# Patient Record
Sex: FEMALE | Race: WHITE | NOT HISPANIC OR LATINO | Employment: FULL TIME | URBAN - METROPOLITAN AREA
[De-identification: names, ages, dates, MRNs, and addresses within clinical notes are randomized per-mention and may not be internally consistent; named-entity substitution may affect disease eponyms.]

---

## 2020-09-10 ENCOUNTER — APPOINTMENT (OUTPATIENT)
Dept: OTHER | Facility: HOSPITAL | Age: 40
End: 2020-09-10

## 2020-09-22 ENCOUNTER — APPOINTMENT (OUTPATIENT)
Dept: LAB | Facility: HOSPITAL | Age: 40
End: 2020-09-22

## 2024-09-11 ENCOUNTER — OFFICE VISIT (OUTPATIENT)
Dept: SURGERY | Facility: CLINIC | Age: 44
End: 2024-09-11
Payer: COMMERCIAL

## 2024-09-11 VITALS
RESPIRATION RATE: 16 BRPM | BODY MASS INDEX: 26.66 KG/M2 | DIASTOLIC BLOOD PRESSURE: 72 MMHG | WEIGHT: 160 LBS | HEART RATE: 72 BPM | SYSTOLIC BLOOD PRESSURE: 118 MMHG | HEIGHT: 65 IN

## 2024-09-11 DIAGNOSIS — F17.290 OTHER TOBACCO PRODUCT NICOTINE DEPENDENCE, UNCOMPLICATED: ICD-10-CM

## 2024-09-11 DIAGNOSIS — Z72.0 TOBACCO USE: ICD-10-CM

## 2024-09-11 DIAGNOSIS — N63.14 MASS OF LOWER INNER QUADRANT OF RIGHT BREAST: Primary | ICD-10-CM

## 2024-09-11 DIAGNOSIS — N63.10 LARGE MASS OF RIGHT BREAST: Primary | ICD-10-CM

## 2024-09-11 RX ORDER — DIPHENHYDRAMINE HCL 25 MG
25 CAPSULE ORAL NIGHTLY PRN
COMMUNITY

## 2024-09-11 NOTE — PROGRESS NOTES
Suzanne Lin 44 y.o. female presents @ the req of PHIL Quevedo for eval of RIGHT breast mass.  Pt noticed on 2021 but lost her ins and did not seek medical attention.  Reports mass started the size of a pebble. + pain + discoloration.  Chief Complaint   Patient presents with    Breast Mass     RIGHT             HPI   Above note agree.  This very pleasant 44-year-old female is here with a very large breast mass.  She has 1 child.  She gave birth at age 34.  She did not breast-feed.  She is a former smoker.  She does use nicotine patches at this time.  She has no family history of breast cancer.  She also has Crohn's disease.  She did have a colon resection in 2007.  She has been managing that with diet since that time and has been doing quite well.  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  That was 10 years ago.  She has no chest pain or shortness of breath.  She has no bone pain.  She does have hip pain but that has been an ongoing thing since she was in her 20s.      Review of Systems   All other systems reviewed and are negative.            Current Outpatient Medications:     diphenhydrAMINE (BENADRYL) 25 mg capsule, Take 1 capsule by mouth At Night As Needed for Itching., Disp: , Rfl:     Multiple Vitamins-Minerals (MULTI COMPLETE PO), Take  by mouth., Disp: , Rfl:         No Known Allergies        Past Medical History:   Diagnosis Date    Anxiety     Crohn's disease     DVT (deep vein thrombosis) in pregnancy     Stroke     after the birth of her child at age 34           Past Surgical History:   Procedure Laterality Date    COLON RESECTION      SKIN CANCER EXCISION             Social History     Tobacco Use    Smoking status: Former     Types: Cigarettes    Smokeless tobacco: Current    Tobacco comments:     Nicotine pouches   Vaping Use    Vaping status: Never Used             There is no immunization history on file for this patient.        Physical Exam  Vitals  "and nursing note reviewed.   Constitutional:       Appearance: Normal appearance.   HENT:      Head: Normocephalic and atraumatic.   Cardiovascular:      Rate and Rhythm: Normal rate and regular rhythm.   Pulmonary:      Effort: Pulmonary effort is normal.      Breath sounds: Normal breath sounds.   Chest:   Breasts:     Left: Normal.       Abdominal:      General: Bowel sounds are normal.      Palpations: Abdomen is soft.   Lymphadenopathy:      Upper Body:      Right upper body: No supraclavicular or axillary adenopathy.      Left upper body: No supraclavicular or axillary adenopathy.   Skin:     General: Skin is warm and dry.   Neurological:      General: No focal deficit present.      Mental Status: She is alert and oriented to person, place, and time.   Psychiatric:         Mood and Affect: Mood normal.         Behavior: Behavior normal.         Debilities/Disabilities Identified: None    Emotional Behavior: Appropriate    I discussed with Suzanne the benefits risks for doing a Jean Paul-Cut biopsy in the office.  After she signed informed consent the area was prepped and draped in usual sterile fashion.  Local was then injected and incision was made using an 11 blade scalpel.  Using the Jean Paul-Cut several specimens were obtained.  Hemostasis was perfected with pressure and a sterile bandage was applied.  Suzanne tolerated this well without any issues.    She had a diagnostic mammogram yesterday at [facility] that shows a BI-RADS 5 mass measuring 8.7 x 5.4 x 6.0 cm      /72   Pulse 72   Resp 16   Ht 165.1 cm (65\")   Wt 72.6 kg (160 lb)   BMI 26.63 kg/m²         Diagnoses and all orders for this visit:    1. Mass of lower inner quadrant of right breast (Primary)    This is highly suspicious for breast cancer.  I am going to start staging with a CT scan of the chest/abdomen/pelvis.  Hopefully the pathology comes back quickly.  I will make further recommendations after her pathology and scans come " back.    Thank you for allowing me to participate in the care of this interesting patient.

## 2024-09-11 NOTE — LETTER
September 11, 2024       No Recipients    Patient: Suzanne Lin   YOB: 1980   Date of Visit: 9/11/2024     Dear PHIL Gaines:       Thank you for referring Suzanne Lin to me for evaluation. Below are the relevant portions of my assessment and plan of care.    If you have questions, please do not hesitate to call me. I look forward to following Suzanne along with you.         Sincerely,        Rebekah Garcia DO        CC:   No Recipients    Rebekah Garcia DO  09/11/24 1612  Sign when Signing Visit  Suzanne Lin 44 y.o. female presents @ the req of PHIL Quevedo for eval of RIGHT breast mass.  Pt noticed on 2021 but lost her ins and did not seek medical attention.  Reports mass started the size of a pebble. + pain + discoloration.  Chief Complaint   Patient presents with   • Breast Mass     RIGHT             HPI   Above note agree.  This very pleasant 44-year-old female is here with a very large breast mass.  She has 1 child.  She gave birth at age 34.  She did not breast-feed.  She is a former smoker.  She does use nicotine patches at this time.  She has no family history of breast cancer.  She also has Crohn's disease.  She did have a colon resection in 2007.  She has been managing that with diet since that time and has been doing quite well.  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  That was 10 years ago.  She has no chest pain or shortness of breath.  She has no bone pain.  She does have hip pain but that has been an ongoing thing since she was in her 20s.      Review of Systems   All other systems reviewed and are negative.            Current Outpatient Medications:   •  diphenhydrAMINE (BENADRYL) 25 mg capsule, Take 1 capsule by mouth At Night As Needed for Itching., Disp: , Rfl:   •  Multiple Vitamins-Minerals (MULTI COMPLETE PO), Take  by mouth., Disp: , Rfl:         No Known Allergies        Past Medical History:   Diagnosis Date    • Anxiety    • Crohn's disease    • DVT (deep vein thrombosis) in pregnancy    • Stroke     after the birth of her child at age 34           Past Surgical History:   Procedure Laterality Date   • COLON RESECTION     • SKIN CANCER EXCISION             Social History     Tobacco Use   • Smoking status: Former     Types: Cigarettes   • Smokeless tobacco: Current   • Tobacco comments:     Nicotine pouches   Vaping Use   • Vaping status: Never Used             There is no immunization history on file for this patient.        Physical Exam  Vitals and nursing note reviewed.   Constitutional:       Appearance: Normal appearance.   HENT:      Head: Normocephalic and atraumatic.   Cardiovascular:      Rate and Rhythm: Normal rate and regular rhythm.   Pulmonary:      Effort: Pulmonary effort is normal.      Breath sounds: Normal breath sounds.   Chest:   Breasts:     Left: Normal.       Abdominal:      General: Bowel sounds are normal.      Palpations: Abdomen is soft.   Lymphadenopathy:      Upper Body:      Right upper body: No supraclavicular or axillary adenopathy.      Left upper body: No supraclavicular or axillary adenopathy.   Skin:     General: Skin is warm and dry.   Neurological:      General: No focal deficit present.      Mental Status: She is alert and oriented to person, place, and time.   Psychiatric:         Mood and Affect: Mood normal.         Behavior: Behavior normal.         Debilities/Disabilities Identified: None    Emotional Behavior: Appropriate    I discussed with Suzanne the benefits risks for doing a Jean Paul-Cut biopsy in the office.  After she signed informed consent the area was prepped and draped in usual sterile fashion.  Local was then injected and incision was made using an 11 blade scalpel.  Using the Jean Paul-Cut several specimens were obtained.  Hemostasis was perfected with pressure and a sterile bandage was applied.  Suzanne tolerated this well without any issues.    She had a diagnostic mammogram  "yesterday at [facility] that shows a BI-RADS 5 mass measuring 8.7 x 5.4 x 6.0 cm      /72   Pulse 72   Resp 16   Ht 165.1 cm (65\")   Wt 72.6 kg (160 lb)   BMI 26.63 kg/m²         Diagnoses and all orders for this visit:    1. Mass of lower inner quadrant of right breast (Primary)    This is highly suspicious for breast cancer.  I am going to start staging with a CT scan of the chest/abdomen/pelvis.  Hopefully the pathology comes back quickly.  I will make further recommendations after her pathology and scans come back.    Thank you for allowing me to participate in the care of this interesting patient.         "

## 2024-09-13 ENCOUNTER — PATIENT ROUNDING (BHMG ONLY) (OUTPATIENT)
Dept: SURGERY | Facility: CLINIC | Age: 44
End: 2024-09-13
Payer: COMMERCIAL

## 2024-09-13 NOTE — PROGRESS NOTES
September 13, 2024    Hello, may I speak with Suzanne Lin?    My name is Thaddeus      I am  with [facility]  [address] [place] [id number] [phone].    Before we get started may I verify your date of birth? 1980    I am calling to officially welcome you to our practice and ask about your recent visit. Is this a good time to talk? yes    Tell me about your visit with us. What things went well?  Everything went well.        We're always looking for ways to make our patients' experiences even better. Do you have recommendations on ways we may improve?  no    Overall were you satisfied with your first visit to our practice? yes       I appreciate you taking the time to speak with me today. Is there anything else I can do for you? no      Thank you, and have a great day.

## 2024-09-16 ENCOUNTER — HOSPITAL ENCOUNTER (OUTPATIENT)
Dept: CT IMAGING | Facility: HOSPITAL | Age: 44
Discharge: HOME | End: 2024-09-16
Admitting: SURGERY
Payer: COMMERCIAL

## 2024-09-16 DIAGNOSIS — N63.10 LARGE MASS OF RIGHT BREAST: ICD-10-CM

## 2024-09-16 PROCEDURE — 74177 CT ABD & PELVIS W/CONTRAST: CPT

## 2024-09-16 PROCEDURE — 25510000001 IOPAMIDOL PER 1 ML: Performed by: SURGERY

## 2024-09-16 PROCEDURE — 71260 CT THORAX DX C+: CPT

## 2024-09-16 RX ORDER — IOPAMIDOL 755 MG/ML
100 INJECTION, SOLUTION INTRAVASCULAR
Status: COMPLETED | OUTPATIENT
Start: 2024-09-16 | End: 2024-09-16

## 2024-09-16 RX ADMIN — IOPAMIDOL 100 ML: 755 INJECTION, SOLUTION INTRAVENOUS at 16:04

## 2024-09-17 DIAGNOSIS — C50.911 MALIGNANT NEOPLASM OF RIGHT BREAST IN FEMALE, ESTROGEN RECEPTOR POSITIVE, UNSPECIFIED SITE OF BREAST: Primary | ICD-10-CM

## 2024-09-17 DIAGNOSIS — Z17.0 MALIGNANT NEOPLASM OF RIGHT BREAST IN FEMALE, ESTROGEN RECEPTOR POSITIVE, UNSPECIFIED SITE OF BREAST: Primary | ICD-10-CM

## 2024-09-17 LAB
DX ICD CODE: NORMAL
PATH REPORT.FINAL DX SPEC: NORMAL
PATH REPORT.GROSS SPEC: NORMAL
PATH REPORT.SITE OF ORIGIN SPEC: NORMAL
PATHOLOGIST NAME: NORMAL
PAYMENT PROCEDURE: NORMAL

## 2024-09-18 ENCOUNTER — OFFICE VISIT (OUTPATIENT)
Dept: CARDIOLOGY | Facility: CLINIC | Age: 44
End: 2024-09-18
Payer: COMMERCIAL

## 2024-09-18 VITALS
DIASTOLIC BLOOD PRESSURE: 76 MMHG | WEIGHT: 156 LBS | HEART RATE: 87 BPM | BODY MASS INDEX: 25.99 KG/M2 | SYSTOLIC BLOOD PRESSURE: 111 MMHG | HEIGHT: 65 IN

## 2024-09-18 DIAGNOSIS — Q21.12 PFO (PATENT FORAMEN OVALE): Primary | ICD-10-CM

## 2024-09-18 DIAGNOSIS — Z01.810 ENCOUNTER FOR PRE-OPERATIVE CARDIOVASCULAR CLEARANCE: Primary | ICD-10-CM

## 2024-09-18 DIAGNOSIS — R00.2 PALPITATIONS: ICD-10-CM

## 2024-09-18 PROCEDURE — 93000 ELECTROCARDIOGRAM COMPLETE: CPT | Performed by: INTERNAL MEDICINE

## 2024-09-18 PROCEDURE — 99203 OFFICE O/P NEW LOW 30 MIN: CPT | Performed by: INTERNAL MEDICINE

## 2024-09-18 RX ORDER — BUPROPION HYDROCHLORIDE 150 MG/1
TABLET ORAL
COMMUNITY
Start: 2024-09-13

## 2024-09-20 ENCOUNTER — HOSPITAL ENCOUNTER (OUTPATIENT)
Dept: CARDIOLOGY | Facility: HOSPITAL | Age: 44
Discharge: HOME | End: 2024-09-20
Admitting: INTERNAL MEDICINE
Payer: COMMERCIAL

## 2024-09-20 PROCEDURE — 93017 CV STRESS TEST TRACING ONLY: CPT

## 2024-09-23 ENCOUNTER — OFFICE VISIT (OUTPATIENT)
Dept: SURGERY | Facility: CLINIC | Age: 44
End: 2024-09-23
Payer: COMMERCIAL

## 2024-09-23 ENCOUNTER — TELEPHONE (OUTPATIENT)
Dept: ONCOLOGY | Facility: CLINIC | Age: 44
End: 2024-09-23
Payer: COMMERCIAL

## 2024-09-23 DIAGNOSIS — C50.911 MALIGNANT NEOPLASM OF RIGHT BREAST IN FEMALE, ESTROGEN RECEPTOR POSITIVE, UNSPECIFIED SITE OF BREAST: Primary | ICD-10-CM

## 2024-09-23 DIAGNOSIS — Z17.0 MALIGNANT NEOPLASM OF RIGHT BREAST IN FEMALE, ESTROGEN RECEPTOR POSITIVE, UNSPECIFIED SITE OF BREAST: Primary | ICD-10-CM

## 2024-09-23 DIAGNOSIS — C50.911 RIGHT BREAST CANCER WITH T3 TUMOR, >5 CM IN GREATEST DIMENSION: Primary | ICD-10-CM

## 2024-09-23 PROCEDURE — 99213 OFFICE O/P EST LOW 20 MIN: CPT | Performed by: SURGERY

## 2024-09-24 ENCOUNTER — HOSPITAL ENCOUNTER (OUTPATIENT)
Dept: CARDIOLOGY | Facility: HOSPITAL | Age: 44
Discharge: HOME | End: 2024-09-24
Admitting: INTERNAL MEDICINE
Payer: COMMERCIAL

## 2024-09-24 VITALS
WEIGHT: 156.09 LBS | SYSTOLIC BLOOD PRESSURE: 106 MMHG | DIASTOLIC BLOOD PRESSURE: 75 MMHG | OXYGEN SATURATION: 100 % | BODY MASS INDEX: 26.01 KG/M2 | HEIGHT: 65 IN | HEART RATE: 81 BPM

## 2024-09-24 DIAGNOSIS — Q21.12 PFO (PATENT FORAMEN OVALE): ICD-10-CM

## 2024-09-24 DIAGNOSIS — R00.2 PALPITATIONS: ICD-10-CM

## 2024-09-24 PROCEDURE — 25510000001 PERFLUTREN 6.52 MG/ML SUSPENSION 2 ML VIAL: Performed by: INTERNAL MEDICINE

## 2024-09-24 PROCEDURE — 93306 TTE W/DOPPLER COMPLETE: CPT

## 2024-09-24 PROCEDURE — 93306 TTE W/DOPPLER COMPLETE: CPT | Performed by: INTERNAL MEDICINE

## 2024-09-24 RX ADMIN — SODIUM CHLORIDE 2 ML: 9 INJECTION INTRAMUSCULAR; INTRAVENOUS; SUBCUTANEOUS at 15:38

## 2024-09-26 LAB
AORTIC ARCH: 2.2 CM
AORTIC DIMENSIONLESS INDEX: 1.2 (DI)
BH CV ECHO MEAS - AO MAX PG: 4.4 MMHG
BH CV ECHO MEAS - AO MEAN PG: 2.6 MMHG
BH CV ECHO MEAS - AO V2 MAX: 104.7 CM/SEC
BH CV ECHO MEAS - AO V2 VTI: 18.1 CM
BH CV ECHO MEAS - AVA(I,D): 3 CM2
BH CV ECHO MEAS - EDV(CUBED): 84.6 ML
BH CV ECHO MEAS - EDV(MOD-SP2): 96 ML
BH CV ECHO MEAS - EDV(MOD-SP4): 99 ML
BH CV ECHO MEAS - EF(MOD-BP): 68 %
BH CV ECHO MEAS - EF(MOD-SP2): 69.8 %
BH CV ECHO MEAS - EF(MOD-SP4): 68.7 %
BH CV ECHO MEAS - ESV(CUBED): 27.8 ML
BH CV ECHO MEAS - ESV(MOD-SP2): 29 ML
BH CV ECHO MEAS - ESV(MOD-SP4): 31 ML
BH CV ECHO MEAS - FS: 31 %
BH CV ECHO MEAS - IVS/LVPW: 0.86 CM
BH CV ECHO MEAS - IVSD: 0.77 CM
BH CV ECHO MEAS - LA A2CS (ATRIAL LENGTH): 4.4 CM
BH CV ECHO MEAS - LA A4C LENGTH: 3.8 CM
BH CV ECHO MEAS - LAT PEAK E' VEL: 16.4 CM/SEC
BH CV ECHO MEAS - LV DIASTOLIC VOL/BSA (35-75): 55.6 CM2
BH CV ECHO MEAS - LV MASS(C)D: 115.5 GRAMS
BH CV ECHO MEAS - LV MAX PG: 5.2 MMHG
BH CV ECHO MEAS - LV MEAN PG: 2.5 MMHG
BH CV ECHO MEAS - LV SYSTOLIC VOL/BSA (12-30): 17.4 CM2
BH CV ECHO MEAS - LV V1 MAX: 114.4 CM/SEC
BH CV ECHO MEAS - LV V1 VTI: 21.3 CM
BH CV ECHO MEAS - LVIDD: 4.4 CM
BH CV ECHO MEAS - LVIDS: 3 CM
BH CV ECHO MEAS - LVOT AREA: 2.6 CM2
BH CV ECHO MEAS - LVOT DIAM: 1.8 CM
BH CV ECHO MEAS - LVPWD: 0.9 CM
BH CV ECHO MEAS - MED PEAK E' VEL: 13.9 CM/SEC
BH CV ECHO MEAS - MV A DUR: 0.12 SEC
BH CV ECHO MEAS - MV A MAX VEL: 66.4 CM/SEC
BH CV ECHO MEAS - MV DEC SLOPE: 462.5 CM/SEC2
BH CV ECHO MEAS - MV DEC TIME: 0.17 SEC
BH CV ECHO MEAS - MV E MAX VEL: 84.8 CM/SEC
BH CV ECHO MEAS - MV E/A: 1.28
BH CV ECHO MEAS - MV MAX PG: 3.6 MMHG
BH CV ECHO MEAS - MV MEAN PG: 1.34 MMHG
BH CV ECHO MEAS - MV P1/2T: 58.9 MSEC
BH CV ECHO MEAS - MV V2 VTI: 22 CM
BH CV ECHO MEAS - MVA(P1/2T): 3.7 CM2
BH CV ECHO MEAS - MVA(VTI): 2.47 CM2
BH CV ECHO MEAS - PULM A REVS DUR: 0.11 SEC
BH CV ECHO MEAS - PULM A REVS VEL: 25.3 CM/SEC
BH CV ECHO MEAS - PULM DIAS VEL: 38.6 CM/SEC
BH CV ECHO MEAS - PULM S/D: 1.53
BH CV ECHO MEAS - PULM SYS VEL: 59.1 CM/SEC
BH CV ECHO MEAS - SV(LVOT): 54.5 ML
BH CV ECHO MEAS - SV(MOD-SP2): 67 ML
BH CV ECHO MEAS - SV(MOD-SP4): 68 ML
BH CV ECHO MEAS - SVI(LVOT): 30.6 ML/M2
BH CV ECHO MEAS - SVI(MOD-SP2): 37.6 ML/M2
BH CV ECHO MEAS - SVI(MOD-SP4): 38.2 ML/M2
BH CV ECHO MEAS - TAPSE (>1.6): 1.68 CM
BH CV ECHO MEASUREMENTS AVERAGE E/E' RATIO: 5.6
BH CV ECHO SHUNT ASSESSMENT PERFORMED (HIDDEN SCRIPTING): 1
BH CV XLRA - RV BASE: 2.7 CM
BH CV XLRA - RV LENGTH: 6.6 CM
BH CV XLRA - RV MID: 1.62 CM
BH CV XLRA - TDI S': 12.2 CM/SEC
LEFT ATRIUM VOLUME INDEX: 12.1 ML/M2

## 2024-09-27 LAB
BH CV STRESS BP STAGE 1: NORMAL
BH CV STRESS BP STAGE 2: NORMAL
BH CV STRESS DURATION MIN STAGE 1: 3
BH CV STRESS DURATION MIN STAGE 2: 1
BH CV STRESS DURATION SEC STAGE 1: 0
BH CV STRESS DURATION SEC STAGE 2: 59
BH CV STRESS GRADE STAGE 1: 10
BH CV STRESS GRADE STAGE 2: 12
BH CV STRESS HR STAGE 1: 146
BH CV STRESS HR STAGE 2: 164
BH CV STRESS METS STAGE 1: 5
BH CV STRESS METS STAGE 2: 7.5
BH CV STRESS PROTOCOL 1: NORMAL
BH CV STRESS RECOVERY BP: NORMAL MMHG
BH CV STRESS RECOVERY HR: 108 BPM
BH CV STRESS SPEED STAGE 1: 1.7
BH CV STRESS SPEED STAGE 2: 2.5
BH CV STRESS STAGE 1: 1
BH CV STRESS STAGE 2: 2
MAXIMAL PREDICTED HEART RATE: 176 BPM
PERCENT MAX PREDICTED HR: 1.14 %
STRESS BASELINE BP: NORMAL MMHG
STRESS BASELINE HR: 108 BPM
STRESS PERCENT HR: 1 %
STRESS POST ESTIMATED WORKLOAD: 7 METS
STRESS POST EXERCISE DUR MIN: 4 MIN
STRESS POST EXERCISE DUR SEC: 59 SEC
STRESS POST PEAK HR: 2 BPM
STRESS TARGET HR: 150 BPM

## 2024-09-28 PROBLEM — R00.2 PALPITATIONS: Status: ACTIVE | Noted: 2024-09-28

## 2024-09-30 ENCOUNTER — TELEPHONE (OUTPATIENT)
Dept: SURGERY | Facility: CLINIC | Age: 44
End: 2024-09-30
Payer: COMMERCIAL

## 2024-09-30 ENCOUNTER — HOSPITAL ENCOUNTER (OUTPATIENT)
Dept: PET IMAGING | Facility: HOSPITAL | Age: 44
Discharge: HOME | End: 2024-09-30
Payer: COMMERCIAL

## 2024-09-30 ENCOUNTER — TELEPHONE (OUTPATIENT)
Dept: CARDIOLOGY | Facility: CLINIC | Age: 44
End: 2024-09-30

## 2024-09-30 DIAGNOSIS — C50.911 MALIGNANT NEOPLASM OF RIGHT BREAST IN FEMALE, ESTROGEN RECEPTOR POSITIVE, UNSPECIFIED SITE OF BREAST: ICD-10-CM

## 2024-09-30 DIAGNOSIS — Z17.0 MALIGNANT NEOPLASM OF RIGHT BREAST IN FEMALE, ESTROGEN RECEPTOR POSITIVE, UNSPECIFIED SITE OF BREAST: ICD-10-CM

## 2024-09-30 LAB — GLUCOSE BLDC GLUCOMTR-MCNC: 85 MG/DL (ref 70–130)

## 2024-09-30 PROCEDURE — 0 FLUDEOXYGLUCOSE F18 SOLUTION: Performed by: SURGERY

## 2024-09-30 PROCEDURE — 78815 PET IMAGE W/CT SKULL-THIGH: CPT

## 2024-09-30 PROCEDURE — A9552 F18 FDG: HCPCS | Performed by: SURGERY

## 2024-09-30 PROCEDURE — 82948 REAGENT STRIP/BLOOD GLUCOSE: CPT

## 2024-09-30 RX ADMIN — FLUDEOXYGLUCOSE F 18 1 DOSE: 200 INJECTION, SOLUTION INTRAVENOUS at 10:00

## 2024-09-30 NOTE — TELEPHONE ENCOUNTER
Caller: SHERLY MUJICAIE     Relationship: SELF     Best call back number: [phone]     What is the best time to reach you:  ANYTIME     Who are you requesting to speak with (clinical staff, provider,  specific staff member):  CRUZITO CALHOUN     What was the call regarding:  WANTS TO MAKE SURE THATPET SCAN RESULTS WILL BE AVAILABLE  TO HER BEFORE SHE DRIVES 5 HOURS TO APPOINTMENT     Is it okay if the provider responds through MyChart:  PREFERS PHONE CALL

## 2024-09-30 NOTE — TELEPHONE ENCOUNTER
Caller: Suzanne Lin    Relationship: Self    Best call back number: [phone]    What was the call regarding: PATIENT HAS FU ON 10.01.24 AND WANTS TO KNOW IF SHE CAN DO TELEHEALTH OVER THE PHONE SINCE ITS JUST TO GO OVER TEST RESULTS. PLEASE ADVISE.

## 2024-10-01 ENCOUNTER — OFFICE VISIT (OUTPATIENT)
Dept: CARDIOLOGY | Facility: CLINIC | Age: 44
End: 2024-10-01
Payer: COMMERCIAL

## 2024-10-01 VITALS
WEIGHT: 154 LBS | HEART RATE: 88 BPM | BODY MASS INDEX: 25.66 KG/M2 | HEIGHT: 65 IN | DIASTOLIC BLOOD PRESSURE: 71 MMHG | SYSTOLIC BLOOD PRESSURE: 96 MMHG

## 2024-10-01 DIAGNOSIS — R00.2 PALPITATIONS: ICD-10-CM

## 2024-10-01 DIAGNOSIS — Z01.818 PRE-OP EVALUATION: Primary | ICD-10-CM

## 2024-10-01 DIAGNOSIS — Q21.12 PFO (PATENT FORAMEN OVALE): ICD-10-CM

## 2024-10-01 PROCEDURE — 99214 OFFICE O/P EST MOD 30 MIN: CPT | Performed by: NURSE PRACTITIONER

## 2024-10-01 RX ORDER — TRAZODONE HYDROCHLORIDE 50 MG/1
50 TABLET, FILM COATED ORAL AS NEEDED
COMMUNITY
Start: 2024-09-26

## 2024-10-01 NOTE — PROGRESS NOTES
Subjective:        Suzanne Lin is a 44 y.o. female who here for follow up    Chief Complaint   Patient presents with    Follow-up     TEST  RESULTS       HPI      Suzanne Lin is a 44-year-old female who is here today for test results who needs surgical clearance.  She has a history of 2 strokes after pregnancy, breast cancer on right, PFO, Crohn's disease, DVTs and history of anxiety.    9/24/2024 echo: EF 66 to 70%, LV diastolic function is normal.  Saline test are positive for right-to-left atrial level shunt.  Large PFO.    9/20/2024 stress test indicated no ECG evidence of myocardial ischemia.  Manage clinical evidence of myocardial ischemia.  Shortness of breath on exertion.      The following portions of the patient's history were reviewed and updated as appropriate: allergies, current medications, past family history, past medical history, past social history, past surgical history and problem list.    Past Medical History:   Diagnosis Date    Anxiety     Breast cancer 2024    Right    Crohn's disease     DVT (deep vein thrombosis) in pregnancy     Stroke     after the birth of her child at age 34         reports that she has quit smoking. Her smoking use included cigarettes. She uses smokeless tobacco. She reports current alcohol use. She reports that she does not use drugs.     Family History   Problem Relation Age of Onset    Diabetes Mother     Heart disease Father     Diabetes Paternal Grandmother        ROS     Review of Systems  Constitutional: No wt loss, fever, fatigue  Gastrointestinal: No nausea, abdominal pain  Behavioral/Psych: No insomnia or anxiety  Cardiovascular no chest pain or shortness of breath.      Objective:           Vitals and nursing note reviewed.   Constitutional:       Appearance: Well-developed.   HENT:      Head: Normocephalic.      Right Ear: External ear normal.      Left Ear: External ear normal.   Neck:      Vascular: No JVD.   Pulmonary:      Effort: Pulmonary effort is  normal. No respiratory distress.      Breath sounds: Normal breath sounds. No stridor. No rales.   Cardiovascular:      Normal rate. Regular rhythm.      No gallop.    Pulses:     Intact distal pulses.   Edema:     Peripheral edema absent.   Abdominal:      General: Bowel sounds are normal. There is no distension.      Palpations: Abdomen is soft.      Tenderness: There is no abdominal tenderness. There is no guarding.   Musculoskeletal: Normal range of motion.         General: No tenderness.      Cervical back: Normal range of motion. Skin:     General: Skin is warm.   Neurological:      Mental Status: Alert and oriented to person, place, and time.      Deep Tendon Reflexes: Reflexes are normal and symmetric.   Psychiatric:         Judgment: Judgment normal.       Procedures      Interpretation Summary         No ECG evidence of myocardial ischemia.    Indeterminate clinical evidence of myocardial ischemia.    Findings consistent with a normal ECG stress test.    No ECG evidence of myocardial ischemia. Indeterminate clinical evidence of myocardial ischemia. Findings consistent with a normal ECG stress test.    Sob on excertion    Interpretation Summary         Left ventricular ejection fraction appears to be 66 - 70%.    Left ventricular diastolic function was normal.    Saline test results are positive for right to left atrial level shunt.    LARGE PFO         Current Outpatient Medications:     buPROPion XL (WELLBUTRIN XL) 150 MG 24 hr tablet, , Disp: , Rfl:     diphenhydrAMINE (BENADRYL) 25 mg capsule, Take 1 capsule by mouth At Night As Needed for Itching., Disp: , Rfl:     Multiple Vitamins-Minerals (MULTI COMPLETE PO), Take  by mouth., Disp: , Rfl:     traZODone (DESYREL) 50 MG tablet, , Disp: , Rfl:      Assessment:        Patient Active Problem List   Diagnosis    PFO (patent foramen ovale)    Palpitations               Plan:   1.  Test results and in need of cardiac clearance for right breast cancer and in  need of mastectomy.  Large PFO on echo and stress testing shortness of breath with exertion.  Will discuss with Dr. Nava.    2.  Palpitations: Controlled             No diagnosis found.    There are no diagnoses linked to this encounter.    COUNSELING: dina Adams was given to patient for the following topics: diagnostic results, risk factor reductions, impressions, risks and benefits of treatment options and importance of treatment compliance .       SMOKING COUNSELING: denies    Will discuss results with Dr. Nava.    Addendum: Discussed with Dr. Nava and she will start asa 81 mg and Dr. Nava discussed with her ok for surgery. She will need to be needed treated for DVT prophylaxis. She will follow up in approximately 1 month and will be set up with physician for pfo closure.    Sincerely,   PHIL Richards  [facility]  10/01/24  10:54 EDT    EMR Dragon/Transcription disclaimer: Dictated utilizing Dragon Dictation

## 2024-10-02 ENCOUNTER — OFFICE VISIT (OUTPATIENT)
Dept: SURGERY | Facility: CLINIC | Age: 44
End: 2024-10-02
Payer: COMMERCIAL

## 2024-10-02 VITALS
SYSTOLIC BLOOD PRESSURE: 110 MMHG | RESPIRATION RATE: 16 BRPM | HEART RATE: 72 BPM | DIASTOLIC BLOOD PRESSURE: 72 MMHG | BODY MASS INDEX: 25.99 KG/M2 | HEIGHT: 65 IN | WEIGHT: 156 LBS

## 2024-10-02 DIAGNOSIS — C50.911 BREAST CANCER, STAGE 3, RIGHT: Primary | ICD-10-CM

## 2024-10-02 PROCEDURE — 99214 OFFICE O/P EST MOD 30 MIN: CPT | Performed by: SURGERY

## 2024-10-02 RX ORDER — ACETAMINOPHEN 500 MG
1000 TABLET ORAL ONCE
OUTPATIENT
Start: 2024-10-02 | End: 2024-10-02

## 2024-10-02 RX ORDER — HEPARIN SODIUM 5000 [USP'U]/ML
5000 INJECTION, SOLUTION INTRAVENOUS; SUBCUTANEOUS EVERY 8 HOURS SCHEDULED
OUTPATIENT
Start: 2024-10-02

## 2024-10-02 RX ORDER — CEFAZOLIN SODIUM 1 G/3ML
2 INJECTION, POWDER, FOR SOLUTION INTRAMUSCULAR; INTRAVENOUS ONCE
OUTPATIENT
Start: 2024-10-02

## 2024-10-02 RX ORDER — CELECOXIB 100 MG/1
200 CAPSULE ORAL ONCE
OUTPATIENT
Start: 2024-10-02 | End: 2024-10-02

## 2024-10-02 RX ORDER — SCOLOPAMINE TRANSDERMAL SYSTEM 1 MG/1
1 PATCH, EXTENDED RELEASE TRANSDERMAL CONTINUOUS
OUTPATIENT
Start: 2024-10-02 | End: 2024-10-05

## 2024-10-02 RX ORDER — GABAPENTIN 100 MG/1
600 CAPSULE ORAL ONCE
OUTPATIENT
Start: 2024-10-02 | End: 2024-10-02

## 2024-10-02 NOTE — H&P (VIEW-ONLY)
"Suzanne Lin 44 y.o. female presents for FU/discuss PET scan and discuss surg.  Pt's Aunt Becky is with pt today and pt grants permission to speak freely.  Pt's mother, lauryn is on speaker phone and pt gives permission to speak freely.       HPI   Above-noted agree.  Suzanne is here following up from her PET scan.  She does have suspicious axillary lymph nodes but no metastatic lymph nodes.  We discussed that she is a T4 cN1 M0 which would be a stage IIIb cancer with an 85% 5-year survival rate.  We discussed we would need to do a modified radical mastectomy since she has positive axillary lymph nodes.  She requested that her other breast be removed as well.      Review of Systems   All other systems reviewed and are negative.          Past Medical History:   Diagnosis Date    Anxiety     Breast cancer     Right    Crohn's disease     DVT (deep vein thrombosis) in pregnancy     Stroke     after the birth of her child at age 34           Past Surgical History:   Procedure Laterality Date     SECTION      COLON RESECTION      SKIN CANCER EXCISION             Physical Exam  Vitals and nursing note reviewed.   Constitutional:       Appearance: Normal appearance.   Cardiovascular:      Rate and Rhythm: Normal rate and regular rhythm.   Pulmonary:      Effort: Pulmonary effort is normal.      Breath sounds: Normal breath sounds.   Abdominal:      General: Bowel sounds are normal.      Palpations: Abdomen is soft.   Neurological:      General: No focal deficit present.      Mental Status: She is alert and oriented to person, place, and time.   Psychiatric:         Mood and Affect: Mood normal.         Behavior: Behavior normal.         No debilities noted    /72   Pulse 72   Resp 16   Ht 165.1 cm (65\")   Wt 70.8 kg (156 lb)   BMI 25.96 kg/m²         Diagnoses and all orders for this visit:    1. Breast cancer, stage 3, right (Primary)    I discussed with Suzanne and her aunt and mom the benefits and risks of " a modified radical mastectomy with axillary dissection of right breast and a simple mastectomy left breast.  We discussed the benefits risks and limited to including: Bleeding, infection, failure to close the wound requiring wound VAC, injury to axillary nerves, lymphedema of the arm, formation of a hematoma or seroma, anesthesia complications.  All of their questions were answered and she is willing to proceed.    Thank you for allowing me to participate in the care of this interesting patient.

## 2024-10-02 NOTE — LETTER
2024       No Recipients    Patient: Suzanne Lin   YOB: 1980   Date of Visit: 10/2/2024     Dear PHIL Gaines:       Thank you for referring Suzanne Lin to me for evaluation. Below are the relevant portions of my assessment and plan of care.    If you have questions, please do not hesitate to call me. I look forward to following Suzanne along with you.         Sincerely,        Rebekah Garcia DO        CC:   No Recipients    Rebekah Garcia DO  10/02/24 1557  Sign when Signing Visit  Suzanne Lin 44 y.o. female presents for FU/discuss PET scan and discuss surg.  Pt's Aunt Becky is with pt today and pt grants permission to speak freely.  Pt's mother, lauryn is on speaker phone and pt gives permission to speak freely.       HPI   Above-noted agree.  Suzanne is here following up from her PET scan.  She does have suspicious axillary lymph nodes but no metastatic lymph nodes.  We discussed that she is a T4 cN1 M0 which would be a stage IIIb cancer with an 85% 5-year survival rate.  We discussed we would need to do a modified radical mastectomy since she has positive axillary lymph nodes.  She requested that her other breast be removed as well.      Review of Systems   All other systems reviewed and are negative.          Past Medical History:   Diagnosis Date   • Anxiety    • Breast cancer     Right   • Crohn's disease    • DVT (deep vein thrombosis) in pregnancy    • Stroke     after the birth of her child at age 34           Past Surgical History:   Procedure Laterality Date   •  SECTION     • COLON RESECTION     • SKIN CANCER EXCISION             Physical Exam  Vitals and nursing note reviewed.   Constitutional:       Appearance: Normal appearance.   Cardiovascular:      Rate and Rhythm: Normal rate and regular rhythm.   Pulmonary:      Effort: Pulmonary effort is normal.      Breath sounds: Normal breath sounds.   Abdominal:      General: Bowel sounds are normal.      " Palpations: Abdomen is soft.   Neurological:      General: No focal deficit present.      Mental Status: She is alert and oriented to person, place, and time.   Psychiatric:         Mood and Affect: Mood normal.         Behavior: Behavior normal.         No debilities noted    /72   Pulse 72   Resp 16   Ht 165.1 cm (65\")   Wt 70.8 kg (156 lb)   BMI 25.96 kg/m²         Diagnoses and all orders for this visit:    1. Breast cancer, stage 3, right (Primary)    I discussed with Suzanne and her aunt and mom the benefits and risks of a modified radical mastectomy with axillary dissection of right breast and a simple mastectomy left breast.  We discussed the benefits risks and limited to including: Bleeding, infection, failure to close the wound requiring wound VAC, injury to axillary nerves, lymphedema of the arm, formation of a hematoma or seroma, anesthesia complications.  All of their questions were answered and she is willing to proceed.    Thank you for allowing me to participate in the care of this interesting patient.     "

## 2024-10-04 ENCOUNTER — TELEPHONE (OUTPATIENT)
Dept: SURGERY | Facility: CLINIC | Age: 44
End: 2024-10-04
Payer: COMMERCIAL

## 2024-10-04 ENCOUNTER — DOCUMENTATION (OUTPATIENT)
Dept: SURGERY | Facility: CLINIC | Age: 44
End: 2024-10-04
Payer: COMMERCIAL

## 2024-10-04 NOTE — TELEPHONE ENCOUNTER
Patient called back in regards to her appointment with oncology on Monday. She would like a call back at your nearest convenience. Thank you!

## 2024-10-04 NOTE — TELEPHONE ENCOUNTER
Yohana Downs (Pt Mother) called with questions, wanting to know why there may be changes for Yohana Lin surgery and what needs to happen next.  Yohana is requesting a call back [phone].

## 2024-10-04 NOTE — PROGRESS NOTES
Suzanne and I discussed neoadjuvant chemotherapy before her mastectomy.  We discussed the possibility of being able to downstage the cancer and avoid an axillary dissection.  She initially agreed to see oncology on Monday.  After thinking about it overnight she decided she wanted to have the surgery and not have chemotherapy.  She has been living with this cancer for 4 years and is ready for surgery.  She understands the risks of an axillary dissection and the risk of decreased function of her arm.  She also understands the challenge of wound healing due to the size of the cancer.  All of her questions were answered and she would like to proceed with surgery on Wednesday.

## 2024-10-07 ENCOUNTER — APPOINTMENT (OUTPATIENT)
Dept: OTHER | Facility: HOSPITAL | Age: 44
End: 2024-10-07
Payer: COMMERCIAL

## 2024-10-07 ENCOUNTER — PRE-ADMISSION TESTING (OUTPATIENT)
Dept: PREADMISSION TESTING | Facility: HOSPITAL | Age: 44
End: 2024-10-07
Payer: COMMERCIAL

## 2024-10-07 VITALS
SYSTOLIC BLOOD PRESSURE: 98 MMHG | DIASTOLIC BLOOD PRESSURE: 75 MMHG | OXYGEN SATURATION: 99 % | HEIGHT: 65 IN | BODY MASS INDEX: 25.49 KG/M2 | HEART RATE: 75 BPM | WEIGHT: 153 LBS | RESPIRATION RATE: 16 BRPM

## 2024-10-07 DIAGNOSIS — C50.911 BREAST CANCER, STAGE 3, RIGHT: ICD-10-CM

## 2024-10-07 DIAGNOSIS — C50.919 MALIGNANT NEOPLASM OF FEMALE BREAST, UNSPECIFIED ESTROGEN RECEPTOR STATUS, UNSPECIFIED LATERALITY, UNSPECIFIED SITE OF BREAST: Primary | ICD-10-CM

## 2024-10-07 LAB
ALBUMIN SERPL-MCNC: 3.6 G/DL (ref 3.5–5.2)
ALBUMIN/GLOB SERPL: 1.1 G/DL
ALP SERPL-CCNC: 72 U/L (ref 39–117)
ALT SERPL W P-5'-P-CCNC: 11 U/L (ref 1–33)
ANION GAP SERPL CALCULATED.3IONS-SCNC: 11 MMOL/L (ref 5–15)
AST SERPL-CCNC: 17 U/L (ref 1–32)
BILIRUB SERPL-MCNC: 0.2 MG/DL (ref 0–1.2)
BUN SERPL-MCNC: 9 MG/DL (ref 6–20)
BUN/CREAT SERPL: 9.8 (ref 7–25)
CALCIUM SPEC-SCNC: 9.6 MG/DL (ref 8.6–10.5)
CHLORIDE SERPL-SCNC: 109 MMOL/L (ref 98–107)
CO2 SERPL-SCNC: 24 MMOL/L (ref 22–29)
CREAT SERPL-MCNC: 0.92 MG/DL (ref 0.57–1)
DEPRECATED RDW RBC AUTO: 44.2 FL (ref 37–54)
EGFRCR SERPLBLD CKD-EPI 2021: 78.9 ML/MIN/1.73
ERYTHROCYTE [DISTWIDTH] IN BLOOD BY AUTOMATED COUNT: 13.6 % (ref 12.3–15.4)
GLOBULIN UR ELPH-MCNC: 3.4 GM/DL
GLUCOSE SERPL-MCNC: 78 MG/DL (ref 65–99)
HCG SERPL QL: NEGATIVE
HCT VFR BLD AUTO: 43.2 % (ref 34–46.6)
HGB BLD-MCNC: 14.1 G/DL (ref 12–15.9)
MCH RBC QN AUTO: 29.1 PG (ref 26.6–33)
MCHC RBC AUTO-ENTMCNC: 32.6 G/DL (ref 31.5–35.7)
MCV RBC AUTO: 89.1 FL (ref 79–97)
PLATELET # BLD AUTO: 404 10*3/MM3 (ref 140–450)
PMV BLD AUTO: 10 FL (ref 6–12)
POTASSIUM SERPL-SCNC: 4.1 MMOL/L (ref 3.5–5.2)
PROT SERPL-MCNC: 7 G/DL (ref 6–8.5)
RBC # BLD AUTO: 4.85 10*6/MM3 (ref 3.77–5.28)
SODIUM SERPL-SCNC: 144 MMOL/L (ref 136–145)
WBC NRBC COR # BLD AUTO: 10.11 10*3/MM3 (ref 3.4–10.8)

## 2024-10-07 PROCEDURE — 84703 CHORIONIC GONADOTROPIN ASSAY: CPT | Performed by: SURGERY

## 2024-10-07 PROCEDURE — 80053 COMPREHEN METABOLIC PANEL: CPT | Performed by: SURGERY

## 2024-10-07 PROCEDURE — 36415 COLL VENOUS BLD VENIPUNCTURE: CPT

## 2024-10-07 PROCEDURE — 85027 COMPLETE CBC AUTOMATED: CPT | Performed by: SURGERY

## 2024-10-07 RX ORDER — ASPIRIN 81 MG/1
81 TABLET ORAL DAILY
COMMUNITY

## 2024-10-07 NOTE — DISCHARGE INSTRUCTIONS
PRE-ADMISSION TESTING INSTRUCTIONS FOR ADULTS    Take these medications the morning of surgery with a small sip of water: nothing       Do not take any insulin or diabetes medications the morning of surgery.      No aspirin, advil, aleve, ibuprofen, naproxen, diet pills, decongestants, or herbal/vitamins for a week prior to surgery.       Tylenol/Acetaminophen is okay to take if needed.    General Instructions:    DO NOT EAT SOLID FOOD AFTER MIDNIGHT THE NIGHT BEFORE SURGERY. No gum, mints, or hard candy after midnight the night before surgery.  You may drink clear liquids the day of surgery up until 2 hours before your arrival time.  Clear liquids are liquids you can see through. Nothing RED in color.    Plain water    Sports drinks      Gelatin (Jell-O)  Fruit juices without pulp such as white grape juice and apple juice  Popsicles that contain no fruit or yogurt  Tea or coffee (no cream or milk added)    It is beneficial for you to have a clear drink that contains carbohydrates 2 hours before your arrival time.  We suggest a 20 ounce bottle of Gatorade or Powerade for non-diabetic patients or a 20 ounce bottle of Gatorade Zero or Powerade Zero for diabetic patients.     Patients who avoid smoking, chewing tobacco and alcohol for 4 weeks prior to surgery have a reduced risk of post-operative complications.  If at all possible, quit smoking as many days before surgery as you can.    Do not smoke, use chewing tobacco or drink alcohol the day of surgery    Bring your C-PAP/ BI-PAP machine if you use one.  Wear clean comfortable clothes.  Do not wear contact lenses, lotion, deodorant, or make-up.  Bring a case for your glasses if applicable. You may brush your teeth the morning of surgery.  You may wear dentures/partials, do not put adhesive/glue on them.  Leave all other jewelry and valuables at home.      Preventing a Surgical Site Infection:    Shower the night before and on the morning of surgery using the  chlorhexidine soap you were given.  Use a clean washcloth with the soap.  Place clean sheets on your bed after showering the night before surgery. Do not use the CHG soap on your hair, face, or private areas. Wash your body gently for five (5) minutes. Do not scrub your skin.  Dry with a clean towel and dress in clean clothing.  Do not shave the surgical area for 10 days-2 weeks prior to surgery  because the razor can irritate skin and make it easier to develop an infection.  Make sure you, your family, and all healthcare providers clean their hands with soap and water or an alcohol based hand  before caring for you or your wound.      Day of surgery:    Your surgeon’s office will advise you of your arrival time for the day of surgery.    Upon arrival, a Pre-op nurse and Anesthesia provider will review your health history, obtain vital signs, and answer questions you may have. The anesthesia provider will also discuss the type of anesthesia that will be needed for your procedure, which may include general anesthesia. The only belongings needed at this time will be your home medications and if applicable your C-PAP/BI-PAP machine.  If you are staying overnight your family can leave the rest of your belongings in the car and bring them to your room later.  A Pre-op nurse will start an IV and you may receive medication in preparation for surgery, including something to help you relax.  Your family will be able to see you in the Pre-op area.  While you are in surgery your family should notify the waiting room  if they leave the waiting room area and provide a contact phone number.    IF you have any questions, you can call the Pre-Admission Department at [phone] or your surgeon's office.  Notify your surgeon if  you become sick, have a fever, productive cough, or cannot be here the day of surgery    Please be aware that surgery does come with discomfort.  We want to make every effort to  control your discomfort so please discuss any uncontrolled symptoms with your nurse.   Your doctor will most likely have prescribed pain medications.      If you are going home after surgery, you will receive individualized written care instructions before being discharged.  A responsible adult (over the age of 18) must drive you to and from the hospital on the day of your surgery and stay with you for 24 hours after anesthesia.    If you are staying overnight following surgery, you will be transported to your hospital room following the recovery period.  [facility] has all private rooms.    You may receive a survey regarding the care you received. Your feedback is very important and will be used to collect the necessary data to help us to continue to provide excellent care.     Deductibles and co-payments are collected on the day of service. Please be prepared to pay the required co-pay, deductible or deposit on the day of service as defined by your plan.

## 2024-10-08 ENCOUNTER — ANESTHESIA EVENT (OUTPATIENT)
Dept: PERIOP | Facility: HOSPITAL | Age: 44
End: 2024-10-08
Payer: COMMERCIAL

## 2024-10-09 ENCOUNTER — TRANSCRIBE ORDERS (OUTPATIENT)
Dept: LAB | Facility: HOSPITAL | Age: 44
End: 2024-10-09
Payer: COMMERCIAL

## 2024-10-09 ENCOUNTER — HOSPITAL ENCOUNTER (OUTPATIENT)
Facility: HOSPITAL | Age: 44
Discharge: HOME | End: 2024-10-11
Attending: SURGERY | Admitting: SURGERY
Payer: COMMERCIAL

## 2024-10-09 ENCOUNTER — ANESTHESIA (OUTPATIENT)
Dept: PERIOP | Facility: HOSPITAL | Age: 44
End: 2024-10-09
Payer: COMMERCIAL

## 2024-10-09 ENCOUNTER — ANCILLARY PROCEDURE (OUTPATIENT)
Dept: LAB | Facility: HOSPITAL | Age: 44
End: 2024-10-09
Payer: COMMERCIAL

## 2024-10-09 DIAGNOSIS — C50.911 INVASIVE DUCTAL CARCINOMA OF RIGHT BREAST IN FEMALE: Primary | ICD-10-CM

## 2024-10-09 DIAGNOSIS — C50.911 INVASIVE DUCTAL CARCINOMA OF RIGHT BREAST IN FEMALE: ICD-10-CM

## 2024-10-09 DIAGNOSIS — C50.911 BREAST CANCER, STAGE 3, RIGHT: ICD-10-CM

## 2024-10-09 PROBLEM — C50.919 BREAST CANCER IN FEMALE: Status: ACTIVE | Noted: 2024-10-09

## 2024-10-09 LAB
QT INTERVAL: 410 MS
QTC INTERVAL: 458 MS

## 2024-10-09 PROCEDURE — 19307 MAST MOD RAD: CPT | Performed by: SURGERY

## 2024-10-09 PROCEDURE — 25810000003 LACTATED RINGERS PER 1000 ML

## 2024-10-09 PROCEDURE — 25010000002 PHENYLEPHRINE 10 MG/ML SOLUTION: Performed by: ANESTHESIOLOGY

## 2024-10-09 PROCEDURE — 25010000002 HEPARIN (PORCINE) PER 1000 UNITS: Performed by: SURGERY

## 2024-10-09 PROCEDURE — 25010000002 ONDANSETRON PER 1 MG

## 2024-10-09 PROCEDURE — 25010000002 SUCCINYLCHOLINE PER 20 MG: Performed by: ANESTHESIOLOGY

## 2024-10-09 PROCEDURE — 76098 X-RAY EXAM SURGICAL SPECIMEN: CPT

## 2024-10-09 PROCEDURE — 94761 N-INVAS EAR/PLS OXIMETRY MLT: CPT

## 2024-10-09 PROCEDURE — 88307 TISSUE EXAM BY PATHOLOGIST: CPT | Performed by: SURGERY

## 2024-10-09 PROCEDURE — 25010000002 HYDROMORPHONE 1 MG/ML SOLUTION: Performed by: SURGERY

## 2024-10-09 PROCEDURE — 63710000001 DIPHENHYDRAMINE PER 50 MG: Performed by: SURGERY

## 2024-10-09 PROCEDURE — 25010000002 DEXAMETHASONE PER 1 MG

## 2024-10-09 PROCEDURE — 25010000002 BUPIVACAINE (PF) 0.25 % SOLUTION: Performed by: ANESTHESIOLOGY

## 2024-10-09 PROCEDURE — 25010000002 CEFAZOLIN PER 500 MG: Performed by: FAMILY MEDICINE

## 2024-10-09 PROCEDURE — 25010000002 PROPOFOL 200 MG/20ML EMULSION: Performed by: ANESTHESIOLOGY

## 2024-10-09 PROCEDURE — 25010000002 FENTANYL CITRATE (PF) 50 MCG/ML SOLUTION: Performed by: ANESTHESIOLOGY

## 2024-10-09 PROCEDURE — 25010000002 CEFAZOLIN PER 500 MG: Performed by: SURGERY

## 2024-10-09 PROCEDURE — 25010000002 MIDAZOLAM PER 1MG

## 2024-10-09 PROCEDURE — 19303 MAST SIMPLE COMPLETE: CPT | Performed by: SPECIALIST/TECHNOLOGIST, OTHER

## 2024-10-09 PROCEDURE — G0378 HOSPITAL OBSERVATION PER HR: HCPCS

## 2024-10-09 PROCEDURE — 93005 ELECTROCARDIOGRAM TRACING: CPT | Performed by: ANESTHESIOLOGY

## 2024-10-09 PROCEDURE — 25010000002 LIDOCAINE 2% SOLUTION: Performed by: ANESTHESIOLOGY

## 2024-10-09 PROCEDURE — 93010 ELECTROCARDIOGRAM REPORT: CPT | Performed by: INTERNAL MEDICINE

## 2024-10-09 PROCEDURE — 19303 MAST SIMPLE COMPLETE: CPT | Performed by: SURGERY

## 2024-10-09 PROCEDURE — 19307 MAST MOD RAD: CPT | Performed by: SPECIALIST/TECHNOLOGIST, OTHER

## 2024-10-09 DEVICE — ARISTA AH ABSORBABLE HEMOSTATIC PARTICLES, 3G BELLOWS CONTAINER
Type: IMPLANTABLE DEVICE | Site: BREAST | Status: FUNCTIONAL
Brand: ARISTA

## 2024-10-09 RX ORDER — FAMOTIDINE 10 MG/ML
20 INJECTION, SOLUTION INTRAVENOUS
Status: COMPLETED | OUTPATIENT
Start: 2024-10-09 | End: 2024-10-09

## 2024-10-09 RX ORDER — ONDANSETRON 2 MG/ML
4 INJECTION INTRAMUSCULAR; INTRAVENOUS EVERY 6 HOURS PRN
Status: DISCONTINUED | OUTPATIENT
Start: 2024-10-09 | End: 2024-10-11 | Stop reason: HOSPADM

## 2024-10-09 RX ORDER — CEFAZOLIN SODIUM 1 G/3ML
2 INJECTION, POWDER, FOR SOLUTION INTRAMUSCULAR; INTRAVENOUS ONCE
Status: DISCONTINUED | OUTPATIENT
Start: 2024-10-09 | End: 2024-10-09

## 2024-10-09 RX ORDER — EPHEDRINE SULFATE 50 MG/ML
INJECTION INTRAVENOUS AS NEEDED
Status: DISCONTINUED | OUTPATIENT
Start: 2024-10-09 | End: 2024-10-09 | Stop reason: SURG

## 2024-10-09 RX ORDER — NALOXONE HCL 0.4 MG/ML
0.1 VIAL (ML) INJECTION
Status: DISCONTINUED | OUTPATIENT
Start: 2024-10-09 | End: 2024-10-09

## 2024-10-09 RX ORDER — ONDANSETRON 2 MG/ML
4 INJECTION INTRAMUSCULAR; INTRAVENOUS ONCE AS NEEDED
Status: DISCONTINUED | OUTPATIENT
Start: 2024-10-09 | End: 2024-10-09

## 2024-10-09 RX ORDER — ONDANSETRON 2 MG/ML
4 INJECTION INTRAMUSCULAR; INTRAVENOUS ONCE AS NEEDED
Status: COMPLETED | OUTPATIENT
Start: 2024-10-09 | End: 2024-10-09

## 2024-10-09 RX ORDER — FENTANYL CITRATE 50 UG/ML
INJECTION, SOLUTION INTRAMUSCULAR; INTRAVENOUS AS NEEDED
Status: DISCONTINUED | OUTPATIENT
Start: 2024-10-09 | End: 2024-10-09 | Stop reason: SURG

## 2024-10-09 RX ORDER — PROMETHAZINE HYDROCHLORIDE 12.5 MG/1
6.25 TABLET ORAL EVERY 6 HOURS PRN
Status: DISCONTINUED | OUTPATIENT
Start: 2024-10-09 | End: 2024-10-11 | Stop reason: HOSPADM

## 2024-10-09 RX ORDER — PROMETHAZINE HYDROCHLORIDE 12.5 MG/1
6.25 SUPPOSITORY RECTAL EVERY 6 HOURS PRN
Status: DISCONTINUED | OUTPATIENT
Start: 2024-10-09 | End: 2024-10-11 | Stop reason: HOSPADM

## 2024-10-09 RX ORDER — PROPOFOL 10 MG/ML
INJECTION, EMULSION INTRAVENOUS AS NEEDED
Status: DISCONTINUED | OUTPATIENT
Start: 2024-10-09 | End: 2024-10-09 | Stop reason: SURG

## 2024-10-09 RX ORDER — CYCLOBENZAPRINE HCL 10 MG
10 TABLET ORAL EVERY 8 HOURS PRN
Status: DISCONTINUED | OUTPATIENT
Start: 2024-10-09 | End: 2024-10-11 | Stop reason: HOSPADM

## 2024-10-09 RX ORDER — OXYCODONE HYDROCHLORIDE 5 MG/1
5 TABLET ORAL EVERY 4 HOURS PRN
Status: DISCONTINUED | OUTPATIENT
Start: 2024-10-09 | End: 2024-10-11 | Stop reason: HOSPADM

## 2024-10-09 RX ORDER — ASPIRIN 81 MG/1
81 TABLET ORAL DAILY
Status: DISCONTINUED | OUTPATIENT
Start: 2024-10-09 | End: 2024-10-11 | Stop reason: HOSPADM

## 2024-10-09 RX ORDER — MIDAZOLAM HYDROCHLORIDE 2 MG/2ML
1 INJECTION, SOLUTION INTRAMUSCULAR; INTRAVENOUS
Status: DISCONTINUED | OUTPATIENT
Start: 2024-10-09 | End: 2024-10-09

## 2024-10-09 RX ORDER — DEXAMETHASONE SODIUM PHOSPHATE 4 MG/ML
4 INJECTION, SOLUTION INTRA-ARTICULAR; INTRALESIONAL; INTRAMUSCULAR; INTRAVENOUS; SOFT TISSUE ONCE AS NEEDED
Status: COMPLETED | OUTPATIENT
Start: 2024-10-09 | End: 2024-10-09

## 2024-10-09 RX ORDER — DIPHENHYDRAMINE HYDROCHLORIDE 50 MG/ML
12.5 INJECTION INTRAMUSCULAR; INTRAVENOUS EVERY 6 HOURS PRN
Status: DISCONTINUED | OUTPATIENT
Start: 2024-10-09 | End: 2024-10-11 | Stop reason: HOSPADM

## 2024-10-09 RX ORDER — LIDOCAINE HYDROCHLORIDE 20 MG/ML
INJECTION, SOLUTION INFILTRATION; PERINEURAL AS NEEDED
Status: DISCONTINUED | OUTPATIENT
Start: 2024-10-09 | End: 2024-10-09 | Stop reason: SURG

## 2024-10-09 RX ORDER — HEPARIN SODIUM 5000 [USP'U]/ML
5000 INJECTION, SOLUTION INTRAVENOUS; SUBCUTANEOUS EVERY 12 HOURS SCHEDULED
Status: DISCONTINUED | OUTPATIENT
Start: 2024-10-09 | End: 2024-10-11 | Stop reason: HOSPADM

## 2024-10-09 RX ORDER — DEXMEDETOMIDINE HYDROCHLORIDE 100 UG/ML
INJECTION, SOLUTION INTRAVENOUS AS NEEDED
Status: DISCONTINUED | OUTPATIENT
Start: 2024-10-09 | End: 2024-10-09 | Stop reason: SURG

## 2024-10-09 RX ORDER — SODIUM CHLORIDE, SODIUM LACTATE, POTASSIUM CHLORIDE, CALCIUM CHLORIDE 600; 310; 30; 20 MG/100ML; MG/100ML; MG/100ML; MG/100ML
30 INJECTION, SOLUTION INTRAVENOUS ONCE
Status: DISCONTINUED | OUTPATIENT
Start: 2024-10-09 | End: 2024-10-09

## 2024-10-09 RX ORDER — BUPIVACAINE HYDROCHLORIDE 2.5 MG/ML
INJECTION, SOLUTION EPIDURAL; INFILTRATION; INTRACAUDAL
Status: COMPLETED | OUTPATIENT
Start: 2024-10-09 | End: 2024-10-09

## 2024-10-09 RX ORDER — ACETAMINOPHEN 500 MG
1000 TABLET ORAL ONCE
Status: COMPLETED | OUTPATIENT
Start: 2024-10-09 | End: 2024-10-09

## 2024-10-09 RX ORDER — KETAMINE HYDROCHLORIDE 10 MG/ML
INJECTION, SOLUTION INTRAMUSCULAR; INTRAVENOUS AS NEEDED
Status: DISCONTINUED | OUTPATIENT
Start: 2024-10-09 | End: 2024-10-09 | Stop reason: SURG

## 2024-10-09 RX ORDER — HEPARIN SODIUM 5000 [USP'U]/ML
5000 INJECTION, SOLUTION INTRAVENOUS; SUBCUTANEOUS EVERY 8 HOURS SCHEDULED
Status: DISCONTINUED | OUTPATIENT
Start: 2024-10-09 | End: 2024-10-09

## 2024-10-09 RX ORDER — DEXMEDETOMIDINE HYDROCHLORIDE 100 UG/ML
INJECTION, SOLUTION INTRAVENOUS
Status: COMPLETED | OUTPATIENT
Start: 2024-10-09 | End: 2024-10-09

## 2024-10-09 RX ORDER — NALOXONE HCL 0.4 MG/ML
0.1 VIAL (ML) INJECTION
Status: DISCONTINUED | OUTPATIENT
Start: 2024-10-09 | End: 2024-10-11 | Stop reason: HOSPADM

## 2024-10-09 RX ORDER — SUCCINYLCHOLINE CHLORIDE 20 MG/ML
INJECTION INTRAMUSCULAR; INTRAVENOUS AS NEEDED
Status: DISCONTINUED | OUTPATIENT
Start: 2024-10-09 | End: 2024-10-09 | Stop reason: SURG

## 2024-10-09 RX ORDER — CELECOXIB 100 MG/1
200 CAPSULE ORAL ONCE
Status: COMPLETED | OUTPATIENT
Start: 2024-10-09 | End: 2024-10-09

## 2024-10-09 RX ORDER — SODIUM CHLORIDE, SODIUM LACTATE, POTASSIUM CHLORIDE, CALCIUM CHLORIDE 600; 310; 30; 20 MG/100ML; MG/100ML; MG/100ML; MG/100ML
30 INJECTION, SOLUTION INTRAVENOUS CONTINUOUS
Status: DISCONTINUED | OUTPATIENT
Start: 2024-10-09 | End: 2024-10-09

## 2024-10-09 RX ORDER — BUPROPION HYDROCHLORIDE 150 MG/1
300 TABLET ORAL DAILY
Status: DISCONTINUED | OUTPATIENT
Start: 2024-10-09 | End: 2024-10-11 | Stop reason: HOSPADM

## 2024-10-09 RX ORDER — TRAMADOL HYDROCHLORIDE 50 MG/1
100 TABLET ORAL EVERY 6 HOURS PRN
Status: DISCONTINUED | OUTPATIENT
Start: 2024-10-09 | End: 2024-10-11 | Stop reason: HOSPADM

## 2024-10-09 RX ORDER — DIPHENHYDRAMINE HCL 25 MG
25 CAPSULE ORAL NIGHTLY PRN
Status: DISCONTINUED | OUTPATIENT
Start: 2024-10-09 | End: 2024-10-11 | Stop reason: HOSPADM

## 2024-10-09 RX ORDER — ACETAMINOPHEN 500 MG
1000 TABLET ORAL EVERY 6 HOURS
Status: DISPENSED | OUTPATIENT
Start: 2024-10-09 | End: 2024-10-11

## 2024-10-09 RX ORDER — FAMOTIDINE 20 MG/1
20 TABLET, FILM COATED ORAL
Status: COMPLETED | OUTPATIENT
Start: 2024-10-09 | End: 2024-10-09

## 2024-10-09 RX ORDER — IBUPROFEN 400 MG/1
400 TABLET, FILM COATED ORAL EVERY 6 HOURS PRN
Status: DISCONTINUED | OUTPATIENT
Start: 2024-10-09 | End: 2024-10-11 | Stop reason: HOSPADM

## 2024-10-09 RX ORDER — SCOLOPAMINE TRANSDERMAL SYSTEM 1 MG/1
1 PATCH, EXTENDED RELEASE TRANSDERMAL CONTINUOUS
Status: DISCONTINUED | OUTPATIENT
Start: 2024-10-09 | End: 2024-10-11 | Stop reason: HOSPADM

## 2024-10-09 RX ORDER — GABAPENTIN 300 MG/1
600 CAPSULE ORAL ONCE
Status: COMPLETED | OUTPATIENT
Start: 2024-10-09 | End: 2024-10-09

## 2024-10-09 RX ORDER — ACETAMINOPHEN 500 MG
1000 TABLET ORAL ONCE
Status: DISCONTINUED | OUTPATIENT
Start: 2024-10-09 | End: 2024-10-09

## 2024-10-09 RX ORDER — PHENYLEPHRINE HYDROCHLORIDE 10 MG/ML
INJECTION INTRAVENOUS AS NEEDED
Status: DISCONTINUED | OUTPATIENT
Start: 2024-10-09 | End: 2024-10-09 | Stop reason: SURG

## 2024-10-09 RX ADMIN — DEXMEDETOMIDINE 15 MCG: 100 INJECTION, SOLUTION INTRAVENOUS at 07:44

## 2024-10-09 RX ADMIN — PHENYLEPHRINE HYDROCHLORIDE 50 MCG: 10 INJECTION INTRAVENOUS at 10:57

## 2024-10-09 RX ADMIN — GABAPENTIN 600 MG: 300 CAPSULE ORAL at 07:21

## 2024-10-09 RX ADMIN — PHENYLEPHRINE HYDROCHLORIDE 50 MCG: 10 INJECTION INTRAVENOUS at 10:52

## 2024-10-09 RX ADMIN — SCOPOLAMINE 1 PATCH: 1.5 PATCH, EXTENDED RELEASE TRANSDERMAL at 07:21

## 2024-10-09 RX ADMIN — MIDAZOLAM HYDROCHLORIDE 1 MG: 1 INJECTION, SOLUTION INTRAMUSCULAR; INTRAVENOUS at 07:32

## 2024-10-09 RX ADMIN — PHENYLEPHRINE HYDROCHLORIDE 100 MCG: 10 INJECTION INTRAVENOUS at 09:25

## 2024-10-09 RX ADMIN — EPHEDRINE SULFATE 5 MG: 50 INJECTION INTRAVENOUS at 11:08

## 2024-10-09 RX ADMIN — EPHEDRINE SULFATE 5 MG: 50 INJECTION INTRAVENOUS at 11:40

## 2024-10-09 RX ADMIN — CEFAZOLIN 2000 MG: 2 INJECTION, POWDER, FOR SOLUTION INTRAVENOUS at 07:47

## 2024-10-09 RX ADMIN — HEPARIN SODIUM 5000 UNITS: 5000 INJECTION INTRAVENOUS; SUBCUTANEOUS at 15:05

## 2024-10-09 RX ADMIN — PHENYLEPHRINE HYDROCHLORIDE 100 MCG: 10 INJECTION INTRAVENOUS at 09:43

## 2024-10-09 RX ADMIN — PROPOFOL 170 MG: 10 INJECTION, EMULSION INTRAVENOUS at 07:41

## 2024-10-09 RX ADMIN — HEPARIN SODIUM 5000 UNITS: 5000 INJECTION INTRAVENOUS; SUBCUTANEOUS at 20:12

## 2024-10-09 RX ADMIN — OXYCODONE HYDROCHLORIDE 5 MG: 5 TABLET ORAL at 20:11

## 2024-10-09 RX ADMIN — PHENYLEPHRINE HYDROCHLORIDE 50 MCG: 10 INJECTION INTRAVENOUS at 10:25

## 2024-10-09 RX ADMIN — PHENYLEPHRINE HYDROCHLORIDE 50 MCG: 10 INJECTION INTRAVENOUS at 11:17

## 2024-10-09 RX ADMIN — ASPIRIN 81 MG: 81 TABLET, COATED ORAL at 15:05

## 2024-10-09 RX ADMIN — KETAMINE HYDROCHLORIDE 10 MG: 10 INJECTION INTRAMUSCULAR; INTRAVENOUS at 08:22

## 2024-10-09 RX ADMIN — KETAMINE HYDROCHLORIDE 5 MG: 10 INJECTION INTRAMUSCULAR; INTRAVENOUS at 09:47

## 2024-10-09 RX ADMIN — FENTANYL CITRATE 25 MCG: 50 INJECTION, SOLUTION INTRAMUSCULAR; INTRAVENOUS at 08:20

## 2024-10-09 RX ADMIN — PHENYLEPHRINE HYDROCHLORIDE 100 MCG: 10 INJECTION INTRAVENOUS at 11:22

## 2024-10-09 RX ADMIN — SODIUM CHLORIDE, POTASSIUM CHLORIDE, SODIUM LACTATE AND CALCIUM CHLORIDE 30 ML/HR: 600; 310; 30; 20 INJECTION, SOLUTION INTRAVENOUS at 06:55

## 2024-10-09 RX ADMIN — PHENYLEPHRINE HYDROCHLORIDE 50 MCG: 10 INJECTION INTRAVENOUS at 10:48

## 2024-10-09 RX ADMIN — PHENYLEPHRINE HYDROCHLORIDE 50 MCG: 10 INJECTION INTRAVENOUS at 10:14

## 2024-10-09 RX ADMIN — PHENYLEPHRINE HYDROCHLORIDE 100 MCG: 10 INJECTION INTRAVENOUS at 08:14

## 2024-10-09 RX ADMIN — ACETAMINOPHEN 1000 MG: 500 TABLET ORAL at 07:22

## 2024-10-09 RX ADMIN — HYDROMORPHONE HYDROCHLORIDE 1 MG: 1 INJECTION, SOLUTION INTRAMUSCULAR; INTRAVENOUS; SUBCUTANEOUS at 15:05

## 2024-10-09 RX ADMIN — PHENYLEPHRINE HYDROCHLORIDE 50 MCG: 10 INJECTION INTRAVENOUS at 10:08

## 2024-10-09 RX ADMIN — KETAMINE HYDROCHLORIDE 5 MG: 10 INJECTION INTRAMUSCULAR; INTRAVENOUS at 09:14

## 2024-10-09 RX ADMIN — ACETAMINOPHEN 1000 MG: 500 TABLET ORAL at 20:11

## 2024-10-09 RX ADMIN — KETAMINE HYDROCHLORIDE 20 MG: 10 INJECTION INTRAMUSCULAR; INTRAVENOUS at 07:44

## 2024-10-09 RX ADMIN — PHENYLEPHRINE HYDROCHLORIDE 50 MCG: 10 INJECTION INTRAVENOUS at 10:36

## 2024-10-09 RX ADMIN — FAMOTIDINE 20 MG: 10 INJECTION, SOLUTION INTRAVENOUS at 07:22

## 2024-10-09 RX ADMIN — BUPROPION HYDROCHLORIDE 300 MG: 150 TABLET, EXTENDED RELEASE ORAL at 15:04

## 2024-10-09 RX ADMIN — SUCCINYLCHOLINE CHLORIDE 100 MG: 20 INJECTION, SOLUTION INTRAMUSCULAR; INTRAVENOUS at 07:42

## 2024-10-09 RX ADMIN — DEXAMETHASONE SODIUM PHOSPHATE 4 MG: 4 INJECTION, SOLUTION INTRAMUSCULAR; INTRAVENOUS at 07:22

## 2024-10-09 RX ADMIN — PHENYLEPHRINE HYDROCHLORIDE 50 MCG: 10 INJECTION INTRAVENOUS at 10:42

## 2024-10-09 RX ADMIN — FENTANYL CITRATE 25 MCG: 50 INJECTION, SOLUTION INTRAMUSCULAR; INTRAVENOUS at 07:58

## 2024-10-09 RX ADMIN — PHENYLEPHRINE HYDROCHLORIDE 50 MCG: 10 INJECTION INTRAVENOUS at 11:12

## 2024-10-09 RX ADMIN — PHENYLEPHRINE HYDROCHLORIDE 50 MCG: 10 INJECTION INTRAVENOUS at 10:21

## 2024-10-09 RX ADMIN — DEXMEDETOMIDINE HYDROCHLORIDE 30 MCG: 100 INJECTION, SOLUTION INTRAVENOUS at 08:06

## 2024-10-09 RX ADMIN — CELECOXIB 200 MG: 100 CAPSULE ORAL at 07:22

## 2024-10-09 RX ADMIN — ONDANSETRON 4 MG: 2 INJECTION INTRAMUSCULAR; INTRAVENOUS at 07:22

## 2024-10-09 RX ADMIN — KETAMINE HYDROCHLORIDE 5 MG: 10 INJECTION INTRAMUSCULAR; INTRAVENOUS at 08:47

## 2024-10-09 RX ADMIN — PHENYLEPHRINE HYDROCHLORIDE 100 MCG: 10 INJECTION INTRAVENOUS at 07:54

## 2024-10-09 RX ADMIN — LIDOCAINE HYDROCHLORIDE 60 MG: 20 INJECTION, SOLUTION INFILTRATION; PERINEURAL at 07:41

## 2024-10-09 RX ADMIN — DIPHENHYDRAMINE HYDROCHLORIDE 25 MG: 25 CAPSULE ORAL at 20:11

## 2024-10-09 RX ADMIN — PHENYLEPHRINE HYDROCHLORIDE 50 MCG: 10 INJECTION INTRAVENOUS at 11:03

## 2024-10-09 RX ADMIN — BUPIVACAINE HYDROCHLORIDE 50 ML: 2.5 INJECTION, SOLUTION EPIDURAL; INFILTRATION; INTRACAUDAL; PERINEURAL at 08:06

## 2024-10-09 RX ADMIN — HEPARIN SODIUM 5000 UNITS: 5000 INJECTION INTRAVENOUS; SUBCUTANEOUS at 11:19

## 2024-10-09 RX ADMIN — PHENYLEPHRINE HYDROCHLORIDE 50 MCG: 10 INJECTION INTRAVENOUS at 10:30

## 2024-10-09 RX ADMIN — ACETAMINOPHEN 1000 MG: 500 TABLET ORAL at 15:04

## 2024-10-09 RX ADMIN — SODIUM CHLORIDE 2000 MG: 9 INJECTION, SOLUTION INTRAVENOUS at 15:54

## 2024-10-09 RX ADMIN — EPHEDRINE SULFATE 5 MG: 50 INJECTION INTRAVENOUS at 08:16

## 2024-10-09 NOTE — ANESTHESIA PROCEDURE NOTES
Peripheral Block    Pre-sedation assessment completed: 10/9/2024 7:30 AM    Patient reassessed immediately prior to procedure    Patient location during procedure: OR  Start time: 10/9/2024 7:54 AM  Stop time: 10/9/2024 8:06 AM  Reason for block: at surgeon's request and post-op pain management  Performed by  Anesthesiologist: Dianne Kiran MD CRNA/CAA: Lilian Peters CRNA  Preanesthetic Checklist  Completed: patient identified, IV checked, site marked, risks and benefits discussed, surgical consent, monitors and equipment checked, pre-op evaluation and timeout performed  Prep:  Pt Position: supine  Sterile barriers:washed/disinfected hands, mask and gloves  Prep: ChloraPrep  Patient monitoring: blood pressure monitoring, continuous pulse oximetry and EKG  Procedure    Sedation: no  Performed under: general  Guidance:ultrasound guided    ULTRASOUND INTERPRETATION.  Using ultrasound guidance a 21 G gauge needle was placed in close proximity to the nerve, at which point, under ultrasound guidance anesthetic was injected in the area of the nerve and spread of the anesthesia was seen on ultrasound in close proximity thereto.  There were no abnormalities seen on ultrasound; a digital image was taken; and the patient tolerated the procedure with no complications. Images:still images obtained, printed/placed on chart    Laterality:Bilateral  Block Type:PECS II and PECS I  Injection Technique:single-shot  Needle Type:echogenic  Needle Gauge:21 G  Resistance on Injection: none    Medications Used: bupivacaine PF (MARCAINE) injection 0.25% - Injection   50 mL - 10/9/2024 8:06:00 AM  dexmedetomidine HCl (PRECEDEX) injection - Intravenous, Chest   30 mcg - 10/9/2024 8:06:00 AM      Medications  Preservative Free Saline:10ml  Comment:10 ml of  0.25% Bupivacaine with precedex to each PEC 1 block, and 20 ml of same for each PEC 2 block.    Post Assessment  Injection Assessment: negative aspiration for heme and incremental  injection  Patient Tolerance:comfortable throughout block  Complications:no  Performed by: Dianne Kiran MD

## 2024-10-09 NOTE — SIGNIFICANT NOTE
10/09/24 1513   OTHER   Discipline physical therapist   Rehab Time/Intention   Session Not Performed patient/family declined evaluation  (PT: Patient s/p mastectomy. RN present. Patient denies need for physical therapy evaluation or services. Patient to ambulate with nursing staff as tolerated. No skilled nees at this time.)

## 2024-10-09 NOTE — ANESTHESIA PREPROCEDURE EVALUATION
Anesthesia Evaluation     Patient summary reviewed, Nursing notes reviewed and pregnancy test completed   no history of anesthetic complications:   NPO Solid Status: > 8 hours  NPO Liquid Status: > 8 hours           Airway   Mallampati: I  TM distance: >3 FB  Neck ROM: full  No difficulty expected  Dental          Pulmonary - negative pulmonary ROS and normal exam    breath sounds clear to auscultation  Cardiovascular   Exercise tolerance: good (4-7 METS)    ECG reviewed  PT is on anticoagulation therapy  Rhythm: regular  Rate: normal    (+) DVT resolved    ROS comment: Patient referred to cardiology for preoperative clearance d/t hx of 2 strokes, dyspnea on exertion, and presence of PFO.     On 81 mg baby aspirin once daily, taken 10/9/2024      Cardiology clearance given 10/1/2024:  ----------------------------------------------------------------------------  [facility] CARDIOLOGY  [address] [place] [address]  Phone: [phone]  Fax: [phone]     October 1, 2024      Patient: Suzanne Lin  YOB: 1980  Date of Visit: 10/1/2024     SUZANNE  has been seen and examined with no clinical signs of angina or CHF , patient is cleared for MASECTOMY surgery with non-modifiable risk factors.LARGE PFO NEEDS TREATED FOR PROPHYLACTIC DVT MANAGED APPROPRIATELY Patient has been advised to take cardiac meds with sip of water on the day of surgery.     Please use beta blocker for tachycardia preoperatively. Anticoagulation to be managed appropriately     Watch for chest pain, shortness of breath, palpitations, arrhythmias, and significant change in the blood pressure preoperatively. Please check EKG pre-op and postop if any questions, notify us if any change in patient's cardiovascular conditions.      Sincerely,     PHIL Richards  -----------------------------------------------------------    ECG 9/18/2024:  NSR, rate  85    -----------------------------------------------------------    Stress test 9/20/2024:  ·  No ECG evidence of myocardial ischemia.  ·  Indeterminate clinical evidence of myocardial ischemia.  ·  Findings consistent with a normal ECG stress test.  ·  No ECG evidence of myocardial ischemia. Indeterminate clinical evidence of myocardial ischemia. Findings consistent with a normal ECG stress test.  ·  Sob on excertion    ------------------------------------------------------------    Echo 9/24/2024  ·  Left ventricular ejection fraction appears to be 66 - 70%.  ·  Left ventricular diastolic function was normal.  ·  Saline test results are positive for right to left atrial level shunt.  ·  LARGE PFO    -----------------------------------------------------------    DVTs lower extremities, resolved, 2007 & 2013      Neuro/Psych  (+) CVA, psychiatric history Anxiety    ROS Comment: 2014  GI/Hepatic/Renal/Endo - negative ROS     Musculoskeletal (-) negative ROS        ROS comment: HCG 10/7/2024  Collected: 10/07/24 2567  Result status: Final  Resulting lab: [facility] LABORATORY  Reference range: Negative  Value: Negative      Abdominal  - normal exam    Abdomen: soft.  Bowel sounds: normal.   Substance History - negative use      Comment: 1-2 drinks, 1-2 times a month   OB/GYN negative ob/gyn ROS         Other      history of cancer active      Other Comment: Breast cancer, right breast                Anesthesia Plan    ASA 3     general with block     (Discussed risks/benefits of general anesthesia with bilateral PECs 1 and 2 blocks for postoperative pain control. Patient agreeable to plan.)  intravenous induction     Anesthetic plan, risks, benefits, and alternatives have been provided, discussed and informed consent has been obtained with: patient and mother.  Pre-procedure education provided  Use of blood products discussed with patient and mother  Consented to blood products.    Plan discussed with  attending.      CODE STATUS:

## 2024-10-09 NOTE — PLAN OF CARE
Goal Outcome Evaluation:  Plan of Care Reviewed With: patient        Progress: improving  Outcome Evaluation: To MS from PACU. Upperco to room, call light and plan of care. Chest noted to be wrapped with ace wrap, LETY vac x2, HARDIK x 2 and Hemovac in place. IV saline locked, patient tolerating PO w/o complaints. IV Dilaudid given for pain. Ambulated in cristobal. IV Antibiotics. Chest dressing noted C/D/I. Education provided to patient and family about drain care. All care explained.  All questions answered, patient and family verb understanding. Call light within reach.

## 2024-10-09 NOTE — INTERVAL H&P NOTE
H&P reviewed. The patient was examined and there are no changes to the H&P.      /86 (BP Location: Right leg, Patient Position: Lying)   Pulse 73   Temp 97.7 °F (36.5 °C) (Oral)   Resp 14   Wt 70.7 kg (155 lb 12.8 oz)   SpO2 97%   BMI 25.93 kg/m²

## 2024-10-09 NOTE — ANESTHESIA POSTPROCEDURE EVALUATION
Patient: Suzanne Lin    Procedure Summary       Date: 10/09/24 Room / Location:  [facility] OR 3 /  [facility] OR    Anesthesia Start: 0737 Anesthesia Stop: 1155    Procedure: Modified radical mastectomy with axillary dissection of the right breast and simple mastectomy of the left breast (Bilateral: Breast) Diagnosis:       Breast cancer, stage 3, right      (Breast cancer, stage 3, right [C50.911])    Surgeons: Rebekah Garcia DO Provider: Dianne Kiran MD    Anesthesia Type: general with block ASA Status: 3            Anesthesia Type: general with block    Vitals  Vitals Value Taken Time   /64 10/09/24 1230   Temp 97.8 °F (36.6 °C) 10/09/24 1200   Pulse 84 10/09/24 1235   Resp 10 10/09/24 1230   SpO2 98 % 10/09/24 1235   Vitals shown include unfiled device data.        Post Anesthesia Care and Evaluation    Patient location during evaluation: PACU  Patient participation: complete - patient participated  Level of consciousness: awake and alert  Pain score: 3  Pain management: satisfactory to patient    Airway patency: patent  Anesthetic complications: No anesthetic complications  PONV Status: none  Cardiovascular status: acceptable  Respiratory status: acceptable  Hydration status: acceptable

## 2024-10-09 NOTE — ANESTHESIA PROCEDURE NOTES
Airway  Urgency: elective    Date/Time: 10/9/2024 7:43 AM  Airway not difficult    General Information and Staff    Patient location during procedure: OR    Indications and Patient Condition  Indications for airway management: airway protection    Preoxygenated: yes  Mask difficulty assessment: 1 - vent by mask    Final Airway Details  Final airway type: endotracheal airway      Successful airway: ETT  Cuffed: yes   Successful intubation technique: direct laryngoscopy  Endotracheal tube insertion site: oral  Blade: Joellen  Blade size: 3.5  ETT size (mm): 7.5  Cormack-Lehane Classification: grade I - full view of glottis  Placement verified by: chest auscultation and capnometry   Cuff volume (mL): 7  Measured from: lips  ETT/EBT  to lips (cm): 22  Number of attempts at approach: 1  Assessment: lips, teeth, and gum same as pre-op and atraumatic intubation

## 2024-10-09 NOTE — OP NOTE
OPERATIVE REPORT     DATE: October 9, 2024     SURGEON: Rebekah Garcia DO      Assistant: Aaliyah Espinal CSA was responsible for performing the following activities: Retraction, Closing, and Placing Dressing and their skilled assistance was necessary for the success of this case.  , who was present for necessary suctioning, retracting, suturing throughout the procedure      OPERATION PERFORMED: Right breast modified radical mastectomy with left breast simple mastectomy    PREOPERATIVE DIAGNOSIS: Stage 3B right breast cancer     POSTOPERATIVE DIAGNOSIS: Same     ANESTHESIA: General     SPECIMEN:   Right breast with single superior stitch double medial stitch and axilla  Left breast with single superior stitch and double medial stitch     BLOOD LOSS: 200 ml     INDICATION FOR OPERATION: Suzanne Lin is a 44 y.o. lady who was recently diagnosed with stage IIIb cancer of the right breast.   Modified radical mastectomy was recommended after we decided she did not want neoadjuvant chemotherapy.  She also requested a simple left mastectomy be performed all risks (including bleeding, infection, damage to surrounding structures, formation of hematoma or seroma, lymphedema, flap necrosis, anesthesia complications), benefits and alternatives were explained to her who agreed and wished to proceed. Informed consent was signed.      OPERATIVE COURSE: Suzanne was taken to the operating room, transferred onto the operating room table, and underwent anesthesia without incident.  She was given bilateral pec blocks under ultrasound guidance by anesthesia the patient was prepped and draped in sterile fashion. A time out was performed and preoperative antibiotics were given. An elliptical incision was drawn around the nipple areolar complex and the large right breast mass. A scalpel was used to transect the skin and dermal layer. Subcutaneous flaps were created approximately 1cm in thickness circumferentially. These were extended to  the clavicle superiorly, the sternum medially, the inframammary fold inferiorly, and the serratus laterally. Once this was circumferentially dissected free with Bovie electrocautery, the breast was removed from the chest wall at the level of the pectoralis fascia. It was marked as listed above and passed off for fresh permanent specimen. The area was irrigated and appeared hemostatic. Bovie electrocautery was used for any small muscle bleeding.  Moist laps were then placed on the chest wall and attention was taken to the right axilla    Using Bovie cautery we dissected down through the fat until reached the axillary fat pad.  We were able to palpate very obvious large grossly positive lymph nodes.  Using careful dissection with hemoclips as well as a harmonic scalpel we dissected out all of the lymph nodes.  We found the thoracodorsal nerve as well as the long thoracic nerve.  These were preserved.  We took our dissection up to the axillary vein.  This was also preserved.  Then using careful dissection we were able to excise the axillary tissue.  This was passed off.  Copious irrigation was carried out.  We then placed a moist lap into the axilla.  We then took our attention to the left breast.    An elliptical incision was made around the nipple areolar complex.  Then using Bovie cautery we made subcutaneous flaps that extended to the clavicle superiorly, the sternum medially, the inframammary fold inferiorly and the serratus anterior laterally.  We then remove the breast using Bovie cautery after we marked it as above.  Copious irrigation was carried out.  We then placed Yolie on the chest wall.    Once hemostasis was noted we placed a 10 flat HARDIK drain in the right axilla.  Placed another 10 flat HARDIK drain on the right wall.  We then placed a Hemovac drain on the left side.  All of these were taken out through separate stab incisions.  Drains were sutured in place with 3-0 nylon suture.    We then took our  attention to closing the incisions.  We reapproximated all of the tissue with 2-0 Vicryl plus suture as well as 2 Monocryl suture.  Once all of the tissue was reapproximated adequately, we then closed the incisions with 4-0 Vicryl.  We also used some 3-0 nylon simple sutures.  Drain sponges were placed and a sarah dressing was placed over the incision.  We then placed Ace wrap's around the chest wall.  The drains were placed to bulb suction.  Suzanne then had her anesthesia reversed and she was extubated and brought to the recovery room in stable postoperative condition having tolerated her procedure well.            Rebekah Garcia DO

## 2024-10-10 LAB
ANION GAP SERPL CALCULATED.3IONS-SCNC: 9.4 MMOL/L (ref 5–15)
BASOPHILS # BLD AUTO: 0.06 10*3/MM3 (ref 0–0.2)
BASOPHILS NFR BLD AUTO: 0.7 % (ref 0–1.5)
BUN SERPL-MCNC: 14 MG/DL (ref 6–20)
BUN/CREAT SERPL: 17.9 (ref 7–25)
CALCIUM SPEC-SCNC: 7.5 MG/DL (ref 8.6–10.5)
CHLORIDE SERPL-SCNC: 107 MMOL/L (ref 98–107)
CO2 SERPL-SCNC: 22.6 MMOL/L (ref 22–29)
CREAT SERPL-MCNC: 0.78 MG/DL (ref 0.57–1)
CYTO UR: NORMAL
DEPRECATED RDW RBC AUTO: 45.2 FL (ref 37–54)
EGFRCR SERPLBLD CKD-EPI 2021: 96.2 ML/MIN/1.73
EOSINOPHIL # BLD AUTO: 0.06 10*3/MM3 (ref 0–0.4)
EOSINOPHIL NFR BLD AUTO: 0.7 % (ref 0.3–6.2)
ERYTHROCYTE [DISTWIDTH] IN BLOOD BY AUTOMATED COUNT: 13.7 % (ref 12.3–15.4)
GLUCOSE SERPL-MCNC: 94 MG/DL (ref 65–99)
HCT VFR BLD AUTO: 32 % (ref 34–46.6)
HGB BLD-MCNC: 10.3 G/DL (ref 12–15.9)
IMM GRANULOCYTES # BLD AUTO: 0.04 10*3/MM3 (ref 0–0.05)
IMM GRANULOCYTES NFR BLD AUTO: 0.5 % (ref 0–0.5)
LAB AP CASE REPORT: NORMAL
LAB AP SYNOPTIC CHECKLIST: NORMAL
LYMPHOCYTES # BLD AUTO: 1.72 10*3/MM3 (ref 0.7–3.1)
LYMPHOCYTES NFR BLD AUTO: 20.9 % (ref 19.6–45.3)
MCH RBC QN AUTO: 29.2 PG (ref 26.6–33)
MCHC RBC AUTO-ENTMCNC: 32.2 G/DL (ref 31.5–35.7)
MCV RBC AUTO: 90.7 FL (ref 79–97)
MONOCYTES # BLD AUTO: 0.66 10*3/MM3 (ref 0.1–0.9)
MONOCYTES NFR BLD AUTO: 8 % (ref 5–12)
NEUTROPHILS NFR BLD AUTO: 5.7 10*3/MM3 (ref 1.7–7)
NEUTROPHILS NFR BLD AUTO: 69.2 % (ref 42.7–76)
NRBC BLD AUTO-RTO: 0 /100 WBC (ref 0–0.2)
PATH REPORT.FINAL DX SPEC: NORMAL
PATH REPORT.GROSS SPEC: NORMAL
PLATELET # BLD AUTO: 298 10*3/MM3 (ref 140–450)
PMV BLD AUTO: 11 FL (ref 6–12)
POTASSIUM SERPL-SCNC: 3.4 MMOL/L (ref 3.5–5.2)
RBC # BLD AUTO: 3.53 10*6/MM3 (ref 3.77–5.28)
SODIUM SERPL-SCNC: 139 MMOL/L (ref 136–145)
WBC NRBC COR # BLD AUTO: 8.24 10*3/MM3 (ref 3.4–10.8)

## 2024-10-10 PROCEDURE — 85025 COMPLETE CBC W/AUTO DIFF WBC: CPT | Performed by: SURGERY

## 2024-10-10 PROCEDURE — 25010000002 HEPARIN (PORCINE) PER 1000 UNITS: Performed by: SURGERY

## 2024-10-10 PROCEDURE — G0378 HOSPITAL OBSERVATION PER HR: HCPCS

## 2024-10-10 PROCEDURE — 80048 BASIC METABOLIC PNL TOTAL CA: CPT | Performed by: SURGERY

## 2024-10-10 PROCEDURE — 63710000001 DIPHENHYDRAMINE PER 50 MG: Performed by: SURGERY

## 2024-10-10 PROCEDURE — 25010000002 HYDROMORPHONE 1 MG/ML SOLUTION: Performed by: SURGERY

## 2024-10-10 PROCEDURE — 25010000002 CEFAZOLIN PER 500 MG: Performed by: SURGERY

## 2024-10-10 RX ORDER — POTASSIUM CHLORIDE 1500 MG/1
40 TABLET, EXTENDED RELEASE ORAL ONCE
Status: COMPLETED | OUTPATIENT
Start: 2024-10-10 | End: 2024-10-10

## 2024-10-10 RX ADMIN — TRAMADOL HYDROCHLORIDE 100 MG: 50 TABLET ORAL at 22:17

## 2024-10-10 RX ADMIN — ACETAMINOPHEN 1000 MG: 500 TABLET ORAL at 15:31

## 2024-10-10 RX ADMIN — HEPARIN SODIUM 5000 UNITS: 5000 INJECTION INTRAVENOUS; SUBCUTANEOUS at 08:43

## 2024-10-10 RX ADMIN — HYDROMORPHONE HYDROCHLORIDE 1 MG: 1 INJECTION, SOLUTION INTRAMUSCULAR; INTRAVENOUS; SUBCUTANEOUS at 11:14

## 2024-10-10 RX ADMIN — HEPARIN SODIUM 5000 UNITS: 5000 INJECTION INTRAVENOUS; SUBCUTANEOUS at 20:14

## 2024-10-10 RX ADMIN — POTASSIUM CHLORIDE 40 MEQ: 1500 TABLET, EXTENDED RELEASE ORAL at 08:43

## 2024-10-10 RX ADMIN — ACETAMINOPHEN 1000 MG: 500 TABLET ORAL at 08:43

## 2024-10-10 RX ADMIN — DIPHENHYDRAMINE HYDROCHLORIDE 25 MG: 25 CAPSULE ORAL at 20:14

## 2024-10-10 RX ADMIN — SODIUM CHLORIDE 2000 MG: 9 INJECTION, SOLUTION INTRAVENOUS at 00:34

## 2024-10-10 RX ADMIN — OXYCODONE HYDROCHLORIDE 5 MG: 5 TABLET ORAL at 08:44

## 2024-10-10 RX ADMIN — HYDROMORPHONE HYDROCHLORIDE 1 MG: 1 INJECTION, SOLUTION INTRAMUSCULAR; INTRAVENOUS; SUBCUTANEOUS at 16:48

## 2024-10-10 RX ADMIN — ASPIRIN 81 MG: 81 TABLET, COATED ORAL at 08:43

## 2024-10-10 RX ADMIN — ACETAMINOPHEN 1000 MG: 500 TABLET ORAL at 03:15

## 2024-10-10 RX ADMIN — HYDROMORPHONE HYDROCHLORIDE 1 MG: 1 INJECTION, SOLUTION INTRAMUSCULAR; INTRAVENOUS; SUBCUTANEOUS at 00:38

## 2024-10-10 RX ADMIN — OXYCODONE HYDROCHLORIDE 5 MG: 5 TABLET ORAL at 20:18

## 2024-10-10 RX ADMIN — BUPROPION HYDROCHLORIDE 300 MG: 150 TABLET, EXTENDED RELEASE ORAL at 08:43

## 2024-10-10 NOTE — DISCHARGE PLACEMENT REQUEST
"Suzanne Lin (44 y.o. Female)       Date of Birth   1980    Social Security Number       Address   [address] [id number]    Home Phone   [phone]    MRN   [id number]       Buddhism   None    Marital Status   Single                            Admission Date   10/9/24    Admission Type   Elective    Admitting Provider   Rebekah Garcia DO    Attending Provider   Rebekah Garcia DO    Department, Room/Bed   [facility] SURG, 1401/1       Discharge Date       Discharge Disposition       Discharge Destination                                 Attending Provider: Rebekah Garcia DO    Allergies: No Known Allergies    Isolation: None   Infection: None   Code Status: CPR    Ht: 165.1 cm (65\")   Wt: 67.1 kg (147 lb 14.9 oz)    Admission Cmt: None   Principal Problem: Breast cancer, stage 3, right [C50.911]                   Active Insurance as of 10/9/2024       Primary Coverage       Payor Plan Insurance Group Employer/Plan Group    [facility] [id number]       Payor Plan Address Payor Plan Phone Number Payor Plan Fax Number Effective Dates    [address] [phone]  9/1/2024 - None Entered    [place] [address]         Subscriber Name Subscriber Birth Date Member ID       SUZANNE LIN 1980 [id number]                     Emergency Contacts        (Rel.) Home Phone Work Phone Mobile Phone    GUANAKOKERRY (Mother) [phone] -- --    denise see (Significant Other) -- -- [phone]    soumya milan (Relative) -- -- [phone]                "

## 2024-10-10 NOTE — PLAN OF CARE
Goal Outcome Evaluation:  Plan of Care Reviewed With: patient        Progress: improving  Outcome Evaluation: Rested on and off throughout the night. Tolerating PRN pain pills for pain. No complaints shortness of breath or difficulty breathing. Tolerated Cefazolin. Ambultes with stand by assist. No complaints of nausea or vomiting. VSS. Drains remain in place. Patient hopes to go home today.

## 2024-10-10 NOTE — PROGRESS NOTES
"        Surgery Progress Note   Chief Complaint: Postoperative day 1 modified radical mastectomy of the right breast with simple left mastectomy for stage IIIb breast cancer    Subjective     Interval History:     Suzanne is doing well today.  Her pain is well controlled.      Objective     Vital Signs  Temp:  [97.8 °F (36.6 °C)-98.4 °F (36.9 °C)] 98.4 °F (36.9 °C)  Heart Rate:  [69-96] 70  Resp:  [10-18] 18  BP: ()/(51-70) 109/57  Body mass index is 24.62 kg/m².    Intake/Output Summary (Last 24 hours) at 10/10/2024 1031  Last data filed at 10/10/2024 0953  Gross per 24 hour   Intake 950 ml   Output 1518 ml   Net -568 ml     I/O this shift:  In: 240 [P.O.:240]  Out: -        Physical Exam:   General: patient awake, alert and cooperative   Chest wall is bruised on the right side but flaps appear viable   Extremities: no rash or edema   Neurologic: Normal mood and behavior     Results Review:     I reviewed the patient's new clinical results.      WBC No results found for: \"WBCS\"   HGB Hemoglobin   Date Value Ref Range Status   10/10/2024 10.3 (L) 12.0 - 15.9 g/dL Final   10/07/2024 14.1 12.0 - 15.9 g/dL Final      HCT Hematocrit   Date Value Ref Range Status   10/10/2024 32.0 (L) 34.0 - 46.6 % Final   10/07/2024 43.2 34.0 - 46.6 % Final      Platlets No results found for: \"LABPLAT\"     PT/INR:  No results found for: \"PROTIME\"/No results found for: \"INR\"    Sodium Sodium   Date Value Ref Range Status   10/10/2024 139 136 - 145 mmol/L Final   10/07/2024 144 136 - 145 mmol/L Final      Potassium Potassium   Date Value Ref Range Status   10/10/2024 3.4 (L) 3.5 - 5.2 mmol/L Final   10/07/2024 4.1 3.5 - 5.2 mmol/L Final      Chloride Chloride   Date Value Ref Range Status   10/10/2024 107 98 - 107 mmol/L Final   10/07/2024 109 (H) 98 - 107 mmol/L Final      Bicarbonate No results found for: \"PLASMABICARB\"   BUN BUN   Date Value Ref Range Status   10/10/2024 14 6 - 20 mg/dL Final   10/07/2024 9 6 - 20 mg/dL Final    " "  Creatinine Creatinine   Date Value Ref Range Status   10/10/2024 0.78 0.57 - 1.00 mg/dL Final   10/07/2024 0.92 0.57 - 1.00 mg/dL Final      Calcium Calcium   Date Value Ref Range Status   10/10/2024 7.5 (L) 8.6 - 10.5 mg/dL Final   10/07/2024 9.6 8.6 - 10.5 mg/dL Final      Magnesium  AST  ALT  Bilirubin, Total  AlkPhos  Albumin    Amylase  Lipase    Radiology: No results found for: \"MG\"  No components found for: \"AST.*\"  No components found for: \"ALT.*\"  No results found for: \"BILIRUBIN\"    No components found for: \"ALKPHOS.*\"  No components found for: \"ALBUMIN.*\"      No components found for: \"AMYLASE.*\"  No components found for: \"LIPASE.*\"            Imaging Results (Most Recent)       Procedure Component Value Units Date/Time    US Sonosite Portable [[id number]] Resulted: 10/09/24 0640     Updated: 10/09/24 0640    Narrative:      This procedure was auto-finalized with no dictation required.               Scopolamine, 1 patch          Assessment & Plan     Active Hospital Problems    Diagnosis     **Breast cancer, stage 3, right     Breast cancer in female         Will recheck an H&H and replace her potassium  Probably d/c home tomorrow      Rebekah Garcia DO  10/10/24  10:31 EDT   "

## 2024-10-10 NOTE — PLAN OF CARE
Goal Outcome Evaluation:  Plan of Care Reviewed With: patient        Progress: improving  Outcome Evaluation: Pt up in room, ambulate in cristobal w encouragement. IV Dilaudid given for pain control. Drains and LETY vacs remain in place. Softee camisole to bedside. Educate about drain care and provided demonstration on how to drain and measure. Voiding w/o diff. Tolerating diet. All care explained. All questions answered. Pt verb understanding.

## 2024-10-10 NOTE — CASE MANAGEMENT/SOCIAL WORK
Continued Stay Note  TERRIE Barker     Patient Name: Suzanne Lin  MRN: [id number]  Today's Date: 10/10/2024    Admit Date: 10/9/2024    Plan: home with family/ HH referral   Discharge Plan       Row Name 10/10/24 1101       Plan    Plan home with family/ HH referral    Patient/Family in Agreement with Plan yes    Plan Comments Spoke with patient at bedside. Face sheet verified. Patient lives in an apartment with her 10yr old son. She states her mother will be staying with her to assist post-op. Patient is independent of ADLs including driving and working. She has used HH in the past but does not recall the agency.  She uses no DME and has not been to inpatient rehab previously. She does not have a living will. She sees Gia VILLARREAL as PCP. She uses [facility] [place] and denies issues obtaining medications. There are no concerns r/t food, housing,utilities or transportation. Discussin regarding order for HH at [place]. Patient agreeable to HH and has no preference as long as accepted by insurance. Spoke with [facility] - referral given and placed via epic. She will review and return call for ability to accept, CM # placed on white board, will continue to follow.                   Discharge Codes    No documentation.                       Mikey Downey RN

## 2024-10-10 NOTE — CASE MANAGEMENT/SOCIAL WORK
Continued Stay Note  TERRIE SierraLindley     Patient Name: Suzanne Lin  MRN: [id number]  Today's Date: 10/10/2024    Admit Date: 10/9/2024    Plan: Plan home with family/[facility]   Discharge Plan       Row Name 10/10/24 1251       Plan    Plan Plan home with family/[facility]    Patient/Family in Agreement with Plan yes    Plan Comments Return call from [facility] who states they can accept patient with start of care this weekend. CM will update when patient is dc'd. Message to update Dr Garcia. Galina CAMPA updated.      Row Name 10/10/24 1101       Plan    Plan home with family/ HH referral    Patient/Family in Agreement with Plan yes    Plan Comments Spoke with patient at bedside. Face sheet verified. Patient lives in an apartment with her 10yr old son. She states her mother will be staying with her to assist post-op. Patient is independent of ADLs including driving and working. She has used HH in the past but does not recall the agency.  She uses no DME and has not been to inpatient rehab previously. She does not have a living will. She sees Gia VILLARREAL as PCP. She uses [facility] [place] and denies issues obtaining medications. There are no concerns r/t food, housing,utilities or transportation. Discussin regarding order for HH at [place]. Patient agreeable to HH and has no preference as long as accepted by insurance. Spoke with [facility] - referral given and placed via epic. She will review and return call for ability to accept, CM # placed on white board, will continue to follow.                   Discharge Codes    No documentation.                       Mikey Downey RN

## 2024-10-11 ENCOUNTER — TELEPHONE (OUTPATIENT)
Dept: SURGERY | Facility: CLINIC | Age: 44
End: 2024-10-11
Payer: COMMERCIAL

## 2024-10-11 VITALS
BODY MASS INDEX: 24.65 KG/M2 | DIASTOLIC BLOOD PRESSURE: 56 MMHG | HEART RATE: 75 BPM | HEIGHT: 65 IN | WEIGHT: 147.93 LBS | OXYGEN SATURATION: 96 % | SYSTOLIC BLOOD PRESSURE: 104 MMHG | TEMPERATURE: 97.8 F | RESPIRATION RATE: 16 BRPM

## 2024-10-11 LAB
ALBUMIN SERPL-MCNC: 2.8 G/DL (ref 3.5–5.2)
ANION GAP SERPL CALCULATED.3IONS-SCNC: 7 MMOL/L (ref 5–15)
BUN SERPL-MCNC: 13 MG/DL (ref 6–20)
BUN/CREAT SERPL: 15.7 (ref 7–25)
CALCIUM SPEC-SCNC: 7.8 MG/DL (ref 8.6–10.5)
CHLORIDE SERPL-SCNC: 108 MMOL/L (ref 98–107)
CO2 SERPL-SCNC: 25 MMOL/L (ref 22–29)
CREAT SERPL-MCNC: 0.83 MG/DL (ref 0.57–1)
EGFRCR SERPLBLD CKD-EPI 2021: 89.3 ML/MIN/1.73
GLUCOSE SERPL-MCNC: 84 MG/DL (ref 65–99)
HCT VFR BLD AUTO: 31 % (ref 34–46.6)
HGB BLD-MCNC: 9.7 G/DL (ref 12–15.9)
PHOSPHATE SERPL-MCNC: 2.6 MG/DL (ref 2.5–4.5)
POTASSIUM SERPL-SCNC: 3.9 MMOL/L (ref 3.5–5.2)
SODIUM SERPL-SCNC: 140 MMOL/L (ref 136–145)

## 2024-10-11 PROCEDURE — 25010000002 HEPARIN (PORCINE) PER 1000 UNITS: Performed by: SURGERY

## 2024-10-11 PROCEDURE — 85014 HEMATOCRIT: CPT | Performed by: SURGERY

## 2024-10-11 PROCEDURE — 85018 HEMOGLOBIN: CPT | Performed by: SURGERY

## 2024-10-11 PROCEDURE — 80069 RENAL FUNCTION PANEL: CPT | Performed by: SURGERY

## 2024-10-11 PROCEDURE — G0378 HOSPITAL OBSERVATION PER HR: HCPCS

## 2024-10-11 RX ORDER — OXYCODONE HYDROCHLORIDE 5 MG/1
5 TABLET ORAL EVERY 4 HOURS PRN
Qty: 10 TABLET | Refills: 0 | Status: SHIPPED | OUTPATIENT
Start: 2024-10-11

## 2024-10-11 RX ORDER — TRAMADOL HYDROCHLORIDE 50 MG/1
100 TABLET ORAL EVERY 6 HOURS PRN
Qty: 20 TABLET | Refills: 0 | Status: SHIPPED | OUTPATIENT
Start: 2024-10-11

## 2024-10-11 RX ORDER — CYCLOBENZAPRINE HCL 10 MG
10 TABLET ORAL 2 TIMES DAILY PRN
Qty: 40 TABLET | Refills: 0 | Status: SHIPPED | OUTPATIENT
Start: 2024-10-11

## 2024-10-11 RX ADMIN — HEPARIN SODIUM 5000 UNITS: 5000 INJECTION INTRAVENOUS; SUBCUTANEOUS at 08:23

## 2024-10-11 RX ADMIN — OXYCODONE HYDROCHLORIDE 5 MG: 5 TABLET ORAL at 11:29

## 2024-10-11 RX ADMIN — BUPROPION HYDROCHLORIDE 300 MG: 150 TABLET, EXTENDED RELEASE ORAL at 08:23

## 2024-10-11 RX ADMIN — OXYCODONE HYDROCHLORIDE 5 MG: 5 TABLET ORAL at 02:05

## 2024-10-11 RX ADMIN — ASPIRIN 81 MG: 81 TABLET, COATED ORAL at 08:23

## 2024-10-11 RX ADMIN — ACETAMINOPHEN 1000 MG: 500 TABLET ORAL at 08:23

## 2024-10-11 RX ADMIN — ACETAMINOPHEN 1000 MG: 500 TABLET ORAL at 02:05

## 2024-10-11 NOTE — PLAN OF CARE
Goal Outcome Evaluation:  Plan of Care Reviewed With: patient        Progress: improving  Outcome Evaluation: POD #2. Oxygen stable on room air. Dressings C/D/I. HARDIK drains and hemovacs in place. Tolerating PO pain medication, see flowsheets/mar for details. Patient appears to have rested well overnight.

## 2024-10-11 NOTE — TELEPHONE ENCOUNTER
The hospital called to make this patient a two week post op. She is being discharged today.    She is expecting a call back from your office at your nearest convenience.    Thank you!

## 2024-10-11 NOTE — CASE MANAGEMENT/SOCIAL WORK
Case Management Discharge Note      Final Note: dc home /[facility]         Selected Continued Care - Discharged on 10/11/2024 Admission date: 10/9/2024 - Discharge disposition: Home or Self Care      Destination    No services have been selected for the patient.                Durable Medical Equipment    No services have been selected for the patient.                Dialysis/Infusion    No services have been selected for the patient.                Home Medical Care       Service Provider Selected Services Address Phone Fax Patient Preferred    [facility] [address] [facility] [place] [facility] [address] [phone] --              Therapy    No services have been selected for the patient.                Community Resources    No services have been selected for the patient.                Community & DME    No services have been selected for the patient.                         Final Discharge Disposition Code: 06 - home with home health care

## 2024-10-11 NOTE — DISCHARGE SUMMARY
Discharge Summary    Patient name: Suzanne Lin    Medical record number: [id number]    Admission date: 10/9/2024  Discharge date:  10/11/2024    Attending physician: Dr. KOKO Garcia    Primary care physician: Gia Rodriguez APRN    Referring physician: Rebekah [place] [address]    Consulting physician(s):    Condition on discharge: stable    Primary Diagnoses:  Stage 3B cancer of the right breast    Secondary Diagnoses:     Operative Procedure: Right breast modified radical mastectomy with left breast simple mastectomy     Hospital Course: The patient is a very pleasant 44 y.o. female that was admitted to the hospital after her Right breast modified radical mastectomy with left breast simple mastectomy.  She was admitted for postoperative care.  By her day of discharge, her pain was well controlled and her drains were putting out serous drainage.    Discharge medications:      Discharge Medications        New Medications        Instructions Start Date   cyclobenzaprine 10 MG tablet  Commonly known as: FLEXERIL   10 mg, Oral, 2 Times Daily PRN      naloxone 4 MG/0.1ML nasal spray  Commonly known as: NARCAN   Call 911. Don't prime. [place] in 1 nostril for overdose. Repeat in 2-3 minutes in other nostril if no or minimal breathing/responsiveness.      oxyCODONE 5 MG immediate release tablet  Commonly known as: ROXICODONE   5 mg, Oral, Every 4 Hours PRN      traMADol 50 MG tablet  Commonly known as: ULTRAM   100 mg, Oral, Every 6 Hours PRN             Continue These Medications        Instructions Start Date   aspirin 81 MG EC tablet   81 mg, Oral, Daily      buPROPion  MG 24 hr tablet  Commonly known as: WELLBUTRIN XL   Take 2 tablets by mouth Daily.      CBD oral oil  Commonly known as: cannabidiol   Oral      diphenhydrAMINE 25 mg capsule  Commonly known as: BENADRYL   25 mg, Oral, Nightly PRN      MULTI COMPLETE PO   Oral      traZODone 50 MG tablet  Commonly known as:  DESYREL   Take 1 tablet by mouth As Needed.               Discharge instructions:      No driving for 2 weeks and when no longer taking narcotics.  You may ride in a car  Diet as tolerated  You may walk as tolerated  You may walk up and down stairs as tolerated  Leave your dressings in place until you follow up with me  Empty and record your drain output daily  No lifting greater than 10 pounds for 6 weeks  If you develop constipation, take a tablespoon of Milk of Magnesia once a day as needed  You may place ice packs on your incisions for 20 minutes at a time as needed for pain    You may take ibuprofen and Tylenol as directed for additional pain control    Follow-up appointment: Follow up with Dr. Garcia in the office in 1-2 weeks. Call for appointment at [phone].

## 2024-10-12 ENCOUNTER — READMISSION MANAGEMENT (OUTPATIENT)
Dept: CALL CENTER | Facility: HOSPITAL | Age: 44
End: 2024-10-12
Payer: COMMERCIAL

## 2024-10-12 NOTE — OUTREACH NOTE
Prep Survey      Flowsheet Row Responses   [facility] discharged from? [facility]   Is LACE score < 7 ? Yes   Eligibility Readm Mgmt   Discharge diagnosis Right breast modified radical mastectomy with left breast simple mastectomy   Does the patient have one of the following disease processes/diagnoses(primary or secondary)? General Surgery   Does the patient have Home health ordered? Yes   What is the Home health agency?  [facility]   Is there a DME ordered? No   Prep survey completed? Yes            Padmini GONZALEZ - Registered Nurse

## 2024-10-15 ENCOUNTER — TELEPHONE (OUTPATIENT)
Dept: SURGERY | Facility: CLINIC | Age: 44
End: 2024-10-15
Payer: COMMERCIAL

## 2024-10-15 NOTE — TELEPHONE ENCOUNTER
Pt reports white box attached to LETY is vibrating.  States she has replaced the batteries and it is still vibrating.  States green light is flashing.  Reported to Dr. Garcia and she wants LETY dressing left in place and will address at OV tomorrow.      Mother asked to speak with nurse.  Reports drainage from RIGHT axilla drain has increased from 5 ml to 10 ml.  Mother reassured.

## 2024-10-16 ENCOUNTER — OFFICE VISIT (OUTPATIENT)
Dept: SURGERY | Facility: CLINIC | Age: 44
End: 2024-10-16
Payer: COMMERCIAL

## 2024-10-16 ENCOUNTER — READMISSION MANAGEMENT (OUTPATIENT)
Dept: CALL CENTER | Facility: HOSPITAL | Age: 44
End: 2024-10-16
Payer: COMMERCIAL

## 2024-10-16 DIAGNOSIS — Z90.13 STATUS POST BILATERAL MASTECTOMY: Primary | ICD-10-CM

## 2024-10-16 DIAGNOSIS — Z17.0 MALIGNANT NEOPLASM OF RIGHT BREAST IN FEMALE, ESTROGEN RECEPTOR POSITIVE, UNSPECIFIED SITE OF BREAST: Primary | ICD-10-CM

## 2024-10-16 DIAGNOSIS — C50.911 MALIGNANT NEOPLASM OF RIGHT BREAST IN FEMALE, ESTROGEN RECEPTOR POSITIVE, UNSPECIFIED SITE OF BREAST: Primary | ICD-10-CM

## 2024-10-16 PROCEDURE — 99024 POSTOP FOLLOW-UP VISIT: CPT | Performed by: SURGERY

## 2024-10-16 NOTE — PROGRESS NOTES
Suzanne Lin 44 y.o. female presents for PO FU bilat mastectomies.       HPI   Above note agree.  Suzanne is here following her modified radical mastectomy the right breast and simple mastectomy left breast.  She is doing well.  Her pain is well-controlled.  She is documented her drain output.  She has no complaints.      Review of Systems        Past Medical History:   Diagnosis Date    Anxiety     Breast cancer     Right    Crohn's disease     DVT (deep vein thrombosis) in pregnancy     PFO (patent foramen ovale)     Stroke     after the birth of her child at age 34           Past Surgical History:   Procedure Laterality Date     SECTION      COLON RESECTION      COLONOSCOPY      MASTECTOMY Bilateral 10/9/2024    Procedure: Modified radical mastectomy with axillary dissection of the right breast and simple mastectomy of the left breast;  Surgeon: Rebekah Garcia DO;  Location: [facility];  Service: General;  Laterality: Bilateral;    SKIN CANCER EXCISION             Physical Exam    The Yaneth dressings were removed.  Her wounds are healing well without signs of infection.  There are no signs of necrosis.  The Hemovac drain was removed.  The simple sutures were removed.  The wound was cleaned with Betadine and Steri-Strips were placed in the middle of the wound.  We then placed a fresh Ace wrap around the chest.    There were no vitals taken for this visit.        Diagnoses and all orders for this visit:    1. Status post bilateral mastectomy (Primary)    I will bring Suzanne back in 1 week.  We will put in the referral for Dr. Witt as she is going to need chemotherapy and most likely radiation.    Thank you for allowing me to participate in the care of this interesting patient.

## 2024-10-16 NOTE — LETTER
2024     PHIL Gaines  [address] [place] [id number]    Patient: Suzanne Lin   YOB: 1980   Date of Visit: 10/16/2024     Dear PHIL Gaines:       Thank you for referring Suzanne Lin to me for evaluation. Below are the relevant portions of my assessment and plan of care.    If you have questions, please do not hesitate to call me. I look forward to following Suzanne along with you.         Sincerely,        Rebekah Garcia DO        CC: No Recipients    Reebkah Garcia DO  10/16/24 1407  Sign when Signing Visit  Suzanne Lin 44 y.o. female presents for PO FU bilat mastectomies.       HPI   Above note agree.  Szuanne is here following her modified radical mastectomy the right breast and simple mastectomy left breast.  She is doing well.  Her pain is well-controlled.  She is documented her drain output.  She has no complaints.      Review of Systems        Past Medical History:   Diagnosis Date   • Anxiety    • Breast cancer     Right   • Crohn's disease    • DVT (deep vein thrombosis) in pregnancy    • PFO (patent foramen ovale)    • Stroke     after the birth of her child at age 34           Past Surgical History:   Procedure Laterality Date   •  SECTION     • COLON RESECTION     • COLONOSCOPY     • MASTECTOMY Bilateral 10/9/2024    Procedure: Modified radical mastectomy with axillary dissection of the right breast and simple mastectomy of the left breast;  Surgeon: Rebekah Garcia DO;  Location: [facility];  Service: General;  Laterality: Bilateral;   • SKIN CANCER EXCISION             Physical Exam    The Yaneth dressings were removed.  Her wounds are healing well without signs of infection.  There are no signs of necrosis.  The Hemovac drain was removed.  The simple sutures were removed.  The wound was cleaned with Betadine and Steri-Strips were placed in the middle of the wound.  We then placed a fresh Ace wrap around the chest.    There were  no vitals taken for this visit.        Diagnoses and all orders for this visit:    1. Status post bilateral mastectomy (Primary)    I will bring Suzanne back in 1 week.  We will put in the referral for Dr. Witt as she is going to need chemotherapy and most likely radiation.    Thank you for allowing me to participate in the care of this interesting patient.

## 2024-10-16 NOTE — OUTREACH NOTE
LAG < 7 Survey      Flowsheet Row Responses   [facility] patient discharged from? [facility]   Does the patient have one of the following disease processes/diagnoses(primary or secondary)? Other   BHLAG <7 Attempt successful? Yes   Call start time 1308   Call end time 1312   Discharge diagnosis Right breast modified radical mastectomy with left breast simple mastectomy   Person spoke with today (if not patient) and relationship pt   Meds reviewed with patient/caregiver? Yes   Is the patient having any side effects they believe may be caused by any medication additions or changes? No   Does the patient have all medications ordered at discharge? Yes   Is the patient taking all medications as directed (includes completed medication regime)? Yes   Does the patient have a primary care provider?  Yes   Does the patient have an appointment with their PCP within 7 days of discharge? Yes   Has the patient kept scheduled appointments due by today? Yes   What is the Home health agency?  [facility]    Psychosocial issues? No   Did the patient receive a copy of their discharge instructions? Yes   Nursing interventions Reviewed instructions with patient   What is the patient's perception of their health status since discharge? Improving   Is the patient/caregiver able to teach back signs and symptoms related to disease process for when to call PCP? Yes   Is the patient/caregiver able to teach back signs and symptoms related to disease process for when to call 911? Yes   Is the patient/caregiver able to teach back the hierarchy of who to call/visit for symptoms/problems? PCP, Specialist, Home health nurse, Urgent Care, ED, 911 Yes   If the patient is a current smoker, are they able to teach back resources for cessation? Not a smoker   Graduated Yes   Is the patient interested in additional calls from an ambulatory ? No   Does the patient have an Advance Directive or Living Will? No   Is the patient/caregiver  familiar with Advance Care Planning? No   Would the patient like more information on Advance Care Planning? No   Wrap up additional comments pt on way to surgeons. Doing well. no concerns at this time            LOURDES TRUJILLO - Registered Nurse

## 2024-10-21 ENCOUNTER — OFFICE VISIT (OUTPATIENT)
Dept: SURGERY | Facility: CLINIC | Age: 44
End: 2024-10-21
Payer: COMMERCIAL

## 2024-10-21 DIAGNOSIS — Z90.13 STATUS POST BILATERAL MASTECTOMY: Primary | ICD-10-CM

## 2024-10-21 PROCEDURE — 99024 POSTOP FOLLOW-UP VISIT: CPT | Performed by: SURGERY

## 2024-10-21 NOTE — PROGRESS NOTES
Suzanne Lin 44 y.o. female presents for PO FU bilat mast.  Pt states she feels pretty good.        HPI   Above-noted agree.      Review of Systems        Past Medical History:   Diagnosis Date    Anxiety     Breast cancer     Right    Crohn's disease     DVT (deep vein thrombosis) in pregnancy     PFO (patent foramen ovale)     Stroke     after the birth of her child at age 34           Past Surgical History:   Procedure Laterality Date     SECTION      COLON RESECTION      COLONOSCOPY      MASTECTOMY Bilateral 10/9/2024    Procedure: Modified radical mastectomy with axillary dissection of the right breast and simple mastectomy of the left breast;  Surgeon: Rebekah Garcia DO;  Location: [facility];  Service: General;  Laterality: Bilateral;    SKIN CANCER EXCISION             Physical Exam    Her wound is continue to look better.  We removed the right chest wall drain without difficulty.  The right axillary drain will remain another week.    There were no vitals taken for this visit.        Diagnoses and all orders for this visit:    1. Status post bilateral mastectomy (Primary)

## 2024-10-28 ENCOUNTER — OFFICE VISIT (OUTPATIENT)
Dept: SURGERY | Facility: CLINIC | Age: 44
End: 2024-10-28
Payer: COMMERCIAL

## 2024-10-28 DIAGNOSIS — C50.911 BREAST CANCER, STAGE 3, RIGHT: Primary | ICD-10-CM

## 2024-10-28 DIAGNOSIS — I89.0 LYMPHEDEMA: ICD-10-CM

## 2024-10-28 DIAGNOSIS — Z90.13 STATUS POST MASTECTOMY, BILATERAL: ICD-10-CM

## 2024-10-28 DIAGNOSIS — Z90.13 STATUS POST MASTECTOMY, BILATERAL: Primary | ICD-10-CM

## 2024-10-28 PROCEDURE — 99024 POSTOP FOLLOW-UP VISIT: CPT | Performed by: SURGERY

## 2024-10-28 NOTE — PROGRESS NOTES
Suzanne Lin 44 y.o. female presents for PO FU.  Pt feels well.        HPI   Suzanne is here following her modified radical mastectomy of the right breast and simple mastectomy left breast that was performed on .  She has been doing very well.  She has 1 drain left and is only been putting out 5 mL/day.      Review of Systems        Past Medical History:   Diagnosis Date    Anxiety     Breast cancer     Right    Crohn's disease     DVT (deep vein thrombosis) in pregnancy     PFO (patent foramen ovale)     Stroke     after the birth of her child at age 34           Past Surgical History:   Procedure Laterality Date     SECTION      COLON RESECTION      COLONOSCOPY      MASTECTOMY Bilateral 10/9/2024    Procedure: Modified radical mastectomy with axillary dissection of the right breast and simple mastectomy of the left breast;  Surgeon: Rebekah Garcia DO;  Location: [facility];  Service: General;  Laterality: Bilateral;    SKIN CANCER EXCISION             Physical Exam    Her wound is healing very well.  The redness has completely resolved.  The eschar is healing as well.  The final drain which was an axillary drain was removed without difficulty.    There were no vitals taken for this visit.        Diagnoses and all orders for this visit:    1. Breast cancer, stage 3, right (Primary)    2. Status post mastectomy, bilateral    Suzanne is seeing Dr. Witt this week to discuss chemotherapy.  We will bring her back in 2 weeks to discuss placing a port.    Thank you for allowing me to participate in the care of this interesting patient.

## 2024-10-28 NOTE — LETTER
2024     PHIL Gaines  [address] [place] [id number]    Patient: Suzanne Lin   YOB: 1980   Date of Visit: 10/28/2024     Dear PHIL Gaines:       Thank you for referring Suzanne Lin to me for evaluation. Below are the relevant portions of my assessment and plan of care.    If you have questions, please do not hesitate to call me. I look forward to following Suzanne along with you.         Sincerely,        Rebekah Garcia DO        CC: No Recipients    Rebekah Garcia DO  10/28/24 0947  Sign when Signing Visit  Suzanne Lin 44 y.o. female presents for PO FU.  Pt feels well.        HPI   Suzanne is here following her modified radical mastectomy of the right breast and simple mastectomy left breast that was performed on .  She has been doing very well.  She has 1 drain left and is only been putting out 5 mL/day.      Review of Systems        Past Medical History:   Diagnosis Date   • Anxiety    • Breast cancer     Right   • Crohn's disease    • DVT (deep vein thrombosis) in pregnancy    • PFO (patent foramen ovale)    • Stroke     after the birth of her child at age 34           Past Surgical History:   Procedure Laterality Date   •  SECTION     • COLON RESECTION     • COLONOSCOPY     • MASTECTOMY Bilateral 10/9/2024    Procedure: Modified radical mastectomy with axillary dissection of the right breast and simple mastectomy of the left breast;  Surgeon: Rebekah Garcia DO;  Location: [facility];  Service: General;  Laterality: Bilateral;   • SKIN CANCER EXCISION             Physical Exam    Her wound is healing very well.  The redness has completely resolved.  The eschar is healing as well.  The final drain which was an axillary drain was removed without difficulty.    There were no vitals taken for this visit.        Diagnoses and all orders for this visit:    1. Breast cancer, stage 3, right (Primary)    2. Status post mastectomy,  kevin Palacios is seeing Dr. Witt this week to discuss chemotherapy.  We will bring her back in 2 weeks to discuss placing a port.    Thank you for allowing me to participate in the care of this interesting patient.

## 2024-10-31 ENCOUNTER — LAB (OUTPATIENT)
Dept: LAB | Facility: HOSPITAL | Age: 44
End: 2024-10-31
Payer: COMMERCIAL

## 2024-10-31 ENCOUNTER — CONSULT (OUTPATIENT)
Dept: ONCOLOGY | Facility: CLINIC | Age: 44
End: 2024-10-31
Payer: COMMERCIAL

## 2024-10-31 ENCOUNTER — HOSPITAL ENCOUNTER (OUTPATIENT)
Dept: OCCUPATIONAL THERAPY | Facility: HOSPITAL | Age: 44
Setting detail: THERAPIES SERIES
Discharge: HOME | End: 2024-10-31
Payer: COMMERCIAL

## 2024-10-31 VITALS
DIASTOLIC BLOOD PRESSURE: 74 MMHG | OXYGEN SATURATION: 99 % | WEIGHT: 150.2 LBS | RESPIRATION RATE: 16 BRPM | HEART RATE: 84 BPM | BODY MASS INDEX: 24.14 KG/M2 | TEMPERATURE: 97.8 F | HEIGHT: 66 IN | SYSTOLIC BLOOD PRESSURE: 113 MMHG

## 2024-10-31 DIAGNOSIS — Z45.2 ENCOUNTER FOR CENTRAL LINE PLACEMENT: ICD-10-CM

## 2024-10-31 DIAGNOSIS — C50.911 INVASIVE DUCTAL CARCINOMA OF RIGHT BREAST IN FEMALE: Primary | ICD-10-CM

## 2024-10-31 DIAGNOSIS — Z79.899 NEED FOR PROPHYLACTIC CHEMOTHERAPY: ICD-10-CM

## 2024-10-31 DIAGNOSIS — Z90.13 STATUS POST MASTECTOMY, BILATERAL: ICD-10-CM

## 2024-10-31 DIAGNOSIS — I89.0 LYMPHEDEMA: Primary | ICD-10-CM

## 2024-10-31 DIAGNOSIS — C50.911 BREAST CANCER, STAGE 3, RIGHT: Primary | ICD-10-CM

## 2024-10-31 PROBLEM — Z79.69 NEED FOR PROPHYLACTIC CHEMOTHERAPY: Status: ACTIVE | Noted: 2024-10-31

## 2024-10-31 LAB
ALBUMIN SERPL-MCNC: 3.9 G/DL (ref 3.5–5.2)
ALBUMIN/GLOB SERPL: 1.1 G/DL
ALP SERPL-CCNC: 110 U/L (ref 39–117)
ALT SERPL W P-5'-P-CCNC: 11 U/L (ref 1–33)
ANION GAP SERPL CALCULATED.3IONS-SCNC: 11.4 MMOL/L (ref 5–15)
AST SERPL-CCNC: 14 U/L (ref 1–32)
BASOPHILS # BLD AUTO: 0.09 10*3/MM3 (ref 0–0.2)
BASOPHILS NFR BLD AUTO: 1.1 % (ref 0–1.5)
BILIRUB SERPL-MCNC: 0.2 MG/DL (ref 0–1.2)
BUN SERPL-MCNC: 14 MG/DL (ref 6–20)
BUN/CREAT SERPL: 17.5 (ref 7–25)
CALCIUM SPEC-SCNC: 9.1 MG/DL (ref 8.6–10.5)
CANCER AG15-3 SERPL-ACNC: 37.2 U/ML
CHLORIDE SERPL-SCNC: 107 MMOL/L (ref 98–107)
CO2 SERPL-SCNC: 20.6 MMOL/L (ref 22–29)
CREAT SERPL-MCNC: 0.8 MG/DL (ref 0.57–1)
DEPRECATED RDW RBC AUTO: 44.5 FL (ref 37–54)
EGFRCR SERPLBLD CKD-EPI 2021: 93.3 ML/MIN/1.73
EOSINOPHIL # BLD AUTO: 0.24 10*3/MM3 (ref 0–0.4)
EOSINOPHIL NFR BLD AUTO: 3 % (ref 0.3–6.2)
ERYTHROCYTE [DISTWIDTH] IN BLOOD BY AUTOMATED COUNT: 13.6 % (ref 12.3–15.4)
GLOBULIN UR ELPH-MCNC: 3.5 GM/DL
GLUCOSE SERPL-MCNC: 85 MG/DL (ref 65–99)
HCT VFR BLD AUTO: 42.2 % (ref 34–46.6)
HGB BLD-MCNC: 13.9 G/DL (ref 12–15.9)
IMM GRANULOCYTES # BLD AUTO: 0.04 10*3/MM3 (ref 0–0.05)
IMM GRANULOCYTES NFR BLD AUTO: 0.5 % (ref 0–0.5)
LYMPHOCYTES # BLD AUTO: 1.31 10*3/MM3 (ref 0.7–3.1)
LYMPHOCYTES NFR BLD AUTO: 16.5 % (ref 19.6–45.3)
MCH RBC QN AUTO: 29.7 PG (ref 26.6–33)
MCHC RBC AUTO-ENTMCNC: 32.9 G/DL (ref 31.5–35.7)
MCV RBC AUTO: 90.2 FL (ref 79–97)
MONOCYTES # BLD AUTO: 0.45 10*3/MM3 (ref 0.1–0.9)
MONOCYTES NFR BLD AUTO: 5.7 % (ref 5–12)
NEUTROPHILS NFR BLD AUTO: 5.82 10*3/MM3 (ref 1.7–7)
NEUTROPHILS NFR BLD AUTO: 73.2 % (ref 42.7–76)
NRBC BLD AUTO-RTO: 0 /100 WBC (ref 0–0.2)
PLATELET # BLD AUTO: 430 10*3/MM3 (ref 140–450)
PMV BLD AUTO: 9.2 FL (ref 6–12)
POTASSIUM SERPL-SCNC: 3.8 MMOL/L (ref 3.5–5.2)
PROT SERPL-MCNC: 7.4 G/DL (ref 6–8.5)
RBC # BLD AUTO: 4.68 10*6/MM3 (ref 3.77–5.28)
SODIUM SERPL-SCNC: 139 MMOL/L (ref 136–145)
WBC NRBC COR # BLD AUTO: 7.95 10*3/MM3 (ref 3.4–10.8)

## 2024-10-31 PROCEDURE — 86300 IMMUNOASSAY TUMOR CA 15-3: CPT | Performed by: INTERNAL MEDICINE

## 2024-10-31 PROCEDURE — 80053 COMPREHEN METABOLIC PANEL: CPT | Performed by: INTERNAL MEDICINE

## 2024-10-31 PROCEDURE — 36415 COLL VENOUS BLD VENIPUNCTURE: CPT

## 2024-10-31 PROCEDURE — 85025 COMPLETE CBC W/AUTO DIFF WBC: CPT | Performed by: INTERNAL MEDICINE

## 2024-10-31 PROCEDURE — 97165 OT EVAL LOW COMPLEX 30 MIN: CPT

## 2024-10-31 RX ORDER — HEPARIN SODIUM (PORCINE) LOCK FLUSH IV SOLN 100 UNIT/ML 100 UNIT/ML
500 SOLUTION INTRAVENOUS AS NEEDED
OUTPATIENT
Start: 2024-11-01

## 2024-10-31 RX ORDER — SODIUM CHLORIDE 0.9 % (FLUSH) 0.9 %
10 SYRINGE (ML) INJECTION AS NEEDED
OUTPATIENT
Start: 2024-11-01

## 2024-10-31 RX ORDER — SODIUM CHLORIDE 0.9 % (FLUSH) 0.9 %
20 SYRINGE (ML) INJECTION AS NEEDED
OUTPATIENT
Start: 2024-11-01

## 2024-10-31 RX ORDER — TRAZODONE HYDROCHLORIDE 100 MG/1
100 TABLET ORAL
COMMUNITY
Start: 2024-10-16

## 2024-10-31 NOTE — THERAPY EVALUATION
Outpatient Occupational Therapy Lymphedema Initial Evaluation   [place]     Patient Name: Suzanne Lin  : 1980  MRN: [id number]  Today's Date: 10/31/2024      Visit Date: 10/31/2024    Patient Active Problem List   Diagnosis    PFO (patent foramen ovale)    Palpitations    Breast cancer, stage 3, right    Breast cancer in female        Past Medical History:   Diagnosis Date    Anxiety     Breast cancer     Right    Crohn's disease     DVT (deep vein thrombosis) in pregnancy     PFO (patent foramen ovale)     Stroke     after the birth of her child at age 34        Past Surgical History:   Procedure Laterality Date     SECTION      COLON RESECTION      COLONOSCOPY      MASTECTOMY Bilateral 10/9/2024    Procedure: Modified radical mastectomy with axillary dissection of the right breast and simple mastectomy of the left breast;  Surgeon: Rebekah Garcia DO;  Location: [facility] OR;  Service: General;  Laterality: Bilateral;    SKIN CANCER EXCISION           Visit Dx:     ICD-10-CM ICD-9-CM   1. Lymphedema  I89.0 457.1   2. Status post mastectomy, bilateral  Z90.13 V45.71        Patient History       Row Name 10/31/24 09             History    Chief Complaint Swelling  -JJ      Date Current Problem(s) Began 10/09/24  -JJ      Brief Description of Current Complaint pt presents to clinic sp R modified radical mastectomy and L breast simple mastectomy. pt with fainl drain removed 10-28. pt with co swelling in proximal R UE and tightness in chest area around scars. pt states appt with Dr Witt today to discuss plans for chemo and radiation  -JJ      Patient/Caregiver Goals Know what to do to help the symptoms;Decrease swelling  -JJ      Current Tobacco Use no  -JJ      Patient's Rating of General Health Good  -JJ      Hand Dominance right-handed  -JJ      Occupation/sports/leisure activities   -JJ      Patient seeing anyone else for problem(s)? oncologist, surgeon  -JJ       Related/Recent Hospitalizations Yes  -JJ      Date of Hospitalization 10/09/24  -JJ      Surgery/Hospitalization R modified radical mastectomy L simple mastectomy  -JJ      Are you or can you be pregnant No  -JJ         Pain     Pain Location Chest  incisional  -JJ      Pain at Present 1  -JJ         Fall Risk Assessment    Any falls in the past year: No  -JJ         Services    Are you currently receiving Home Health services No  -JJ      Do you plan to receive Home Health services in the near future No  -JJ         Daily Activities    Primary Language English  -JJ      Are you able to read Yes  -JJ      Are you able to write Yes  -JJ      How does patient learn best? Reading  -JJ      Teaching needs identified Home Exercise Program;Management of Condition  -JJ      Patient is concerned about/has problems with Flexibility;Performing home management (household chores, shopping, care of dependents);Performing job responsibilities/community activities (work, school,  -JJ      Does patient have problems with the following? None  -JJ      Barriers to learning None  -JJ      Pt Participated in POC and Goals Yes  -JJ         Safety    Are you being hurt, hit, or frightened by anyone at home or in your life? No  -JJ      Are you being neglected by a caregiver No  -JJ      Have you had any of the following issues with N/A  -JJ                User Key  (r) = Recorded By, (t) = Taken By, (c) = Cosigned By      Initials Name Provider Type    Talya Morgan, OTNATHAN Occupational Therapist                     Lymphedema       Row Name 10/31/24 0900             Subjective Pain    Able to rate subjective pain? yes  -JJ      Pre-Treatment Pain Level 1  -JJ      Post-Treatment Pain Level 1  -JJ         Subjective    Subjective Comments pt states some tightness around incison sites on chest and some surgical edema on proximal R UE  -JJ         Lymphedema Assessment    Lymphedema Classification RUE:  -JJ      Lymphedema Cancer  "Related Sx right;modified radical mastectomy  L simple mastectomy  -JJ      Stage of Cancer Stage III  -JJ      Cancer Comments pt with appointement today with oncologist today to discuss plans for chemotherapy and radiation  -JJ         Posture/Observations    Posture- WNL Posture is WNL  -JJ         General ROM    GENERAL ROM COMMENTS B UE AROM WFL. pt states some increased pain with AROM of R shoulder  -JJ         Lymphedema Edema Assessment    Edema Assessment Comment pt with some continued surgical swelling in R proximal UE, very mobile. pt states has decreased since surgery. no edema present distal to R elbow  -JJ         Skin Changes/Observations    Location/Assessment Upper Extremity  -JJ      Upper Extremity Conditions right:;clean;dry  -JJ      Upper Extremity Color/Pigment right:;normal  -JJ         Lymphedema Measurements    Measurement Type(s) Quick Girth  -JJ      Quick Girth Areas Upper extremities  -JJ         RUE Quick Girth (cm)    Axilla 34 cm  -JJ      Elbow 24 cm  -JJ      Wrist crease 15 cm  -JJ      RUE Quick Girth Total 73  -JJ         Compression/Skin Care    Compression/Skin Care Comments recommended OTC compression sleeve for R UE due to pts continued proximal edema and for wear during radiation treatments  -JJ       Bioimpedence   At risk: R UE  Score taken in standing, post surgical score  Score:13.2         User Key  (r) = Recorded By, (t) = Taken By, (c) = Cosigned By      Initials Name Provider Type    Talya Morgan, OTR Occupational Therapist                            Therapy Education  Given: HEP, Symptoms/condition management, Edema management  Program: New  How Provided: Verbal, Demonstration  Provided to: Patient  Level of Understanding: Verbalized         OT Goals       Row Name 10/31/24 0900          OT Short Term Goals    STG Date to Achieve 12/12/24  -JJ     STG 1 pt will verbalize understanding of \"dos and donts of lymphedema\" including monitoring for symptoms " and skin care  -     STG 1 Progress New  -     STG 2 pt will verbalize independence and compliance with AROM of R UE HEP  -     STG 2 Progress New  [facility]        Long Term Goals    LTG Date to Achieve 01/23/25  -     LTG 1 pt will demonstrate independence and compliance with wearing compression sleeve throughtout radiation treatments and/or if lymphedema symptoms occur in R UE, including wearing schedule and monitoring for skin breakdown/redness  -     LTG 1 Progress New  -     LTG 2 pt will decrease bioimpedence score to >/= 6 in normal range to decrease risk and symptoms of lymphedema and decrease risk of developing wounds or infections  -     LTG 2 Progress New  [facility]        Time Calculation    OT Goal Re-Cert Due Date 01/23/25  -               User Key  (r) = Recorded By, (t) = Taken By, (c) = Cosigned By      Initials Name Provider Type    Talya Morgan, OTR Occupational Therapist                     OT Assessment/Plan       Row Name 10/31/24 6580          OT Assessment    Functional Limitations Limitation in home management;Performance in self-care ADL  -     Impairments Edema;Impaired lymphatic circulation;Joint mobility;Range of motion. Pt presents to clinic with continued surgical edema in R proximal UE sp R radical mastectomy with axillary dissection and L simple mastectomy. Pt with bioimpedence score today of 13.2 in at risk R UE which is outside of the normal health range of scores. Pt measured for compression sleeve, size medium compression 20-30 mmHG and educated on wearing schedule.      Please refer to paper survey for additional self-reported information Yes  -JJ     OT Diagnosis R UE lymphedema  -     OT Rehab Potential Excellent  -     Patient/caregiver participated in establishment of treatment plan and goals Yes  -JERRY     Patient would benefit from skilled therapy intervention Yes  -JJ            OT Plan    OT Frequency --  follow up appts x 2-3  -SOSA     Predicted  Duration of Therapy Intervention (OT) x 12 weeks  -SOSA     Planned CPT's? OT EVAL LOW COMPLEXITY: 91298;OT THER ACT EA 15 MIN: [id number];OT SELF CARE/MGMT/TRAIN 15 MIN: 62288;OT MANUAL THERAPY EA 15 MIN: 06279  -SOSA     OT Plan Comments follow up appt x 2 weeks. pt to call after appointment with oncologist  -SOSA               User Key  (r) = Recorded By, (t) = Taken By, (c) = Cosigned By      Initials Name Provider Type    Talya Morgan, BRUCE Occupational Therapist                    Outcome Measure Options: Quick DASH  Quick DASH  Open a tight or new jar.: Severe Difficulty  Do heavy household chores (e.g., wash walls, wash floors): Unable  Carry a shopping bag or briefcase: Mild Difficulty  Wash your back: Severe Difficulty  Use a knife to cut food: Mild Difficulty  Recreational activities in which you take some force or impact through your arm, should or hand (e.g. golf, hammering, tennis, etc.): Moderate Difficulty  During the past week, to what extent has your arm, shoulder, or hand problem interfered with your normal social activites with family, friends, neighbors or groups?: Quite a bit  During the past week, were you limited in your work or other regular daily activities as a result of your arm, shoulder or hand problem?: Moderately Limited  Arm, Shoulder, or hand pain: Moderate  Tingling (pins and needles) in your arm, shoulder, or hand: None  During the past week, how much difficulty have you had sleeping because of the pain in your arm, shoulder or hand?: Mild Difficulty  Number of Questions Answered: 11  Quick DASH Score: 50  Work Module (Optional)  Using your usual technique for your work?: Moderate Difficulty  Doing your usual work because of arm, shoulder or hand pain?: Moderate Difficulty  Doing your work as well as you would like?: Severe Difficulty  Spending your usual amount of time doing your work?: Severe Difficulty  Work Module Score: 62.5         Time Calculation:   OT Start Time:  0900  OT Stop Time: 0945  OT Time Calculation (min): 45 min     Therapy Charges for Today       Code Description Service Date Service Provider Modifiers Qty    03896691200 HC OT EVAL LOW COMPLEXITY 3 10/31/2024 Talya Chamberlain, OTR GO 1                      Talya Chamberlain, OTR  10/31/2024

## 2024-10-31 NOTE — PROGRESS NOTES
Subjective     REASON FOR CONSULTATION:  breast cancer  Provide an opinion on any further workup or treatment                             REQUESTING PHYSICIAN:  Jose    RECORDS OBTAINED:  Records of the patients history including those obtained from the referring provider were reviewed and summarized in detail.    HISTORY OF PRESENT ILLNESS:  The patient is a 44 y.o. year old female who is here for an opinion about the above issue.    History of Present Illness   This is a 44-year-old woman referred from general surgery to discuss adjuvant treatment for recently surgically treated breast cancer.  The patient presented with a large palpable mass in the inner quadrant of the right breast.  The mass had been enlarging for couple years but the patient did not have insurance to get assessed.  The breast imaging is not available to me but she had a CT of the chest abdomen pelvis on 9/16/2024 showed a large mass in the right breast measuring up to 7.7 cm with abnormal left axillary lymphadenopathy.  There were tiny subpleural nodules in the lateral aspect of the left lower lobe likely granulomatous mildly conspicuous lymph node in the central mesentery 1.1 cm.  A PET scan was performed 9/30/2024 showing a hypermetabolic mass in the right breast with thickening throughout the right breast and pathologic hypermetabolic right axillary level 1 and 2 lymph nodes, no hypermetabolic internal mammary or supraclavicular lymph nodes.  The small pulmonary nodules were too small to characterize and mesenteric lymph nodes without hypermetabolic activity.  A biopsy of the right breast mass showed invasive ductal adenocarcinoma.    She was taken to the operating room on 10/9/2024 and underwent a right modified radical mastectomy and axillary dissection, prophylactic left mastectomy.  Pathology from the right breast showed invasive ductal carcinoma [place] grade 3 (3+3+3) measuring 17 cm with extensive lymphovascular invasion  and dermal lymphatic invasion.  The tumor extended to the dermis but did not ulcerate the skin.  Tumor extended to skeletal muscle and was present focally at an anterior margin, 0.05 mm of the posterior margin, 10 mm from the lateral margin, 20 mm from the medial margin, 28 mm from the superior margin and 40 mm from inferior margin.  There was ductal carcinoma in situ present within 2.5 mm of the posterior margin.  There were 13 lymph nodes in the axillary dissection 8 oncology plan/recommendations: of which had macrometastases, 1 micrometastases and 1 lymph node with isolated tumor cells.  The left breast had an intraductal papilloma otherwise negative.  The tumor was positive for estrogen receptor 99% of cells, progesterone receptor 50% of cells, HER2 0 and Ki-67 65%.    The patient has medical comorbidities of Crohn's disease.    Past Medical History:   Diagnosis Date    Anxiety     Breast cancer     Right    Crohn's disease     DVT (deep vein thrombosis) in pregnancy     PFO (patent foramen ovale)     Stroke     after the birth of her child at age 34        Past Surgical History:   Procedure Laterality Date     SECTION      COLON RESECTION      COLONOSCOPY      MASTECTOMY Bilateral 10/9/2024    Procedure: Modified radical mastectomy with axillary dissection of the right breast and simple mastectomy of the left breast;  Surgeon: Rebekah Garcia DO;  Location: [facility];  Service: General;  Laterality: Bilateral;    SKIN CANCER EXCISION          Current Outpatient Medications on File Prior to Visit   Medication Sig Dispense Refill    aspirin 81 MG EC tablet Take 1 tablet by mouth Daily.      buPROPion XL (WELLBUTRIN XL) 150 MG 24 hr tablet Take 2 tablets by mouth Daily.      CBD (cannabidiol) oral oil Take  by mouth.      cyclobenzaprine (FLEXERIL) 10 MG tablet Take 1 tablet by mouth 2 (Two) Times a Day As Needed for Muscle Spasms for up to 40 doses. 40 tablet 0    diphenhydrAMINE (BENADRYL) 25 mg  "capsule Take 1 capsule by mouth At Night As Needed for Itching.      Multiple Vitamins-Minerals (MULTI COMPLETE PO) Take  by mouth.      naloxone (NARCAN) 4 MG/0.1ML nasal spray Call 911. Don't prime. [place] in 1 nostril for overdose. Repeat in 2-3 minutes in other nostril if no or minimal breathing/responsiveness. 2 each 0    oxyCODONE (ROXICODONE) 5 MG immediate release tablet Take 1 tablet by mouth Every 4 (Four) Hours As Needed for Severe Pain for up to 10 doses. 10 tablet 0    traZODone (DESYREL) 100 MG tablet 1 tablet.      [DISCONTINUED] traMADol (ULTRAM) 50 MG tablet Take 2 tablets by mouth Every 6 (Six) Hours As Needed for Moderate Pain for up to 20 doses. 20 tablet 0    [DISCONTINUED] traZODone (DESYREL) 50 MG tablet Take 1 tablet by mouth As Needed.       No current facility-administered medications on file prior to visit.        ALLERGIES:  No Known Allergies     Social History     Socioeconomic History    Marital status: Single    Number of children: 1   Tobacco Use    Smoking status: Former     Types: Cigarettes    Smokeless tobacco: Current    Tobacco comments:     Nicotine pouches   Vaping Use    Vaping status: Never Used   Substance and Sexual Activity    Alcohol use: Yes     Comment: OCC    Drug use: Never    Sexual activity: Defer        Family History   Problem Relation Age of Onset    Diabetes Mother     Heart disease Father     Diabetes Paternal Grandmother         Review of Systems   Constitutional: Negative.    HENT: Negative.     Respiratory: Negative.     Cardiovascular: Negative.    Gastrointestinal: Negative.    Genitourinary: Negative.    Musculoskeletal: Negative.    Skin:  Positive for wound.   Neurological: Negative.    Hematological: Negative.           Objective     Vitals:    10/31/24 0951   BP: 113/74   Pulse: 84   Resp: 16   Temp: 97.8 °F (36.6 °C)   TempSrc: Infrared   SpO2: 99%   Weight: 68.1 kg (150 lb 3.2 oz)   Height: 168.6 cm (66.38\")  Comment: New ht - no shoes   PainSc:   " 2   PainLoc: Chest  Comment: chest and arm         10/31/2024    10:04 AM   Current Status   ECOG score 0       Physical Exam    CONSTITUTIONAL: pleasant well-developed adult woman  HEENT: no icterus, no thrush, moist membranes  NECK: no jvd  LYMPH: no cervical or supraclavicular lad  CV: RRR, S1S2, no murmur  RESP: cta bilat, no wheezing, no rales  Breast: Bilateral mastectomy wounds with slight erythema  GI: soft, nontender, no splenomegaly, +BS  MUSC: no edema, normal gait  NEURO: alert and oriented x3, normal strength  PSYCH: normal mood somewhat flat affect      RECENT LABS:  Hematology WBC   Date Value Ref Range Status   10/31/2024 7.95 3.40 - 10.80 10*3/mm3 Final     RBC   Date Value Ref Range Status   10/31/2024 4.68 3.77 - 5.28 10*6/mm3 Final     Hemoglobin   Date Value Ref Range Status   10/31/2024 13.9 12.0 - 15.9 g/dL Final     Hematocrit   Date Value Ref Range Status   10/31/2024 42.2 34.0 - 46.6 % Final     Platelets   Date Value Ref Range Status   10/31/2024 430 140 - 450 10*3/mm3 Final        Lab Results   Component Value Date    GLUCOSE 85 10/31/2024    BUN 14 10/31/2024    CREATININE 0.80 10/31/2024     10/31/2024    K 3.8 10/31/2024     10/31/2024    CALCIUM 9.1 10/31/2024    PROTEINTOT 7.4 10/31/2024    ALBUMIN 3.9 10/31/2024    ALT 11 10/31/2024    AST 14 10/31/2024    ALKPHOS 110 10/31/2024    BILITOT 0.2 10/31/2024    GLOB 3.5 10/31/2024    AGRATIO 1.1 10/31/2024    BCR 17.5 10/31/2024    ANIONGAP 11.4 10/31/2024    EGFR 93.3 10/31/2024     PET scan 9/30/2024:  FINDINGS:     Head/neck: No suspicious uptake.     Chest: An 8 x 5.8 cm mass in the lower inner right breast with max SUV  of 18 (series 4/image 156). Mass comes in close proximity to the  sternum. Hypermetabolic parenchymal thickening throughout the right  breast with max SUV of 9 (series 4/image 140). Diffuse right breast skin  thickening.     Hypermetabolic right axillary level I and II lymph nodes. Reference 1.2  cm  level I lymph node with max SUV of 11.9 (series 4/image 103).  Additional reference subcentimeter level II lymph node with max SUV of  3.3 (series 4/image 93). Separate brown fat uptake in the bilateral  axilla and posterior neck base.     No enlarged or hypermetabolic internal mammary or supraclavicular lymph  nodes.     Few subcentimeter pulmonary nodules are too small for accurate PET  characterization without overt hypermetabolism and unchanged from recent  CT. Reference left lower lobe (series 4/image 167) and right upper lobe  (series 4/image 131).     Abdomen/pelvis: Mesenteric lymph nodes are mostly subcentimeter without  associated hypermetabolism.     Sigmoid colectomy. Tubular hypermetabolism throughout the otherwise  normal-appearing remaining colon may be medication related. Moderate  proximal colonic stool burden.     Bones: No focal osseous hypermetabolism with special attention to the  sternum.           IMPRESSION:  1. Hypermetabolic 8 cm right breast mass with hypermetabolic parenchymal  thickening throughout the right breast, pathologically proven invasive  ductal carcinoma. Mass comes in close proximity to the sternum. No focal  osseous hypermetabolism with special attention to the sternum.  2. Hypermetabolic right axillary level I and II lymph nodes suggesting  alana metastatic disease. No enlarged or hypermetabolic internal mammary  or supraclavicular lymph nodes. Separate brown fat uptake in the  bilateral axilla and posterior neck base.  3. Few subcentimeter pulmonary nodules are too small for accurate PET  characterization and will need follow-up via chest CT.  4. Mesenteric lymph nodes are mostly subcentimeter without associated  hypermetabolism.       Assessment & Plan   *pT3N2a M0 grade 3 invasive ductal adenocarcinoma right breast, ER 99% positive, SC 50% positive, HER2 0, Ki-67 65%, tumor present at anterior margin  presented with a large palpable mass in the inner quadrant of the right  breast; mass had been enlarging for couple years but the patient did not have insurance to get assessed  CT of the chest abdomen pelvis on 9/16/2024 showed a large mass in the right breast measuring up to 7.7 cm with abnormal left axillary lymphadenopathy.  There were tiny subpleural nodules in the lateral aspect of the left lower lobe likely granulomatous mildly conspicuous lymph node in the central mesentery 1.1 cm.    PET scan 9/30/2024 - hypermetabolic mass in the right breast with thickening throughout the right breast and pathologic hypermetabolic right axillary level 1 and 2 lymph nodes, no hypermetabolic internal mammary or supraclavicular lymph nodes.  The small pulmonary nodules were too small to characterize and mesenteric lymph nodes without hypermetabolic activity.    biopsy of the right breast mass showed invasive ductal adenocarcinoma.  operating room on 10/9/2024 and underwent a right modified radical mastectomy and axillary dissection, prophylactic left mastectomy.  Pathology from the right breast showed invasive ductal carcinoma [place] grade 3 (3+3+3) measuring 17 cm with extensive lymphovascular invasion and dermal lymphatic invasion.  The tumor extended to the dermis but did not ulcerate the skin.  Tumor extended to skeletal muscle and was present focally at an anterior margin, 0.05 mm of the posterior margin, 10 mm from the lateral margin, 20 mm from the medial margin, 28 mm from the superior margin and 40 mm from inferior margin.  There was ductal carcinoma in situ present within 2.5 mm of the posterior margin.  There were 13 lymph nodes in the axillary dissection 8 of which had macrometastases, 1 micrometastases and 1 lymph node with isolated tumor cells.  The left breast had an intraductal papilloma otherwise negative.  The tumor was positive for estrogen receptor 99% of cells, progesterone receptor 50% of cells, HER2 0 and Ki-67 65%.    *medical comorbidities of Crohn's  disease.    Oncology plan/recommendations:  Imaging and pathology reviewed with the patient today.  I have discussed the pathology with Dr. Garcia.  The patient has disease characteristics with significant elevated risk of residual micrometastatic disease including size of tumor, lymph node involvement, grade 3 etc.  I have recommended adjuvant chemotherapy to reduce risk of recurrence/help eradicate potential micrometastatic disease with dose dense AC with Neulasta support x 4 cycles followed by weekly Taxol x 12 doses.  I have discussed risk and benefits of chemotherapy and formal education requested.  She needs another 2 to 3 weeks to heal from surgery before chemotherapy.  After chemotherapy she will need postmastectomy radiation given the size and alana involvement  She will need hormonal therapy with ovarian suppression/tamoxifen versus oophorectomy and AI therapy.  I will check a genetic panel with [facility]-if she were to have BRCA1/BRCA2 mutation salpingo-oophorectomies would be indicated  She will need a 2D echocardiogram before anthracycline chemotherapy  She will need a port placed for chemotherapy  Nonspecific lung nodules will need reassessment after completing chemotherapy radiographically  I will see the patient back in about 3 weeks anticipating to begin treatment that date assuming wounds are healed appropriately.  I spent 75 minutes of time today on this case reviewing the patient's medical records, pathology results, imaging studies, face-to-face time with the patient discussing her disease and adjuvant treatment recommendations, writing orders and documentation of care.    Thank you for allowing me to participate in the care of this pleasant patient.

## 2024-11-01 ENCOUNTER — PATIENT ROUNDING (BHMG ONLY) (OUTPATIENT)
Dept: ONCOLOGY | Facility: CLINIC | Age: 44
End: 2024-11-01
Payer: COMMERCIAL

## 2024-11-04 ENCOUNTER — OFFICE VISIT (OUTPATIENT)
Dept: SURGERY | Facility: CLINIC | Age: 44
End: 2024-11-04
Payer: COMMERCIAL

## 2024-11-04 VITALS
BODY MASS INDEX: 23.78 KG/M2 | SYSTOLIC BLOOD PRESSURE: 102 MMHG | HEART RATE: 72 BPM | RESPIRATION RATE: 16 BRPM | WEIGHT: 148 LBS | HEIGHT: 66 IN | DIASTOLIC BLOOD PRESSURE: 72 MMHG

## 2024-11-04 DIAGNOSIS — Z79.899 NEED FOR PROPHYLACTIC CHEMOTHERAPY: Primary | ICD-10-CM

## 2024-11-04 PROCEDURE — 99024 POSTOP FOLLOW-UP VISIT: CPT | Performed by: SURGERY

## 2024-11-04 NOTE — PROGRESS NOTES
"Suzanne Lin 44 y.o. female presents to discuss port placement.       HPI   Above noted and agree.  Suzanne is doing well.  She needs a port placed for chemotherapy.  The chemo is supposed to start on .      Review of Systems        Past Medical History:   Diagnosis Date    Anxiety     Breast cancer     Right    Crohn's disease     DVT (deep vein thrombosis) in pregnancy     PFO (patent foramen ovale)     Stroke     after the birth of her child at age 34           Past Surgical History:   Procedure Laterality Date     SECTION      COLON RESECTION      COLONOSCOPY      MASTECTOMY Bilateral 10/9/2024    Procedure: Modified radical mastectomy with axillary dissection of the right breast and simple mastectomy of the left breast;  Surgeon: Rebekah Garcia DO;  Location: [facility];  Service: General;  Laterality: Bilateral;    SKIN CANCER EXCISION             Physical Exam  Constitutional:       Appearance: Normal appearance.   Cardiovascular:      Rate and Rhythm: Normal rate and regular rhythm.   Pulmonary:      Effort: Pulmonary effort is normal.      Breath sounds: Normal breath sounds.   Skin:     Comments: Her incisions are continuing to heal   Neurological:      General: No focal deficit present.      Mental Status: She is alert and oriented to person, place, and time.   Psychiatric:         Mood and Affect: Mood normal.         Behavior: Behavior normal.             /72   Pulse 72   Resp 16   Ht 167.6 cm (66\")   Wt 67.1 kg (148 lb)   BMI 23.89 kg/m²         Diagnoses and all orders for this visit:    1. Need for prophylactic chemotherapy (Primary)    We discussed placing a port for initiation of chemotherapy.  We discussed the benefits and risks not limited to but including:  bleeding, infection, malfunction of the port, pneumothorax, need to a chest tube, anesthesia complications.  She appeared to understand and is willing to proceed.    Thank you for allowing me to participate " in the care of this interesting patient.

## 2024-11-04 NOTE — LETTER
2024     PHIL Gaines  [address] [place] [id number]    Patient: Suzanne Lin   YOB: 1980   Date of Visit: 2024     Dear PHIL Gaines:       Thank you for referring Suzanne Lin to me for evaluation. Below are the relevant portions of my assessment and plan of care.    If you have questions, please do not hesitate to call me. I look forward to following Suzanne along with you.         Sincerely,        Rebekah Garcia DO        CC: No Recipients    Rebekah Garcia DO  [id number]  Sign when Signing Visit  Suzanne Lin 44 y.o. female presents to discuss port placement.       HPI   Above noted and agree.  Suzanne is doing well.  She needs a port placed for chemotherapy.  The chemo is supposed to start on .      Review of Systems        Past Medical History:   Diagnosis Date   • Anxiety    • Breast cancer     Right   • Crohn's disease    • DVT (deep vein thrombosis) in pregnancy    • PFO (patent foramen ovale)    • Stroke     after the birth of her child at age 34           Past Surgical History:   Procedure Laterality Date   •  SECTION     • COLON RESECTION     • COLONOSCOPY     • MASTECTOMY Bilateral 10/9/2024    Procedure: Modified radical mastectomy with axillary dissection of the right breast and simple mastectomy of the left breast;  Surgeon: Rebekah Garcia DO;  Location: [facility];  Service: General;  Laterality: Bilateral;   • SKIN CANCER EXCISION             Physical Exam  Constitutional:       Appearance: Normal appearance.   Cardiovascular:      Rate and Rhythm: Normal rate and regular rhythm.   Pulmonary:      Effort: Pulmonary effort is normal.      Breath sounds: Normal breath sounds.   Skin:     Comments: Her incisions are continuing to heal   Neurological:      General: No focal deficit present.      Mental Status: She is alert and oriented to person, place, and time.   Psychiatric:         Mood and Affect: Mood  "normal.         Behavior: Behavior normal.             /72   Pulse 72   Resp 16   Ht 167.6 cm (66\")   Wt 67.1 kg (148 lb)   BMI 23.89 kg/m²         Diagnoses and all orders for this visit:    1. Need for prophylactic chemotherapy (Primary)    We discussed placing a port for initiation of chemotherapy.  We discussed the benefits and risks not limited to but including:  bleeding, infection, malfunction of the port, pneumothorax, need to a chest tube, anesthesia complications.  She appeared to understand and is willing to proceed.    Thank you for allowing me to participate in the care of this interesting patient.     "

## 2024-11-11 ENCOUNTER — OFFICE VISIT (OUTPATIENT)
Dept: SURGERY | Facility: CLINIC | Age: 44
End: 2024-11-11
Payer: COMMERCIAL

## 2024-11-11 DIAGNOSIS — Z90.13 STATUS POST BILATERAL MASTECTOMY: Primary | ICD-10-CM

## 2024-11-11 PROCEDURE — 99024 POSTOP FOLLOW-UP VISIT: CPT | Performed by: SURGERY

## 2024-11-11 NOTE — LETTER
2024     PHIL Gaines  [address] [place] [id number]    Patient: Suzanne Lin   YOB: 1980   Date of Visit: 2024     Dear PHIL Gaines:       Thank you for referring Suzanne Lin to me for evaluation. Below are the relevant portions of my assessment and plan of care.    If you have questions, please do not hesitate to call me. I look forward to following Suzanne along with you.         Sincerely,        Rebekah Garcia DO        CC: MD Jose Tom Jeannine, DO  24 [phone]  Sign when Signing Visit  Suzanne Lin 44 y.o. female presents for PO FU bilat mast.  Pt states she is feeling extra tired.  States she is showering everyday and using Hibiclens soap in incision.       HPI   Above-noted agree.  Suzanne is doing well.      Review of Systems        Past Medical History:   Diagnosis Date   • Anxiety    • Breast cancer     Right   • Crohn's disease    • DVT (deep vein thrombosis) in pregnancy    • PFO (patent foramen ovale)    • Stroke     after the birth of her child at age 34           Past Surgical History:   Procedure Laterality Date   •  SECTION     • COLON RESECTION     • COLONOSCOPY     • MASTECTOMY Bilateral 10/9/2024    Procedure: Modified radical mastectomy with axillary dissection of the right breast and simple mastectomy of the left breast;  Surgeon: Rebekah Garcia DO;  Location: [facility];  Service: General;  Laterality: Bilateral;   • SKIN CANCER EXCISION             Physical Exam    The wound has slough and necrotic tissue on it in the midportion of the wound.  I cleaned the wound with Betadine and then debrided all of the dead necrotic sloughy tissue.  Beneath was healthy appearing granulation tissue that had excellent blood flow.  I then cleaned the wound again with Betadine and covered with antibiotic ointment and a sterile dressing.    LMP 2024 (Approximate)         Diagnoses and all orders for this  visit:    1. Status post bilateral mastectomy (Primary)    I will bring her back on Wednesday to reevaluate her wound.  I will push back her port for a week because I want better wound healing prior to placing it.    Thank you for allowing me to participate in the care of this interesting patient.

## 2024-11-11 NOTE — PROGRESS NOTES
Suzanne Lin 44 y.o. female presents for PO FU bilat mast.  Pt states she is feeling extra tired.  States she is showering everyday and using Hibiclens soap in incision.       HPI   Above-noted agree.  Suzanne is doing well.      Review of Systems        Past Medical History:   Diagnosis Date    Anxiety     Breast cancer     Right    Crohn's disease     DVT (deep vein thrombosis) in pregnancy     PFO (patent foramen ovale)     Stroke     after the birth of her child at age 34           Past Surgical History:   Procedure Laterality Date     SECTION      COLON RESECTION      COLONOSCOPY      MASTECTOMY Bilateral 10/9/2024    Procedure: Modified radical mastectomy with axillary dissection of the right breast and simple mastectomy of the left breast;  Surgeon: Rebekah Garcia DO;  Location: [facility];  Service: General;  Laterality: Bilateral;    SKIN CANCER EXCISION             Physical Exam    The wound has slough and necrotic tissue on it in the midportion of the wound.  I cleaned the wound with Betadine and then debrided all of the dead necrotic sloughy tissue.  Beneath was healthy appearing granulation tissue that had excellent blood flow.  I then cleaned the wound again with Betadine and covered with antibiotic ointment and a sterile dressing.    LMP 2024 (Approximate)         Diagnoses and all orders for this visit:    1. Status post bilateral mastectomy (Primary)    I will bring her back on Wednesday to reevaluate her wound.  I will push back her port for a week because I want better wound healing prior to placing it.    Thank you for allowing me to participate in the care of this interesting patient.

## 2024-11-13 ENCOUNTER — INFUSION (OUTPATIENT)
Dept: ONCOLOGY | Facility: HOSPITAL | Age: 44
End: 2024-11-13
Payer: COMMERCIAL

## 2024-11-13 ENCOUNTER — LAB (OUTPATIENT)
Dept: LAB | Facility: HOSPITAL | Age: 44
End: 2024-11-13
Payer: COMMERCIAL

## 2024-11-13 ENCOUNTER — OFFICE VISIT (OUTPATIENT)
Dept: ONCOLOGY | Facility: CLINIC | Age: 44
End: 2024-11-13
Payer: COMMERCIAL

## 2024-11-13 ENCOUNTER — OFFICE VISIT (OUTPATIENT)
Dept: SURGERY | Facility: CLINIC | Age: 44
End: 2024-11-13
Payer: COMMERCIAL

## 2024-11-13 ENCOUNTER — HOSPITAL ENCOUNTER (OUTPATIENT)
Dept: CARDIOLOGY | Facility: HOSPITAL | Age: 44
Discharge: HOME | End: 2024-11-13
Payer: COMMERCIAL

## 2024-11-13 VITALS
WEIGHT: 148.6 LBS | OXYGEN SATURATION: 98 % | RESPIRATION RATE: 16 BRPM | SYSTOLIC BLOOD PRESSURE: 112 MMHG | HEIGHT: 66 IN | TEMPERATURE: 97.8 F | HEART RATE: 110 BPM | BODY MASS INDEX: 23.88 KG/M2 | DIASTOLIC BLOOD PRESSURE: 86 MMHG

## 2024-11-13 DIAGNOSIS — Z79.899 NEED FOR PROPHYLACTIC CHEMOTHERAPY: ICD-10-CM

## 2024-11-13 DIAGNOSIS — C50.911 BREAST CANCER, STAGE 3, RIGHT: ICD-10-CM

## 2024-11-13 DIAGNOSIS — R19.7 DIARRHEA, UNSPECIFIED TYPE: Primary | ICD-10-CM

## 2024-11-13 DIAGNOSIS — Z51.89 VISIT FOR WOUND CHECK: ICD-10-CM

## 2024-11-13 DIAGNOSIS — C50.911 BREAST CANCER, STAGE 3, RIGHT: Primary | ICD-10-CM

## 2024-11-13 DIAGNOSIS — E86.0 DEHYDRATION: ICD-10-CM

## 2024-11-13 LAB
ALBUMIN SERPL-MCNC: 3.8 G/DL (ref 3.5–5.2)
ALBUMIN/GLOB SERPL: 1.1 G/DL
ALP SERPL-CCNC: 136 U/L (ref 39–117)
ALT SERPL W P-5'-P-CCNC: 9 U/L (ref 1–33)
ANION GAP SERPL CALCULATED.3IONS-SCNC: 14.9 MMOL/L (ref 5–15)
AST SERPL-CCNC: 13 U/L (ref 1–32)
BASOPHILS # BLD AUTO: 0.06 10*3/MM3 (ref 0–0.2)
BASOPHILS NFR BLD AUTO: 0.5 % (ref 0–1.5)
BILIRUB SERPL-MCNC: 0.3 MG/DL (ref 0–1.2)
BUN SERPL-MCNC: 10 MG/DL (ref 6–20)
BUN/CREAT SERPL: 12 (ref 7–25)
CALCIUM SPEC-SCNC: 9 MG/DL (ref 8.6–10.5)
CHLORIDE SERPL-SCNC: 100 MMOL/L (ref 98–107)
CO2 SERPL-SCNC: 23.1 MMOL/L (ref 22–29)
CREAT SERPL-MCNC: 0.83 MG/DL (ref 0.57–1)
DEPRECATED RDW RBC AUTO: 41.7 FL (ref 37–54)
EGFRCR SERPLBLD CKD-EPI 2021: 89.3 ML/MIN/1.73
EOSINOPHIL # BLD AUTO: 0.12 10*3/MM3 (ref 0–0.4)
EOSINOPHIL NFR BLD AUTO: 1.1 % (ref 0.3–6.2)
ERYTHROCYTE [DISTWIDTH] IN BLOOD BY AUTOMATED COUNT: 13.4 % (ref 12.3–15.4)
GLOBULIN UR ELPH-MCNC: 3.6 GM/DL
GLUCOSE SERPL-MCNC: 110 MG/DL (ref 65–99)
HCT VFR BLD AUTO: 43.9 % (ref 34–46.6)
HGB BLD-MCNC: 15.1 G/DL (ref 12–15.9)
IMM GRANULOCYTES # BLD AUTO: 0.05 10*3/MM3 (ref 0–0.05)
IMM GRANULOCYTES NFR BLD AUTO: 0.5 % (ref 0–0.5)
LYMPHOCYTES # BLD AUTO: 1.32 10*3/MM3 (ref 0.7–3.1)
LYMPHOCYTES NFR BLD AUTO: 11.9 % (ref 19.6–45.3)
MAGNESIUM SERPL-MCNC: 1.4 MG/DL (ref 1.6–2.6)
MCH RBC QN AUTO: 29.8 PG (ref 26.6–33)
MCHC RBC AUTO-ENTMCNC: 34.4 G/DL (ref 31.5–35.7)
MCV RBC AUTO: 86.6 FL (ref 79–97)
MONOCYTES # BLD AUTO: 0.67 10*3/MM3 (ref 0.1–0.9)
MONOCYTES NFR BLD AUTO: 6 % (ref 5–12)
NEUTROPHILS NFR BLD AUTO: 8.86 10*3/MM3 (ref 1.7–7)
NEUTROPHILS NFR BLD AUTO: 80 % (ref 42.7–76)
NRBC BLD AUTO-RTO: 0 /100 WBC (ref 0–0.2)
PLATELET # BLD AUTO: 498 10*3/MM3 (ref 140–450)
PMV BLD AUTO: 9.6 FL (ref 6–12)
POTASSIUM SERPL-SCNC: 2.7 MMOL/L (ref 3.5–5.2)
PROT SERPL-MCNC: 7.4 G/DL (ref 6–8.5)
RBC # BLD AUTO: 5.07 10*6/MM3 (ref 3.77–5.28)
SODIUM SERPL-SCNC: 138 MMOL/L (ref 136–145)
WBC NRBC COR # BLD AUTO: 11.08 10*3/MM3 (ref 3.4–10.8)

## 2024-11-13 PROCEDURE — 96365 THER/PROPH/DIAG IV INF INIT: CPT

## 2024-11-13 PROCEDURE — 96375 TX/PRO/DX INJ NEW DRUG ADDON: CPT

## 2024-11-13 PROCEDURE — 85025 COMPLETE CBC W/AUTO DIFF WBC: CPT | Performed by: NURSE PRACTITIONER

## 2024-11-13 PROCEDURE — 80053 COMPREHEN METABOLIC PANEL: CPT | Performed by: NURSE PRACTITIONER

## 2024-11-13 PROCEDURE — 99024 POSTOP FOLLOW-UP VISIT: CPT | Performed by: SURGERY

## 2024-11-13 PROCEDURE — 25010000002 MAGNESIUM SULFATE IN D5W 1G/100ML (PREMIX) 1-5 GM/100ML-% SOLUTION: Performed by: NURSE PRACTITIONER

## 2024-11-13 PROCEDURE — 83735 ASSAY OF MAGNESIUM: CPT | Performed by: NURSE PRACTITIONER

## 2024-11-13 RX ORDER — FAMOTIDINE 10 MG/ML
20 INJECTION, SOLUTION INTRAVENOUS ONCE
Status: COMPLETED | OUTPATIENT
Start: 2024-11-13 | End: 2024-11-13

## 2024-11-13 RX ORDER — MAGNESIUM SULFATE 1 G/100ML
3 INJECTION INTRAVENOUS ONCE
Status: COMPLETED | OUTPATIENT
Start: 2024-11-13 | End: 2024-11-13

## 2024-11-13 RX ORDER — FAMOTIDINE 20 MG/1
20 TABLET, FILM COATED ORAL 2 TIMES DAILY
Qty: 60 TABLET | Refills: 2 | Status: SHIPPED | OUTPATIENT
Start: 2024-11-13

## 2024-11-13 RX ORDER — POTASSIUM CHLORIDE 1500 MG/1
20 TABLET, EXTENDED RELEASE ORAL 2 TIMES DAILY
Qty: 40 TABLET | Refills: 1 | Status: SHIPPED | OUTPATIENT
Start: 2024-11-13

## 2024-11-13 RX ORDER — OLANZAPINE 5 MG/1
TABLET ORAL
Qty: 8 TABLET | Refills: 1 | Status: SHIPPED | OUTPATIENT
Start: 2024-11-13 | End: 2024-12-11

## 2024-11-13 RX ORDER — ONDANSETRON 8 MG/1
8 TABLET, FILM COATED ORAL 3 TIMES DAILY PRN
Qty: 30 TABLET | Refills: 5 | Status: SHIPPED | OUTPATIENT
Start: 2024-11-13

## 2024-11-13 RX ADMIN — MAGNESIUM SULFATE HEPTAHYDRATE 1 G: 1 INJECTION, SOLUTION INTRAVENOUS at 11:32

## 2024-11-13 RX ADMIN — FAMOTIDINE 20 MG: 10 INJECTION, SOLUTION INTRAVENOUS at 11:32

## 2024-11-13 NOTE — PROGRESS NOTES
[facility] Hematology/Oncology Treatment Plan Summary    Name: Suzanne Lin  Merged with [facility]# [id number]  MD: Dr. Witt    Diagnosis:     ICD-10-CM ICD-9-CM   1. Diarrhea, unspecified type  R19.7 787.91   2. Breast cancer, stage 3, right  C50.911 174.9   3. Need for prophylactic chemotherapy  Z79.899 V07.39     Stage: III      Goal of treatment: adjuvant and disease control    Treatment Medication(s):   Adriamycin and Cytoxan once every 2 weeks x 4 cycles with Neulasta support  Taxol once weekly x 12 weeks    Starting on: To be determined pending healing    Repeat after completion of chemotherapy: CT Scan    Items for home use: Thermometer    Rx written for: [x] Nausea    [] Pre-Treatment   olanzapine 5mg nightly on days as directed, ondansetron 8 mg by mouth every 8 hours as needed for nausea, and Pepcid (famotidine) 20 mg twice daily    Notes: Will need to prescribe dexamethasone 2 tabs twice daily the day prior to initiation of Taxol once Adriamycin Cytoxan complete    Next Steps: PORT, MUGA/ECHO, and Oncology Social Work     Completing Provider: PHIL Sams           Date/time: 11/13/2024      Please note: You will be seen by a provider frequently with your treatment plan. This plan may change depending on many factors, if so, this will be discussed with you by your physician.  Last update 03/2022.

## 2024-11-13 NOTE — PROGRESS NOTES
TREATMENT  PREPARATION    Suzanne Lin  [id number]  1980    Chief Complaint: Treatment preparation and needs assessment    History of present illness:  Suzanne Lin is a 44 y.o. year old female who is here today for treatment preparation and needs assessment.  The patient has been diagnosed with   Encounter Diagnoses   Name Primary?    Diarrhea, unspecified type Yes    Breast cancer, stage 3, right     Need for prophylactic chemotherapy     and is scheduled to begin treatment with:     Oncology History:    Oncology/Hematology History   Breast cancer, stage 3, right   10/2/2024 Initial Diagnosis    Breast cancer, stage 3, right     11/20/2024 -  Chemotherapy    OP Breast DD AC DOXOrubicin / Cyclophosphamide      1/15/2025 -  Chemotherapy    OP BREAST PACLitaxel Adjuvant (Weekly X 12)         The current medication list and allergy list were reviewed and reconciled.     Past Medical History, Past Surgical History, Social History, Family History have been reviewed and are without significant changes except as mentioned.    Physical Exam:    Vitals:    11/13/24 0927   BP: 112/86   Pulse: 110   Resp: 16   Temp: 97.8 °F (36.6 °C)   SpO2: 98%     Vitals:    11/13/24 0927   PainSc: 0-No pain        ECOG score: 0             Physical Exam  Constitutional:       Appearance: She is ill-appearing.   HENT:      Head: Normocephalic and atraumatic.   Eyes:      Extraocular Movements: Extraocular movements intact.      Conjunctiva/sclera: Conjunctivae normal.   Pulmonary:      Effort: Pulmonary effort is normal. No respiratory distress.   Neurological:      General: No focal deficit present.      Mental Status: She is alert and oriented to person, place, and time.   Psychiatric:         Mood and Affect: Mood normal.         Behavior: Behavior normal.           NEEDS ASSESSMENTS    Genetics  The patient's new diagnosis and family history have been reviewed for genetic counseling needs. The patient will not be referred..      Psychosocial and Barriers to care  The patient has completed a PHQ-9 Depression Screening and the Distress Thermometer (DT) today.  PHQ-9 results show PHQ-2 Total Score:   PHQ-9 Total Score:        The patient scored their distress today as Distress Level: 3 on a scale of 0-10 with 0 being no distress and 10 being extreme distress. Problems marked by the patient as being an issue for them within the last week include Practical Problems  Housing: No  Treatment decisions: No  Physical concerns  Fatigue: Yes  Pain: No  Sleep: Yes .      Results were reviewed along with psychosocial resources offered by our cancer center.  Our Supportive Oncology team will be flagged for a score of 4 or above, and a same day call will be made for a score of 9 or 10.  A mental health referral is offered at that time. Patients who score less than 4 have been educated on our support services and can be referred to our  upon request.  The patient will not be referred to our .       Nutrition  The patient has completed the malnutrition screening today. They scored Malnutrition Screening Tool  Have you recently lost weight without trying?  If yes, how much weight have you lost?: 0--> No  Have you been eating poorly because of a decreased appetite?: 1--> Yes  MST score: 1   with a score of 0-1 meaning not at risk in a score of 2 or greater meaning at risk.  Patients with a score of 3 or higher will be referred to our oncology dietitian for support. Patients beginning at risk treatment regimens or who have dietary concerns will also be referred to our oncology dietitian. The patient will not be referred.    Functional Assessment  Persons who are age 70 or greater will be screened for qualification of a comprehensive geriatric assessment by our survivorship nurse practitioner.  Older adults with cancer face unique challenges. These may include an increased risk of drug reactions, financial burdens, and caregiver  "stress. The patient scored   . Patients scoring 14 or lower will referred for an older adult functional assessment with the survivorship advanced practice registered nurse to ensure all needed support is provided as patients plan for their treatments. NOT APPLICABLE    Intravenous Access Assessment  The patient and I discussed planned intravenous chemo/biotherapy as well as other IV treatments that are often needed throughout the course of treatment. These may include, but are not limited to blood transfusions, antibiotics, and IV hydration. Discussed that depending on selected treatment and vein assessment, patient may require venous access device (VAD) which could include but not limited to a Mediport or PICC line. Risks and benefits of VADs reviewed. The patient will be treated via Port.    Reproductive/Sexual Activity   People should avoid becoming pregnant and should not get a partner pregnant while undergoing chemo/biotherapy.  People of childbearing age should use effective contraception during active therapy. The best recommendation for all people is to use a barrier method for a minimum of 1 week after the last infusion of chemo/biotherapy to prevent your partner being exposed to byproducts from treatment medications in bodily fluids. Effective contraception should be discussed with your oncology team to make sure it is safe to take based on your diagnosis. Possible options include oral contraceptives, barrier methods. Chemo/biotherapy can change your ability to reproduce children in the future.  There are options for fertility preservation. The patient will not be referred.    Advanced Care Planning  Advance Care Planning   The patient and I discussed advanced care planning, \"Conversations that Matter\".   This service is offered for development of advance directives with a certified ACP facilitator.  The patient does not have an up-to-date advanced directive. This document is not on file with our office. " The patient is not interested in an appointment with one of our facilitators to create or update their advanced directives.    Have you reviewed your Advance Directive and is it valid for this stay?: No          Smoking cessation  Tobacco Use: High Risk (11/13/2024)    Patient History     Smoking Tobacco Use: Former     Smokeless Tobacco Use: Current     Passive Exposure: Not on file       Patient and I discussed their tobacco use history. Referral will not be made for smoking cessation.      Palliative Care  When appropriate, the patient and I discussed the availability palliative care services and when appropriate Hospice care. Palliative care is not the same as Hospice care which was explained to the patient.NOT APPLICABLE.    Survivorship   When appropriate, we discussed that we will refer the patient to survivorship clinic to discuss next steps following completion of planned treatment.  Reviewed this visit will include assessment of your physical, psychological, functional, and spiritual needs as a survivor and the need at attend this visit when scheduled.    TREATMENT EDUCATION    Today I met with the patient to discuss the chemo/biotherapy regimen recommended for treatment of Diarrhea, unspecified type  - CBC & Differential  - Magnesium  - Comprehensive Metabolic Panel  - Basic Metabolic Panel  - Magnesium    Breast cancer, stage 3, right  - CBC & Differential  - Magnesium  - Comprehensive Metabolic Panel  - Provider communication  - OLANZapine (zyPREXA) 5 MG tablet  - ondansetron (ZOFRAN) 8 MG tablet  - [facility] Breast Panel - Miscellaneous Test  - CBC and Differential  - Comprehensive metabolic panel  - Pregnancy, Urine - Urine, Clean Catch  - Basic Metabolic Panel  - Magnesium    Need for prophylactic chemotherapy  - Provider communication  - OLANZapine (zyPREXA) 5 MG tablet  - ondansetron (ZOFRAN) 8 MG tablet  - [facility] Breast Panel - Miscellaneous Test  - CBC and Differential  - Comprehensive metabolic  panel  - Pregnancy, Urine - Urine, Clean Catch  .  The patient was given explanation of treatment premed side effects including office policy that prohibits patients to drive if sedating medications are administered, MD explanation given regarding benefits, side effects, toxicities and goals of treatment.  The patient received a Chemotherapy/Biotherapy Plan Summary including diagnosis and explanation of specific treatment plan.    SIDE EFFECTS:  Common side effects were discussed with the patient and/or significant other.  Discussion included where applicable hair loss/discoloration, anemia/fatigue, infection/chills/fever, appetite, bleeding risk/precautions, constipation, diarrhea, mouth sores, taste alteration, loss of appetite, nausea/vomiting, peripheral neuropathy, skin/nail changes, rash, muscle aches/weakness, photosensitivity, weight gain/loss, hearing loss, dizziness, menopausal symptoms, menstrual irregularity, sterility, high blood pressure, heart damage, liver damage, lung damage, kidney damage, DVT/PE risk, fluid retention, pleural/pericardial effusion, somnolence, electrolyte/LFT imbalance, vein exercises and/or the possible need for vascular access/port placement.  The patient was advised that although uncommon, leakage of an infused medication from the vein or venous access device may lead to skin breakdown and/or other tissue damage.  The patient was advised that he/she may have pain, bleeding, and/or bruising from the insertion of a needle in their vein or venous access device (port).  The patient was further advised that, in spite of proper technique, infection with redness and irritation may rarely occur at the site where the needle was inserted.  The patient was advised that if complications occur, additional medical treatment is available.  Finally, where applicable we have reviewed rare but potential immune mediated side effects including shortness of breath, cough, chest pain (pneumonitis),  abdominal pain, diarrhea (colitis), thyroiditis (hypothyroid or hyperthyroid), hepatitis and liver dysfunction, nephritis and renal dysfunction.    Discussion also included side effects specific to drugs in the treatment plan, specifically:    Treatment Plans       Name Type Plan Dates Plan Provider         Active    OP Breast DD AC DOXOrubicin / Cyclophosphamide  ONCOLOGY TREATMENT 10/30/2024 - Present Nishant Witt MD                      Questions answered and additional information discussed on topics including:  Anemia, Thrombocytopenia, Neutropenia, Nutrition and appetite changes, Constipation, Diarrhea, Nausea & vomiting, Mouth sores, Alopecia, Intimate activity, Nervous system changes, Pain, Skin & nail changes, Organ toxicities, Survivorship, and Hand/Foot Cooling       Assessment and Plan:    Diagnoses and all orders for this visit:    1. Diarrhea, unspecified type (Primary)  -     CBC & Differential  -     Magnesium  -     Comprehensive Metabolic Panel  -     Basic Metabolic Panel; Future  -     Magnesium; Future    2. Breast cancer, stage 3, right  -     CBC & Differential  -     Magnesium  -     Comprehensive Metabolic Panel  -     Provider communication  -     OLANZapine (zyPREXA) 5 MG tablet; Take 1 tablet by mouth for 4 doses starting night of chemotherapy.  Dispense: 8 tablet; Refill: 1  -     ondansetron (ZOFRAN) 8 MG tablet; Take 1 tablet by mouth 3 (Three) Times a Day As Needed for Nausea or Vomiting.  Dispense: 30 tablet; Refill: 5  -     [facility] Breast Panel - Miscellaneous Test; Future  -     CBC and Differential; Future  -     Comprehensive metabolic panel; Future  -     Pregnancy, Urine - Urine, Clean Catch; Future  -     Basic Metabolic Panel; Future  -     Magnesium; Future    3. Need for prophylactic chemotherapy  -     Provider communication  -     OLANZapine (zyPREXA) 5 MG tablet; Take 1 tablet by mouth for 4 doses starting night of chemotherapy.  Dispense: 8 tablet; Refill: 1  -      ondansetron (ZOFRAN) 8 MG tablet; Take 1 tablet by mouth 3 (Three) Times a Day As Needed for Nausea or Vomiting.  Dispense: 30 tablet; Refill: 5  -     [facility] Breast Panel - Miscellaneous Test; Future  -     CBC and Differential; Future  -     Comprehensive metabolic panel; Future  -     Pregnancy, Urine - Urine, Clean Catch; Future    Other orders  -     famotidine (PEPCID) 20 MG tablet; Take 1 tablet by mouth 2 (Two) Times a Day.  Dispense: 60 tablet; Refill: 2  -     potassium chloride (KLOR-CON M20) 20 MEQ CR tablet; Take 1 tablet by mouth 2 (Two) Times a Day.  Dispense: 40 tablet; Refill: 1      Orders Placed This Encounter   Procedures    Magnesium     Order Specific Question:   Release to patient     Answer:   Routine Release [[id number]]    Comprehensive Metabolic Panel     Order Specific Question:   Release to patient     Answer:   Routine Release [[id number]]    CBC Auto Differential     Order Specific Question:   Release to patient     Answer:   Routine Release [[id number]]    [facility] Breast Panel - Miscellaneous Test     Standing Status:   Future     Standing Expiration Date:   11/20/2025     Order Specific Question:   What is the name of the test you wish to perform?     Answer:   [facility] Breast Panel     Order Specific Question:   Release to patient     Answer:   Routine Release [[id number]]    Comprehensive metabolic panel     Standing Status:   Future     Standing Expiration Date:   11/20/2025     Order Specific Question:   Release to patient     Answer:   Routine Release [[id number]]    Pregnancy, Urine - Urine, Clean Catch     Standing Status:   Future     Standing Expiration Date:   11/20/2025     Order Specific Question:   Release to patient     Answer:   Routine Release [[id number]]    Basic Metabolic Panel     Standing Status:   Future     Standing Expiration Date:   11/13/2025     Order Specific Question:   Release to patient     Answer:   Routine Release [[id number]]    Magnesium      "Standing Status:   Future     Standing Expiration Date:   11/13/2025     Order Specific Question:   Release to patient     Answer:   Routine Release [[id number]]    Provider communication     Go to \"Add Orders\" to place order to assess Left Ventricular Ejection Fraction prior to starting therapy.    Associate one of the following ICD-10 codes to LVEF study:  Z01.818 - Examination prior to chemotherapy  or  Z51.11 - Patient on antineoplastic chemotherapy regimen / encounter for chemotherapy management    CBC & Differential     Order Specific Question:   Manual Differential     Answer:   No     Order Specific Question:   Release to patient     Answer:   Routine Release [[id number]]    CBC and Differential     Standing Status:   Future     Standing Expiration Date:   11/20/2025     Order Specific Question:   Manual Differential     Answer:   No     Order Specific Question:   Release to patient     Answer:   Routine Release [[id number]]         The patient and I have reviewed their diagnosis and scheduled treatment plan. Needs assessment was completed where applicable including genetics, psychosocial needs, barriers to care, VAD evaluation, advanced care planning, survivorship, and palliative care services where indicated. Referrals have been ordered as appropriate based upon evaluation today and patient desires.   Chemo/biotherapy teaching was completed today and consent obtained. See separate documentation for further details.  Adequate time was given to answer questions.  Patient made aware of their care team members and contact information if they have questions or problems throughout the treatment course.  Discussion held and written information provided describing frequency of office visits and ongoing monitoring throughout the treatment plan.     Reviewed with patient any prescribed medication sent to pharmacy.  Education provided regarding proper storage, safe handling, and proper disposal of unused " medication.  Proper handling of body fluids and waste discussed and written information provided.  If appropriate, patient had pretreatment labs drawn today.  Patient having Crohn's flare and requested possible IV fluids.  Proceeded with CBC, CMP, magnesium level today.  BUN and creatinine normal.  Potassium 2.7 and magnesium 1.4 today.  Patient does not have a port placed as of yet so we will start on oral potassium chloride 40 mEq tonight and in the morning followed by 20 mg twice daily thereafter.  Suggested utilizing ondansetron prior.  Patient given samples of Enterade with instructions to drink 1/day.  .  Patient previously has used Imodium in the situation to help with her diarrhea, suggested she begin this as she has in the past.  Magnesium 3 g per protocol planned today however patient feeling very poorly after 1 g IV.  Will bring her back tomorrow to recheck levels with possible IV fluids or electrolyte supplementation.  Encouraged increased oral intake overnight, she has been drinking Gatorade and water.  Patient seen Dr. Garcia office later today for assessment of surgical site.    Learning assessment completed at initial patient encounter. See separate flowsheet. Chemo/biotherapy education comprehension assessed at today's visit.    I spent 75 minutes caring for Suzanne on this date of service. This time includes time spent by me in the following activities: preparing for the visit, reviewing tests, obtaining and/or reviewing a separately obtained history, performing a medically appropriate examination and/or evaluation, counseling and educating the patient/family/caregiver, ordering medications, tests, or procedures, referring and communicating with other health care professionals, documenting information in the medical record, independently interpreting results and communicating that information with the patient/family/caregiver, and care coordination.     Azalia [place], APRN   11/13/24

## 2024-11-13 NOTE — LETTER
2024     PHIL Gaines  [address] [place] [id number]    Patient: Suzanne Lin   YOB: 1980   Date of Visit: 2024     Dear PHIL Gaines:       Thank you for referring Suzanne Lin to me for evaluation. Below are the relevant portions of my assessment and plan of care.    If you have questions, please do not hesitate to call me. I look forward to following Suzanne along with you.         Sincerely,        Rebekah Garcia DO        CC: No Recipients    Rebekah Garcia DO  24 [phone]  Sign when Signing Visit  Suzanne Lin 44 y.o. female presents for  FU/wound check.  Pt states she does not feel well.  Reports she can only be up for a few min and has SOA.  Saw oncology today and they bandar blood and gave meds.        HPI     Above note agree.  Suzanne is here for a wound check.  She has not been feeling well.  She has been having a lot of diarrhea with her Crohn's disease.  She had some lab work performed today at Dr. Witt's office and was given some fluids and a prescription for magnesium.  She really has no appetite and has not been eating well.    Review of Systems        Past Medical History:   Diagnosis Date   • Anxiety    • Breast cancer     Right   • Crohn's disease    • DVT (deep vein thrombosis) in pregnancy    • PFO (patent foramen ovale)    • Stroke     after the birth of her child at age 34   • Tattoos            Past Surgical History:   Procedure Laterality Date   •  SECTION     • COLON RESECTION     • COLONOSCOPY     • MASTECTOMY Bilateral 10/9/2024    Procedure: Modified radical mastectomy with axillary dissection of the right breast and simple mastectomy of the left breast;  Surgeon: Rebekah Garcia DO;  Location: [facility];  Service: General;  Laterality: Bilateral;   • SKIN CANCER EXCISION             Physical Exam    Her wound looks better.  I debrided some more dead tissue and treated the area with silver nitrate.  Also  took cultures.  It does not appear infected but I took the cultures just in case.    LMP 11/01/2024 (Approximate)         Diagnoses and all orders for this visit:    1. Breast cancer, stage 3, right (Primary)    2. Visit for wound check    I will bring her back next week to reevaluate the wound and await the wound cultures.    Thank you for allowing me to participate in the care of this interesting patient.

## 2024-11-13 NOTE — PATIENT INSTRUCTIONS
Take two potassium 20 meq tonight and in the morning  after taking zofran for nausea if needed    Drink enterade one pack tonight and in the morning  Return to office tomorrow at 10 am

## 2024-11-13 NOTE — ADDENDUM NOTE
Addended by: JOSEPH ONTIVEROS on: 11/13/2024 03:32 PM     Modules accepted: Orders     LENS OPTION (CLASSIC): Discussed with patient in detail all available methods and lens options as well as their associated benefits, limitations and out-of-pocket costs. Patient chooses femtosecond laser-assisted cataract surgery with monofocal lens implant and understands that reading glasses will still be needed after surgery. The patient also understands that with any IOL there is no guarantee that they will not require glasses to see their best at any distance after surgery. The risks, benefits and alternatives to surgery were explained and all questions were answered.

## 2024-11-13 NOTE — PROGRESS NOTES
Suzanne Lin 44 y.o. female presents for  FU/wound check.  Pt states she does not feel well.  Reports she can only be up for a few min and has SOA.  Saw oncology today and they bandar blood and gave meds.        HPI     Above note agree.  Suzanne is here for a wound check.  She has not been feeling well.  She has been having a lot of diarrhea with her Crohn's disease.  She had some lab work performed today at Dr. Witt's office and was given some fluids and a prescription for magnesium.  She really has no appetite and has not been eating well.    Review of Systems        Past Medical History:   Diagnosis Date    Anxiety     Breast cancer     Right    Crohn's disease     DVT (deep vein thrombosis) in pregnancy     PFO (patent foramen ovale)     Stroke     after the birth of her child at age 34    Tattoos            Past Surgical History:   Procedure Laterality Date     SECTION      COLON RESECTION      COLONOSCOPY      MASTECTOMY Bilateral 10/9/2024    Procedure: Modified radical mastectomy with axillary dissection of the right breast and simple mastectomy of the left breast;  Surgeon: Rebekah Garcia DO;  Location: [facility];  Service: General;  Laterality: Bilateral;    SKIN CANCER EXCISION             Physical Exam    Her wound looks better.  I debrided some more dead tissue and treated the area with silver nitrate.  Also took cultures.  It does not appear infected but I took the cultures just in case.    LMP 2024 (Approximate)         Diagnoses and all orders for this visit:    1. Breast cancer, stage 3, right (Primary)    2. Visit for wound check    I will bring her back next week to reevaluate the wound and await the wound cultures.    Thank you for allowing me to participate in the care of this interesting patient.

## 2024-11-13 NOTE — NURSING NOTE
Pt given one gram of magnesium rather than three ordered due to patient having an appointment with her surgeon.  Pt to return tomorrow at 10 am for lab work and magnesium.  Pt instructed to take prn zofran prior to taking 40 meq potassium tonight and again in the morning.  Enterade given to patient with instructions to take tonight and in the morning.  Pt feeling weak but feels okay to drive.  Pt instructed to call for any questions or problems.  Pt V/U.

## 2024-11-14 ENCOUNTER — INFUSION (OUTPATIENT)
Dept: ONCOLOGY | Facility: HOSPITAL | Age: 44
End: 2024-11-14
Payer: COMMERCIAL

## 2024-11-14 VITALS
BODY MASS INDEX: 24.13 KG/M2 | WEIGHT: 149.4 LBS | HEART RATE: 122 BPM | SYSTOLIC BLOOD PRESSURE: 118 MMHG | TEMPERATURE: 97 F | DIASTOLIC BLOOD PRESSURE: 84 MMHG | OXYGEN SATURATION: 97 %

## 2024-11-14 DIAGNOSIS — R19.7 DIARRHEA, UNSPECIFIED TYPE: ICD-10-CM

## 2024-11-14 DIAGNOSIS — C50.911 BREAST CANCER, STAGE 3, RIGHT: ICD-10-CM

## 2024-11-14 LAB
ANION GAP SERPL CALCULATED.3IONS-SCNC: 12.1 MMOL/L (ref 5–15)
BUN SERPL-MCNC: 12 MG/DL (ref 6–20)
BUN/CREAT SERPL: 15 (ref 7–25)
CALCIUM SPEC-SCNC: 8.6 MG/DL (ref 8.6–10.5)
CHLORIDE SERPL-SCNC: 103 MMOL/L (ref 98–107)
CO2 SERPL-SCNC: 22.9 MMOL/L (ref 22–29)
CREAT SERPL-MCNC: 0.8 MG/DL (ref 0.57–1)
EGFRCR SERPLBLD CKD-EPI 2021: 93.3 ML/MIN/1.73
GLUCOSE SERPL-MCNC: 103 MG/DL (ref 65–99)
MAGNESIUM SERPL-MCNC: 1.9 MG/DL (ref 1.6–2.6)
POTASSIUM SERPL-SCNC: 3.1 MMOL/L (ref 3.5–5.2)
SODIUM SERPL-SCNC: 138 MMOL/L (ref 136–145)

## 2024-11-14 PROCEDURE — 80048 BASIC METABOLIC PNL TOTAL CA: CPT | Performed by: NURSE PRACTITIONER

## 2024-11-14 PROCEDURE — 96361 HYDRATE IV INFUSION ADD-ON: CPT

## 2024-11-14 PROCEDURE — 96360 HYDRATION IV INFUSION INIT: CPT

## 2024-11-14 PROCEDURE — 83735 ASSAY OF MAGNESIUM: CPT | Performed by: NURSE PRACTITIONER

## 2024-11-14 PROCEDURE — 25810000003 SODIUM CHLORIDE 0.9 % SOLUTION: Performed by: NURSE PRACTITIONER

## 2024-11-14 RX ORDER — SODIUM CHLORIDE 9 MG/ML
500 INJECTION, SOLUTION INTRAVENOUS ONCE
Status: COMPLETED | OUTPATIENT
Start: 2024-11-14 | End: 2024-11-14

## 2024-11-14 RX ADMIN — SODIUM CHLORIDE 500 ML: 9 INJECTION, SOLUTION INTRAVENOUS at 09:58

## 2024-11-14 NOTE — NURSING NOTE
Pt feeling better.  She reports decrease in episodes of loose stool. Magnesium 1.9 today.  Potassium 3.1  Per PHIL Sams , Pt instructed to take 40 meq potassium twice a day for three days then take 20 meq potassium daily until return to our office next week. She is to continue enterade daily.  Pt V/U.

## 2024-11-14 NOTE — PATIENT INSTRUCTIONS
Take( 2 ) 20 meq potassium twice a day for three days then take one 20 meq potassium daily until we see you back next week and check lab work.

## 2024-11-16 LAB
BACTERIA SPEC CULT: NORMAL
MICROORGANISM/AGENT SPEC: NORMAL

## 2024-11-18 ENCOUNTER — OFFICE VISIT (OUTPATIENT)
Dept: SURGERY | Facility: CLINIC | Age: 44
End: 2024-11-18
Payer: COMMERCIAL

## 2024-11-18 DIAGNOSIS — Z90.13 STATUS POST MASTECTOMY, BILATERAL: Primary | ICD-10-CM

## 2024-11-18 PROCEDURE — 99024 POSTOP FOLLOW-UP VISIT: CPT | Performed by: SURGERY

## 2024-11-18 RX ORDER — CYCLOBENZAPRINE HCL 10 MG
10 TABLET ORAL 2 TIMES DAILY PRN
Qty: 40 TABLET | Refills: 0 | Status: SHIPPED | OUTPATIENT
Start: 2024-11-18

## 2024-11-18 RX ORDER — ZOLPIDEM TARTRATE 5 MG/1
5 TABLET ORAL NIGHTLY PRN
COMMUNITY

## 2024-11-18 NOTE — PROGRESS NOTES
Suzanne Lin 44 y.o. female presents for PO FU.  Pt is feeling much better today.  Pt is requesting RF Flexeril.       HPI   Above-noted agree.  Suzanne is here for a follow-up from her mastectomy.  She is doing well.  Her cultures were negative.      Review of Systems        Past Medical History:   Diagnosis Date    Anxiety     Breast cancer     Right    Crohn's disease     DVT (deep vein thrombosis) in pregnancy     PFO (patent foramen ovale)     Stroke     after the birth of her child at age 34    Tattoos            Past Surgical History:   Procedure Laterality Date     SECTION      COLON RESECTION      COLONOSCOPY      MASTECTOMY Bilateral 10/9/2024    Procedure: Modified radical mastectomy with axillary dissection of the right breast and simple mastectomy of the left breast;  Surgeon: Rebekah Garcia DO;  Location: [facility];  Service: General;  Laterality: Bilateral;    SKIN CANCER EXCISION             Physical Exam    The wound looks really good today.  There was less slough debrided off today.    LMP 2024 (Approximate)         Diagnoses and all orders for this visit:    1. Status post mastectomy, bilateral (Primary)    Other orders  -     cyclobenzaprine (FLEXERIL) 10 MG tablet; Take 1 tablet by mouth 2 (Two) Times a Day As Needed for Muscle Spasms for up to 40 doses.  Dispense: 40 tablet; Refill: 0    I will bring her back Wednesday for more wound care.  I will put off placing her port another several weeks to allow for complete wound healing.    Thank you for allowing me to participate in the care of this interesting patient.

## 2024-11-18 NOTE — LETTER
2024     PHIL Gaines  [address] [place] [id number]    Patient: Suzanne Lin   YOB: 1980   Date of Visit: 2024     Dear PHIL Gaines:       Thank you for referring Suzanne Lin to me for evaluation. Below are the relevant portions of my assessment and plan of care.    If you have questions, please do not hesitate to call me. I look forward to following Suzanne along with you.         Sincerely,        Rebekah Garcia DO        CC: MD Jose Tom Jeannine, DO  24 0947  Sign when Signing Visit  Suzanne Lin 44 y.o. female presents for PO FU.  Pt is feeling much better today.  Pt is requesting RF Flexeril.       HPI   Above-noted agree.  Suzanne is here for a follow-up from her mastectomy.  She is doing well.  Her cultures were negative.      Review of Systems        Past Medical History:   Diagnosis Date   • Anxiety    • Breast cancer     Right   • Crohn's disease    • DVT (deep vein thrombosis) in pregnancy    • PFO (patent foramen ovale)    • Stroke     after the birth of her child at age 34   • Tattoos            Past Surgical History:   Procedure Laterality Date   •  SECTION     • COLON RESECTION     • COLONOSCOPY     • MASTECTOMY Bilateral 10/9/2024    Procedure: Modified radical mastectomy with axillary dissection of the right breast and simple mastectomy of the left breast;  Surgeon: Rebekah Garcia DO;  Location: [facility];  Service: General;  Laterality: Bilateral;   • SKIN CANCER EXCISION             Physical Exam    The wound looks really good today.  There was less slough debrided off today.    LMP 2024 (Approximate)         Diagnoses and all orders for this visit:    1. Status post mastectomy, bilateral (Primary)    Other orders  -     cyclobenzaprine (FLEXERIL) 10 MG tablet; Take 1 tablet by mouth 2 (Two) Times a Day As Needed for Muscle Spasms for up to 40 doses.  Dispense: 40 tablet; Refill: 0    I  will bring her back Wednesday for more wound care.  I will put off placing her port another several weeks to allow for complete wound healing.    Thank you for allowing me to participate in the care of this interesting patient.

## 2024-11-20 ENCOUNTER — LAB (OUTPATIENT)
Dept: LAB | Facility: HOSPITAL | Age: 44
End: 2024-11-20
Payer: COMMERCIAL

## 2024-11-20 ENCOUNTER — INFUSION (OUTPATIENT)
Dept: ONCOLOGY | Facility: HOSPITAL | Age: 44
End: 2024-11-20
Payer: COMMERCIAL

## 2024-11-20 ENCOUNTER — OFFICE VISIT (OUTPATIENT)
Dept: SURGERY | Facility: CLINIC | Age: 44
End: 2024-11-20
Payer: COMMERCIAL

## 2024-11-20 VITALS — HEART RATE: 98 BPM | SYSTOLIC BLOOD PRESSURE: 109 MMHG | TEMPERATURE: 97.1 F | DIASTOLIC BLOOD PRESSURE: 79 MMHG

## 2024-11-20 DIAGNOSIS — R19.7 DIARRHEA, UNSPECIFIED TYPE: ICD-10-CM

## 2024-11-20 DIAGNOSIS — E86.0 DEHYDRATION: ICD-10-CM

## 2024-11-20 DIAGNOSIS — C50.911 BREAST CANCER, STAGE 3, RIGHT: ICD-10-CM

## 2024-11-20 DIAGNOSIS — Z51.89 VISIT FOR WOUND CHECK: Primary | ICD-10-CM

## 2024-11-20 LAB
ALBUMIN SERPL-MCNC: 3.4 G/DL (ref 3.5–5.2)
ALBUMIN/GLOB SERPL: 1 G/DL
ALP SERPL-CCNC: 116 U/L (ref 39–117)
ALT SERPL W P-5'-P-CCNC: 12 U/L (ref 1–33)
ANION GAP SERPL CALCULATED.3IONS-SCNC: 10.6 MMOL/L (ref 5–15)
AST SERPL-CCNC: 16 U/L (ref 1–32)
BASOPHILS # BLD AUTO: 0.05 10*3/MM3 (ref 0–0.2)
BASOPHILS NFR BLD AUTO: 0.9 % (ref 0–1.5)
BILIRUB SERPL-MCNC: 0.3 MG/DL (ref 0–1.2)
BUN SERPL-MCNC: 12 MG/DL (ref 6–20)
BUN/CREAT SERPL: 15.8 (ref 7–25)
CALCIUM SPEC-SCNC: 8.7 MG/DL (ref 8.6–10.5)
CHLORIDE SERPL-SCNC: 106 MMOL/L (ref 98–107)
CO2 SERPL-SCNC: 24.4 MMOL/L (ref 22–29)
CREAT SERPL-MCNC: 0.76 MG/DL (ref 0.57–1)
DEPRECATED RDW RBC AUTO: 43.7 FL (ref 37–54)
EGFRCR SERPLBLD CKD-EPI 2021: 99.2 ML/MIN/1.73
EOSINOPHIL # BLD AUTO: 0.16 10*3/MM3 (ref 0–0.4)
EOSINOPHIL NFR BLD AUTO: 2.9 % (ref 0.3–6.2)
ERYTHROCYTE [DISTWIDTH] IN BLOOD BY AUTOMATED COUNT: 13.3 % (ref 12.3–15.4)
GLOBULIN UR ELPH-MCNC: 3.4 GM/DL
GLUCOSE SERPL-MCNC: 94 MG/DL (ref 65–99)
HCT VFR BLD AUTO: 43.1 % (ref 34–46.6)
HGB BLD-MCNC: 14.1 G/DL (ref 12–15.9)
IMM GRANULOCYTES # BLD AUTO: 0.04 10*3/MM3 (ref 0–0.05)
IMM GRANULOCYTES NFR BLD AUTO: 0.7 % (ref 0–0.5)
LYMPHOCYTES # BLD AUTO: 1.58 10*3/MM3 (ref 0.7–3.1)
LYMPHOCYTES NFR BLD AUTO: 28.3 % (ref 19.6–45.3)
MAGNESIUM SERPL-MCNC: 1.7 MG/DL (ref 1.6–2.6)
MCH RBC QN AUTO: 29.4 PG (ref 26.6–33)
MCHC RBC AUTO-ENTMCNC: 32.7 G/DL (ref 31.5–35.7)
MCV RBC AUTO: 89.8 FL (ref 79–97)
MONOCYTES # BLD AUTO: 0.36 10*3/MM3 (ref 0.1–0.9)
MONOCYTES NFR BLD AUTO: 6.5 % (ref 5–12)
NEUTROPHILS NFR BLD AUTO: 3.39 10*3/MM3 (ref 1.7–7)
NEUTROPHILS NFR BLD AUTO: 60.7 % (ref 42.7–76)
NRBC BLD AUTO-RTO: 0 /100 WBC (ref 0–0.2)
PLATELET # BLD AUTO: 498 10*3/MM3 (ref 140–450)
PMV BLD AUTO: 9.4 FL (ref 6–12)
POTASSIUM SERPL-SCNC: 3.1 MMOL/L (ref 3.5–5.2)
PROT SERPL-MCNC: 6.8 G/DL (ref 6–8.5)
RBC # BLD AUTO: 4.8 10*6/MM3 (ref 3.77–5.28)
SODIUM SERPL-SCNC: 141 MMOL/L (ref 136–145)
WBC NRBC COR # BLD AUTO: 5.58 10*3/MM3 (ref 3.4–10.8)

## 2024-11-20 PROCEDURE — 85025 COMPLETE CBC W/AUTO DIFF WBC: CPT | Performed by: NURSE PRACTITIONER

## 2024-11-20 PROCEDURE — 83735 ASSAY OF MAGNESIUM: CPT | Performed by: NURSE PRACTITIONER

## 2024-11-20 PROCEDURE — 99024 POSTOP FOLLOW-UP VISIT: CPT | Performed by: SURGERY

## 2024-11-20 PROCEDURE — 80053 COMPREHEN METABOLIC PANEL: CPT | Performed by: NURSE PRACTITIONER

## 2024-11-20 PROCEDURE — 36415 COLL VENOUS BLD VENIPUNCTURE: CPT

## 2024-11-20 NOTE — NURSING NOTE
Lab results discussed with patient.  Potassium 3.1 today.   Magnesium  1.7. Pt has been taking 40 meq potassium daily.  Pt reports loose stool somewhat better. She reports about 4 bm's a day. She can not tolerate enterade so has not drank those.  She only uses immodium if she has to go out of the house because it can cause constipation for her.  Per Dr Witt, pt to increase Potassium tablet to 60  meq a day.  He requests that her PCP manages her potassium going forward. Pt to see Dr Gray (surgeon) today to evaluate her surgical wounds.  Pt instructed to call office if she is scheduled for port placement to adjust schedule for first chemotherapy treatment if necessary.  Pt V/U.

## 2024-11-20 NOTE — PROGRESS NOTES
Suzanne Lin 44 y.o. female presents for FU/wound check.  Pt feels well.        HPI     Above noted and agree.  Suzanne looks and feels better each and every day.    Review of Systems        Past Medical History:   Diagnosis Date    Anxiety     Breast cancer     Right    Crohn's disease     DVT (deep vein thrombosis) in pregnancy     PFO (patent foramen ovale)     Stroke     after the birth of her child at age 34    Tattoos            Past Surgical History:   Procedure Laterality Date     SECTION      COLON RESECTION      COLONOSCOPY      MASTECTOMY Bilateral 10/9/2024    Procedure: Modified radical mastectomy with axillary dissection of the right breast and simple mastectomy of the left breast;  Surgeon: Rebekah Garcia DO;  Location: [facility];  Service: General;  Laterality: Bilateral;    SKIN CANCER EXCISION             Physical Exam    Her wound looks good today.  I cleaned it with Betadine and covered it.  There was no need for debriding today.    LMP 2024 (Approximate)         Diagnoses and all orders for this visit:    1. Visit for wound check (Primary)    I will bring Suzanne back next week.    Thank you for allowing me to participate in the care of this interesting patient.

## 2024-11-25 ENCOUNTER — OFFICE VISIT (OUTPATIENT)
Dept: SURGERY | Facility: CLINIC | Age: 44
End: 2024-11-25
Payer: COMMERCIAL

## 2024-11-25 DIAGNOSIS — Z51.89 VISIT FOR WOUND CHECK: Primary | ICD-10-CM

## 2024-11-25 PROCEDURE — 99024 POSTOP FOLLOW-UP VISIT: CPT | Performed by: SURGERY

## 2024-11-25 NOTE — PROGRESS NOTES
Suzanne Lin 44 y.o. female presents for  FU.  Pt feels well.        HPI   Above noted and agree.  Suzanne is doing well.      Review of Systems        Past Medical History:   Diagnosis Date    Anxiety     Breast cancer     Right    Crohn's disease     DVT (deep vein thrombosis) in pregnancy     PFO (patent foramen ovale)     Stroke     after the birth of her child at age 34    Tattoos            Past Surgical History:   Procedure Laterality Date     SECTION      COLON RESECTION      COLONOSCOPY      MASTECTOMY Bilateral 10/9/2024    Procedure: Modified radical mastectomy with axillary dissection of the right breast and simple mastectomy of the left breast;  Surgeon: Rebekah Garcia DO;  Location: [facility];  Service: General;  Laterality: Bilateral;    SKIN CANCER EXCISION             Physical Exam    Her wound looks good.  It has almost completely granulated in.     LMP 2024 (Approximate)         Diagnoses and all orders for this visit:    1. Visit for wound check (Primary)    I will bring her back next week.  At that time, hopefully we can get her port scheduled.    Thank you for allowing me to participate in the care of this interesting patient.

## 2024-11-25 NOTE — LETTER
2024     PHIL Gaines  [address] [place] [id number]    Patient: Suzanne Lin   YOB: 1980   Date of Visit: 2024     Dear PHIL Gaines:       Thank you for referring Suzanne Lin to me for evaluation. Below are the relevant portions of my assessment and plan of care.    If you have questions, please do not hesitate to call me. I look forward to following Suzanne along with you.         Sincerely,        Rebekah Garcia DO        CC: No Recipients    Rebekah Garcia DO  24 1048  Sign when Signing Visit  Suzanne Lin 44 y.o. female presents for  FU.  Pt feels well.        HPI   Above noted and agree.  Suzanne is doing well.      Review of Systems        Past Medical History:   Diagnosis Date   • Anxiety    • Breast cancer     Right   • Crohn's disease    • DVT (deep vein thrombosis) in pregnancy    • PFO (patent foramen ovale)    • Stroke     after the birth of her child at age 34   • Tattoos            Past Surgical History:   Procedure Laterality Date   •  SECTION     • COLON RESECTION     • COLONOSCOPY     • MASTECTOMY Bilateral 10/9/2024    Procedure: Modified radical mastectomy with axillary dissection of the right breast and simple mastectomy of the left breast;  Surgeon: Rebekah Garcia DO;  Location: [facility];  Service: General;  Laterality: Bilateral;   • SKIN CANCER EXCISION             Physical Exam    Her wound looks good.  It has almost completely granulated in.     LMP 2024 (Approximate)         Diagnoses and all orders for this visit:    1. Visit for wound check (Primary)    I will bring her back next week.  At that time, hopefully we can get her port scheduled.    Thank you for allowing me to participate in the care of this interesting patient.

## 2024-11-26 ENCOUNTER — TELEPHONE (OUTPATIENT)
Dept: ONCOLOGY | Facility: CLINIC | Age: 44
End: 2024-11-26
Payer: COMMERCIAL

## 2024-11-26 NOTE — TELEPHONE ENCOUNTER
Caller: Suzanne Lin    Relationship to patient: Self    Best call back number: [phone]    Chief complaint: PATIENT CALLED TO CANCEL     Type of visit: PORT FLUSH, FOLLOW UP AND INFUSION     Requested date: THE 2ND WEEK OF DECEMBER      If rescheduling, when is the original appointment: 12-4-24

## 2024-11-27 ENCOUNTER — TELEPHONE (OUTPATIENT)
Dept: OTHER | Facility: HOSPITAL | Age: 44
End: 2024-11-27
Payer: COMMERCIAL

## 2024-11-27 NOTE — TELEPHONE ENCOUNTER
Oncology Social Work  Voicemail message was left for the patient to return call to OSW. TIMO Torres

## 2024-12-02 ENCOUNTER — HOSPITAL ENCOUNTER (OUTPATIENT)
Dept: CARDIOLOGY | Facility: HOSPITAL | Age: 44
Discharge: HOME | End: 2024-12-02
Payer: COMMERCIAL

## 2024-12-02 ENCOUNTER — OFFICE VISIT (OUTPATIENT)
Dept: SURGERY | Facility: CLINIC | Age: 44
End: 2024-12-02
Payer: COMMERCIAL

## 2024-12-02 ENCOUNTER — TELEPHONE (OUTPATIENT)
Dept: ONCOLOGY | Facility: CLINIC | Age: 44
End: 2024-12-02
Payer: COMMERCIAL

## 2024-12-02 DIAGNOSIS — Z51.89 VISIT FOR WOUND CHECK: ICD-10-CM

## 2024-12-02 DIAGNOSIS — C50.911 BREAST CANCER, STAGE 3, RIGHT: Primary | ICD-10-CM

## 2024-12-02 DIAGNOSIS — C50.911 BREAST CANCER, STAGE 3, RIGHT: ICD-10-CM

## 2024-12-02 DIAGNOSIS — Z79.899 NEED FOR PROPHYLACTIC CHEMOTHERAPY: Primary | ICD-10-CM

## 2024-12-02 DIAGNOSIS — Z79.899 NEED FOR PROPHYLACTIC CHEMOTHERAPY: ICD-10-CM

## 2024-12-02 PROCEDURE — 99024 POSTOP FOLLOW-UP VISIT: CPT | Performed by: SURGERY

## 2024-12-02 NOTE — PROGRESS NOTES
Suzanne Lin 44 y.o. female presents for  FU/wound check.       HPI     Above noted agree.  Suzanne is here for a wound check and discuss placing her port.  She is doing well.    Review of Systems        Past Medical History:   Diagnosis Date    Anxiety     Breast cancer     Right    Crohn's disease     DVT (deep vein thrombosis) in pregnancy     PFO (patent foramen ovale)     Stroke     after the birth of her child at age 34    Tattoos            Past Surgical History:   Procedure Laterality Date     SECTION      COLON RESECTION      COLONOSCOPY      MASTECTOMY Bilateral 10/9/2024    Procedure: Modified radical mastectomy with axillary dissection of the right breast and simple mastectomy of the left breast;  Surgeon: Rebekah Garcia DO;  Location: [facility];  Service: General;  Laterality: Bilateral;    SKIN CANCER EXCISION             Physical Exam  Constitutional:       Appearance: Normal appearance.   Skin:     Comments: Her incision looks great today.  There was significantly much less sloughy tissue that was removed using silver nitrate.  It is granulating in nicely and almost completed granulating in.   Neurological:      General: No focal deficit present.      Mental Status: She is alert and oriented to person, place, and time.   Psychiatric:         Mood and Affect: Mood normal.         Behavior: Behavior normal.         No debilities noted    LMP 2024 (Approximate)         Diagnoses and all orders for this visit:    1. Breast cancer, stage 3, right (Primary)    2. Need for prophylactic chemotherapy    3. Visit for wound check    I think Suzanne's wound is healed enough for us to place her port next week.  We discussed the benefits and risks.  All of her questions were answered and she is willing to proceed.    Thank you for allowing me to participate in the care of this interesting patient.

## 2024-12-02 NOTE — LETTER
2024     PHIL Gaines  [address] [place] [id number]    Patient: Suzanne Lin   YOB: 1980   Date of Visit: 2024     Dear PHIL Gaines:       Thank you for referring Suzanne Lin to me for evaluation. Below are the relevant portions of my assessment and plan of care.    If you have questions, please do not hesitate to call me. I look forward to following Suzanne along with you.         Sincerely,        Rebekah Garcia DO        CC: No Recipients    Rebekah Garcia DO  24 0941  Sign when Signing Visit  Suzanne Lin 44 y.o. female presents for  FU/wound check.       HPI     Above noted agree.  Suzanne is here for a wound check and discuss placing her port.  She is doing well.    Review of Systems        Past Medical History:   Diagnosis Date   • Anxiety    • Breast cancer     Right   • Crohn's disease    • DVT (deep vein thrombosis) in pregnancy    • PFO (patent foramen ovale)    • Stroke     after the birth of her child at age 34   • Tattoos            Past Surgical History:   Procedure Laterality Date   •  SECTION     • COLON RESECTION     • COLONOSCOPY     • MASTECTOMY Bilateral 10/9/2024    Procedure: Modified radical mastectomy with axillary dissection of the right breast and simple mastectomy of the left breast;  Surgeon: Rebekah Garcia DO;  Location: [facility];  Service: General;  Laterality: Bilateral;   • SKIN CANCER EXCISION             Physical Exam  Constitutional:       Appearance: Normal appearance.   Skin:     Comments: Her incision looks great today.  There was significantly much less sloughy tissue that was removed using silver nitrate.  It is granulating in nicely and almost completed granulating in.   Neurological:      General: No focal deficit present.      Mental Status: She is alert and oriented to person, place, and time.   Psychiatric:         Mood and Affect: Mood normal.         Behavior: Behavior normal.          No debilities noted    LMP 11/01/2024 (Approximate)         Diagnoses and all orders for this visit:    1. Breast cancer, stage 3, right (Primary)    2. Need for prophylactic chemotherapy    3. Visit for wound check    I think Suzanne's wound is healed enough for us to place her port next week.  We discussed the benefits and risks.  All of her questions were answered and she is willing to proceed.    Thank you for allowing me to participate in the care of this interesting patient.

## 2024-12-02 NOTE — TELEPHONE ENCOUNTER
Caller: Suzanne Lin    Relationship: Self    Best call back number: [phone]    What is the best time to reach you: ASAP    Who are you requesting to speak with (clinical staff, provider,  specific staff member): CLINICAL     What was the call regarding: PATIENT DID NOT HAVE ECHO DUE TO HER RECENT MASTECTOMY BEING OPEN. PORT IS SCHEDULED FOR 12/10/2024.

## 2024-12-03 ENCOUNTER — TELEPHONE (OUTPATIENT)
Dept: OTHER | Facility: HOSPITAL | Age: 44
End: 2024-12-03
Payer: COMMERCIAL

## 2024-12-03 NOTE — TELEPHONE ENCOUNTER
Oncology Social Work  Social Work referral received for Community Resources and Psychosocial support.  OSW spoke to the patient and discussed her concerns. The apteint states that she would be interested in a therapist in the [place], [place] area.  The patient was emailed a link to [facility]. Her email address is [email or web address].     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      The patient states that she feels as if she is doing well mentally but she does have anxiety and on some days feels overwhelmed.  The patient states that the Enterade helped with her diarrhea and a request was sent to the dietitians to follow up with the patient about her concerns about the taste of Enterade.  A request was also sent to navigation to follow up with the patient.      The patient states that she has been on Wellbutrin for about 3 months and it seems to be working well for her.  She states that it is prescribed her PCP.  The patient states that she has also been taking Ambien for 2-3 weeks due to the difficulty that she is having falling asleep.  The patient is currently not interested in seeing anyone for medication management or CBT-I.      The patient informed OSW that she will follow up for any assistance with getting a referral for virtual therapy at [facility] if she is unsuccessful with [facility].  When the patient follows up with OSW an inquiry will be made to see if the patient is interested in medication management or CBT-I.  OSW will make a referral to [facility] for assistance.  TIMO Torres

## 2024-12-04 ENCOUNTER — TELEPHONE (OUTPATIENT)
Dept: OTHER | Facility: HOSPITAL | Age: 44
End: 2024-12-04
Payer: COMMERCIAL

## 2024-12-04 ENCOUNTER — TELEMEDICINE - AUDIO (OUTPATIENT)
Dept: OTHER | Facility: HOSPITAL | Age: 44
End: 2024-12-04
Payer: COMMERCIAL

## 2024-12-04 ENCOUNTER — OFFICE VISIT (OUTPATIENT)
Dept: SURGERY | Facility: CLINIC | Age: 44
End: 2024-12-04
Payer: COMMERCIAL

## 2024-12-04 ENCOUNTER — DOCUMENTATION (OUTPATIENT)
Dept: OTHER | Facility: HOSPITAL | Age: 44
End: 2024-12-04
Payer: COMMERCIAL

## 2024-12-04 DIAGNOSIS — Z51.89 VISIT FOR WOUND CHECK: Primary | ICD-10-CM

## 2024-12-04 PROCEDURE — 99024 POSTOP FOLLOW-UP VISIT: CPT | Performed by: SURGERY

## 2024-12-04 NOTE — TELEPHONE ENCOUNTER
Oncology Social Work  Spoke to the patient and informed her that financial assistance applications had been submitted on her behalf for [facility] [employer] (gift card) and [employer] (Gift card and virtual therapy if available).  OSW will continue to follow and assist with patient's needs.  TIMO Torres

## 2024-12-04 NOTE — PROGRESS NOTES
Oncology Social Work  Financial Assistance application submitted for [facility].  TIMO Torres

## 2024-12-04 NOTE — PROGRESS NOTES
Suzanne Lin 44 y.o. female presents for PO FU.  Pt feels well.        HPI     Above noted and agree.    Review of Systems        Past Medical History:   Diagnosis Date    Anxiety     Breast cancer     Right    Crohn's disease     DVT (deep vein thrombosis) in pregnancy     PFO (patent foramen ovale)     Stroke     after the birth of her child at age 34    Tattoos            Past Surgical History:   Procedure Laterality Date     SECTION      COLON RESECTION      COLONOSCOPY      MASTECTOMY Bilateral 10/9/2024    Procedure: Modified radical mastectomy with axillary dissection of the right breast and simple mastectomy of the left breast;  Surgeon: Rebekah Garcia DO;  Location: [facility];  Service: General;  Laterality: Bilateral;    SKIN CANCER EXCISION             Physical Exam    The wound looks great.  There was very little slough today.    LMP 2024 (Approximate)         Diagnoses and all orders for this visit:    1. Visit for wound check (Primary)

## 2024-12-04 NOTE — PROGRESS NOTES
OUTPATIENT ONCOLOGY NUTRITION ASSESSMENT    Patient Name: Suzanne Lin  YOB: 1980  MRN: [id number]  Assessment Date: 12/4/2024    COMMENTS:  Spoke to patient on the phone today. Chemotherapy has been delayed due to port delay, wound healing. S/p mastectomy.   The patient has a history of Crohns and had a resection many years ago. She has baseline diarrhea and had reported diarrhea when she was in the office. She was given enterade to try and while it seemed to help she did not like the taste of it.   She also has had a decreased appetite with nausea. She has not had her port placed and may not start chemonext week but I suggested she try the enterade again, diluting it with more water if it tastes too salty. Explained that she should not add any flavoring to it as it will not work in the way it is meant to.   Suggested she try one bottle a day for the two days before treatment to try to get ahead of any nausea or diarrhea.   Also suggested she try biolyte for electrolyte replacement and we can get her some samples of this to try.   Will follow up for treatment schedule and follow for tolerance.  Encouraged her to eat small frequent meals.         Reason for Assessment New assessment, Nursing referral, Education      Diagnosis/Problem   Stage 3 breast cancer   Treatment Plan Chemotherapy has not started yet-- Adriamycin and Cytoxan one every 2 weeks x 4 with neulasta, then taxol once a week x 12 weeks   Frequency    Goal of cancer treatment Disease control, Adjuvant   Comments:          Encounter Information        Nutrition/Diet History:     Oral Nutrition Supplements:    Factors/Symptoms Affecting Intake: Anorexia, Diarrhea, Taste changes, Weight loss   Comments:      Medical/Surgical History Past Medical History:   Diagnosis Date    Anxiety     Breast cancer 2024    Right    Crohn's disease     DVT (deep vein thrombosis) in pregnancy     PFO (patent foramen ovale)     Stroke     after the birth of  "her child at age 34    Tattoos        Past Surgical History:   Procedure Laterality Date     SECTION      COLON RESECTION      COLONOSCOPY      MASTECTOMY Bilateral 10/9/2024    Procedure: Modified radical mastectomy with axillary dissection of the right breast and simple mastectomy of the left breast;  Surgeon: Rebekah Garcia DO;  Location: [facility];  Service: General;  Laterality: Bilateral;    SKIN CANCER EXCISION                 Anthropometrics        Current Height Ht Readings from Last 1 Encounters:   24 167.6 cm (65.98\")      Current Weight Wt Readings from Last 1 Encounters:   24 67.8 kg (149 lb 6.4 oz)      BMI  24.1   Ideal Body Weight (IBW) 130 lb   Usual Body Weight (UBW) 160 lb   Weight Change/Trend Loss   Weight History Wt Readings from Last 30 Encounters:   24 0949 67.8 kg (149 lb 6.4 oz)   24 0927 67.4 kg (148 lb 9.6 oz)   24 0907 67.1 kg (148 lb)   10/31/24 0951 68.1 kg (150 lb 3.2 oz)   10/09/24 1334 67.1 kg (147 lb 14.9 oz)   10/09/24 0700 70.7 kg (155 lb 12.8 oz)   10/07/24 1406 69.4 kg (153 lb)   10/02/24 1532 70.8 kg (156 lb)   10/01/24 1030 69.9 kg (154 lb)   24 1420 70.8 kg (156 lb 1.4 oz)   24 1506 70.8 kg (156 lb)   24 1531 72.6 kg (160 lb)          Medications           Current medications: OLANZapine, aspirin, buPROPion XL, cyclobenzaprine, diphenhydrAMINE, famotidine, ondansetron, oxyCODONE, potassium chloride, and zolpidem                 Tests/Procedures        Tests/Procedures Chemotherapy     Labs       Pertinent Labs          Invalid input(s): \"LABALBU\", \"PROT\"          No results found for: \"COVID19\"  No results found for: \"HGBA1C\"           Estimated/Assessed Needs        Energy Requirements    Height for Calculation      Weight for Calculation 68 kg   Method for Estimation  30 kcal/kg   EST Needs (kcal/day) 2040 kcals/day       Protein Requirements    Weight for Calculation 68 kg   EST Protein Needs (g/kg) 1.2 gm/kg "   EST Daily Needs (g/day) 82 g/day       Fluid Requirements     Method for Estimation 1 mL/kcal    Estimated Needs (mL/day) 2040 mL/day         NUTRITION INTERVENTION / PLAN OF CARE      Intervention Goal(s) Reduce/improve symptoms, Provide information, Meet estimated needs, Disease management/therapy, Tolerate PO , Increase intake, Maintain weight, No significant weight loss, and Appropriate weight gain         RD Intervention/Action Encouraged intake, Follow Tx progress, Recommended/ordered         Recommendations:       PO Diet Small frequent meals      Supplements       Snacks       Other Try enterade again, biolyte         Monitor/Evaluation PO intake, Supplement intake, Pertinent labs, Weight, Symptoms   Education Education provided   Next appointment 12/11       Electronically signed by:  Daysi Alex RD, LD  12/04/24 12:25 EST

## 2024-12-05 ENCOUNTER — PATIENT OUTREACH (OUTPATIENT)
Dept: ONCOLOGY | Facility: HOSPITAL | Age: 44
End: 2024-12-05
Payer: COMMERCIAL

## 2024-12-05 DIAGNOSIS — C50.911 BREAST CANCER, STAGE 3, RIGHT: Primary | ICD-10-CM

## 2024-12-09 ENCOUNTER — ANESTHESIA EVENT (OUTPATIENT)
Dept: PERIOP | Facility: HOSPITAL | Age: 44
End: 2024-12-09
Payer: COMMERCIAL

## 2024-12-09 ENCOUNTER — OFFICE VISIT (OUTPATIENT)
Dept: SURGERY | Facility: CLINIC | Age: 44
End: 2024-12-09
Payer: COMMERCIAL

## 2024-12-09 ENCOUNTER — HOSPITAL ENCOUNTER (OUTPATIENT)
Dept: CARDIOLOGY | Facility: HOSPITAL | Age: 44
Discharge: HOME | End: 2024-12-09
Admitting: INTERNAL MEDICINE
Payer: COMMERCIAL

## 2024-12-09 VITALS
BODY MASS INDEX: 25.39 KG/M2 | WEIGHT: 158 LBS | HEART RATE: 80 BPM | DIASTOLIC BLOOD PRESSURE: 78 MMHG | HEIGHT: 66 IN | SYSTOLIC BLOOD PRESSURE: 120 MMHG

## 2024-12-09 VITALS
TEMPERATURE: 97.9 F | OXYGEN SATURATION: 98 % | DIASTOLIC BLOOD PRESSURE: 80 MMHG | HEART RATE: 108 BPM | BODY MASS INDEX: 25.39 KG/M2 | HEIGHT: 66 IN | WEIGHT: 158 LBS | SYSTOLIC BLOOD PRESSURE: 118 MMHG

## 2024-12-09 DIAGNOSIS — Z48.89 ENCOUNTER FOR POSTOPERATIVE WOUND CARE: Primary | ICD-10-CM

## 2024-12-09 LAB
AORTIC DIMENSIONLESS INDEX: 0.83 (DI)
BH CV ECHO LEFT VENTRICLE GLOBAL LONGITUDINAL STRAIN: -18.8 %
BH CV ECHO MEAS - AO MAX PG: 5.3 MMHG
BH CV ECHO MEAS - AO MEAN PG: 3 MMHG
BH CV ECHO MEAS - AO V2 MAX: 115 CM/SEC
BH CV ECHO MEAS - AO V2 VTI: 20.9 CM
BH CV ECHO MEAS - AVA(I,D): 2.6 CM2
BH CV ECHO MEAS - EDV(CUBED): 93.5 ML
BH CV ECHO MEAS - EDV(MOD-SP2): 94 ML
BH CV ECHO MEAS - EDV(MOD-SP4): 84 ML
BH CV ECHO MEAS - EF(MOD-BP): 49.6 %
BH CV ECHO MEAS - EF(MOD-SP2): 50 %
BH CV ECHO MEAS - EF(MOD-SP4): 51.2 %
BH CV ECHO MEAS - EF_3D-VOL: 60 %
BH CV ECHO MEAS - ESV(CUBED): 43.5 ML
BH CV ECHO MEAS - ESV(MOD-SP2): 47 ML
BH CV ECHO MEAS - ESV(MOD-SP4): 41 ML
BH CV ECHO MEAS - FS: 22.5 %
BH CV ECHO MEAS - IVS/LVPW: 1.12 CM
BH CV ECHO MEAS - IVSD: 0.74 CM
BH CV ECHO MEAS - LAT PEAK E' VEL: 13.9 CM/SEC
BH CV ECHO MEAS - LV DIASTOLIC VOL/BSA (35-75): 46.9 CM2
BH CV ECHO MEAS - LV MASS(C)D: 97.3 GRAMS
BH CV ECHO MEAS - LV MAX PG: 3.7 MMHG
BH CV ECHO MEAS - LV MEAN PG: 2 MMHG
BH CV ECHO MEAS - LV SYSTOLIC VOL/BSA (12-30): 22.9 CM2
BH CV ECHO MEAS - LV V1 MAX: 96.1 CM/SEC
BH CV ECHO MEAS - LV V1 VTI: 17.3 CM
BH CV ECHO MEAS - LVIDD: 4.5 CM
BH CV ECHO MEAS - LVIDS: 3.5 CM
BH CV ECHO MEAS - LVOT AREA: 3.1 CM2
BH CV ECHO MEAS - LVOT DIAM: 2 CM
BH CV ECHO MEAS - LVPWD: 0.66 CM
BH CV ECHO MEAS - MED PEAK E' VEL: 11.6 CM/SEC
BH CV ECHO MEAS - MV DEC SLOPE: 607.2 CM/SEC2
BH CV ECHO MEAS - MV E MAX VEL: 70.4 CM/SEC
BH CV ECHO MEAS - MV MAX PG: 3.5 MMHG
BH CV ECHO MEAS - MV MEAN PG: 1.72 MMHG
BH CV ECHO MEAS - MV P1/2T: 42.6 MSEC
BH CV ECHO MEAS - MV V2 VTI: 15.8 CM
BH CV ECHO MEAS - MVA(P1/2T): 5.2 CM2
BH CV ECHO MEAS - MVA(VTI): 3.4 CM2
BH CV ECHO MEAS - PULM A REVS DUR: 0.08 SEC
BH CV ECHO MEAS - PULM A REVS VEL: 24.6 CM/SEC
BH CV ECHO MEAS - PULM DIAS VEL: 38.8 CM/SEC
BH CV ECHO MEAS - PULM S/D: 1.13
BH CV ECHO MEAS - PULM SYS VEL: 43.8 CM/SEC
BH CV ECHO MEAS - RAP SYSTOLE: 3 MMHG
BH CV ECHO MEAS - RVSP: 21 MMHG
BH CV ECHO MEAS - SV(LVOT): 54.2 ML
BH CV ECHO MEAS - SV(MOD-SP2): 47 ML
BH CV ECHO MEAS - SV(MOD-SP4): 43 ML
BH CV ECHO MEAS - SVI(LVOT): 30.3 ML/M2
BH CV ECHO MEAS - SVI(MOD-SP2): 26.3 ML/M2
BH CV ECHO MEAS - SVI(MOD-SP4): 24 ML/M2
BH CV ECHO MEAS - TAPSE (>1.6): 2.5 CM
BH CV ECHO MEAS - TR MAX PG: 18.3 MMHG
BH CV ECHO MEAS - TR MAX VEL: 213.8 CM/SEC
BH CV ECHO MEASUREMENTS AVERAGE E/E' RATIO: 5.52
BH CV XLRA - RV BASE: 2.47 CM
BH CV XLRA - RV LENGTH: 5.2 CM
BH CV XLRA - RV MID: 1.98 CM
BH CV XLRA - TDI S': 11.5 CM/SEC
LEFT ATRIUM VOLUME INDEX: 21.2 ML/M2
LV EF 2D ECHO EST: 55 %

## 2024-12-09 PROCEDURE — 93356 MYOCRD STRAIN IMG SPCKL TRCK: CPT

## 2024-12-09 PROCEDURE — 99024 POSTOP FOLLOW-UP VISIT: CPT | Performed by: SURGERY

## 2024-12-09 PROCEDURE — 93306 TTE W/DOPPLER COMPLETE: CPT

## 2024-12-09 PROCEDURE — 25510000001 PERFLUTREN 6.52 MG/ML SUSPENSION 2 ML VIAL: Performed by: INTERNAL MEDICINE

## 2024-12-09 RX ADMIN — PERFLUTREN 1 ML: 6.52 INJECTION, SUSPENSION INTRAVENOUS at 14:04

## 2024-12-09 NOTE — PROGRESS NOTES
Suzanne is here for a wound evaluation.  Her wound looks great.  I again treated it with silver nitrate and covered it with sterile dressing.  I will see her tomorrow for her port.

## 2024-12-10 ENCOUNTER — HOSPITAL ENCOUNTER (OUTPATIENT)
Facility: HOSPITAL | Age: 44
Setting detail: HOSPITAL OUTPATIENT SURGERY
Discharge: HOME | End: 2024-12-10
Attending: SURGERY | Admitting: SURGERY
Payer: COMMERCIAL

## 2024-12-10 ENCOUNTER — APPOINTMENT (OUTPATIENT)
Dept: GENERAL RADIOLOGY | Facility: HOSPITAL | Age: 44
End: 2024-12-10
Payer: COMMERCIAL

## 2024-12-10 ENCOUNTER — ANESTHESIA (OUTPATIENT)
Dept: PERIOP | Facility: HOSPITAL | Age: 44
End: 2024-12-10
Payer: COMMERCIAL

## 2024-12-10 VITALS
SYSTOLIC BLOOD PRESSURE: 129 MMHG | BODY MASS INDEX: 25.32 KG/M2 | HEART RATE: 84 BPM | DIASTOLIC BLOOD PRESSURE: 66 MMHG | RESPIRATION RATE: 12 BRPM | TEMPERATURE: 97.8 F | WEIGHT: 156.8 LBS | OXYGEN SATURATION: 98 %

## 2024-12-10 DIAGNOSIS — Z79.899 NEED FOR PROPHYLACTIC CHEMOTHERAPY: ICD-10-CM

## 2024-12-10 LAB — HCG SERPL QL: NEGATIVE

## 2024-12-10 PROCEDURE — 71045 X-RAY EXAM CHEST 1 VIEW: CPT

## 2024-12-10 PROCEDURE — 84703 CHORIONIC GONADOTROPIN ASSAY: CPT | Performed by: NURSE ANESTHETIST, CERTIFIED REGISTERED

## 2024-12-10 PROCEDURE — 25810000003 LACTATED RINGERS PER 1000 ML: Performed by: NURSE ANESTHETIST, CERTIFIED REGISTERED

## 2024-12-10 PROCEDURE — 25010000002 LIDOCAINE 1 % SOLUTION: Performed by: SURGERY

## 2024-12-10 PROCEDURE — 77001 FLUOROGUIDE FOR VEIN DEVICE: CPT | Performed by: SURGERY

## 2024-12-10 PROCEDURE — 25010000002 MIDAZOLAM PER 1MG: Performed by: NURSE ANESTHETIST, CERTIFIED REGISTERED

## 2024-12-10 PROCEDURE — C1788 PORT, INDWELLING, IMP: HCPCS | Performed by: SURGERY

## 2024-12-10 PROCEDURE — 25010000002 PROPOFOL 10 MG/ML EMULSION: Performed by: NURSE ANESTHETIST, CERTIFIED REGISTERED

## 2024-12-10 PROCEDURE — 25010000002 DEXAMETHASONE PER 1 MG: Performed by: NURSE ANESTHETIST, CERTIFIED REGISTERED

## 2024-12-10 PROCEDURE — 25010000002 ONDANSETRON PER 1 MG: Performed by: NURSE ANESTHETIST, CERTIFIED REGISTERED

## 2024-12-10 PROCEDURE — 25010000002 FENTANYL CITRATE (PF) 50 MCG/ML SOLUTION: Performed by: NURSE ANESTHETIST, CERTIFIED REGISTERED

## 2024-12-10 PROCEDURE — 25010000002 LIDOCAINE 2% SOLUTION: Performed by: NURSE ANESTHETIST, CERTIFIED REGISTERED

## 2024-12-10 PROCEDURE — 25010000002 CEFAZOLIN PER 500 MG: Performed by: SURGERY

## 2024-12-10 PROCEDURE — 76000 FLUOROSCOPY <1 HR PHYS/QHP: CPT

## 2024-12-10 PROCEDURE — 36561 INSERT TUNNELED CV CATH: CPT | Performed by: SURGERY

## 2024-12-10 DEVICE — POWERPORT CLEARVUE SLIM IMPLANTABLE PORT WITH ATTACHABLE 8F POLYURETHANE OPEN-ENDED SINGLE-LUMEN VENOUS CATHETER INTERMEDIATE KIT
Type: IMPLANTABLE DEVICE | Site: CHEST WALL | Status: FUNCTIONAL
Brand: POWERPORT CLEARVUE

## 2024-12-10 RX ORDER — HYDROCODONE BITARTRATE AND ACETAMINOPHEN 5; 325 MG/1; MG/1
1 TABLET ORAL ONCE AS NEEDED
Status: DISCONTINUED | OUTPATIENT
Start: 2024-12-10 | End: 2024-12-10 | Stop reason: HOSPADM

## 2024-12-10 RX ORDER — SODIUM CHLORIDE 9 MG/ML
40 INJECTION, SOLUTION INTRAVENOUS AS NEEDED
Status: DISCONTINUED | OUTPATIENT
Start: 2024-12-10 | End: 2024-12-10 | Stop reason: HOSPADM

## 2024-12-10 RX ORDER — PROPOFOL 10 MG/ML
VIAL (ML) INTRAVENOUS AS NEEDED
Status: DISCONTINUED | OUTPATIENT
Start: 2024-12-10 | End: 2024-12-10 | Stop reason: SURG

## 2024-12-10 RX ORDER — FAMOTIDINE 10 MG/ML
20 INJECTION, SOLUTION INTRAVENOUS
Status: COMPLETED | OUTPATIENT
Start: 2024-12-10 | End: 2024-12-10

## 2024-12-10 RX ORDER — FENTANYL CITRATE 50 UG/ML
INJECTION, SOLUTION INTRAMUSCULAR; INTRAVENOUS AS NEEDED
Status: DISCONTINUED | OUTPATIENT
Start: 2024-12-10 | End: 2024-12-10 | Stop reason: SURG

## 2024-12-10 RX ORDER — SODIUM CHLORIDE 0.9 % (FLUSH) 0.9 %
10 SYRINGE (ML) INJECTION EVERY 12 HOURS SCHEDULED
Status: DISCONTINUED | OUTPATIENT
Start: 2024-12-10 | End: 2024-12-10 | Stop reason: HOSPADM

## 2024-12-10 RX ORDER — BUPIVACAINE HYDROCHLORIDE AND EPINEPHRINE 5; 5 MG/ML; UG/ML
INJECTION, SOLUTION EPIDURAL; INTRACAUDAL; PERINEURAL AS NEEDED
Status: DISCONTINUED | OUTPATIENT
Start: 2024-12-10 | End: 2024-12-10 | Stop reason: HOSPADM

## 2024-12-10 RX ORDER — SODIUM CHLORIDE 0.9 % (FLUSH) 0.9 %
10 SYRINGE (ML) INJECTION AS NEEDED
Status: DISCONTINUED | OUTPATIENT
Start: 2024-12-10 | End: 2024-12-10 | Stop reason: HOSPADM

## 2024-12-10 RX ORDER — HYDROCODONE BITARTRATE AND ACETAMINOPHEN 7.5; 325 MG/1; MG/1
1 TABLET ORAL ONCE AS NEEDED
Status: DISCONTINUED | OUTPATIENT
Start: 2024-12-10 | End: 2024-12-10 | Stop reason: HOSPADM

## 2024-12-10 RX ORDER — ONDANSETRON 2 MG/ML
4 INJECTION INTRAMUSCULAR; INTRAVENOUS ONCE AS NEEDED
Status: DISCONTINUED | OUTPATIENT
Start: 2024-12-10 | End: 2024-12-10 | Stop reason: HOSPADM

## 2024-12-10 RX ORDER — SODIUM CHLORIDE, SODIUM LACTATE, POTASSIUM CHLORIDE, CALCIUM CHLORIDE 600; 310; 30; 20 MG/100ML; MG/100ML; MG/100ML; MG/100ML
100 INJECTION, SOLUTION INTRAVENOUS ONCE
Status: DISCONTINUED | OUTPATIENT
Start: 2024-12-10 | End: 2024-12-10 | Stop reason: HOSPADM

## 2024-12-10 RX ORDER — LIDOCAINE HYDROCHLORIDE 10 MG/ML
INJECTION, SOLUTION INFILTRATION; PERINEURAL AS NEEDED
Status: DISCONTINUED | OUTPATIENT
Start: 2024-12-10 | End: 2024-12-10 | Stop reason: HOSPADM

## 2024-12-10 RX ORDER — MIDAZOLAM HYDROCHLORIDE 2 MG/2ML
1 INJECTION, SOLUTION INTRAMUSCULAR; INTRAVENOUS
Status: COMPLETED | OUTPATIENT
Start: 2024-12-10 | End: 2024-12-10

## 2024-12-10 RX ORDER — LIDOCAINE HYDROCHLORIDE 20 MG/ML
INJECTION, SOLUTION INFILTRATION; PERINEURAL AS NEEDED
Status: DISCONTINUED | OUTPATIENT
Start: 2024-12-10 | End: 2024-12-10 | Stop reason: SURG

## 2024-12-10 RX ORDER — DEXAMETHASONE SODIUM PHOSPHATE 4 MG/ML
4 INJECTION, SOLUTION INTRA-ARTICULAR; INTRALESIONAL; INTRAMUSCULAR; INTRAVENOUS; SOFT TISSUE ONCE AS NEEDED
Status: COMPLETED | OUTPATIENT
Start: 2024-12-10 | End: 2024-12-10

## 2024-12-10 RX ORDER — HEPARIN SODIUM,PORCINE 10 UNIT/ML
VIAL (ML) INTRAVENOUS AS NEEDED
Status: DISCONTINUED | OUTPATIENT
Start: 2024-12-10 | End: 2024-12-10 | Stop reason: HOSPADM

## 2024-12-10 RX ORDER — ONDANSETRON 2 MG/ML
4 INJECTION INTRAMUSCULAR; INTRAVENOUS ONCE AS NEEDED
Status: COMPLETED | OUTPATIENT
Start: 2024-12-10 | End: 2024-12-10

## 2024-12-10 RX ORDER — SODIUM CHLORIDE, SODIUM LACTATE, POTASSIUM CHLORIDE, CALCIUM CHLORIDE 600; 310; 30; 20 MG/100ML; MG/100ML; MG/100ML; MG/100ML
9 INJECTION, SOLUTION INTRAVENOUS CONTINUOUS PRN
Status: DISCONTINUED | OUTPATIENT
Start: 2024-12-10 | End: 2024-12-10 | Stop reason: HOSPADM

## 2024-12-10 RX ADMIN — CEFAZOLIN 2 G: 2 INJECTION, POWDER, FOR SOLUTION INTRAVENOUS at 12:18

## 2024-12-10 RX ADMIN — FENTANYL CITRATE 50 MCG: 50 INJECTION, SOLUTION INTRAMUSCULAR; INTRAVENOUS at 12:32

## 2024-12-10 RX ADMIN — MIDAZOLAM HYDROCHLORIDE 1 MG: 1 INJECTION, SOLUTION INTRAMUSCULAR; INTRAVENOUS at 11:35

## 2024-12-10 RX ADMIN — FAMOTIDINE 20 MG: 10 INJECTION, SOLUTION INTRAVENOUS at 10:52

## 2024-12-10 RX ADMIN — ONDANSETRON 4 MG: 2 INJECTION INTRAMUSCULAR; INTRAVENOUS at 10:52

## 2024-12-10 RX ADMIN — DEXAMETHASONE SODIUM PHOSPHATE 4 MG: 4 INJECTION, SOLUTION INTRAMUSCULAR; INTRAVENOUS at 10:52

## 2024-12-10 RX ADMIN — MIDAZOLAM HYDROCHLORIDE 1 MG: 1 INJECTION, SOLUTION INTRAMUSCULAR; INTRAVENOUS at 11:18

## 2024-12-10 RX ADMIN — LIDOCAINE HYDROCHLORIDE 100 MG: 20 INJECTION, SOLUTION INFILTRATION; PERINEURAL at 12:17

## 2024-12-10 RX ADMIN — PROPOFOL 100 MCG/KG/MIN: 10 INJECTION, EMULSION INTRAVENOUS at 12:18

## 2024-12-10 RX ADMIN — SODIUM CHLORIDE, POTASSIUM CHLORIDE, SODIUM LACTATE AND CALCIUM CHLORIDE: 600; 310; 30; 20 INJECTION, SOLUTION INTRAVENOUS at 11:27

## 2024-12-10 RX ADMIN — PROPOFOL 60 MG: 10 INJECTION, EMULSION INTRAVENOUS at 12:17

## 2024-12-10 NOTE — H&P
"Suzanne Lin 44 y.o. female presents to discuss port placement.         HPI   Above noted and agree.  Suzanne is doing well.  She needs a port placed for chemotherapy.  The chemo is supposed to start on .        Review of Systems           Medical History        Past Medical History:   Diagnosis Date    Anxiety      Breast cancer      Right    Crohn's disease      DVT (deep vein thrombosis) in pregnancy      PFO (patent foramen ovale)      Stroke       after the birth of her child at age 34                  Surgical History         Past Surgical History:   Procedure Laterality Date     SECTION        COLON RESECTION        COLONOSCOPY        MASTECTOMY Bilateral 10/9/2024     Procedure: Modified radical mastectomy with axillary dissection of the right breast and simple mastectomy of the left breast;  Surgeon: Rebekah Garcia DO;  [facility];  Service: General;  Laterality: Bilateral;    SKIN CANCER EXCISION                      Physical Exam  Constitutional:       Appearance: Normal appearance.   Cardiovascular:      Rate and Rhythm: Normal rate and regular rhythm.   Pulmonary:      Effort: Pulmonary effort is normal.      Breath sounds: Normal breath sounds.   Skin:     Comments: Her incisions are continuing to heal   Neurological:      General: No focal deficit present.      Mental Status: She is alert and oriented to person, place, and time.   Psychiatric:         Mood and Affect: Mood normal.         Behavior: Behavior normal.                  /72   Pulse 72   Resp 16   Ht 167.6 cm (66\")   Wt 67.1 kg (148 lb)   BMI 23.89 kg/m²            Diagnoses and all orders for this visit:     1. Need for prophylactic chemotherapy (Primary)     We discussed placing a port for initiation of chemotherapy.  We discussed the benefits and risks not limited to but including:  bleeding, infection, malfunction of the port, pneumothorax, need to a chest tube, anesthesia complications.  She " appeared to understand and is willing to proceed.

## 2024-12-10 NOTE — OP NOTE
Operative note    Preoperative Diagnosis: Need for chemotherapy secondary to breast cancer       Postoperative Diagnosis: Need for chemotherapy secondary breast cancer    Procedure Performed: left port placement using seldinger technique and fluoroscopic guidance    Dictating physician and surgeon: Rebekah Garcia DO    EBL:10 ml    Assistant: None    FINDINGS AND DESCRIPTION OF PROCEDURE:        Suzanne was brought to the operating room and placed on the table in the supine position. After adequate anesthesia was obtained, we sterilly prepped and draped bilateral anterior chest wall.   We did a time out to identify correct patient and correct operative site.    Using a large bore needle, the left subclavian vein was located.  The syringe was removed and, under fluoroscopic guidance, a wire was floated through the needle and into the superior vena cava.  The needle was then removed and the wire was left into place.  After injecting local, an incision was made where the wire was coming out of the skin.  Then, using blunt dissection, a pocket was made for the port between the subcutaneous tissue and the pectoralis muscle.    Once the pocket was satisfactory, we inserted a peelaway sheath over the wire.  This was also done under fluoroscopic guidance.  We then removed the dilator and wire.  We then placed the catheter through the sheath and, under fluoroscopic guidance, placed the catheter into the superior vena cava above the left atrium.  The peelaway sheath was then  and removed.  We confirmed placement of the catheter using fluoroscopic guidance.  The catheter was then connected to the port and the port was placed into the pocket we made earlier.      I then flushed and bandar from the port using heparainized saline 10 unit per cc to ensure that the port worked properly, which it did.    The port was then sutured into place using 2-0 prolene suture.    I then irrigated, assured hemostasis and closed the skin  in 2 layers. the first being n interrupted 2-0 monocryl layer, followed by a running 4-0 vicryl.  Exofin glue and sterile dressings and applied.  A chest x-ray was obtained which showed good placement of the port.    The patient tolerated the procedure well, there were no immediate complications, and all counts were correct at the end of the case.

## 2024-12-10 NOTE — ANESTHESIA POSTPROCEDURE EVALUATION
Patient: Suzanne Lin    Procedure Summary       Date: 12/10/24 Room / Location:  [facility] /  [facility]    Anesthesia Start: 1210 Anesthesia Stop: 1322    Procedure: INSERTION VENOUS ACCESS DEVICE Diagnosis:       Need for prophylactic chemotherapy      (Need for prophylactic chemotherapy [Z79.899])    Surgeons: Rebekah Garcia DO Provider: Vita Lowe CRNA    Anesthesia Type: MAC ASA Status: 3            Anesthesia Type: MAC    Vitals  Vitals Value Taken Time   /66 12/10/24 1355   Temp 97.8 °F (36.6 °C) 12/10/24 1333   Pulse 91 12/10/24 1358   Resp 12 12/10/24 1355   SpO2 98 % 12/10/24 1358   Vitals shown include unfiled device data.        Post Anesthesia Care and Evaluation    Patient location during evaluation: bedside  Patient participation: complete - patient participated  Level of consciousness: awake and alert  Pain score: 1  Pain management: adequate    Airway patency: patent  Anesthetic complications: No anesthetic complications  PONV Status: none  Cardiovascular status: acceptable  Respiratory status: acceptable  Hydration status: acceptable

## 2024-12-10 NOTE — ANESTHESIA PREPROCEDURE EVALUATION
Anesthesia Evaluation     Patient summary reviewed, Nursing notes reviewed and pregnancy test completed   no history of anesthetic complications:   NPO Solid Status: > 8 hours  NPO Liquid Status: > 8 hours           Airway   Mallampati: I  TM distance: >3 FB  Neck ROM: full  No difficulty expected  Dental          Pulmonary - negative pulmonary ROS and normal exam    breath sounds clear to auscultation  Cardiovascular - normal exam  Exercise tolerance: good (4-7 METS)    ECG reviewed  Rhythm: regular  Rate: normal    (+) valvular problems/murmurs (Large PFO), DVT resolved    ROS comment: Patient referred to cardiology for preoperative clearance d/t hx of 2 strokes, dyspnea on exertion, and presence of PFO.     On 81 mg baby aspirin once daily, taken 10/9/2024      Cardiology clearance given 10/1/2024:  ----------------------------------------------------------------------------  [facility] CARDIOLOGY  [address] [facility] [address]  Phone: [phone]  Fax: [phone]     October 1, 2024      Patient: Suzanne Lin  YOB: 1980  Date of Visit: 10/1/2024     SUZANNE  has been seen and examined with no clinical signs of angina or CHF , patient is cleared for MASECTOMY surgery with non-modifiable risk factors.LARGE PFO NEEDS TREATED FOR PROPHYLACTIC DVT MANAGED APPROPRIATELY Patient has been advised to take cardiac meds with sip of water on the day of surgery.     Please use beta blocker for tachycardia preoperatively. Anticoagulation to be managed appropriately     Watch for chest pain, shortness of breath, palpitations, arrhythmias, and significant change in the blood pressure preoperatively. Please check EKG pre-op and postop if any questions, notify us if any change in patient's cardiovascular conditions.      Sincerely,     PHIL Richards  -----------------------------------------------------------    ECG 9/18/2024:  NSR, rate  85    -----------------------------------------------------------    Stress test 9/20/2024:  ·  No ECG evidence of myocardial ischemia.  ·  Indeterminate clinical evidence of myocardial ischemia.  ·  Findings consistent with a normal ECG stress test.  ·  No ECG evidence of myocardial ischemia. Indeterminate clinical evidence of myocardial ischemia. Findings consistent with a normal ECG stress test.  ·  Sob on excertion    ------------------------------------------------------------    Echo 9/24/2024  ·  Left ventricular ejection fraction appears to be 66 - 70%.  ·  Left ventricular diastolic function was normal.  ·  Saline test results are positive for right to left atrial level shunt.  ·  LARGE PFO    -----------------------------------------------------------    DVTs lower extremities, resolved, 2007 & 2013      Neuro/Psych  (+) CVA, psychiatric history Anxiety    ROS Comment: 2014  GI/Hepatic/Renal/Endo - negative ROS     Musculoskeletal (-) negative ROS        ROS comment: HCG 10/7/2024  Collected: 10/07/24 0397  Result status: Final  Resulting lab: [facility] LABORATORY  Reference range: Negative  Value: Negative      Abdominal  - normal exam    Abdomen: soft.  Bowel sounds: normal.   Substance History - negative use      Comment: 1-2 drinks, 1-2 times a month   OB/GYN negative ob/gyn ROS   (-)  Pregnant        Other      history of cancer active      Other Comment: Breast cancer, right breast                    Anesthesia Plan    ASA 3     MAC     intravenous induction     Anesthetic plan, risks, benefits, and alternatives have been provided, discussed and informed consent has been obtained with: patient and mother.  Pre-procedure education provided  Use of blood products discussed with patient and mother  Consented to blood products.    Plan discussed with attending.        CODE STATUS:

## 2024-12-11 ENCOUNTER — OFFICE VISIT (OUTPATIENT)
Dept: ONCOLOGY | Facility: CLINIC | Age: 44
End: 2024-12-11
Payer: COMMERCIAL

## 2024-12-11 ENCOUNTER — INFUSION (OUTPATIENT)
Dept: ONCOLOGY | Facility: HOSPITAL | Age: 44
End: 2024-12-11
Payer: COMMERCIAL

## 2024-12-11 ENCOUNTER — PATIENT OUTREACH (OUTPATIENT)
Dept: ONCOLOGY | Facility: HOSPITAL | Age: 44
End: 2024-12-11
Payer: COMMERCIAL

## 2024-12-11 ENCOUNTER — OFFICE VISIT (OUTPATIENT)
Dept: SURGERY | Facility: CLINIC | Age: 44
End: 2024-12-11
Payer: COMMERCIAL

## 2024-12-11 VITALS
WEIGHT: 156 LBS | RESPIRATION RATE: 16 BRPM | TEMPERATURE: 97.8 F | HEIGHT: 66 IN | HEART RATE: 86 BPM | OXYGEN SATURATION: 100 % | SYSTOLIC BLOOD PRESSURE: 120 MMHG | DIASTOLIC BLOOD PRESSURE: 77 MMHG | BODY MASS INDEX: 25.07 KG/M2

## 2024-12-11 VITALS
DIASTOLIC BLOOD PRESSURE: 62 MMHG | OXYGEN SATURATION: 98 % | BODY MASS INDEX: 25.07 KG/M2 | SYSTOLIC BLOOD PRESSURE: 100 MMHG | HEIGHT: 66 IN | WEIGHT: 156 LBS | HEART RATE: 68 BPM

## 2024-12-11 DIAGNOSIS — C50.911 BREAST CANCER, STAGE 3, RIGHT: ICD-10-CM

## 2024-12-11 DIAGNOSIS — Z45.2 ENCOUNTER FOR CENTRAL LINE CARE: Primary | ICD-10-CM

## 2024-12-11 DIAGNOSIS — R19.7 DIARRHEA, UNSPECIFIED TYPE: ICD-10-CM

## 2024-12-11 DIAGNOSIS — Z79.899 NEED FOR PROPHYLACTIC CHEMOTHERAPY: ICD-10-CM

## 2024-12-11 DIAGNOSIS — Z48.89 ENCOUNTER FOR POSTOPERATIVE WOUND CARE: Primary | ICD-10-CM

## 2024-12-11 DIAGNOSIS — D64.9 ANEMIA, UNSPECIFIED TYPE: Primary | ICD-10-CM

## 2024-12-11 DIAGNOSIS — Z79.899 NEED FOR PROPHYLACTIC CHEMOTHERAPY: Primary | ICD-10-CM

## 2024-12-11 LAB
ALBUMIN SERPL-MCNC: 3.7 G/DL (ref 3.5–5.2)
ALBUMIN/GLOB SERPL: 1.2 G/DL
ALP SERPL-CCNC: 93 U/L (ref 39–117)
ALT SERPL W P-5'-P-CCNC: 23 U/L (ref 1–33)
ANION GAP SERPL CALCULATED.3IONS-SCNC: 11.7 MMOL/L (ref 5–15)
AST SERPL-CCNC: 20 U/L (ref 1–32)
B-HCG UR QL: NEGATIVE
BASOPHILS # BLD AUTO: 0.11 10*3/MM3 (ref 0–0.2)
BASOPHILS NFR BLD AUTO: 1.2 % (ref 0–1.5)
BILIRUB SERPL-MCNC: 0.2 MG/DL (ref 0–1.2)
BUN SERPL-MCNC: 10 MG/DL (ref 6–20)
BUN/CREAT SERPL: 14.7 (ref 7–25)
CALCIUM SPEC-SCNC: 9 MG/DL (ref 8.6–10.5)
CHLORIDE SERPL-SCNC: 102 MMOL/L (ref 98–107)
CO2 SERPL-SCNC: 25.3 MMOL/L (ref 22–29)
CREAT SERPL-MCNC: 0.68 MG/DL (ref 0.57–1)
DEPRECATED RDW RBC AUTO: 42.5 FL (ref 37–54)
EGFRCR SERPLBLD CKD-EPI 2021: 110.3 ML/MIN/1.73
EOSINOPHIL # BLD AUTO: 0.1 10*3/MM3 (ref 0–0.4)
EOSINOPHIL NFR BLD AUTO: 1.1 % (ref 0.3–6.2)
ERYTHROCYTE [DISTWIDTH] IN BLOOD BY AUTOMATED COUNT: 13 % (ref 12.3–15.4)
FERRITIN SERPL-MCNC: 59.7 NG/ML (ref 13–150)
GLOBULIN UR ELPH-MCNC: 3.1 GM/DL
GLUCOSE SERPL-MCNC: 85 MG/DL (ref 65–99)
HCT VFR BLD AUTO: 33.4 % (ref 34–46.6)
HGB BLD-MCNC: 10.7 G/DL (ref 12–15.9)
IMM GRANULOCYTES # BLD AUTO: 0.27 10*3/MM3 (ref 0–0.05)
IMM GRANULOCYTES NFR BLD AUTO: 2.9 % (ref 0–0.5)
IRON 24H UR-MRATE: 37 MCG/DL (ref 37–145)
IRON SATN MFR SERPL: 10 % (ref 20–50)
LYMPHOCYTES # BLD AUTO: 1.57 10*3/MM3 (ref 0.7–3.1)
LYMPHOCYTES NFR BLD AUTO: 17.1 % (ref 19.6–45.3)
MAGNESIUM SERPL-MCNC: 1.9 MG/DL (ref 1.6–2.6)
MCH RBC QN AUTO: 29.3 PG (ref 26.6–33)
MCHC RBC AUTO-ENTMCNC: 32 G/DL (ref 31.5–35.7)
MCV RBC AUTO: 91.5 FL (ref 79–97)
MONOCYTES # BLD AUTO: 0.67 10*3/MM3 (ref 0.1–0.9)
MONOCYTES NFR BLD AUTO: 7.3 % (ref 5–12)
NEUTROPHILS NFR BLD AUTO: 6.48 10*3/MM3 (ref 1.7–7)
NEUTROPHILS NFR BLD AUTO: 70.4 % (ref 42.7–76)
NRBC BLD AUTO-RTO: 0.2 /100 WBC (ref 0–0.2)
PLATELET # BLD AUTO: 512 10*3/MM3 (ref 140–450)
PMV BLD AUTO: 9.1 FL (ref 6–12)
POTASSIUM SERPL-SCNC: 3.8 MMOL/L (ref 3.5–5.2)
PROT SERPL-MCNC: 6.8 G/DL (ref 6–8.5)
RBC # BLD AUTO: 3.65 10*6/MM3 (ref 3.77–5.28)
SODIUM SERPL-SCNC: 139 MMOL/L (ref 136–145)
TIBC SERPL-MCNC: 364 MCG/DL (ref 298–536)
TRANSFERRIN SERPL-MCNC: 244 MG/DL (ref 200–360)
WBC NRBC COR # BLD AUTO: 9.2 10*3/MM3 (ref 3.4–10.8)

## 2024-12-11 PROCEDURE — 99024 POSTOP FOLLOW-UP VISIT: CPT | Performed by: SURGERY

## 2024-12-11 PROCEDURE — 80053 COMPREHEN METABOLIC PANEL: CPT | Performed by: INTERNAL MEDICINE

## 2024-12-11 PROCEDURE — 36591 DRAW BLOOD OFF VENOUS DEVICE: CPT

## 2024-12-11 PROCEDURE — 25010000002 HEPARIN LOCK FLUSH PER 10 UNITS: Performed by: INTERNAL MEDICINE

## 2024-12-11 PROCEDURE — 84466 ASSAY OF TRANSFERRIN: CPT | Performed by: INTERNAL MEDICINE

## 2024-12-11 PROCEDURE — 83540 ASSAY OF IRON: CPT | Performed by: INTERNAL MEDICINE

## 2024-12-11 PROCEDURE — 83735 ASSAY OF MAGNESIUM: CPT | Performed by: INTERNAL MEDICINE

## 2024-12-11 PROCEDURE — 81025 URINE PREGNANCY TEST: CPT | Performed by: INTERNAL MEDICINE

## 2024-12-11 PROCEDURE — 82728 ASSAY OF FERRITIN: CPT | Performed by: INTERNAL MEDICINE

## 2024-12-11 PROCEDURE — 85025 COMPLETE CBC W/AUTO DIFF WBC: CPT | Performed by: INTERNAL MEDICINE

## 2024-12-11 RX ORDER — SODIUM CHLORIDE 0.9 % (FLUSH) 0.9 %
20 SYRINGE (ML) INJECTION AS NEEDED
OUTPATIENT
Start: 2024-12-11

## 2024-12-11 RX ORDER — HEPARIN SODIUM (PORCINE) LOCK FLUSH IV SOLN 100 UNIT/ML 100 UNIT/ML
500 SOLUTION INTRAVENOUS AS NEEDED
OUTPATIENT
Start: 2024-12-11

## 2024-12-11 RX ORDER — SODIUM CHLORIDE 0.9 % (FLUSH) 0.9 %
10 SYRINGE (ML) INJECTION AS NEEDED
Status: ACTIVE | OUTPATIENT
Start: 2024-12-11

## 2024-12-11 RX ORDER — HEPARIN SODIUM (PORCINE) LOCK FLUSH IV SOLN 100 UNIT/ML 100 UNIT/ML
SOLUTION INTRAVENOUS
Status: DISCONTINUED
Start: 2024-12-11 | End: 2024-12-11 | Stop reason: HOSPADM

## 2024-12-11 RX ORDER — SODIUM CHLORIDE 0.9 % (FLUSH) 0.9 %
10 SYRINGE (ML) INJECTION AS NEEDED
OUTPATIENT
Start: 2024-12-11

## 2024-12-11 RX ORDER — HEPARIN SODIUM (PORCINE) LOCK FLUSH IV SOLN 100 UNIT/ML 100 UNIT/ML
500 SOLUTION INTRAVENOUS AS NEEDED
Status: ACTIVE | OUTPATIENT
Start: 2024-12-11

## 2024-12-11 RX ORDER — BUPROPION HYDROCHLORIDE 300 MG/1
300 TABLET ORAL
COMMUNITY
Start: 2024-12-09

## 2024-12-11 RX ADMIN — Medication 10 ML: at 09:16

## 2024-12-11 RX ADMIN — HEPARIN 500 UNITS: 100 SYRINGE at 09:16

## 2024-12-11 NOTE — LETTER
December 11, 2024     PHIL Gaines  [address] [place] [id number]    Patient: Suzanne Lin   YOB: 1980   Date of Visit: 12/11/2024     Dear PHIL Gaines:       Thank you for referring Suzanne Lin to me for evaluation. Below are the relevant portions of my assessment and plan of care.    If you have questions, please do not hesitate to call me. I look forward to following Suzanne along with you.         Sincerely,        Rebekah Garcia DO        CC: No Recipients    Rebekah Garcia DO  12/11/24 1353  Sign when Signing Visit          Surgery Progress Note   Chief Complaint:  POD 1 port placement and wound check    Subjective    Interval History:     Suzanne is doing great.  Chemotherapy is going to start in 2 weeks.      Objective    Vital Signs  Temp:  [97.8 °F (36.6 °C)] 97.8 °F (36.6 °C)  Heart Rate:  [68-88] 68  Resp:  [12-16] 16  BP: (100-129)/(61-77) 100/62  Body mass index is 25.19 kg/m².  [unfilled]  [facility]       Physical Exam:   General: patient awake, alert and cooperative   Her wound looks great.  I cleaned it with betadine and covered it   Her port incision looks good   Neurologic: Normal mood and behavior      Assessment & Plan         Wound care  --Suzanne can return to see me in 1 week unless she needs me sooner.        Rebekah Garcia DO  12/11/24  13:49 EST

## 2024-12-11 NOTE — PROGRESS NOTES
Subjective     REASON FOR CONSULTATION:  breast cancer  Provide an opinion on any further workup or treatment                             REQUESTING PHYSICIAN:  Jose    RECORDS OBTAINED:  Records of the patients history including those obtained from the referring provider were reviewed and summarized in detail.    HISTORY OF PRESENT ILLNESS:  The patient is a 44 y.o. year old female who is here for an opinion about the above issue.    History of Present Illness   This is a 44-year-old woman referred from general surgery to discuss adjuvant treatment for recently surgically treated breast cancer.  The patient presented with a large palpable mass in the inner quadrant of the right breast.  The mass had been enlarging for couple years but the patient did not have insurance to get assessed.  The breast imaging is not available to me but she had a CT of the chest abdomen pelvis on 9/16/2024 showed a large mass in the right breast measuring up to 7.7 cm with abnormal left axillary lymphadenopathy.  There were tiny subpleural nodules in the lateral aspect of the left lower lobe likely granulomatous mildly conspicuous lymph node in the central mesentery 1.1 cm.  A PET scan was performed 9/30/2024 showing a hypermetabolic mass in the right breast with thickening throughout the right breast and pathologic hypermetabolic right axillary level 1 and 2 lymph nodes, no hypermetabolic internal mammary or supraclavicular lymph nodes.  The small pulmonary nodules were too small to characterize and mesenteric lymph nodes without hypermetabolic activity.  A biopsy of the right breast mass showed invasive ductal adenocarcinoma.    She was taken to the operating room on 10/9/2024 and underwent a right modified radical mastectomy and axillary dissection, prophylactic left mastectomy.  Pathology from the right breast showed invasive ductal carcinoma Bruce grade 3 (3+3+3) measuring 17 cm with extensive lymphovascular invasion  and dermal lymphatic invasion.  The tumor extended to the dermis but did not ulcerate the skin.  Tumor extended to skeletal muscle and was present focally at an anterior margin, 0.05 mm of the posterior margin, 10 mm from the lateral margin, 20 mm from the medial margin, 28 mm from the superior margin and 40 mm from inferior margin.  There was ductal carcinoma in situ present within 2.5 mm of the posterior margin.  There were 13 lymph nodes in the axillary dissection 8 oncology plan/recommendations: of which had macrometastases, 1 micrometastases and 1 lymph node with isolated tumor cells.  The left breast had an intraductal papilloma otherwise negative.  The tumor was positive for estrogen receptor 99% of cells, progesterone receptor 50% of cells, HER2 0 and Ki-67 65%.    She has been continuing frequent follow-ups with Dr. Garcia since surgery as the wound was not closed.  She most recently saw her in office 2 days ago at which time it was treated with silver nitrate and dressed.  She was able to have her port placed just yesterday, this had been delayed due to her wound.    The patient has medical comorbidities of Crohn's disease.  She had a flare approximately a month ago but states in the past week and a half her bowels have been normal.  Her weight has improved and she has been eating better.    She denies known bleeding.  She does have a history of iron deficiency at 1 time in the past and states she did tolerate oral iron at that time.    Past Medical History:   Diagnosis Date    Anxiety     Breast cancer     Right    Crohn's disease     DVT (deep vein thrombosis) in pregnancy     PFO (patent foramen ovale)     Stroke     after the birth of her child at age 34    Tattoos         Past Surgical History:   Procedure Laterality Date     SECTION      COLON RESECTION      COLONOSCOPY      MASTECTOMY Bilateral 10/9/2024    Procedure: Modified radical mastectomy with axillary dissection of the right  breast and simple mastectomy of the left breast;  Surgeon: Rebekah Garcia DO;  Location:  [facility];  Service: General;  Laterality: Bilateral;    SKIN CANCER EXCISION      VENOUS ACCESS DEVICE (PORT) INSERTION N/A 12/10/2024    Procedure: INSERTION VENOUS ACCESS DEVICE;  Surgeon: Rebekah Garcia DO;  Location:  [facility];  Service: General;  Laterality: N/A;        Current Outpatient Medications on File Prior to Visit   Medication Sig Dispense Refill    aspirin 81 MG EC tablet Take 1 tablet by mouth Daily.      buPROPion XL (WELLBUTRIN XL) 300 MG 24 hr tablet 1 tablet.      cyclobenzaprine (FLEXERIL) 10 MG tablet Take 1 tablet by mouth 2 (Two) Times a Day As Needed for Muscle Spasms for up to 40 doses. 40 tablet 0    diphenhydrAMINE (BENADRYL) 25 mg capsule Take 1 capsule by mouth At Night As Needed for Itching.      famotidine (PEPCID) 20 MG tablet Take 1 tablet by mouth 2 (Two) Times a Day. 60 tablet 2    OLANZapine (zyPREXA) 5 MG tablet Take 1 tablet by mouth for 4 doses starting night of chemotherapy. 8 tablet 1    omeprazole (priLOSEC) 20 MG capsule       ondansetron (ZOFRAN) 8 MG tablet Take 1 tablet by mouth 3 (Three) Times a Day As Needed for Nausea or Vomiting. 30 tablet 5    oxyCODONE (ROXICODONE) 5 MG immediate release tablet Take 1 tablet by mouth Every 4 (Four) Hours As Needed for Severe Pain for up to 10 doses. 10 tablet 0    potassium chloride (KLOR-CON M20) 20 MEQ CR tablet Take 1 tablet by mouth 2 (Two) Times a Day. 40 tablet 1    zolpidem (Ambien) 5 MG tablet Take 1 tablet by mouth At Night As Needed for Sleep.      buPROPion XL (WELLBUTRIN XL) 150 MG 24 hr tablet Take 2 tablets by mouth Daily. Hasn't had for a few days       Current Facility-Administered Medications on File Prior to Visit   Medication Dose Route Frequency Provider Last Rate Last Admin    [COMPLETED] ceFAZolin 2000 mg IVPB in 100 mL NS (VTB)  2,000 mg Intravenous Once Rebekah Garcia DO   2 g at 12/10/24 6120     [COMPLETED] dexAMETHasone (DECADRON) injection 4 mg  4 mg Intravenous Once PRN Surendra Sanz CRNA   4 mg at 12/10/24 1052    [COMPLETED] famotidine (PEPCID) injection 20 mg  20 mg Intravenous 60 Min Pre-Op Surendra Sanz CRNA   20 mg at 12/10/24 1052    heparin injection 500 Units  500 Units Intravenous PRN Nishant Witt MD   500 Units at 12/11/24 0916    [COMPLETED] Midazolam HCl (PF) (VERSED) injection 1 mg  1 mg Intravenous Q10 Min PRN Surendra Sanz CRNA   1 mg at 12/10/24 1135    [COMPLETED] ondansetron (ZOFRAN) injection 4 mg  4 mg Intravenous Once PRN Surendra Sanz CRNA   4 mg at 12/10/24 1052    sodium chloride 0.9 % flush 10 mL  10 mL Intravenous PRN Nishant Witt MD   10 mL at 12/11/24 0916    [DISCONTINUED] bupivacaine-EPINEPHrine PF (MARCAINE w/EPI) 0.5% -1:[id number] injection    PRN Rebekah Garcia, DO   10 mL at 12/10/24 1245    [DISCONTINUED] fentaNYL citrate (PF) (SUBLIMAZE) injection   Intravenous PRN Vita Lowe CRNA   50 mcg at 12/10/24 1232    [DISCONTINUED] heparin lock flush injection    PRN Rebekah Garcia DO   30 Units at 12/10/24 1314    [DISCONTINUED] HYDROcodone-acetaminophen (NORCO) 5-325 MG per tablet 1 tablet  1 tablet Oral Once PRN Vita Lowe CRNA        [DISCONTINUED] HYDROcodone-acetaminophen (NORCO) 7.5-325 MG per tablet 1 tablet  1 tablet Oral Once PRN Vita Lowe CRNA        [DISCONTINUED] lactated ringers infusion  9 mL/hr Intravenous Continuous PRN Surendra Sanz CRNA 9 mL/hr at 12/10/24 1127 Restarted at 12/10/24 1211    [DISCONTINUED] lactated ringers infusion  100 mL/hr Intravenous Once Vita Lowe CRNA        [DISCONTINUED] lidocaine (XYLOCAINE) 1 % injection    PRN Rebekah Garcia DO   10 mL at 12/10/24 1245    [DISCONTINUED] lidocaine (XYLOCAINE) 2% injection   Infiltration PRN Vita Lowe CRNA   100 mg at 12/10/24 1217    [DISCONTINUED] ondansetron (ZOFRAN) injection 4 mg  4 mg Intravenous Once PRN  "Vita Lowe CRNA        [DISCONTINUED] Propofol (DIPRIVAN) injection   Intravenous PRN Vita Lowe CRNA   75 mcg/kg/min at 12/10/24 1252    [DISCONTINUED] sodium chloride 0.9 % flush 10 mL  10 mL Intravenous Q12H Surendra Sanz CRNA        [DISCONTINUED] sodium chloride 0.9 % flush 10 mL  10 mL Intravenous PRN Surendra Sanz CRNA        [DISCONTINUED] sodium chloride 0.9 % infusion 40 mL  40 mL Intravenous PRN Surendra Sanz CRNA            ALLERGIES:  No Known Allergies     Social History     Socioeconomic History    Marital status: Single    Number of children: 1   Tobacco Use    Smoking status: Former     Current packs/day: 0.00     Average packs/day: 1 pack/day for 20.0 years (20.0 ttl pk-yrs)     Types: Cigarettes     Start date:      Quit date:      Years since quittin.9    Smokeless tobacco: Current    Tobacco comments:     Nicotine pouches   Vaping Use    Vaping status: Never Used   Substance and Sexual Activity    Alcohol use: Yes     Comment: OCC    Drug use: Never    Sexual activity: Defer        Family History   Problem Relation Age of Onset    Diabetes Mother     Heart disease Father     Diabetes Paternal Grandmother     Hypertension Other     Malig Hyperthermia Neg Hx         Review of Systems   Constitutional: Negative.    HENT: Negative.     Respiratory: Negative.     Cardiovascular: Negative.    Gastrointestinal: Negative.    Genitourinary: Negative.    Musculoskeletal: Negative.    Skin:  Positive for wound.   Neurological: Negative.    Hematological: Negative.           Objective     Vitals:    24 0824   BP: 120/77   Pulse: 86   Resp: 16   Temp: 97.8 °F (36.6 °C)   TempSrc: Infrared   SpO2: 100%   Weight: 70.8 kg (156 lb)   Height: 167.6 cm (65.98\")   PainSc: 0-No pain         2024     8:43 AM   Current Status   ECOG score 0       Physical Exam    CONSTITUTIONAL: pleasant well-developed adult woman  HEENT: no icterus, no thrush, moist " membranes  NECK: no jvd  LYMPH: no cervical or supraclavicular lad  CV: RRR, S1S2, no murmur  RESP: cta bilat, no wheezing, no rales  Breast: Bilateral mastectomy with unclosed wound midline chest, see picture in chart; left upper chest port in place to access.  MUSC: no edema, normal gait  NEURO: alert and oriented x3, normal strength  PSYCH: normal mood and affect      RECENT LABS:  Hematology WBC   Date Value Ref Range Status   12/11/2024 9.20 3.40 - 10.80 10*3/mm3 Final     RBC   Date Value Ref Range Status   12/11/2024 3.65 (L) 3.77 - 5.28 10*6/mm3 Final     Hemoglobin   Date Value Ref Range Status   12/11/2024 10.7 (L) 12.0 - 15.9 g/dL Final     Hematocrit   Date Value Ref Range Status   12/11/2024 33.4 (L) 34.0 - 46.6 % Final     Platelets   Date Value Ref Range Status   12/11/2024 512 (H) 140 - 450 10*3/mm3 Final        Lab Results   Component Value Date    GLUCOSE 85 12/11/2024    BUN 10 12/11/2024    CREATININE 0.68 12/11/2024     12/11/2024    K 3.8 12/11/2024     12/11/2024    CALCIUM 9.0 12/11/2024    PROTEINTOT 6.8 12/11/2024    ALBUMIN 3.7 12/11/2024    ALT 23 12/11/2024    AST 20 12/11/2024    ALKPHOS 93 12/11/2024    BILITOT 0.2 12/11/2024    GLOB 3.1 12/11/2024    AGRATIO 1.2 12/11/2024    BCR 14.7 12/11/2024    ANIONGAP 11.7 12/11/2024    EGFR 110.3 12/11/2024     PET scan 9/30/2024:  FINDINGS:     Head/neck: No suspicious uptake.     Chest: An 8 x 5.8 cm mass in the lower inner right breast with max SUV  of 18 (series 4/image 156). Mass comes in close proximity to the  sternum. Hypermetabolic parenchymal thickening throughout the right  breast with max SUV of 9 (series 4/image 140). Diffuse right breast skin  thickening.     Hypermetabolic right axillary level I and II lymph nodes. Reference 1.2  cm level I lymph node with max SUV of 11.9 (series 4/image 103).  Additional reference subcentimeter level II lymph node with max SUV of  3.3 (series 4/image 93). Separate brown fat uptake in  the bilateral  axilla and posterior neck base.     No enlarged or hypermetabolic internal mammary or supraclavicular lymph  nodes.     Few subcentimeter pulmonary nodules are too small for accurate PET  characterization without overt hypermetabolism and unchanged from recent  CT. Reference left lower lobe (series 4/image 167) and right upper lobe  (series 4/image 131).     Abdomen/pelvis: Mesenteric lymph nodes are mostly subcentimeter without  associated hypermetabolism.     Sigmoid colectomy. Tubular hypermetabolism throughout the otherwise  normal-appearing remaining colon may be medication related. Moderate  proximal colonic stool burden.     Bones: No focal osseous hypermetabolism with special attention to the  sternum.           IMPRESSION:  1. Hypermetabolic 8 cm right breast mass with hypermetabolic parenchymal  thickening throughout the right breast, pathologically proven invasive  ductal carcinoma. Mass comes in close proximity to the sternum. No focal  osseous hypermetabolism with special attention to the sternum.  2. Hypermetabolic right axillary level I and II lymph nodes suggesting  alana metastatic disease. No enlarged or hypermetabolic internal mammary  or supraclavicular lymph nodes. Separate brown fat uptake in the  bilateral axilla and posterior neck base.  3. Few subcentimeter pulmonary nodules are too small for accurate PET  characterization and will need follow-up via chest CT.  4. Mesenteric lymph nodes are mostly subcentimeter without associated  hypermetabolism.       Assessment & Plan   *pT3N2a M0 grade 3 invasive ductal adenocarcinoma right breast, ER 99% positive, OH 50% positive, HER2 0, Ki-67 65%, tumor present at anterior margin  presented with a large palpable mass in the inner quadrant of the right breast; mass had been enlarging for couple years but the patient did not have insurance to get assessed  CT of the chest abdomen pelvis on 9/16/2024 showed a large mass in the right breast  measuring up to 7.7 cm with abnormal left axillary lymphadenopathy.  There were tiny subpleural nodules in the lateral aspect of the left lower lobe likely granulomatous mildly conspicuous lymph node in the central mesentery 1.1 cm.    PET scan 9/30/2024 - hypermetabolic mass in the right breast with thickening throughout the right breast and pathologic hypermetabolic right axillary level 1 and 2 lymph nodes, no hypermetabolic internal mammary or supraclavicular lymph nodes.  The small pulmonary nodules were too small to characterize and mesenteric lymph nodes without hypermetabolic activity.    biopsy of the right breast mass showed invasive ductal adenocarcinoma.  operating room on 10/9/2024 and underwent a right modified radical mastectomy and axillary dissection, prophylactic left mastectomy.  Pathology from the right breast showed invasive ductal carcinoma [place] grade 3 (3+3+3) measuring 17 cm with extensive lymphovascular invasion and dermal lymphatic invasion.  The tumor extended to the dermis but did not ulcerate the skin.  Tumor extended to skeletal muscle and was present focally at an anterior margin, 0.05 mm of the posterior margin, 10 mm from the lateral margin, 20 mm from the medial margin, 28 mm from the superior margin and 40 mm from inferior margin.  There was ductal carcinoma in situ present within 2.5 mm of the posterior margin.  There were 13 lymph nodes in the axillary dissection 8 of which had macrometastases, 1 micrometastases and 1 lymph node with isolated tumor cells.  The left breast had an intraductal papilloma otherwise negative.  The tumor was positive for estrogen receptor 99% of cells, progesterone receptor 50% of cells, HER2 0 and Ki-67 65%.  10/31/2024 Imaging and pathology reviewed with the patient today.  I have discussed the pathology with Dr. Garcia.  The patient has disease characteristics with significant elevated risk of residual micrometastatic disease including size  of tumor, lymph node involvement, grade 3 etc.  I have recommended adjuvant chemotherapy to reduce risk of recurrence/help eradicate potential micrometastatic disease with dose dense AC with Neulasta support x 4 cycles followed by weekly Taxol x 12 doses.  I have discussed risk and benefits of chemotherapy and formal education requested.  She needs another 2 to 3 weeks to heal from surgery before chemotherapy.   12/10/2024 port placed by Dr. Garcia  12/11/2024 patient returns to the office today for follow-up and possible initiation of Adriamycin Cytoxan.  Her chest wound continues to improved though not fully healed at this point.  See picture in chart.  Patient verbally reports Dr. Garcia was comfortable placing the port yesterday however with leaving chemo start timing up to our service with regard to the wound and its current state.  Reviewed with Dr. Witt today and he suggest giving the patient 2 more weeks until initiation of treatment to give the wound more time to close.    *Anemia.  Hemoglobin declined to 10.7 today with platelets slightly elevated 512,000. Reports she does have a history of iron deficiency at 1 time and did take oral iron, in the setting of her also having Crohn's, and tolerated this.  Discussed will trial AccruFer since it tends to be easier with regards to GI side effects.  If patient has any difficulties at all she will call the office and we would move to IV iron.  Give this every other day, prescription sent to pharmacy.  Plan to recheck iron studies in 6 weeks.    *medical comorbidities of Crohn's disease.      Oncology plan/recommendations:  Delay initiation of chemotherapy x 2 additional weeks to allow further time for wound closure  Patient to begin ferric maltol 30 mg every other day  After chemotherapy she will need postmastectomy radiation given the size and alana involvement  She will need hormonal therapy with ovarian suppression/tamoxifen versus oophorectomy and AI  therapy.  I will check a genetic panel with [facility]-if she were to have BRCA1/BRCA2 mutation salpingo-oophorectomies would be indicated  Nonspecific lung nodules will need reassessment after completing chemotherapy radiographically    Notes reviewed from Dr. Garcia today.  Case reviewed with Dr. Witt.

## 2024-12-11 NOTE — PROGRESS NOTES
Surgery Progress Note   Chief Complaint:  POD 1 port placement and wound check    Subjective     Interval History:     Suzanne is doing great.  Chemotherapy is going to start in 2 weeks.      Objective     Vital Signs  Temp:  [97.8 °F (36.6 °C)] 97.8 °F (36.6 °C)  Heart Rate:  [68-88] 68  Resp:  [12-16] 16  BP: (100-129)/(61-77) 100/62  Body mass index is 25.19 kg/m².  [unfilled]  @[facility]@       Physical Exam:   General: patient awake, alert and cooperative   Her wound looks great.  I cleaned it with betadine and covered it   Her port incision looks good   Neurologic: Normal mood and behavior      Assessment & Plan          Wound care  --Suzanne can return to see me in 1 week unless she needs me sooner.        Rebekah Garcia,   12/11/24  13:49 EST

## 2024-12-12 ENCOUNTER — TELEPHONE (OUTPATIENT)
Dept: ONCOLOGY | Facility: CLINIC | Age: 44
End: 2024-12-12
Payer: COMMERCIAL

## 2024-12-12 PROBLEM — T45.4X5A ADVERSE EFFECT OF IRON: Status: ACTIVE | Noted: 2024-12-12

## 2024-12-12 PROBLEM — D50.9 IDA (IRON DEFICIENCY ANEMIA): Status: ACTIVE | Noted: 2024-12-12

## 2024-12-12 RX ORDER — CETIRIZINE HYDROCHLORIDE 10 MG/1
10 TABLET ORAL ONCE
OUTPATIENT
Start: 2024-12-26

## 2024-12-12 RX ORDER — ACETAMINOPHEN 325 MG/1
650 TABLET ORAL ONCE
OUTPATIENT
Start: 2024-12-26

## 2024-12-12 RX ORDER — DIPHENHYDRAMINE HYDROCHLORIDE 50 MG/ML
50 INJECTION INTRAMUSCULAR; INTRAVENOUS AS NEEDED
OUTPATIENT
Start: 2024-12-17

## 2024-12-12 RX ORDER — FAMOTIDINE 10 MG/ML
20 INJECTION, SOLUTION INTRAVENOUS AS NEEDED
OUTPATIENT
Start: 2024-12-17

## 2024-12-12 RX ORDER — FAMOTIDINE 20 MG/1
20 TABLET, FILM COATED ORAL ONCE
OUTPATIENT
Start: 2024-12-26

## 2024-12-12 RX ORDER — FAMOTIDINE 20 MG/1
20 TABLET, FILM COATED ORAL ONCE
OUTPATIENT
Start: 2024-12-17

## 2024-12-12 RX ORDER — CETIRIZINE HYDROCHLORIDE 10 MG/1
10 TABLET ORAL ONCE
OUTPATIENT
Start: 2024-12-17

## 2024-12-12 RX ORDER — POTASSIUM CHLORIDE 1500 MG/1
20 TABLET, EXTENDED RELEASE ORAL 2 TIMES DAILY
Qty: 40 TABLET | Refills: 1 | Status: SHIPPED | OUTPATIENT
Start: 2024-12-12

## 2024-12-12 RX ORDER — SODIUM CHLORIDE 9 MG/ML
20 INJECTION, SOLUTION INTRAVENOUS ONCE
OUTPATIENT
Start: 2024-12-26

## 2024-12-12 RX ORDER — FAMOTIDINE 10 MG/ML
20 INJECTION, SOLUTION INTRAVENOUS AS NEEDED
OUTPATIENT
Start: 2024-12-26

## 2024-12-12 RX ORDER — HYDROCORTISONE SODIUM SUCCINATE 100 MG/2ML
100 INJECTION INTRAMUSCULAR; INTRAVENOUS AS NEEDED
OUTPATIENT
Start: 2024-12-17

## 2024-12-12 RX ORDER — DIPHENHYDRAMINE HYDROCHLORIDE 50 MG/ML
50 INJECTION INTRAMUSCULAR; INTRAVENOUS AS NEEDED
OUTPATIENT
Start: 2024-12-26

## 2024-12-12 RX ORDER — SODIUM CHLORIDE 9 MG/ML
20 INJECTION, SOLUTION INTRAVENOUS ONCE
OUTPATIENT
Start: 2024-12-17

## 2024-12-12 RX ORDER — ACETAMINOPHEN 325 MG/1
650 TABLET ORAL ONCE
OUTPATIENT
Start: 2024-12-17

## 2024-12-12 RX ORDER — HYDROCORTISONE SODIUM SUCCINATE 100 MG/2ML
100 INJECTION INTRAMUSCULAR; INTRAVENOUS AS NEEDED
OUTPATIENT
Start: 2024-12-26

## 2024-12-12 NOTE — TELEPHONE ENCOUNTER
Caller: Suzanne Lin    Relationship: Self    Best call back number: [phone]    What is the best time to reach you: ASAP    Who are you requesting to speak with (clinical staff, provider,  specific staff member): DR. ARCHER'S NURSE    What was the call regarding: PT STATES SHE CANNOT AFFORD THE FERRIC MALTOL  THAT WAS PRESCRIBED ON 12/10/24 & WAS TO CALL BACK IF SHE REALIZED IT WAS TOO EXPENSIVE.  CALLING FOR OTHER OPTIONS.    Is it okay if the provider responds through MyChart: NO

## 2024-12-12 NOTE — TELEPHONE ENCOUNTER
"Patient called in to report Accrufer being too expensive. Per Azalia Clark, APRN, \"Would recommend us moving to IV iron with feraheme x 2.  Concerned the ferrous sulfate would aggravate her Crohns.\"  "

## 2024-12-13 ENCOUNTER — PATIENT ROUNDING (BHMG ONLY) (OUTPATIENT)
Dept: ONCOLOGY | Facility: CLINIC | Age: 44
End: 2024-12-13
Payer: COMMERCIAL

## 2024-12-13 NOTE — PROGRESS NOTES
A My-Chart message been sent to the patient for PATIENT ROUNDING with [facility]

## 2024-12-16 ENCOUNTER — PATIENT OUTREACH (OUTPATIENT)
Dept: ONCOLOGY | Facility: HOSPITAL | Age: 44
End: 2024-12-16
Payer: COMMERCIAL

## 2024-12-17 ENCOUNTER — OFFICE VISIT (OUTPATIENT)
Dept: GASTROENTEROLOGY | Facility: CLINIC | Age: 44
End: 2024-12-17
Payer: COMMERCIAL

## 2024-12-17 ENCOUNTER — DOCUMENTATION (OUTPATIENT)
Dept: OTHER | Facility: HOSPITAL | Age: 44
End: 2024-12-17
Payer: COMMERCIAL

## 2024-12-17 ENCOUNTER — INFUSION (OUTPATIENT)
Dept: ONCOLOGY | Facility: HOSPITAL | Age: 44
End: 2024-12-17
Payer: COMMERCIAL

## 2024-12-17 ENCOUNTER — PREP FOR SURGERY (OUTPATIENT)
Dept: SURGERY | Facility: SURGERY CENTER | Age: 44
End: 2024-12-17
Payer: COMMERCIAL

## 2024-12-17 VITALS
BODY MASS INDEX: 25.84 KG/M2 | HEART RATE: 120 BPM | TEMPERATURE: 97.8 F | WEIGHT: 160 LBS | OXYGEN SATURATION: 98 % | SYSTOLIC BLOOD PRESSURE: 100 MMHG | DIASTOLIC BLOOD PRESSURE: 67 MMHG | RESPIRATION RATE: 18 BRPM

## 2024-12-17 VITALS
SYSTOLIC BLOOD PRESSURE: 118 MMHG | DIASTOLIC BLOOD PRESSURE: 78 MMHG | WEIGHT: 160.8 LBS | HEIGHT: 66 IN | BODY MASS INDEX: 25.84 KG/M2

## 2024-12-17 DIAGNOSIS — Z45.2 ENCOUNTER FOR CENTRAL LINE CARE: ICD-10-CM

## 2024-12-17 DIAGNOSIS — Z12.11 ENCOUNTER FOR SCREENING COLONOSCOPY: ICD-10-CM

## 2024-12-17 DIAGNOSIS — K50.90 CROHN'S DISEASE WITHOUT COMPLICATION, UNSPECIFIED GASTROINTESTINAL TRACT LOCATION: Primary | ICD-10-CM

## 2024-12-17 DIAGNOSIS — K58.0 IRRITABLE BOWEL SYNDROME WITH DIARRHEA: ICD-10-CM

## 2024-12-17 DIAGNOSIS — T45.4X5A ADVERSE EFFECT OF IRON, INITIAL ENCOUNTER: Primary | ICD-10-CM

## 2024-12-17 DIAGNOSIS — K50.90 CROHN'S DISEASE WITHOUT COMPLICATION, UNSPECIFIED GASTROINTESTINAL TRACT LOCATION: ICD-10-CM

## 2024-12-17 DIAGNOSIS — D50.9 IRON DEFICIENCY ANEMIA, UNSPECIFIED IRON DEFICIENCY ANEMIA TYPE: ICD-10-CM

## 2024-12-17 LAB
HAV IGM SERPL QL IA: NORMAL
HBV CORE IGM SERPL QL IA: NORMAL
HBV SURFACE AG SERPL QL IA: NORMAL
HCV AB SER QL: NORMAL

## 2024-12-17 PROCEDURE — 25010000002 HEPARIN LOCK FLUSH PER 10 UNITS: Performed by: INTERNAL MEDICINE

## 2024-12-17 PROCEDURE — 25010000002 FERUMOXYTOL 510 MG/17ML SOLUTION: Performed by: NURSE PRACTITIONER

## 2024-12-17 PROCEDURE — 96374 THER/PROPH/DIAG INJ IV PUSH: CPT

## 2024-12-17 PROCEDURE — 80074 ACUTE HEPATITIS PANEL: CPT

## 2024-12-17 RX ORDER — HEPARIN SODIUM (PORCINE) LOCK FLUSH IV SOLN 100 UNIT/ML 100 UNIT/ML
500 SOLUTION INTRAVENOUS AS NEEDED
Status: DISCONTINUED | OUTPATIENT
Start: 2024-12-17 | End: 2024-12-17 | Stop reason: HOSPADM

## 2024-12-17 RX ORDER — SODIUM CHLORIDE 0.9 % (FLUSH) 0.9 %
10 SYRINGE (ML) INJECTION AS NEEDED
Status: DISCONTINUED | OUTPATIENT
Start: 2024-12-17 | End: 2024-12-17 | Stop reason: HOSPADM

## 2024-12-17 RX ORDER — HEPARIN SODIUM (PORCINE) LOCK FLUSH IV SOLN 100 UNIT/ML 100 UNIT/ML
500 SOLUTION INTRAVENOUS AS NEEDED
OUTPATIENT
Start: 2024-12-17

## 2024-12-17 RX ORDER — CETIRIZINE HYDROCHLORIDE 10 MG/1
10 TABLET ORAL ONCE
Status: COMPLETED | OUTPATIENT
Start: 2024-12-17 | End: 2024-12-17

## 2024-12-17 RX ORDER — FAMOTIDINE 20 MG/1
20 TABLET, FILM COATED ORAL ONCE
Status: COMPLETED | OUTPATIENT
Start: 2024-12-17 | End: 2024-12-17

## 2024-12-17 RX ORDER — SODIUM CHLORIDE 0.9 % (FLUSH) 0.9 %
20 SYRINGE (ML) INJECTION AS NEEDED
OUTPATIENT
Start: 2024-12-17

## 2024-12-17 RX ORDER — SODIUM CHLORIDE 0.9 % (FLUSH) 0.9 %
10 SYRINGE (ML) INJECTION AS NEEDED
OUTPATIENT
Start: 2024-12-17

## 2024-12-17 RX ORDER — SODIUM CHLORIDE 0.9 % (FLUSH) 0.9 %
3 SYRINGE (ML) INJECTION EVERY 12 HOURS SCHEDULED
OUTPATIENT
Start: 2024-12-17

## 2024-12-17 RX ORDER — ACETAMINOPHEN 325 MG/1
650 TABLET ORAL ONCE
Status: COMPLETED | OUTPATIENT
Start: 2024-12-17 | End: 2024-12-17

## 2024-12-17 RX ORDER — SODIUM CHLORIDE, SODIUM LACTATE, POTASSIUM CHLORIDE, CALCIUM CHLORIDE 600; 310; 30; 20 MG/100ML; MG/100ML; MG/100ML; MG/100ML
30 INJECTION, SOLUTION INTRAVENOUS CONTINUOUS PRN
OUTPATIENT
Start: 2024-12-17 | End: 2024-12-18

## 2024-12-17 RX ADMIN — HEPARIN 500 UNITS: 100 SYRINGE at 15:18

## 2024-12-17 RX ADMIN — FAMOTIDINE 20 MG: 20 TABLET, FILM COATED ORAL at 13:47

## 2024-12-17 RX ADMIN — ACETAMINOPHEN 650 MG: 325 TABLET ORAL at 13:47

## 2024-12-17 RX ADMIN — CETIRIZINE HYDROCHLORIDE 10 MG: 10 TABLET, FILM COATED ORAL at 13:47

## 2024-12-17 RX ADMIN — FERUMOXYTOL 510 MG: 510 INJECTION INTRAVENOUS at 14:25

## 2024-12-17 NOTE — PROGRESS NOTES
Oncology Social Work  [address] stated that a $500 [employer] gift card was mailed to the patient on 12/09/24.  TIMO Torres

## 2024-12-17 NOTE — PROGRESS NOTES
Oncology Social Work  The patient was informed that [employer] mailed her a $500 gift card.  TIMO Torres

## 2024-12-17 NOTE — PROGRESS NOTES
Chief Complaint   Patient presents with    Crohn's Disease         Patient is a 44 y.o. who presents to the office for management of Crohn's disease as a new patient to the practice.   Patient has a significant past medical history of Crohn's disease with history of colon resection in 2007 managing with diet since this time citing absence of insurance coverage, stage III right breast cancer diagnosed September 2024, DVTs, CVA 2014, melanoma, and patent PFO.     EGD and Colonoscopy: Possibly 2013 at [facility] in [place].  Will attempt to obtain and review.    Past Surgical History:  Ileocecectomy  10/9/2024 right modified radical mastectomy and axillary dissection, prophylactic left mastectomy.   Pathology from the right breast showed invasive ductal carcinoma Bruce grade 3 (3+3+3) measuring 17 cm with extensive lymphovascular invasion and dermal lymphatic invasion.   tumor was positive for estrogen receptor 99% of cells, progesterone receptor 50% of cells, HER2 0 and Ki-67 65%.     Social History:  Former tobacco user quit 2021    Family History:  [facility] in mother    History of Present Illness  The patient presents for evaluation of Crohn's disease.    The patient was diagnosed with Crohn's disease in 1997, although she has experienced symptoms since the age of 3 or 4, including gastrointestinal sensitivity and frequent emesis. Severe abdominal cramping began around the age of 12 or 13, escalating to increased diarrhea and vomiting between the ages of 16 and 17, leading to her diagnosis.     She believes a colonoscopy was performed at that time. In 2006 and 2007, her condition deteriorated, with pyrexia and inability to ambulate, resulting in frequent emergency department visits.     An abscess in her abdominal wall was discovered and drained for several months to allow her intestines to heal prior to surgery. She was treated with antibiotics and anti-inflammatory medications.     In 2007, she underwent a 6-hour  surgery during which her intestines were found to be entangled in adhesions. She underwent resection in two locations and was diagnosed with Crohn's colitis.     Postoperatively, she recovered well, gained weight, and was able to eat without pain. She did not require medication post-surgery due to loss of health insurance. In 2013, a colonoscopy was attempted but could not be completed beyond the rectal area. She was prescribed Humira after the birth of her son but discontinued it due to weight gain. She does not recall if it helped control her symptoms. She has been experiencing diarrhea and some cramping. She has been taking fiber supplements daily for the past 3 weeks, which have improved her diarrhea. She has bowel movements once a day or every other day, with formed stools and no hematochezia. She reports up to 15 bowel movements on a bad day, leading to dehydration. She experiences nocturnal bowel movements during flares. She reports no hematochezia but notes the presence of mucus. Her weight has improved since starting the fiber supplement. She does not experience bowel urgency or constant pressure. She experiences mild lower abdominal cramping, which worsens during premenstrual syndrome (PMS).     She frequently belches and experiences throat tightening, which she attributes to anxiety. She has been experiencing throat tightening for several years, sometimes daily for several days or weeks, and sometimes only in the afternoon when she is fatigued. She has a hiatal hernia and was prescribed esomeprazole (Nexium).     She has not been on corticosteroids recently. She experiences flatulence and bloating, which she manages with simethicone (Gas-X). She is not currently on antibiotics. Her surgeon has recommended cholecystectomy due to a possible gallstone and inflammation. She has been taking slippery elm to soothe her intestinal wall, which she finds helpful. She experiences discomfort and pressure pain when  bloated but does not overeat.     She was referred to [facility] in [place], [place] at age 26, where she was started on infliximab (Remicade). She discontinued infliximab after a sigmoid colon resection. She experienced severe pain, weight loss, and cramping around the age of 20 to 22, which lasted for a couple of years. She has been taking fiber supplements daily for the past 3 weeks, which have improved her diarrhea. She has been taking slippery elm to soothe her intestinal wall, which she finds helpful.    The patient had two cerebrovascular accidents (strokes) in 2013, one month apart, due to a patent foramen ovale and inadequate anticoagulation. She was not monitored for blood clotting. She started experiencing nocturnal bowel movements after the strokes.    FAMILY HISTORY  - Mother has ulcerative colitis    ALLERGIES  - No known allergies    MEDICATIONS  - Current: Gas-X, slippery elm  - Past: Remicade, Humira, Nexium      Result Review :    CT Abdomen Pelvis With Contrast (09/16/2024 16:05)   Mildly conspicuous lymph nodes in central mesentery within abdomen and pelvis measuring up to 1.1 cm  Fold is noted at gallbladder fundus with possible stone in fold and evidence of mild gallbladder wall thickening/pericholecystic edema suspicious for cholecystitis without biliary dilatation  Liver, spleen, and pancreas unremarkable  NM PET/CT Skull Base to Mid Thigh (09/30/2024 11:10)   Abdomen/pelvis: Mesenteric lymph nodes are mostly subcentimeter without associated hypermetabolism  Sigmoid colectomy.  Tubular hypermetabolism throughout the otherwise normal-appearing remaining colon which could be medication related.  Moderate proximal colonic stool burden  CBC and Differential (12/11/2024 08:19)  Comprehensive metabolic panel (12/11/2024 08:19)  Ferritin (12/11/2024 08:19)  Iron Profile (12/11/2024 08:19)    Review of outside Hospital GI records 12/18/2014:  hx of Crohn's disease involving both large and  small intestine with history of fistula  Crohn's diagnosis diagnosed via colonoscopy and CT scan in 1997  Since this time patient has had previous ileocolonic resection for treatment of IBD  Previous treatments include:  Entyvio with first infusion on 12/18/2014 12/2/2014 Colonoscopy:   Evidence of prior end-to-end side colocolonic anastomosis in rectosigmoid colon.  Anastomosis was patent with healthy-appearing mucosa  15 cm from anal verge evidence of patchy mild inflammation characterized by congestion, erosions, erythema, and granularity and extending in rectum, descending colon, transverse colon, and ascending colon  A benign-appearing intrinsic moderate stenosis measuring 3 cm in length x 6 mm in ascending colon that was unable to be traversed this area was ulcerated and inflamed  Path: Severe chronic active colitis with extensive ulceration, granulation tissue and abundant necroinflammatory exudate  No evidence of CMV or dysplasia  Right colon Path: Colonic mucosa without significant pathologic alterations however with detached fragments of necroinflammatory exudate and granulation tissue  Left colon: Colonic mucosa with no significant alterations or dysplasia  reviewed and updated with patient  Outpatient Medications Marked as Taking for the 12/17/24 encounter (Office Visit) with Aviva Eng APRN   Medication Sig Dispense Refill    aspirin 81 MG EC tablet Take 1 tablet by mouth Daily.      buPROPion XL (WELLBUTRIN XL) 300 MG 24 hr tablet 1 tablet.      cyclobenzaprine (FLEXERIL) 10 MG tablet Take 1 tablet by mouth 2 (Two) Times a Day As Needed for Muscle Spasms for up to 40 doses. 40 tablet 0    diphenhydrAMINE (BENADRYL) 25 mg capsule Take 1 capsule by mouth At Night As Needed for Itching.      famotidine (PEPCID) 20 MG tablet Take 1 tablet by mouth 2 (Two) Times a Day. 60 tablet 2    Klor-Con M20 20 MEQ CR tablet TAKE 1 TABLET BY MOUTH 2 TIMES A DAY 40 tablet 1    omeprazole (priLOSEC) 20 MG  "capsule       ondansetron (ZOFRAN) 8 MG tablet Take 1 tablet by mouth 3 (Three) Times a Day As Needed for Nausea or Vomiting. 30 tablet 5    oxyCODONE (ROXICODONE) 5 MG immediate release tablet Take 1 tablet by mouth Every 4 (Four) Hours As Needed for Severe Pain for up to 10 doses. 10 tablet 0    zolpidem (Ambien) 5 MG tablet Take 1 tablet by mouth At Night As Needed for Sleep.       Vital Signs:   /78 (BP Location: Left arm, Patient Position: Sitting, Cuff Size: Large Adult)   Ht 167.6 cm (65.98\")   Wt 72.9 kg (160 lb 12.8 oz)   BMI 25.97 kg/m²     Body mass index is 25.97 kg/m².  Physical Exam  Constitutional:       General: She is not in acute distress.     Appearance: Normal appearance. She is not ill-appearing.   HENT:      Head: Normocephalic.   Eyes:      General: No scleral icterus.  Pulmonary:      Effort: No respiratory distress.   Abdominal:      General: Abdomen is flat. Bowel sounds are normal. There is no distension.      Palpations: Abdomen is soft. There is no mass.      Tenderness: There is abdominal tenderness. There is no guarding or rebound.      Hernia: No hernia is present.      Comments: Generalized abdominal discomfort to palpation   Skin:     General: Skin is warm and dry.   Neurological:      Mental Status: She is alert.      Gait: Gait normal.   Psychiatric:         Mood and Affect: Mood normal.       Assessment and Plan    Diagnoses and all orders for this visit:    1. Irritable bowel syndrome with diarrhea (Primary)  -     riFAXIMin (Xifaxan) 550 MG tablet; Take 1 tablet by mouth 3 (Three) Times a Day for 14 days.  Dispense: 42 tablet; Refill: 2    2. Crohn's disease without complication, unspecified gastrointestinal tract location  -     Hepatitis Panel, Acute  -     Cancel: QuantiFERON TB Gold; Future  -     QuantiFERON TB Gold; Future  -     QuantiFERON-TB Gold Plus; Future  -     CT Abdomen Pelvis With Contrast Enterography       Assessment & Plan  Crohn's Disease  - " Complex medical history including previous clots, patent PFO, and stroke history necessitates a cautious approach  - Consider potential overlap between IBS and inflammatory bowel disease  - Absence of overt inflammatory changes on imaging suggests Xifaxan could be beneficial while awaiting next Epson treatment for underlying Crohn's disease.  - Upcoming chemotherapy complicates treatment plan  - Prescribe Xifaxan with hopes of improving stool consistency, consistency, and urgency.  - Provide information about Benefiber for long-term use  - Screen for hepatitis and TB before initiating any treatment  - Seek clearance to ensure no concerns about undergoing scopes    Patient is agreeable to the outlined above treatment plan.  Verbalizes understanding and will contact office for any new or worsening concerns.  All questions answered and support provided.    Addendum:    Reviewed case findings with GI attending Dr. Castellanos who concurs with plan to obtain CT enterography to further assess history of stricturing restricting ability to complete full colonoscopy evaluation in 2014.  Will also plan to obtain initial prebiologic lab assessment including acute hepatitis panel and TB testing.  We will also plan to proceed with EGD and colonoscopy evaluation once we have received the results of the CT enterography assessment.  Additionally, in regards to plans for next steps in Crohn's disease treatment.  Dr. Castellanos recommends consideration of IL 23 pending insurance authorization and coverage.  Patient has a complex past medical history with patent PFO, DVT, CVA, and plans for upcoming initiation of chemotherapy for stage III breast cancer.  Reviewed these recommendations with patient at length via telephone call and is agreeable to plan.  We also discussed recommended treatment option with Skyrizi; IL 23 medication dose frequency, common adverse effects, and mechanism of action and administration.  She did verbalize concerns of  possible weight gain associated with oral corticosteroids and would like to forego at this time pending clinical course.  After this discussion patient is agreeable to proceed.  All questions answered and report provided.  I spent more than 50 percent ( 30 minutes) of a 60 minute visit discussing diagnostic results, treatment options, and treatment plan.      Patient Instructions   Once I have reviewed outside health records we will contact you with additional recommendations     For Diarrhea, Abdominal Bloating, and stool frequency:     1.We will prescribe you a course of a gut specific antibiotic called Xifaxan. You will take 1 tab 3 x daily x 14 days. Please provide our office a 2 week update via Intrallectt or telephone call so that additional recommendations can be provided based on your symptom response to Xifaxan.       For Diarrhea we recommend starting a prebiotic supplement called Banatrol plus which can be found on Amazon:    Begin taking 1 scoop of Banatrol plus with 4 ounces of water or juice twice a day until symptoms improve or after 7 days, then begin taking 1 scoop once daily.  Banatrol plus can also be mixed in oatmeal or applesauce  Do not take more than 6 scoops per day or more than 2 scoops at a single time  It is okay to use long-term  Banatrol plus works by solidifying stools through absorbing excess water in the colon while feeling the growth of good bacteria in your gut and helping to reduce inflammation.  This is also helpful in promoting bowel regularity.  Do not take if you allergic to latex or fruits bananas, avocados, passionfruit, plums, strawberries, and tomatoes   Additional information is also available on [employer] website (the manufacture):  [email or web address]/[email or web address]-[email or web address]          EMR Dragon/Transcription Disclaimer:  This document has been Dictated utilizing Dragon dictation.   Patient or patient representative verbalized consent for the use of Ambient Listening  during the visit with  PHIL Sanchez for chart documentation. 12/17/2024  15:03 EST

## 2024-12-17 NOTE — PATIENT INSTRUCTIONS
Once I have reviewed outside health records we will contact you with additional recommendations     For Diarrhea, Abdominal Bloating, and stool frequency:     1.We will prescribe you a course of a gut specific antibiotic called Xifaxan. You will take 1 tab 3 x daily x 14 days. Please provide our office a 2 week update via WP Engine or telephone call so that additional recommendations can be provided based on your symptom response to Xifaxan.       For Diarrhea we recommend starting a prebiotic supplement called Banatrol plus which can be found on Amazon:    Begin taking 1 scoop of Banatrol plus with 4 ounces of water or juice twice a day until symptoms improve or after 7 days, then begin taking 1 scoop once daily.  Banatrol plus can also be mixed in oatmeal or applesauce  Do not take more than 6 scoops per day or more than 2 scoops at a single time  It is okay to use long-term  Banatrol plus works by solidifying stools through absorbing excess water in the colon while feeling the growth of good bacteria in your gut and helping to reduce inflammation.  This is also helpful in promoting bowel regularity.  Do not take if you allergic to latex or fruits bananas, avocados, passionfruit, plums, strawberries, and tomatoes   Additional information is also available on [employer] website (the manufacture):  https://[email or web address]/product/banatrol-plus/

## 2024-12-18 ENCOUNTER — OFFICE VISIT (OUTPATIENT)
Dept: SURGERY | Facility: CLINIC | Age: 44
End: 2024-12-18
Payer: COMMERCIAL

## 2024-12-18 DIAGNOSIS — C50.911 BREAST CANCER, STAGE 3, RIGHT: Primary | ICD-10-CM

## 2024-12-18 DIAGNOSIS — Z90.13 STATUS POST BILATERAL MASTECTOMY: ICD-10-CM

## 2024-12-18 LAB — REF LAB TEST RESULTS: NORMAL

## 2024-12-18 PROCEDURE — 99024 POSTOP FOLLOW-UP VISIT: CPT | Performed by: SURGERY

## 2024-12-18 NOTE — LETTER
2024     PHIL Gaines  [address] [place] [id number]    Patient: Suzanne Lin   YOB: 1980   Date of Visit: 2024     Dear PHIL Gaines:       Thank you for referring Suzanne Lin to me for evaluation. Below are the relevant portions of my assessment and plan of care.    If you have questions, please do not hesitate to call me. I look forward to following Suzanne along with you.         Sincerely,        Rebekah Garcia DO        CC: No Recipients    Rebekah Garcia DO  24 1515  Sign when Signing Visit  Suzanne Lin 44 y.o. female presents for  FU port placement.        HPI   Above noted and agree.  Suzanne is doing well.  The port is being used without issue.      Review of Systems        Past Medical History:   Diagnosis Date   • Anxiety    • Breast cancer     Right   • Crohn's disease    • DVT (deep vein thrombosis) in pregnancy    • PFO (patent foramen ovale)    • Stroke     after the birth of her child at age 34   • Tattoos            Past Surgical History:   Procedure Laterality Date   •  SECTION     • COLON RESECTION     • COLONOSCOPY     • MASTECTOMY Bilateral 10/9/2024    Procedure: Modified radical mastectomy with axillary dissection of the right breast and simple mastectomy of the left breast;  Surgeon: Rebekah Garcia DO;  Location: [facility] OR;  Service: General;  Laterality: Bilateral;   • SKIN CANCER EXCISION     • VENOUS ACCESS DEVICE (PORT) INSERTION N/A 12/10/2024    Procedure: INSERTION VENOUS ACCESS DEVICE;  Surgeon: Rebekah Garcia DO;  Location:  [facility] OR;  Service: General;  Laterality: N/A;           Physical Exam    Her port incision looks good.  Her mastectomy wound looks good.  It is almost healed.    There were no vitals taken for this visit.        Diagnoses and all orders for this visit:    1. Breast cancer, stage 3, right (Primary)    2. Status post bilateral mastectomy    I will bring Suzanne back in 1  month.    Thank you for allowing me to participate in the care of this interesting patient.

## 2024-12-18 NOTE — PROGRESS NOTES
Suzanne Lin 44 y.o. female presents for  FU port placement.        HPI   Above noted and agree.  Suzanne is doing well.  The port is being used without issue.      Review of Systems        Past Medical History:   Diagnosis Date    Anxiety     Breast cancer     Right    Crohn's disease     DVT (deep vein thrombosis) in pregnancy     PFO (patent foramen ovale)     Stroke     after the birth of her child at age 34    Tattoos            Past Surgical History:   Procedure Laterality Date     SECTION      COLON RESECTION      COLONOSCOPY      MASTECTOMY Bilateral 10/9/2024    Procedure: Modified radical mastectomy with axillary dissection of the right breast and simple mastectomy of the left breast;  Surgeon: Rebekah Garcia DO;  Location: [facility];  Service: General;  Laterality: Bilateral;    SKIN CANCER EXCISION      VENOUS ACCESS DEVICE (PORT) INSERTION N/A 12/10/2024    Procedure: INSERTION VENOUS ACCESS DEVICE;  Surgeon: Rebekah Garcia DO;  Location: [facility] OR;  Service: General;  Laterality: N/A;           Physical Exam    Her port incision looks good.  Her mastectomy wound looks good.  It is almost healed.    There were no vitals taken for this visit.        Diagnoses and all orders for this visit:    1. Breast cancer, stage 3, right (Primary)    2. Status post bilateral mastectomy    I will bring Suzanne back in 1 month.    Thank you for allowing me to participate in the care of this interesting patient.

## 2024-12-19 ENCOUNTER — TELEPHONE (OUTPATIENT)
Dept: GASTROENTEROLOGY | Facility: CLINIC | Age: 44
End: 2024-12-19
Payer: COMMERCIAL

## 2024-12-19 NOTE — TELEPHONE ENCOUNTER
PA Case: [id number], Status: Approved, Coverage Starts on: 12/19/2024 12:00:00 AM, Coverage Ends on: 12/19/2025 12:00:00 AM.. Authorization Expiration Date: December 19, 2025.

## 2024-12-26 ENCOUNTER — INFUSION (OUTPATIENT)
Dept: ONCOLOGY | Facility: HOSPITAL | Age: 44
End: 2024-12-26
Payer: COMMERCIAL

## 2024-12-26 ENCOUNTER — OFFICE VISIT (OUTPATIENT)
Dept: ONCOLOGY | Facility: CLINIC | Age: 44
End: 2024-12-26
Payer: COMMERCIAL

## 2024-12-26 VITALS
BODY MASS INDEX: 25.88 KG/M2 | DIASTOLIC BLOOD PRESSURE: 79 MMHG | TEMPERATURE: 98 F | OXYGEN SATURATION: 99 % | WEIGHT: 161 LBS | HEART RATE: 104 BPM | SYSTOLIC BLOOD PRESSURE: 124 MMHG | HEIGHT: 66 IN

## 2024-12-26 DIAGNOSIS — Z79.899 NEED FOR PROPHYLACTIC CHEMOTHERAPY: Primary | ICD-10-CM

## 2024-12-26 DIAGNOSIS — C50.911 BREAST CANCER, STAGE 3, RIGHT: ICD-10-CM

## 2024-12-26 DIAGNOSIS — Z45.2 ENCOUNTER FOR CENTRAL LINE CARE: ICD-10-CM

## 2024-12-26 DIAGNOSIS — D50.9 IRON DEFICIENCY ANEMIA, UNSPECIFIED IRON DEFICIENCY ANEMIA TYPE: ICD-10-CM

## 2024-12-26 DIAGNOSIS — T45.4X5A ADVERSE EFFECT OF IRON, INITIAL ENCOUNTER: ICD-10-CM

## 2024-12-26 DIAGNOSIS — C50.911 BREAST CANCER, STAGE 3, RIGHT: Primary | ICD-10-CM

## 2024-12-26 DIAGNOSIS — Z79.899 NEED FOR PROPHYLACTIC CHEMOTHERAPY: ICD-10-CM

## 2024-12-26 LAB
ALBUMIN SERPL-MCNC: 3.6 G/DL (ref 3.5–5.2)
ALBUMIN/GLOB SERPL: 1 G/DL
ALP SERPL-CCNC: 114 U/L (ref 39–117)
ALT SERPL W P-5'-P-CCNC: 20 U/L (ref 1–33)
ANION GAP SERPL CALCULATED.3IONS-SCNC: 8.2 MMOL/L (ref 5–15)
AST SERPL-CCNC: 16 U/L (ref 1–32)
B-HCG UR QL: NEGATIVE
BASOPHILS # BLD AUTO: 0.04 10*3/MM3 (ref 0–0.2)
BASOPHILS NFR BLD AUTO: 0.5 % (ref 0–1.5)
BILIRUB SERPL-MCNC: 0.2 MG/DL (ref 0–1.2)
BUN SERPL-MCNC: 10 MG/DL (ref 6–20)
BUN/CREAT SERPL: 17.5 (ref 7–25)
CALCIUM SPEC-SCNC: 9 MG/DL (ref 8.6–10.5)
CHLORIDE SERPL-SCNC: 100 MMOL/L (ref 98–107)
CO2 SERPL-SCNC: 24.8 MMOL/L (ref 22–29)
CREAT SERPL-MCNC: 0.57 MG/DL (ref 0.57–1)
DEPRECATED RDW RBC AUTO: 55.2 FL (ref 37–54)
EGFRCR SERPLBLD CKD-EPI 2021: 115.1 ML/MIN/1.73
EOSINOPHIL # BLD AUTO: 0.19 10*3/MM3 (ref 0–0.4)
EOSINOPHIL NFR BLD AUTO: 2.4 % (ref 0.3–6.2)
ERYTHROCYTE [DISTWIDTH] IN BLOOD BY AUTOMATED COUNT: 16 % (ref 12.3–15.4)
GLOBULIN UR ELPH-MCNC: 3.6 GM/DL
GLUCOSE SERPL-MCNC: 98 MG/DL (ref 65–99)
HCT VFR BLD AUTO: 36.1 % (ref 34–46.6)
HGB BLD-MCNC: 11.4 G/DL (ref 12–15.9)
IMM GRANULOCYTES # BLD AUTO: 0.03 10*3/MM3 (ref 0–0.05)
IMM GRANULOCYTES NFR BLD AUTO: 0.4 % (ref 0–0.5)
LYMPHOCYTES # BLD AUTO: 0.97 10*3/MM3 (ref 0.7–3.1)
LYMPHOCYTES NFR BLD AUTO: 12.4 % (ref 19.6–45.3)
MCH RBC QN AUTO: 29.6 PG (ref 26.6–33)
MCHC RBC AUTO-ENTMCNC: 31.6 G/DL (ref 31.5–35.7)
MCV RBC AUTO: 93.8 FL (ref 79–97)
MONOCYTES # BLD AUTO: 0.44 10*3/MM3 (ref 0.1–0.9)
MONOCYTES NFR BLD AUTO: 5.6 % (ref 5–12)
NEUTROPHILS NFR BLD AUTO: 6.14 10*3/MM3 (ref 1.7–7)
NEUTROPHILS NFR BLD AUTO: 78.7 % (ref 42.7–76)
PLATELET # BLD AUTO: 362 10*3/MM3 (ref 140–450)
PMV BLD AUTO: 9.3 FL (ref 6–12)
POTASSIUM SERPL-SCNC: 3.4 MMOL/L (ref 3.5–5.2)
PROT SERPL-MCNC: 7.2 G/DL (ref 6–8.5)
RBC # BLD AUTO: 3.85 10*6/MM3 (ref 3.77–5.28)
SODIUM SERPL-SCNC: 133 MMOL/L (ref 136–145)
WBC NRBC COR # BLD AUTO: 7.81 10*3/MM3 (ref 3.4–10.8)

## 2024-12-26 PROCEDURE — 85025 COMPLETE CBC W/AUTO DIFF WBC: CPT | Performed by: INTERNAL MEDICINE

## 2024-12-26 PROCEDURE — 81025 URINE PREGNANCY TEST: CPT | Performed by: INTERNAL MEDICINE

## 2024-12-26 PROCEDURE — 96374 THER/PROPH/DIAG INJ IV PUSH: CPT

## 2024-12-26 PROCEDURE — 25010000002 FERUMOXYTOL 510 MG/17ML SOLUTION: Performed by: NURSE PRACTITIONER

## 2024-12-26 PROCEDURE — 25010000002 HEPARIN LOCK FLUSH PER 10 UNITS: Performed by: INTERNAL MEDICINE

## 2024-12-26 PROCEDURE — 80053 COMPREHEN METABOLIC PANEL: CPT | Performed by: INTERNAL MEDICINE

## 2024-12-26 RX ORDER — HEPARIN SODIUM (PORCINE) LOCK FLUSH IV SOLN 100 UNIT/ML 100 UNIT/ML
500 SOLUTION INTRAVENOUS AS NEEDED
OUTPATIENT
Start: 2024-12-26

## 2024-12-26 RX ORDER — CEFDINIR 300 MG/1
300 CAPSULE ORAL 2 TIMES DAILY
COMMUNITY
Start: 2024-12-19

## 2024-12-26 RX ORDER — SODIUM CHLORIDE 9 MG/ML
20 INJECTION, SOLUTION INTRAVENOUS ONCE
Status: CANCELLED | OUTPATIENT
Start: 2024-12-26

## 2024-12-26 RX ORDER — SODIUM CHLORIDE 9 MG/ML
20 INJECTION, SOLUTION INTRAVENOUS ONCE
Status: DISCONTINUED | OUTPATIENT
Start: 2024-12-26 | End: 2024-12-26 | Stop reason: HOSPADM

## 2024-12-26 RX ORDER — FAMOTIDINE 20 MG/1
20 TABLET, FILM COATED ORAL ONCE
Status: COMPLETED | OUTPATIENT
Start: 2024-12-26 | End: 2024-12-26

## 2024-12-26 RX ORDER — PALONOSETRON 0.05 MG/ML
0.25 INJECTION, SOLUTION INTRAVENOUS ONCE
Status: CANCELLED | OUTPATIENT
Start: 2024-12-31

## 2024-12-26 RX ORDER — DOXORUBICIN HYDROCHLORIDE 2 MG/ML
60 INJECTION, SOLUTION INTRAVENOUS ONCE
Status: CANCELLED | OUTPATIENT
Start: 2024-12-31

## 2024-12-26 RX ORDER — SODIUM CHLORIDE 0.9 % (FLUSH) 0.9 %
10 SYRINGE (ML) INJECTION AS NEEDED
Status: DISCONTINUED | OUTPATIENT
Start: 2024-12-26 | End: 2024-12-26 | Stop reason: HOSPADM

## 2024-12-26 RX ORDER — PALONOSETRON 0.05 MG/ML
0.25 INJECTION, SOLUTION INTRAVENOUS ONCE
Status: DISCONTINUED | OUTPATIENT
Start: 2024-12-26 | End: 2024-12-26 | Stop reason: HOSPADM

## 2024-12-26 RX ORDER — ACETAMINOPHEN 325 MG/1
650 TABLET ORAL ONCE
Status: COMPLETED | OUTPATIENT
Start: 2024-12-26 | End: 2024-12-26

## 2024-12-26 RX ORDER — DOXORUBICIN HYDROCHLORIDE 2 MG/ML
60 INJECTION, SOLUTION INTRAVENOUS ONCE
Status: DISCONTINUED | OUTPATIENT
Start: 2024-12-26 | End: 2024-12-26 | Stop reason: ALTCHOICE

## 2024-12-26 RX ORDER — HEPARIN SODIUM (PORCINE) LOCK FLUSH IV SOLN 100 UNIT/ML 100 UNIT/ML
500 SOLUTION INTRAVENOUS AS NEEDED
Status: DISCONTINUED | OUTPATIENT
Start: 2024-12-26 | End: 2024-12-26 | Stop reason: HOSPADM

## 2024-12-26 RX ORDER — SODIUM CHLORIDE 9 MG/ML
20 INJECTION, SOLUTION INTRAVENOUS ONCE
Status: CANCELLED | OUTPATIENT
Start: 2024-12-31

## 2024-12-26 RX ORDER — PALONOSETRON 0.05 MG/ML
0.25 INJECTION, SOLUTION INTRAVENOUS ONCE
Status: CANCELLED | OUTPATIENT
Start: 2024-12-26

## 2024-12-26 RX ORDER — DOXORUBICIN HYDROCHLORIDE 2 MG/ML
60 INJECTION, SOLUTION INTRAVENOUS ONCE
Status: CANCELLED | OUTPATIENT
Start: 2024-12-26

## 2024-12-26 RX ORDER — SODIUM CHLORIDE 0.9 % (FLUSH) 0.9 %
20 SYRINGE (ML) INJECTION AS NEEDED
OUTPATIENT
Start: 2024-12-26

## 2024-12-26 RX ORDER — CETIRIZINE HYDROCHLORIDE 10 MG/1
10 TABLET ORAL ONCE
Status: COMPLETED | OUTPATIENT
Start: 2024-12-26 | End: 2024-12-26

## 2024-12-26 RX ORDER — SODIUM CHLORIDE 0.9 % (FLUSH) 0.9 %
10 SYRINGE (ML) INJECTION AS NEEDED
OUTPATIENT
Start: 2024-12-26

## 2024-12-26 RX ADMIN — FERUMOXYTOL 510 MG: 510 INJECTION INTRAVENOUS at 11:30

## 2024-12-26 RX ADMIN — ACETAMINOPHEN 650 MG: 325 TABLET ORAL at 11:07

## 2024-12-26 RX ADMIN — FAMOTIDINE 20 MG: 20 TABLET, FILM COATED ORAL at 11:08

## 2024-12-26 RX ADMIN — Medication 10 ML: at 12:46

## 2024-12-26 RX ADMIN — CETIRIZINE HYDROCHLORIDE 10 MG: 10 TABLET, FILM COATED ORAL at 11:08

## 2024-12-26 RX ADMIN — HEPARIN 500 UNITS: 100 SYRINGE at 12:46

## 2024-12-26 NOTE — PROGRESS NOTES
Subjective     REASON FOR CONSULTATION:  breast cancer  Provide an opinion on any further workup or treatment                             REQUESTING PHYSICIAN:  Jose    RECORDS OBTAINED:  Records of the patients history including those obtained from the referring provider were reviewed and summarized in detail.    HISTORY OF PRESENT ILLNESS:  The patient is a 44 y.o. year old female who is here for an opinion about the above issue.    History of Present Illness  Patient is a 44 y.o. female who returns the office today to initiate adjuvant chemotherapy with Adriamycin and Cytoxan after much delay due to slow wound healing post right modified radical mastectomy and axillary dissection with left prophylactic mastectomy.  She has been followed closely by Dr. Garcia.  Patient also has complicated history of Crohn's and is actively seeing gastroenterology.  Previous visit she was found to be iron deficient and therefore received her first Feraheme on 12/17/2024 and is due for her second dose today.    She previously underwent echocardiogram on 12/9/2024 showing ejection fraction 55% with normal strain and -18.8.      ONCOLOGIC HISTORY   This is a 44-year-old woman referred from general surgery to discuss adjuvant treatment for recently surgically treated breast cancer.  The patient presented with a large palpable mass in the inner quadrant of the right breast.  The mass had been enlarging for couple years but the patient did not have insurance to get assessed.  The breast imaging is not available to me but she had a CT of the chest abdomen pelvis on 9/16/2024 showed a large mass in the right breast measuring up to 7.7 cm with abnormal left axillary lymphadenopathy.  There were tiny subpleural nodules in the lateral aspect of the left lower lobe likely granulomatous mildly conspicuous lymph node in the central mesentery 1.1 cm.  A PET scan was performed 9/30/2024 showing a hypermetabolic mass in the right breast  with thickening throughout the right breast and pathologic hypermetabolic right axillary level 1 and 2 lymph nodes, no hypermetabolic internal mammary or supraclavicular lymph nodes.  The small pulmonary nodules were too small to characterize and mesenteric lymph nodes without hypermetabolic activity.  A biopsy of the right breast mass showed invasive ductal adenocarcinoma.    She was taken to the operating room on 10/9/2024 and underwent a right modified radical mastectomy and axillary dissection, prophylactic left mastectomy.  Pathology from the right breast showed invasive ductal carcinoma Austin grade 3 (3+3+3) measuring 17 cm with extensive lymphovascular invasion and dermal lymphatic invasion.  The tumor extended to the dermis but did not ulcerate the skin.  Tumor extended to skeletal muscle and was present focally at an anterior margin, 0.05 mm of the posterior margin, 10 mm from the lateral margin, 20 mm from the medial margin, 28 mm from the superior margin and 40 mm from inferior margin.  There was ductal carcinoma in situ present within 2.5 mm of the posterior margin.  There were 13 lymph nodes in the axillary dissection 8 oncology plan/recommendations: of which had macrometastases, 1 micrometastases and 1 lymph node with isolated tumor cells.  The left breast had an intraductal papilloma otherwise negative.  The tumor was positive for estrogen receptor 99% of cells, progesterone receptor 50% of cells, HER2 0 and Ki-67 65%.    She has been continuing frequent follow-ups with Dr. Garcia since surgery as the wound was not closed.  She most recently saw her in office 2 days ago at which time it was treated with silver nitrate and dressed.  She was able to have her port placed just yesterday, this had been delayed due to her wound.    The patient has medical comorbidities of Crohn's disease.  She had a flare approximately a month ago but states in the past week and a half her bowels have been normal.   Her weight has improved and she has been eating better.    She denies known bleeding.  She does have a history of iron deficiency at 1 time in the past and states she did tolerate oral iron at that time.    Past Medical History:   Diagnosis Date    Anxiety     Breast cancer     Right    Crohn's disease     DVT (deep vein thrombosis) in pregnancy     PFO (patent foramen ovale)     Stroke     after the birth of her child at age 34    Tattoos         Past Surgical History:   Procedure Laterality Date     SECTION      COLON RESECTION      COLONOSCOPY      MASTECTOMY Bilateral 10/9/2024    Procedure: Modified radical mastectomy with axillary dissection of the right breast and simple mastectomy of the left breast;  Surgeon: Rebekah Garcia DO;  Location: [facility];  Service: General;  Laterality: Bilateral;    SKIN CANCER EXCISION      VENOUS ACCESS DEVICE (PORT) INSERTION N/A 12/10/2024    Procedure: INSERTION VENOUS ACCESS DEVICE;  Surgeon: Rebekah Garcia DO;  Location: [facility] OR;  Service: General;  Laterality: N/A;        Current Outpatient Medications on File Prior to Visit   Medication Sig Dispense Refill    aspirin 81 MG EC tablet Take 1 tablet by mouth Daily.      buPROPion XL (WELLBUTRIN XL) 300 MG 24 hr tablet 1 tablet. (Patient not taking: Reported on 2024)      cefdinir (OMNICEF) 300 MG capsule Take 1 capsule by mouth 2 (Two) Times a Day.      cyclobenzaprine (FLEXERIL) 10 MG tablet Take 1 tablet by mouth 2 (Two) Times a Day As Needed for Muscle Spasms for up to 40 doses. 40 tablet 0    diphenhydrAMINE (BENADRYL) 25 mg capsule Take 1 capsule by mouth At Night As Needed for Itching.      famotidine (PEPCID) 20 MG tablet Take 1 tablet by mouth 2 (Two) Times a Day. 60 tablet 2    Klor-Con M20 20 MEQ CR tablet TAKE 1 TABLET BY MOUTH 2 TIMES A DAY 40 tablet 1    omeprazole (priLOSEC) 20 MG capsule       ondansetron (ZOFRAN) 8 MG tablet Take 1 tablet by mouth 3 (Three) Times a Day As  Needed for Nausea or Vomiting. 30 tablet 5    oxyCODONE (ROXICODONE) 5 MG immediate release tablet Take 1 tablet by mouth Every 4 (Four) Hours As Needed for Severe Pain for up to 10 doses. 10 tablet 0    [] riFAXIMin (Xifaxan) 550 MG tablet Take 1 tablet by mouth 3 (Three) Times a Day for 14 days. 42 tablet 2    zolpidem (Ambien) 5 MG tablet Take 1 tablet by mouth At Night As Needed for Sleep.      OLANZapine (zyPREXA) 5 MG tablet Take 1 tablet by mouth for 4 doses starting night of chemotherapy. 8 tablet 1     Current Facility-Administered Medications on File Prior to Visit   Medication Dose Route Frequency Provider Last Rate Last Admin    heparin injection 500 Units  500 Units Intravenous PRN Nishant Witt MD   500 Units at 24 0916    sodium chloride 0.9 % flush 10 mL  10 mL Intravenous PRN Nishant Witt MD   10 mL at 24 0916        ALLERGIES:  No Known Allergies     Social History     Socioeconomic History    Marital status: Single    Number of children: 1   Tobacco Use    Smoking status: Former     Current packs/day: 0.00     Average packs/day: 1 pack/day for 20.0 years (20.0 ttl pk-yrs)     Types: Cigarettes     Start date:      Quit date: 2019     Years since quittin.0    Smokeless tobacco: Current    Tobacco comments:     Nicotine pouches   Vaping Use    Vaping status: Never Used   Substance and Sexual Activity    Alcohol use: Yes     Comment: OCC    Drug use: Never    Sexual activity: Defer        Family History   Problem Relation Age of Onset    Diabetes Mother     Heart disease Father     Diabetes Paternal Grandmother     Hypertension Other     Malig Hyperthermia Neg Hx         Review of Systems   Constitutional: Negative.    HENT: Negative.     Respiratory: Negative.     Cardiovascular: Negative.    Gastrointestinal: Negative.    Genitourinary: Negative.    Musculoskeletal: Negative.    Skin:  Positive for wound.   Neurological: Negative.    Hematological: Negative.      "      Objective     Vitals:    12/26/24 1034   BP: 124/79   Pulse: 104   Temp: 98 °F (36.7 °C)   TempSrc: Oral   SpO2: 99%   Weight: 73 kg (161 lb)   Height: 167.6 cm (65.98\")   PainSc: 0-No pain           12/31/2024    10:10 AM   Current Status   ECOG score 0       Physical Exam    CONSTITUTIONAL: pleasant well-developed adult woman  HEENT: no icterus, no thrush, moist membranes  NECK: no jvd  LYMPH: no cervical or supraclavicular lad  CV: RRR, S1S2, no murmur  RESP: cta bilat, no wheezing, no rales  Breast: Bilateral mastectomy with unclosed wound midline chest, see picture in chart; left upper chest port in place to access.  MUSC: no edema, normal gait  NEURO: alert and oriented x3, normal strength  PSYCH: normal mood and affect      RECENT LABS:  Hematology WBC   Date Value Ref Range Status   12/31/2024 9.59 3.40 - 10.80 10*3/mm3 Final     RBC   Date Value Ref Range Status   12/31/2024 3.98 3.77 - 5.28 10*6/mm3 Final     Hemoglobin   Date Value Ref Range Status   12/31/2024 12.0 12.0 - 15.9 g/dL Final     Hematocrit   Date Value Ref Range Status   12/31/2024 37.6 34.0 - 46.6 % Final     Platelets   Date Value Ref Range Status   12/31/2024 418 140 - 450 10*3/mm3 Final        Lab Results   Component Value Date    GLUCOSE 94 12/31/2024    BUN 9 12/31/2024    CREATININE 0.66 12/31/2024     12/31/2024    K 3.5 12/31/2024     12/31/2024    CALCIUM 8.6 12/31/2024    PROTEINTOT 7.3 12/31/2024    ALBUMIN 3.5 12/31/2024    ALT 21 12/31/2024    AST 18 12/31/2024    ALKPHOS 124 (H) 12/31/2024    BILITOT 0.2 12/31/2024    GLOB 3.8 12/31/2024    AGRATIO 0.9 12/31/2024    BCR 13.6 12/31/2024    ANIONGAP 11.3 12/31/2024    EGFR 111.1 12/31/2024     PET scan 9/30/2024:  FINDINGS:     Head/neck: No suspicious uptake.     Chest: An 8 x 5.8 cm mass in the lower inner right breast with max SUV  of 18 (series 4/image 156). Mass comes in close proximity to the  sternum. Hypermetabolic parenchymal thickening throughout the " right  breast with max SUV of 9 (series 4/image 140). Diffuse right breast skin  thickening.     Hypermetabolic right axillary level I and II lymph nodes. Reference 1.2  cm level I lymph node with max SUV of 11.9 (series 4/image 103).  Additional reference subcentimeter level II lymph node with max SUV of  3.3 (series 4/image 93). Separate brown fat uptake in the bilateral  axilla and posterior neck base.     No enlarged or hypermetabolic internal mammary or supraclavicular lymph  nodes.     Few subcentimeter pulmonary nodules are too small for accurate PET  characterization without overt hypermetabolism and unchanged from recent  CT. Reference left lower lobe (series 4/image 167) and right upper lobe  (series 4/image 131).     Abdomen/pelvis: Mesenteric lymph nodes are mostly subcentimeter without  associated hypermetabolism.     Sigmoid colectomy. Tubular hypermetabolism throughout the otherwise  normal-appearing remaining colon may be medication related. Moderate  proximal colonic stool burden.     Bones: No focal osseous hypermetabolism with special attention to the  sternum.           IMPRESSION:  1. Hypermetabolic 8 cm right breast mass with hypermetabolic parenchymal  thickening throughout the right breast, pathologically proven invasive  ductal carcinoma. Mass comes in close proximity to the sternum. No focal  osseous hypermetabolism with special attention to the sternum.  2. Hypermetabolic right axillary level I and II lymph nodes suggesting  alana metastatic disease. No enlarged or hypermetabolic internal mammary  or supraclavicular lymph nodes. Separate brown fat uptake in the  bilateral axilla and posterior neck base.  3. Few subcentimeter pulmonary nodules are too small for accurate PET  characterization and will need follow-up via chest CT.  4. Mesenteric lymph nodes are mostly subcentimeter without associated  hypermetabolism.       Assessment & Plan   *pT3N2a M0 grade 3 invasive ductal adenocarcinoma  right breast, ER 99% positive, OK 50% positive, HER2 0, Ki-67 65%, tumor present at anterior margin  presented with a large palpable mass in the inner quadrant of the right breast; mass had been enlarging for couple years but the patient did not have insurance to get assessed  CT of the chest abdomen pelvis on 9/16/2024 showed a large mass in the right breast measuring up to 7.7 cm with abnormal left axillary lymphadenopathy.  There were tiny subpleural nodules in the lateral aspect of the left lower lobe likely granulomatous mildly conspicuous lymph node in the central mesentery 1.1 cm.    PET scan 9/30/2024 - hypermetabolic mass in the right breast with thickening throughout the right breast and pathologic hypermetabolic right axillary level 1 and 2 lymph nodes, no hypermetabolic internal mammary or supraclavicular lymph nodes.  The small pulmonary nodules were too small to characterize and mesenteric lymph nodes without hypermetabolic activity.    biopsy of the right breast mass showed invasive ductal adenocarcinoma.  operating room on 10/9/2024 and underwent a right modified radical mastectomy and axillary dissection, prophylactic left mastectomy.  Pathology from the right breast showed invasive ductal carcinoma Bruce grade 3 (3+3+3) measuring 17 cm with extensive lymphovascular invasion and dermal lymphatic invasion.  The tumor extended to the dermis but did not ulcerate the skin.  Tumor extended to skeletal muscle and was present focally at an anterior margin, 0.05 mm of the posterior margin, 10 mm from the lateral margin, 20 mm from the medial margin, 28 mm from the superior margin and 40 mm from inferior margin.  There was ductal carcinoma in situ present within 2.5 mm of the posterior margin.  There were 13 lymph nodes in the axillary dissection 8 of which had macrometastases, 1 micrometastases and 1 lymph node with isolated tumor cells.  The left breast had an intraductal papilloma otherwise negative.   The tumor was positive for estrogen receptor 99% of cells, progesterone receptor 50% of cells, HER2 0 and Ki-67 65%.  10/31/2024 Imaging and pathology reviewed with the patient today.  I have discussed the pathology with Dr. Garcia.  The patient has disease characteristics with significant elevated risk of residual micrometastatic disease including size of tumor, lymph node involvement, grade 3 etc.  I have recommended adjuvant chemotherapy to reduce risk of recurrence/help eradicate potential micrometastatic disease with dose dense AC with Neulasta support x 4 cycles followed by weekly Taxol x 12 doses.  I have discussed risk and benefits of chemotherapy and formal education requested.  She needs another 2 to 3 weeks to heal from surgery before chemotherapy.   12/10/2024 port placed by Dr. Garcia  12/11/2024 patient returns to the office today for follow-up and possible initiation of Adriamycin Cytoxan.  Her chest wound continues to improved though not fully healed at this point.  See picture in chart.  Patient verbally reports Dr. Garcia was comfortable placing the port yesterday however with leaving chemo start timing up to our service with regard to the wound and its current state.  Reviewed with Dr. Witt today and he suggest giving the patient 2 more weeks until initiation of treatment to give the wound more time to close.  12/26/2024 patient returns today with further improvement in wound and discussed with Dr. Witt we will proceed with Adriamycin Cytoxan.  Unfortunately, further review of med list reveals she is on Wellbutrin and we cannot give this along with Adriamycin as discussed with pharmacist today.  Patient will discontinue Wellbutrin today and reach out to filling provider for alternative medication.  She states she has not been super consistent in taking this so that should not be a problem however did take a dose yesterday.  Pharmacy would prefer 4 days prior to giving Adriamycin  therefore we will have her return on Tuesday of next week.    *Anemia.    12/11/2024 ferritin 59, iron saturation 10%, TIBC 364.  Trialed AccruFer however was not affordable for patient therefore moved to Feraheme x 2 given 12/17/2024 and 12/26/2024.    *medical comorbidities of Crohn's disease.      Oncology plan/recommendations:  Delay initiation of Adriamycin and Cytoxan until Tuesday of next week secondary to interaction with Wellbutrin  Patient will discontinue Wellbutrin and begin another alternative to be prescribed by her original prescriber  Patient receiving dose 2 of 2 of Feraheme today.  After chemotherapy she will need postmastectomy radiation given the size and alana involvement  She will need hormonal therapy with ovarian suppression/tamoxifen versus oophorectomy and AI therapy.  I will check a genetic panel with [facility]-if she were to have BRCA1/BRCA2 mutation salpingo-oophorectomies would be indicated  Nonspecific lung nodules will need reassessment after completing chemotherapy radiographically  Follow-up 1 week after initiation of chemotherapy for toxicity check and possible fluids.    Notes reviewed from Dr. Garcia today.  Case reviewed with Dr. Witt.  Case discussed with pharmacist today and infusion nursing.I spent 45 minutes caring for Suzanne on this date of service. This time includes time spent by me in the following activities: preparing for the visit, reviewing tests, obtaining and/or reviewing a separately obtained history, performing a medically appropriate examination and/or evaluation, counseling and educating the patient/family/caregiver, ordering medications, tests, or procedures, referring and communicating with other health care professionals, independently interpreting results and communicating that information with the patient/family/caregiver, and care coordination.

## 2024-12-26 NOTE — NURSING NOTE
Pt is on wellbutrin. Adriamycin and wellbutrin is a category x interaction had to hold treatment til Tuesday. Pt left message with pcp to change wellbutrin.

## 2024-12-31 ENCOUNTER — INFUSION (OUTPATIENT)
Dept: ONCOLOGY | Facility: HOSPITAL | Age: 44
End: 2024-12-31
Payer: COMMERCIAL

## 2024-12-31 VITALS
OXYGEN SATURATION: 98 % | TEMPERATURE: 97.7 F | DIASTOLIC BLOOD PRESSURE: 65 MMHG | HEART RATE: 92 BPM | WEIGHT: 159 LBS | BODY MASS INDEX: 25.68 KG/M2 | SYSTOLIC BLOOD PRESSURE: 97 MMHG

## 2024-12-31 DIAGNOSIS — Z79.899 NEED FOR PROPHYLACTIC CHEMOTHERAPY: Primary | ICD-10-CM

## 2024-12-31 DIAGNOSIS — C50.911 BREAST CANCER, STAGE 3, RIGHT: ICD-10-CM

## 2024-12-31 DIAGNOSIS — Z45.2 ENCOUNTER FOR CENTRAL LINE CARE: ICD-10-CM

## 2024-12-31 LAB
ALBUMIN SERPL-MCNC: 3.5 G/DL (ref 3.5–5.2)
ALBUMIN/GLOB SERPL: 0.9 G/DL
ALP SERPL-CCNC: 124 U/L (ref 39–117)
ALT SERPL W P-5'-P-CCNC: 21 U/L (ref 1–33)
ANION GAP SERPL CALCULATED.3IONS-SCNC: 11.3 MMOL/L (ref 5–15)
AST SERPL-CCNC: 18 U/L (ref 1–32)
B-HCG UR QL: NEGATIVE
BASOPHILS # BLD AUTO: 0.06 10*3/MM3 (ref 0–0.2)
BASOPHILS NFR BLD AUTO: 0.6 % (ref 0–1.5)
BILIRUB SERPL-MCNC: 0.2 MG/DL (ref 0–1.2)
BUN SERPL-MCNC: 9 MG/DL (ref 6–20)
BUN/CREAT SERPL: 13.6 (ref 7–25)
CALCIUM SPEC-SCNC: 8.6 MG/DL (ref 8.6–10.5)
CHLORIDE SERPL-SCNC: 104 MMOL/L (ref 98–107)
CO2 SERPL-SCNC: 22.7 MMOL/L (ref 22–29)
CREAT SERPL-MCNC: 0.66 MG/DL (ref 0.57–1)
DEPRECATED RDW RBC AUTO: 54.9 FL (ref 37–54)
EGFRCR SERPLBLD CKD-EPI 2021: 111.1 ML/MIN/1.73
EOSINOPHIL # BLD AUTO: 0.33 10*3/MM3 (ref 0–0.4)
EOSINOPHIL NFR BLD AUTO: 3.4 % (ref 0.3–6.2)
ERYTHROCYTE [DISTWIDTH] IN BLOOD BY AUTOMATED COUNT: 15.8 % (ref 12.3–15.4)
GLOBULIN UR ELPH-MCNC: 3.8 GM/DL
GLUCOSE SERPL-MCNC: 94 MG/DL (ref 65–99)
HCT VFR BLD AUTO: 37.6 % (ref 34–46.6)
HGB BLD-MCNC: 12 G/DL (ref 12–15.9)
IMM GRANULOCYTES # BLD AUTO: 0.07 10*3/MM3 (ref 0–0.05)
IMM GRANULOCYTES NFR BLD AUTO: 0.7 % (ref 0–0.5)
LYMPHOCYTES # BLD AUTO: 0.8 10*3/MM3 (ref 0.7–3.1)
LYMPHOCYTES NFR BLD AUTO: 8.3 % (ref 19.6–45.3)
MCH RBC QN AUTO: 30.2 PG (ref 26.6–33)
MCHC RBC AUTO-ENTMCNC: 31.9 G/DL (ref 31.5–35.7)
MCV RBC AUTO: 94.5 FL (ref 79–97)
MONOCYTES # BLD AUTO: 0.44 10*3/MM3 (ref 0.1–0.9)
MONOCYTES NFR BLD AUTO: 4.6 % (ref 5–12)
NEUTROPHILS NFR BLD AUTO: 7.89 10*3/MM3 (ref 1.7–7)
NEUTROPHILS NFR BLD AUTO: 82.4 % (ref 42.7–76)
PLATELET # BLD AUTO: 418 10*3/MM3 (ref 140–450)
PMV BLD AUTO: 9 FL (ref 6–12)
POTASSIUM SERPL-SCNC: 3.5 MMOL/L (ref 3.5–5.2)
PROT SERPL-MCNC: 7.3 G/DL (ref 6–8.5)
RBC # BLD AUTO: 3.98 10*6/MM3 (ref 3.77–5.28)
SODIUM SERPL-SCNC: 138 MMOL/L (ref 136–145)
WBC NRBC COR # BLD AUTO: 9.59 10*3/MM3 (ref 3.4–10.8)

## 2024-12-31 PROCEDURE — 96411 CHEMO IV PUSH ADDL DRUG: CPT

## 2024-12-31 PROCEDURE — 96413 CHEMO IV INFUSION 1 HR: CPT

## 2024-12-31 PROCEDURE — 25810000003 SODIUM CHLORIDE 0.9 % SOLUTION: Performed by: INTERNAL MEDICINE

## 2024-12-31 PROCEDURE — 25010000002 PALONOSETRON PER 25 MCG: Performed by: INTERNAL MEDICINE

## 2024-12-31 PROCEDURE — 25010000002 DEXAMETHASONE SODIUM PHOSPHATE 100 MG/10ML SOLUTION: Performed by: INTERNAL MEDICINE

## 2024-12-31 PROCEDURE — 25010000002 CYCLOPHOSPHAMIDE 2 GM/10ML SOLUTION 10 ML VIAL: Performed by: INTERNAL MEDICINE

## 2024-12-31 PROCEDURE — 25010000002 DOXORUBICIN PER 10 MG: Performed by: INTERNAL MEDICINE

## 2024-12-31 PROCEDURE — 85025 COMPLETE CBC W/AUTO DIFF WBC: CPT | Performed by: INTERNAL MEDICINE

## 2024-12-31 PROCEDURE — 96375 TX/PRO/DX INJ NEW DRUG ADDON: CPT

## 2024-12-31 PROCEDURE — 25810000003 SODIUM CHLORIDE 0.9 % SOLUTION 250 ML FLEX CONT: Performed by: INTERNAL MEDICINE

## 2024-12-31 PROCEDURE — 25010000002 HEPARIN LOCK FLUSH PER 10 UNITS: Performed by: INTERNAL MEDICINE

## 2024-12-31 PROCEDURE — 81025 URINE PREGNANCY TEST: CPT | Performed by: INTERNAL MEDICINE

## 2024-12-31 PROCEDURE — 96377 APPLICATON ON-BODY INJECTOR: CPT

## 2024-12-31 PROCEDURE — 96367 TX/PROPH/DG ADDL SEQ IV INF: CPT

## 2024-12-31 PROCEDURE — 80053 COMPREHEN METABOLIC PANEL: CPT | Performed by: INTERNAL MEDICINE

## 2024-12-31 PROCEDURE — 25010000002 FOSAPREPITANT PER 1 MG: Performed by: INTERNAL MEDICINE

## 2024-12-31 RX ORDER — HEPARIN SODIUM (PORCINE) LOCK FLUSH IV SOLN 100 UNIT/ML 100 UNIT/ML
500 SOLUTION INTRAVENOUS AS NEEDED
OUTPATIENT
Start: 2024-12-31

## 2024-12-31 RX ORDER — DOXORUBICIN HYDROCHLORIDE 2 MG/ML
100 INJECTION, SOLUTION INTRAVENOUS ONCE
Status: COMPLETED | OUTPATIENT
Start: 2024-12-31 | End: 2024-12-31

## 2024-12-31 RX ORDER — SODIUM CHLORIDE 0.9 % (FLUSH) 0.9 %
20 SYRINGE (ML) INJECTION AS NEEDED
OUTPATIENT
Start: 2024-12-31

## 2024-12-31 RX ORDER — SODIUM CHLORIDE 9 MG/ML
20 INJECTION, SOLUTION INTRAVENOUS ONCE
Status: COMPLETED | OUTPATIENT
Start: 2024-12-31 | End: 2024-12-31

## 2024-12-31 RX ORDER — SODIUM CHLORIDE 0.9 % (FLUSH) 0.9 %
10 SYRINGE (ML) INJECTION AS NEEDED
OUTPATIENT
Start: 2024-12-31

## 2024-12-31 RX ORDER — SODIUM CHLORIDE 0.9 % (FLUSH) 0.9 %
10 SYRINGE (ML) INJECTION AS NEEDED
Status: DISCONTINUED | OUTPATIENT
Start: 2024-12-31 | End: 2024-12-31 | Stop reason: HOSPADM

## 2024-12-31 RX ORDER — PALONOSETRON 0.05 MG/ML
0.25 INJECTION, SOLUTION INTRAVENOUS ONCE
Status: COMPLETED | OUTPATIENT
Start: 2024-12-31 | End: 2024-12-31

## 2024-12-31 RX ORDER — LIDOCAINE/PRILOCAINE 2.5 %-2.5%
1 CREAM (GRAM) TOPICAL AS NEEDED
Qty: 15 G | Refills: 3 | Status: SHIPPED | OUTPATIENT
Start: 2024-12-31

## 2024-12-31 RX ORDER — HEPARIN SODIUM (PORCINE) LOCK FLUSH IV SOLN 100 UNIT/ML 100 UNIT/ML
500 SOLUTION INTRAVENOUS AS NEEDED
Status: DISCONTINUED | OUTPATIENT
Start: 2024-12-31 | End: 2024-12-31 | Stop reason: HOSPADM

## 2024-12-31 RX ADMIN — HEPARIN 500 UNITS: 100 SYRINGE at 12:23

## 2024-12-31 RX ADMIN — SODIUM CHLORIDE 20 ML/HR: 900 INJECTION, SOLUTION INTRAVENOUS at 10:13

## 2024-12-31 RX ADMIN — CYCLOPHOSPHAMIDE 1000 MG: 2 INJECTION INTRAVENOUS at 11:36

## 2024-12-31 RX ADMIN — DEXAMETHASONE SODIUM PHOSPHATE 12 MG: 10 INJECTION, SOLUTION INTRAMUSCULAR; INTRAVENOUS at 10:16

## 2024-12-31 RX ADMIN — Medication 10 ML: at 12:23

## 2024-12-31 RX ADMIN — DOXORUBICIN HYDROCHLORIDE 50 MG: 2 INJECTION, SOLUTION INTRAVENOUS at 11:26

## 2024-12-31 RX ADMIN — PALONOSETRON HYDROCHLORIDE 0.25 MG: 0.25 INJECTION, SOLUTION INTRAVENOUS at 10:14

## 2024-12-31 RX ADMIN — FOSAPREPITANT 150 MG: 150 INJECTION, POWDER, LYOPHILIZED, FOR SOLUTION INTRAVENOUS at 10:32

## 2025-01-02 ENCOUNTER — PATIENT MESSAGE (OUTPATIENT)
Dept: GASTROENTEROLOGY | Facility: CLINIC | Age: 45
End: 2025-01-02
Payer: COMMERCIAL

## 2025-01-02 ENCOUNTER — TELEPHONE (OUTPATIENT)
Dept: ONCOLOGY | Facility: CLINIC | Age: 45
End: 2025-01-02

## 2025-01-02 DIAGNOSIS — K50.90 CROHN'S DISEASE WITHOUT COMPLICATION, UNSPECIFIED GASTROINTESTINAL TRACT LOCATION: ICD-10-CM

## 2025-01-02 DIAGNOSIS — K58.0 IRRITABLE BOWEL SYNDROME WITH DIARRHEA: Primary | ICD-10-CM

## 2025-01-02 RX ORDER — PROCHLORPERAZINE MALEATE 10 MG
10 TABLET ORAL EVERY 6 HOURS PRN
Qty: 60 TABLET | Refills: 1 | Status: SHIPPED | OUTPATIENT
Start: 2025-01-02

## 2025-01-02 NOTE — PROGRESS NOTES
Subjective     REASON FOR CONSULTATION:  breast cancer  Provide an opinion on any further workup or treatment                             REQUESTING PHYSICIAN:  Jose    RECORDS OBTAINED:  Records of the patients history including those obtained from the referring provider were reviewed and summarized in detail.    HISTORY OF PRESENT ILLNESS:  The patient is a 44 y.o. year old female who is here for an opinion about the above issue.    History of Present Illness   The patient initiated adjuvant chemotherapy with AC on 12/31/2024 and returns today for a toxicity check.  The patient experienced nausea and diarrhea the past week but has been controlled since taking Imodium and Zofran/Compazine.  She is tolerating fluids and bland diet.  She had fever up to 102 yesterday without associated dysuria shortness of breath cough or changes in her mastectomy wounds (no worsening erythema or drainage).  Fever has responded to ibuprofen.  She is not neutropenic today.    ONCOLOGY HISTORY:  This is a 44-year-old woman referred from general surgery to discuss adjuvant treatment for recently surgically treated breast cancer.  The patient presented with a large palpable mass in the inner quadrant of the right breast.  The mass had been enlarging for couple years but the patient did not have insurance to get assessed.  The breast imaging is not available to me but she had a CT of the chest abdomen pelvis on 9/16/2024 showed a large mass in the right breast measuring up to 7.7 cm with abnormal left axillary lymphadenopathy.  There were tiny subpleural nodules in the lateral aspect of the left lower lobe likely granulomatous mildly conspicuous lymph node in the central mesentery 1.1 cm.  A PET scan was performed 9/30/2024 showing a hypermetabolic mass in the right breast with thickening throughout the right breast and pathologic hypermetabolic right axillary level 1 and 2 lymph nodes, no hypermetabolic internal mammary or  supraclavicular lymph nodes.  The small pulmonary nodules were too small to characterize and mesenteric lymph nodes without hypermetabolic activity.  A biopsy of the right breast mass showed invasive ductal adenocarcinoma.    She was taken to the operating room on 10/9/2024 and underwent a right modified radical mastectomy and axillary dissection, prophylactic left mastectomy.  Pathology from the right breast showed invasive ductal carcinoma Bruce grade 3 (3+3+3) measuring 17 cm with extensive lymphovascular invasion and dermal lymphatic invasion.  The tumor extended to the dermis but did not ulcerate the skin.  Tumor extended to skeletal muscle and was present focally at an anterior margin, 0.05 mm of the posterior margin, 10 mm from the lateral margin, 20 mm from the medial margin, 28 mm from the superior margin and 40 mm from inferior margin.  There was ductal carcinoma in situ present within 2.5 mm of the posterior margin.  There were 13 lymph nodes in the axillary dissection 8 oncology plan/recommendations: of which had macrometastases, 1 micrometastases and 1 lymph node with isolated tumor cells.  The left breast had an intraductal papilloma otherwise negative.  The tumor was positive for estrogen receptor 99% of cells, progesterone receptor 50% of cells, HER2 0 and Ki-67 65%.    The patient has medical comorbidities of Crohn's disease.    Past Medical History:   Diagnosis Date    Anxiety     Breast cancer     Right    Crohn's disease     DVT (deep vein thrombosis) in pregnancy     PFO (patent foramen ovale)     Stroke     after the birth of her child at age 34    Tattoos         Past Surgical History:   Procedure Laterality Date     SECTION      COLON RESECTION      COLONOSCOPY      MASTECTOMY Bilateral 10/9/2024    Procedure: Modified radical mastectomy with axillary dissection of the right breast and simple mastectomy of the left breast;  Surgeon: Rebekah Garcia DO;  Location: [facility]  OR;  Service: General;  Laterality: Bilateral;    SKIN CANCER EXCISION      VENOUS ACCESS DEVICE (PORT) INSERTION N/A 12/10/2024    Procedure: INSERTION VENOUS ACCESS DEVICE;  Surgeon: Rebekah Garcia DO;  Location: [facility] OR;  Service: General;  Laterality: N/A;        Current Outpatient Medications on File Prior to Visit   Medication Sig Dispense Refill    aspirin 81 MG EC tablet Take 1 tablet by mouth Daily.      buPROPion XL (WELLBUTRIN XL) 300 MG 24 hr tablet 1 tablet.      cefdinir (OMNICEF) 300 MG capsule Take 1 capsule by mouth 2 (Two) Times a Day.      cyclobenzaprine (FLEXERIL) 10 MG tablet Take 1 tablet by mouth 2 (Two) Times a Day As Needed for Muscle Spasms for up to 40 doses. 40 tablet 0    diphenhydrAMINE (BENADRYL) 25 mg capsule Take 1 capsule by mouth At Night As Needed for Itching.      famotidine (PEPCID) 20 MG tablet Take 1 tablet by mouth 2 (Two) Times a Day. 60 tablet 2    Klor-Con M20 20 MEQ CR tablet TAKE 1 TABLET BY MOUTH 2 TIMES A DAY 40 tablet 1    lidocaine-prilocaine (EMLA) 2.5-2.5 % cream Apply 1 Application topically to the appropriate area as directed As Needed for Mild Pain. Apply to port site 30 minutes before access 15 g 3    omeprazole (priLOSEC) 20 MG capsule       ondansetron (ZOFRAN) 8 MG tablet Take 1 tablet by mouth 3 (Three) Times a Day As Needed for Nausea or Vomiting. 30 tablet 5    oxyCODONE (ROXICODONE) 5 MG immediate release tablet Take 1 tablet by mouth Every 4 (Four) Hours As Needed for Severe Pain for up to 10 doses. 10 tablet 0    prochlorperazine (COMPAZINE) 10 MG tablet Take 1 tablet by mouth Every 6 (Six) Hours As Needed for Nausea or Vomiting. 60 tablet 1    zolpidem (Ambien) 5 MG tablet Take 1 tablet by mouth At Night As Needed for Sleep.      OLANZapine (zyPREXA) 5 MG tablet Take 1 tablet by mouth for 4 doses starting night of chemotherapy. 8 tablet 1     Current Facility-Administered Medications on File Prior to Visit   Medication Dose Route Frequency  "Provider Last Rate Last Admin    heparin injection 500 Units  500 Units Intravenous PRN Nishant Witt MD   500 Units at 24 0916    sodium chloride 0.9 % flush 10 mL  10 mL Intravenous PRN Nishant Witt MD   10 mL at 24 0916        ALLERGIES:  No Known Allergies     Social History     Socioeconomic History    Marital status: Single    Number of children: 1   Tobacco Use    Smoking status: Former     Current packs/day: 0.00     Average packs/day: 1 pack/day for 20.0 years (20.0 ttl pk-yrs)     Types: Cigarettes     Start date:      Quit date: 2019     Years since quittin.0    Smokeless tobacco: Current    Tobacco comments:     Nicotine pouches   Vaping Use    Vaping status: Never Used   Substance and Sexual Activity    Alcohol use: Yes     Comment: OCC    Drug use: Never    Sexual activity: Defer        Family History   Problem Relation Age of Onset    Diabetes Mother     Heart disease Father     Diabetes Paternal Grandmother     Hypertension Other     Malig Hyperthermia Neg Hx         Review of Systems   Constitutional: Negative.  Positive for fever.   HENT: Negative.     Respiratory: Negative.     Cardiovascular: Negative.    Gastrointestinal: Negative.  Positive for nausea.   Genitourinary: Negative.    Musculoskeletal: Negative.    Skin:  Positive for wound.   Neurological: Negative.    Hematological: Negative.           Objective     Vitals:    25 0953   BP: 109/74   Pulse: 97   Resp: 16   Temp: 97.9 °F (36.6 °C)  Comment: had a fever of 102.1 F last night   TempSrc: Oral   SpO2: 100%   Weight: 71.2 kg (157 lb)   Height: 167.6 cm (65.98\")   PainSc: 0-No pain           2025     9:54 AM   Current Status   ECOG score 0       Physical Exam    CONSTITUTIONAL: pleasant well-developed adult woman  HEENT: no icterus, no thrush, moist membranes  NECK: no jvd  LYMPH: no cervical or supraclavicular lad  CV: RRR, S1S2, no murmur  RESP: cta bilat, no wheezing, no rales  Breast: Bilateral " mastectomy wounds with slight erythema deferred today  GI: soft, nontender, no splenomegaly, +BS  MUSC: no edema, normal gait  NEURO: alert and oriented x3, normal strength  PSYCH: normal mood and affect      RECENT LABS:  Hematology WBC   Date Value Ref Range Status   01/07/2025 2.31 (L) 3.40 - 10.80 10*3/mm3 Final     RBC   Date Value Ref Range Status   01/07/2025 3.78 3.77 - 5.28 10*6/mm3 Final     Hemoglobin   Date Value Ref Range Status   01/07/2025 11.4 (L) 12.0 - 15.9 g/dL Final     Hematocrit   Date Value Ref Range Status   01/07/2025 35.4 34.0 - 46.6 % Final     Platelets   Date Value Ref Range Status   01/07/2025 328 140 - 450 10*3/mm3 Final        Lab Results   Component Value Date    GLUCOSE 140 (H) 01/07/2025    BUN 7 01/07/2025    CREATININE 0.57 01/07/2025     (L) 01/07/2025    K 3.3 (L) 01/07/2025    CL 97 (L) 01/07/2025    CALCIUM 8.6 01/07/2025    PROTEINTOT 7.4 01/07/2025    ALBUMIN 3.7 01/07/2025     (H) 01/07/2025    AST 71 (H) 01/07/2025    ALKPHOS 256 (H) 01/07/2025    BILITOT 0.8 01/07/2025    GLOB 3.7 01/07/2025    AGRATIO 1.0 01/07/2025    BCR 12.3 01/07/2025    ANIONGAP 10.2 01/07/2025    EGFR 115.1 01/07/2025     PET scan 9/30/2024:  FINDINGS:     Head/neck: No suspicious uptake.     Chest: An 8 x 5.8 cm mass in the lower inner right breast with max SUV  of 18 (series 4/image 156). Mass comes in close proximity to the  sternum. Hypermetabolic parenchymal thickening throughout the right  breast with max SUV of 9 (series 4/image 140). Diffuse right breast skin  thickening.     Hypermetabolic right axillary level I and II lymph nodes. Reference 1.2  cm level I lymph node with max SUV of 11.9 (series 4/image 103).  Additional reference subcentimeter level II lymph node with max SUV of  3.3 (series 4/image 93). Separate brown fat uptake in the bilateral  axilla and posterior neck base.     No enlarged or hypermetabolic internal mammary or supraclavicular lymph  nodes.     Few  subcentimeter pulmonary nodules are too small for accurate PET  characterization without overt hypermetabolism and unchanged from recent  CT. Reference left lower lobe (series 4/image 167) and right upper lobe  (series 4/image 131).     Abdomen/pelvis: Mesenteric lymph nodes are mostly subcentimeter without  associated hypermetabolism.     Sigmoid colectomy. Tubular hypermetabolism throughout the otherwise  normal-appearing remaining colon may be medication related. Moderate  proximal colonic stool burden.     Bones: No focal osseous hypermetabolism with special attention to the  sternum.           IMPRESSION:  1. Hypermetabolic 8 cm right breast mass with hypermetabolic parenchymal  thickening throughout the right breast, pathologically proven invasive  ductal carcinoma. Mass comes in close proximity to the sternum. No focal  osseous hypermetabolism with special attention to the sternum.  2. Hypermetabolic right axillary level I and II lymph nodes suggesting  alana metastatic disease. No enlarged or hypermetabolic internal mammary  or supraclavicular lymph nodes. Separate brown fat uptake in the  bilateral axilla and posterior neck base.  3. Few subcentimeter pulmonary nodules are too small for accurate PET  characterization and will need follow-up via chest CT.  4. Mesenteric lymph nodes are mostly subcentimeter without associated  hypermetabolism.       Assessment & Plan   *pT3N2a M0 grade 3 invasive ductal adenocarcinoma right breast, ER 99% positive, DC 50% positive, HER2 0, Ki-67 65%, tumor present at anterior margin  presented with a large palpable mass in the inner quadrant of the right breast; mass had been enlarging for couple years but the patient did not have insurance to get assessed  CT of the chest abdomen pelvis on 9/16/2024 showed a large mass in the right breast measuring up to 7.7 cm with abnormal left axillary lymphadenopathy.  There were tiny subpleural nodules in the lateral aspect of the left  lower lobe likely granulomatous mildly conspicuous lymph node in the central mesentery 1.1 cm.    PET scan 9/30/2024 - hypermetabolic mass in the right breast with thickening throughout the right breast and pathologic hypermetabolic right axillary level 1 and 2 lymph nodes, no hypermetabolic internal mammary or supraclavicular lymph nodes.  The small pulmonary nodules were too small to characterize and mesenteric lymph nodes without hypermetabolic activity.    biopsy of the right breast mass showed invasive ductal adenocarcinoma.  operating room on 10/9/2024 and underwent a right modified radical mastectomy and axillary dissection, prophylactic left mastectomy.  Pathology from the right breast showed invasive ductal carcinoma [place] grade 3 (3+3+3) measuring 17 cm with extensive lymphovascular invasion and dermal lymphatic invasion.  The tumor extended to the dermis but did not ulcerate the skin.  Tumor extended to skeletal muscle and was present focally at an anterior margin, 0.05 mm of the posterior margin, 10 mm from the lateral margin, 20 mm from the medial margin, 28 mm from the superior margin and 40 mm from inferior margin.  There was ductal carcinoma in situ present within 2.5 mm of the posterior margin.  There were 13 lymph nodes in the axillary dissection 8 of which had macrometastases, 1 micrometastases and 1 lymph node with isolated tumor cells.  The left breast had an intraductal papilloma otherwise negative.  The tumor was positive for estrogen receptor 99% of cells, progesterone receptor 50% of cells, HER2 0 and Ki-67 65%.  Initiated adjuvant chemotherapy with Adriamycin/Cytoxan 12/31/2024 (adjuvant chemotherapy delayed because of wound healing issues) nonneutropenic fever    *Non-neutropenic fever  Reported fever 102 at home yesterday, ANC 1.69    * nausea/diarrhea, chemo induced   responding to Imodium/Zofran/Compazine    *elevated LFT-likely s/e chemo     *[employer]ae 19 gene panel-VUS Bard  1    *medical comorbidities of Crohn's disease.    Oncology plan/recommendations:  Plan is for adjuvant chemotherapy to reduce risk of recurrence/help eradicate potential micrometastatic disease with dose dense AC with Neulasta support x 4 cycles followed by weekly Taxol x 12 doses.   After chemotherapy she will need postmastectomy radiation given the size and alana involvement  She will need hormonal therapy with ovarian suppression/tamoxifen versus oophorectomy and AI therapy/ck4/6 inhibitor  Nonspecific lung nodules will need reassessment after completing chemotherapy radiographically  Levaquin 500 mg daily x 7 days given recent fever and possible impending neutropenia-patient advised to go to ER if fever not responding to ibuprofen   Continue Imodium Zofran and Compazine for nausea/diarrhea   Return to clinic 1 week lab MD visit and cycle 2 AC chemotherapy including repeat CMP prior

## 2025-01-02 NOTE — TELEPHONE ENCOUNTER
"Returned pt's call. States she began having diarrhea today and has\" run to the bathroom about 10 times with diarrhea\". States she is also having a lot of nausea, but so far no vomiting. She has taken zyprexa once today b/c she forgot to take it last night and has had 2 zofran so far today, which did help relieve some nausea but nausea returns when she wakes up. Did have a smoothie for breakfast and has kept down 20oz of water mixed with electrolyte powder. Advised her to continue to hydrate with the electrolyte solution and to go ahead and take 2 immodium now as she has already had multiple eposides of diarrhea today. Instructed her to take one additional immodium after each loose stool up to 8 tabs/day and she v/u. Reviewed with Azalia VILLARREAL and sent in prescription for compazine 10mg q6hr prn and also advised pt  to take enterade as this has helped her GI issues in the past. If no improvement by tomorrow, Azalia would like to bring pt in to [facility]U at [facility] for labs and fluids. Will included Dr Witt's clinical RN so we can f/u with pt tomorrow.   "

## 2025-01-07 ENCOUNTER — INFUSION (OUTPATIENT)
Dept: ONCOLOGY | Facility: HOSPITAL | Age: 45
End: 2025-01-07
Payer: COMMERCIAL

## 2025-01-07 ENCOUNTER — OFFICE VISIT (OUTPATIENT)
Dept: ONCOLOGY | Facility: CLINIC | Age: 45
End: 2025-01-07
Payer: COMMERCIAL

## 2025-01-07 VITALS
WEIGHT: 157 LBS | HEIGHT: 66 IN | OXYGEN SATURATION: 100 % | HEART RATE: 97 BPM | TEMPERATURE: 97.9 F | DIASTOLIC BLOOD PRESSURE: 74 MMHG | SYSTOLIC BLOOD PRESSURE: 109 MMHG | BODY MASS INDEX: 25.23 KG/M2 | RESPIRATION RATE: 16 BRPM

## 2025-01-07 DIAGNOSIS — C50.919 MALIGNANT NEOPLASM OF BREAST IN FEMALE, ESTROGEN RECEPTOR POSITIVE, UNSPECIFIED LATERALITY, UNSPECIFIED SITE OF BREAST: ICD-10-CM

## 2025-01-07 DIAGNOSIS — C50.911 BREAST CANCER, STAGE 3, RIGHT: ICD-10-CM

## 2025-01-07 DIAGNOSIS — Z45.2 ENCOUNTER FOR CENTRAL LINE CARE: Primary | ICD-10-CM

## 2025-01-07 DIAGNOSIS — C50.911 BREAST CANCER, STAGE 3, RIGHT: Primary | ICD-10-CM

## 2025-01-07 DIAGNOSIS — Z17.0 MALIGNANT NEOPLASM OF BREAST IN FEMALE, ESTROGEN RECEPTOR POSITIVE, UNSPECIFIED LATERALITY, UNSPECIFIED SITE OF BREAST: ICD-10-CM

## 2025-01-07 LAB
ALBUMIN SERPL-MCNC: 3.7 G/DL (ref 3.5–5.2)
ALBUMIN/GLOB SERPL: 1 G/DL
ALP SERPL-CCNC: 256 U/L (ref 39–117)
ALT SERPL W P-5'-P-CCNC: 110 U/L (ref 1–33)
ANION GAP SERPL CALCULATED.3IONS-SCNC: 10.2 MMOL/L (ref 5–15)
AST SERPL-CCNC: 71 U/L (ref 1–32)
BASOPHILS # BLD AUTO: 0.03 10*3/MM3 (ref 0–0.2)
BASOPHILS NFR BLD AUTO: 1.3 % (ref 0–1.5)
BILIRUB SERPL-MCNC: 0.8 MG/DL (ref 0–1.2)
BUN SERPL-MCNC: 7 MG/DL (ref 6–20)
BUN/CREAT SERPL: 12.3 (ref 7–25)
CALCIUM SPEC-SCNC: 8.6 MG/DL (ref 8.6–10.5)
CHLORIDE SERPL-SCNC: 97 MMOL/L (ref 98–107)
CO2 SERPL-SCNC: 23.8 MMOL/L (ref 22–29)
CREAT SERPL-MCNC: 0.57 MG/DL (ref 0.57–1)
DEPRECATED RDW RBC AUTO: 49.7 FL (ref 37–54)
EGFRCR SERPLBLD CKD-EPI 2021: 115.1 ML/MIN/1.73
EOSINOPHIL # BLD AUTO: 0.14 10*3/MM3 (ref 0–0.4)
EOSINOPHIL NFR BLD AUTO: 6.1 % (ref 0.3–6.2)
ERYTHROCYTE [DISTWIDTH] IN BLOOD BY AUTOMATED COUNT: 14.2 % (ref 12.3–15.4)
GLOBULIN UR ELPH-MCNC: 3.7 GM/DL
GLUCOSE SERPL-MCNC: 140 MG/DL (ref 65–99)
HCT VFR BLD AUTO: 35.4 % (ref 34–46.6)
HGB BLD-MCNC: 11.4 G/DL (ref 12–15.9)
IMM GRANULOCYTES # BLD AUTO: 0.04 10*3/MM3 (ref 0–0.05)
IMM GRANULOCYTES NFR BLD AUTO: 1.7 % (ref 0–0.5)
LYMPHOCYTES # BLD AUTO: 0.36 10*3/MM3 (ref 0.7–3.1)
LYMPHOCYTES NFR BLD AUTO: 15.6 % (ref 19.6–45.3)
MCH RBC QN AUTO: 30.2 PG (ref 26.6–33)
MCHC RBC AUTO-ENTMCNC: 32.2 G/DL (ref 31.5–35.7)
MCV RBC AUTO: 93.7 FL (ref 79–97)
MONOCYTES # BLD AUTO: 0.05 10*3/MM3 (ref 0.1–0.9)
MONOCYTES NFR BLD AUTO: 2.2 % (ref 5–12)
NEUTROPHILS NFR BLD AUTO: 1.69 10*3/MM3 (ref 1.7–7)
NEUTROPHILS NFR BLD AUTO: 73.1 % (ref 42.7–76)
PLATELET # BLD AUTO: 328 10*3/MM3 (ref 140–450)
PMV BLD AUTO: 9.3 FL (ref 6–12)
POTASSIUM SERPL-SCNC: 3.3 MMOL/L (ref 3.5–5.2)
PROT SERPL-MCNC: 7.4 G/DL (ref 6–8.5)
RBC # BLD AUTO: 3.78 10*6/MM3 (ref 3.77–5.28)
SODIUM SERPL-SCNC: 131 MMOL/L (ref 136–145)
WBC NRBC COR # BLD AUTO: 2.31 10*3/MM3 (ref 3.4–10.8)

## 2025-01-07 PROCEDURE — 36591 DRAW BLOOD OFF VENOUS DEVICE: CPT

## 2025-01-07 PROCEDURE — 80053 COMPREHEN METABOLIC PANEL: CPT | Performed by: INTERNAL MEDICINE

## 2025-01-07 PROCEDURE — 25010000002 HEPARIN LOCK FLUSH PER 10 UNITS: Performed by: INTERNAL MEDICINE

## 2025-01-07 PROCEDURE — 85025 COMPLETE CBC W/AUTO DIFF WBC: CPT | Performed by: INTERNAL MEDICINE

## 2025-01-07 RX ORDER — SODIUM CHLORIDE 0.9 % (FLUSH) 0.9 %
20 SYRINGE (ML) INJECTION AS NEEDED
OUTPATIENT
Start: 2025-01-07

## 2025-01-07 RX ORDER — SODIUM CHLORIDE 0.9 % (FLUSH) 0.9 %
10 SYRINGE (ML) INJECTION AS NEEDED
Status: ACTIVE | OUTPATIENT
Start: 2025-01-07

## 2025-01-07 RX ORDER — HEPARIN SODIUM (PORCINE) LOCK FLUSH IV SOLN 100 UNIT/ML 100 UNIT/ML
500 SOLUTION INTRAVENOUS AS NEEDED
OUTPATIENT
Start: 2025-01-07

## 2025-01-07 RX ORDER — HEPARIN SODIUM (PORCINE) LOCK FLUSH IV SOLN 100 UNIT/ML 100 UNIT/ML
500 SOLUTION INTRAVENOUS AS NEEDED
Status: DISPENSED | OUTPATIENT
Start: 2025-01-07

## 2025-01-07 RX ORDER — LEVOFLOXACIN 500 MG/1
500 TABLET, FILM COATED ORAL DAILY
Qty: 7 TABLET | Refills: 0 | Status: SHIPPED | OUTPATIENT
Start: 2025-01-07 | End: 2025-01-14

## 2025-01-07 RX ORDER — SODIUM CHLORIDE 0.9 % (FLUSH) 0.9 %
10 SYRINGE (ML) INJECTION AS NEEDED
OUTPATIENT
Start: 2025-01-07

## 2025-01-07 RX ADMIN — Medication 10 ML: at 10:48

## 2025-01-07 RX ADMIN — HEPARIN 500 UNITS: 100 SYRINGE at 10:49

## 2025-01-07 NOTE — TELEPHONE ENCOUNTER
Thank you for the update.  I have sent communication to Dr. Witt for further recommendations from their standpoint.  I do want to stress the importance of initiating treatment for Crohn's disease as she did have significant stricturing at time of last endoscopic assessment.    Please encourage her to continue with plan as outlined during last in office visit and we will update her with any additional recommendations based on Dr. Witt's response.    PHIL Sanchez

## 2025-01-08 NOTE — PROGRESS NOTES
Subjective     REASON FOR CONSULTATION:  breast cancer  Provide an opinion on any further workup or treatment                             REQUESTING PHYSICIAN:  Jose    RECORDS OBTAINED:  Records of the patients history including those obtained from the referring provider were reviewed and summarized in detail.    HISTORY OF PRESENT ILLNESS:  The patient is a 44 y.o. year old female who is here for an opinion about the above issue.    History of Present Illness   The patient initiated adjuvant chemotherapy with AC on 12/31/2024.  She had fever after the first cycle with borderline neutropenia but no obvious bacterial infections.  She was treated with Levaquin.  Fevers resolved over the weekend.  She denies uncontrolled nausea vomiting or diarrhea.  She denies stomatitis.  Chest wounds have healed.    ONCOLOGY HISTORY:  This is a 44-year-old woman referred from general surgery to discuss adjuvant treatment for recently surgically treated breast cancer.  The patient presented with a large palpable mass in the inner quadrant of the right breast.  The mass had been enlarging for couple years but the patient did not have insurance to get assessed.  The breast imaging is not available to me but she had a CT of the chest abdomen pelvis on 9/16/2024 showed a large mass in the right breast measuring up to 7.7 cm with abnormal left axillary lymphadenopathy.  There were tiny subpleural nodules in the lateral aspect of the left lower lobe likely granulomatous mildly conspicuous lymph node in the central mesentery 1.1 cm.  A PET scan was performed 9/30/2024 showing a hypermetabolic mass in the right breast with thickening throughout the right breast and pathologic hypermetabolic right axillary level 1 and 2 lymph nodes, no hypermetabolic internal mammary or supraclavicular lymph nodes.  The small pulmonary nodules were too small to characterize and mesenteric lymph nodes without hypermetabolic activity.  A biopsy of the  right breast mass showed invasive ductal adenocarcinoma.    She was taken to the operating room on 10/9/2024 and underwent a right modified radical mastectomy and axillary dissection, prophylactic left mastectomy.  Pathology from the right breast showed invasive ductal carcinoma [place] grade 3 (3+3+3) measuring 17 cm with extensive lymphovascular invasion and dermal lymphatic invasion.  The tumor extended to the dermis but did not ulcerate the skin.  Tumor extended to skeletal muscle and was present focally at an anterior margin, 0.05 mm of the posterior margin, 10 mm from the lateral margin, 20 mm from the medial margin, 28 mm from the superior margin and 40 mm from inferior margin.  There was ductal carcinoma in situ present within 2.5 mm of the posterior margin.  There were 13 lymph nodes in the axillary dissection 8 oncology plan/recommendations: of which had macrometastases, 1 micrometastases and 1 lymph node with isolated tumor cells.  The left breast had an intraductal papilloma otherwise negative.  The tumor was positive for estrogen receptor 99% of cells, progesterone receptor 50% of cells, HER2 0 and Ki-67 65%.    The patient has medical comorbidities of Crohn's disease.    Past Medical History:   Diagnosis Date    Anxiety     Breast cancer     Right    Crohn's disease     DVT (deep vein thrombosis) in pregnancy     PFO (patent foramen ovale)     Stroke     after the birth of her child at age 34    Tattoos         Past Surgical History:   Procedure Laterality Date     SECTION      COLON RESECTION      COLONOSCOPY      MASTECTOMY Bilateral 10/9/2024    Procedure: Modified radical mastectomy with axillary dissection of the right breast and simple mastectomy of the left breast;  Surgeon: Rebekah Garcia DO;  Location: [facility];  Service: General;  Laterality: Bilateral;    SKIN CANCER EXCISION      VENOUS ACCESS DEVICE (PORT) INSERTION N/A 12/10/2024    Procedure: INSERTION VENOUS ACCESS  DEVICE;  Surgeon: Rebekah Garcia DO;  Location: [facility];  Service: General;  Laterality: N/A;        Current Outpatient Medications on File Prior to Visit   Medication Sig Dispense Refill    aspirin 81 MG EC tablet Take 1 tablet by mouth Daily.      buPROPion XL (WELLBUTRIN XL) 300 MG 24 hr tablet 1 tablet.      cefdinir (OMNICEF) 300 MG capsule Take 1 capsule by mouth 2 (Two) Times a Day.      cyclobenzaprine (FLEXERIL) 10 MG tablet Take 1 tablet by mouth 2 (Two) Times a Day As Needed for Muscle Spasms for up to 40 doses. 40 tablet 0    diphenhydrAMINE (BENADRYL) 25 mg capsule Take 1 capsule by mouth At Night As Needed for Itching.      famotidine (PEPCID) 20 MG tablet Take 1 tablet by mouth 2 (Two) Times a Day. 60 tablet 2    Klor-Con M20 20 MEQ CR tablet TAKE 1 TABLET BY MOUTH 2 TIMES A DAY 40 tablet 1    levoFLOXacin (LEVAQUIN) 500 MG tablet Take 1 tablet by mouth Daily for 7 days. 1 tablet 7 tablet 0    lidocaine-prilocaine (EMLA) 2.5-2.5 % cream Apply 1 Application topically to the appropriate area as directed As Needed for Mild Pain. Apply to port site 30 minutes before access 15 g 3    OLANZapine (zyPREXA) 5 MG tablet Take 1 tablet by mouth for 4 doses starting night of chemotherapy. 8 tablet 1    omeprazole (priLOSEC) 20 MG capsule       ondansetron (ZOFRAN) 8 MG tablet Take 1 tablet by mouth 3 (Three) Times a Day As Needed for Nausea or Vomiting. 30 tablet 5    oxyCODONE (ROXICODONE) 5 MG immediate release tablet Take 1 tablet by mouth Every 4 (Four) Hours As Needed for Severe Pain for up to 10 doses. 10 tablet 0    potassium chloride 10 MEQ CR tablet Take 1 tablet by mouth Every 12 (Twelve) Hours for 5 days. 10 tablet 0    prochlorperazine (COMPAZINE) 10 MG tablet Take 1 tablet by mouth Every 6 (Six) Hours As Needed for Nausea or Vomiting. 60 tablet 1    zolpidem (Ambien) 5 MG tablet Take 1 tablet by mouth At Night As Needed for Sleep.       Current Facility-Administered Medications on File Prior  "to Visit   Medication Dose Route Frequency Provider Last Rate Last Admin    heparin injection 500 Units  500 Units Intravenous PRN Nishant Witt MD   500 Units at 24 0916    heparin injection 500 Units  500 Units Intravenous PRN Nishant Witt MD   500 Units at 25 1049    sodium chloride 0.9 % flush 10 mL  10 mL Intravenous PRN Nishant Witt MD   10 mL at 24 0916    sodium chloride 0.9 % flush 10 mL  10 mL Intravenous PRN Nishant Witt MD   10 mL at 25 1048        ALLERGIES:  No Known Allergies     Social History     Socioeconomic History    Marital status: Single    Number of children: 1   Tobacco Use    Smoking status: Former     Current packs/day: 0.00     Average packs/day: 1 pack/day for 20.0 years (20.0 ttl pk-yrs)     Types: Cigarettes     Start date:      Quit date:      Years since quittin.0    Smokeless tobacco: Current    Tobacco comments:     Nicotine pouches   Vaping Use    Vaping status: Never Used   Substance and Sexual Activity    Alcohol use: Yes     Comment: OCC    Drug use: Never    Sexual activity: Defer        Family History   Problem Relation Age of Onset    Diabetes Mother     Heart disease Father     Diabetes Paternal Grandmother     Hypertension Other     Malig Hyperthermia Neg Hx         Review of Systems   Constitutional:  Negative for fever.   HENT: Negative.     Respiratory: Negative.     Cardiovascular: Negative.    Gastrointestinal:  Negative for nausea.   Genitourinary: Negative.    Musculoskeletal: Negative.    Skin:  Negative for wound.   Neurological: Negative.    Hematological: Negative.           Objective     Vitals:    25 0824   BP: 98/55   Pulse: 98   Resp: 16   Temp: 97.3 °F (36.3 °C)  Comment: Temp was 102.7 on Saturday Night   TempSrc: Infrared   SpO2: 99%   Weight: 68.4 kg (150 lb 12.8 oz)   Height: 165.1 cm (65\")   PainSc: 0-No pain             2025     8:31 AM   Current Status   ECOG score 0       Physical " Exam    CONSTITUTIONAL: pleasant well-developed adult woman  HEENT: no icterus, no thrush, moist membranes/  LYMPH: no cervical or supraclavicular lad  CV: RRR, S1S2, no murmur  RESP/chest: cta bilat, no wheezing, no rales, port present left chest wall  Breast: Deferred  GI: soft, nontender, no splenomegaly, +BS  MUSC: no edema, normal gait  NEURO: alert and oriented x3, normal strength  PSYCH: normal mood and affect      RECENT LABS:  Hematology WBC   Date Value Ref Range Status   01/14/2025 6.85 3.40 - 10.80 10*3/mm3 Final     RBC   Date Value Ref Range Status   01/14/2025 3.94 3.77 - 5.28 10*6/mm3 Final     Hemoglobin   Date Value Ref Range Status   01/14/2025 11.9 (L) 12.0 - 15.9 g/dL Final     Hematocrit   Date Value Ref Range Status   01/14/2025 36.2 34.0 - 46.6 % Final     Platelets   Date Value Ref Range Status   01/14/2025 567 (H) 140 - 450 10*3/mm3 Final        Lab Results   Component Value Date    GLUCOSE 111 (H) 01/09/2025    BUN 5 (L) 01/09/2025    CREATININE 0.63 01/09/2025     (L) 01/09/2025    K 3.2 (L) 01/09/2025     01/09/2025    CALCIUM 8.5 (L) 01/09/2025    PROTEINTOT 6.7 01/09/2025    ALBUMIN 3.3 (L) 01/09/2025     (H) 01/09/2025    AST 54 (H) 01/09/2025    ALKPHOS 351 (H) 01/09/2025    BILITOT 0.9 01/09/2025    GLOB 3.4 01/09/2025    AGRATIO 1.0 01/09/2025    BCR 7.9 01/09/2025    ANIONGAP 12.2 01/09/2025    EGFR 112.3 01/09/2025     PET scan 9/30/2024:  FINDINGS:     Head/neck: No suspicious uptake.     Chest: An 8 x 5.8 cm mass in the lower inner right breast with max SUV  of 18 (series 4/image 156). Mass comes in close proximity to the  sternum. Hypermetabolic parenchymal thickening throughout the right  breast with max SUV of 9 (series 4/image 140). Diffuse right breast skin  thickening.     Hypermetabolic right axillary level I and II lymph nodes. Reference 1.2  cm level I lymph node with max SUV of 11.9 (series 4/image 103).  Additional reference subcentimeter level II  lymph node with max SUV of  3.3 (series 4/image 93). Separate brown fat uptake in the bilateral  axilla and posterior neck base.     No enlarged or hypermetabolic internal mammary or supraclavicular lymph  nodes.     Few subcentimeter pulmonary nodules are too small for accurate PET  characterization without overt hypermetabolism and unchanged from recent  CT. Reference left lower lobe (series 4/image 167) and right upper lobe  (series 4/image 131).     Abdomen/pelvis: Mesenteric lymph nodes are mostly subcentimeter without  associated hypermetabolism.     Sigmoid colectomy. Tubular hypermetabolism throughout the otherwise  normal-appearing remaining colon may be medication related. Moderate  proximal colonic stool burden.     Bones: No focal osseous hypermetabolism with special attention to the  sternum.           IMPRESSION:  1. Hypermetabolic 8 cm right breast mass with hypermetabolic parenchymal  thickening throughout the right breast, pathologically proven invasive  ductal carcinoma. Mass comes in close proximity to the sternum. No focal  osseous hypermetabolism with special attention to the sternum.  2. Hypermetabolic right axillary level I and II lymph nodes suggesting  alana metastatic disease. No enlarged or hypermetabolic internal mammary  or supraclavicular lymph nodes. Separate brown fat uptake in the  bilateral axilla and posterior neck base.  3. Few subcentimeter pulmonary nodules are too small for accurate PET  characterization and will need follow-up via chest CT.  4. Mesenteric lymph nodes are mostly subcentimeter without associated  hypermetabolism.       Assessment & Plan   *pT3N2a M0 grade 3 invasive ductal adenocarcinoma right breast, ER 99% positive, AZ 50% positive, HER2 0, Ki-67 65%, tumor present at anterior margin  presented with a large palpable mass in the inner quadrant of the right breast; mass had been enlarging for couple years but the patient did not have insurance to get  assessed  CT of the chest abdomen pelvis on 9/16/2024 showed a large mass in the right breast measuring up to 7.7 cm with abnormal left axillary lymphadenopathy.  There were tiny subpleural nodules in the lateral aspect of the left lower lobe likely granulomatous mildly conspicuous lymph node in the central mesentery 1.1 cm.    PET scan 9/30/2024 - hypermetabolic mass in the right breast with thickening throughout the right breast and pathologic hypermetabolic right axillary level 1 and 2 lymph nodes, no hypermetabolic internal mammary or supraclavicular lymph nodes.  The small pulmonary nodules were too small to characterize and mesenteric lymph nodes without hypermetabolic activity.    biopsy of the right breast mass showed invasive ductal adenocarcinoma.  operating room on 10/9/2024 and underwent a right modified radical mastectomy and axillary dissection, prophylactic left mastectomy.  Pathology from the right breast showed invasive ductal carcinoma [place] grade 3 (3+3+3) measuring 17 cm with extensive lymphovascular invasion and dermal lymphatic invasion.  The tumor extended to the dermis but did not ulcerate the skin.  Tumor extended to skeletal muscle and was present focally at an anterior margin, 0.05 mm of the posterior margin, 10 mm from the lateral margin, 20 mm from the medial margin, 28 mm from the superior margin and 40 mm from inferior margin.  There was ductal carcinoma in situ present within 2.5 mm of the posterior margin.  There were 13 lymph nodes in the axillary dissection 8 of which had macrometastases, 1 micrometastases and 1 lymph node with isolated tumor cells.  The left breast had an intraductal papilloma otherwise negative.  The tumor was positive for estrogen receptor 99% of cells, progesterone receptor 50% of cells, HER2 0 and Ki-67 65%.  Initiated adjuvant chemotherapy with Adriamycin/Cytoxan 12/31/2024 (adjuvant chemotherapy delayed because of wound healing issues) nonneutropenic  fever    *Suppression secondary to chemotherapy   resolved with normal CBC today      * nausea/diarrhea, chemo induced   responding to Imodium/Zofran/Compazine    *elevated LFT-likely s/e chemo     *Invitae 19 gene panel-VUS Bard 1    *medical comorbidities of Crohn's disease.    Oncology plan/recommendations:  Plan is for adjuvant chemotherapy to reduce risk of recurrence/help eradicate potential micrometastatic disease with dose dense AC with Neulasta support x 4 cycles followed by weekly Taxol x 12 doses-proceed with cycle 2 AC today pending stable CMP  After chemotherapy she will need postmastectomy radiation given the size and alana involvement  She will need hormonal therapy with ovarian suppression/tamoxifen versus oophorectomy and AI therapy/ck4/6 inhibitor  Nonspecific lung nodules will need reassessment after completing chemotherapy radiographically  Continue Imodium Zofran and Compazine for nausea/diarrhea   Return to clinic 2 week lab MD visit and cycle 3 AC chemotherapy

## 2025-01-09 ENCOUNTER — HOSPITAL ENCOUNTER (EMERGENCY)
Facility: HOSPITAL | Age: 45
Discharge: HOME | End: 2025-01-10
Attending: STUDENT IN AN ORGANIZED HEALTH CARE EDUCATION/TRAINING PROGRAM
Payer: COMMERCIAL

## 2025-01-09 ENCOUNTER — APPOINTMENT (OUTPATIENT)
Dept: CT IMAGING | Facility: HOSPITAL | Age: 45
End: 2025-01-09
Payer: COMMERCIAL

## 2025-01-09 ENCOUNTER — TELEPHONE (OUTPATIENT)
Dept: ONCOLOGY | Facility: CLINIC | Age: 45
End: 2025-01-09
Payer: COMMERCIAL

## 2025-01-09 ENCOUNTER — APPOINTMENT (OUTPATIENT)
Dept: GENERAL RADIOLOGY | Facility: HOSPITAL | Age: 45
End: 2025-01-09
Payer: COMMERCIAL

## 2025-01-09 VITALS
RESPIRATION RATE: 18 BRPM | OXYGEN SATURATION: 96 % | WEIGHT: 157 LBS | SYSTOLIC BLOOD PRESSURE: 92 MMHG | DIASTOLIC BLOOD PRESSURE: 67 MMHG | HEART RATE: 60 BPM | HEIGHT: 65 IN | BODY MASS INDEX: 26.16 KG/M2 | TEMPERATURE: 98.2 F

## 2025-01-09 DIAGNOSIS — E87.6 HYPOKALEMIA: ICD-10-CM

## 2025-01-09 DIAGNOSIS — K52.9 ILEITIS: Primary | ICD-10-CM

## 2025-01-09 DIAGNOSIS — R10.84 GENERALIZED ABDOMINAL PAIN: ICD-10-CM

## 2025-01-09 DIAGNOSIS — R50.9 FEVER, UNSPECIFIED FEVER CAUSE: ICD-10-CM

## 2025-01-09 LAB
ALBUMIN SERPL-MCNC: 3.3 G/DL (ref 3.5–5.2)
ALBUMIN/GLOB SERPL: 1 G/DL
ALP SERPL-CCNC: 351 U/L (ref 39–117)
ALT SERPL W P-5'-P-CCNC: 110 U/L (ref 1–33)
ANION GAP SERPL CALCULATED.3IONS-SCNC: 12.2 MMOL/L (ref 5–15)
AST SERPL-CCNC: 54 U/L (ref 1–32)
BASOPHILS # BLD AUTO: 0.02 10*3/MM3 (ref 0–0.2)
BASOPHILS NFR BLD AUTO: 2.6 % (ref 0–1.5)
BILIRUB SERPL-MCNC: 0.9 MG/DL (ref 0–1.2)
BUN SERPL-MCNC: 5 MG/DL (ref 6–20)
BUN/CREAT SERPL: 7.9 (ref 7–25)
CALCIUM SPEC-SCNC: 8.5 MG/DL (ref 8.6–10.5)
CHLORIDE SERPL-SCNC: 100 MMOL/L (ref 98–107)
CO2 SERPL-SCNC: 22.8 MMOL/L (ref 22–29)
CREAT SERPL-MCNC: 0.63 MG/DL (ref 0.57–1)
DEPRECATED RDW RBC AUTO: 48.6 FL (ref 37–54)
EGFRCR SERPLBLD CKD-EPI 2021: 112.3 ML/MIN/1.73
EOSINOPHIL # BLD AUTO: 0.03 10*3/MM3 (ref 0–0.4)
EOSINOPHIL NFR BLD AUTO: 3.9 % (ref 0.3–6.2)
ERYTHROCYTE [DISTWIDTH] IN BLOOD BY AUTOMATED COUNT: 14.6 % (ref 12.3–15.4)
FLUAV RNA RESP QL NAA+PROBE: NOT DETECTED
FLUBV RNA RESP QL NAA+PROBE: NOT DETECTED
GLOBULIN UR ELPH-MCNC: 3.4 GM/DL
GLUCOSE SERPL-MCNC: 111 MG/DL (ref 65–99)
HCT VFR BLD AUTO: 30.6 % (ref 34–46.6)
HGB BLD-MCNC: 10.2 G/DL (ref 12–15.9)
IMM GRANULOCYTES # BLD AUTO: 0 10*3/MM3 (ref 0–0.05)
IMM GRANULOCYTES NFR BLD AUTO: 0 % (ref 0–0.5)
LIPASE SERPL-CCNC: 11 U/L (ref 13–60)
LYMPHOCYTES # BLD AUTO: 0.4 10*3/MM3 (ref 0.7–3.1)
LYMPHOCYTES NFR BLD AUTO: 51.9 % (ref 19.6–45.3)
MCH RBC QN AUTO: 30.3 PG (ref 26.6–33)
MCHC RBC AUTO-ENTMCNC: 33.3 G/DL (ref 31.5–35.7)
MCV RBC AUTO: 90.8 FL (ref 79–97)
MONOCYTES # BLD AUTO: 0.2 10*3/MM3 (ref 0.1–0.9)
MONOCYTES NFR BLD AUTO: 26 % (ref 5–12)
NEUTROPHILS NFR BLD AUTO: 0.12 10*3/MM3 (ref 1.7–7)
NEUTROPHILS NFR BLD AUTO: 15.6 % (ref 42.7–76)
NRBC BLD AUTO-RTO: 0 /100 WBC (ref 0–0.2)
PLATELET # BLD AUTO: 237 10*3/MM3 (ref 140–450)
PMV BLD AUTO: 10.1 FL (ref 6–12)
POTASSIUM SERPL-SCNC: 3.2 MMOL/L (ref 3.5–5.2)
PROT SERPL-MCNC: 6.7 G/DL (ref 6–8.5)
RBC # BLD AUTO: 3.37 10*6/MM3 (ref 3.77–5.28)
RSV RNA RESP QL NAA+PROBE: NOT DETECTED
SARS-COV-2 RNA RESP QL NAA+PROBE: NOT DETECTED
SODIUM SERPL-SCNC: 135 MMOL/L (ref 136–145)
WBC NRBC COR # BLD AUTO: 0.77 10*3/MM3 (ref 3.4–10.8)

## 2025-01-09 PROCEDURE — 25010000002 KETOROLAC TROMETHAMINE PER 15 MG: Performed by: STUDENT IN AN ORGANIZED HEALTH CARE EDUCATION/TRAINING PROGRAM

## 2025-01-09 PROCEDURE — 80053 COMPREHEN METABOLIC PANEL: CPT | Performed by: STUDENT IN AN ORGANIZED HEALTH CARE EDUCATION/TRAINING PROGRAM

## 2025-01-09 PROCEDURE — 96361 HYDRATE IV INFUSION ADD-ON: CPT

## 2025-01-09 PROCEDURE — 85025 COMPLETE CBC W/AUTO DIFF WBC: CPT | Performed by: STUDENT IN AN ORGANIZED HEALTH CARE EDUCATION/TRAINING PROGRAM

## 2025-01-09 PROCEDURE — 87637 SARSCOV2&INF A&B&RSV AMP PRB: CPT | Performed by: STUDENT IN AN ORGANIZED HEALTH CARE EDUCATION/TRAINING PROGRAM

## 2025-01-09 PROCEDURE — 74177 CT ABD & PELVIS W/CONTRAST: CPT

## 2025-01-09 PROCEDURE — 99285 EMERGENCY DEPT VISIT HI MDM: CPT | Performed by: STUDENT IN AN ORGANIZED HEALTH CARE EDUCATION/TRAINING PROGRAM

## 2025-01-09 PROCEDURE — 25810000003 SODIUM CHLORIDE 0.9 % SOLUTION: Performed by: STUDENT IN AN ORGANIZED HEALTH CARE EDUCATION/TRAINING PROGRAM

## 2025-01-09 PROCEDURE — 71045 X-RAY EXAM CHEST 1 VIEW: CPT

## 2025-01-09 PROCEDURE — 96374 THER/PROPH/DIAG INJ IV PUSH: CPT

## 2025-01-09 PROCEDURE — 83690 ASSAY OF LIPASE: CPT | Performed by: STUDENT IN AN ORGANIZED HEALTH CARE EDUCATION/TRAINING PROGRAM

## 2025-01-09 RX ORDER — ACETAMINOPHEN 500 MG
1000 TABLET ORAL ONCE
Status: COMPLETED | OUTPATIENT
Start: 2025-01-09 | End: 2025-01-09

## 2025-01-09 RX ORDER — KETOROLAC TROMETHAMINE 30 MG/ML
15 INJECTION, SOLUTION INTRAMUSCULAR; INTRAVENOUS ONCE
Status: COMPLETED | OUTPATIENT
Start: 2025-01-09 | End: 2025-01-09

## 2025-01-09 RX ADMIN — KETOROLAC TROMETHAMINE 15 MG: 30 INJECTION, SOLUTION INTRAMUSCULAR; INTRAVENOUS at 22:52

## 2025-01-09 RX ADMIN — SODIUM CHLORIDE 1000 ML: 9 INJECTION, SOLUTION INTRAVENOUS at 22:47

## 2025-01-09 RX ADMIN — ACETAMINOPHEN 1000 MG: 500 TABLET, FILM COATED ORAL at 22:51

## 2025-01-09 NOTE — TELEPHONE ENCOUNTER
Provider: Dr. Witt  Caller: Suzanne Lin   Relationship to Patient: Self  Call Back Phone Number: [phone]  Reason for Call: Pt stated she feels sick running a fever. Please advise. Pt asked to speak to Lukasz

## 2025-01-09 NOTE — TELEPHONE ENCOUNTER
Clarified Dr. Witt's routing comment. He meant to say if temperature greater than 101 at all, go to ER. If temperature is greater than 100.5, recheck in 1 hour, if still greater than 100.5, proceed to ER. Patient checked temperature now and it is at 100.8, she will recheck in an hour and use these parameters overnight.

## 2025-01-10 ENCOUNTER — TELEPHONE (OUTPATIENT)
Dept: ONCOLOGY | Facility: CLINIC | Age: 45
End: 2025-01-10
Payer: COMMERCIAL

## 2025-01-10 PROCEDURE — 25510000001 IOPAMIDOL PER 1 ML: Performed by: EMERGENCY MEDICINE

## 2025-01-10 RX ORDER — IOPAMIDOL 755 MG/ML
100 INJECTION, SOLUTION INTRAVASCULAR
Status: COMPLETED | OUTPATIENT
Start: 2025-01-10 | End: 2025-01-10

## 2025-01-10 RX ORDER — POTASSIUM CHLORIDE 750 MG/1
10 TABLET, EXTENDED RELEASE ORAL EVERY 12 HOURS
Qty: 10 TABLET | Refills: 0 | Status: SHIPPED | OUTPATIENT
Start: 2025-01-10 | End: 2025-01-15

## 2025-01-10 RX ORDER — CIPROFLOXACIN 500 MG/1
500 TABLET, FILM COATED ORAL EVERY 12 HOURS
Qty: 10 TABLET | Refills: 0 | Status: SHIPPED | OUTPATIENT
Start: 2025-01-10 | End: 2025-01-10 | Stop reason: SDUPTHER

## 2025-01-10 RX ADMIN — IOPAMIDOL 100 ML: 755 INJECTION, SOLUTION INTRAVENOUS at 00:16

## 2025-01-10 NOTE — TELEPHONE ENCOUNTER
Caller: Suzanne Lin    Relationship: Self    Best call back number: [phone]    What is the best time to reach you: ANY    Who are you requesting to speak with (clinical staff, provider,  specific staff member): CLINICAL       What was the call regarding: SUZANNE WENT TO THE  ER LAST NIGHT DUE TO HAVING A FEVER .5    SHE WAS TOLD THAT IS WAS VIRAL AND SENT HER HOME WITH MOTRIN AND TYLENOL        PLEASE ADVISE

## 2025-01-10 NOTE — TELEPHONE ENCOUNTER
Provider: Dr. Witt  Caller: [facility]  Relationship to Patient: Provider office  Call Back Phone Number: [phone]  Reason for Call: Pt told PCP she was taken off Wellbutrin by Dr Witt. Wants to confirm and ask if dr. Witt has any recommendations for an alternative medication.

## 2025-01-10 NOTE — ED PROVIDER NOTES
Subjective   History of Present Illness  45 yo F hx crohn's disease, invasive ductal carcinoma on chemotherapy presents to er w/ cc of intermittent fever for 2 wks. She had been on cefdinir near anjana time for fevers while receiving chemo; her fevers resolved with this, but for last 3 days started up again; this time with a cough, associated sx of flank pain. Tmax 101.6 at home. Denies chest, abd pain, n/v, diarrhea or constipation, syncope. Denies blood from any orifice or melena. ROS otherwise neg. Vitals are T 101.4 here; other vitals nl; exam pt well-appearing but cough, ctab, non-ttp abd, no flank ttp, no peripheral edema.   Review of Systems    Past Medical History:   Diagnosis Date    Anxiety     Breast cancer     Right    Crohn's disease     DVT (deep vein thrombosis) in pregnancy     PFO (patent foramen ovale)     Stroke     after the birth of her child at age 34    Tattoos        No Known Allergies    Past Surgical History:   Procedure Laterality Date     SECTION      COLON RESECTION      COLONOSCOPY      MASTECTOMY Bilateral 10/9/2024    Procedure: Modified radical mastectomy with axillary dissection of the right breast and simple mastectomy of the left breast;  Surgeon: Rebekah Garcia DO;  Location: [facility] OR;  Service: General;  Laterality: Bilateral;    SKIN CANCER EXCISION      VENOUS ACCESS DEVICE (PORT) INSERTION N/A 12/10/2024    Procedure: INSERTION VENOUS ACCESS DEVICE;  Surgeon: Rebekah Garcia DO;  Location: [facility] OR;  Service: General;  Laterality: N/A;       Family History   Problem Relation Age of Onset    Diabetes Mother     Heart disease Father     Diabetes Paternal Grandmother     Hypertension Other     Malig Hyperthermia Neg Hx        Social History     Socioeconomic History    Marital status: Single    Number of children: 1   Tobacco Use    Smoking status: Former     Current packs/day: 0.00     Average packs/day: 1 pack/day for 20.0 years (20.0 ttl pk-yrs)      Types: Cigarettes     Start date:      Quit date: 2019     Years since quittin.0    Smokeless tobacco: Current    Tobacco comments:     Nicotine pouches   Vaping Use    Vaping status: Never Used   Substance and Sexual Activity    Alcohol use: Yes     Comment: OCC    Drug use: Never    Sexual activity: Defer           Objective   Physical Exam    Procedures           ED Course                                                       Medical Decision Making      45 yo f with active cancer therapy x2 with fever T101.4 , cough and flank pain.    Ext review of chart; Dr. Witt, oncologist; reports pt on doxorubicin cyclophosphamide, pegfilgrastim.    Concern for viral uri vs pna vs uti/pyelo; eval with abd labs, ua, CXR, viral swabs; tx w/ tylenol 1 g PO toradol 15 mg iv, 1 L NS IV    S/o to dr. Gu pending labs/imaging      Hst pt  Dsp home  Final diagnoses:   None       ED Disposition  ED Disposition       None            No follow-up provider specified.       Medication List      No changes were made to your prescriptions during this visit.            Efrain Burleson MD  [id number]

## 2025-01-10 NOTE — TELEPHONE ENCOUNTER
Informed patient that we have reviewed the ED notes and Dr. Witt advises to continue Levaquin. Explained to her that based on yesterday's CBC result, she is still neutropenic and at risk for infection. She verbalized understanding.

## 2025-01-10 NOTE — ED PROVIDER NOTES
Subjective   History of Present Illness    Review of Systems    Past Medical History:   Diagnosis Date    Anxiety     Breast cancer     Right    Crohn's disease     DVT (deep vein thrombosis) in pregnancy     PFO (patent foramen ovale)     Stroke     after the birth of her child at age 34    Tattoos        No Known Allergies    Past Surgical History:   Procedure Laterality Date     SECTION      COLON RESECTION      COLONOSCOPY      MASTECTOMY Bilateral 10/9/2024    Procedure: Modified radical mastectomy with axillary dissection of the right breast and simple mastectomy of the left breast;  Surgeon: Rebekah Garcia DO;  Location: [facility] OR;  Service: General;  Laterality: Bilateral;    SKIN CANCER EXCISION      VENOUS ACCESS DEVICE (PORT) INSERTION N/A 12/10/2024    Procedure: INSERTION VENOUS ACCESS DEVICE;  Surgeon: Rebekah Garcia DO;  Location: [facility] OR;  Service: General;  Laterality: N/A;       Family History   Problem Relation Age of Onset    Diabetes Mother     Heart disease Father     Diabetes Paternal Grandmother     Hypertension Other     Malig Hyperthermia Neg Hx        Social History     Socioeconomic History    Marital status: Single    Number of children: 1   Tobacco Use    Smoking status: Former     Current packs/day: 0.00     Average packs/day: 1 pack/day for 20.0 years (20.0 ttl pk-yrs)     Types: Cigarettes     Start date:      Quit date: 2019     Years since quittin.0    Smokeless tobacco: Current    Tobacco comments:     Nicotine pouches   Vaping Use    Vaping status: Never Used   Substance and Sexual Activity    Alcohol use: Yes     Comment: OCC    Drug use: Never    Sexual activity: Defer           Objective   Physical Exam    Procedures           ED Course                                                       Medical Decision Making  CT Abdomen Pelvis With Contrast    Result Date: 1/10/2025  Impression: 1.Enhancement and wall thickening of the distal ileum  consistent with ileitis. 2.Questionable wall thickening of the ascending colon. 3.Trace pericholecystic fluid and gallbladder wall thickening. Electronically Signed: Hubert Stephen MD  1/10/2025 12:38 AM EST  Workstation ID: [id number]    XR Chest 1 View    Result Date: 1/9/2025  Impression: Mild coarsening of the pulmonary interstitial markings, whether from image technique, or viral syndrome/bronchitis. No focal pneumonia is seen. Electronically Signed: Ash Cortes MD  1/9/2025 11:04 PM EST  Workstation ID: [id number]    Labs Reviewed  COMPREHENSIVE METABOLIC PANEL - Abnormal; Notable for the following components:     Glucose                       111 (*)                BUN                           5 (*)                  Sodium                        135 (*)                Potassium                     3.2 (*)                Calcium                       8.5 (*)                Albumin                       3.3 (*)                ALT (SGPT)                    110 (*)                AST (SGOT)                    54 (*)                 Alkaline Phosphatase          351 (*)             All other components within normal limits         Narrative: GFR Categories in Chronic Kidney Disease (CKD)                                      GFR Category          GFR (mL/min/1.73)    Interpretation                  G1                     90 or greater         Normal or high (1)                  G2                      60-89                Mild decrease (1)                  G3a                   45-59                Mild to moderate decrease                  G3b                   30-44                Moderate to severe decrease                  G4                    15-29                Severe decrease                  G5                    14 or less           Kidney failure                                          (1)In the absence of evidence of kidney disease, neither GFR category G1 or G2 fulfill the criteria for CKD.                                     eGFR calculation 2021 CKD-EPI creatinine equation, which does not include race as a factor  LIPASE - Abnormal; Notable for the following components:     Lipase                        11 (*)              All other components within normal limits  CBC WITH AUTO DIFFERENTIAL - Abnormal; Notable for the following components:     WBC                           0.77 (*)               RBC                           3.37 (*)               Hemoglobin                    10.2 (*)               Hematocrit                    30.6 (*)               Neutrophil %                  15.6 (*)               Lymphocyte %                  51.9 (*)               Monocyte %                    26.0 (*)               Basophil %                    2.6 (*)                Neutrophils, Absolute         0.12 (*)               Lymphocytes, Absolute         0.40 (*)            All other components within normal limits  COVID-19/FLUA&B/RSV, NP SWAB IN TRANSPORT MEDIA 1 HR TAT - Normal         Narrative: Fact sheet for providers: https://www.fda.gov/media/624042/download                    Fact sheet for patients: https://www.fda.gov/media/622804/download                                    Test performed by PCR.  CBC AND DIFFERENTIAL    0050 Pt seen again prior to d/c.  Labs/Imaging reviewed and are remarkable decreasing white blood cell count with an increase in lymphocytes and decrease in neutrophils.  Patient has some changes on x-ray of bronchitis and some changes of the colon on CT concerning for ileitis.  Patient is currently prescribed Levaquin which should be treating both of these.  Encourage patient to continue to take the medicine and follow-up with her provider in the next few days.  Patient's fever has improved and she feels better.  Vitals stable and pt. in NAD. Non-toxic. Comfortable. Ambulating without difficulty.  Tolerating po.  Relaxed breathing.  All questions personally answered at the bedside and all d/c  instructions personally reviewed with pt.  Discussed the importance of close outpt. f/u and pt. understands this and agrees to do so.  Pt agrees to return to ED immediately for any new, persistent, or worsening symptoms.  Patient spouse is present for evaluation and discharge instructions.    EMR Dragon/Transcription disclaimer:  Much of this encounter note is an electronic transcription/translation of spoken language to printed text using the Dragon Dictation System       Problems Addressed:  Fever, unspecified fever cause: complicated acute illness or injury  Generalized abdominal pain: complicated acute illness or injury  Ileitis: complicated acute illness or injury    Amount and/or Complexity of Data Reviewed  Labs: ordered.  Radiology: ordered.    Risk  OTC drugs.  Prescription drug management.        Final diagnoses:   Ileitis   Generalized abdominal pain   Fever, unspecified fever cause   Hypokalemia       ED Disposition  ED Disposition       ED Disposition   Discharge    Condition   Stable    Comment   --               Gia Rodriguez, APRN  [address] [place] [address]  [phone]    In 1 day           Medication List        New Prescriptions      potassium chloride 10 MEQ CR tablet  Take 1 tablet by mouth Every 12 (Twelve) Hours for 5 days.               Where to Get Your Medications        These medications were sent to [facility] [id number] [place], [place] - [address] [facility]  &  - [phone]  - [phone] FX  [address] [address]      Phone: [phone]   potassium chloride 10 MEQ CR tablet            Mamadou Gu MD  01/10/25 0144

## 2025-01-10 NOTE — TELEPHONE ENCOUNTER
I called back Natalie and let her know per Dr. Witt that it doesn't matter what medication she goes on to replace the Wellbutrin as long as it doesn't have any interaction with her chemo therapy, Cytoxan/adriamycin. Natalie marie/jenny

## 2025-01-14 ENCOUNTER — INFUSION (OUTPATIENT)
Dept: ONCOLOGY | Facility: HOSPITAL | Age: 45
End: 2025-01-14
Payer: COMMERCIAL

## 2025-01-14 ENCOUNTER — OFFICE VISIT (OUTPATIENT)
Dept: ONCOLOGY | Facility: CLINIC | Age: 45
End: 2025-01-14
Payer: COMMERCIAL

## 2025-01-14 VITALS
TEMPERATURE: 97.3 F | WEIGHT: 150.8 LBS | HEART RATE: 98 BPM | SYSTOLIC BLOOD PRESSURE: 98 MMHG | HEIGHT: 65 IN | DIASTOLIC BLOOD PRESSURE: 55 MMHG | BODY MASS INDEX: 25.12 KG/M2 | OXYGEN SATURATION: 99 % | RESPIRATION RATE: 16 BRPM

## 2025-01-14 DIAGNOSIS — Z45.2 ENCOUNTER FOR CENTRAL LINE CARE: ICD-10-CM

## 2025-01-14 DIAGNOSIS — Z79.899 NEED FOR PROPHYLACTIC CHEMOTHERAPY: ICD-10-CM

## 2025-01-14 DIAGNOSIS — C50.911 BREAST CANCER, STAGE 3, RIGHT: ICD-10-CM

## 2025-01-14 DIAGNOSIS — Z79.899 NEED FOR PROPHYLACTIC CHEMOTHERAPY: Primary | ICD-10-CM

## 2025-01-14 DIAGNOSIS — C50.911 BREAST CANCER, STAGE 3, RIGHT: Primary | ICD-10-CM

## 2025-01-14 DIAGNOSIS — Z65.8 PSYCHOSOCIAL DISTRESS: ICD-10-CM

## 2025-01-14 LAB
ALBUMIN SERPL-MCNC: 3.3 G/DL (ref 3.5–5.2)
ALBUMIN/GLOB SERPL: 0.8 G/DL
ALP SERPL-CCNC: 277 U/L (ref 39–117)
ALT SERPL W P-5'-P-CCNC: 33 U/L (ref 1–33)
ANION GAP SERPL CALCULATED.3IONS-SCNC: 12.1 MMOL/L (ref 5–15)
AST SERPL-CCNC: 14 U/L (ref 1–32)
BASOPHILS # BLD AUTO: 0.11 10*3/MM3 (ref 0–0.2)
BASOPHILS NFR BLD AUTO: 1.6 % (ref 0–1.5)
BILIRUB SERPL-MCNC: 0.5 MG/DL (ref 0–1.2)
BUN SERPL-MCNC: 7 MG/DL (ref 6–20)
BUN/CREAT SERPL: 11.1 (ref 7–25)
CALCIUM SPEC-SCNC: 8.9 MG/DL (ref 8.6–10.5)
CHLORIDE SERPL-SCNC: 104 MMOL/L (ref 98–107)
CO2 SERPL-SCNC: 23.9 MMOL/L (ref 22–29)
CREAT SERPL-MCNC: 0.63 MG/DL (ref 0.57–1)
DEPRECATED RDW RBC AUTO: 49.7 FL (ref 37–54)
EGFRCR SERPLBLD CKD-EPI 2021: 112.3 ML/MIN/1.73
EOSINOPHIL # BLD AUTO: 0.08 10*3/MM3 (ref 0–0.4)
EOSINOPHIL NFR BLD AUTO: 1.2 % (ref 0.3–6.2)
ERYTHROCYTE [DISTWIDTH] IN BLOOD BY AUTOMATED COUNT: 14.6 % (ref 12.3–15.4)
GLOBULIN UR ELPH-MCNC: 3.9 GM/DL
GLUCOSE SERPL-MCNC: 109 MG/DL (ref 65–99)
HCT VFR BLD AUTO: 36.2 % (ref 34–46.6)
HGB BLD-MCNC: 11.9 G/DL (ref 12–15.9)
IMM GRANULOCYTES # BLD AUTO: 0.62 10*3/MM3 (ref 0–0.05)
IMM GRANULOCYTES NFR BLD AUTO: 9.1 % (ref 0–0.5)
LYMPHOCYTES # BLD AUTO: 1.01 10*3/MM3 (ref 0.7–3.1)
LYMPHOCYTES NFR BLD AUTO: 14.7 % (ref 19.6–45.3)
MCH RBC QN AUTO: 30.2 PG (ref 26.6–33)
MCHC RBC AUTO-ENTMCNC: 32.9 G/DL (ref 31.5–35.7)
MCV RBC AUTO: 91.9 FL (ref 79–97)
MONOCYTES # BLD AUTO: 0.89 10*3/MM3 (ref 0.1–0.9)
MONOCYTES NFR BLD AUTO: 13 % (ref 5–12)
NEUTROPHILS NFR BLD AUTO: 4.14 10*3/MM3 (ref 1.7–7)
NEUTROPHILS NFR BLD AUTO: 60.4 % (ref 42.7–76)
NRBC BLD AUTO-RTO: 0 /100 WBC (ref 0–0.2)
PLATELET # BLD AUTO: 567 10*3/MM3 (ref 140–450)
PMV BLD AUTO: 9.4 FL (ref 6–12)
POTASSIUM SERPL-SCNC: 3.7 MMOL/L (ref 3.5–5.2)
PROT SERPL-MCNC: 7.2 G/DL (ref 6–8.5)
RBC # BLD AUTO: 3.94 10*6/MM3 (ref 3.77–5.28)
SODIUM SERPL-SCNC: 140 MMOL/L (ref 136–145)
WBC NRBC COR # BLD AUTO: 6.85 10*3/MM3 (ref 3.4–10.8)

## 2025-01-14 PROCEDURE — 25010000002 PEGFILGRASTIM 6 MG/0.6ML PREFILLED SYRINGE KIT: Performed by: INTERNAL MEDICINE

## 2025-01-14 PROCEDURE — 25010000002 FOSAPREPITANT PER 1 MG: Performed by: INTERNAL MEDICINE

## 2025-01-14 PROCEDURE — 25010000002 HEPARIN LOCK FLUSH PER 10 UNITS: Performed by: INTERNAL MEDICINE

## 2025-01-14 PROCEDURE — 96411 CHEMO IV PUSH ADDL DRUG: CPT

## 2025-01-14 PROCEDURE — 25810000003 SODIUM CHLORIDE 0.9 % SOLUTION 250 ML FLEX CONT: Performed by: INTERNAL MEDICINE

## 2025-01-14 PROCEDURE — 80053 COMPREHEN METABOLIC PANEL: CPT | Performed by: INTERNAL MEDICINE

## 2025-01-14 PROCEDURE — 25010000002 PALONOSETRON PER 25 MCG: Performed by: INTERNAL MEDICINE

## 2025-01-14 PROCEDURE — 25010000002 DEXAMETHASONE SODIUM PHOSPHATE 100 MG/10ML SOLUTION: Performed by: INTERNAL MEDICINE

## 2025-01-14 PROCEDURE — 25010000002 DOXORUBICIN PER 10 MG: Performed by: INTERNAL MEDICINE

## 2025-01-14 PROCEDURE — 96377 APPLICATON ON-BODY INJECTOR: CPT

## 2025-01-14 PROCEDURE — 96413 CHEMO IV INFUSION 1 HR: CPT

## 2025-01-14 PROCEDURE — 96367 TX/PROPH/DG ADDL SEQ IV INF: CPT

## 2025-01-14 PROCEDURE — 25810000003 SODIUM CHLORIDE 0.9 % SOLUTION: Performed by: INTERNAL MEDICINE

## 2025-01-14 PROCEDURE — 25010000002 CYCLOPHOSPHAMIDE 1 GM/5ML SOLUTION 5 ML VIAL: Performed by: INTERNAL MEDICINE

## 2025-01-14 PROCEDURE — 85025 COMPLETE CBC W/AUTO DIFF WBC: CPT | Performed by: INTERNAL MEDICINE

## 2025-01-14 PROCEDURE — 96375 TX/PRO/DX INJ NEW DRUG ADDON: CPT

## 2025-01-14 RX ORDER — BUDESONIDE 3 MG/1
9 CAPSULE, COATED PELLETS ORAL EVERY MORNING
Qty: 90 CAPSULE | Refills: 1 | Status: SHIPPED | OUTPATIENT
Start: 2025-01-14 | End: 2025-01-15 | Stop reason: SDUPTHER

## 2025-01-14 RX ORDER — HEPARIN SODIUM (PORCINE) LOCK FLUSH IV SOLN 100 UNIT/ML 100 UNIT/ML
500 SOLUTION INTRAVENOUS AS NEEDED
Status: DISCONTINUED | OUTPATIENT
Start: 2025-01-14 | End: 2025-01-14 | Stop reason: HOSPADM

## 2025-01-14 RX ORDER — SODIUM CHLORIDE 9 MG/ML
20 INJECTION, SOLUTION INTRAVENOUS ONCE
Status: CANCELLED | OUTPATIENT
Start: 2025-01-14

## 2025-01-14 RX ORDER — SODIUM CHLORIDE 9 MG/ML
20 INJECTION, SOLUTION INTRAVENOUS ONCE
Status: COMPLETED | OUTPATIENT
Start: 2025-01-14 | End: 2025-01-14

## 2025-01-14 RX ORDER — SODIUM CHLORIDE 0.9 % (FLUSH) 0.9 %
20 SYRINGE (ML) INJECTION AS NEEDED
Status: CANCELLED | OUTPATIENT
Start: 2025-01-14

## 2025-01-14 RX ORDER — DOXORUBICIN HYDROCHLORIDE 2 MG/ML
100 INJECTION, SOLUTION INTRAVENOUS ONCE
Status: COMPLETED | OUTPATIENT
Start: 2025-01-14 | End: 2025-01-14

## 2025-01-14 RX ORDER — PALONOSETRON 0.05 MG/ML
0.25 INJECTION, SOLUTION INTRAVENOUS ONCE
Status: COMPLETED | OUTPATIENT
Start: 2025-01-14 | End: 2025-01-14

## 2025-01-14 RX ORDER — SODIUM CHLORIDE 0.9 % (FLUSH) 0.9 %
10 SYRINGE (ML) INJECTION AS NEEDED
OUTPATIENT
Start: 2025-01-14

## 2025-01-14 RX ORDER — HEPARIN SODIUM (PORCINE) LOCK FLUSH IV SOLN 100 UNIT/ML 100 UNIT/ML
500 SOLUTION INTRAVENOUS AS NEEDED
OUTPATIENT
Start: 2025-01-14

## 2025-01-14 RX ORDER — PALONOSETRON 0.05 MG/ML
0.25 INJECTION, SOLUTION INTRAVENOUS ONCE
Status: CANCELLED | OUTPATIENT
Start: 2025-01-14

## 2025-01-14 RX ORDER — SODIUM CHLORIDE 0.9 % (FLUSH) 0.9 %
10 SYRINGE (ML) INJECTION AS NEEDED
Status: DISCONTINUED | OUTPATIENT
Start: 2025-01-14 | End: 2025-01-14 | Stop reason: HOSPADM

## 2025-01-14 RX ORDER — DOXORUBICIN HYDROCHLORIDE 2 MG/ML
60 INJECTION, SOLUTION INTRAVENOUS ONCE
Status: CANCELLED | OUTPATIENT
Start: 2025-01-14

## 2025-01-14 RX ORDER — SODIUM CHLORIDE 0.9 % (FLUSH) 0.9 %
20 SYRINGE (ML) INJECTION AS NEEDED
Status: DISCONTINUED | OUTPATIENT
Start: 2025-01-14 | End: 2025-01-14 | Stop reason: HOSPADM

## 2025-01-14 RX ADMIN — DOXORUBICIN HYDROCHLORIDE 50 MG: 2 INJECTION, SOLUTION INTRAVENOUS at 10:46

## 2025-01-14 RX ADMIN — CYCLOPHOSPHAMIDE 1000 MG: 1 INJECTION INTRAVENOUS at 10:56

## 2025-01-14 RX ADMIN — Medication 10 ML: at 11:36

## 2025-01-14 RX ADMIN — SODIUM CHLORIDE 20 ML/HR: 900 INJECTION, SOLUTION INTRAVENOUS at 09:33

## 2025-01-14 RX ADMIN — DEXAMETHASONE SODIUM PHOSPHATE 12 MG: 10 INJECTION, SOLUTION INTRAMUSCULAR; INTRAVENOUS at 10:20

## 2025-01-14 RX ADMIN — PEGFILGRASTIM 6 MG: KIT SUBCUTANEOUS at 11:13

## 2025-01-14 RX ADMIN — FOSAPREPITANT DIMEGLUMINE 150 MG: 150 INJECTION, POWDER, LYOPHILIZED, FOR SOLUTION INTRAVENOUS at 09:35

## 2025-01-14 RX ADMIN — PALONOSETRON HYDROCHLORIDE 0.25 MG: 0.25 INJECTION, SOLUTION INTRAVENOUS at 09:34

## 2025-01-14 RX ADMIN — HEPARIN 500 UNITS: 100 SYRINGE at 11:36

## 2025-01-14 NOTE — TELEPHONE ENCOUNTER
Reviewed case findings with Dr. Castellanos.  Recommends proceeding with budesonide 9 mg once daily to help improve inflammatory changes witnessed on CT while awaiting chemotherapy completion per Dr. Witt's recommendations.  Additionally, agreeable with plan to consider referral to IBD specialist given patient's complex comorbidities and nonresponse to biologic treatments.    Patient contacted via telephone with these recommendations and agreeable to proceeding with budesonide however cites concern over budesonide and's impact on weight gain when compared to previous experiences with prednisone.  Reviewed mechanism of action of budesonide all questions answered and support provided.    At this time she declines referral to IBD specialist due to demands associated with oncology.  However is agreeable to consider in the future and will contact our office when she would like to proceed.    We did discuss importance of proceeding with CT enterography as most recent CT did not utilize oral contrast.  Patient is agreeable with this plan and will reschedule canceled appointment.    Further recommendations to be made pending results of the above work-up and clinical course.      Aviva Eng, APRN

## 2025-01-14 NOTE — TELEPHONE ENCOUNTER
Can you please contact patient and offer cost plus pharmacy to see if she is agreeable to proceed.  Once she confirms I am happy to send prescription.    PHIL Sanchez

## 2025-01-15 ENCOUNTER — OFFICE VISIT (OUTPATIENT)
Dept: SURGERY | Facility: CLINIC | Age: 45
End: 2025-01-15
Payer: COMMERCIAL

## 2025-01-15 DIAGNOSIS — C50.911 BREAST CANCER, STAGE 3, RIGHT: Primary | ICD-10-CM

## 2025-01-15 PROCEDURE — 99213 OFFICE O/P EST LOW 20 MIN: CPT | Performed by: SURGERY

## 2025-01-15 RX ORDER — BUDESONIDE 3 MG/1
9 CAPSULE, COATED PELLETS ORAL EVERY MORNING
Qty: 90 CAPSULE | Refills: 1 | Status: SHIPPED | OUTPATIENT
Start: 2025-01-15

## 2025-01-15 NOTE — LETTER
January 15, 2025     PHIL Gaines  [address] [place] [id number]    Patient: Suzanne Lin   YOB: 1980   Date of Visit: 1/15/2025     Dear PHIL Gaines:       Thank you for referring Suzanne Lin to me for evaluation. Below are the relevant portions of my assessment and plan of care.    If you have questions, please do not hesitate to call me. I look forward to following Suzanne along with you.         Sincerely,        Rebekah Garcia DO        CC: No Recipients    Rebekah Garcia DO  01/15/25 1523  Sign when Signing Visit  Suzanne Lin 44 y.o. female presents for FU.  Pt states she feels pretty good.       HPI     Above noted and agree.  Suzanne is here following her bilateral mastectomies and port placement.  She has almost completed her chemotherapy.  She feels well.      Review of Systems        Past Medical History:   Diagnosis Date   • Anxiety    • Breast cancer     Right   • Crohn's disease    • DVT (deep vein thrombosis) in pregnancy    • PFO (patent foramen ovale)    • Stroke     after the birth of her child at age 34   • Tattoos            Past Surgical History:   Procedure Laterality Date   •  SECTION     • COLON RESECTION     • COLONOSCOPY     • MASTECTOMY Bilateral 10/9/2024    Procedure: Modified radical mastectomy with axillary dissection of the right breast and simple mastectomy of the left breast;  Surgeon: Rebekah Garcia DO;  Location: [facility];  Service: General;  Laterality: Bilateral;   • SKIN CANCER EXCISION     • VENOUS ACCESS DEVICE (PORT) INSERTION N/A 12/10/2024    Procedure: INSERTION VENOUS ACCESS DEVICE;  Surgeon: Rebekah Garcia DO;  Location: [facility];  Service: General;  Laterality: N/A;           Physical Exam  Constitutional:       Appearance: Normal appearance.   Chest:   Breasts:     Right: Absent.      Left: Absent.      Comments: Her wound has completely healed  Neurological:      General: No focal deficit  present.      Mental Status: She is alert and oriented to person, place, and time.   Psychiatric:         Mood and Affect: Mood normal.         Behavior: Behavior normal.             There were no vitals taken for this visit.        Diagnoses and all orders for this visit:    1. Breast cancer, stage 3, right (Primary)    I will have Suzanne come back in 3 months.    Thank you for allowing me to participate in the care of this interesting patient.

## 2025-01-15 NOTE — PROGRESS NOTES
Suzanne Lin 44 y.o. female presents for FU.  Pt states she feels pretty good.       HPI     Above noted and agree.  Suzanne is here following her bilateral mastectomies and port placement.  She has almost completed her chemotherapy.  She feels well.      Review of Systems        Past Medical History:   Diagnosis Date    Anxiety     Breast cancer     Right    Crohn's disease     DVT (deep vein thrombosis) in pregnancy     PFO (patent foramen ovale)     Stroke     after the birth of her child at age 34    Tattoos            Past Surgical History:   Procedure Laterality Date     SECTION      COLON RESECTION      COLONOSCOPY      MASTECTOMY Bilateral 10/9/2024    Procedure: Modified radical mastectomy with axillary dissection of the right breast and simple mastectomy of the left breast;  Surgeon: Rebekah Garcia DO;  Location: [facility] OR;  Service: General;  Laterality: Bilateral;    SKIN CANCER EXCISION      VENOUS ACCESS DEVICE (PORT) INSERTION N/A 12/10/2024    Procedure: INSERTION VENOUS ACCESS DEVICE;  Surgeon: Rebekah Garcia DO;  Location: [facility] OR;  Service: General;  Laterality: N/A;           Physical Exam  Constitutional:       Appearance: Normal appearance.   Chest:   Breasts:     Right: Absent.      Left: Absent.      Comments: Her wound has completely healed  Neurological:      General: No focal deficit present.      Mental Status: She is alert and oriented to person, place, and time.   Psychiatric:         Mood and Affect: Mood normal.         Behavior: Behavior normal.             There were no vitals taken for this visit.        Diagnoses and all orders for this visit:    1. Breast cancer, stage 3, right (Primary)    I will have Suzanne come back in 3 months.    Thank you for allowing me to participate in the care of this interesting patient.

## 2025-01-16 ENCOUNTER — PATIENT OUTREACH (OUTPATIENT)
Dept: OTHER | Facility: HOSPITAL | Age: 45
End: 2025-01-16
Payer: COMMERCIAL

## 2025-01-16 ENCOUNTER — TELEMEDICINE (OUTPATIENT)
Dept: PSYCHIATRY | Facility: HOSPITAL | Age: 45
End: 2025-01-16
Payer: COMMERCIAL

## 2025-01-16 DIAGNOSIS — F33.1 MODERATE EPISODE OF RECURRENT MAJOR DEPRESSIVE DISORDER: ICD-10-CM

## 2025-01-16 DIAGNOSIS — R23.2 HOT FLASHES: ICD-10-CM

## 2025-01-16 DIAGNOSIS — F41.1 GAD (GENERALIZED ANXIETY DISORDER): Primary | ICD-10-CM

## 2025-01-16 RX ORDER — GABAPENTIN 300 MG/1
600 CAPSULE ORAL NIGHTLY
Qty: 60 CAPSULE | Refills: 2 | Status: SHIPPED | OUTPATIENT
Start: 2025-01-16 | End: 2026-01-16

## 2025-01-16 RX ORDER — VENLAFAXINE HYDROCHLORIDE 37.5 MG/1
37.5 CAPSULE, EXTENDED RELEASE ORAL TAKE AS DIRECTED
Qty: 60 CAPSULE | Refills: 2 | Status: SHIPPED | OUTPATIENT
Start: 2025-01-16 | End: 2026-01-16

## 2025-01-16 NOTE — PROGRESS NOTES
Video visit conducted via [facility] Video Visit  You have chosen to receive care through a telehealth visit.  Do you consent to use a video/audio connection for your medical care today? YES  Telehealth provided in patient's home.  Provider Location: [facility]    Chief Complaint: Stress, overwhelm associated with current diagnosis    Subjective  Patient ID: Suzanne Lin is a 44 y.o. female who presents for initial consultation through the [facility] at the request of No ref. provider found     PHQ-9 Total Score: (Patient-Rptd) 13       HPI:  Pt is seen in initial consultation regarding recent diagnosis of breast cancer, stage III, with micromets in LN. Reports sense of overwhelm with big picture view, trying to focus on one component of treatment at a time. Per med onc, plan is for adjuvant chemotherapy with dose dense AC with Neulasta support x 4 cycles followed by weekly Taxol x 12 doses-proceed with cycle 2 AC followed by postmastectomy radiation and hormonal therapy with ovarian suppression/tamoxifen versus oophorectomy and AI therapy/ck4/6 inhibitor. 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 Has recovered significantly, although continues to endorse some difficulty with expressive aphasia. Otherwise lives and operates independently.    Shares perception of premenopause at 36 with increase in anxiety, PMDD acute on chronic, identifying more complicated sleep at 41. Not sleeping appx one night a week, gradually becoming worse over time. Assisted by benadryl or melatonin, then no longer working. Has reported some benefit to ambien, no longer taking due to back and forth with PCP regarding refill. Has returned to benadryl, taking two q hs alongside motrin, etc. Sleep schedule reviewed, appreciating regular sleep schedule, not sleeping during day. Denies regular physical activity or exercise.     Has  "always had bad PMS, mild cramping with more aggravation before periods. Reports mood lability, a day each of happy, sad, and angry since mid to upper twenties. Never able to manage this acceptably, depsite various trials of AD therapy per PCP for depression and anxiety. Med history include paxil - helpful but horrible with missed dose, prozac, zoloft-helpful but sexual SE, celexa- weight gain, prozac with limited benefit. Endorses some times of being \"down in that hole with inability to climb out.\" Eventual trial of combination zoloft and wellbutrin was helpful, allowing benefit of SSRI, improved sexual SE, as well as improvement in focus. Never diagnosed with ADHD, but questions this, remembering initial behavioral health eval and diagnosis, not feeling this was evaluated. States able adherence was disrupted due to not having insurance, off medications for 5 years, and doing \"as well as she could do\" without. Has more recently started wellbutrin single agent, although not feeling this is working as well as combined product. Reports discontinuing this abruptly appx 2-3 weeks ago due to contraindication with current chemotherapy. Denies any SE with discontinuation. Not yet noting any disruption in mood or anxiety, while feeling thoughts are increasingly scattered, energy is low, and feels like 'garbage.'     Currently reports low appetite, well managed nausea. Feels sx are relatively well managed at this time. Shares personal prioritity is mgmt of anxiety, premenopausal sx. Can deal with issues with concentration and focus. Never heard of/ been on Trintellix.     Social History  Marital Status: Significant other, lives independently in [facility] - 3 years, supportive, although not in same city  Children: Son, 11, AJ - doing well, calm, resilient  Support Community: Significant other, neighbors; family lives in [place], Aunt in [place] who is helpful  Highest Level of Education: Associates degree, saw full time work as " barrier. Additionally reported difficulty focusing on school, work, etc. Reports doing well through high school, while reporting difficulty with test taking  Career:  for [employer]- physical, active, stressful - currently off since mastectomy; returned to work appx one year following CVA, hx baker, A, B, C student; not as good in math. Appreciates use of list, crossing this often to guide success.   Tobacco Use: Nicotine pouches  Alcohol Use: None current, hx social use  Marijuana/ Other drug Use: None current, hx social cannabis     Medical History  Psychiatric History: PMDD, various SSRI trials per PCP, more recent wellbutrin  Hx of 2 CVAs one month following birth of son, persistent expressive aphasia. Getting worse with age. Vision impacted, largely unable to function for 4 months.  Significant recovery. Remains on baby aspirin.     Family History  Family Psychiatric History: Mom with high anxiety, disrupted focus, overwhelmed, on xanax; father with alcohol use disorder    The following portions of the patient's history were reviewed and updated as appropriate: She  has a past medical history of Anxiety, Breast cancer (), Crohn's disease, DVT (deep vein thrombosis) in pregnancy, PFO (patent foramen ovale), Stroke, and Tattoos.  She  has a past surgical history that includes Colectomy; Skin cancer excision;  section; Colonoscopy; Mastectomy (Bilateral, 10/9/2024); and Venous Access Device (Port) (N/A, 12/10/2024).  Her family history includes Diabetes in her mother and paternal grandmother; Heart disease in her father; Hypertension in an other family member.  She  reports that she quit smoking about 6 years ago. Her smoking use included cigarettes. She started smoking about 26 years ago. She has a 20 pack-year smoking history. She uses smokeless tobacco. She reports current alcohol use. She reports that she does not use drugs.  No current facility-administered medications for this visit.      No current outpatient medications on file.     Facility-Administered Medications Ordered in Other Visits   Medication Dose Route Frequency Provider Last Rate Last Admin    acetaminophen (TYLENOL) tablet 650 mg  650 mg Oral Q4H PRN Pramod Zambrano DMD   650 mg at 01/26/25 1626    Or    acetaminophen (TYLENOL) 160 MG/5ML oral solution 650 mg  650 mg Oral Q4H PRN Pramod Zambrano DMD        Or    acetaminophen (TYLENOL) suppository 650 mg  650 mg Rectal Q4H PRN Pramod Zambrano DMD        alteplase (CATHFLO/ACTIVASE) injection 2 mg  2 mg Intracatheter PRN Pramod Zambrano DMD   New Syringe/Cartridge at 01/25/25 1810    aluminum-magnesium hydroxide-simethicone (MAALOX MAX) 400-400-40 MG/5ML suspension 15 mL  15 mL Oral Q6H PRN Pramod Zambrano DMD        aspirin EC tablet 81 mg  81 mg Oral Daily Pramod Zambrano DMD   81 mg at 01/28/25 0823    sennosides-docusate (PERICOLACE) 8.6-50 MG per tablet 2 tablet  2 tablet Oral BID PRN Pramod Zambrano DMD        And    polyethylene glycol (MIRALAX) packet 17 g  17 g Oral Daily PRN Pramod Zambrano DMD        And    bisacodyl (DULCOLAX) EC tablet 5 mg  5 mg Oral Daily PRN Pramod Zambrano DMD        And    bisacodyl (DULCOLAX) suppository 10 mg  10 mg Rectal Daily PRN Pramod Zambrano DMD        Calcium Replacement - Follow Nurse / BPA Driven Protocol   Not Applicable PRN Pramod Zambrano DMD        cefepime 2000 mg IVPB in 100 mL NS (MBP)  2,000 mg Intravenous Q8H Mamadou Thompson DO   2,000 mg at 01/29/25 0406    dicyclomine (BENTYL) capsule 10 mg  10 mg Oral TID PRN Pramod Zambrano DMD   10 mg at 01/25/25 1309    gabapentin (NEURONTIN) capsule 600 mg  600 mg Oral Nightly Pramod Zambrano DMD   600 mg at 01/28/25 2112    heparin injection 500 Units  500 Units Intravenous PRN Nishant Witt MD   500 Units at 12/11/24 0916    heparin injection 500 Units  500 Units Intravenous PRN Nishant Witt MD   500 Units at 01/07/25 1049    heparin injection 500 Units  5  mL Intravenous PRN Pramod Zambrano, DMD        lactated ringers infusion  9 mL/hr Intravenous Continuous Edilberto Mayen MD 9 mL/hr at 01/28/25 1739 Restarted at 01/28/25 1740    Magnesium Standard Dose Replacement - Follow Nurse / BPA Driven Protocol   Not Applicable PRN Pramod Zambrano A, DMD        melatonin tablet 5 mg  5 mg Oral Nightly PRN Pramod Zambrano, DMD        ondansetron ODT (ZOFRAN-ODT) disintegrating tablet 4 mg  4 mg Oral Q6H PRN Pramod Zambrano, DMD   4 mg at 01/24/25 1234    Or    ondansetron (ZOFRAN) injection 4 mg  4 mg Intravenous Q6H PRN Pramod Zambrano A, DMD   4 mg at 01/27/25 1853    ondansetron (ZOFRAN) tablet 8 mg  8 mg Oral TID PRN Pramod Zambrano, DMD        oxyCODONE (ROXICODONE) immediate release tablet 5 mg  5 mg Oral Q6H PRN Pramod Zambrano, DMD   5 mg at 01/29/25 0406    Phosphorus Replacement - Follow Nurse / BPA Driven Protocol   Not Applicable PRN Pramod Zambrano, DMD        Potassium Replacement - Follow Nurse / BPA Driven Protocol   Not Applicable PRN Pramod Zambrano, DMD        prochlorperazine (COMPAZINE) injection 5 mg  5 mg Intravenous Q6H PRN Pramod Zambrano, DMD   5 mg at 01/23/25 0433    prochlorperazine (COMPAZINE) tablet 5 mg  5 mg Oral Q6H PRN Pramod Zambrano, DMD        sodium chloride 0.9 % flush 10 mL  10 mL Intravenous PRN Nishant Witt MD   10 mL at 12/11/24 0916    sodium chloride 0.9 % flush 10 mL  10 mL Intravenous PRN Nishant Witt MD   10 mL at 01/07/25 1048    sodium chloride 0.9 % flush 10 mL  10 mL Intravenous PRN Pramod Zambrano, DMD        sodium chloride 0.9 % flush 10 mL  10 mL Intravenous PRN Pramod Zambrano A, DMD        sodium chloride 0.9 % flush 10 mL  10 mL Intravenous Q12H Juan ManuelPramod bello, DMD   10 mL at 01/29/25 0050    sodium chloride 0.9 % flush 10 mL  10 mL Intravenous PRN Pramod Zambrano DMD        sodium chloride 0.9 % flush 20 mL  20 mL Intravenous PRN Pramod Zambrano DMD         Current Facility-Administered Medications on  File Prior to Visit   Medication    heparin injection 500 Units    heparin injection 500 Units    sodium chloride 0.9 % flush 10 mL    sodium chloride 0.9 % flush 10 mL     Current Outpatient Medications on File Prior to Visit   Medication Sig    aspirin 81 MG EC tablet Take 1 tablet by mouth Daily.    Klor-Con M20 20 MEQ CR tablet TAKE 1 TABLET BY MOUTH 2 TIMES A DAY    lidocaine-prilocaine (EMLA) 2.5-2.5 % cream Apply 1 Application topically to the appropriate area as directed As Needed for Mild Pain. Apply to port site 30 minutes before access    OLANZapine (zyPREXA) 5 MG tablet Take 1 tablet by mouth for 4 doses starting night of chemotherapy.    omeprazole (priLOSEC) 20 MG capsule     ondansetron (ZOFRAN) 8 MG tablet Take 1 tablet by mouth 3 (Three) Times a Day As Needed for Nausea or Vomiting.    prochlorperazine (COMPAZINE) 10 MG tablet Take 1 tablet by mouth Every 6 (Six) Hours As Needed for Nausea or Vomiting.     She has No Known Allergies..    Objective   Mental Status Exam  Appearance:  clean and casually dressed, appropriate  Attitude toward clinician:  cooperative and agreeable   Speech:    Rate:  regular rate and rhythm   Volume:  normal  Motor:  no abnormal movements present  Mood:  processing  Affect:  euthymic and mood congruent  Thought Processes:  linear, logical, and goal directed  Thought Content:  normal  Suicidal Thoughts:  absent  Homicidal Thoughts:  absent  Perceptual Disturbance: no perceptual disturbance  Attention and Concentration:  fair  Insight and Judgement:  good  Memory:  memory appears to be intact    Lab Review:   Infusion on 01/14/2025   Component Date Value    Glucose 01/14/2025 109 (H)     BUN 01/14/2025 7     Creatinine 01/14/2025 0.63     Sodium 01/14/2025 140     Potassium 01/14/2025 3.7     Chloride 01/14/2025 104     CO2 01/14/2025 23.9     Calcium 01/14/2025 8.9     Total Protein 01/14/2025 7.2     Albumin 01/14/2025 3.3 (L)     ALT (SGPT) 01/14/2025 33     AST (SGOT)  01/14/2025 14     Alkaline Phosphatase 01/14/2025 277 (H)     Total Bilirubin 01/14/2025 0.5     Globulin 01/14/2025 3.9     A/G Ratio 01/14/2025 0.8     BUN/Creatinine Ratio 01/14/2025 11.1     Anion Gap 01/14/2025 12.1     eGFR 01/14/2025 112.3     WBC 01/14/2025 6.85     RBC 01/14/2025 3.94     Hemoglobin 01/14/2025 11.9 (L)     Hematocrit 01/14/2025 36.2     MCV 01/14/2025 91.9     MCH 01/14/2025 30.2     MCHC 01/14/2025 32.9     RDW 01/14/2025 14.6     RDW-SD 01/14/2025 49.7     MPV 01/14/2025 9.4     Platelets 01/14/2025 567 (H)     Neutrophil % 01/14/2025 60.4     Lymphocyte % 01/14/2025 14.7 (L)     Monocyte % 01/14/2025 13.0 (H)     Eosinophil % 01/14/2025 1.2     Basophil % 01/14/2025 1.6 (H)     Immature Grans % 01/14/2025 9.1 (H)     Neutrophils, Absolute 01/14/2025 4.14     Lymphocytes, Absolute 01/14/2025 1.01     Monocytes, Absolute 01/14/2025 0.89     Eosinophils, Absolute 01/14/2025 0.08     Basophils, Absolute 01/14/2025 0.11     Immature Grans, Absolute 01/14/2025 0.62 (H)     nRBC 01/14/2025 0.0    Admission on 01/09/2025, Discharged on 01/10/2025   Component Date Value    COVID19 01/09/2025 Not Detected     Influenza A PCR 01/09/2025 Not Detected     Influenza B PCR 01/09/2025 Not Detected     RSV, PCR 01/09/2025 Not Detected     Glucose 01/09/2025 111 (H)     BUN 01/09/2025 5 (L)     Creatinine 01/09/2025 0.63     Sodium 01/09/2025 135 (L)     Potassium 01/09/2025 3.2 (L)     Chloride 01/09/2025 100     CO2 01/09/2025 22.8     Calcium 01/09/2025 8.5 (L)     Total Protein 01/09/2025 6.7     Albumin 01/09/2025 3.3 (L)     ALT (SGPT) 01/09/2025 110 (H)     AST (SGOT) 01/09/2025 54 (H)     Alkaline Phosphatase 01/09/2025 351 (H)     Total Bilirubin 01/09/2025 0.9     Globulin 01/09/2025 3.4     A/G Ratio 01/09/2025 1.0     BUN/Creatinine Ratio 01/09/2025 7.9     Anion Gap 01/09/2025 12.2     eGFR 01/09/2025 112.3     Lipase 01/09/2025 11 (L)     WBC 01/09/2025 0.77 (C)     RBC 01/09/2025 3.37 (L)      Hemoglobin 01/09/2025 10.2 (L)     Hematocrit 01/09/2025 30.6 (L)     MCV 01/09/2025 90.8     MCH 01/09/2025 30.3     MCHC 01/09/2025 33.3     RDW 01/09/2025 14.6     RDW-SD 01/09/2025 48.6     MPV 01/09/2025 10.1     Platelets 01/09/2025 237     Neutrophil % 01/09/2025 15.6 (L)     Lymphocyte % 01/09/2025 51.9 (H)     Monocyte % 01/09/2025 26.0 (H)     Eosinophil % 01/09/2025 3.9     Basophil % 01/09/2025 2.6 (H)     Immature Grans % 01/09/2025 0.0     Neutrophils, Absolute 01/09/2025 0.12 (L)     Lymphocytes, Absolute 01/09/2025 0.40 (L)     Monocytes, Absolute 01/09/2025 0.20     Eosinophils, Absolute 01/09/2025 0.03     Basophils, Absolute 01/09/2025 0.02     Immature Grans, Absolute 01/09/2025 0.00     nRBC 01/09/2025 0.0    Infusion on 01/07/2025   Component Date Value    Glucose 01/07/2025 140 (H)     BUN 01/07/2025 7     Creatinine 01/07/2025 0.57     Sodium 01/07/2025 131 (L)     Potassium 01/07/2025 3.3 (L)     Chloride 01/07/2025 97 (L)     CO2 01/07/2025 23.8     Calcium 01/07/2025 8.6     Total Protein 01/07/2025 7.4     Albumin 01/07/2025 3.7     ALT (SGPT) 01/07/2025 110 (H)     AST (SGOT) 01/07/2025 71 (H)     Alkaline Phosphatase 01/07/2025 256 (H)     Total Bilirubin 01/07/2025 0.8     Globulin 01/07/2025 3.7     A/G Ratio 01/07/2025 1.0     BUN/Creatinine Ratio 01/07/2025 12.3     Anion Gap 01/07/2025 10.2     eGFR 01/07/2025 115.1     WBC 01/07/2025 2.31 (L)     RBC 01/07/2025 3.78     Hemoglobin 01/07/2025 11.4 (L)     Hematocrit 01/07/2025 35.4     MCV 01/07/2025 93.7     MCH 01/07/2025 30.2     MCHC 01/07/2025 32.2     RDW 01/07/2025 14.2     RDW-SD 01/07/2025 49.7     MPV 01/07/2025 9.3     Platelets 01/07/2025 328     Neutrophil % 01/07/2025 73.1     Lymphocyte % 01/07/2025 15.6 (L)     Monocyte % 01/07/2025 2.2 (L)     Eosinophil % 01/07/2025 6.1     Basophil % 01/07/2025 1.3     Immature Grans % 01/07/2025 1.7 (H)     Neutrophils, Absolute 01/07/2025 1.69 (L)     Lymphocytes, Absolute  01/07/2025 0.36 (L)     Monocytes, Absolute 01/07/2025 0.05 (L)     Eosinophils, Absolute 01/07/2025 0.14     Basophils, Absolute 01/07/2025 0.03     Immature Grans, Absolute 01/07/2025 0.04    Infusion on 12/31/2024   Component Date Value    Glucose 12/31/2024 94     BUN 12/31/2024 9     Creatinine 12/31/2024 0.66     Sodium 12/31/2024 138     Potassium 12/31/2024 3.5     Chloride 12/31/2024 104     CO2 12/31/2024 22.7     Calcium 12/31/2024 8.6     Total Protein 12/31/2024 7.3     Albumin 12/31/2024 3.5     ALT (SGPT) 12/31/2024 21     AST (SGOT) 12/31/2024 18     Alkaline Phosphatase 12/31/2024 124 (H)     Total Bilirubin 12/31/2024 0.2     Globulin 12/31/2024 3.8     A/G Ratio 12/31/2024 0.9     BUN/Creatinine Ratio 12/31/2024 13.6     Anion Gap 12/31/2024 11.3     eGFR 12/31/2024 111.1     HCG, Urine QL 12/31/2024 Negative     WBC 12/31/2024 9.59     RBC 12/31/2024 3.98     Hemoglobin 12/31/2024 12.0     Hematocrit 12/31/2024 37.6     MCV 12/31/2024 94.5     MCH 12/31/2024 30.2     MCHC 12/31/2024 31.9     RDW 12/31/2024 15.8 (H)     RDW-SD 12/31/2024 54.9 (H)     MPV 12/31/2024 9.0     Platelets 12/31/2024 418     Neutrophil % 12/31/2024 82.4 (H)     Lymphocyte % 12/31/2024 8.3 (L)     Monocyte % 12/31/2024 4.6 (L)     Eosinophil % 12/31/2024 3.4     Basophil % 12/31/2024 0.6     Immature Grans % 12/31/2024 0.7 (H)     Neutrophils, Absolute 12/31/2024 7.89 (H)     Lymphocytes, Absolute 12/31/2024 0.80     Monocytes, Absolute 12/31/2024 0.44     Eosinophils, Absolute 12/31/2024 0.33     Basophils, Absolute 12/31/2024 0.06     Immature Grans, Absolute 12/31/2024 0.07 (H)    Infusion on 12/26/2024   Component Date Value    HCG, Urine QL 12/26/2024 Negative     Glucose 12/26/2024 98     BUN 12/26/2024 10     Creatinine 12/26/2024 0.57     Sodium 12/26/2024 133 (L)     Potassium 12/26/2024 3.4 (L)     Chloride 12/26/2024 100     CO2 12/26/2024 24.8     Calcium 12/26/2024 9.0     Total Protein 12/26/2024 7.2      Albumin 12/26/2024 3.6     ALT (SGPT) 12/26/2024 20     AST (SGOT) 12/26/2024 16     Alkaline Phosphatase 12/26/2024 114     Total Bilirubin 12/26/2024 0.2     Globulin 12/26/2024 3.6     A/G Ratio 12/26/2024 1.0     BUN/Creatinine Ratio 12/26/2024 17.5     Anion Gap 12/26/2024 8.2     eGFR 12/26/2024 115.1     WBC 12/26/2024 7.81     RBC 12/26/2024 3.85     Hemoglobin 12/26/2024 11.4 (L)     Hematocrit 12/26/2024 36.1     MCV 12/26/2024 93.8     MCH 12/26/2024 29.6     MCHC 12/26/2024 31.6     RDW 12/26/2024 16.0 (H)     RDW-SD 12/26/2024 55.2 (H)     MPV 12/26/2024 9.3     Platelets 12/26/2024 362     Neutrophil % 12/26/2024 78.7 (H)     Lymphocyte % 12/26/2024 12.4 (L)     Monocyte % 12/26/2024 5.6     Eosinophil % 12/26/2024 2.4     Basophil % 12/26/2024 0.5     Immature Grans % 12/26/2024 0.4     Neutrophils, Absolute 12/26/2024 6.14     Lymphocytes, Absolute 12/26/2024 0.97     Monocytes, Absolute 12/26/2024 0.44     Eosinophils, Absolute 12/26/2024 0.19     Basophils, Absolute 12/26/2024 0.04     Immature Grans, Absolute 12/26/2024 0.03    Office Visit on 12/17/2024   Component Date Value    Hepatitis B Surface Ag 12/17/2024 Non-Reactive     Hep A IgM 12/17/2024 Non-Reactive     Hep B C IgM 12/17/2024 Non-Reactive     Hepatitis C Ab 12/17/2024 Non-Reactive    Infusion on 12/11/2024   Component Date Value    Miscellaneous Lab Test R* 12/11/2024 See attached report    Office Visit on 12/11/2024   Component Date Value    Ferritin 12/11/2024 59.70     Iron 12/11/2024 37     Iron Saturation (TSAT) 12/11/2024 10 (L)     Transferrin 12/11/2024 244     TIBC 12/11/2024 364     Magnesium 12/11/2024 1.9    Infusion on 12/11/2024   Component Date Value    Glucose 12/11/2024 85     BUN 12/11/2024 10     Creatinine 12/11/2024 0.68     Sodium 12/11/2024 139     Potassium 12/11/2024 3.8     Chloride 12/11/2024 102     CO2 12/11/2024 25.3     Calcium 12/11/2024 9.0     Total Protein 12/11/2024 6.8     Albumin 12/11/2024 3.7      ALT (SGPT) 12/11/2024 23     AST (SGOT) 12/11/2024 20     Alkaline Phosphatase 12/11/2024 93     Total Bilirubin 12/11/2024 0.2     Globulin 12/11/2024 3.1     A/G Ratio 12/11/2024 1.2     BUN/Creatinine Ratio 12/11/2024 14.7     Anion Gap 12/11/2024 11.7     eGFR 12/11/2024 110.3     HCG, Urine QL 12/11/2024 Negative     WBC 12/11/2024 9.20     RBC 12/11/2024 3.65 (L)     Hemoglobin 12/11/2024 10.7 (L)     Hematocrit 12/11/2024 33.4 (L)     MCV 12/11/2024 91.5     MCH 12/11/2024 29.3     MCHC 12/11/2024 32.0     RDW 12/11/2024 13.0     RDW-SD 12/11/2024 42.5     MPV 12/11/2024 9.1     Platelets 12/11/2024 512 (H)     Neutrophil % 12/11/2024 70.4     Lymphocyte % 12/11/2024 17.1 (L)     Monocyte % 12/11/2024 7.3     Eosinophil % 12/11/2024 1.1     Basophil % 12/11/2024 1.2     Immature Grans % 12/11/2024 2.9 (H)     Neutrophils, Absolute 12/11/2024 6.48     Lymphocytes, Absolute 12/11/2024 1.57     Monocytes, Absolute 12/11/2024 0.67     Eosinophils, Absolute 12/11/2024 0.10     Basophils, Absolute 12/11/2024 0.11     Immature Grans, Absolute 12/11/2024 0.27 (H)     nRBC 12/11/2024 0.2    Admission on 12/10/2024, Discharged on 12/10/2024   Component Date Value    HCG Qualitative 12/10/2024 Negative    There may be more visits with results that are not included.   Labs reviewed    Plan of Care  Initiate venlafaxine 37.5 mg q AM followed by increase to 75 mg daily. Counsleing provided surrounding common side effects, recommendations to discontinue under care of provider due to discontinuation sx.  Remain off wellbutrin at this time. Support previous best response to wellbutrin and sertraline and may consider return to this once chemo complete.  Counseling provided supporting patient strengths, benefits of routine, notable resilience.  FU scheduled in 4 weeks.    Diagnoses and all orders for this visit:    1. LOUIS (generalized anxiety disorder) (Primary)  -     gabapentin (Neurontin) 300 MG capsule; Take 2 capsules  by mouth Every Night.  Dispense: 60 capsule; Refill: 2    2. Hot flashes  -     gabapentin (Neurontin) 300 MG capsule; Take 2 capsules by mouth Every Night.  Dispense: 60 capsule; Refill: 2    3. Moderate episode of recurrent major depressive disorder    Other orders  -     venlafaxine XR (Effexor XR) 37.5 MG 24 hr capsule; Take 1 capsule by mouth Take As Directed. 1 capsule po q am for two weeks followed by increase to 2 capsules daily (Patient taking differently: Take 1 capsule by mouth Take As Directed. 1 capsule po q am for two weeks followed by increase to 2 capsules daily  Pt has not started yet)  Dispense: 60 capsule; Refill: 2

## 2025-01-16 NOTE — PROGRESS NOTES
"Suzanne returned my call and I introduced myself as an oncology nurse navigator and explained the services a nurse navigator can provide during her treatment such as:       She is diagnosed [id number] M0 grade 3 invasive ductal adenocarcinoma right breast, ER 99% positive, NE 50% positive, HER2 0, Ki-67 65%. She has had a bilateral mastectomy and she is undergoing adjuvant chemotherapy with AC initiated on 12/31/2024 for She reports feeling \"so tired\" and stated \"I hate chemo.\" She c/o some nausea is taking Olanzapine and Ondansetron.She denied any vomiting. She reports her temp was 95 yesterday using an oral thermometer. Advised her to take it again today and she had a fever or was that low again she should call the [facility] office. She agreed she would. We discussed her support system and she reports she lives with her son and has a boyfriend which does not live with her. She has her mom, but she lives in [place], her aunt and great neighbors that help bring her food and help with some household chores. She is not working but is grateful her employer has been providing her with an income since she has been off work. She reports she does not need any financial assistance at this time. She has an appt today with Nilam VILLARREAL to discuss feelings of anxiety and depression. She was prescribed Wellbutrin in the past but it was d/c'd when she started her Adriamycin. She states she takes Ambien for sleep but has been out for a few days and has been taking Benadryl. We discussed integrative therapies and other services at the [facility]. She was interested in virtual visit support groups. Will provide her a  navigation folder with the following pamphlet info [facility], Cancer and Restorative Exercise (CARE), [employer] Exercise program, [facility], Massage Therapy, Reiki Therapy, [employer] [place], Cancer Nutrition, and Survivorship Clinic. Gave her my direct " contact number and will follow up with her completion of her AC chemo. She appreciated the phone call and

## 2025-01-21 ENCOUNTER — LAB (OUTPATIENT)
Dept: LAB | Facility: HOSPITAL | Age: 45
End: 2025-01-21
Payer: COMMERCIAL

## 2025-01-21 ENCOUNTER — INFUSION (OUTPATIENT)
Dept: ONCOLOGY | Facility: HOSPITAL | Age: 45
End: 2025-01-21
Payer: COMMERCIAL

## 2025-01-21 VITALS
DIASTOLIC BLOOD PRESSURE: 72 MMHG | WEIGHT: 150 LBS | HEART RATE: 80 BPM | SYSTOLIC BLOOD PRESSURE: 103 MMHG | TEMPERATURE: 98.4 F | BODY MASS INDEX: 24.96 KG/M2 | OXYGEN SATURATION: 100 %

## 2025-01-21 DIAGNOSIS — Z45.2 ENCOUNTER FOR CENTRAL LINE CARE: ICD-10-CM

## 2025-01-21 DIAGNOSIS — T45.1X5A CHEMOTHERAPY-INDUCED NAUSEA: ICD-10-CM

## 2025-01-21 DIAGNOSIS — R11.0 CHEMOTHERAPY-INDUCED NAUSEA: ICD-10-CM

## 2025-01-21 DIAGNOSIS — C50.919 MALIGNANT NEOPLASM OF BREAST IN FEMALE, ESTROGEN RECEPTOR POSITIVE, UNSPECIFIED LATERALITY, UNSPECIFIED SITE OF BREAST: Primary | ICD-10-CM

## 2025-01-21 DIAGNOSIS — D64.9 ANEMIA, UNSPECIFIED TYPE: ICD-10-CM

## 2025-01-21 DIAGNOSIS — Z17.0 MALIGNANT NEOPLASM OF BREAST IN FEMALE, ESTROGEN RECEPTOR POSITIVE, UNSPECIFIED LATERALITY, UNSPECIFIED SITE OF BREAST: Primary | ICD-10-CM

## 2025-01-21 DIAGNOSIS — R50.9 FEVER, UNSPECIFIED FEVER CAUSE: ICD-10-CM

## 2025-01-21 LAB
ANION GAP SERPL CALCULATED.3IONS-SCNC: 11.1 MMOL/L (ref 5–15)
BACTERIA UR QL AUTO: NORMAL /HPF
BASOPHILS # BLD AUTO: 0.01 10*3/MM3 (ref 0–0.2)
BASOPHILS NFR BLD AUTO: 5.9 % (ref 0–1.5)
BILIRUB UR QL STRIP: NEGATIVE
BUN SERPL-MCNC: 9 MG/DL (ref 6–20)
BUN/CREAT SERPL: 17.3 (ref 7–25)
CALCIUM SPEC-SCNC: 8.9 MG/DL (ref 8.6–10.5)
CHLORIDE SERPL-SCNC: 101 MMOL/L (ref 98–107)
CLARITY UR: ABNORMAL
CO2 SERPL-SCNC: 22.9 MMOL/L (ref 22–29)
COLOR UR: ABNORMAL
CREAT SERPL-MCNC: 0.52 MG/DL (ref 0.57–1)
DEPRECATED RDW RBC AUTO: 47.2 FL (ref 37–54)
EGFRCR SERPLBLD CKD-EPI 2021: 117.7 ML/MIN/1.73
EOSINOPHIL # BLD AUTO: 0 10*3/MM3 (ref 0–0.4)
EOSINOPHIL NFR BLD AUTO: 0 % (ref 0.3–6.2)
ERYTHROCYTE [DISTWIDTH] IN BLOOD BY AUTOMATED COUNT: 13.8 % (ref 12.3–15.4)
FERRITIN SERPL-MCNC: 2525 NG/ML (ref 13–150)
GLUCOSE SERPL-MCNC: 120 MG/DL (ref 65–99)
GLUCOSE UR STRIP-MCNC: NEGATIVE MG/DL
HCT VFR BLD AUTO: 31.8 % (ref 34–46.6)
HGB BLD-MCNC: 10.2 G/DL (ref 12–15.9)
HGB UR QL STRIP.AUTO: NEGATIVE
HYALINE CASTS UR QL AUTO: NORMAL /LPF
IMM GRANULOCYTES # BLD AUTO: 0.01 10*3/MM3 (ref 0–0.05)
IMM GRANULOCYTES NFR BLD AUTO: 5.9 % (ref 0–0.5)
IRON 24H UR-MRATE: 19 MCG/DL (ref 37–145)
IRON SATN MFR SERPL: 11 % (ref 20–50)
KETONES UR QL STRIP: NEGATIVE
LEUKOCYTE ESTERASE UR QL STRIP.AUTO: NEGATIVE
LYMPHOCYTES # BLD AUTO: 0.11 10*3/MM3 (ref 0.7–3.1)
LYMPHOCYTES NFR BLD AUTO: 64.7 % (ref 19.6–45.3)
MAGNESIUM SERPL-MCNC: 2.2 MG/DL (ref 1.6–2.6)
MCH RBC QN AUTO: 29.1 PG (ref 26.6–33)
MCHC RBC AUTO-ENTMCNC: 32.1 G/DL (ref 31.5–35.7)
MCV RBC AUTO: 90.9 FL (ref 79–97)
MONOCYTES # BLD AUTO: 0.03 10*3/MM3 (ref 0.1–0.9)
MONOCYTES NFR BLD AUTO: 17.6 % (ref 5–12)
NEUTROPHILS NFR BLD AUTO: 0.01 10*3/MM3 (ref 1.7–7)
NEUTROPHILS NFR BLD AUTO: 5.9 % (ref 42.7–76)
NITRITE UR QL STRIP: NEGATIVE
PH UR STRIP.AUTO: 6 [PH] (ref 4.5–8)
PLATELET # BLD AUTO: 280 10*3/MM3 (ref 140–450)
PMV BLD AUTO: 9.5 FL (ref 6–12)
POTASSIUM SERPL-SCNC: 3.2 MMOL/L (ref 3.5–5.2)
PROT UR QL STRIP: ABNORMAL
RBC # BLD AUTO: 3.5 10*6/MM3 (ref 3.77–5.28)
RBC # UR STRIP: NORMAL /HPF
REF LAB TEST METHOD: NORMAL
SODIUM SERPL-SCNC: 135 MMOL/L (ref 136–145)
SP GR UR STRIP: 1.01 (ref 1–1.03)
SQUAMOUS #/AREA URNS HPF: NORMAL /HPF
TIBC SERPL-MCNC: 177 MCG/DL (ref 298–536)
TRANSFERRIN SERPL-MCNC: 119 MG/DL (ref 200–360)
UROBILINOGEN UR QL STRIP: ABNORMAL
WBC # UR STRIP: NORMAL /HPF
WBC NRBC COR # BLD AUTO: 0.17 10*3/MM3 (ref 3.4–10.8)

## 2025-01-21 PROCEDURE — 83735 ASSAY OF MAGNESIUM: CPT | Performed by: INTERNAL MEDICINE

## 2025-01-21 PROCEDURE — 84466 ASSAY OF TRANSFERRIN: CPT | Performed by: NURSE PRACTITIONER

## 2025-01-21 PROCEDURE — 87040 BLOOD CULTURE FOR BACTERIA: CPT | Performed by: INTERNAL MEDICINE

## 2025-01-21 PROCEDURE — 81001 URINALYSIS AUTO W/SCOPE: CPT | Performed by: INTERNAL MEDICINE

## 2025-01-21 PROCEDURE — 25810000003 SODIUM CHLORIDE 0.9 % SOLUTION: Performed by: INTERNAL MEDICINE

## 2025-01-21 PROCEDURE — 96361 HYDRATE IV INFUSION ADD-ON: CPT

## 2025-01-21 PROCEDURE — 80048 BASIC METABOLIC PNL TOTAL CA: CPT | Performed by: INTERNAL MEDICINE

## 2025-01-21 PROCEDURE — 25010000002 HEPARIN LOCK FLUSH PER 10 UNITS: Performed by: INTERNAL MEDICINE

## 2025-01-21 PROCEDURE — 96366 THER/PROPH/DIAG IV INF ADDON: CPT

## 2025-01-21 PROCEDURE — 85025 COMPLETE CBC W/AUTO DIFF WBC: CPT | Performed by: INTERNAL MEDICINE

## 2025-01-21 PROCEDURE — 82728 ASSAY OF FERRITIN: CPT | Performed by: NURSE PRACTITIONER

## 2025-01-21 PROCEDURE — 83540 ASSAY OF IRON: CPT | Performed by: NURSE PRACTITIONER

## 2025-01-21 PROCEDURE — 96365 THER/PROPH/DIAG IV INF INIT: CPT

## 2025-01-21 PROCEDURE — 25010000002 POTASSIUM CHLORIDE 10 MEQ/100ML SOLUTION: Performed by: INTERNAL MEDICINE

## 2025-01-21 RX ORDER — AMOXICILLIN AND CLAVULANATE POTASSIUM 500; 125 MG/1; MG/1
1 TABLET, FILM COATED ORAL 2 TIMES DAILY
Qty: 14 TABLET | Refills: 0 | Status: ON HOLD | OUTPATIENT
Start: 2025-01-21 | End: 2025-01-28

## 2025-01-21 RX ORDER — POTASSIUM CHLORIDE 7.45 MG/ML
10 INJECTION INTRAVENOUS
Status: COMPLETED | OUTPATIENT
Start: 2025-01-21 | End: 2025-01-21

## 2025-01-21 RX ORDER — POTASSIUM CHLORIDE 29.8 MG/ML
20 INJECTION INTRAVENOUS ONCE
Status: DISCONTINUED | OUTPATIENT
Start: 2025-01-21 | End: 2025-01-21 | Stop reason: SDUPTHER

## 2025-01-21 RX ORDER — HEPARIN SODIUM (PORCINE) LOCK FLUSH IV SOLN 100 UNIT/ML 100 UNIT/ML
500 SOLUTION INTRAVENOUS AS NEEDED
Status: DISCONTINUED | OUTPATIENT
Start: 2025-01-21 | End: 2025-01-21 | Stop reason: HOSPADM

## 2025-01-21 RX ORDER — HEPARIN SODIUM (PORCINE) LOCK FLUSH IV SOLN 100 UNIT/ML 100 UNIT/ML
500 SOLUTION INTRAVENOUS AS NEEDED
OUTPATIENT
Start: 2025-01-21

## 2025-01-21 RX ORDER — SODIUM CHLORIDE 0.9 % (FLUSH) 0.9 %
10 SYRINGE (ML) INJECTION AS NEEDED
Status: DISCONTINUED | OUTPATIENT
Start: 2025-01-21 | End: 2025-01-21 | Stop reason: HOSPADM

## 2025-01-21 RX ORDER — SODIUM CHLORIDE 9 MG/ML
500 INJECTION, SOLUTION INTRAVENOUS ONCE
Status: COMPLETED | OUTPATIENT
Start: 2025-01-21 | End: 2025-01-21

## 2025-01-21 RX ORDER — SODIUM CHLORIDE 0.9 % (FLUSH) 0.9 %
10 SYRINGE (ML) INJECTION AS NEEDED
OUTPATIENT
Start: 2025-01-21

## 2025-01-21 RX ADMIN — HEPARIN 500 UNITS: 100 SYRINGE at 14:02

## 2025-01-21 RX ADMIN — SODIUM CHLORIDE 500 ML: 9 INJECTION, SOLUTION INTRAVENOUS at 09:30

## 2025-01-21 RX ADMIN — POTASSIUM CHLORIDE 10 MEQ: 7.46 INJECTION, SOLUTION INTRAVENOUS at 12:34

## 2025-01-21 RX ADMIN — POTASSIUM CHLORIDE 10 MEQ: 7.46 INJECTION, SOLUTION INTRAVENOUS at 11:18

## 2025-01-21 RX ADMIN — Medication 10 ML: at 14:02

## 2025-01-21 NOTE — NURSING NOTE
Pt feels bad today. She feels extreme fatigue.  She has had one episode of vomiting this morning.  Potassium 3.2 today .  20 meq potassium given IV and 500 saline given.  WBC 0.17  ANC 0.01  Pt reports urgency with urination. U/A ordered. Pt reports having fevers daily starting two days after her last chemotherapy. She reports temperatures as high as 101.0  She takes aleve in the morning daily to help keep her fever down.  Per Dr Witt, augmentin 500 mg bid for 7 days escribed.  Pt instructed not to take ibuprofen, aleve or tylenol and if temperature gets to 100.5 pt to go to [facility] ER .  Pt V/U. Pt shivering in office temperature max at 99.3.  Pt stayed in office to see if temperature increased beyond that but it did not.  Pt discharged without issues with her aunt by her side.

## 2025-01-22 ENCOUNTER — HOSPITAL ENCOUNTER (INPATIENT)
Facility: HOSPITAL | Age: 45
LOS: 7 days | Discharge: HOME | End: 2025-01-29
Attending: EMERGENCY MEDICINE | Admitting: INTERNAL MEDICINE
Payer: COMMERCIAL

## 2025-01-22 ENCOUNTER — APPOINTMENT (OUTPATIENT)
Dept: GENERAL RADIOLOGY | Facility: HOSPITAL | Age: 45
End: 2025-01-22
Payer: COMMERCIAL

## 2025-01-22 DIAGNOSIS — D64.9 ACUTE ANEMIA: ICD-10-CM

## 2025-01-22 DIAGNOSIS — K04.7 PERIAPICAL ABSCESS: ICD-10-CM

## 2025-01-22 DIAGNOSIS — C50.919 MALIGNANT NEOPLASM OF FEMALE BREAST, UNSPECIFIED ESTROGEN RECEPTOR STATUS, UNSPECIFIED LATERALITY, UNSPECIFIED SITE OF BREAST: ICD-10-CM

## 2025-01-22 DIAGNOSIS — D72.819 LEUKOPENIA, UNSPECIFIED TYPE: ICD-10-CM

## 2025-01-22 DIAGNOSIS — T45.1X5A IMMUNOSUPPRESSED DUE TO CHEMOTHERAPY: ICD-10-CM

## 2025-01-22 DIAGNOSIS — D84.821 IMMUNOSUPPRESSED DUE TO CHEMOTHERAPY: ICD-10-CM

## 2025-01-22 DIAGNOSIS — Z79.899 IMMUNOSUPPRESSED DUE TO CHEMOTHERAPY: ICD-10-CM

## 2025-01-22 DIAGNOSIS — R50.81 NEUTROPENIC FEVER: Primary | ICD-10-CM

## 2025-01-22 DIAGNOSIS — D70.9 NEUTROPENIC FEVER: Primary | ICD-10-CM

## 2025-01-22 LAB
ALBUMIN SERPL-MCNC: 2.9 G/DL (ref 3.5–5.2)
ALBUMIN/GLOB SERPL: 0.6 G/DL
ALP SERPL-CCNC: 271 U/L (ref 39–117)
ALT SERPL W P-5'-P-CCNC: 30 U/L (ref 1–33)
ANION GAP SERPL CALCULATED.3IONS-SCNC: 14 MMOL/L (ref 5–15)
AST SERPL-CCNC: 27 U/L (ref 1–32)
B PARAPERT DNA SPEC QL NAA+PROBE: NOT DETECTED
B PERT DNA SPEC QL NAA+PROBE: NOT DETECTED
BACTERIA UR QL AUTO: ABNORMAL /HPF
BILIRUB SERPL-MCNC: 0.6 MG/DL (ref 0–1.2)
BILIRUB UR QL STRIP: NEGATIVE
BUN SERPL-MCNC: 4 MG/DL (ref 6–20)
BUN/CREAT SERPL: 5.8 (ref 7–25)
C PNEUM DNA NPH QL NAA+NON-PROBE: NOT DETECTED
CALCIUM SPEC-SCNC: 8.7 MG/DL (ref 8.6–10.5)
CHLORIDE SERPL-SCNC: 98 MMOL/L (ref 98–107)
CLARITY UR: ABNORMAL
CO2 SERPL-SCNC: 23 MMOL/L (ref 22–29)
COLOR UR: ABNORMAL
CREAT SERPL-MCNC: 0.69 MG/DL (ref 0.57–1)
D-LACTATE SERPL-SCNC: 1.1 MMOL/L (ref 0.5–2)
DEPRECATED RDW RBC AUTO: 44.6 FL (ref 37–54)
EGFRCR SERPLBLD CKD-EPI 2021: 109.9 ML/MIN/1.73
ERYTHROCYTE [DISTWIDTH] IN BLOOD BY AUTOMATED COUNT: 14.1 % (ref 12.3–15.4)
FLUAV SUBTYP SPEC NAA+PROBE: NOT DETECTED
FLUBV RNA ISLT QL NAA+PROBE: NOT DETECTED
GLOBULIN UR ELPH-MCNC: 4.6 GM/DL
GLUCOSE SERPL-MCNC: 136 MG/DL (ref 65–99)
GLUCOSE UR STRIP-MCNC: NEGATIVE MG/DL
HADV DNA SPEC NAA+PROBE: NOT DETECTED
HCOV 229E RNA SPEC QL NAA+PROBE: NOT DETECTED
HCOV HKU1 RNA SPEC QL NAA+PROBE: NOT DETECTED
HCOV NL63 RNA SPEC QL NAA+PROBE: NOT DETECTED
HCOV OC43 RNA SPEC QL NAA+PROBE: NOT DETECTED
HCT VFR BLD AUTO: 25.9 % (ref 34–46.6)
HGB BLD-MCNC: 8.9 G/DL (ref 12–15.9)
HGB UR QL STRIP.AUTO: NEGATIVE
HMPV RNA NPH QL NAA+NON-PROBE: NOT DETECTED
HPIV1 RNA ISLT QL NAA+PROBE: NOT DETECTED
HPIV2 RNA SPEC QL NAA+PROBE: NOT DETECTED
HPIV3 RNA NPH QL NAA+PROBE: NOT DETECTED
HPIV4 P GENE NPH QL NAA+PROBE: NOT DETECTED
HYALINE CASTS UR QL AUTO: ABNORMAL /LPF
KETONES UR QL STRIP: ABNORMAL
LEUKOCYTE ESTERASE UR QL STRIP.AUTO: ABNORMAL
LYMPHOCYTES # BLD MANUAL: 0.13 10*3/MM3 (ref 0.7–3.1)
LYMPHOCYTES NFR BLD MANUAL: 32 % (ref 5–12)
M PNEUMO IGG SER IA-ACNC: NOT DETECTED
MAGNESIUM SERPL-MCNC: 1.9 MG/DL (ref 1.6–2.6)
MCH RBC QN AUTO: 30.1 PG (ref 26.6–33)
MCHC RBC AUTO-ENTMCNC: 34.4 G/DL (ref 31.5–35.7)
MCV RBC AUTO: 87.5 FL (ref 79–97)
MONOCYTES # BLD: 0.08 10*3/MM3 (ref 0.1–0.9)
MUCOUS THREADS URNS QL MICRO: ABNORMAL /HPF
MYELOCYTES NFR BLD MANUAL: 4 % (ref 0–0)
NEUTROPHILS # BLD AUTO: 0.02 10*3/MM3 (ref 1.7–7)
NEUTROPHILS NFR BLD MANUAL: 8 % (ref 42.7–76)
NITRITE UR QL STRIP: NEGATIVE
PH UR STRIP.AUTO: 5.5 [PH] (ref 5–8)
PLAT MORPH BLD: NORMAL
PLATELET # BLD AUTO: 241 10*3/MM3 (ref 140–450)
PMV BLD AUTO: 9.9 FL (ref 6–12)
POTASSIUM SERPL-SCNC: 2.7 MMOL/L (ref 3.5–5.2)
PROCALCITONIN SERPL-MCNC: 0.33 NG/ML (ref 0–0.25)
PROT SERPL-MCNC: 7.5 G/DL (ref 6–8.5)
PROT UR QL STRIP: ABNORMAL
RBC # BLD AUTO: 2.96 10*6/MM3 (ref 3.77–5.28)
RBC # UR STRIP: ABNORMAL /HPF
RBC MORPH BLD: NORMAL
REF LAB TEST METHOD: ABNORMAL
RHINOVIRUS RNA SPEC NAA+PROBE: NOT DETECTED
RSV RNA NPH QL NAA+NON-PROBE: NOT DETECTED
SARS-COV-2 RNA NPH QL NAA+NON-PROBE: NOT DETECTED
SODIUM SERPL-SCNC: 135 MMOL/L (ref 136–145)
SP GR UR STRIP: 1.02 (ref 1–1.03)
SQUAMOUS #/AREA URNS HPF: ABNORMAL /HPF
UROBILINOGEN UR QL STRIP: ABNORMAL
VARIANT LYMPHS NFR BLD MANUAL: 56 % (ref 19.6–45.3)
WBC # UR STRIP: ABNORMAL /HPF
WBC MORPH BLD: NORMAL
WBC NRBC COR # BLD AUTO: 0.24 10*3/MM3 (ref 3.4–10.8)

## 2025-01-22 PROCEDURE — 0202U NFCT DS 22 TRGT SARS-COV-2: CPT | Performed by: EMERGENCY MEDICINE

## 2025-01-22 PROCEDURE — 85025 COMPLETE CBC W/AUTO DIFF WBC: CPT | Performed by: EMERGENCY MEDICINE

## 2025-01-22 PROCEDURE — 80053 COMPREHEN METABOLIC PANEL: CPT | Performed by: EMERGENCY MEDICINE

## 2025-01-22 PROCEDURE — 36415 COLL VENOUS BLD VENIPUNCTURE: CPT | Performed by: EMERGENCY MEDICINE

## 2025-01-22 PROCEDURE — 83605 ASSAY OF LACTIC ACID: CPT | Performed by: EMERGENCY MEDICINE

## 2025-01-22 PROCEDURE — 99285 EMERGENCY DEPT VISIT HI MDM: CPT

## 2025-01-22 PROCEDURE — 25010000002 ONDANSETRON PER 1 MG: Performed by: EMERGENCY MEDICINE

## 2025-01-22 PROCEDURE — 84145 PROCALCITONIN (PCT): CPT | Performed by: EMERGENCY MEDICINE

## 2025-01-22 PROCEDURE — 81001 URINALYSIS AUTO W/SCOPE: CPT | Performed by: EMERGENCY MEDICINE

## 2025-01-22 PROCEDURE — 85007 BL SMEAR W/DIFF WBC COUNT: CPT | Performed by: EMERGENCY MEDICINE

## 2025-01-22 PROCEDURE — 87040 BLOOD CULTURE FOR BACTERIA: CPT | Performed by: EMERGENCY MEDICINE

## 2025-01-22 PROCEDURE — 25010000002 CEFEPIME PER 500 MG: Performed by: EMERGENCY MEDICINE

## 2025-01-22 PROCEDURE — 25810000003 SODIUM CHLORIDE 0.9 % SOLUTION: Performed by: EMERGENCY MEDICINE

## 2025-01-22 PROCEDURE — 83735 ASSAY OF MAGNESIUM: CPT | Performed by: EMERGENCY MEDICINE

## 2025-01-22 PROCEDURE — 71045 X-RAY EXAM CHEST 1 VIEW: CPT

## 2025-01-22 RX ORDER — ALUMINA, MAGNESIA, AND SIMETHICONE 2400; 2400; 240 MG/30ML; MG/30ML; MG/30ML
15 SUSPENSION ORAL EVERY 6 HOURS PRN
Status: DISCONTINUED | OUTPATIENT
Start: 2025-01-22 | End: 2025-01-29 | Stop reason: HOSPADM

## 2025-01-22 RX ORDER — LOPERAMIDE HYDROCHLORIDE 2 MG/1
4 CAPSULE ORAL ONCE
Status: COMPLETED | OUTPATIENT
Start: 2025-01-22 | End: 2025-01-22

## 2025-01-22 RX ORDER — SODIUM CHLORIDE 0.9 % (FLUSH) 0.9 %
10 SYRINGE (ML) INJECTION AS NEEDED
Status: DISCONTINUED | OUTPATIENT
Start: 2025-01-22 | End: 2025-01-29 | Stop reason: HOSPADM

## 2025-01-22 RX ORDER — ACETAMINOPHEN 650 MG/1
650 SUPPOSITORY RECTAL EVERY 4 HOURS PRN
Status: DISCONTINUED | OUTPATIENT
Start: 2025-01-22 | End: 2025-01-29 | Stop reason: HOSPADM

## 2025-01-22 RX ORDER — BISACODYL 5 MG/1
5 TABLET, DELAYED RELEASE ORAL DAILY PRN
Status: DISCONTINUED | OUTPATIENT
Start: 2025-01-22 | End: 2025-01-29 | Stop reason: HOSPADM

## 2025-01-22 RX ORDER — ONDANSETRON 2 MG/ML
4 INJECTION INTRAMUSCULAR; INTRAVENOUS EVERY 6 HOURS PRN
Status: DISCONTINUED | OUTPATIENT
Start: 2025-01-22 | End: 2025-01-29 | Stop reason: HOSPADM

## 2025-01-22 RX ORDER — ACETAMINOPHEN 500 MG
1000 TABLET ORAL ONCE
Status: COMPLETED | OUTPATIENT
Start: 2025-01-22 | End: 2025-01-22

## 2025-01-22 RX ORDER — ACETAMINOPHEN 160 MG/5ML
650 SOLUTION ORAL EVERY 4 HOURS PRN
Status: DISCONTINUED | OUTPATIENT
Start: 2025-01-22 | End: 2025-01-29 | Stop reason: HOSPADM

## 2025-01-22 RX ORDER — AMOXICILLIN 250 MG
2 CAPSULE ORAL 2 TIMES DAILY PRN
Status: DISCONTINUED | OUTPATIENT
Start: 2025-01-22 | End: 2025-01-29 | Stop reason: HOSPADM

## 2025-01-22 RX ORDER — ONDANSETRON 2 MG/ML
4 INJECTION INTRAMUSCULAR; INTRAVENOUS ONCE
Status: COMPLETED | OUTPATIENT
Start: 2025-01-22 | End: 2025-01-22

## 2025-01-22 RX ORDER — ONDANSETRON 4 MG/1
4 TABLET, ORALLY DISINTEGRATING ORAL EVERY 6 HOURS PRN
Status: DISCONTINUED | OUTPATIENT
Start: 2025-01-22 | End: 2025-01-29 | Stop reason: HOSPADM

## 2025-01-22 RX ORDER — BISACODYL 10 MG
10 SUPPOSITORY, RECTAL RECTAL DAILY PRN
Status: DISCONTINUED | OUTPATIENT
Start: 2025-01-22 | End: 2025-01-29 | Stop reason: HOSPADM

## 2025-01-22 RX ORDER — ACETAMINOPHEN 325 MG/1
650 TABLET ORAL EVERY 4 HOURS PRN
Status: DISCONTINUED | OUTPATIENT
Start: 2025-01-22 | End: 2025-01-29 | Stop reason: HOSPADM

## 2025-01-22 RX ORDER — POLYETHYLENE GLYCOL 3350 17 G/17G
17 POWDER, FOR SOLUTION ORAL DAILY PRN
Status: DISCONTINUED | OUTPATIENT
Start: 2025-01-22 | End: 2025-01-29 | Stop reason: HOSPADM

## 2025-01-22 RX ORDER — POTASSIUM CHLORIDE 750 MG/1
40 TABLET, FILM COATED, EXTENDED RELEASE ORAL ONCE
Status: COMPLETED | OUTPATIENT
Start: 2025-01-22 | End: 2025-01-22

## 2025-01-22 RX ADMIN — ONDANSETRON 4 MG: 2 INJECTION INTRAMUSCULAR; INTRAVENOUS at 20:10

## 2025-01-22 RX ADMIN — CEFEPIME 2000 MG: 2 INJECTION, POWDER, FOR SOLUTION INTRAVENOUS at 21:18

## 2025-01-22 RX ADMIN — POTASSIUM CHLORIDE 40 MEQ: 750 TABLET, EXTENDED RELEASE ORAL at 21:28

## 2025-01-22 RX ADMIN — LOPERAMIDE HYDROCHLORIDE 4 MG: 2 CAPSULE ORAL at 22:44

## 2025-01-22 RX ADMIN — SODIUM CHLORIDE 1000 ML: 9 INJECTION, SOLUTION INTRAVENOUS at 20:27

## 2025-01-22 RX ADMIN — ACETAMINOPHEN 1000 MG: 500 TABLET, FILM COATED ORAL at 20:10

## 2025-01-22 RX ADMIN — ONDANSETRON 4 MG: 2 INJECTION INTRAMUSCULAR; INTRAVENOUS at 22:14

## 2025-01-22 NOTE — ED NOTES
PT to ER via [facility] from home for fever on and off x 3 wks. Highest temp has been 102. Pt is a cancer patient- pt is on AC chemo.

## 2025-01-23 ENCOUNTER — TELEPHONE (OUTPATIENT)
Dept: ONCOLOGY | Facility: CLINIC | Age: 45
End: 2025-01-23
Payer: COMMERCIAL

## 2025-01-23 ENCOUNTER — APPOINTMENT (OUTPATIENT)
Dept: CT IMAGING | Facility: HOSPITAL | Age: 45
End: 2025-01-23
Payer: COMMERCIAL

## 2025-01-23 LAB
ALBUMIN SERPL-MCNC: 2.2 G/DL (ref 3.5–5.2)
ALBUMIN/GLOB SERPL: 0.6 G/DL
ALP SERPL-CCNC: 208 U/L (ref 39–117)
ALT SERPL W P-5'-P-CCNC: 19 U/L (ref 1–33)
ANION GAP SERPL CALCULATED.3IONS-SCNC: 9.4 MMOL/L (ref 5–15)
AST SERPL-CCNC: 12 U/L (ref 1–32)
BILIRUB SERPL-MCNC: 0.6 MG/DL (ref 0–1.2)
BUN SERPL-MCNC: 3 MG/DL (ref 6–20)
BUN/CREAT SERPL: 5.2 (ref 7–25)
CALCIUM SPEC-SCNC: 8 MG/DL (ref 8.6–10.5)
CHLORIDE SERPL-SCNC: 106 MMOL/L (ref 98–107)
CO2 SERPL-SCNC: 22.6 MMOL/L (ref 22–29)
CREAT SERPL-MCNC: 0.58 MG/DL (ref 0.57–1)
DEPRECATED RDW RBC AUTO: 44.2 FL (ref 37–54)
EGFRCR SERPLBLD CKD-EPI 2021: 114.6 ML/MIN/1.73
ERYTHROCYTE [DISTWIDTH] IN BLOOD BY AUTOMATED COUNT: 14.1 % (ref 12.3–15.4)
GLOBULIN UR ELPH-MCNC: 4 GM/DL
GLUCOSE SERPL-MCNC: 117 MG/DL (ref 65–99)
HCT VFR BLD AUTO: 25.1 % (ref 34–46.6)
HGB BLD-MCNC: 8.5 G/DL (ref 12–15.9)
MCH RBC QN AUTO: 29.6 PG (ref 26.6–33)
MCHC RBC AUTO-ENTMCNC: 33.9 G/DL (ref 31.5–35.7)
MCV RBC AUTO: 87.5 FL (ref 79–97)
PLATELET # BLD AUTO: 189 10*3/MM3 (ref 140–450)
PMV BLD AUTO: 10.5 FL (ref 6–12)
POTASSIUM SERPL-SCNC: 2.6 MMOL/L (ref 3.5–5.2)
POTASSIUM SERPL-SCNC: 2.9 MMOL/L (ref 3.5–5.2)
PROT SERPL-MCNC: 6.2 G/DL (ref 6–8.5)
RBC # BLD AUTO: 2.87 10*6/MM3 (ref 3.77–5.28)
SODIUM SERPL-SCNC: 138 MMOL/L (ref 136–145)
WBC NRBC COR # BLD AUTO: 0.35 10*3/MM3 (ref 3.4–10.8)

## 2025-01-23 PROCEDURE — 99232 SBSQ HOSP IP/OBS MODERATE 35: CPT | Performed by: INTERNAL MEDICINE

## 2025-01-23 PROCEDURE — 99223 1ST HOSP IP/OBS HIGH 75: CPT | Performed by: STUDENT IN AN ORGANIZED HEALTH CARE EDUCATION/TRAINING PROGRAM

## 2025-01-23 PROCEDURE — 25510000001 IOPAMIDOL 61 % SOLUTION: Performed by: INTERNAL MEDICINE

## 2025-01-23 PROCEDURE — 25010000002 ONDANSETRON PER 1 MG: Performed by: NURSE PRACTITIONER

## 2025-01-23 PROCEDURE — 85027 COMPLETE CBC AUTOMATED: CPT | Performed by: NURSE PRACTITIONER

## 2025-01-23 PROCEDURE — 25010000002 PROCHLORPERAZINE 10 MG/2ML SOLUTION: Performed by: NURSE PRACTITIONER

## 2025-01-23 PROCEDURE — 25010000002 SODIUM CHLORIDE 0.9 % WITH KCL 20 MEQ 20-0.9 MEQ/L-% SOLUTION: Performed by: NURSE PRACTITIONER

## 2025-01-23 PROCEDURE — 74177 CT ABD & PELVIS W/CONTRAST: CPT

## 2025-01-23 PROCEDURE — 25010000002 ONDANSETRON PER 1 MG: Performed by: EMERGENCY MEDICINE

## 2025-01-23 PROCEDURE — 80053 COMPREHEN METABOLIC PANEL: CPT | Performed by: NURSE PRACTITIONER

## 2025-01-23 PROCEDURE — 25010000002 FILGRASTIM 300 MCG/0.5ML SOLUTION PREFILLED SYRINGE: Performed by: INTERNAL MEDICINE

## 2025-01-23 PROCEDURE — 84132 ASSAY OF SERUM POTASSIUM: CPT | Performed by: STUDENT IN AN ORGANIZED HEALTH CARE EDUCATION/TRAINING PROGRAM

## 2025-01-23 PROCEDURE — 25010000002 CEFEPIME PER 500 MG: Performed by: STUDENT IN AN ORGANIZED HEALTH CARE EDUCATION/TRAINING PROGRAM

## 2025-01-23 RX ORDER — OXYCODONE HYDROCHLORIDE 5 MG/1
5 TABLET ORAL EVERY 6 HOURS PRN
Status: DISCONTINUED | OUTPATIENT
Start: 2025-01-23 | End: 2025-01-29 | Stop reason: HOSPADM

## 2025-01-23 RX ORDER — POTASSIUM CHLORIDE 750 MG/1
40 TABLET, FILM COATED, EXTENDED RELEASE ORAL EVERY 4 HOURS
Status: DISPENSED | OUTPATIENT
Start: 2025-01-23 | End: 2025-01-23

## 2025-01-23 RX ORDER — PROCHLORPERAZINE EDISYLATE 5 MG/ML
5 INJECTION INTRAMUSCULAR; INTRAVENOUS EVERY 6 HOURS PRN
Status: DISCONTINUED | OUTPATIENT
Start: 2025-01-23 | End: 2025-01-29 | Stop reason: HOSPADM

## 2025-01-23 RX ORDER — ASPIRIN 81 MG/1
81 TABLET ORAL DAILY
Status: DISCONTINUED | OUTPATIENT
Start: 2025-01-23 | End: 2025-01-29 | Stop reason: HOSPADM

## 2025-01-23 RX ORDER — ONDANSETRON 8 MG/1
8 TABLET, FILM COATED ORAL 3 TIMES DAILY PRN
Status: DISCONTINUED | OUTPATIENT
Start: 2025-01-23 | End: 2025-01-29 | Stop reason: HOSPADM

## 2025-01-23 RX ORDER — PROCHLORPERAZINE MALEATE 10 MG
5 TABLET ORAL EVERY 6 HOURS PRN
Status: DISCONTINUED | OUTPATIENT
Start: 2025-01-23 | End: 2025-01-29 | Stop reason: HOSPADM

## 2025-01-23 RX ORDER — IOPAMIDOL 612 MG/ML
100 INJECTION, SOLUTION INTRAVASCULAR
Status: COMPLETED | OUTPATIENT
Start: 2025-01-23 | End: 2025-01-23

## 2025-01-23 RX ORDER — DICYCLOMINE HYDROCHLORIDE 10 MG/1
10 CAPSULE ORAL 3 TIMES DAILY PRN
Status: DISCONTINUED | OUTPATIENT
Start: 2025-01-23 | End: 2025-01-29 | Stop reason: HOSPADM

## 2025-01-23 RX ORDER — ONDANSETRON 2 MG/ML
4 INJECTION INTRAMUSCULAR; INTRAVENOUS ONCE
Status: COMPLETED | OUTPATIENT
Start: 2025-01-23 | End: 2025-01-23

## 2025-01-23 RX ORDER — GABAPENTIN 300 MG/1
600 CAPSULE ORAL NIGHTLY
Status: DISCONTINUED | OUTPATIENT
Start: 2025-01-23 | End: 2025-01-29 | Stop reason: HOSPADM

## 2025-01-23 RX ORDER — SODIUM CHLORIDE AND POTASSIUM CHLORIDE 150; 900 MG/100ML; MG/100ML
100 INJECTION, SOLUTION INTRAVENOUS CONTINUOUS
Status: DISPENSED | OUTPATIENT
Start: 2025-01-23 | End: 2025-01-24

## 2025-01-23 RX ADMIN — DICYCLOMINE HYDROCHLORIDE 10 MG: 10 CAPSULE ORAL at 18:21

## 2025-01-23 RX ADMIN — DICYCLOMINE HYDROCHLORIDE 10 MG: 10 CAPSULE ORAL at 13:04

## 2025-01-23 RX ADMIN — ONDANSETRON 4 MG: 2 INJECTION INTRAMUSCULAR; INTRAVENOUS at 18:21

## 2025-01-23 RX ADMIN — ONDANSETRON 4 MG: 2 INJECTION INTRAMUSCULAR; INTRAVENOUS at 00:24

## 2025-01-23 RX ADMIN — ASPIRIN 81 MG: 81 TABLET, COATED ORAL at 13:00

## 2025-01-23 RX ADMIN — POTASSIUM CHLORIDE 40 MEQ: 750 TABLET, EXTENDED RELEASE ORAL at 04:33

## 2025-01-23 RX ADMIN — ACETAMINOPHEN 650 MG: 325 TABLET, FILM COATED ORAL at 12:08

## 2025-01-23 RX ADMIN — CEFEPIME 2000 MG: 2 INJECTION, POWDER, FOR SOLUTION INTRAVENOUS at 14:14

## 2025-01-23 RX ADMIN — ACETAMINOPHEN 650 MG: 325 TABLET, FILM COATED ORAL at 01:50

## 2025-01-23 RX ADMIN — SODIUM CHLORIDE AND POTASSIUM CHLORIDE 100 ML/HR: .9; .15 SOLUTION INTRAVENOUS at 15:29

## 2025-01-23 RX ADMIN — POTASSIUM CHLORIDE 20 MEQ: 750 TABLET, EXTENDED RELEASE ORAL at 10:00

## 2025-01-23 RX ADMIN — PROCHLORPERAZINE EDISYLATE 5 MG: 5 INJECTION INTRAMUSCULAR; INTRAVENOUS at 04:33

## 2025-01-23 RX ADMIN — GABAPENTIN 600 MG: 300 CAPSULE ORAL at 20:11

## 2025-01-23 RX ADMIN — IOPAMIDOL 85 ML: 612 INJECTION, SOLUTION INTRAVENOUS at 18:08

## 2025-01-23 RX ADMIN — OXYCODONE HYDROCHLORIDE 5 MG: 5 TABLET ORAL at 14:13

## 2025-01-23 RX ADMIN — FILGRASTIM 300 MCG: 300 INJECTION, SOLUTION INTRAVENOUS; SUBCUTANEOUS at 17:56

## 2025-01-23 RX ADMIN — SODIUM CHLORIDE AND POTASSIUM CHLORIDE 100 ML/HR: .9; .15 SOLUTION INTRAVENOUS at 02:46

## 2025-01-23 RX ADMIN — CEFEPIME 2000 MG: 2 INJECTION, POWDER, FOR SOLUTION INTRAVENOUS at 21:34

## 2025-01-23 RX ADMIN — OXYCODONE HYDROCHLORIDE 5 MG: 5 TABLET ORAL at 20:11

## 2025-01-23 RX ADMIN — ONDANSETRON 4 MG: 2 INJECTION INTRAMUSCULAR; INTRAVENOUS at 10:04

## 2025-01-23 NOTE — PLAN OF CARE
Goal Outcome Evaluation:  Plan of Care Reviewed With: patient        Progress: no change  Outcome Evaluation: A&Ox4. VSS. Pt is pleasant and withdrawn arriving onto the unit for a neutropenic fever. L-sided chest port, no accessed. 20G IV in the L AC. She last had chemotherapy for Stage 3 breast CA 1 wk ago. She's been having fevers off and on for three weeks. IVF started NS with K+ going at 100 mL/hr. K+ replacement started. Gave compazine x1 for nausea. She does not appear to be in any distress.

## 2025-01-23 NOTE — PAYOR COMM NOTE
"Suzanne Lin (44 y.o. Female)          INPATIENT REQUEST FOR [id number]     F# [phone]         Date of Birth   1980    Social Security Number       Address   [address] [place] [address]    Home Phone   [phone]    MRN   [id number]       Gnosticism   None    Marital Status   Single                            Admission Date   1/22/25    Admission Type   Emergency    Admitting Provider   Jim Mane MD    Attending Provider   Jim Mane MD    Department, Room/Bed   [address], 89/1       Discharge Date       Discharge Disposition       Discharge Destination                                 Attending Provider: Jim Mane MD    Allergies: No Known Allergies    Isolation: None   Infection: None   Code Status: CPR    Ht: 165.1 cm (65\")   Wt: 68 kg (150 lb)    Admission Cmt: None   Principal Problem: Neutropenic fever [D70.9,R50.81]                   Active Insurance as of 1/22/2025       Primary Coverage       Payor Plan Insurance Group Employer/Plan Group    [employer] [facility] [id number]       Payor Plan Address Payor Plan Phone Number Payor Plan Fax Number Effective Dates    [address] [phone]  9/1/2024 - None Entered    [facility] [id number]         Subscriber Name Subscriber Birth Date Member ID       SUZANNE LIN 1980 [id number]                     Emergency Contacts        (Rel.) Home Phone Work Phone Mobile Phone    KERRY MUJICA (Mother) [phone] -- --    denise see (Significant Other) -- -- [phone]    soumya milan (Relative) -- -- [phone]                 History & Physical        Jim Mane MD at 01/23/25 1225              Patient Name:  Suzanne Lin  YOB: 1980  MRN:  [id number]  Admit Date:  1/22/2025  Patient Care Team:  Gia Rodriguez APRN as PCP - General (Family Medicine)  Gia Rodriguez APRN as Referring Physician (Family " Medicine)  Benito Douglas MD as Consulting Physician (Hematology and Oncology)  Nishant Witt MD as Consulting Physician (Hematology and Oncology)  Juliet Santizo, RN as Nurse Navigator (Oncology)      Subjective  History Present Illness     Chief Complaint   Patient presents with    Fever       History of Present Illness  This is a 44-year-old female with history of Crohn's disease, breast cancer, most recent chemotherapy 1 week ago, presented to the emergency room with main complaints of acute fever, generalized malaise associated with nausea.  Patient was seen by hematology/oncology service recently, during which labs were obtained and patient was started on oral antibiotics.  She has poor oral intake, slight gastric discomfort, she was admitted to the hospital for IV antibiotic administration and further workup by infectious disease.      Review of Systems   Review of Systems   Constitutional: Negative for activity change and appetite change.   HENT: Negative for congestion and dental problem.    Eyes: Negative for discharge and itching.   Respiratory: Negative for apnea and chest tightness.    Cardiovascular: Negative for chest pain and palpitations.   Gastrointestinal: Negative for abdominal distention and abdominal pain.   Endocrine: Negative for cold intolerance and heat intolerance.   Genitourinary: Negative for difficulty urinating and frequency.   Musculoskeletal: Negative for arthralgias and back pain.   Skin: Negative for color change and pallor.   Allergic/Immunologic: Negative for environmental allergies and food allergies.   Neurological: Negative for dizziness and facial asymmetry.   Hematological: Negative for adenopathy. Does not bruise/bleed easily.   Psychiatric/Behavioral: Negative for agitation and suicidal ideas.       Personal History     Past Medical History:   Diagnosis Date    Anxiety     Breast cancer 2024    Right    Crohn's disease     DVT (deep vein thrombosis) in  pregnancy     PFO (patent foramen ovale)     Stroke     after the birth of her child at age 34    Tattoos      Past Surgical History:   Procedure Laterality Date     SECTION      COLON RESECTION      COLONOSCOPY      MASTECTOMY Bilateral 10/9/2024    Procedure: Modified radical mastectomy with axillary dissection of the right breast and simple mastectomy of the left breast;  Surgeon: Rebekah Garcia DO;  Location: [facility];  Service: General;  Laterality: Bilateral;    SKIN CANCER EXCISION      VENOUS ACCESS DEVICE (PORT) INSERTION N/A 12/10/2024    Procedure: INSERTION VENOUS ACCESS DEVICE;  Surgeon: Rebekah Garcia DO;  Location: [facility] OR;  Service: General;  Laterality: N/A;     Family History   Problem Relation Age of Onset    Diabetes Mother     Heart disease Father     Diabetes Paternal Grandmother     Hypertension Other     Malig Hyperthermia Neg Hx      Social History     Tobacco Use    Smoking status: Former     Current packs/day: 0.00     Average packs/day: 1 pack/day for 20.0 years (20.0 ttl pk-yrs)     Types: Cigarettes     Start date:      Quit date: 2019     Years since quittin.0    Smokeless tobacco: Current    Tobacco comments:     Nicotine pouches   Vaping Use    Vaping status: Never Used   Substance Use Topics    Alcohol use: Yes     Comment: OCC    Drug use: Never     Current Facility-Administered Medications on File Prior to Encounter   Medication Dose Route Frequency Provider Last Rate Last Admin    heparin injection 500 Units  500 Units Intravenous PRN Nishant Witt MD   500 Units at 24 0916    heparin injection 500 Units  500 Units Intravenous PRN Nishant Witt MD   500 Units at 25 1049    sodium chloride 0.9 % flush 10 mL  10 mL Intravenous PRN Nishant Witt MD   10 mL at 24 0916    sodium chloride 0.9 % flush 10 mL  10 mL Intravenous PRN Nishant Witt MD   10 mL at 25 1048     Current Outpatient Medications on File Prior to  Encounter   Medication Sig Dispense Refill    amoxicillin-clavulanate (AUGMENTIN) 500-125 MG per tablet Take 1 tablet by mouth 2 (Two) Times a Day for 7 days. 14 tablet 0    aspirin 81 MG EC tablet Take 1 tablet by mouth Daily.      gabapentin (Neurontin) 300 MG capsule Take 2 capsules by mouth Every Night. 60 capsule 2    Klor-Con M20 20 MEQ CR tablet TAKE 1 TABLET BY MOUTH 2 TIMES A DAY 40 tablet 1    lidocaine-prilocaine (EMLA) 2.5-2.5 % cream Apply 1 Application topically to the appropriate area as directed As Needed for Mild Pain. Apply to port site 30 minutes before access 15 g 3    omeprazole (priLOSEC) 20 MG capsule       ondansetron (ZOFRAN) 8 MG tablet Take 1 tablet by mouth 3 (Three) Times a Day As Needed for Nausea or Vomiting. 30 tablet 5    prochlorperazine (COMPAZINE) 10 MG tablet Take 1 tablet by mouth Every 6 (Six) Hours As Needed for Nausea or Vomiting. 60 tablet 1    venlafaxine XR (Effexor XR) 37.5 MG 24 hr capsule Take 1 capsule by mouth Take As Directed. 1 capsule po q am for two weeks followed by increase to 2 capsules daily (Patient taking differently: Take 1 capsule by mouth Take As Directed. 1 capsule po q am for two weeks followed by increase to 2 capsules daily  Pt has not started yet) 60 capsule 2    OLANZapine (zyPREXA) 5 MG tablet Take 1 tablet by mouth for 4 doses starting night of chemotherapy. 8 tablet 1     No Known Allergies    Objective   Objective     Vital Signs  Temp:  [97.4 °F (36.3 °C)-102.9 °F (39.4 °C)] 102.6 °F (39.2 °C)  Heart Rate:  [] 101  Resp:  [16-20] 16  BP: ()/(50-72) 90/58  SpO2:  [95 %-100 %] 96 %  on   ;   Device (Oxygen Therapy): room air  Body mass index is 24.96 kg/m².    Physical Exam  General, awake and alert.  Head and ENT, normocephalic and atraumatic.  Lungs, symmetric expansion, equal air entry bilaterally.  Heart, regular rate and rhythm.  Abdomen, soft and nontender.  Extremities, no clubbing or cyanosis.  Neuro, no focal deficits.  Skin:  Warm and no rash.  Psych, normal mood and affect.  Musculoskeletal, joint examination is grossly normal.    Results Review:  I reviewed the patient's new clinical results.  I reviewed the patient's new imaging results and agree with the interpretation.  I reviewed the patient's other test results and agree with the interpretation  I personally viewed and interpreted the patient's EKG/Telemetry data  Discussed with ED provider.    Lab Results (last 24 hours)       Procedure Component Value Units Date/Time    Respiratory Panel PCR w/COVID-19(SARS-CoV-2) AICHA/KALPANA/DAPHNE/PAD/COR/PAM In-House, NP Swab in UTM/VTM, 2 HR TAT - Swab, Nasopharynx [[id number]]  (Normal) Collected: 01/22/25 1934    Specimen: Swab from Nasopharynx Updated: 01/22/25 2040     ADENOVIRUS, PCR Not Detected     Coronavirus 229E Not Detected     Coronavirus HKU1 Not Detected     Coronavirus NL63 Not Detected     Coronavirus OC43 Not Detected     COVID19 Not Detected     Human Metapneumovirus Not Detected     Human Rhinovirus/Enterovirus Not Detected     Influenza A PCR Not Detected     Influenza B PCR Not Detected     Parainfluenza Virus 1 Not Detected     Parainfluenza Virus 2 Not Detected     Parainfluenza Virus 3 Not Detected     Parainfluenza Virus 4 Not Detected     RSV, PCR Not Detected     Bordetella pertussis pcr Not Detected     Bordetella parapertussis PCR Not Detected     Chlamydophila pneumoniae PCR Not Detected     Mycoplasma pneumo by PCR Not Detected    Narrative:      In the setting of a positive respiratory panel with a viral infection PLUS a negative procalcitonin without other underlying concern for bacterial infection, consider observing off antibiotics or discontinuation of antibiotics and continue supportive care. If the respiratory panel is positive for atypical bacterial infection (Bordetella pertussis, Chlamydophila pneumoniae, or Mycoplasma pneumoniae), consider antibiotic de-escalation to target atypical bacterial infection.     CBC & Differential [[id number]]  (Abnormal) Collected: 01/22/25 1934    Specimen: Blood from Arm, Left Updated: 01/22/25 2006    Narrative:      The following orders were created for panel order CBC & Differential.  Procedure                               Abnormality         Status                     ---------                               -----------         ------                     CBC Auto Differential[[id number]]        Abnormal            Final result                 Please view results for these tests on the individual orders.    Comprehensive Metabolic Panel [[id number]]  (Abnormal) Collected: 01/22/25 1934    Specimen: Blood from Arm, Left Updated: 01/22/25 2024     Glucose 136 mg/dL      BUN 4 mg/dL      Creatinine 0.69 mg/dL      Sodium 135 mmol/L      Potassium 2.7 mmol/L      Chloride 98 mmol/L      CO2 23.0 mmol/L      Calcium 8.7 mg/dL      Total Protein 7.5 g/dL      Albumin 2.9 g/dL      ALT (SGPT) 30 U/L      AST (SGOT) 27 U/L      Alkaline Phosphatase 271 U/L      Total Bilirubin 0.6 mg/dL      Globulin 4.6 gm/dL      A/G Ratio 0.6 g/dL      BUN/Creatinine Ratio 5.8     Anion Gap 14.0 mmol/L      eGFR 109.9 mL/min/1.73     Narrative:      GFR Categories in Chronic Kidney Disease (CKD)      GFR Category          GFR (mL/min/1.73)    Interpretation  G1                     90 or greater         Normal or high (1)  G2                      60-89                Mild decrease (1)  G3a                   45-59                Mild to moderate decrease  G3b                   30-44                Moderate to severe decrease  G4                    15-29                Severe decrease  G5                    14 or less           Kidney failure          (1)In the absence of evidence of kidney disease, neither GFR category G1 or G2 fulfill the criteria for CKD.    eGFR calculation 2021 CKD-EPI creatinine equation, which does not include race as a factor    Blood Culture - Blood, Arm, Left [[id number]] Collected:  "01/22/25 1934    Specimen: Blood from Arm, Left Updated: 01/22/25 1940    Lactic Acid, Plasma [[id number]]  (Normal) Collected: 01/22/25 1934    Specimen: Blood from Arm, Left Updated: 01/22/25 2006     Lactate 1.1 mmol/L     Procalcitonin [[id number]]  (Abnormal) Collected: 01/22/25 1934    Specimen: Blood from Arm, Left Updated: 01/22/25 2016     Procalcitonin 0.33 ng/mL     Narrative:      As a Marker for Sepsis (Non-Neonates):    1. <0.5 ng/mL represents a low risk of severe sepsis and/or septic shock.  2. >2 ng/mL represents a high risk of severe sepsis and/or septic shock.    As a Marker for Lower Respiratory Tract Infections that require antibiotic therapy:    PCT on Admission    Antibiotic Therapy       6-12 Hrs later    >0.5                Strongly Recommended  >0.25 - <0.5        Recommended   0.1 - 0.25          Discouraged              Remeasure/reassess PCT  <0.1                Strongly Discouraged     Remeasure/reassess PCT    As 28 day mortality risk marker: \"Change in Procalcitonin Result\" (>80% or <=80%) if Day 0 (or Day 1) and Day 4 values are available. Refer to http://www.[employer] [facility]-pct-calculator.com    Change in PCT <=80%  A decrease of PCT levels below or equal to 80% defines a positive change in PCT test result representing a higher risk for 28-day all-cause mortality of patients diagnosed with severe sepsis for septic shock.    Change in PCT >80%  A decrease of PCT levels of more than 80% defines a negative change in PCT result representing a lower risk for 28-day all-cause mortality of patients diagnosed with severe sepsis or septic shock.       CBC Auto Differential [[id number]]  (Abnormal) Collected: 01/22/25 1934    Specimen: Blood from Arm, Left Updated: 01/22/25 2006     WBC 0.24 10*3/mm3      RBC 2.96 10*6/mm3      Hemoglobin 8.9 g/dL      Hematocrit 25.9 %      MCV 87.5 fL      MCH 30.1 pg      MCHC 34.4 g/dL      RDW 14.1 %      RDW-SD 44.6 fl      MPV 9.9 fL      Platelets 241 10*3/mm3  "    Manual Differential [[id number]]  (Abnormal) Collected: 01/22/25 1934    Specimen: Blood from Arm, Left Updated: 01/22/25 2036     Neutrophil % 8.0 %      Lymphocyte % 56.0 %      Monocyte % 32.0 %      Myelocyte % 4.0 %      Neutrophils Absolute 0.02 10*3/mm3      Lymphocytes Absolute 0.13 10*3/mm3      Monocytes Absolute 0.08 10*3/mm3      RBC Morphology Normal     WBC Morphology Normal     Platelet Morphology Normal    Magnesium [[id number]]  (Normal) Collected: 01/22/25 1934    Specimen: Blood from Arm, Left Updated: 01/22/25 2118     Magnesium 1.9 mg/dL     Urinalysis With Microscopic If Indicated (No Culture) - Urine, Clean Catch [[id number]]  (Abnormal) Collected: 01/22/25 2028    Specimen: Urine, Clean Catch Updated: 01/22/25 2045     Color, UA Dark Yellow     Appearance, UA Cloudy     pH, UA 5.5     Specific Gravity, UA 1.024     Glucose, UA Negative     Ketones, UA Trace     Bilirubin, UA Negative     Comment: Confirmed by alternative method        Blood, UA Negative     Protein,  mg/dL (2+)     Leuk Esterase, UA Trace     Nitrite, UA Negative     Urobilinogen, UA 1.0 E.U./dL    Urinalysis, Microscopic Only - Urine, Clean Catch [[id number]]  (Abnormal) Collected: 01/22/25 2028    Specimen: Urine, Clean Catch Updated: 01/22/25 2056     RBC, UA 0-2 /HPF      WBC, UA 3-5 /HPF      Bacteria, UA None Seen /HPF      Squamous Epithelial Cells, UA 7-12 /HPF      Hyaline Casts, UA 0-2 /LPF      Mucus, UA Small/1+ /HPF      Methodology Manual Light Microscopy    Blood Culture - Blood, Arm, Left [[id number]] Collected: 01/22/25 2057    Specimen: Blood from Arm, Left Updated: 01/22/25 2103    CBC (No Diff) [[id number]]  (Abnormal) Collected: 01/23/25 0552    Specimen: Blood Updated: 01/23/25 0625     WBC 0.35 10*3/mm3      RBC 2.87 10*6/mm3      Hemoglobin 8.5 g/dL      Hematocrit 25.1 %      MCV 87.5 fL      MCH 29.6 pg      MCHC 33.9 g/dL      RDW 14.1 %      RDW-SD 44.2 fl      MPV 10.5 fL      Platelets 189  10*3/mm3     Comprehensive Metabolic Panel [[id number]]  (Abnormal) Collected: 01/23/25 0552    Specimen: Blood Updated: 01/23/25 0631     Glucose 117 mg/dL      BUN 3 mg/dL      Creatinine 0.58 mg/dL      Sodium 138 mmol/L      Potassium 2.6 mmol/L      Chloride 106 mmol/L      CO2 22.6 mmol/L      Calcium 8.0 mg/dL      Total Protein 6.2 g/dL      Albumin 2.2 g/dL      ALT (SGPT) 19 U/L      AST (SGOT) 12 U/L      Alkaline Phosphatase 208 U/L      Total Bilirubin 0.6 mg/dL      Globulin 4.0 gm/dL      A/G Ratio 0.6 g/dL      BUN/Creatinine Ratio 5.2     Anion Gap 9.4 mmol/L      eGFR 114.6 mL/min/1.73     Narrative:      GFR Categories in Chronic Kidney Disease (CKD)      GFR Category          GFR (mL/min/1.73)    Interpretation  G1                     90 or greater         Normal or high (1)  G2                      60-89                Mild decrease (1)  G3a                   45-59                Mild to moderate decrease  G3b                   30-44                Moderate to severe decrease  G4                    15-29                Severe decrease  G5                    14 or less           Kidney failure          (1)In the absence of evidence of kidney disease, neither GFR category G1 or G2 fulfill the criteria for CKD.    eGFR calculation 2021 CKD-EPI creatinine equation, which does not include race as a factor            Imaging Results (Last 24 Hours)       Procedure Component Value Units Date/Time    XR Chest 1 View [[id number]] Collected: 01/22/25 1946     Updated: 01/22/25 1950    Narrative:      AP CHEST     HISTORY: Neutropenic fever     COMPARISON: 1/9/2025     FINDINGS: Lungs are clear. Heart size stable. Is a stable chest port.  There is prominent bowel gas in the upper abdomen       Impression:      As above           This report was finalized on 1/22/2025 7:46 PM by Dr. Shayan Reyna M.D on Workstation: [id number]               Results for orders placed during the hospital encounter of  11/13/24    Adult Transthoracic Echo Complete W/ Cont if Necessary Per Protocol    Interpretation Summary    Left ventricular systolic function is normal. Estimated left ventricular EF = 55% Normal global longitudinal LV strain (GLS) = -18.8%. Left ventricle strain data was reviewed by the physician. Left ventricular diastolic function was normal.      No orders to display        Assessment/Plan     Active Hospital Problems    Diagnosis  POA    **Neutropenic fever [D70.9, R50.81]  Yes    Crohn's disease without complication [K50.90]  Yes    LITA (iron deficiency anemia) [D50.9]  Yes    Breast cancer, stage 3, right [C50.911]  Yes      Resolved Hospital Problems   No resolved problems to display.       Assessment and plan  1.  Neutropenic fever, high-grade fever noted, underlying etiology unclear, need further workup with infectious disease, initiate IV broad-spectrum antibiotics with vancomycin and Zosyn.    2.  History of Crohn's disease, currently uncomplicated course.  If she worsens, she may need GI evaluation.    3.  Stage III right-sided breast cancer, status post recent chemotherapy, hematology/oncology has been consulted.  We will follow management recommendations.    4.  Anemia, monitor hemoglobin closely, we will continue to recheck labs.    5.  CODE STATUS is full code.  Further plans based on hospital course.       Jim Mane MD  [facility]  01/23/25  12:25 EST    Electronically signed by Jim Mane MD at 01/23/25 1228          Emergency Department Notes        Fidelia Angel, RN at 01/23/25 0059          Nursing report ED to floor  Suzanne Lin  44 y.o.  female    HPI :  HPI  Stated Reason for Visit: fever    Chief Complaint  Chief Complaint   Patient presents with    Fever       Admitting doctor:   Emil Bland MD    Admitting diagnosis:   The primary encounter diagnosis was Neutropenic fever. Diagnoses of Immunosuppressed due to chemotherapy, Malignant neoplasm of  female breast, unspecified estrogen receptor status, unspecified laterality, unspecified site of breast, Acute anemia, and Leukopenia, unspecified type were also pertinent to this visit.    Code status:   Current Code Status       Date Active Code Status Order ID Comments User Context       1/22/2025 2141 CPR (Attempt to Resuscitate) [id number]  Italia Sheikh APRN ED        Question Answer    Code Status (Patient has no pulse and is not breathing) CPR (Attempt to Resuscitate)    Medical Interventions (Patient has pulse or is breathing) Full Support                    Allergies:   Patient has no known allergies.    Isolation:   No active isolations    Intake and Output    Intake/Output Summary (Last 24 hours) at 1/23/2025 0059  Last data filed at 1/22/2025 2224  Gross per 24 hour   Intake 1100 ml   Output --   Net 1100 ml       Weight:   There were no vitals filed for this visit.    Most recent vitals:   Vitals:    01/22/25 2349 01/23/25 0001 01/23/25 0010 01/23/25 0032   BP:  100/53     Patient Position:       Pulse:  86 91 87   Resp:       Temp: 97.7 °F (36.5 °C)      TempSrc: Oral      SpO2:  97% 99% 100%       Active LDAs/IV Access:   Lines, Drains & Airways       Active LDAs       Name Placement date Placement time Site Days    Peripheral IV 01/22/25 2004 Left Antecubital 01/22/25 2004  Antecubital  less than 1    Single Lumen Implantable Port 12/10/24 Left Chest 12/10/24  1234  Chest  43                    Labs (abnormal labs have a star):   Labs Reviewed   COMPREHENSIVE METABOLIC PANEL - Abnormal; Notable for the following components:       Result Value    Glucose 136 (*)     BUN 4 (*)     Sodium 135 (*)     Potassium 2.7 (*)     Albumin 2.9 (*)     Alkaline Phosphatase 271 (*)     BUN/Creatinine Ratio 5.8 (*)     All other components within normal limits    Narrative:     GFR Categories in Chronic Kidney Disease (CKD)      GFR Category          GFR (mL/min/1.73)    Interpretation  G1                     " 90 or greater         Normal or high (1)  G2                      60-89                Mild decrease (1)  G3a                   45-59                Mild to moderate decrease  G3b                   30-44                Moderate to severe decrease  G4                    15-29                Severe decrease  G5                    14 or less           Kidney failure          (1)In the absence of evidence of kidney disease, neither GFR category G1 or G2 fulfill the criteria for CKD.    eGFR calculation 2021 CKD-EPI creatinine equation, which does not include race as a factor   URINALYSIS W/ MICROSCOPIC IF INDICATED (NO CULTURE) - Abnormal; Notable for the following components:    Color, UA Dark Yellow (*)     Appearance, UA Cloudy (*)     Ketones, UA Trace (*)     Protein,  mg/dL (2+) (*)     Leuk Esterase, UA Trace (*)     All other components within normal limits   PROCALCITONIN - Abnormal; Notable for the following components:    Procalcitonin 0.33 (*)     All other components within normal limits    Narrative:     As a Marker for Sepsis (Non-Neonates):    1. <0.5 ng/mL represents a low risk of severe sepsis and/or septic shock.  2. >2 ng/mL represents a high risk of severe sepsis and/or septic shock.    As a Marker for Lower Respiratory Tract Infections that require antibiotic therapy:    PCT on Admission    Antibiotic Therapy       6-12 Hrs later    >0.5                Strongly Recommended  >0.25 - <0.5        Recommended   0.1 - 0.25          Discouraged              Remeasure/reassess PCT  <0.1                Strongly Discouraged     Remeasure/reassess PCT    As 28 day mortality risk marker: \"Change in Procalcitonin Result\" (>80% or <=80%) if Day 0 (or Day 1) and Day 4 values are available. Refer to http://www.[employer]-pct-calculator.com    Change in PCT <=80%  A decrease of PCT levels below or equal to 80% defines a positive change in PCT test result representing a higher risk for 28-day all-cause mortality " of patients diagnosed with severe sepsis for septic shock.    Change in PCT >80%  A decrease of PCT levels of more than 80% defines a negative change in PCT result representing a lower risk for 28-day all-cause mortality of patients diagnosed with severe sepsis or septic shock.      CBC WITH AUTO DIFFERENTIAL - Abnormal; Notable for the following components:    WBC 0.24 (*)     RBC 2.96 (*)     Hemoglobin 8.9 (*)     Hematocrit 25.9 (*)     All other components within normal limits   MANUAL DIFFERENTIAL - Abnormal; Notable for the following components:    Neutrophil % 8.0 (*)     Lymphocyte % 56.0 (*)     Monocyte % 32.0 (*)     Myelocyte % 4.0 (*)     Neutrophils Absolute 0.02 (*)     Lymphocytes Absolute 0.13 (*)     Monocytes Absolute 0.08 (*)     All other components within normal limits   URINALYSIS, MICROSCOPIC ONLY - Abnormal; Notable for the following components:    WBC, UA 3-5 (*)     Squamous Epithelial Cells, UA 7-12 (*)     Mucus, UA Small/1+ (*)     All other components within normal limits   RESPIRATORY PANEL PCR W/ COVID-19 (SARS-COV-2), NP SWAB IN UTM/VTP, 2 HR TAT - Normal    Narrative:     In the setting of a positive respiratory panel with a viral infection PLUS a negative procalcitonin without other underlying concern for bacterial infection, consider observing off antibiotics or discontinuation of antibiotics and continue supportive care. If the respiratory panel is positive for atypical bacterial infection (Bordetella pertussis, Chlamydophila pneumoniae, or Mycoplasma pneumoniae), consider antibiotic de-escalation to target atypical bacterial infection.   LACTIC ACID, PLASMA - Normal   MAGNESIUM - Normal   BLOOD CULTURE   BLOOD CULTURE   CBC (NO DIFF)   COMPREHENSIVE METABOLIC PANEL   CBC AND DIFFERENTIAL    Narrative:     The following orders were created for panel order CBC & Differential.  Procedure                               Abnormality         Status                     ---------                                -----------         ------                     CBC Auto Differential[[id number]]        Abnormal            Final result                 Please view results for these tests on the individual orders.       EKG:   No orders to display       Meds given in ED:   Medications   sodium chloride 0.9 % flush 10 mL (has no administration in time range)   sodium chloride 0.9 % flush 10 mL (has no administration in time range)   acetaminophen (TYLENOL) tablet 650 mg (has no administration in time range)     Or   acetaminophen (TYLENOL) 160 MG/5ML oral solution 650 mg (has no administration in time range)     Or   acetaminophen (TYLENOL) suppository 650 mg (has no administration in time range)   sennosides-docusate (PERICOLACE) 8.6-50 MG per tablet 2 tablet (has no administration in time range)     And   polyethylene glycol (MIRALAX) packet 17 g (has no administration in time range)     And   bisacodyl (DULCOLAX) EC tablet 5 mg (has no administration in time range)     And   bisacodyl (DULCOLAX) suppository 10 mg (has no administration in time range)   melatonin tablet 5 mg (has no administration in time range)   ondansetron ODT (ZOFRAN-ODT) disintegrating tablet 4 mg ( Oral Not Given:  See Alt 1/23/25 0024)     Or   ondansetron (ZOFRAN) injection 4 mg (4 mg Intravenous Given 1/23/25 0024)   aluminum-magnesium hydroxide-simethicone (MAALOX MAX) 400-400-40 MG/5ML suspension 15 mL (has no administration in time range)   Potassium Replacement - Follow Nurse / BPA Driven Protocol (has no administration in time range)   Magnesium Standard Dose Replacement - Follow Nurse / BPA Driven Protocol (has no administration in time range)   Phosphorus Replacement - Follow Nurse / BPA Driven Protocol (has no administration in time range)   Calcium Replacement - Follow Nurse / BPA Driven Protocol (has no administration in time range)   sodium chloride 0.9 % with KCl 20 mEq/L infusion (has no administration in time range)    sodium chloride 0.9 % bolus 1,000 mL (0 mL Intravenous Stopped 25)   acetaminophen (TYLENOL) tablet 1,000 mg (1,000 mg Oral Given 25)   ondansetron (ZOFRAN) injection 4 mg (4 mg Intravenous Given 25)   cefepime 2000 mg IVPB in 100 mL NS (MBP) (0 mg Intravenous Stopped 25)   potassium chloride (K-DUR,KLOR-CON) ER tablet 40 mEq (40 mEq Oral Given 25)   ondansetron (ZOFRAN) injection 4 mg (4 mg Intravenous Given 25)   loperamide (IMODIUM) capsule 4 mg (4 mg Oral Given 25)   ondansetron (ZOFRAN) injection 4 mg (4 mg Intravenous Given 25)       Imaging results:  XR Chest 1 View    Result Date: 2025  As above    This report was finalized on 2025 7:46 PM by Dr. Shayan Reyna M.D on Workstation: [id number]       Ambulatory status:   - up with assist     Social issues:   Social History     Socioeconomic History    Marital status: Single    Number of children: 1   Tobacco Use    Smoking status: Former     Current packs/day: 0.00     Average packs/day: 1 pack/day for 20.0 years (20.0 ttl pk-yrs)     Types: Cigarettes     Start date:      Quit date: 2019     Years since quittin.0    Smokeless tobacco: Current    Tobacco comments:     Nicotine pouches   Vaping Use    Vaping status: Never Used   Substance and Sexual Activity    Alcohol use: Yes     Comment: OCC    Drug use: Never    Sexual activity: Defer       Peripheral Neurovascular  Peripheral Neurovascular (Adult)  Peripheral Neurovascular WDL: WDL    Neuro Cognitive  Neuro Cognitive (Adult)  Cognitive/Neuro/Behavioral WDL: WDL    Learning  Learning Assessment  Learning Readiness and Ability: no barriers identified    Respiratory  Respiratory WDL  Respiratory WDL: .WDL except, cough  Cough Frequency: frequent  Cough Type: dry    Abdominal Pain       Pain Assessments  Pain (Adult)  (0-10) Pain Rating: Rest: 0    NIH Stroke Scale       Fidelia Angel RN  25 00:59  EST      Electronically signed by Fidelia Angel RN at 01/23/25 0100       Luis Armando Reyna MD at 01/22/25 1901           EMERGENCY DEPARTMENT ENCOUNTER  Room Number:  12/12  PCP: Gia Rodriguez APRN  Independent Historians: Patient and family      HPI:  Chief Complaint: had concerns including Fever.     A complete HPI/ROS/PMH/PSH/SH/FH are unobtainable due to: None    Chronic or social conditions impacting patient care (Social Determinants of Health): None      Context: Suzanne Lin is a 44 y.o. female with a medical history of Crohn's, PFO, DVT, breast cancer with most recent chemotherapy 1 week ago who presents to the ED c/o acute recurrent fevers and general malaise with associated nausea.  Fever today brought patient to the hospital.  She was seen by hematology/oncology yesterday during which labs were obtained and the patient was started on antibiotic.  First  Whole body aches and nausea.  Poor p.o. intake.      Review of prior external notes (non-ED) -and- Review of prior external test results outside of this encounter:  Heme-onc office note 1/14/2025.  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  ==============================  Patient still been started on Augmentin yesterday.      PAST MEDICAL HISTORY  Active Ambulatory Problems     Diagnosis Date Noted    PFO (patent foramen ovale) 09/18/2024    Palpitations 09/28/2024    Breast cancer, stage 3, right 10/02/2024    Breast cancer in female 10/09/2024    Need for prophylactic chemotherapy 10/31/2024    Encounter for central line care 10/31/2024    LITA (iron deficiency anemia) 12/12/2024    Adverse effect of iron 12/12/2024    Crohn's disease without complication 12/17/2024    Encounter for screening colonoscopy 12/17/2024     Resolved Ambulatory Problems     Diagnosis Date Noted    No Resolved Ambulatory Problems     Past Medical History:   Diagnosis Date    Anxiety     Breast cancer 2024    Crohn's disease     DVT  (deep vein thrombosis) in pregnancy     Stroke     Tattoos          PAST SURGICAL HISTORY  Past Surgical History:   Procedure Laterality Date     SECTION      COLON RESECTION      COLONOSCOPY      MASTECTOMY Bilateral 10/9/2024    Procedure: Modified radical mastectomy with axillary dissection of the right breast and simple mastectomy of the left breast;  Surgeon: Rebekah Garcia DO;  Location: [facility] OR;  Service: General;  Laterality: Bilateral;    SKIN CANCER EXCISION      VENOUS ACCESS DEVICE (PORT) INSERTION N/A 12/10/2024    Procedure: INSERTION VENOUS ACCESS DEVICE;  Surgeon: Rebekah Garcia DO;  Location: [facility] OR;  Service: General;  Laterality: N/A;         FAMILY HISTORY  Family History   Problem Relation Age of Onset    Diabetes Mother     Heart disease Father     Diabetes Paternal Grandmother     Hypertension Other     Malig Hyperthermia Neg Hx          SOCIAL HISTORY  Social History     Socioeconomic History    Marital status: Single    Number of children: 1   Tobacco Use    Smoking status: Former     Current packs/day: 0.00     Average packs/day: 1 pack/day for 20.0 years (20.0 ttl pk-yrs)     Types: Cigarettes     Start date:      Quit date: 2019     Years since quittin.0    Smokeless tobacco: Current    Tobacco comments:     Nicotine pouches   Vaping Use    Vaping status: Never Used   Substance and Sexual Activity    Alcohol use: Yes     Comment: OCC    Drug use: Never    Sexual activity: Defer         ALLERGIES  Patient has no known allergies.      REVIEW OF SYSTEMS  Review of Systems  Included in HPI  All systems reviewed and negative except for those discussed in HPI.      PHYSICAL EXAM    I have reviewed the triage vital signs and nursing notes.    ED Triage Vitals   Temp Heart Rate Resp BP SpO2   25 1837 25 1837 25 1837 25 1840 25 1837   (!) 101.1 °F (38.4 °C) 118 16 108/56 98 %      Temp src Heart Rate Source Patient Position BP Location  FiO2 (%)   -- -- 01/22/25 1840 -- --     Sitting         Physical Exam    Physical Exam   Constitutional: No distress but ill-appearing.  Potentially toxic.  HENT:  Head: Normocephalic and atraumatic.   Oropharynx: Mucous membranes are moist.   Eyes: . No scleral icterus.  Mild conjunctival pallor.  Neck: Normal range of motion. Neck supple.   Cardiovascular: Pink warm and well perfused throughout.  Tachycardic but regular  Pulmonary/Chest: No respiratory distress.  No tachypnea or increased work of breathing appreciated.  No tachypnea.  Speaks in full sentences  Abdominal: Soft. There is no tenderness. There is no rebound and no guarding.  Benign exam  Musculoskeletal: Moves all extremities equally.    Neurological: Alert and oriented.  No acute focal deficit appreciated.  Skin: Skin is pink, warm, and dry.   Psychiatric: Mood and affect normal.   Nursing note and vitals reviewed.             LAB RESULTS  Recent Results (from the past 24 hours)   Respiratory Panel PCR w/COVID-19(SARS-CoV-2) [facility]/KALPANA/DAPHNE/PAD/COR/PAM In-House, NP Swab in UTM/VTM, 2 HR TAT - Swab, Nasopharynx    Collection Time: 01/22/25  7:34 PM    Specimen: Nasopharynx; Swab   Result Value Ref Range    ADENOVIRUS, PCR Not Detected Not Detected    Coronavirus 229E Not Detected Not Detected    Coronavirus HKU1 Not Detected Not Detected    Coronavirus NL63 Not Detected Not Detected    Coronavirus OC43 Not Detected Not Detected    COVID19 Not Detected Not Detected - Ref. Range    Human Metapneumovirus Not Detected Not Detected    Human Rhinovirus/Enterovirus Not Detected Not Detected    Influenza A PCR Not Detected Not Detected    Influenza B PCR Not Detected Not Detected    Parainfluenza Virus 1 Not Detected Not Detected    Parainfluenza Virus 2 Not Detected Not Detected    Parainfluenza Virus 3 Not Detected Not Detected    Parainfluenza Virus 4 Not Detected Not Detected    RSV, PCR Not Detected Not Detected    Bordetella pertussis pcr Not Detected Not  Detected    Bordetella parapertussis PCR Not Detected Not Detected    Chlamydophila pneumoniae PCR Not Detected Not Detected    Mycoplasma pneumo by PCR Not Detected Not Detected   Comprehensive Metabolic Panel    Collection Time: 01/22/25  7:34 PM    Specimen: Arm, Left; Blood   Result Value Ref Range    Glucose 136 (H) 65 - 99 mg/dL    BUN 4 (L) 6 - 20 mg/dL    Creatinine 0.69 0.57 - 1.00 mg/dL    Sodium 135 (L) 136 - 145 mmol/L    Potassium 2.7 (L) 3.5 - 5.2 mmol/L    Chloride 98 98 - 107 mmol/L    CO2 23.0 22.0 - 29.0 mmol/L    Calcium 8.7 8.6 - 10.5 mg/dL    Total Protein 7.5 6.0 - 8.5 g/dL    Albumin 2.9 (L) 3.5 - 5.2 g/dL    ALT (SGPT) 30 1 - 33 U/L    AST (SGOT) 27 1 - 32 U/L    Alkaline Phosphatase 271 (H) 39 - 117 U/L    Total Bilirubin 0.6 0.0 - 1.2 mg/dL    Globulin 4.6 gm/dL    A/G Ratio 0.6 g/dL    BUN/Creatinine Ratio 5.8 (L) 7.0 - 25.0    Anion Gap 14.0 5.0 - 15.0 mmol/L    eGFR 109.9 >60.0 mL/min/1.73   Lactic Acid, Plasma    Collection Time: 01/22/25  7:34 PM    Specimen: Arm, Left; Blood   Result Value Ref Range    Lactate 1.1 0.5 - 2.0 mmol/L   Procalcitonin    Collection Time: 01/22/25  7:34 PM    Specimen: Arm, Left; Blood   Result Value Ref Range    Procalcitonin 0.33 (H) 0.00 - 0.25 ng/mL   CBC Auto Differential    Collection Time: 01/22/25  7:34 PM    Specimen: Arm, Left; Blood   Result Value Ref Range    WBC 0.24 (C) 3.40 - 10.80 10*3/mm3    RBC 2.96 (L) 3.77 - 5.28 10*6/mm3    Hemoglobin 8.9 (L) 12.0 - 15.9 g/dL    Hematocrit 25.9 (L) 34.0 - 46.6 %    MCV 87.5 79.0 - 97.0 fL    MCH 30.1 26.6 - 33.0 pg    MCHC 34.4 31.5 - 35.7 g/dL    RDW 14.1 12.3 - 15.4 %    RDW-SD 44.6 37.0 - 54.0 fl    MPV 9.9 6.0 - 12.0 fL    Platelets 241 140 - 450 10*3/mm3   Manual Differential    Collection Time: 01/22/25  7:34 PM    Specimen: Arm, Left; Blood   Result Value Ref Range    Neutrophil % 8.0 (L) 42.7 - 76.0 %    Lymphocyte % 56.0 (H) 19.6 - 45.3 %    Monocyte % 32.0 (H) 5.0 - 12.0 %    Myelocyte % 4.0  (H) 0.0 - 0.0 %    Neutrophils Absolute 0.02 (L) 1.70 - 7.00 10*3/mm3    Lymphocytes Absolute 0.13 (L) 0.70 - 3.10 10*3/mm3    Monocytes Absolute 0.08 (L) 0.10 - 0.90 10*3/mm3    RBC Morphology Normal Normal    WBC Morphology Normal Normal    Platelet Morphology Normal Normal   Magnesium    Collection Time: 01/22/25  7:34 PM    Specimen: Arm, Left; Blood   Result Value Ref Range    Magnesium 1.9 1.6 - 2.6 mg/dL   Urinalysis With Microscopic If Indicated (No Culture) - Urine, Clean Catch    Collection Time: 01/22/25  8:28 PM    Specimen: Urine, Clean Catch   Result Value Ref Range    Color, UA Dark Yellow (A) Yellow, Straw    Appearance, UA Cloudy (A) Clear    pH, UA 5.5 5.0 - 8.0    Specific Gravity, UA 1.024 1.005 - 1.030    Glucose, UA Negative Negative    Ketones, UA Trace (A) Negative    Bilirubin, UA Negative Negative    Blood, UA Negative Negative    Protein,  mg/dL (2+) (A) Negative    Leuk Esterase, UA Trace (A) Negative    Nitrite, UA Negative Negative    Urobilinogen, UA 1.0 E.U./dL 0.2 - 1.0 E.U./dL   Urinalysis, Microscopic Only - Urine, Clean Catch    Collection Time: 01/22/25  8:28 PM    Specimen: Urine, Clean Catch   Result Value Ref Range    RBC, UA 0-2 None Seen, 0-2 /HPF    WBC, UA 3-5 (A) None Seen, 0-2 /HPF    Bacteria, UA None Seen None Seen /HPF    Squamous Epithelial Cells, UA 7-12 (A) None Seen, 0-2 /HPF    Hyaline Casts, UA 0-2 None Seen /LPF    Mucus, UA Small/1+ (A) None Seen, Trace /HPF    Methodology Manual Light Microscopy          RADIOLOGY  XR Chest 1 View    Result Date: 1/22/2025  AP CHEST  HISTORY: Neutropenic fever  COMPARISON: 1/9/2025  FINDINGS: Lungs are clear. Heart size stable. Is a stable chest port. There is prominent bowel gas in the upper abdomen      As above    This report was finalized on 1/22/2025 7:46 PM by Dr. Shayan Reyna M.D on Workstation: [id number]         MEDICATIONS GIVEN IN ER  Medications   sodium chloride 0.9 % flush 10 mL (has no administration  in time range)   cefepime 2000 mg IVPB in 100 mL NS (MBP) (2,000 mg Intravenous New Bag 1/22/25 2118)   ondansetron (ZOFRAN) injection 4 mg (has no administration in time range)   sodium chloride 0.9 % flush 10 mL (has no administration in time range)   acetaminophen (TYLENOL) tablet 650 mg (has no administration in time range)     Or   acetaminophen (TYLENOL) 160 MG/5ML oral solution 650 mg (has no administration in time range)     Or   acetaminophen (TYLENOL) suppository 650 mg (has no administration in time range)   sennosides-docusate (PERICOLACE) 8.6-50 MG per tablet 2 tablet (has no administration in time range)     And   polyethylene glycol (MIRALAX) packet 17 g (has no administration in time range)     And   bisacodyl (DULCOLAX) EC tablet 5 mg (has no administration in time range)     And   bisacodyl (DULCOLAX) suppository 10 mg (has no administration in time range)   melatonin tablet 5 mg (has no administration in time range)   ondansetron ODT (ZOFRAN-ODT) disintegrating tablet 4 mg (has no administration in time range)     Or   ondansetron (ZOFRAN) injection 4 mg (has no administration in time range)   aluminum-magnesium hydroxide-simethicone (MAALOX MAX) 400-400-40 MG/5ML suspension 15 mL (has no administration in time range)   sodium chloride 0.9 % bolus 1,000 mL (1,000 mL Intravenous New Bag 1/22/25 2027)   acetaminophen (TYLENOL) tablet 1,000 mg (1,000 mg Oral Given 1/22/25 2010)   ondansetron (ZOFRAN) injection 4 mg (4 mg Intravenous Given 1/22/25 2010)   potassium chloride (K-DUR,KLOR-CON) ER tablet 40 mEq (40 mEq Oral Given 1/22/25 2128)         ORDERS PLACED DURING THIS VISIT:  Orders Placed This Encounter   Procedures    Respiratory Panel PCR w/COVID-19(SARS-CoV-2) [facility]/DAPHNE/PAD/COR/PAM In-House, NP Swab in UTM/VTM, 2 HR TAT - Swab, Nasopharynx    Blood Culture - Blood,    Blood Culture - Blood,    XR Chest 1 View    Comprehensive Metabolic Panel    Urinalysis With Microscopic If Indicated (No  Culture) - Urine, Clean Catch    Lactic Acid, Plasma    Procalcitonin    CBC Auto Differential    Manual Differential    Urinalysis, Microscopic Only - Urine, Clean Catch    Magnesium    CBC (No Diff)    Comprehensive Metabolic Panel    Diet: Regular/House; Fluid Consistency: Thin (IDDSI 0)    Monitor Blood Pressure    Continuous Pulse Oximetry    Vital Signs    Intake & Output    Oral Care    Place Sequential Compression Device    Maintain Sequential Compression Device    Code Status and Medical Interventions: CPR (Attempt to Resuscitate); Full Support    [facility] (on-call MD unless specified) Details    Hematology and Oncology (on-call MD unless specified)    Incentive Spirometry    Insert Peripheral IV    Inpatient Admission    CBC & Differential         OUTPATIENT MEDICATION MANAGEMENT:  Current Facility-Administered Medications Ordered in Epic   Medication Dose Route Frequency Provider Last Rate Last Admin    acetaminophen (TYLENOL) tablet 650 mg  650 mg Oral Q4H PRN Italia Sheikh APRN        Or    acetaminophen (TYLENOL) 160 MG/5ML oral solution 650 mg  650 mg Oral Q4H PRN Italia Sheikh APRN        Or    acetaminophen (TYLENOL) suppository 650 mg  650 mg Rectal Q4H PRN Italia Sheikh APRN        aluminum-magnesium hydroxide-simethicone (MAALOX MAX) 400-400-40 MG/5ML suspension 15 mL  15 mL Oral Q6H PRN Italia Sheikh APRN        sennosides-docusate (PERICOLACE) 8.6-50 MG per tablet 2 tablet  2 tablet Oral BID PRN Italia Sheikh APRN        And    polyethylene glycol (MIRALAX) packet 17 g  17 g Oral Daily PRN Italia Sheikh APRN        And    bisacodyl (DULCOLAX) EC tablet 5 mg  5 mg Oral Daily PRN Italia Sheikh APRN        And    bisacodyl (DULCOLAX) suppository 10 mg  10 mg Rectal Daily PRN Italia Sheikh APRN        cefepime 2000 mg IVPB in 100 mL NS (MBP)  2,000 mg Intravenous Once Luis Armando Reyna MD   2,000 mg at 01/22/25 2118    heparin injection 500 Units  500  Units Intravenous PRN Nishant Witt MD   500 Units at 12/11/24 0916    heparin injection 500 Units  500 Units Intravenous PRN Nishant Witt MD   500 Units at 01/07/25 1049    melatonin tablet 5 mg  5 mg Oral Nightly PRN Italia Sheikh APRN        ondansetron (ZOFRAN) injection 4 mg  4 mg Intravenous Once Luis Armando Reyna MD        ondansetron ODT (ZOFRAN-ODT) disintegrating tablet 4 mg  4 mg Oral Q6H PRN Italia Sheikh APRN        Or    ondansetron (ZOFRAN) injection 4 mg  4 mg Intravenous Q6H PRN Italia Sheikh APRN        sodium chloride 0.9 % flush 10 mL  10 mL Intravenous PRN Nishant Witt MD   10 mL at 12/11/24 0916    sodium chloride 0.9 % flush 10 mL  10 mL Intravenous PRN Nishant Witt MD   10 mL at 01/07/25 1048    sodium chloride 0.9 % flush 10 mL  10 mL Intravenous PRN Luis Armando Reyna MD        sodium chloride 0.9 % flush 10 mL  10 mL Intravenous PRN Italia Sheikh APRN         Current Outpatient Medications Ordered in Epic   Medication Sig Dispense Refill    amoxicillin-clavulanate (AUGMENTIN) 500-125 MG per tablet Take 1 tablet by mouth 2 (Two) Times a Day for 7 days. 14 tablet 0    aspirin 81 MG EC tablet Take 1 tablet by mouth Daily.      Budesonide (ENTOCORT EC) 3 MG 24 hr capsule Take 3 capsules by mouth Every Morning. 90 capsule 1    cyclobenzaprine (FLEXERIL) 10 MG tablet Take 1 tablet by mouth 2 (Two) Times a Day As Needed for Muscle Spasms for up to 40 doses. 40 tablet 0    diphenhydrAMINE (BENADRYL) 25 mg capsule Take 1 capsule by mouth At Night As Needed for Itching.      famotidine (PEPCID) 20 MG tablet Take 1 tablet by mouth 2 (Two) Times a Day. 60 tablet 2    gabapentin (Neurontin) 300 MG capsule Take 2 capsules by mouth Every Night. 60 capsule 2    Klor-Con M20 20 MEQ CR tablet TAKE 1 TABLET BY MOUTH 2 TIMES A DAY 40 tablet 1    lidocaine-prilocaine (EMLA) 2.5-2.5 % cream Apply 1 Application topically to the appropriate area as directed As Needed for  Mild Pain. Apply to port site 30 minutes before access 15 g 3    OLANZapine (zyPREXA) 5 MG tablet Take 1 tablet by mouth for 4 doses starting night of chemotherapy. 8 tablet 1    omeprazole (priLOSEC) 20 MG capsule       ondansetron (ZOFRAN) 8 MG tablet Take 1 tablet by mouth 3 (Three) Times a Day As Needed for Nausea or Vomiting. 30 tablet 5    oxyCODONE (ROXICODONE) 5 MG immediate release tablet Take 1 tablet by mouth Every 4 (Four) Hours As Needed for Severe Pain for up to 10 doses. 10 tablet 0    prochlorperazine (COMPAZINE) 10 MG tablet Take 1 tablet by mouth Every 6 (Six) Hours As Needed for Nausea or Vomiting. 60 tablet 1    venlafaxine XR (Effexor XR) 37.5 MG 24 hr capsule Take 1 capsule by mouth Take As Directed. 1 capsule po q am for two weeks followed by increase to 2 capsules daily 60 capsule 2    zolpidem (Ambien) 5 MG tablet Take 1 tablet by mouth At Night As Needed for Sleep.           PROCEDURES  Procedures      Total critical care time: Approximately 35 minutes    Due to a high probability of clinically significant, life threatening deterioration, the patient required my highest level of preparedness to intervene emergently and I personally spent this critical care time directly and personally managing the patient. This critical care time included obtaining a history; examining the patient; vital sign monitoring; ordering and review of studies; arranging urgent treatment with development of a management plan; evaluation of patient's response to treatment; frequent reassessment; and, discussions with other providers.    This critical care time was performed to assess and manage the high probability of imminent, life-threatening deterioration that could result in multi-organ failure. It was exclusive of separately billable procedures and treating other patients and teaching time.    Please see MDM section and the rest of the note for further information on patient assessment and  treatment.        PROGRESS, DATA ANALYSIS, CONSULTS, AND MEDICAL DECISION MAKING  All labs have been independently interpreted by me.  All radiology studies have been reviewed by me. All EKG's have been independently viewed and interpreted by me.  Discussion below represents my analysis of pertinent findings related to patient's condition, differential diagnosis, treatment plan and final disposition.    Differential diagnosis:   My differential diagnosis for fever includes but is not limited:  To viral infections including COVID-19, bacterial infections, fungal infections, fever of unknown origin, auto regulatory dysfunction, hyperthermia, heat exhaustion, heat stroke, malignant neuroleptic syndrome and others.      Clinical Scores:                  ED Course as of 01/22/25 2142   Wed Jan 22, 2025 1919 CONSULT        Provider: Dr. Cook -clinical pharmacist    Discussion: Reviewed my concerns the patient has neutropenic fever.  Recommend cefepime 2 g.    Agreeable c treatment and planned disposition.         [RS]   1919 I have discussed findings and concerns with the patient and family.  Recommend IV antibiotics and admission.  Patient agreeable. [RS]   2000 RADIOLOGY      Study: Single view chest  Findings: No pneumothorax or large focal infiltrate noted  I independently viewed and interpreted these images contemporaneously with treatment.    [RS]   2007 WBC(!!): 0.24 [RS]   2008 Hemoglobin(!): 8.9 [RS]   2008 Platelets: 241 [RS]   2008 Lactate: 1.1 [RS]   2106 COVID19: Not Detected [RS]   2106 Influenza A PCR: Not Detected [RS]   2106 Influenza B PCR: Not Detected [RS]   2106 Nitrite, UA: Negative [RS]   2106 Bacteria, UA: None Seen [RS]   2106 Glucose(!): 136 [RS]   2106 BUN(!): 4 [RS]   2106 Creatinine: 0.69 [RS]   2106 Sodium(!): 135 [RS]   2106 Potassium(!): 2.7 [RS]   2106 Procalcitonin(!): 0.33 [RS]   2129 CONSULT        Provider: Dr. Quintanilla - Hem/Onc    Discussion: Reviewed patient history, ED  presentation and evaluation.  Agrees with plan for admission and is agreeable to consult    Agreeable c treatment and planned disposition.         [RS]      ED Course User Index  [RS] Luis Armando Reyna MD         Prescription drug monitoring program review:     AS OF 21:42 EST VITALS:    BP - 99/61  HR - 92  TEMP - (!) 101.1 °F (38.4 °C)  O2 SATS - 97%    COMPLEXITY OF CARE  The patient requires admission.      DIAGNOSIS  Final diagnoses:   Neutropenic fever   Immunosuppressed due to chemotherapy   Malignant neoplasm of female breast, unspecified estrogen receptor status, unspecified laterality, unspecified site of breast   Acute anemia   Leukopenia, unspecified type         DISPOSITION  ED Disposition       ED Disposition   Decision to Admit    Condition   --    Comment   Level of Care: Oncology [30]   Diagnosis: Neutropenic fever [219491]   Admitting Physician: MAYDA AGGARWAL [[id number]]   Bed Request Comments: 3 park   Certification: I Certify That Inpatient Hospital Services Are Medically Necessary For Greater Than 2 Midnights                    ADMISSION    Discussed treatment plan and reason for admission with pt/family and admitting physician.  Pt/family voiced understanding of the plan for admission for further testing/treatment as needed.       Please note that portions of this document were completed with a voice recognition program.    Note Disclaimer: At [facility], we believe that sharing information builds trust and better relationships. You are receiving this note because you recently visited [facility]. It is possible you will see health information before a provider has talked with you about it. This kind of information can be easy to misunderstand. To help you fully understand what it means for your health, we urge you to discuss this note with your provider.         Luis Armando Reyna MD  01/22/25 2142      Electronically signed by Luis Armando Reyna MD at 01/22/25 2142       Owen Stewart RN at  01/22/25 1836          PT to ER via PV from home for fever on and off x 3 wks. Highest temp has been 102. Pt is a cancer patient- pt is on AC chemo.    Electronically signed by Owen Stewart RN at 01/22/25 1837          Physician Progress Notes (last 48 hours)        Sampson Quintanilla MD at 01/23/25 1226                REASON FOR CONSULTATION:  breast cancer undergoing therapy, admission with neutropenic fever                               History of Present Illness   Record reviewed, the patient initiated adjuvant chemotherapy with AC on 12/31/2024.  She had fever after the first cycle with borderline neutropenia but no obvious bacterial infections.  She was treated with Levaquin.  Fevers resolved over the weekend.  She denies uncontrolled nausea vomiting or diarrhea.  She denies stomatitis.  Chest wounds have healed.  She was last seen 1/14/2024 with overall plans to proceed with adjuvant therapy including dose dense AC with Neulasta support x 4 followed by weekly Taxol.  Additional discussions included postmastectomy radiation therapy, hormonal therapy with ovarian suppression and tamoxifen versus oophorectomy and AI therapy and review of nonspecific lung nodules after completing chemotherapy.    She was treated with her second cycle of dose dense AC 1/14/2025 presented to the ER 1/22/2025 with fever, generalized malaise and nausea.  Studies included negative respiratory panel,, CBC with progressive neutropenia.  She had been assessed 1/21 with ANC of 0.01, placed on Augmentin.    Unfortunately symptoms worsened and she presented to the ER 1/22/2025.  H&H 8.9 25.9 with white count of 240, platelet count of 240,000, BUN/creatinine of 4 and 0.69, alk phos 271, lactate of 1.1, procalcitonin 0.33 UA with trace ketones, 2+ protein and trace leukocyte esterase, 3-5 WBCs and 7-12 epithelial cells, blood cultures negative,.  Patient admitted placed on cefepime.    Interval history:  1/23/2025  T102.6, pulse  101, respirate 16, BP 90/58  Patient describes continued fever, abdominal cramping and diarrhea  H&H 8.5 and 25.1 with white count of 350, platelet count of 189,000, BUN/creatinine of 3 and 0.58, potassium 2.6      ONCOLOGY HISTORY:  This is a 44-year-old woman referred from general surgery to discuss adjuvant treatment for recently surgically treated breast cancer.  The patient presented with a large palpable mass in the inner quadrant of the right breast.  The mass had been enlarging for couple years but the patient did not have insurance to get assessed.  The breast imaging is not available to me but she had a CT of the chest abdomen pelvis on 9/16/2024 showed a large mass in the right breast measuring up to 7.7 cm with abnormal left axillary lymphadenopathy.  There were tiny subpleural nodules in the lateral aspect of the left lower lobe likely granulomatous mildly conspicuous lymph node in the central mesentery 1.1 cm.  A PET scan was performed 9/30/2024 showing a hypermetabolic mass in the right breast with thickening throughout the right breast and pathologic hypermetabolic right axillary level 1 and 2 lymph nodes, no hypermetabolic internal mammary or supraclavicular lymph nodes.  The small pulmonary nodules were too small to characterize and mesenteric lymph nodes without hypermetabolic activity.  A biopsy of the right breast mass showed invasive ductal adenocarcinoma.    She was taken to the operating room on 10/9/2024 and underwent a right modified radical mastectomy and axillary dissection, prophylactic left mastectomy.  Pathology from the right breast showed invasive ductal carcinoma [place] grade 3 (3+3+3) measuring 17 cm with extensive lymphovascular invasion and dermal lymphatic invasion.  The tumor extended to the dermis but did not ulcerate the skin.  Tumor extended to skeletal muscle and was present focally at an anterior margin, 0.05 mm of the posterior margin, 10 mm from the lateral margin,  20 mm from the medial margin, 28 mm from the superior margin and 40 mm from inferior margin.  There was ductal carcinoma in situ present within 2.5 mm of the posterior margin.  There were 13 lymph nodes in the axillary dissection 8 oncology plan/recommendations: of which had macrometastases, 1 micrometastases and 1 lymph node with isolated tumor cells.  The left breast had an intraductal papilloma otherwise negative.  The tumor was positive for estrogen receptor 99% of cells, progesterone receptor 50% of cells, HER2 0 and Ki-67 65%.    The patient has medical comorbidities of Crohn's disease.    Past Medical History:   Diagnosis Date    Anxiety     Breast cancer     Right    Crohn's disease     DVT (deep vein thrombosis) in pregnancy     PFO (patent foramen ovale)     Stroke     after the birth of her child at age 34    Tattoos         Past Surgical History:   Procedure Laterality Date     SECTION      COLON RESECTION      COLONOSCOPY      MASTECTOMY Bilateral 10/9/2024    Procedure: Modified radical mastectomy with axillary dissection of the right breast and simple mastectomy of the left breast;  Surgeon: Rebekah Garcia DO;  Location: [facility] OR;  Service: General;  Laterality: Bilateral;    SKIN CANCER EXCISION      VENOUS ACCESS DEVICE (PORT) INSERTION N/A 12/10/2024    Procedure: INSERTION VENOUS ACCESS DEVICE;  Surgeon: Rebekah Garcia DO;  Location: [facility] OR;  Service: General;  Laterality: N/A;        Current Facility-Administered Medications on File Prior to Encounter   Medication Dose Route Frequency Provider Last Rate Last Admin    heparin injection 500 Units  500 Units Intravenous PRN Nishant Witt MD   500 Units at 24 0916    heparin injection 500 Units  500 Units Intravenous PRN Nishant Witt MD   500 Units at 25 1049    sodium chloride 0.9 % flush 10 mL  10 mL Intravenous PRN Nishant Witt MD   10 mL at 24 0916    sodium chloride 0.9 % flush 10 mL   10 mL Intravenous PRN Nishant Witt MD   10 mL at 25 1048     Current Outpatient Medications on File Prior to Encounter   Medication Sig Dispense Refill    amoxicillin-clavulanate (AUGMENTIN) 500-125 MG per tablet Take 1 tablet by mouth 2 (Two) Times a Day for 7 days. 14 tablet 0    aspirin 81 MG EC tablet Take 1 tablet by mouth Daily.      gabapentin (Neurontin) 300 MG capsule Take 2 capsules by mouth Every Night. 60 capsule 2    Klor-Con M20 20 MEQ CR tablet TAKE 1 TABLET BY MOUTH 2 TIMES A DAY 40 tablet 1    lidocaine-prilocaine (EMLA) 2.5-2.5 % cream Apply 1 Application topically to the appropriate area as directed As Needed for Mild Pain. Apply to port site 30 minutes before access 15 g 3    omeprazole (priLOSEC) 20 MG capsule       ondansetron (ZOFRAN) 8 MG tablet Take 1 tablet by mouth 3 (Three) Times a Day As Needed for Nausea or Vomiting. 30 tablet 5    prochlorperazine (COMPAZINE) 10 MG tablet Take 1 tablet by mouth Every 6 (Six) Hours As Needed for Nausea or Vomiting. 60 tablet 1    venlafaxine XR (Effexor XR) 37.5 MG 24 hr capsule Take 1 capsule by mouth Take As Directed. 1 capsule po q am for two weeks followed by increase to 2 capsules daily (Patient taking differently: Take 1 capsule by mouth Take As Directed. 1 capsule po q am for two weeks followed by increase to 2 capsules daily  Pt has not started yet) 60 capsule 2    OLANZapine (zyPREXA) 5 MG tablet Take 1 tablet by mouth for 4 doses starting night of chemotherapy. 8 tablet 1        ALLERGIES:  No Known Allergies     Social History     Socioeconomic History    Marital status: Single    Number of children: 1   Tobacco Use    Smoking status: Former     Current packs/day: 0.00     Average packs/day: 1 pack/day for 20.0 years (20.0 ttl pk-yrs)     Types: Cigarettes     Start date:      Quit date: 2019     Years since quittin.0    Smokeless tobacco: Current    Tobacco comments:     Nicotine pouches   Vaping Use    Vaping status:  "Never Used   Substance and Sexual Activity    Alcohol use: Yes     Comment: OCC    Drug use: Never    Sexual activity: Defer        Family History   Problem Relation Age of Onset    Diabetes Mother     Heart disease Father     Diabetes Paternal Grandmother     Hypertension Other     Malig Hyperthermia Neg Hx         Review of Systems   Constitutional:  Positive for fever.   HENT: Negative.     Respiratory: Negative.     Cardiovascular: Negative.    Gastrointestinal:  Positive for abdominal pain and diarrhea. Negative for nausea.   Genitourinary: Negative.    Musculoskeletal: Negative.    Skin:  Negative for wound.   Neurological: Negative.    Hematological: Negative.           Objective     Vitals:    01/23/25 0228 01/23/25 0520 01/23/25 0807 01/23/25 1201   BP: 90/57 94/55 (!) 89/55 90/58   BP Location: Left arm Right arm Left arm Left arm   Patient Position: Lying Lying Lying Lying   Pulse: 103 89 90 101   Resp: 16 16 16 16   Temp: (!) 102.9 °F (39.4 °C) 97.4 °F (36.3 °C) 99.4 °F (37.4 °C) (!) 102.6 °F (39.2 °C)   TempSrc: Oral Oral Oral Oral   SpO2: 97% 95% 100% 96%   Weight: 68 kg (150 lb)      Height: 165.1 cm (65\")                1/14/2025     8:31 AM   Current Status   ECOG score 0       Physical Exam    CONSTITUTIONAL: pleasant well-developed adult woman  HEENT: no icterus, no thrush, moist membranes/  LYMPH: no cervical or supraclavicular lad  CV: RRR, S1S2, no murmur  RESP/chest: cta bilat, no wheezing, no rales, port present left chest wall  Breast: Status post bilateral mastectomies, wound mildly erythematous, healed without discharge  GI: soft, generally tender, bowel sounds positive  MUSC: no mele  NEURO: alert and oriented x3, normal strength  PSYCH: Frustrated, alert, oriented      RECENT LABS:  Hematology WBC   Date Value Ref Range Status   01/23/2025 0.35 (C) 3.40 - 10.80 10*3/mm3 Final     RBC   Date Value Ref Range Status   01/23/2025 2.87 (L) 3.77 - 5.28 10*6/mm3 Final     Hemoglobin   Date " Value Ref Range Status   01/23/2025 8.5 (L) 12.0 - 15.9 g/dL Final     Hematocrit   Date Value Ref Range Status   01/23/2025 25.1 (L) 34.0 - 46.6 % Final     Platelets   Date Value Ref Range Status   01/23/2025 189 140 - 450 10*3/mm3 Final        Lab Results   Component Value Date    GLUCOSE 117 (H) 01/23/2025    BUN 3 (L) 01/23/2025    CREATININE 0.58 01/23/2025     01/23/2025    K 2.6 (C) 01/23/2025     01/23/2025    CALCIUM 8.0 (L) 01/23/2025    PROTEINTOT 6.2 01/23/2025    ALBUMIN 2.2 (L) 01/23/2025    ALT 19 01/23/2025    AST 12 01/23/2025    ALKPHOS 208 (H) 01/23/2025    BILITOT 0.6 01/23/2025    GLOB 4.0 01/23/2025    AGRATIO 0.6 01/23/2025    BCR 5.2 (L) 01/23/2025    ANIONGAP 9.4 01/23/2025    EGFR 114.6 01/23/2025     PET scan 9/30/2024:  FINDINGS:     Head/neck: No suspicious uptake.     Chest: An 8 x 5.8 cm mass in the lower inner right breast with max SUV  of 18 (series 4/image 156). Mass comes in close proximity to the  sternum. Hypermetabolic parenchymal thickening throughout the right  breast with max SUV of 9 (series 4/image 140). Diffuse right breast skin  thickening.     Hypermetabolic right axillary level I and II lymph nodes. Reference 1.2  cm level I lymph node with max SUV of 11.9 (series 4/image 103).  Additional reference subcentimeter level II lymph node with max SUV of  3.3 (series 4/image 93). Separate brown fat uptake in the bilateral  axilla and posterior neck base.     No enlarged or hypermetabolic internal mammary or supraclavicular lymph  nodes.     Few subcentimeter pulmonary nodules are too small for accurate PET  characterization without overt hypermetabolism and unchanged from recent  CT. Reference left lower lobe (series 4/image 167) and right upper lobe  (series 4/image 131).     Abdomen/pelvis: Mesenteric lymph nodes are mostly subcentimeter without  associated hypermetabolism.     Sigmoid colectomy. Tubular hypermetabolism throughout the  otherwise  normal-appearing remaining colon may be medication related. Moderate  proximal colonic stool burden.     Bones: No focal osseous hypermetabolism with special attention to the  sternum.           IMPRESSION:  1. Hypermetabolic 8 cm right breast mass with hypermetabolic parenchymal  thickening throughout the right breast, pathologically proven invasive  ductal carcinoma. Mass comes in close proximity to the sternum. No focal  osseous hypermetabolism with special attention to the sternum.  2. Hypermetabolic right axillary level I and II lymph nodes suggesting  alana metastatic disease. No enlarged or hypermetabolic internal mammary  or supraclavicular lymph nodes. Separate brown fat uptake in the  bilateral axilla and posterior neck base.  3. Few subcentimeter pulmonary nodules are too small for accurate PET  characterization and will need follow-up via chest CT.  4. Mesenteric lymph nodes are mostly subcentimeter without associated  hypermetabolism.       Assessment & Plan   *pT3N2a M0 grade 3 invasive ductal adenocarcinoma right breast, ER 99% positive, MO 50% positive, HER2 0, Ki-67 65%, tumor present at anterior margin  She presented with a large palpable mass in the inner quadrant of the right breast; mass had been enlarging for couple years but the patient did not have insurance to get assessed  CT of the chest abdomen pelvis on 9/16/2024 showed a large mass in the right breast measuring up to 7.7 cm with abnormal left axillary lymphadenopathy.  There were tiny subpleural nodules in the lateral aspect of the left lower lobe likely granulomatous mildly conspicuous lymph node in the central mesentery 1.1 cm.    PET scan 9/30/2024 - hypermetabolic mass in the right breast with thickening throughout the right breast and pathologic hypermetabolic right axillary level 1 and 2 lymph nodes, no hypermetabolic internal mammary or supraclavicular lymph nodes.  The small pulmonary nodules were too small to  characterize and mesenteric lymph nodes without hypermetabolic activity.    biopsy of the right breast mass showed invasive ductal adenocarcinoma.  operating room on 10/9/2024 and underwent a right modified radical mastectomy and axillary dissection, prophylactic left mastectomy.  Pathology from the right breast showed invasive ductal carcinoma [place] grade 3 (3+3+3) measuring 17 cm with extensive lymphovascular invasion and dermal lymphatic invasion.  The tumor extended to the dermis but did not ulcerate the skin.  Tumor extended to skeletal muscle and was present focally at an anterior margin, 0.05 mm of the posterior margin, 10 mm from the lateral margin, 20 mm from the medial margin, 28 mm from the superior margin and 40 mm from inferior margin.  There was ductal carcinoma in situ present within 2.5 mm of the posterior margin.  There were 13 lymph nodes in the axillary dissection 8 of which had macrometastases, 1 micrometastases and 1 lymph node with isolated tumor cells.  The left breast had an intraductal papilloma otherwise negative.  The tumor was positive for estrogen receptor 99% of cells, progesterone receptor 50% of cells, HER2 0 and Ki-67 65%.  Initiated adjuvant chemotherapy with Adriamycin/Cytoxan 12/31/2024 (adjuvant chemotherapy delayed because of wound healing issues) nonneutropenic fever    *Suppression secondary to chemotherapy   resolved with normal CBC-6850, 1/14/2025      * nausea/diarrhea, chemo induced   responding to Imodium/Zofran/Compazine    *elevated LFT-likely s/e chemo     *Invitae 19 gene panel-VUS Bard 1    *medical comorbidities of Crohn's disease.    Oncology plan/recommendations:  Again reviewed plan  for adjuvant chemotherapy to reduce risk of recurrence/help eradicate potential micrometastatic disease with dose dense AC with Neulasta support x 4 cycles followed by weekly Taxol x 12 doses-proceed with cycle 2 AC today pending stable CMP  Patient admitted with neutropenic  "fever, agree with cefepime, initiation of additional Neupogen, discussion with primary oncologist about subsequent treatment plan  After chemotherapy she will need postmastectomy radiation given the size and alana involvement  She will need hormonal therapy with ovarian suppression/tamoxifen versus oophorectomy and AI therapy/ck4/6 inhibitor  Nonspecific lung nodules will need reassessment after completing chemotherapy radiographically  Continue Imodium Zofran and Compazine for nausea/diarrhea                     Electronically signed by Sampson Quintanilla MD at 01/23/25 1309          Consult Notes (last 48 hours)        Mamadou Thompson DO at 01/23/25 1236        Consult Orders    1. Inpatient Infectious Diseases Consult [[id number]] ordered by Jim Mane MD at 01/23/25 1222                 Referring Provider: Luis Armando Reyna MD  Reason for Consultation:     Neutropenic fever     Chief Complaint   Patient presents with    Fever         Subjective   History of present illness: Patient is 44-year-old female with past medical history of Crohn's disease and  pT3N2a M0 grade 3 invasive ductal adenocarcinoma right breast currently on doxorubicin and cyclophosphamide who presents with fever.  ID consulted for \"neutropenic fever\".    Last chemotherapy infusion was performed on 1/14/2025.  Patient presenting with Tmax of 102.9 and neutropenia.  ANC of 10 on admission.  Lactate has been normal and procalcitonin was 0.33.  Started on cefepime with blood cultures pending.  Respiratory viral panel has been negative.  Urinalysis was unremarkable and chest x-ray without infiltrate.    This afternoon patient reports she has abdominal pain with diarrhea.  Denies any cough or shortness of breath.  Denies any sick contacts.  Reports last chemotherapy was last week.  Denies any rash or skin changes.  Denies any joint pains.  States that she has crampy abdominal pain that comes and goes.  Denies any vomiting.    Past Medical " History:   Diagnosis Date    Anxiety     Breast cancer     Right    Crohn's disease     DVT (deep vein thrombosis) in pregnancy     PFO (patent foramen ovale)     Stroke     after the birth of her child at age 34    Tattoos        Past Surgical History:   Procedure Laterality Date     SECTION      COLON RESECTION      COLONOSCOPY      MASTECTOMY Bilateral 10/9/2024    Procedure: Modified radical mastectomy with axillary dissection of the right breast and simple mastectomy of the left breast;  Surgeon: Rebekah Garcia DO;  Location: [facility] OR;  Service: General;  Laterality: Bilateral;    SKIN CANCER EXCISION      VENOUS ACCESS DEVICE (PORT) INSERTION N/A 12/10/2024    Procedure: INSERTION VENOUS ACCESS DEVICE;  Surgeon: Rebekah Garcia DO;  Location: [facility] OR;  Service: General;  Laterality: N/A;       family history includes Diabetes in her mother and paternal grandmother; Heart disease in her father; Hypertension in an other family member.     reports that she quit smoking about 6 years ago. Her smoking use included cigarettes. She started smoking about 26 years ago. She has a 20 pack-year smoking history. She uses smokeless tobacco. She reports current alcohol use. She reports that she does not use drugs.     No Known Allergies    Medication:  Antibiotics:  Anti-Infectives (From admission, onward)      Ordered     Dose/Rate Route Frequency Start Stop    25 1224  Pharmacy to Dose Zosyn        Ordering Provider: Jim Mane MD     Not Applicable Continuous PRN 25 1224 25 1223    25 1919  cefepime 2000 mg IVPB in 100 mL NS (MBP)        Ordering Provider: Luis Armando Reyna MD    2,000 mg  over 30 Minutes Intravenous Once 25              Objective     Physical Exam:   Vital Signs   Temp:  [97.4 °F (36.3 °C)-102.9 °F (39.4 °C)] 102.6 °F (39.2 °C)  Heart Rate:  [] 101  Resp:  [16-20] 16  BP: ()/(50-72) 90/58    GENERAL: Awake and  "alert, in no acute distress.   HEENT: Oropharynx is clear. Hearing is grossly normal.   EYES: PERRL. No conjunctival injection. No lid lag.   LUNGS: Normal work of breathing.  SKIN: Warm and dry without cutaneous eruptions in exposed areas.  PSYCHIATRIC: Appropriate mood, affect, insight, and judgment.     Results Review:   I reviewed the patient's new clinical results.  I reviewed the patient's new imaging results and agree with the interpretation.  I reviewed the patient's other test results and agree with the interpretation    Lab Results   Component Value Date    WBC 0.35 (C) 01/23/2025    HGB 8.5 (L) 01/23/2025    HCT 25.1 (L) 01/23/2025    MCV 87.5 01/23/2025     01/23/2025       No results found for: \"VANCOPEAK\", \"VANCOTROUGH\", \"VANCORANDOM\"    Lab Results   Component Value Date    GLUCOSE 117 (H) 01/23/2025    BUN 3 (L) 01/23/2025    CREATININE 0.58 01/23/2025    BCR 5.2 (L) 01/23/2025    CO2 22.6 01/23/2025    CALCIUM 8.0 (L) 01/23/2025    ALBUMIN 2.2 (L) 01/23/2025    AST 12 01/23/2025    ALT 19 01/23/2025         Estimated Creatinine Clearance: 132.9 mL/min (by C-G formula based on SCr of 0.58 mg/dL).    Isolation:   No active isolations      Microbiology:  1/21 blood cultures no growth to date  1/22 respiratory panel negative  1/22 blood culture in process    Radiology:  1/22 chest x-ray reviewed by me with no acute infiltrate.    Assessment     #Neutropenic fever  # pT3N2a M0 grade 3 invasive ductal adenocarcinoma right breast   #Immunosuppressed on chemotherapy with doxorubicin and cyclophosphamide  #Crohn's disease  #Chest port in place    Chest x-ray, respiratory viral panel and urinalysis have been unremarkable.  Blood cultures are pending.  Obtain CT of the abdomen given abdominal pain and fevers.  Continue cefepime 2 g every 8 hours for neutropenic fever protocol.  Follow-up blood cultures and ANC for recovery      Thank you for this consult.  We will continue to follow along and tailor " antibiotics as the patient's clinical course evolves.      Electronically signed by Mamadou Thompson DO at 01/23/25 9186

## 2025-01-23 NOTE — H&P
Patient Name:  Suzanne Lin  YOB: 1980  MRN:  [id number]  Admit Date:  1/22/2025  Patient Care Team:  Gia Rodriguez APRN as PCP - General (Family Medicine)  Gia Rodriguez APRN as Referring Physician (Family Medicine)  Benito Douglas MD as Consulting Physician (Hematology and Oncology)  Nishant Witt MD as Consulting Physician (Hematology and Oncology)  Juliet Santizo RN as Nurse Navigator (Oncology)      Subjective   History Present Illness     Chief Complaint   Patient presents with    Fever       History of Present Illness  This is a 44-year-old female with history of Crohn's disease, breast cancer, most recent chemotherapy 1 week ago, presented to the emergency room with main complaints of acute fever, generalized malaise associated with nausea.  Patient was seen by hematology/oncology service recently, during which labs were obtained and patient was started on oral antibiotics.  She has poor oral intake, slight gastric discomfort, she was admitted to the hospital for IV antibiotic administration and further workup by infectious disease.      Review of Systems   Review of Systems   Constitutional: Negative for activity change and appetite change.   HENT: Negative for congestion and dental problem.    Eyes: Negative for discharge and itching.   Respiratory: Negative for apnea and chest tightness.    Cardiovascular: Negative for chest pain and palpitations.   Gastrointestinal: Negative for abdominal distention and abdominal pain.   Endocrine: Negative for cold intolerance and heat intolerance.   Genitourinary: Negative for difficulty urinating and frequency.   Musculoskeletal: Negative for arthralgias and back pain.   Skin: Negative for color change and pallor.   Allergic/Immunologic: Negative for environmental allergies and food allergies.   Neurological: Negative for dizziness and facial asymmetry.   Hematological: Negative for adenopathy. Does not  bruise/bleed easily.   Psychiatric/Behavioral: Negative for agitation and suicidal ideas.       Personal History     Past Medical History:   Diagnosis Date    Anxiety     Breast cancer     Right    Crohn's disease     DVT (deep vein thrombosis) in pregnancy     PFO (patent foramen ovale)     Stroke     after the birth of her child at age 34    Tattoos      Past Surgical History:   Procedure Laterality Date     SECTION      COLON RESECTION      COLONOSCOPY      MASTECTOMY Bilateral 10/9/2024    Procedure: Modified radical mastectomy with axillary dissection of the right breast and simple mastectomy of the left breast;  Surgeon: Rebekah Garcia DO;  Location: [facility] OR;  Service: General;  Laterality: Bilateral;    SKIN CANCER EXCISION      VENOUS ACCESS DEVICE (PORT) INSERTION N/A 12/10/2024    Procedure: INSERTION VENOUS ACCESS DEVICE;  Surgeon: Rebekah Garcia DO;  Location: [facility] OR;  Service: General;  Laterality: N/A;     Family History   Problem Relation Age of Onset    Diabetes Mother     Heart disease Father     Diabetes Paternal Grandmother     Hypertension Other     Malig Hyperthermia Neg Hx      Social History     Tobacco Use    Smoking status: Former     Current packs/day: 0.00     Average packs/day: 1 pack/day for 20.0 years (20.0 ttl pk-yrs)     Types: Cigarettes     Start date:      Quit date: 2019     Years since quittin.0    Smokeless tobacco: Current    Tobacco comments:     Nicotine pouches   Vaping Use    Vaping status: Never Used   Substance Use Topics    Alcohol use: Yes     Comment: OCC    Drug use: Never     Current Facility-Administered Medications on File Prior to Encounter   Medication Dose Route Frequency Provider Last Rate Last Admin    heparin injection 500 Units  500 Units Intravenous PRN Nishant Witt MD   500 Units at 24 0916    heparin injection 500 Units  500 Units Intravenous PRN Nishant Witt MD   500 Units at 25 1049    sodium  chloride 0.9 % flush 10 mL  10 mL Intravenous PRN Nishant Witt MD   10 mL at 12/11/24 0916    sodium chloride 0.9 % flush 10 mL  10 mL Intravenous PRN Nishant Witt MD   10 mL at 01/07/25 1048     Current Outpatient Medications on File Prior to Encounter   Medication Sig Dispense Refill    amoxicillin-clavulanate (AUGMENTIN) 500-125 MG per tablet Take 1 tablet by mouth 2 (Two) Times a Day for 7 days. 14 tablet 0    aspirin 81 MG EC tablet Take 1 tablet by mouth Daily.      gabapentin (Neurontin) 300 MG capsule Take 2 capsules by mouth Every Night. 60 capsule 2    Klor-Con M20 20 MEQ CR tablet TAKE 1 TABLET BY MOUTH 2 TIMES A DAY 40 tablet 1    lidocaine-prilocaine (EMLA) 2.5-2.5 % cream Apply 1 Application topically to the appropriate area as directed As Needed for Mild Pain. Apply to port site 30 minutes before access 15 g 3    omeprazole (priLOSEC) 20 MG capsule       ondansetron (ZOFRAN) 8 MG tablet Take 1 tablet by mouth 3 (Three) Times a Day As Needed for Nausea or Vomiting. 30 tablet 5    prochlorperazine (COMPAZINE) 10 MG tablet Take 1 tablet by mouth Every 6 (Six) Hours As Needed for Nausea or Vomiting. 60 tablet 1    venlafaxine XR (Effexor XR) 37.5 MG 24 hr capsule Take 1 capsule by mouth Take As Directed. 1 capsule po q am for two weeks followed by increase to 2 capsules daily (Patient taking differently: Take 1 capsule by mouth Take As Directed. 1 capsule po q am for two weeks followed by increase to 2 capsules daily  Pt has not started yet) 60 capsule 2    OLANZapine (zyPREXA) 5 MG tablet Take 1 tablet by mouth for 4 doses starting night of chemotherapy. 8 tablet 1     No Known Allergies    Objective    Objective     Vital Signs  Temp:  [97.4 °F (36.3 °C)-102.9 °F (39.4 °C)] 102.6 °F (39.2 °C)  Heart Rate:  [] 101  Resp:  [16-20] 16  BP: ()/(50-72) 90/58  SpO2:  [95 %-100 %] 96 %  on   ;   Device (Oxygen Therapy): room air  Body mass index is 24.96 kg/m².    Physical Exam  General,  awake and alert.  Head and ENT, normocephalic and atraumatic.  Lungs, symmetric expansion, equal air entry bilaterally.  Heart, regular rate and rhythm.  Abdomen, soft and nontender.  Extremities, no clubbing or cyanosis.  Neuro, no focal deficits.  Skin: Warm and no rash.  Psych, normal mood and affect.  Musculoskeletal, joint examination is grossly normal.    Results Review:  I reviewed the patient's new clinical results.  I reviewed the patient's new imaging results and agree with the interpretation.  I reviewed the patient's other test results and agree with the interpretation  I personally viewed and interpreted the patient's EKG/Telemetry data  Discussed with ED provider.    Lab Results (last 24 hours)       Procedure Component Value Units Date/Time    Respiratory Panel PCR w/COVID-19(SARS-CoV-2) [facility]PANA/DAPHNE/PAD/COR/PAM In-House, NP Swab in UTM/VTM, 2 HR TAT - Swab, Nasopharynx [[id number]]  (Normal) Collected: 01/22/25 1934    Specimen: Swab from Nasopharynx Updated: 01/22/25 2040     ADENOVIRUS, PCR Not Detected     Coronavirus 229E Not Detected     Coronavirus HKU1 Not Detected     Coronavirus NL63 Not Detected     Coronavirus OC43 Not Detected     COVID19 Not Detected     Human Metapneumovirus Not Detected     Human Rhinovirus/Enterovirus Not Detected     Influenza A PCR Not Detected     Influenza B PCR Not Detected     Parainfluenza Virus 1 Not Detected     Parainfluenza Virus 2 Not Detected     Parainfluenza Virus 3 Not Detected     Parainfluenza Virus 4 Not Detected     RSV, PCR Not Detected     Bordetella pertussis pcr Not Detected     Bordetella parapertussis PCR Not Detected     Chlamydophila pneumoniae PCR Not Detected     Mycoplasma pneumo by PCR Not Detected    Narrative:      In the setting of a positive respiratory panel with a viral infection PLUS a negative procalcitonin without other underlying concern for bacterial infection, consider observing off antibiotics or discontinuation of  antibiotics and continue supportive care. If the respiratory panel is positive for atypical bacterial infection (Bordetella pertussis, Chlamydophila pneumoniae, or Mycoplasma pneumoniae), consider antibiotic de-escalation to target atypical bacterial infection.    CBC & Differential [[id number]]  (Abnormal) Collected: 01/22/25 1934    Specimen: Blood from Arm, Left Updated: 01/22/25 2006    Narrative:      The following orders were created for panel order CBC & Differential.  Procedure                               Abnormality         Status                     ---------                               -----------         ------                     CBC Auto Differential[[id number]]        Abnormal            Final result                 Please view results for these tests on the individual orders.    Comprehensive Metabolic Panel [[id number]]  (Abnormal) Collected: 01/22/25 1934    Specimen: Blood from Arm, Left Updated: 01/22/25 2024     Glucose 136 mg/dL      BUN 4 mg/dL      Creatinine 0.69 mg/dL      Sodium 135 mmol/L      Potassium 2.7 mmol/L      Chloride 98 mmol/L      CO2 23.0 mmol/L      Calcium 8.7 mg/dL      Total Protein 7.5 g/dL      Albumin 2.9 g/dL      ALT (SGPT) 30 U/L      AST (SGOT) 27 U/L      Alkaline Phosphatase 271 U/L      Total Bilirubin 0.6 mg/dL      Globulin 4.6 gm/dL      A/G Ratio 0.6 g/dL      BUN/Creatinine Ratio 5.8     Anion Gap 14.0 mmol/L      eGFR 109.9 mL/min/1.73     Narrative:      GFR Categories in Chronic Kidney Disease (CKD)      GFR Category          GFR (mL/min/1.73)    Interpretation  G1                     90 or greater         Normal or high (1)  G2                      60-89                Mild decrease (1)  G3a                   45-59                Mild to moderate decrease  G3b                   30-44                Moderate to severe decrease  G4                    15-29                Severe decrease  G5                    14 or less           Kidney  "failure          (1)In the absence of evidence of kidney disease, neither GFR category G1 or G2 fulfill the criteria for CKD.    eGFR calculation 2021 CKD-EPI creatinine equation, which does not include race as a factor    Blood Culture - Blood, Arm, Left [[id number]] Collected: 01/22/25 1934    Specimen: Blood from Arm, Left Updated: 01/22/25 1940    Lactic Acid, Plasma [[id number]]  (Normal) Collected: 01/22/25 1934    Specimen: Blood from Arm, Left Updated: 01/22/25 2006     Lactate 1.1 mmol/L     Procalcitonin [[id number]]  (Abnormal) Collected: 01/22/25 1934    Specimen: Blood from Arm, Left Updated: 01/22/25 2016     Procalcitonin 0.33 ng/mL     Narrative:      As a Marker for Sepsis (Non-Neonates):    1. <0.5 ng/mL represents a low risk of severe sepsis and/or septic shock.  2. >2 ng/mL represents a high risk of severe sepsis and/or septic shock.    As a Marker for Lower Respiratory Tract Infections that require antibiotic therapy:    PCT on Admission    Antibiotic Therapy       6-12 Hrs later    >0.5                Strongly Recommended  >0.25 - <0.5        Recommended   0.1 - 0.25          Discouraged              Remeasure/reassess PCT  <0.1                Strongly Discouraged     Remeasure/reassess PCT    As 28 day mortality risk marker: \"Change in Procalcitonin Result\" (>80% or <=80%) if Day 0 (or Day 1) and Day 4 values are available. Refer to http://www.[facility]-pct-calculator.com    Change in PCT <=80%  A decrease of PCT levels below or equal to 80% defines a positive change in PCT test result representing a higher risk for 28-day all-cause mortality of patients diagnosed with severe sepsis for septic shock.    Change in PCT >80%  A decrease of PCT levels of more than 80% defines a negative change in PCT result representing a lower risk for 28-day all-cause mortality of patients diagnosed with severe sepsis or septic shock.       CBC Auto Differential [[id number]]  (Abnormal) Collected: 01/22/25 1934    " Specimen: Blood from Arm, Left Updated: 01/22/25 2006     WBC 0.24 10*3/mm3      RBC 2.96 10*6/mm3      Hemoglobin 8.9 g/dL      Hematocrit 25.9 %      MCV 87.5 fL      MCH 30.1 pg      MCHC 34.4 g/dL      RDW 14.1 %      RDW-SD 44.6 fl      MPV 9.9 fL      Platelets 241 10*3/mm3     Manual Differential [[id number]]  (Abnormal) Collected: 01/22/25 1934    Specimen: Blood from Arm, Left Updated: 01/22/25 2036     Neutrophil % 8.0 %      Lymphocyte % 56.0 %      Monocyte % 32.0 %      Myelocyte % 4.0 %      Neutrophils Absolute 0.02 10*3/mm3      Lymphocytes Absolute 0.13 10*3/mm3      Monocytes Absolute 0.08 10*3/mm3      RBC Morphology Normal     WBC Morphology Normal     Platelet Morphology Normal    Magnesium [[id number]]  (Normal) Collected: 01/22/25 1934    Specimen: Blood from Arm, Left Updated: 01/22/25 2118     Magnesium 1.9 mg/dL     Urinalysis With Microscopic If Indicated (No Culture) - Urine, Clean Catch [[id number]]  (Abnormal) Collected: 01/22/25 2028    Specimen: Urine, Clean Catch Updated: 01/22/25 2045     Color, UA Dark Yellow     Appearance, UA Cloudy     pH, UA 5.5     Specific Gravity, UA 1.024     Glucose, UA Negative     Ketones, UA Trace     Bilirubin, UA Negative     Comment: Confirmed by alternative method        Blood, UA Negative     Protein,  mg/dL (2+)     Leuk Esterase, UA Trace     Nitrite, UA Negative     Urobilinogen, UA 1.0 E.U./dL    Urinalysis, Microscopic Only - Urine, Clean Catch [[id number]]  (Abnormal) Collected: 01/22/25 2028    Specimen: Urine, Clean Catch Updated: 01/22/25 2056     RBC, UA 0-2 /HPF      WBC, UA 3-5 /HPF      Bacteria, UA None Seen /HPF      Squamous Epithelial Cells, UA 7-12 /HPF      Hyaline Casts, UA 0-2 /LPF      Mucus, UA Small/1+ /HPF      Methodology Manual Light Microscopy    Blood Culture - Blood, Arm, Left [[id number]] Collected: 01/22/25 2057    Specimen: Blood from Arm, Left Updated: 01/22/25 2103    CBC (No Diff) [[id number]]  (Abnormal)  Collected: 01/23/25 0552    Specimen: Blood Updated: 01/23/25 0625     WBC 0.35 10*3/mm3      RBC 2.87 10*6/mm3      Hemoglobin 8.5 g/dL      Hematocrit 25.1 %      MCV 87.5 fL      MCH 29.6 pg      MCHC 33.9 g/dL      RDW 14.1 %      RDW-SD 44.2 fl      MPV 10.5 fL      Platelets 189 10*3/mm3     Comprehensive Metabolic Panel [[id number]]  (Abnormal) Collected: 01/23/25 0552    Specimen: Blood Updated: 01/23/25 0631     Glucose 117 mg/dL      BUN 3 mg/dL      Creatinine 0.58 mg/dL      Sodium 138 mmol/L      Potassium 2.6 mmol/L      Chloride 106 mmol/L      CO2 22.6 mmol/L      Calcium 8.0 mg/dL      Total Protein 6.2 g/dL      Albumin 2.2 g/dL      ALT (SGPT) 19 U/L      AST (SGOT) 12 U/L      Alkaline Phosphatase 208 U/L      Total Bilirubin 0.6 mg/dL      Globulin 4.0 gm/dL      A/G Ratio 0.6 g/dL      BUN/Creatinine Ratio 5.2     Anion Gap 9.4 mmol/L      eGFR 114.6 mL/min/1.73     Narrative:      GFR Categories in Chronic Kidney Disease (CKD)      GFR Category          GFR (mL/min/1.73)    Interpretation  G1                     90 or greater         Normal or high (1)  G2                      60-89                Mild decrease (1)  G3a                   45-59                Mild to moderate decrease  G3b                   30-44                Moderate to severe decrease  G4                    15-29                Severe decrease  G5                    14 or less           Kidney failure          (1)In the absence of evidence of kidney disease, neither GFR category G1 or G2 fulfill the criteria for CKD.    eGFR calculation 2021 CKD-EPI creatinine equation, which does not include race as a factor            Imaging Results (Last 24 Hours)       Procedure Component Value Units Date/Time    XR Chest 1 View [[id number]] Collected: 01/22/25 1946     Updated: 01/22/25 1950    Narrative:      AP CHEST     HISTORY: Neutropenic fever     COMPARISON: 1/9/2025     FINDINGS: Lungs are clear. Heart size stable. Is a stable  chest port.  There is prominent bowel gas in the upper abdomen       Impression:      As above           This report was finalized on 1/22/2025 7:46 PM by Dr. Shayan Reyna M.D on Workstation: [id number]               Results for orders placed during the hospital encounter of 11/13/24    Adult Transthoracic Echo Complete W/ Cont if Necessary Per Protocol    Interpretation Summary    Left ventricular systolic function is normal. Estimated left ventricular EF = 55% Normal global longitudinal LV strain (GLS) = -18.8%. Left ventricle strain data was reviewed by the physician. Left ventricular diastolic function was normal.      No orders to display        Assessment/Plan     Active Hospital Problems    Diagnosis  POA    **Neutropenic fever [D70.9, R50.81]  Yes    Crohn's disease without complication [K50.90]  Yes    LITA (iron deficiency anemia) [D50.9]  Yes    Breast cancer, stage 3, right [C50.911]  Yes      Resolved Hospital Problems   No resolved problems to display.       Assessment and plan  1.  Neutropenic fever, high-grade fever noted, underlying etiology unclear, need further workup with infectious disease, initiate IV broad-spectrum antibiotics with vancomycin and Zosyn.    2.  History of Crohn's disease, currently uncomplicated course.  If she worsens, she may need GI evaluation.    3.  Stage III right-sided breast cancer, status post recent chemotherapy, hematology/oncology has been consulted.  We will follow management recommendations.    4.  Anemia, monitor hemoglobin closely, we will continue to recheck labs.    5.  CODE STATUS is full code.  Further plans based on hospital course.       Jim Mane MD  [facility]  01/23/25  12:25 EST

## 2025-01-23 NOTE — ED NOTES
Nursing report ED to floor  Suzanne Lin  44 y.o.  female    HPI :  HPI  Stated Reason for Visit: fever    Chief Complaint  Chief Complaint   Patient presents with    Fever       Admitting doctor:   Emil Bland MD    Admitting diagnosis:   The primary encounter diagnosis was Neutropenic fever. Diagnoses of Immunosuppressed due to chemotherapy, Malignant neoplasm of female breast, unspecified estrogen receptor status, unspecified laterality, unspecified site of breast, Acute anemia, and Leukopenia, unspecified type were also pertinent to this visit.    Code status:   Current Code Status       Date Active Code Status Order ID Comments User Context       1/22/2025 2141 CPR (Attempt to Resuscitate) [id number]  Italia Sheikh, APRN ED        Question Answer    Code Status (Patient has no pulse and is not breathing) CPR (Attempt to Resuscitate)    Medical Interventions (Patient has pulse or is breathing) Full Support                    Allergies:   Patient has no known allergies.    Isolation:   No active isolations    Intake and Output    Intake/Output Summary (Last 24 hours) at 1/23/2025 0059  Last data filed at 1/22/2025 2224  Gross per 24 hour   Intake 1100 ml   Output --   Net 1100 ml       Weight:   There were no vitals filed for this visit.    Most recent vitals:   Vitals:    01/22/25 2349 01/23/25 0001 01/23/25 0010 01/23/25 0032   BP:  100/53     Patient Position:       Pulse:  86 91 87   Resp:       Temp: 97.7 °F (36.5 °C)      TempSrc: Oral      SpO2:  97% 99% 100%       Active LDAs/IV Access:   Lines, Drains & Airways       Active LDAs       Name Placement date Placement time Site Days    Peripheral IV 01/22/25 2004 Left Antecubital 01/22/25 2004  Antecubital  less than 1    Single Lumen Implantable Port 12/10/24 Left Chest 12/10/24  1234  Chest  43                    Labs (abnormal labs have a star):   Labs Reviewed   COMPREHENSIVE METABOLIC PANEL - Abnormal; Notable for the following components:        Result Value    Glucose 136 (*)     BUN 4 (*)     Sodium 135 (*)     Potassium 2.7 (*)     Albumin 2.9 (*)     Alkaline Phosphatase 271 (*)     BUN/Creatinine Ratio 5.8 (*)     All other components within normal limits    Narrative:     GFR Categories in Chronic Kidney Disease (CKD)      GFR Category          GFR (mL/min/1.73)    Interpretation  G1                     90 or greater         Normal or high (1)  G2                      60-89                Mild decrease (1)  G3a                   45-59                Mild to moderate decrease  G3b                   30-44                Moderate to severe decrease  G4                    15-29                Severe decrease  G5                    14 or less           Kidney failure          (1)In the absence of evidence of kidney disease, neither GFR category G1 or G2 fulfill the criteria for CKD.    eGFR calculation 2021 CKD-EPI creatinine equation, which does not include race as a factor   URINALYSIS W/ MICROSCOPIC IF INDICATED (NO CULTURE) - Abnormal; Notable for the following components:    Color, UA Dark Yellow (*)     Appearance, UA Cloudy (*)     Ketones, UA Trace (*)     Protein,  mg/dL (2+) (*)     Leuk Esterase, UA Trace (*)     All other components within normal limits   PROCALCITONIN - Abnormal; Notable for the following components:    Procalcitonin 0.33 (*)     All other components within normal limits    Narrative:     As a Marker for Sepsis (Non-Neonates):    1. <0.5 ng/mL represents a low risk of severe sepsis and/or septic shock.  2. >2 ng/mL represents a high risk of severe sepsis and/or septic shock.    As a Marker for Lower Respiratory Tract Infections that require antibiotic therapy:    PCT on Admission    Antibiotic Therapy       6-12 Hrs later    >0.5                Strongly Recommended  >0.25 - <0.5        Recommended   0.1 - 0.25          Discouraged              Remeasure/reassess PCT  <0.1                Strongly Discouraged      "Remeasure/reassess PCT    As 28 day mortality risk marker: \"Change in Procalcitonin Result\" (>80% or <=80%) if Day 0 (or Day 1) and Day 4 values are available. Refer to [email or web address]-pct-[email or web address]    Change in PCT <=80%  A decrease of PCT levels below or equal to 80% defines a positive change in PCT test result representing a higher risk for 28-day all-cause mortality of patients diagnosed with severe sepsis for septic shock.    Change in PCT >80%  A decrease of PCT levels of more than 80% defines a negative change in PCT result representing a lower risk for 28-day all-cause mortality of patients diagnosed with severe sepsis or septic shock.      CBC WITH AUTO DIFFERENTIAL - Abnormal; Notable for the following components:    WBC 0.24 (*)     RBC 2.96 (*)     Hemoglobin 8.9 (*)     Hematocrit 25.9 (*)     All other components within normal limits   MANUAL DIFFERENTIAL - Abnormal; Notable for the following components:    Neutrophil % 8.0 (*)     Lymphocyte % 56.0 (*)     Monocyte % 32.0 (*)     Myelocyte % 4.0 (*)     Neutrophils Absolute 0.02 (*)     Lymphocytes Absolute 0.13 (*)     Monocytes Absolute 0.08 (*)     All other components within normal limits   URINALYSIS, MICROSCOPIC ONLY - Abnormal; Notable for the following components:    WBC, UA 3-5 (*)     Squamous Epithelial Cells, UA 7-12 (*)     Mucus, UA Small/1+ (*)     All other components within normal limits   RESPIRATORY PANEL PCR W/ COVID-19 (SARS-COV-2), NP SWAB IN UT/VTP, 2 HR TAT - Normal    Narrative:     In the setting of a positive respiratory panel with a viral infection PLUS a negative procalcitonin without other underlying concern for bacterial infection, consider observing off antibiotics or discontinuation of antibiotics and continue supportive care. If the respiratory panel is positive for atypical bacterial infection (Bordetella pertussis, Chlamydophila pneumoniae, or Mycoplasma pneumoniae), consider antibiotic de-escalation to " target atypical bacterial infection.   LACTIC ACID, PLASMA - Normal   MAGNESIUM - Normal   BLOOD CULTURE   BLOOD CULTURE   CBC (NO DIFF)   COMPREHENSIVE METABOLIC PANEL   CBC AND DIFFERENTIAL    Narrative:     The following orders were created for panel order CBC & Differential.  Procedure                               Abnormality         Status                     ---------                               -----------         ------                     CBC Auto Differential[[id number]]        Abnormal            Final result                 Please view results for these tests on the individual orders.       EKG:   No orders to display       Meds given in ED:   Medications   sodium chloride 0.9 % flush 10 mL (has no administration in time range)   sodium chloride 0.9 % flush 10 mL (has no administration in time range)   acetaminophen (TYLENOL) tablet 650 mg (has no administration in time range)     Or   acetaminophen (TYLENOL) 160 MG/5ML oral solution 650 mg (has no administration in time range)     Or   acetaminophen (TYLENOL) suppository 650 mg (has no administration in time range)   sennosides-docusate (PERICOLACE) 8.6-50 MG per tablet 2 tablet (has no administration in time range)     And   polyethylene glycol (MIRALAX) packet 17 g (has no administration in time range)     And   bisacodyl (DULCOLAX) EC tablet 5 mg (has no administration in time range)     And   bisacodyl (DULCOLAX) suppository 10 mg (has no administration in time range)   melatonin tablet 5 mg (has no administration in time range)   ondansetron ODT (ZOFRAN-ODT) disintegrating tablet 4 mg ( Oral Not Given:  See Alt 1/23/25 0024)     Or   ondansetron (ZOFRAN) injection 4 mg (4 mg Intravenous Given 1/23/25 0024)   aluminum-magnesium hydroxide-simethicone (MAALOX MAX) 400-400-40 MG/5ML suspension 15 mL (has no administration in time range)   Potassium Replacement - Follow Nurse / BPA Driven Protocol (has no administration in time range)   Magnesium  Standard Dose Replacement - Follow Nurse / BPA Driven Protocol (has no administration in time range)   Phosphorus Replacement - Follow Nurse / BPA Driven Protocol (has no administration in time range)   Calcium Replacement - Follow Nurse / BPA Driven Protocol (has no administration in time range)   sodium chloride 0.9 % with KCl 20 mEq/L infusion (has no administration in time range)   sodium chloride 0.9 % bolus 1,000 mL (0 mL Intravenous Stopped 25)   acetaminophen (TYLENOL) tablet 1,000 mg (1,000 mg Oral Given 25)   ondansetron (ZOFRAN) injection 4 mg (4 mg Intravenous Given 25)   cefepime 2000 mg IVPB in 100 mL NS (MBP) (0 mg Intravenous Stopped 25)   potassium chloride (K-DUR,KLOR-CON) ER tablet 40 mEq (40 mEq Oral Given 25)   ondansetron (ZOFRAN) injection 4 mg (4 mg Intravenous Given 25)   loperamide (IMODIUM) capsule 4 mg (4 mg Oral Given 25)   ondansetron (ZOFRAN) injection 4 mg (4 mg Intravenous Given 25 0024)       Imaging results:  XR Chest 1 View    Result Date: 2025  As above    This report was finalized on 2025 7:46 PM by Dr. Shayan Reyna M.D on Workstation: [id number]       Ambulatory status:   - up with assist     Social issues:   Social History     Socioeconomic History    Marital status: Single    Number of children: 1   Tobacco Use    Smoking status: Former     Current packs/day: 0.00     Average packs/day: 1 pack/day for 20.0 years (20.0 ttl pk-yrs)     Types: Cigarettes     Start date:      Quit date: 2019     Years since quittin.0    Smokeless tobacco: Current    Tobacco comments:     Nicotine pouches   Vaping Use    Vaping status: Never Used   Substance and Sexual Activity    Alcohol use: Yes     Comment: OCC    Drug use: Never    Sexual activity: Defer       Peripheral Neurovascular  Peripheral Neurovascular (Adult)  Peripheral Neurovascular WDL: WDL    Neuro Cognitive  Neuro Cognitive  (Adult)  Cognitive/Neuro/Behavioral WDL: WDL    Learning  Learning Assessment  Learning Readiness and Ability: no barriers identified    Respiratory  Respiratory WDL  Respiratory WDL: .WDL except, cough  Cough Frequency: frequent  Cough Type: dry    Abdominal Pain       Pain Assessments  Pain (Adult)  (0-10) Pain Rating: Rest: 0    NIH Stroke Scale       Fidelia Angel RN  01/23/25 00:59 EST

## 2025-01-23 NOTE — CONSULTS
"Referring Provider: Luis Armando Reyna MD  Reason for Consultation:     Neutropenic fever     Chief Complaint   Patient presents with    Fever         Subjective   History of present illness: Patient is 44-year-old female with past medical history of Crohn's disease and  pT3N2a M0 grade 3 invasive ductal adenocarcinoma right breast currently on doxorubicin and cyclophosphamide who presents with fever.  ID consulted for \"neutropenic fever\".    Last chemotherapy infusion was performed on 2025.  Patient presenting with Tmax of 102.9 and neutropenia.  ANC of 10 on admission.  Lactate has been normal and procalcitonin was 0.33.  Started on cefepime with blood cultures pending.  Respiratory viral panel has been negative.  Urinalysis was unremarkable and chest x-ray without infiltrate.    This afternoon patient reports she has abdominal pain with diarrhea.  Denies any cough or shortness of breath.  Denies any sick contacts.  Reports last chemotherapy was last week.  Denies any rash or skin changes.  Denies any joint pains.  States that she has crampy abdominal pain that comes and goes.  Denies any vomiting.    Past Medical History:   Diagnosis Date    Anxiety     Breast cancer     Right    Crohn's disease     DVT (deep vein thrombosis) in pregnancy     PFO (patent foramen ovale)     Stroke     after the birth of her child at age 34    Tattoos        Past Surgical History:   Procedure Laterality Date     SECTION      COLON RESECTION      COLONOSCOPY      MASTECTOMY Bilateral 10/9/2024    Procedure: Modified radical mastectomy with axillary dissection of the right breast and simple mastectomy of the left breast;  Surgeon: Rebekah Garcia DO;  Location: [facility] OR;  Service: General;  Laterality: Bilateral;    SKIN CANCER EXCISION      VENOUS ACCESS DEVICE (PORT) INSERTION N/A 12/10/2024    Procedure: INSERTION VENOUS ACCESS DEVICE;  Surgeon: Rebekah Garcia DO;  Location: [facility] OR;  Service: General;  " "Laterality: N/A;       family history includes Diabetes in her mother and paternal grandmother; Heart disease in her father; Hypertension in an other family member.     reports that she quit smoking about 6 years ago. Her smoking use included cigarettes. She started smoking about 26 years ago. She has a 20 pack-year smoking history. She uses smokeless tobacco. She reports current alcohol use. She reports that she does not use drugs.     No Known Allergies    Medication:  Antibiotics:  Anti-Infectives (From admission, onward)      Ordered     Dose/Rate Route Frequency Start Stop    01/23/25 1224  Pharmacy to Dose Zosyn        Ordering Provider: Jim Mane MD     Not Applicable Continuous PRN 01/23/25 1224 01/26/25 1223    01/22/25 1919  cefepime 2000 mg IVPB in 100 mL NS (MBP)        Ordering Provider: Luis Armando Reyna MD    2,000 mg  over 30 Minutes Intravenous Once 01/22/25 1935 01/22/25 2153              Objective     Physical Exam:   Vital Signs   Temp:  [97.4 °F (36.3 °C)-102.9 °F (39.4 °C)] 102.6 °F (39.2 °C)  Heart Rate:  [] 101  Resp:  [16-20] 16  BP: ()/(50-72) 90/58    GENERAL: Awake and alert, in no acute distress.   HEENT: Oropharynx is clear. Hearing is grossly normal.   EYES: PERRL. No conjunctival injection. No lid lag.   LUNGS: Normal work of breathing.  SKIN: Warm and dry without cutaneous eruptions in exposed areas.  PSYCHIATRIC: Appropriate mood, affect, insight, and judgment.     Results Review:   I reviewed the patient's new clinical results.  I reviewed the patient's new imaging results and agree with the interpretation.  I reviewed the patient's other test results and agree with the interpretation    Lab Results   Component Value Date    WBC 0.35 (C) 01/23/2025    HGB 8.5 (L) 01/23/2025    HCT 25.1 (L) 01/23/2025    MCV 87.5 01/23/2025     01/23/2025       No results found for: \"VANCOPEAK\", \"VANCOTROUGH\", \"VANCORANDOM\"    Lab Results   Component Value Date    GLUCOSE 117 " (H) 01/23/2025    BUN 3 (L) 01/23/2025    CREATININE 0.58 01/23/2025    BCR 5.2 (L) 01/23/2025    CO2 22.6 01/23/2025    CALCIUM 8.0 (L) 01/23/2025    ALBUMIN 2.2 (L) 01/23/2025    AST 12 01/23/2025    ALT 19 01/23/2025         Estimated Creatinine Clearance: 132.9 mL/min (by C-G formula based on SCr of 0.58 mg/dL).    Isolation:   No active isolations      Microbiology:  1/21 blood cultures no growth to date  1/22 respiratory panel negative  1/22 blood culture in process    Radiology:  1/22 chest x-ray reviewed by me with no acute infiltrate.    Assessment     #Neutropenic fever  # pT3N2a M0 grade 3 invasive ductal adenocarcinoma right breast   #Immunosuppressed on chemotherapy with doxorubicin and cyclophosphamide  #Crohn's disease  #Chest port in place    Chest x-ray, respiratory viral panel and urinalysis have been unremarkable.  Blood cultures are pending.  Obtain CT of the abdomen given abdominal pain and fevers.  Continue cefepime 2 g every 8 hours for neutropenic fever protocol.  Follow-up blood cultures and ANC for recovery      Thank you for this consult.  We will continue to follow along and tailor antibiotics as the patient's clinical course evolves.

## 2025-01-23 NOTE — PROGRESS NOTES
Discharge Planning Assessment  [facility]     Patient Name: Suzanne Lin  MRN: [id number]  Today's Date: 1/23/2025    Admit Date: 1/22/2025    Plan: pending   Discharge Needs Assessment    No documentation.                  Discharge Plan       Row Name 01/23/25 1642       Plan    Plan pending    Plan Comments The patient transferred to [facility] from ER on 1/23/25 for neutropenic fever. CCP will screen and follow for any needs. CLINTON Husain Rn CCP.                  Continued Care and Services - Admitted Since 1/22/2025    No active coordination exists for this encounter.       Expected Discharge Date and Time       Expected Discharge Date Expected Discharge Time    Jan 27, 2025            Demographic Summary    No documentation.                  Functional Status    No documentation.                  Psychosocial    No documentation.                  Abuse/Neglect    No documentation.                  Legal    No documentation.                  Substance Abuse    No documentation.                  Patient Forms    No documentation.                     Helen Husain, RN

## 2025-01-23 NOTE — TELEPHONE ENCOUNTER
I called Yohana, patient wanting information of has she is doing in the hospital. I told Yohana that she would need to call the patient or the hospital to speak with her medicare care team. Yohana GONZALEZ/NELSON

## 2025-01-23 NOTE — PLAN OF CARE
Goal Outcome Evaluation:  Plan of Care Reviewed With: patient, family        Progress: no change  Outcome Evaluation: Pt alert, cooperative with staff. Pt having increased pain, michael in abdomen. Pt reports sharp, gas like pain and stomach cramps. New orders for Bentyl TID PRN and Oxycodone 5mg q6prn. Pt reports both helped with discomfort. Pt has been febrile throughout shift, temp reaching 102.6. PRN Tylenol administered. IV abx per left arm periph site. NS with 20meq K+ @100/hr cont, left chest port not accessed. Hematology and Oncology following. Awaiting bed availability on 3rd floor. Nsg to cont with plan of care.

## 2025-01-23 NOTE — PROGRESS NOTES
REASON FOR CONSULTATION:  breast cancer undergoing therapy, admission with neutropenic fever                               History of Present Illness   Record reviewed, the patient initiated adjuvant chemotherapy with AC on 12/31/2024.  She had fever after the first cycle with borderline neutropenia but no obvious bacterial infections.  She was treated with Levaquin.  Fevers resolved over the weekend.  She denies uncontrolled nausea vomiting or diarrhea.  She denies stomatitis.  Chest wounds have healed.  She was last seen 1/14/2024 with overall plans to proceed with adjuvant therapy including dose dense AC with Neulasta support x 4 followed by weekly Taxol.  Additional discussions included postmastectomy radiation therapy, hormonal therapy with ovarian suppression and tamoxifen versus oophorectomy and AI therapy and review of nonspecific lung nodules after completing chemotherapy.    She was treated with her second cycle of dose dense AC 1/14/2025 presented to the ER 1/22/2025 with fever, generalized malaise and nausea.  Studies included negative respiratory panel,, CBC with progressive neutropenia.  She had been assessed 1/21 with ANC of 0.01, placed on Augmentin.    Unfortunately symptoms worsened and she presented to the ER 1/22/2025.  H&H 8.9 25.9 with white count of 240, platelet count of 240,000, BUN/creatinine of 4 and 0.69, alk phos 271, lactate of 1.1, procalcitonin 0.33 UA with trace ketones, 2+ protein and trace leukocyte esterase, 3-5 WBCs and 7-12 epithelial cells, blood cultures negative,.  Patient admitted placed on cefepime.    Interval history:  1/23/2025  T102.6, pulse 101, respirate 16, BP 90/58  Patient describes continued fever, abdominal cramping and diarrhea  H&H 8.5 and 25.1 with white count of 350, platelet count of 189,000, BUN/creatinine of 3 and 0.58, potassium 2.6      ONCOLOGY HISTORY:  This is a 44-year-old woman referred from general surgery to discuss adjuvant treatment for  recently surgically treated breast cancer.  The patient presented with a large palpable mass in the inner quadrant of the right breast.  The mass had been enlarging for couple years but the patient did not have insurance to get assessed.  The breast imaging is not available to me but she had a CT of the chest abdomen pelvis on 9/16/2024 showed a large mass in the right breast measuring up to 7.7 cm with abnormal left axillary lymphadenopathy.  There were tiny subpleural nodules in the lateral aspect of the left lower lobe likely granulomatous mildly conspicuous lymph node in the central mesentery 1.1 cm.  A PET scan was performed 9/30/2024 showing a hypermetabolic mass in the right breast with thickening throughout the right breast and pathologic hypermetabolic right axillary level 1 and 2 lymph nodes, no hypermetabolic internal mammary or supraclavicular lymph nodes.  The small pulmonary nodules were too small to characterize and mesenteric lymph nodes without hypermetabolic activity.  A biopsy of the right breast mass showed invasive ductal adenocarcinoma.    She was taken to the operating room on 10/9/2024 and underwent a right modified radical mastectomy and axillary dissection, prophylactic left mastectomy.  Pathology from the right breast showed invasive ductal carcinoma Canby grade 3 (3+3+3) measuring 17 cm with extensive lymphovascular invasion and dermal lymphatic invasion.  The tumor extended to the dermis but did not ulcerate the skin.  Tumor extended to skeletal muscle and was present focally at an anterior margin, 0.05 mm of the posterior margin, 10 mm from the lateral margin, 20 mm from the medial margin, 28 mm from the superior margin and 40 mm from inferior margin.  There was ductal carcinoma in situ present within 2.5 mm of the posterior margin.  There were 13 lymph nodes in the axillary dissection 8 oncology plan/recommendations: of which had macrometastases, 1 micrometastases and 1 lymph node  with isolated tumor cells.  The left breast had an intraductal papilloma otherwise negative.  The tumor was positive for estrogen receptor 99% of cells, progesterone receptor 50% of cells, HER2 0 and Ki-67 65%.    The patient has medical comorbidities of Crohn's disease.    Past Medical History:   Diagnosis Date    Anxiety     Breast cancer     Right    Crohn's disease     DVT (deep vein thrombosis) in pregnancy     PFO (patent foramen ovale)     Stroke     after the birth of her child at age 34    Tattoos         Past Surgical History:   Procedure Laterality Date     SECTION      COLON RESECTION      COLONOSCOPY      MASTECTOMY Bilateral 10/9/2024    Procedure: Modified radical mastectomy with axillary dissection of the right breast and simple mastectomy of the left breast;  Surgeon: Rebekah Garcia DO;  Location: [facility] OR;  Service: General;  Laterality: Bilateral;    SKIN CANCER EXCISION      VENOUS ACCESS DEVICE (PORT) INSERTION N/A 12/10/2024    Procedure: INSERTION VENOUS ACCESS DEVICE;  Surgeon: Rebekah Garcia DO;  Location: [facility] OR;  Service: General;  Laterality: N/A;        Current Facility-Administered Medications on File Prior to Encounter   Medication Dose Route Frequency Provider Last Rate Last Admin    heparin injection 500 Units  500 Units Intravenous PRN Nishant Witt MD   500 Units at 24 0916    heparin injection 500 Units  500 Units Intravenous PRN Nishant Witt MD   500 Units at 25 1049    sodium chloride 0.9 % flush 10 mL  10 mL Intravenous PRN Nishant Witt MD   10 mL at 24 0916    sodium chloride 0.9 % flush 10 mL  10 mL Intravenous PRN Nishant Witt MD   10 mL at 25 1048     Current Outpatient Medications on File Prior to Encounter   Medication Sig Dispense Refill    amoxicillin-clavulanate (AUGMENTIN) 500-125 MG per tablet Take 1 tablet by mouth 2 (Two) Times a Day for 7 days. 14 tablet 0    aspirin 81 MG EC tablet Take 1  tablet by mouth Daily.      gabapentin (Neurontin) 300 MG capsule Take 2 capsules by mouth Every Night. 60 capsule 2    Klor-Con M20 20 MEQ CR tablet TAKE 1 TABLET BY MOUTH 2 TIMES A DAY 40 tablet 1    lidocaine-prilocaine (EMLA) 2.5-2.5 % cream Apply 1 Application topically to the appropriate area as directed As Needed for Mild Pain. Apply to port site 30 minutes before access 15 g 3    omeprazole (priLOSEC) 20 MG capsule       ondansetron (ZOFRAN) 8 MG tablet Take 1 tablet by mouth 3 (Three) Times a Day As Needed for Nausea or Vomiting. 30 tablet 5    prochlorperazine (COMPAZINE) 10 MG tablet Take 1 tablet by mouth Every 6 (Six) Hours As Needed for Nausea or Vomiting. 60 tablet 1    venlafaxine XR (Effexor XR) 37.5 MG 24 hr capsule Take 1 capsule by mouth Take As Directed. 1 capsule po q am for two weeks followed by increase to 2 capsules daily (Patient taking differently: Take 1 capsule by mouth Take As Directed. 1 capsule po q am for two weeks followed by increase to 2 capsules daily  Pt has not started yet) 60 capsule 2    OLANZapine (zyPREXA) 5 MG tablet Take 1 tablet by mouth for 4 doses starting night of chemotherapy. 8 tablet 1        ALLERGIES:  No Known Allergies     Social History     Socioeconomic History    Marital status: Single    Number of children: 1   Tobacco Use    Smoking status: Former     Current packs/day: 0.00     Average packs/day: 1 pack/day for 20.0 years (20.0 ttl pk-yrs)     Types: Cigarettes     Start date:      Quit date: 2019     Years since quittin.0    Smokeless tobacco: Current    Tobacco comments:     Nicotine pouches   Vaping Use    Vaping status: Never Used   Substance and Sexual Activity    Alcohol use: Yes     Comment: OCC    Drug use: Never    Sexual activity: Defer        Family History   Problem Relation Age of Onset    Diabetes Mother     Heart disease Father     Diabetes Paternal Grandmother     Hypertension Other     Malig Hyperthermia Neg Hx         Review of  "Systems   Constitutional:  Positive for fever.   HENT: Negative.     Respiratory: Negative.     Cardiovascular: Negative.    Gastrointestinal:  Positive for abdominal pain and diarrhea. Negative for nausea.   Genitourinary: Negative.    Musculoskeletal: Negative.    Skin:  Negative for wound.   Neurological: Negative.    Hematological: Negative.           Objective     Vitals:    01/23/25 0228 01/23/25 0520 01/23/25 0807 01/23/25 1201   BP: 90/57 94/55 (!) 89/55 90/58   BP Location: Left arm Right arm Left arm Left arm   Patient Position: Lying Lying Lying Lying   Pulse: 103 89 90 101   Resp: 16 16 16 16   Temp: (!) 102.9 °F (39.4 °C) 97.4 °F (36.3 °C) 99.4 °F (37.4 °C) (!) 102.6 °F (39.2 °C)   TempSrc: Oral Oral Oral Oral   SpO2: 97% 95% 100% 96%   Weight: 68 kg (150 lb)      Height: 165.1 cm (65\")                1/14/2025     8:31 AM   Current Status   ECOG score 0       Physical Exam    CONSTITUTIONAL: pleasant well-developed adult woman  HEENT: no icterus, no thrush, moist membranes/  LYMPH: no cervical or supraclavicular lad  CV: RRR, S1S2, no murmur  RESP/chest: cta bilat, no wheezing, no rales, port present left chest wall  Breast: Status post bilateral mastectomies, wound mildly erythematous, healed without discharge  GI: soft, generally tender, bowel sounds positive  MUSC: no mele  NEURO: alert and oriented x3, normal strength  PSYCH: Frustrated, alert, oriented      RECENT LABS:  Hematology WBC   Date Value Ref Range Status   01/23/2025 0.35 (C) 3.40 - 10.80 10*3/mm3 Final     RBC   Date Value Ref Range Status   01/23/2025 2.87 (L) 3.77 - 5.28 10*6/mm3 Final     Hemoglobin   Date Value Ref Range Status   01/23/2025 8.5 (L) 12.0 - 15.9 g/dL Final     Hematocrit   Date Value Ref Range Status   01/23/2025 25.1 (L) 34.0 - 46.6 % Final     Platelets   Date Value Ref Range Status   01/23/2025 189 140 - 450 10*3/mm3 Final        Lab Results   Component Value Date    GLUCOSE 117 (H) 01/23/2025    BUN 3 (L) " 01/23/2025    CREATININE 0.58 01/23/2025     01/23/2025    K 2.6 (C) 01/23/2025     01/23/2025    CALCIUM 8.0 (L) 01/23/2025    PROTEINTOT 6.2 01/23/2025    ALBUMIN 2.2 (L) 01/23/2025    ALT 19 01/23/2025    AST 12 01/23/2025    ALKPHOS 208 (H) 01/23/2025    BILITOT 0.6 01/23/2025    GLOB 4.0 01/23/2025    AGRATIO 0.6 01/23/2025    BCR 5.2 (L) 01/23/2025    ANIONGAP 9.4 01/23/2025    EGFR 114.6 01/23/2025     PET scan 9/30/2024:  FINDINGS:     Head/neck: No suspicious uptake.     Chest: An 8 x 5.8 cm mass in the lower inner right breast with max SUV  of 18 (series 4/image 156). Mass comes in close proximity to the  sternum. Hypermetabolic parenchymal thickening throughout the right  breast with max SUV of 9 (series 4/image 140). Diffuse right breast skin  thickening.     Hypermetabolic right axillary level I and II lymph nodes. Reference 1.2  cm level I lymph node with max SUV of 11.9 (series 4/image 103).  Additional reference subcentimeter level II lymph node with max SUV of  3.3 (series 4/image 93). Separate brown fat uptake in the bilateral  axilla and posterior neck base.     No enlarged or hypermetabolic internal mammary or supraclavicular lymph  nodes.     Few subcentimeter pulmonary nodules are too small for accurate PET  characterization without overt hypermetabolism and unchanged from recent  CT. Reference left lower lobe (series 4/image 167) and right upper lobe  (series 4/image 131).     Abdomen/pelvis: Mesenteric lymph nodes are mostly subcentimeter without  associated hypermetabolism.     Sigmoid colectomy. Tubular hypermetabolism throughout the otherwise  normal-appearing remaining colon may be medication related. Moderate  proximal colonic stool burden.     Bones: No focal osseous hypermetabolism with special attention to the  sternum.           IMPRESSION:  1. Hypermetabolic 8 cm right breast mass with hypermetabolic parenchymal  thickening throughout the right breast, pathologically  proven invasive  ductal carcinoma. Mass comes in close proximity to the sternum. No focal  osseous hypermetabolism with special attention to the sternum.  2. Hypermetabolic right axillary level I and II lymph nodes suggesting  alana metastatic disease. No enlarged or hypermetabolic internal mammary  or supraclavicular lymph nodes. Separate brown fat uptake in the  bilateral axilla and posterior neck base.  3. Few subcentimeter pulmonary nodules are too small for accurate PET  characterization and will need follow-up via chest CT.  4. Mesenteric lymph nodes are mostly subcentimeter without associated  hypermetabolism.       Assessment & Plan   *pT3N2a M0 grade 3 invasive ductal adenocarcinoma right breast, ER 99% positive, ME 50% positive, HER2 0, Ki-67 65%, tumor present at anterior margin  She presented with a large palpable mass in the inner quadrant of the right breast; mass had been enlarging for couple years but the patient did not have insurance to get assessed  CT of the chest abdomen pelvis on 9/16/2024 showed a large mass in the right breast measuring up to 7.7 cm with abnormal left axillary lymphadenopathy.  There were tiny subpleural nodules in the lateral aspect of the left lower lobe likely granulomatous mildly conspicuous lymph node in the central mesentery 1.1 cm.    PET scan 9/30/2024 - hypermetabolic mass in the right breast with thickening throughout the right breast and pathologic hypermetabolic right axillary level 1 and 2 lymph nodes, no hypermetabolic internal mammary or supraclavicular lymph nodes.  The small pulmonary nodules were too small to characterize and mesenteric lymph nodes without hypermetabolic activity.    biopsy of the right breast mass showed invasive ductal adenocarcinoma.  operating room on 10/9/2024 and underwent a right modified radical mastectomy and axillary dissection, prophylactic left mastectomy.  Pathology from the right breast showed invasive ductal carcinoma  Brethren grade 3 (3+3+3) measuring 17 cm with extensive lymphovascular invasion and dermal lymphatic invasion.  The tumor extended to the dermis but did not ulcerate the skin.  Tumor extended to skeletal muscle and was present focally at an anterior margin, 0.05 mm of the posterior margin, 10 mm from the lateral margin, 20 mm from the medial margin, 28 mm from the superior margin and 40 mm from inferior margin.  There was ductal carcinoma in situ present within 2.5 mm of the posterior margin.  There were 13 lymph nodes in the axillary dissection 8 of which had macrometastases, 1 micrometastases and 1 lymph node with isolated tumor cells.  The left breast had an intraductal papilloma otherwise negative.  The tumor was positive for estrogen receptor 99% of cells, progesterone receptor 50% of cells, HER2 0 and Ki-67 65%.  Initiated adjuvant chemotherapy with Adriamycin/Cytoxan 12/31/2024 (adjuvant chemotherapy delayed because of wound healing issues) nonneutropenic fever    *Suppression secondary to chemotherapy   resolved with normal CBC-6850, 1/14/2025      * nausea/diarrhea, chemo induced   responding to Imodium/Zofran/Compazine    *elevated LFT-likely s/e chemo     *[employer] 19 gene panel-VUS Bard 1    *medical comorbidities of Crohn's disease.    Oncology plan/recommendations:  Again reviewed plan  for adjuvant chemotherapy to reduce risk of recurrence/help eradicate potential micrometastatic disease with dose dense AC with Neulasta support x 4 cycles followed by weekly Taxol x 12 doses-proceed with cycle 2 AC today pending stable CMP  Patient admitted with neutropenic fever, agree with cefepime, initiation of additional Neupogen, discussion with primary oncologist about subsequent treatment plan  After chemotherapy she will need postmastectomy radiation given the size and alana involvement  She will need hormonal therapy with ovarian suppression/tamoxifen versus oophorectomy and AI therapy/ck4/6  inhibitor  Nonspecific lung nodules will need reassessment after completing chemotherapy radiographically  Continue Imodium Zofran and Compazine for nausea/diarrhea

## 2025-01-23 NOTE — TELEPHONE ENCOUNTER
Caller: KERRY MUJICA   (NO BH VERBAL ON FILE FOR THE OFFICE)    Relationship: Mother    Best call back number: [phone]      What was the call regarding: PATIENTS MOTHER CALLED TO GET INFORMATION ON PATIENT, PATIENT I SIN THE HOSPITAL AND MOTHER WOULD LIKE AN UPDATE

## 2025-01-23 NOTE — ED PROVIDER NOTES
EMERGENCY DEPARTMENT ENCOUNTER  Room Number:  12/12  PCP: Gia Rodriguez APRN  Independent Historians: Patient and family      HPI:  Chief Complaint: had concerns including Fever.     A complete HPI/ROS/PMH/PSH/SH/FH are unobtainable due to: None    Chronic or social conditions impacting patient care (Social Determinants of Health): None      Context: Suzanne Lin is a 44 y.o. female with a medical history of Crohn's, PFO, DVT, breast cancer with most recent chemotherapy 1 week ago who presents to the ED c/o acute recurrent fevers and general malaise with associated nausea.  Fever today brought patient to the hospital.  She was seen by hematology/oncology yesterday during which labs were obtained and the patient was started on antibiotic.  First  Whole body aches and nausea.  Poor p.o. intake.      Review of prior external notes (non-ED) -and- Review of prior external test results outside of this encounter:  Heme-onc office note 1/14/2025.  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  ==============================  Patient still been started on Augmentin yesterday.      PAST MEDICAL HISTORY  Active Ambulatory Problems     Diagnosis Date Noted    PFO (patent foramen ovale) 09/18/2024    Palpitations 09/28/2024    Breast cancer, stage 3, right 10/02/2024    Breast cancer in female 10/09/2024    Need for prophylactic chemotherapy 10/31/2024    Encounter for central line care 10/31/2024    LITA (iron deficiency anemia) 12/12/2024    Adverse effect of iron 12/12/2024    Crohn's disease without complication 12/17/2024    Encounter for screening colonoscopy 12/17/2024     Resolved Ambulatory Problems     Diagnosis Date Noted    No Resolved Ambulatory Problems     Past Medical History:   Diagnosis Date    Anxiety     Breast cancer 2024    Crohn's disease     DVT (deep vein thrombosis) in pregnancy     Stroke     Tattoos          PAST SURGICAL HISTORY  Past Surgical History:    Procedure Laterality Date     SECTION      COLON RESECTION      COLONOSCOPY      MASTECTOMY Bilateral 10/9/2024    Procedure: Modified radical mastectomy with axillary dissection of the right breast and simple mastectomy of the left breast;  Surgeon: Rebekah Garcia DO;  Location:  [facility];  Service: General;  Laterality: Bilateral;    SKIN CANCER EXCISION      VENOUS ACCESS DEVICE (PORT) INSERTION N/A 12/10/2024    Procedure: INSERTION VENOUS ACCESS DEVICE;  Surgeon: Rebekah Garcia DO;  Location:  [facility];  Service: General;  Laterality: N/A;         FAMILY HISTORY  Family History   Problem Relation Age of Onset    Diabetes Mother     Heart disease Father     Diabetes Paternal Grandmother     Hypertension Other     Malig Hyperthermia Neg Hx          SOCIAL HISTORY  Social History     Socioeconomic History    Marital status: Single    Number of children: 1   Tobacco Use    Smoking status: Former     Current packs/day: 0.00     Average packs/day: 1 pack/day for 20.0 years (20.0 ttl pk-yrs)     Types: Cigarettes     Start date:      Quit date:      Years since quittin.0    Smokeless tobacco: Current    Tobacco comments:     Nicotine pouches   Vaping Use    Vaping status: Never Used   Substance and Sexual Activity    Alcohol use: Yes     Comment: OCC    Drug use: Never    Sexual activity: Defer         ALLERGIES  Patient has no known allergies.      REVIEW OF SYSTEMS  Review of Systems  Included in HPI  All systems reviewed and negative except for those discussed in HPI.      PHYSICAL EXAM    I have reviewed the triage vital signs and nursing notes.    ED Triage Vitals   Temp Heart Rate Resp BP SpO2   25 1837 25 1837 25 1837 25 1840 25 183   (!) 101.1 °F (38.4 °C) 118 16 108/56 98 %      Temp src Heart Rate Source Patient Position BP Location FiO2 (%)   -- -- 25 -- --     Sitting         Physical Exam    Physical Exam   Constitutional: No  distress but ill-appearing.  Potentially toxic.  HENT:  Head: Normocephalic and atraumatic.   Oropharynx: Mucous membranes are moist.   Eyes: . No scleral icterus.  Mild conjunctival pallor.  Neck: Normal range of motion. Neck supple.   Cardiovascular: Pink warm and well perfused throughout.  Tachycardic but regular  Pulmonary/Chest: No respiratory distress.  No tachypnea or increased work of breathing appreciated.  No tachypnea.  Speaks in full sentences  Abdominal: Soft. There is no tenderness. There is no rebound and no guarding.  Benign exam  Musculoskeletal: Moves all extremities equally.    Neurological: Alert and oriented.  No acute focal deficit appreciated.  Skin: Skin is pink, warm, and dry.   Psychiatric: Mood and affect normal.   Nursing note and vitals reviewed.             LAB RESULTS  Recent Results (from the past 24 hours)   Respiratory Panel PCR w/COVID-19(SARS-CoV-2) [facility]/D[facility]NE/PAD/[facility]/P[facility] In-House, NP Swab in UTM/VTM, 2 HR TAT - Swab, Nasopharynx    Collection Time: 01/22/25  7:34 PM    Specimen: Nasopharynx; Swab   Result Value Ref Range    ADENOVIRUS, PCR Not Detected Not Detected    Coronavirus 229E Not Detected Not Detected    Coronavirus HKU1 Not Detected Not Detected    Coronavirus NL63 Not Detected Not Detected    Coronavirus OC43 Not Detected Not Detected    COVID19 Not Detected Not Detected - Ref. Range    Human Metapneumovirus Not Detected Not Detected    Human Rhinovirus/Enterovirus Not Detected Not Detected    Influenza A PCR Not Detected Not Detected    Influenza B PCR Not Detected Not Detected    Parainfluenza Virus 1 Not Detected Not Detected    Parainfluenza Virus 2 Not Detected Not Detected    Parainfluenza Virus 3 Not Detected Not Detected    Parainfluenza Virus 4 Not Detected Not Detected    RSV, PCR Not Detected Not Detected    Bordetella pertussis pcr Not Detected Not Detected    Bordetella parapertussis PCR Not Detected Not Detected    Chlamydophila pneumoniae PCR Not  Detected Not Detected    Mycoplasma pneumo by PCR Not Detected Not Detected   Comprehensive Metabolic Panel    Collection Time: 01/22/25  7:34 PM    Specimen: Arm, Left; Blood   Result Value Ref Range    Glucose 136 (H) 65 - 99 mg/dL    BUN 4 (L) 6 - 20 mg/dL    Creatinine 0.69 0.57 - 1.00 mg/dL    Sodium 135 (L) 136 - 145 mmol/L    Potassium 2.7 (L) 3.5 - 5.2 mmol/L    Chloride 98 98 - 107 mmol/L    CO2 23.0 22.0 - 29.0 mmol/L    Calcium 8.7 8.6 - 10.5 mg/dL    Total Protein 7.5 6.0 - 8.5 g/dL    Albumin 2.9 (L) 3.5 - 5.2 g/dL    ALT (SGPT) 30 1 - 33 U/L    AST (SGOT) 27 1 - 32 U/L    Alkaline Phosphatase 271 (H) 39 - 117 U/L    Total Bilirubin 0.6 0.0 - 1.2 mg/dL    Globulin 4.6 gm/dL    A/G Ratio 0.6 g/dL    BUN/Creatinine Ratio 5.8 (L) 7.0 - 25.0    Anion Gap 14.0 5.0 - 15.0 mmol/L    eGFR 109.9 >60.0 mL/min/1.73   Lactic Acid, Plasma    Collection Time: 01/22/25  7:34 PM    Specimen: Arm, Left; Blood   Result Value Ref Range    Lactate 1.1 0.5 - 2.0 mmol/L   Procalcitonin    Collection Time: 01/22/25  7:34 PM    Specimen: Arm, Left; Blood   Result Value Ref Range    Procalcitonin 0.33 (H) 0.00 - 0.25 ng/mL   CBC Auto Differential    Collection Time: 01/22/25  7:34 PM    Specimen: Arm, Left; Blood   Result Value Ref Range    WBC 0.24 (C) 3.40 - 10.80 10*3/mm3    RBC 2.96 (L) 3.77 - 5.28 10*6/mm3    Hemoglobin 8.9 (L) 12.0 - 15.9 g/dL    Hematocrit 25.9 (L) 34.0 - 46.6 %    MCV 87.5 79.0 - 97.0 fL    MCH 30.1 26.6 - 33.0 pg    MCHC 34.4 31.5 - 35.7 g/dL    RDW 14.1 12.3 - 15.4 %    RDW-SD 44.6 37.0 - 54.0 fl    MPV 9.9 6.0 - 12.0 fL    Platelets 241 140 - 450 10*3/mm3   Manual Differential    Collection Time: 01/22/25  7:34 PM    Specimen: Arm, Left; Blood   Result Value Ref Range    Neutrophil % 8.0 (L) 42.7 - 76.0 %    Lymphocyte % 56.0 (H) 19.6 - 45.3 %    Monocyte % 32.0 (H) 5.0 - 12.0 %    Myelocyte % 4.0 (H) 0.0 - 0.0 %    Neutrophils Absolute 0.02 (L) 1.70 - 7.00 10*3/mm3    Lymphocytes Absolute 0.13 (L)  0.70 - 3.10 10*3/mm3    Monocytes Absolute 0.08 (L) 0.10 - 0.90 10*3/mm3    RBC Morphology Normal Normal    WBC Morphology Normal Normal    Platelet Morphology Normal Normal   Magnesium    Collection Time: 01/22/25  7:34 PM    Specimen: Arm, Left; Blood   Result Value Ref Range    Magnesium 1.9 1.6 - 2.6 mg/dL   Urinalysis With Microscopic If Indicated (No Culture) - Urine, Clean Catch    Collection Time: 01/22/25  8:28 PM    Specimen: Urine, Clean Catch   Result Value Ref Range    Color, UA Dark Yellow (A) Yellow, Straw    Appearance, UA Cloudy (A) Clear    pH, UA 5.5 5.0 - 8.0    Specific Gravity, UA 1.024 1.005 - 1.030    Glucose, UA Negative Negative    Ketones, UA Trace (A) Negative    Bilirubin, UA Negative Negative    Blood, UA Negative Negative    Protein,  mg/dL (2+) (A) Negative    Leuk Esterase, UA Trace (A) Negative    Nitrite, UA Negative Negative    Urobilinogen, UA 1.0 E.U./dL 0.2 - 1.0 E.U./dL   Urinalysis, Microscopic Only - Urine, Clean Catch    Collection Time: 01/22/25  8:28 PM    Specimen: Urine, Clean Catch   Result Value Ref Range    RBC, UA 0-2 None Seen, 0-2 /HPF    WBC, UA 3-5 (A) None Seen, 0-2 /HPF    Bacteria, UA None Seen None Seen /HPF    Squamous Epithelial Cells, UA 7-12 (A) None Seen, 0-2 /HPF    Hyaline Casts, UA 0-2 None Seen /LPF    Mucus, UA Small/1+ (A) None Seen, Trace /HPF    Methodology Manual Light Microscopy          RADIOLOGY  XR Chest 1 View    Result Date: 1/22/2025  AP CHEST  HISTORY: Neutropenic fever  COMPARISON: 1/9/2025  FINDINGS: Lungs are clear. Heart size stable. Is a stable chest port. There is prominent bowel gas in the upper abdomen      As above    This report was finalized on 1/22/2025 7:46 PM by Dr. Shayan Reyna M.D on Workstation: [id number]         MEDICATIONS GIVEN IN ER  Medications   sodium chloride 0.9 % flush 10 mL (has no administration in time range)   cefepime 2000 mg IVPB in 100 mL NS (MBP) (2,000 mg Intravenous New Bag 1/22/25 7576)    ondansetron (ZOFRAN) injection 4 mg (has no administration in time range)   sodium chloride 0.9 % flush 10 mL (has no administration in time range)   acetaminophen (TYLENOL) tablet 650 mg (has no administration in time range)     Or   acetaminophen (TYLENOL) 160 MG/5ML oral solution 650 mg (has no administration in time range)     Or   acetaminophen (TYLENOL) suppository 650 mg (has no administration in time range)   sennosides-docusate (PERICOLACE) 8.6-50 MG per tablet 2 tablet (has no administration in time range)     And   polyethylene glycol (MIRALAX) packet 17 g (has no administration in time range)     And   bisacodyl (DULCOLAX) EC tablet 5 mg (has no administration in time range)     And   bisacodyl (DULCOLAX) suppository 10 mg (has no administration in time range)   melatonin tablet 5 mg (has no administration in time range)   ondansetron ODT (ZOFRAN-ODT) disintegrating tablet 4 mg (has no administration in time range)     Or   ondansetron (ZOFRAN) injection 4 mg (has no administration in time range)   aluminum-magnesium hydroxide-simethicone (MAALOX MAX) 400-400-40 MG/5ML suspension 15 mL (has no administration in time range)   sodium chloride 0.9 % bolus 1,000 mL (1,000 mL Intravenous New Bag 1/22/25 2027)   acetaminophen (TYLENOL) tablet 1,000 mg (1,000 mg Oral Given 1/22/25 2010)   ondansetron (ZOFRAN) injection 4 mg (4 mg Intravenous Given 1/22/25 2010)   potassium chloride (K-DUR,KLOR-CON) ER tablet 40 mEq (40 mEq Oral Given 1/22/25 2128)         ORDERS PLACED DURING THIS VISIT:  Orders Placed This Encounter   Procedures    Respiratory Panel PCR w/COVID-19(SARS-CoV-2) [facility] In-House, NP Swab in UTM/VTM, 2 HR TAT - Swab, Nasopharynx    Blood Culture - Blood,    Blood Culture - Blood,    XR Chest 1 View    Comprehensive Metabolic Panel    Urinalysis With Microscopic If Indicated (No Culture) - Urine, Clean Catch    Lactic Acid, Plasma    Procalcitonin    CBC Auto Differential     Manual Differential    Urinalysis, Microscopic Only - Urine, Clean Catch    Magnesium    CBC (No Diff)    Comprehensive Metabolic Panel    Diet: Regular/House; Fluid Consistency: Thin (IDDSI 0)    Monitor Blood Pressure    Continuous Pulse Oximetry    Vital Signs    Intake & Output    Oral Care    Place Sequential Compression Device    Maintain Sequential Compression Device    Code Status and Medical Interventions: CPR (Attempt to Resuscitate); Full Support    [facility] (on-call MD unless specified) Details    Hematology and Oncology (on-call MD unless specified)    Incentive Spirometry    Insert Peripheral IV    Inpatient Admission    CBC & Differential         OUTPATIENT MEDICATION MANAGEMENT:  Current Facility-Administered Medications Ordered in Epic   Medication Dose Route Frequency Provider Last Rate Last Admin    acetaminophen (TYLENOL) tablet 650 mg  650 mg Oral Q4H PRN Italia Sheikh APRN        Or    acetaminophen (TYLENOL) 160 MG/5ML oral solution 650 mg  650 mg Oral Q4H PRN Italia Sheikh APRN        Or    acetaminophen (TYLENOL) suppository 650 mg  650 mg Rectal Q4H PRN Italia Sheikh APRN        aluminum-magnesium hydroxide-simethicone (MAALOX MAX) 400-400-40 MG/5ML suspension 15 mL  15 mL Oral Q6H PRN Italia Sheikh APRN        sennosides-docusate (PERICOLACE) 8.6-50 MG per tablet 2 tablet  2 tablet Oral BID PRN tIalia Sheikh APRN        And    polyethylene glycol (MIRALAX) packet 17 g  17 g Oral Daily PRN Italia Sheikh APRN        And    bisacodyl (DULCOLAX) EC tablet 5 mg  5 mg Oral Daily PRN Italia Sheikh APRN        And    bisacodyl (DULCOLAX) suppository 10 mg  10 mg Rectal Daily PRN Italia Sheikh APRN        cefepime 2000 mg IVPB in 100 mL NS (MBP)  2,000 mg Intravenous Once Luis Armando Reyna MD   2,000 mg at 01/22/25 2118    heparin injection 500 Units  500 Units Intravenous PRN Nishant Witt MD   500 Units at 12/11/24 0916    heparin injection 500  Units  500 Units Intravenous PRN Nishant Witt MD   500 Units at 01/07/25 1049    melatonin tablet 5 mg  5 mg Oral Nightly PRN Italia Sheikh APRN        ondansetron (ZOFRAN) injection 4 mg  4 mg Intravenous Once Luis Armando Reyna MD        ondansetron ODT (ZOFRAN-ODT) disintegrating tablet 4 mg  4 mg Oral Q6H PRN Italia Sheikh APRN        Or    ondansetron (ZOFRAN) injection 4 mg  4 mg Intravenous Q6H PRN Italia Sheikh APRN        sodium chloride 0.9 % flush 10 mL  10 mL Intravenous PRN Nishant Witt MD   10 mL at 12/11/24 0916    sodium chloride 0.9 % flush 10 mL  10 mL Intravenous PRN Nishant Witt MD   10 mL at 01/07/25 1048    sodium chloride 0.9 % flush 10 mL  10 mL Intravenous PRN Luis Armando Reyna MD        sodium chloride 0.9 % flush 10 mL  10 mL Intravenous PRN Italia Sheikh APRN         Current Outpatient Medications Ordered in Epic   Medication Sig Dispense Refill    amoxicillin-clavulanate (AUGMENTIN) 500-125 MG per tablet Take 1 tablet by mouth 2 (Two) Times a Day for 7 days. 14 tablet 0    aspirin 81 MG EC tablet Take 1 tablet by mouth Daily.      Budesonide (ENTOCORT EC) 3 MG 24 hr capsule Take 3 capsules by mouth Every Morning. 90 capsule 1    cyclobenzaprine (FLEXERIL) 10 MG tablet Take 1 tablet by mouth 2 (Two) Times a Day As Needed for Muscle Spasms for up to 40 doses. 40 tablet 0    diphenhydrAMINE (BENADRYL) 25 mg capsule Take 1 capsule by mouth At Night As Needed for Itching.      famotidine (PEPCID) 20 MG tablet Take 1 tablet by mouth 2 (Two) Times a Day. 60 tablet 2    gabapentin (Neurontin) 300 MG capsule Take 2 capsules by mouth Every Night. 60 capsule 2    Klor-Con M20 20 MEQ CR tablet TAKE 1 TABLET BY MOUTH 2 TIMES A DAY 40 tablet 1    lidocaine-prilocaine (EMLA) 2.5-2.5 % cream Apply 1 Application topically to the appropriate area as directed As Needed for Mild Pain. Apply to port site 30 minutes before access 15 g 3    OLANZapine (zyPREXA) 5 MG tablet  Take 1 tablet by mouth for 4 doses starting night of chemotherapy. 8 tablet 1    omeprazole (priLOSEC) 20 MG capsule       ondansetron (ZOFRAN) 8 MG tablet Take 1 tablet by mouth 3 (Three) Times a Day As Needed for Nausea or Vomiting. 30 tablet 5    oxyCODONE (ROXICODONE) 5 MG immediate release tablet Take 1 tablet by mouth Every 4 (Four) Hours As Needed for Severe Pain for up to 10 doses. 10 tablet 0    prochlorperazine (COMPAZINE) 10 MG tablet Take 1 tablet by mouth Every 6 (Six) Hours As Needed for Nausea or Vomiting. 60 tablet 1    venlafaxine XR (Effexor XR) 37.5 MG 24 hr capsule Take 1 capsule by mouth Take As Directed. 1 capsule po q am for two weeks followed by increase to 2 capsules daily 60 capsule 2    zolpidem (Ambien) 5 MG tablet Take 1 tablet by mouth At Night As Needed for Sleep.           PROCEDURES  Procedures      Total critical care time: Approximately 35 minutes    Due to a high probability of clinically significant, life threatening deterioration, the patient required my highest level of preparedness to intervene emergently and I personally spent this critical care time directly and personally managing the patient. This critical care time included obtaining a history; examining the patient; vital sign monitoring; ordering and review of studies; arranging urgent treatment with development of a management plan; evaluation of patient's response to treatment; frequent reassessment; and, discussions with other providers.    This critical care time was performed to assess and manage the high probability of imminent, life-threatening deterioration that could result in multi-organ failure. It was exclusive of separately billable procedures and treating other patients and teaching time.    Please see MDM section and the rest of the note for further information on patient assessment and treatment.        PROGRESS, DATA ANALYSIS, CONSULTS, AND MEDICAL DECISION MAKING  All labs have been independently  interpreted by me.  All radiology studies have been reviewed by me. All EKG's have been independently viewed and interpreted by me.  Discussion below represents my analysis of pertinent findings related to patient's condition, differential diagnosis, treatment plan and final disposition.    Differential diagnosis:   My differential diagnosis for fever includes but is not limited:  To viral infections including COVID-19, bacterial infections, fungal infections, fever of unknown origin, auto regulatory dysfunction, hyperthermia, heat exhaustion, heat stroke, malignant neuroleptic syndrome and others.      Clinical Scores:                  ED Course as of 01/22/25 2142 Wed Jan 22, 2025 1919 CONSULT        Provider: Dr. Cook -clinical pharmacist    Discussion: Reviewed my concerns the patient has neutropenic fever.  Recommend cefepime 2 g.    Agreeable c treatment and planned disposition.         [RS]   1919 I have discussed findings and concerns with the patient and family.  Recommend IV antibiotics and admission.  Patient agreeable. [RS]   2000 RADIOLOGY      Study: Single view chest  Findings: No pneumothorax or large focal infiltrate noted  I independently viewed and interpreted these images contemporaneously with treatment.    [RS]   2007 WBC(!!): 0.24 [RS]   2008 Hemoglobin(!): 8.9 [RS]   2008 Platelets: 241 [RS]   2008 Lactate: 1.1 [RS]   2106 COVID19: Not Detected [RS]   2106 Influenza A PCR: Not Detected [RS]   2106 Influenza B PCR: Not Detected [RS]   2106 Nitrite, UA: Negative [RS]   2106 Bacteria, UA: None Seen [RS]   2106 Glucose(!): 136 [RS]   2106 BUN(!): 4 [RS]   2106 Creatinine: 0.69 [RS]   2106 Sodium(!): 135 [RS]   2106 Potassium(!): 2.7 [RS]   2106 Procalcitonin(!): 0.33 [RS]   2129 CONSULT        Provider: Dr. Quintanilla - Hem/Onc    Discussion: Reviewed patient history, ED presentation and evaluation.  Agrees with plan for admission and is agreeable to consult    Agreeable c treatment and  planned disposition.         [RS]      ED Course User Index  [RS] Luis Armando Reyna MD         Prescription drug monitoring program review:     AS OF 21:42 EST VITALS:    BP - 99/61  HR - 92  TEMP - (!) 101.1 °F (38.4 °C)  O2 SATS - 97%    COMPLEXITY OF CARE  The patient requires admission.      DIAGNOSIS  Final diagnoses:   Neutropenic fever   Immunosuppressed due to chemotherapy   Malignant neoplasm of female breast, unspecified estrogen receptor status, unspecified laterality, unspecified site of breast   Acute anemia   Leukopenia, unspecified type         DISPOSITION  ED Disposition       ED Disposition   Decision to Admit    Condition   --    Comment   Level of Care: Oncology [30]   Diagnosis: Neutropenic fever [546899]   Admitting Physician: MAYDA AGGARWAL [[id number]]   Bed Request Comments: 3 miguel   Certification: I Certify That Inpatient Hospital Services Are Medically Necessary For Greater Than 2 Midnights                    ADMISSION    Discussed treatment plan and reason for admission with pt/family and admitting physician.  Pt/family voiced understanding of the plan for admission for further testing/treatment as needed.       Please note that portions of this document were completed with a voice recognition program.    Note Disclaimer: At [facility], we believe that sharing information builds trust and better relationships. You are receiving this note because you recently visited [facility]. It is possible you will see health information before a provider has talked with you about it. This kind of information can be easy to misunderstand. To help you fully understand what it means for your health, we urge you to discuss this note with your provider.         Luis Armando Reyna MD  01/22/25 0156

## 2025-01-23 NOTE — PROGRESS NOTES
[facility] Clinical [facility]acy Services: Piperacillin-Tazobactam Consult    Pt Name: Suzanne Lin   : 1980    Indication:  Neutropenic fever    Relevant clinical data and objective history reviewed:    Past Medical History:   Diagnosis Date    Anxiety     Breast cancer     Right    Crohn's disease     DVT (deep vein thrombosis) in pregnancy     PFO (patent foramen ovale)     Stroke     after the birth of her child at age 34    Tattoos      Creatinine   Date Value Ref Range Status   2025 0.58 0.57 - 1.00 mg/dL Final   2025 0.69 0.57 - 1.00 mg/dL Final   2025 0.52 (L) 0.57 - 1.00 mg/dL Final     BUN   Date Value Ref Range Status   2025 3 (L) 6 - 20 mg/dL Final     Estimated Creatinine Clearance: 132.9 mL/min (by C-G formula based on SCr of 0.58 mg/dL).    Lab Results   Component Value Date    WBC 0.35 (C) 2025     Temp Readings from Last 3 Encounters:   25 (!) 102.6 °F (39.2 °C) (Oral)   25 98.4 °F (36.9 °C) (Oral)   25 97.3 °F (36.3 °C) (Infrared)      Assessment/Plan  Estimated CrCl >20 mL/min at this time; BMI 24.96 kg/m2  Will start piperacillin-tazobactam 3.375 g IV every 8 hours     Pharmacy will continue to follow daily while on piperacillin-tazobactam and adjust as needed. Thank you for this consult.    Emanuel Molina, [facility]  Clinical Pharmacist

## 2025-01-24 LAB
ABO GROUP BLD: NORMAL
ALBUMIN SERPL-MCNC: 2.4 G/DL (ref 3.5–5.2)
ALBUMIN/GLOB SERPL: 0.8 G/DL
ALP SERPL-CCNC: 178 U/L (ref 39–117)
ALT SERPL W P-5'-P-CCNC: 19 U/L (ref 1–33)
ANION GAP SERPL CALCULATED.3IONS-SCNC: 11.3 MMOL/L (ref 5–15)
AST SERPL-CCNC: 12 U/L (ref 1–32)
BILIRUB SERPL-MCNC: 0.3 MG/DL (ref 0–1.2)
BLD GP AB SCN SERPL QL: NEGATIVE
BUN SERPL-MCNC: 4 MG/DL (ref 6–20)
BUN/CREAT SERPL: 7 (ref 7–25)
CALCIUM SPEC-SCNC: 8.3 MG/DL (ref 8.6–10.5)
CHLORIDE SERPL-SCNC: 103 MMOL/L (ref 98–107)
CO2 SERPL-SCNC: 22.7 MMOL/L (ref 22–29)
CREAT SERPL-MCNC: 0.57 MG/DL (ref 0.57–1)
DEPRECATED RDW RBC AUTO: 46 FL (ref 37–54)
EGFRCR SERPLBLD CKD-EPI 2021: 115.1 ML/MIN/1.73
ERYTHROCYTE [DISTWIDTH] IN BLOOD BY AUTOMATED COUNT: 14.2 % (ref 12.3–15.4)
GLOBULIN UR ELPH-MCNC: 3.2 GM/DL
GLUCOSE SERPL-MCNC: 87 MG/DL (ref 65–99)
HCT VFR BLD AUTO: 23.6 % (ref 34–46.6)
HGB BLD-MCNC: 7.6 G/DL (ref 12–15.9)
MCH RBC QN AUTO: 28.7 PG (ref 26.6–33)
MCHC RBC AUTO-ENTMCNC: 32.2 G/DL (ref 31.5–35.7)
MCV RBC AUTO: 89.1 FL (ref 79–97)
PLATELET # BLD AUTO: 182 10*3/MM3 (ref 140–450)
PMV BLD AUTO: 10.3 FL (ref 6–12)
POTASSIUM SERPL-SCNC: 2.9 MMOL/L (ref 3.5–5.2)
PROT SERPL-MCNC: 5.6 G/DL (ref 6–8.5)
RBC # BLD AUTO: 2.65 10*6/MM3 (ref 3.77–5.28)
RH BLD: POSITIVE
SODIUM SERPL-SCNC: 137 MMOL/L (ref 136–145)
T&S EXPIRATION DATE: NORMAL
WBC NRBC COR # BLD AUTO: 1.14 10*3/MM3 (ref 3.4–10.8)

## 2025-01-24 PROCEDURE — 99232 SBSQ HOSP IP/OBS MODERATE 35: CPT | Performed by: INTERNAL MEDICINE

## 2025-01-24 PROCEDURE — 86901 BLOOD TYPING SEROLOGIC RH(D): CPT

## 2025-01-24 PROCEDURE — 36430 TRANSFUSION BLD/BLD COMPNT: CPT

## 2025-01-24 PROCEDURE — 80053 COMPREHEN METABOLIC PANEL: CPT | Performed by: INTERNAL MEDICINE

## 2025-01-24 PROCEDURE — 85025 COMPLETE CBC W/AUTO DIFF WBC: CPT | Performed by: INTERNAL MEDICINE

## 2025-01-24 PROCEDURE — 86900 BLOOD TYPING SEROLOGIC ABO: CPT | Performed by: INTERNAL MEDICINE

## 2025-01-24 PROCEDURE — 63710000001 ONDANSETRON ODT 4 MG TABLET DISPERSIBLE: Performed by: NURSE PRACTITIONER

## 2025-01-24 PROCEDURE — 86901 BLOOD TYPING SEROLOGIC RH(D): CPT | Performed by: INTERNAL MEDICINE

## 2025-01-24 PROCEDURE — 86900 BLOOD TYPING SEROLOGIC ABO: CPT

## 2025-01-24 PROCEDURE — 86850 RBC ANTIBODY SCREEN: CPT | Performed by: INTERNAL MEDICINE

## 2025-01-24 PROCEDURE — 25010000002 ONDANSETRON PER 1 MG: Performed by: NURSE PRACTITIONER

## 2025-01-24 PROCEDURE — 25010000002 FILGRASTIM 300 MCG/0.5ML SOLUTION PREFILLED SYRINGE: Performed by: INTERNAL MEDICINE

## 2025-01-24 PROCEDURE — 25010000002 CEFEPIME PER 500 MG: Performed by: STUDENT IN AN ORGANIZED HEALTH CARE EDUCATION/TRAINING PROGRAM

## 2025-01-24 PROCEDURE — P9016 RBC LEUKOCYTES REDUCED: HCPCS

## 2025-01-24 PROCEDURE — 99232 SBSQ HOSP IP/OBS MODERATE 35: CPT | Performed by: STUDENT IN AN ORGANIZED HEALTH CARE EDUCATION/TRAINING PROGRAM

## 2025-01-24 PROCEDURE — 86923 COMPATIBILITY TEST ELECTRIC: CPT

## 2025-01-24 RX ORDER — POTASSIUM CHLORIDE 750 MG/1
40 TABLET, FILM COATED, EXTENDED RELEASE ORAL EVERY 4 HOURS
Status: COMPLETED | OUTPATIENT
Start: 2025-01-24 | End: 2025-01-24

## 2025-01-24 RX ORDER — POTASSIUM CHLORIDE 7.45 MG/ML
10 INJECTION INTRAVENOUS
Status: DISCONTINUED | OUTPATIENT
Start: 2025-01-24 | End: 2025-01-24

## 2025-01-24 RX ADMIN — ACETAMINOPHEN 650 MG: 325 TABLET, FILM COATED ORAL at 13:45

## 2025-01-24 RX ADMIN — ACETAMINOPHEN 650 MG: 325 TABLET, FILM COATED ORAL at 21:28

## 2025-01-24 RX ADMIN — OXYCODONE HYDROCHLORIDE 5 MG: 5 TABLET ORAL at 03:01

## 2025-01-24 RX ADMIN — CEFEPIME 2000 MG: 2 INJECTION, POWDER, FOR SOLUTION INTRAVENOUS at 06:04

## 2025-01-24 RX ADMIN — ACETAMINOPHEN 650 MG: 325 TABLET, FILM COATED ORAL at 01:57

## 2025-01-24 RX ADMIN — ONDANSETRON 4 MG: 4 TABLET, ORALLY DISINTEGRATING ORAL at 12:34

## 2025-01-24 RX ADMIN — ONDANSETRON 4 MG: 2 INJECTION INTRAMUSCULAR; INTRAVENOUS at 18:07

## 2025-01-24 RX ADMIN — CEFEPIME 2000 MG: 2 INJECTION, POWDER, FOR SOLUTION INTRAVENOUS at 21:27

## 2025-01-24 RX ADMIN — FILGRASTIM 300 MCG: 300 INJECTION, SOLUTION INTRAVENOUS; SUBCUTANEOUS at 18:07

## 2025-01-24 RX ADMIN — ONDANSETRON 4 MG: 2 INJECTION INTRAMUSCULAR; INTRAVENOUS at 03:01

## 2025-01-24 RX ADMIN — POTASSIUM CHLORIDE 40 MEQ: 750 TABLET, EXTENDED RELEASE ORAL at 08:35

## 2025-01-24 RX ADMIN — GABAPENTIN 600 MG: 300 CAPSULE ORAL at 21:28

## 2025-01-24 RX ADMIN — CEFEPIME 2000 MG: 2 INJECTION, POWDER, FOR SOLUTION INTRAVENOUS at 13:50

## 2025-01-24 RX ADMIN — POTASSIUM CHLORIDE 40 MEQ: 750 TABLET, EXTENDED RELEASE ORAL at 12:32

## 2025-01-24 RX ADMIN — POTASSIUM CHLORIDE 40 MEQ: 750 TABLET, EXTENDED RELEASE ORAL at 16:55

## 2025-01-24 RX ADMIN — ASPIRIN 81 MG: 81 TABLET, COATED ORAL at 08:35

## 2025-01-24 NOTE — PROGRESS NOTES
Name: Suzanne Lin ADMIT: 2025   : 1980  PCP: Gia Rodriguez, APRN    MRN: [id number] LOS: 2 days   AGE/SEX: 44 y.o. female  ROOM: [facility]     Subjective   Subjective   Patient is seen at bedside, she feels better today.  No acute issues have been reported from overnight to me.       Objective   Objective   Vital Signs  Temp:  [97 °F (36.1 °C)-101.2 °F (38.4 °C)] 97.8 °F (36.6 °C)  Heart Rate:  [74-98] 81  Resp:  [16-18] 18  BP: ()/(46-70) 101/70  SpO2:  [95 %-99 %] 99 %  on   ;   Device (Oxygen Therapy): room air  Body mass index is 24.96 kg/m².  Physical Exam        General, awake and alert.  Head and ENT, normocephalic and atraumatic.  Lungs, symmetric expansion, equal air entry bilaterally.  Heart, regular rate and rhythm.  Abdomen, soft and nontender.  Extremities, no clubbing or cyanosis.  Neuro, no focal deficits.  Skin: Warm and no rash.  Psych, normal mood and affect.  Musculoskeletal, joint examination is grossly normal.      Copied text material from yesterday's note has been reviewed for appropriate changes and remains accurate as of 25.    Results Review     I reviewed the patient's new clinical results.  Results from last 7 days   Lab Units 25  0516 25  0552 25  0944   WBC 10*3/mm3 1.14* 0.35* 0.24* 0.17*   HEMOGLOBIN g/dL 7.6* 8.5* 8.9* 10.2*   PLATELETS 10*3/mm3 182 189 241 280     Results from last 7 days   Lab Units 25  0516 25  1621 25  0552 25  0929   SODIUM mmol/L 137  --  138 135* 135*   POTASSIUM mmol/L 2.9* 2.9* 2.6* 2.7* 3.2*   CHLORIDE mmol/L 103  --  106 98 101   CO2 mmol/L 22.7  --  22.6 23.0 22.9   BUN mg/dL 4*  --  3* 4* 9   CREATININE mg/dL 0.57  --  0.58 0.69 0.52*   GLUCOSE mg/dL 87  --  117* 136* 120*   EGFR mL/min/1.73 115.1  --  114.6 109.9 117.7     Results from last 7 days   Lab Units 25  0516 25  0552 25  1934   ALBUMIN g/dL 2.4* 2.2* 2.9*   BILIRUBIN mg/dL  "0.3 0.6 0.6   ALK PHOS U/L 178* 208* 271*   AST (SGOT) U/L 12 12 27   ALT (SGPT) U/L 19 19 30     Results from last 7 days   Lab Units 01/24/25  0516 01/23/25  0552 01/22/25 1934 01/21/25  0929   CALCIUM mg/dL 8.3* 8.0* 8.7 8.9   ALBUMIN g/dL 2.4* 2.2* 2.9*  --    MAGNESIUM mg/dL  --   --  1.9 2.2     Results from last 7 days   Lab Units 01/22/25 1934   PROCALCITONIN ng/mL 0.33*   LACTATE mmol/L 1.1     No results found for: \"HGBA1C\", \"POCGLU\"    CT Abdomen Pelvis With Contrast    Result Date: 1/23/2025  1. New periportal edema within the liver. No biliary ductal dilation 2. Stable mild nonspecific fluid around the gallbladder fundus 3. Fluid throughout the colon. Stable wall thickening of the hepatic flexure portion of the colon. Stable wall thickening and enhancement of the terminal ileum  Radiation dose reduction techniques were utilized, including automated exposure control and exposure modulation based on body size.   This report was finalized on 1/23/2025 7:05 PM by Dr. Shayan Reyna M.D on Workstation: [id number]      XR Chest 1 View    Result Date: 1/22/2025  As above    This report was finalized on 1/22/2025 7:46 PM by Dr. Shayan Reyna M.D on Workstation: [id number]       I have personally reviewed all medications:  Scheduled Medications  aspirin, 81 mg, Oral, Daily  cefepime, 2,000 mg, Intravenous, Q8H  filgrastim (NEUPOGEN) injection, 300 mcg, Subcutaneous, Q PM  gabapentin, 600 mg, Oral, Nightly    Infusions   Diet  Diet: Regular/House; Fluid Consistency: Thin (IDDSI 0)    I have personally reviewed:  [x]  Laboratory   [x]  Microbiology   [x]  Radiology   [x]  EKG/Telemetry  [x]  Cardiology/Vascular   []  Pathology    []  Records       Assessment/Plan     Active Hospital Problems    Diagnosis  POA    **Neutropenic fever [D70.9, R50.81]  Yes    Crohn's disease without complication [K50.90]  Yes    LITA (iron deficiency anemia) [D50.9]  Yes    Breast cancer, stage 3, right [C50.911]  Yes    "   Resolved Hospital Problems   No resolved problems to display.       44 y.o. female admitted with Neutropenic fever.    Assessment and plan  1.  Neutropenic fever, high-grade fever noted, infectious disease service on board, continue IV cefepime.  Antibiotic management per ID recommendations.     2.  History of Crohn's disease, currently uncomplicated course.  If she worsens, she may need GI evaluation.     3.  Stage III right-sided breast cancer, status post recent chemotherapy, hematology/oncology has been consulted.  We will follow management recommendations.     4.  Anemia, monitor hemoglobin closely, we will continue to recheck labs.     5.  CODE STATUS is full code.  Further plans based on hospital course.     Expected Discharge Date: 1/27/2025; Expected Discharge Time:       Jim Mane MD  [facility]  01/24/25  18:21 EST

## 2025-01-24 NOTE — PROGRESS NOTES
REASON FOR CONSULTATION:  breast cancer undergoing therapy, admission with neutropenic fever                               History of Present Illness   Record reviewed, the patient initiated adjuvant chemotherapy with AC on 12/31/2024.  She had fever after the first cycle with borderline neutropenia but no obvious bacterial infections.  She was treated with Levaquin.  Fevers resolved over the weekend.  She denies uncontrolled nausea vomiting or diarrhea.  She denies stomatitis.  Chest wounds have healed.  She was last seen 1/14/2024 with overall plans to proceed with adjuvant therapy including dose dense AC with Neulasta support x 4 followed by weekly Taxol.  Additional discussions included postmastectomy radiation therapy, hormonal therapy with ovarian suppression and tamoxifen versus oophorectomy and AI therapy and review of nonspecific lung nodules after completing chemotherapy.    She was treated with her second cycle of dose dense AC 1/14/2025 presented to the ER 1/22/2025 with fever, generalized malaise and nausea.  Studies included negative respiratory panel,, CBC with progressive neutropenia.  She had been assessed 1/21 with ANC of 0.01, placed on Augmentin.    Unfortunately symptoms worsened and she presented to the ER 1/22/2025.  H&H 8.9 25.9 with white count of 240, platelet count of 240,000, BUN/creatinine of 4 and 0.69, alk phos 271, lactate of 1.1, procalcitonin 0.33 UA with trace ketones, 2+ protein and trace leukocyte esterase, 3-5 WBCs and 7-12 epithelial cells, blood cultures negative,.  Patient admitted placed on cefepime.    Interval history:  1/23/2025  T102.6, pulse 101, respirate 16, BP 90/58  Patient describes continued fever, abdominal cramping and diarrhea  H&H 8.5 and 25.1 with white count of 350, platelet count of 189,000, BUN/creatinine of 3 and 0.58, potassium 2.6    1/24/2025  T97.9, pulse 74, respirations 18, BP 92/46  Patient far more comfortable today, quite fatigued  H&H 7.6  and 23.9, white count is 1140, platelet count 1 82,000, being creatinine of 4 and 0.57, albumin 2.4, total protein 5.6, alk phos 178, AST 12, ALT 19, total bilirubin 0.3      ONCOLOGY HISTORY:  This is a 44-year-old woman referred from general surgery to discuss adjuvant treatment for recently surgically treated breast cancer.  The patient presented with a large palpable mass in the inner quadrant of the right breast.  The mass had been enlarging for couple years but the patient did not have insurance to get assessed.  The breast imaging is not available to me but she had a CT of the chest abdomen pelvis on 9/16/2024 showed a large mass in the right breast measuring up to 7.7 cm with abnormal left axillary lymphadenopathy.  There were tiny subpleural nodules in the lateral aspect of the left lower lobe likely granulomatous mildly conspicuous lymph node in the central mesentery 1.1 cm.  A PET scan was performed 9/30/2024 showing a hypermetabolic mass in the right breast with thickening throughout the right breast and pathologic hypermetabolic right axillary level 1 and 2 lymph nodes, no hypermetabolic internal mammary or supraclavicular lymph nodes.  The small pulmonary nodules were too small to characterize and mesenteric lymph nodes without hypermetabolic activity.  A biopsy of the right breast mass showed invasive ductal adenocarcinoma.    She was taken to the operating room on 10/9/2024 and underwent a right modified radical mastectomy and axillary dissection, prophylactic left mastectomy.  Pathology from the right breast showed invasive ductal carcinoma Bruce grade 3 (3+3+3) measuring 17 cm with extensive lymphovascular invasion and dermal lymphatic invasion.  The tumor extended to the dermis but did not ulcerate the skin.  Tumor extended to skeletal muscle and was present focally at an anterior margin, 0.05 mm of the posterior margin, 10 mm from the lateral margin, 20 mm from the medial margin, 28 mm from  the superior margin and 40 mm from inferior margin.  There was ductal carcinoma in situ present within 2.5 mm of the posterior margin.  There were 13 lymph nodes in the axillary dissection 8 oncology plan/recommendations: of which had macrometastases, 1 micrometastases and 1 lymph node with isolated tumor cells.  The left breast had an intraductal papilloma otherwise negative.  The tumor was positive for estrogen receptor 99% of cells, progesterone receptor 50% of cells, HER2 0 and Ki-67 65%.    The patient has medical comorbidities of Crohn's disease.    Past Medical History:   Diagnosis Date    Anxiety     Breast cancer     Right    Crohn's disease     DVT (deep vein thrombosis) in pregnancy     PFO (patent foramen ovale)     Stroke     after the birth of her child at age 34    Tattoos         Past Surgical History:   Procedure Laterality Date     SECTION      COLON RESECTION      COLONOSCOPY      MASTECTOMY Bilateral 10/9/2024    Procedure: Modified radical mastectomy with axillary dissection of the right breast and simple mastectomy of the left breast;  Surgeon: Rebekah Garcia DO;  Location: [facility] OR;  Service: General;  Laterality: Bilateral;    SKIN CANCER EXCISION      VENOUS ACCESS DEVICE (PORT) INSERTION N/A 12/10/2024    Procedure: INSERTION VENOUS ACCESS DEVICE;  Surgeon: Rebekah Garcia DO;  Location: [facility] OR;  Service: General;  Laterality: N/A;        Current Facility-Administered Medications on File Prior to Encounter   Medication Dose Route Frequency Provider Last Rate Last Admin    heparin injection 500 Units  500 Units Intravenous PRN Nishant Witt MD   500 Units at 24 0916    heparin injection 500 Units  500 Units Intravenous PRN Nishant Witt MD   500 Units at 25 1049    sodium chloride 0.9 % flush 10 mL  10 mL Intravenous PRN Nishant Witt MD   10 mL at 24 0916    sodium chloride 0.9 % flush 10 mL  10 mL Intravenous PRN Nishant Witt MD    10 mL at 25 1048     Current Outpatient Medications on File Prior to Encounter   Medication Sig Dispense Refill    amoxicillin-clavulanate (AUGMENTIN) 500-125 MG per tablet Take 1 tablet by mouth 2 (Two) Times a Day for 7 days. 14 tablet 0    aspirin 81 MG EC tablet Take 1 tablet by mouth Daily.      gabapentin (Neurontin) 300 MG capsule Take 2 capsules by mouth Every Night. 60 capsule 2    Klor-Con M20 20 MEQ CR tablet TAKE 1 TABLET BY MOUTH 2 TIMES A DAY 40 tablet 1    lidocaine-prilocaine (EMLA) 2.5-2.5 % cream Apply 1 Application topically to the appropriate area as directed As Needed for Mild Pain. Apply to port site 30 minutes before access 15 g 3    omeprazole (priLOSEC) 20 MG capsule       ondansetron (ZOFRAN) 8 MG tablet Take 1 tablet by mouth 3 (Three) Times a Day As Needed for Nausea or Vomiting. 30 tablet 5    prochlorperazine (COMPAZINE) 10 MG tablet Take 1 tablet by mouth Every 6 (Six) Hours As Needed for Nausea or Vomiting. 60 tablet 1    venlafaxine XR (Effexor XR) 37.5 MG 24 hr capsule Take 1 capsule by mouth Take As Directed. 1 capsule po q am for two weeks followed by increase to 2 capsules daily (Patient taking differently: Take 1 capsule by mouth Take As Directed. 1 capsule po q am for two weeks followed by increase to 2 capsules daily  Pt has not started yet) 60 capsule 2    OLANZapine (zyPREXA) 5 MG tablet Take 1 tablet by mouth for 4 doses starting night of chemotherapy. 8 tablet 1        ALLERGIES:  No Known Allergies     Social History     Socioeconomic History    Marital status: Single    Number of children: 1   Tobacco Use    Smoking status: Former     Current packs/day: 0.00     Average packs/day: 1 pack/day for 20.0 years (20.0 ttl pk-yrs)     Types: Cigarettes     Start date:      Quit date: 2019     Years since quittin.0    Smokeless tobacco: Current    Tobacco comments:     Nicotine pouches   Vaping Use    Vaping status: Never Used   Substance and Sexual Activity     Alcohol use: Yes     Comment: OCC    Drug use: Never    Sexual activity: Defer        Family History   Problem Relation Age of Onset    Diabetes Mother     Heart disease Father     Diabetes Paternal Grandmother     Hypertension Other     Malig Hyperthermia Neg Hx         Review of Systems   Constitutional:  Positive for fever.   HENT: Negative.     Respiratory: Negative.     Cardiovascular: Negative.    Gastrointestinal:  Positive for abdominal pain and diarrhea. Negative for nausea.   Genitourinary: Negative.    Musculoskeletal: Negative.    Skin:  Negative for wound.   Neurological: Negative.    Hematological: Negative.           Objective     Vitals:    01/23/25 2014 01/24/25 0006 01/24/25 0155 01/24/25 0808   BP: 93/61 (!) 81/52 95/57 92/46   BP Location: Left arm Left arm Left leg Left leg   Patient Position: Lying Lying Lying Lying   Pulse: 78 83 95 74   Resp: 16 18 18 18   Temp: 97 °F (36.1 °C) 98.1 °F (36.7 °C) (!) 101.2 °F (38.4 °C)  Comment: RN is aware 97.9 °F (36.6 °C)   TempSrc: Oral Oral Oral Oral   SpO2: 99% 95% 98% 98%   Weight:       Height:                 1/14/2025     8:31 AM   Current Status   ECOG score 0       Physical Exam    CONSTITUTIONAL: pleasant well-developed adult woman  HEENT: no icterus, no thrush, moist membranes/  LYMPH: no cervical or supraclavicular lad  CV: RRR, S1S2, no murmur  RESP/chest: cta bilat, no wheezing, no rales, port present left chest wall  Breast: Status post bilateral mastectomies, wound mildly erythematous, healed without discharge  GI: soft, generally tender, bowel sounds positive  MUSC: no mele  NEURO: alert and oriented x3, normal strength  PSYCH: Frustrated, alert, oriented      RECENT LABS:  Hematology WBC   Date Value Ref Range Status   01/24/2025 1.14 (C) 3.40 - 10.80 10*3/mm3 Final     RBC   Date Value Ref Range Status   01/24/2025 2.65 (L) 3.77 - 5.28 10*6/mm3 Final     Hemoglobin   Date Value Ref Range Status   01/24/2025 7.6 (L) 12.0 - 15.9 g/dL Final      Hematocrit   Date Value Ref Range Status   01/24/2025 23.6 (L) 34.0 - 46.6 % Final     Platelets   Date Value Ref Range Status   01/24/2025 182 140 - 450 10*3/mm3 Final        Lab Results   Component Value Date    GLUCOSE 87 01/24/2025    BUN 4 (L) 01/24/2025    CREATININE 0.57 01/24/2025     01/24/2025    K 2.9 (L) 01/24/2025     01/24/2025    CALCIUM 8.3 (L) 01/24/2025    PROTEINTOT 5.6 (L) 01/24/2025    ALBUMIN 2.4 (L) 01/24/2025    ALT 19 01/24/2025    AST 12 01/24/2025    ALKPHOS 178 (H) 01/24/2025    BILITOT 0.3 01/24/2025    GLOB 3.2 01/24/2025    AGRATIO 0.8 01/24/2025    BCR 7.0 01/24/2025    ANIONGAP 11.3 01/24/2025    EGFR 115.1 01/24/2025     PET scan 9/30/2024:  FINDINGS:     Head/neck: No suspicious uptake.     Chest: An 8 x 5.8 cm mass in the lower inner right breast with max SUV  of 18 (series 4/image 156). Mass comes in close proximity to the  sternum. Hypermetabolic parenchymal thickening throughout the right  breast with max SUV of 9 (series 4/image 140). Diffuse right breast skin  thickening.     Hypermetabolic right axillary level I and II lymph nodes. Reference 1.2  cm level I lymph node with max SUV of 11.9 (series 4/image 103).  Additional reference subcentimeter level II lymph node with max SUV of  3.3 (series 4/image 93). Separate brown fat uptake in the bilateral  axilla and posterior neck base.     No enlarged or hypermetabolic internal mammary or supraclavicular lymph  nodes.     Few subcentimeter pulmonary nodules are too small for accurate PET  characterization without overt hypermetabolism and unchanged from recent  CT. Reference left lower lobe (series 4/image 167) and right upper lobe  (series 4/image 131).     Abdomen/pelvis: Mesenteric lymph nodes are mostly subcentimeter without  associated hypermetabolism.     Sigmoid colectomy. Tubular hypermetabolism throughout the otherwise  normal-appearing remaining colon may be medication related. Moderate  proximal colonic  stool burden.     Bones: No focal osseous hypermetabolism with special attention to the  sternum.           IMPRESSION:  1. Hypermetabolic 8 cm right breast mass with hypermetabolic parenchymal  thickening throughout the right breast, pathologically proven invasive  ductal carcinoma. Mass comes in close proximity to the sternum. No focal  osseous hypermetabolism with special attention to the sternum.  2. Hypermetabolic right axillary level I and II lymph nodes suggesting  alana metastatic disease. No enlarged or hypermetabolic internal mammary  or supraclavicular lymph nodes. Separate brown fat uptake in the  bilateral axilla and posterior neck base.  3. Few subcentimeter pulmonary nodules are too small for accurate PET  characterization and will need follow-up via chest CT.  4. Mesenteric lymph nodes are mostly subcentimeter without associated  hypermetabolism.       Assessment & Plan   *pT3N2a M0 grade 3 invasive ductal adenocarcinoma right breast, ER 99% positive, NH 50% positive, HER2 0, Ki-67 65%, tumor present at anterior margin  She presented with a large palpable mass in the inner quadrant of the right breast; mass had been enlarging for couple years but the patient did not have insurance to get assessed  CT of the chest abdomen pelvis on 9/16/2024 showed a large mass in the right breast measuring up to 7.7 cm with abnormal left axillary lymphadenopathy.  There were tiny subpleural nodules in the lateral aspect of the left lower lobe likely granulomatous mildly conspicuous lymph node in the central mesentery 1.1 cm.    PET scan 9/30/2024 - hypermetabolic mass in the right breast with thickening throughout the right breast and pathologic hypermetabolic right axillary level 1 and 2 lymph nodes, no hypermetabolic internal mammary or supraclavicular lymph nodes.  The small pulmonary nodules were too small to characterize and mesenteric lymph nodes without hypermetabolic activity.    biopsy of the right breast  mass showed invasive ductal adenocarcinoma.  operating room on 10/9/2024 and underwent a right modified radical mastectomy and axillary dissection, prophylactic left mastectomy.  Pathology from the right breast showed invasive ductal carcinoma Bruce grade 3 (3+3+3) measuring 17 cm with extensive lymphovascular invasion and dermal lymphatic invasion.  The tumor extended to the dermis but did not ulcerate the skin.  Tumor extended to skeletal muscle and was present focally at an anterior margin, 0.05 mm of the posterior margin, 10 mm from the lateral margin, 20 mm from the medial margin, 28 mm from the superior margin and 40 mm from inferior margin.  There was ductal carcinoma in situ present within 2.5 mm of the posterior margin.  There were 13 lymph nodes in the axillary dissection 8 of which had macrometastases, 1 micrometastases and 1 lymph node with isolated tumor cells.  The left breast had an intraductal papilloma otherwise negative.  The tumor was positive for estrogen receptor 99% of cells, progesterone receptor 50% of cells, HER2 0 and Ki-67 65%.  Initiated adjuvant chemotherapy with Adriamycin/Cytoxan 12/31/2024 (adjuvant chemotherapy delayed because of wound healing issues) nonneutropenic fever    *Admitted 1/22/2025 with neutropenic fever  G-CSF continued from 1/23/2025  Continue cefepime      *Marrow suppression secondary to chemotherapy   resolved with normal CBC-6850, 1/14/2025  Transfusion planned 1/24/2025      * nausea/diarrhea, chemo induced   responding to Imodium/Zofran/Compazine    *elevated LFT-likely s/e chemo     *[employer] 19 gene panel-VUS Bard 1    *medical comorbidities of Crohn's disease.    Oncology plan/recommendations:  Again reviewed plan  for adjuvant chemotherapy to reduce risk of recurrence/help eradicate potential micrometastatic disease with dose dense AC with Neulasta support x 4 cycles followed by weekly Taxol x 12 doses-proceed with cycle 2 AC today pending stable  CMP  Patient admitted with neutropenic fever, agree with cefepime, initiation of additional Neupogen, discussion with primary oncologist about subsequent treatment plan, transfusion as needed  After chemotherapy she will need postmastectomy radiation given the size and alana involvement  She will need hormonal therapy with ovarian suppression/tamoxifen versus oophorectomy and AI therapy/ck4/6 inhibitor  Nonspecific lung nodules will need reassessment after completing chemotherapy radiographically

## 2025-01-24 NOTE — PROGRESS NOTES
"Nutrition Services    Patient Name:  Suzanne Lin  YOB: 1980  MRN: [id number]  Admit Date:  2025    Assessment Date:  25    Summary: 45 yo female with BrCa adm with Neutropenic fever    Patient meets ASPEN/AND criteria for nutrition diagnosis of Severe malnutrition of Acute illness based on: 7% weight loss over past month d/t severely limited po intake.        CLINICAL NUTRITION ASSESSMENT      Reason for Assessment MST score 2+     Diagnosis/Problem   Weight loss   Medical/Surgical History Past Medical History:   Diagnosis Date    Anxiety     Breast cancer     Right    Crohn's disease     DVT (deep vein thrombosis) in pregnancy     PFO (patent foramen ovale)     Stroke     after the birth of her child at age 34    Tattoos        Past Surgical History:   Procedure Laterality Date     SECTION      COLON RESECTION      COLONOSCOPY      MASTECTOMY Bilateral 10/9/2024    Procedure: Modified radical mastectomy with axillary dissection of the right breast and simple mastectomy of the left breast;  Surgeon: Rebekah Garcia DO;  Location: [facility] OR;  Service: General;  Laterality: Bilateral;    SKIN CANCER EXCISION      VENOUS ACCESS DEVICE (PORT) INSERTION N/A 12/10/2024    Procedure: INSERTION VENOUS ACCESS DEVICE;  Surgeon: Rebekah Garcia DO;  Location: [facility] OR;  Service: General;  Laterality: N/A;        Anthropometrics        Current Height  Current Weight  BMI kg/m2 Height: 165.1 cm (65\")  Weight: 68 kg (150 lb) (25)  Body mass index is 24.96 kg/m².   Adjusted BMI (if applicable)    BMI Category Normal/Healthy (18.4 - 24.9)   Ideal Body Weight (IBW) 125# +/- 10%   Usual Body Weight (UBW) 155-160#   Weight Trend Loss, Amount/Timeframe: Loss of 7% in past month   Weight History Wt Readings from Last 30 Encounters:   25 022 68 kg (150 lb)   25 68 kg (150 lb)   25 08 68.4 kg (150 lb 12.8 oz)   25 221 71.2 kg (157 lb)   25 " 0953 71.2 kg (157 lb)   12/31/24 0905 72.1 kg (159 lb)   12/26/24 1034 73 kg (161 lb)   12/17/24 1343 72.6 kg (160 lb)   12/17/24 1009 72.9 kg (160 lb 12.8 oz)   12/11/24 1328 70.8 kg (156 lb)   12/11/24 0824 70.8 kg (156 lb)   12/10/24 1015 71.1 kg (156 lb 12.8 oz)   12/09/24 1403 71.7 kg (158 lb)   12/09/24 0911 71.7 kg (158 lb)   11/14/24 0949 67.8 kg (149 lb 6.4 oz)   11/13/24 0927 67.4 kg (148 lb 9.6 oz)   11/04/24 0907 67.1 kg (148 lb)   10/31/24 0951 68.1 kg (150 lb 3.2 oz)   10/09/24 1334 67.1 kg (147 lb 14.9 oz)   10/09/24 0700 70.7 kg (155 lb 12.8 oz)   10/07/24 1406 69.4 kg (153 lb)   10/02/24 1532 70.8 kg (156 lb)   10/01/24 1030 69.9 kg (154 lb)   09/24/24 1420 70.8 kg (156 lb 1.4 oz)   09/18/24 1506 70.8 kg (156 lb)   09/11/24 1531 72.6 kg (160 lb)      --  Labs       Pertinent Labs    Results from last 7 days   Lab Units 01/24/25  0516 01/23/25  1621 01/23/25  0552 01/22/25  1934   SODIUM mmol/L 137  --  138 135*   POTASSIUM mmol/L 2.9* 2.9* 2.6* 2.7*   CHLORIDE mmol/L 103  --  106 98   CO2 mmol/L 22.7  --  22.6 23.0   BUN mg/dL 4*  --  3* 4*   CREATININE mg/dL 0.57  --  0.58 0.69   CALCIUM mg/dL 8.3*  --  8.0* 8.7   BILIRUBIN mg/dL 0.3  --  0.6 0.6   ALK PHOS U/L 178*  --  208* 271*   ALT (SGPT) U/L 19  --  19 30   AST (SGOT) U/L 12  --  12 27   GLUCOSE mg/dL 87  --  117* 136*     Results from last 7 days   Lab Units 01/24/25  0516 01/23/25  0552 01/22/25 1934 01/21/25  0944 01/21/25  0929   MAGNESIUM mg/dL  --   --  1.9  --  2.2   HEMOGLOBIN g/dL 7.6*   < > 8.9*   < >  --    HEMATOCRIT % 23.6*   < > 25.9*   < >  --    WBC 10*3/mm3 1.14*   < > 0.24*   < >  --    ALBUMIN g/dL 2.4*   < > 2.9*  --   --     < > = values in this interval not displayed.     Results from last 7 days   Lab Units 01/24/25  0516 01/23/25  0552 01/22/25 1934 01/21/25  0944   PLATELETS 10*3/mm3 182 189 241 280     COVID19   Date Value Ref Range Status   01/22/2025 Not Detected Not Detected - Ref. Range Final     No results  "found for: \"HGBA1C\"       Medications           Scheduled Medications aspirin, 81 mg, Oral, Daily  cefepime, 2,000 mg, Intravenous, Q8H  filgrastim (NEUPOGEN) injection, 300 mcg, Subcutaneous, Q PM  gabapentin, 600 mg, Oral, Nightly       Infusions     PRN Medications   acetaminophen **OR** acetaminophen **OR** acetaminophen    aluminum-magnesium hydroxide-simethicone    senna-docusate sodium **AND** polyethylene glycol **AND** bisacodyl **AND** bisacodyl    Calcium Replacement - Follow Nurse / BPA Driven Protocol    dicyclomine    Magnesium Standard Dose Replacement - Follow Nurse / BPA Driven Protocol    melatonin    ondansetron ODT **OR** ondansetron    ondansetron    oxyCODONE    Phosphorus Replacement - Follow Nurse / BPA Driven Protocol    Potassium Replacement - Follow Nurse / BPA Driven Protocol    prochlorperazine    prochlorperazine    [COMPLETED] Insert Peripheral IV **AND** sodium chloride    sodium chloride     Physical Findings          General Findings alert, oriented, room air, other: fatigued   Oral/Mouth Cavity WDL   Edema  no edema   Gastrointestinal abdominal distension, last bowel movement: 1/23   Skin  skin intact   Tubes/Drains/Lines implantable port   NFPE See Malnutrition Severity Assessment   --  Malnutrition Severity Assessment      Patient meets criteria for : Severe Malnutrition  Malnutrition Type (Last 8 Hours)       Malnutrition Severity Assessment       Row Name 01/24/25 1718       Malnutrition Severity Assessment    Malnutrition Type Acute Disease or Injury - Related Malnutrition      Row Name 01/24/25 1718       Insufficient Energy Intake     Insufficient Energy Intake Findings Severe    Insufficient Energy Intake  <75% of est. energy requirement for >7d)      Row Name 01/24/25 1718       Unintentional Weight Loss     Unintentional Weight Loss Findings Severe    Unintentional Weight Loss  Weight loss greater than 5% in one month  Loss of 7% in past month      Row Name 01/24/25 1718 "       Criteria Met (Must meet criteria for severity in at least 2 of these categories: M Wasting, Fat Loss, Fluid, Secondary Signs, Wt. Status, Intake)    Patient meets criteria for  Severe Malnutrition                       Current Nutrition Orders & Evaluation of Intake       Oral Nutrition     Food Allergies NKFA   Current PO Diet Diet: Regular/House; Fluid Consistency: Thin (IDDSI 0)   Supplement n/a   PO Evaluation     % PO Intake poor    Factors Affecting Intake: abdominal pain, altered GI function, decreased appetite, early satiety , limited food preferences, multiple food intolerances/allergies, taste changes, Other: Crohn's   --  PES STATEMENT / NUTRITION DIAGNOSIS      Nutrition Dx Problem  Problem: Inadequate Oral Intake  Etiology: Medical Diagnosis - chemotherapy and Crohn's    Signs/Symptoms: PO intake, Report of Minimal PO Intake, PO Diet Not Tolerated, and Unintended Weight Change     NUTRITION INTERVENTION / PLAN OF CARE      Intervention Goal(s) Tolerate PO , Increase intake, and Maintain weight         RD Intervention/Action Supplement offered/declined, Encourage intake, and Continue to monitor   --      Prescription/Orders:       PO Diet       Supplements       Enteral Nutrition       Parenteral Nutrition    New Prescription Ordered? No changes at this time   --      Monitor/Evaluation PO intake, Pertinent labs, Weight, Skin status, GI status, Symptoms   Discharge Plan/Needs Pending clinical course   --    RD to follow per protocol.      Electronically signed by:  Thaddeus Akers RD  01/24/25 17:13 EST

## 2025-01-24 NOTE — PROGRESS NOTES
LOS: 2 days     Chief Complaint: Neutropenic fever    Interval History: Patient resting comfortably this morning.  Did not wake the patient from sleep.  Tmax last night of 101.2.  WBC improving.    Vital Signs  Temp:  [97 °F (36.1 °C)-102.6 °F (39.2 °C)] 97.9 °F (36.6 °C)  Heart Rate:  [] 74  Resp:  [16-18] 18  BP: (81-95)/(46-61) 92/46    Physical Exam:  General: In no acute distress  HEENT: Oropharynx clear, moist mucous membranes  Respiratory: Normal work of breathing  Skin: No rashes or lesions in exposed areas  Extremities: No edema, cyanosis  Access: Chest port and peripheral IV    Antibiotics:  Anti-Infectives (From admission, onward)      Ordered     Dose/Rate Route Frequency Start Stop    01/23/25 1255  Vancomycin HCl 1,250 mg in sodium chloride 0.9 % 250 mL VTB  Status:  Discontinued        Ordering Provider: Jim Mane MD    1,250 mg  200 mL/hr over 75 Minutes Intravenous Every 12 Hours 01/24/25 0000 01/23/25 1300    01/23/25 1240  piperacillin-tazobactam (ZOSYN) 3.375 g IVPB in 100 mL NS MBP (CD)  Status:  Discontinued        Ordering Provider: Jim Mane MD    3.375 g  over 4 Hours Intravenous Every 8 Hours 01/23/25 1930 01/23/25 1305    01/23/25 1305  cefepime 2000 mg IVPB in 100 mL NS (MBP)        Ordering Provider: Mamadou Thompson DO    2,000 mg  over 4 Hours Intravenous Every 8 Hours 01/23/25 1400 01/30/25 1359    01/23/25 1240  Vancomycin HCl 1,250 mg in sodium chloride 0.9 % 250 mL VTB  Status:  Discontinued        Ordering Provider: Jim Mane MD    20 mg/kg × 68 kg  200 mL/hr over 75 Minutes Intravenous Once 01/23/25 1330 01/23/25 1300    01/23/25 1240  piperacillin-tazobactam (ZOSYN) 3.375 g IVPB in 100 mL NS MBP (CD)  Status:  Discontinued        Ordering Provider: Jim Mane MD    3.375 g  over 30 Minutes Intravenous Once 01/23/25 1330 01/23/25 1305    01/23/25 1224  Pharmacy to Dose Zosyn  Status:  Discontinued        Ordering Provider: Alhaji  "MD Jim     Not Applicable Continuous PRN 01/23/25 1224 01/23/25 1255    01/22/25 1919  cefepime 2000 mg IVPB in 100 mL NS (MBP)        Ordering Provider: Luis Armando Reyna MD    2,000 mg  over 30 Minutes Intravenous Once 01/22/25 1935 01/22/25 2153             Results Review:     I reviewed the patient's new clinical results.    Lab Results   Component Value Date    WBC 1.14 (C) 01/24/2025    HGB 7.6 (L) 01/24/2025    HCT 23.6 (L) 01/24/2025    MCV 89.1 01/24/2025     01/24/2025     Lab Results   Component Value Date    GLUCOSE 87 01/24/2025    BUN 4 (L) 01/24/2025    CREATININE 0.57 01/24/2025    BCR 7.0 01/24/2025    CO2 22.7 01/24/2025    CALCIUM 8.3 (L) 01/24/2025    ALBUMIN 2.4 (L) 01/24/2025    AST 12 01/24/2025    ALT 19 01/24/2025       No results found for: \"VANCOPEAK\", \"VANCOTROUGH\", \"VANCORANDOM\"     Isolation:   No active isolations      Microbiology:  1/21 blood cultures no growth to date  1/22 respiratory panel negative  1/22 blood culture in process    Assessment    #Neutropenic fever  # pT3N2a M0 grade 3 invasive ductal adenocarcinoma right breast   #Immunosuppressed on chemotherapy with doxorubicin and cyclophosphamide  #Crohn's disease  #Chest port in place    Blood cultures remain negative.  Overall slight decrease in Tmax.  WBC slowly improving.  Continue empiric cefepime 2 g every 8 hours.  Will follow-up culture results and ANC.    ID will follow.     "

## 2025-01-24 NOTE — PLAN OF CARE
Goal Outcome Evaluation:      L-chest port accessed; CDI with good blood return. Receiving cefepime q8h. Potassium replaced this shift. 1 of 2 units of PRBC currently transfusing. PRN zofran given x2. Neutropenic precautions in place. Max temp this shift 101.

## 2025-01-24 NOTE — PLAN OF CARE
Problem: Adult Inpatient Plan of Care  Goal: Plan of Care Review  Outcome: Progressing  Goal: Patient-Specific Goal (Individualized)  Outcome: Progressing  Goal: Absence of Hospital-Acquired Illness or Injury  Outcome: Progressing  Intervention: Identify and Manage Fall Risk  Recent Flowsheet Documentation  Taken 1/24/2025 0200 by Vita Vega RN  Safety Promotion/Fall Prevention:   assistive device/personal items within reach   clutter free environment maintained   fall prevention program maintained   lighting adjusted   mobility aid in reach   room organization consistent   safety round/check completed  Intervention: Prevent Skin Injury  Recent Flowsheet Documentation  Taken 1/24/2025 0200 by Vita Vega RN  Body Position: position changed independently  Intervention: Prevent Infection  Recent Flowsheet Documentation  Taken 1/24/2025 0200 by Vita Vega RN  Infection Prevention:   hand hygiene promoted   environmental surveillance performed   rest/sleep promoted   single patient room provided  Goal: Optimal Comfort and Wellbeing  Outcome: Progressing  Intervention: Provide Person-Centered Care  Recent Flowsheet Documentation  Taken 1/24/2025 0200 by Vita Vega RN  Trust Relationship/Rapport:   care explained   choices provided   questions answered   reassurance provided   questions encouraged   thoughts/feelings acknowledged  Goal: Readiness for Transition of Care  Outcome: Progressing     Problem: Sepsis/Septic Shock  Goal: Optimal Coping  Outcome: Progressing  Goal: Absence of Bleeding  Outcome: Progressing  Goal: Blood Glucose Level Within Target Range  Outcome: Progressing  Goal: Absence of Infection Signs and Symptoms  Outcome: Progressing  Intervention: Initiate Sepsis Management  Recent Flowsheet Documentation  Taken 1/24/2025 0200 by Vita Vega RN  Infection Management: aseptic technique maintained  Infection Prevention:   hand hygiene promoted   environmental surveillance performed    rest/sleep promoted   single patient room provided  Intervention: Promote Recovery  Recent Flowsheet Documentation  Taken 1/24/2025 0200 by Vita Vega, RN  Activity Management: dorsiflexion/plantar flexion performed  Goal: Optimal Nutrition Delivery  Outcome: Progressing     Problem: Fall Injury Risk  Goal: Absence of Fall and Fall-Related Injury  Outcome: Progressing  Intervention: Identify and Manage Contributors  Recent Flowsheet Documentation  Taken 1/24/2025 0200 by Vita Vega, RN  Medication Review/Management: medications reviewed  Intervention: Promote Injury-Free Environment  Recent Flowsheet Documentation  Taken 1/24/2025 0200 by Vita Vega, RN  Safety Promotion/Fall Prevention:   assistive device/personal items within reach   clutter free environment maintained   fall prevention program maintained   lighting adjusted   mobility aid in reach   room organization consistent   safety round/check completed   Goal Outcome Evaluation:

## 2025-01-25 LAB
ALBUMIN SERPL-MCNC: 2.1 G/DL (ref 3.5–5.2)
ALBUMIN/GLOB SERPL: 0.5 G/DL
ALP SERPL-CCNC: 201 U/L (ref 39–117)
ALT SERPL W P-5'-P-CCNC: 17 U/L (ref 1–33)
ANION GAP SERPL CALCULATED.3IONS-SCNC: 9.3 MMOL/L (ref 5–15)
ANISOCYTOSIS BLD QL: ABNORMAL
AST SERPL-CCNC: 10 U/L (ref 1–32)
BASOPHILS # BLD MANUAL: 0 10*3/MM3 (ref 0–0.2)
BASOPHILS NFR BLD MANUAL: 0 % (ref 0–1.5)
BH BB BLOOD EXPIRATION DATE: NORMAL
BH BB BLOOD EXPIRATION DATE: NORMAL
BH BB BLOOD TYPE BARCODE: 6200
BH BB BLOOD TYPE BARCODE: 6200
BH BB DISPENSE STATUS: NORMAL
BH BB DISPENSE STATUS: NORMAL
BH BB PRODUCT CODE: NORMAL
BH BB PRODUCT CODE: NORMAL
BH BB UNIT NUMBER: NORMAL
BH BB UNIT NUMBER: NORMAL
BILIRUB SERPL-MCNC: 0.6 MG/DL (ref 0–1.2)
BUN SERPL-MCNC: 5 MG/DL (ref 6–20)
BUN/CREAT SERPL: 7.1 (ref 7–25)
CALCIUM SPEC-SCNC: 8.7 MG/DL (ref 8.6–10.5)
CHLORIDE SERPL-SCNC: 107 MMOL/L (ref 98–107)
CO2 SERPL-SCNC: 22.7 MMOL/L (ref 22–29)
CREAT SERPL-MCNC: 0.7 MG/DL (ref 0.57–1)
CROSSMATCH INTERPRETATION: NORMAL
CROSSMATCH INTERPRETATION: NORMAL
DEPRECATED RDW RBC AUTO: 45.9 FL (ref 37–54)
EGFRCR SERPLBLD CKD-EPI 2021: 109.5 ML/MIN/1.73
EOSINOPHIL # BLD MANUAL: 0 10*3/MM3 (ref 0–0.4)
EOSINOPHIL NFR BLD MANUAL: 0 % (ref 0.3–6.2)
ERYTHROCYTE [DISTWIDTH] IN BLOOD BY AUTOMATED COUNT: 14.3 % (ref 12.3–15.4)
GLOBULIN UR ELPH-MCNC: 4.4 GM/DL
GLUCOSE SERPL-MCNC: 86 MG/DL (ref 65–99)
HCT VFR BLD AUTO: 36.1 % (ref 34–46.6)
HGB BLD-MCNC: 12.1 G/DL (ref 12–15.9)
LYMPHOCYTES # BLD MANUAL: 0.3 10*3/MM3 (ref 0.7–3.1)
LYMPHOCYTES NFR BLD MANUAL: 9.1 % (ref 5–12)
MCH RBC QN AUTO: 29.7 PG (ref 26.6–33)
MCHC RBC AUTO-ENTMCNC: 33.5 G/DL (ref 31.5–35.7)
MCV RBC AUTO: 88.5 FL (ref 79–97)
MONOCYTES # BLD: 0.45 10*3/MM3 (ref 0.1–0.9)
MYELOCYTES NFR BLD MANUAL: 1 % (ref 0–0)
NEUTROPHILS # BLD AUTO: 4.18 10*3/MM3 (ref 1.7–7)
NEUTROPHILS NFR BLD MANUAL: 83.8 % (ref 42.7–76)
PLAT MORPH BLD: NORMAL
PLATELET # BLD AUTO: 274 10*3/MM3 (ref 140–450)
PMV BLD AUTO: 10.4 FL (ref 6–12)
POTASSIUM SERPL-SCNC: 3.4 MMOL/L (ref 3.5–5.2)
PROT SERPL-MCNC: 6.5 G/DL (ref 6–8.5)
RBC # BLD AUTO: 4.08 10*6/MM3 (ref 3.77–5.28)
SODIUM SERPL-SCNC: 139 MMOL/L (ref 136–145)
UNIT  ABO: NORMAL
UNIT  ABO: NORMAL
UNIT  RH: NORMAL
UNIT  RH: NORMAL
VARIANT LYMPHS NFR BLD MANUAL: 6.1 % (ref 19.6–45.3)
WBC MORPH BLD: NORMAL
WBC NRBC COR # BLD AUTO: 4.99 10*3/MM3 (ref 3.4–10.8)

## 2025-01-25 PROCEDURE — 99231 SBSQ HOSP IP/OBS SF/LOW 25: CPT | Performed by: INTERNAL MEDICINE

## 2025-01-25 PROCEDURE — 25010000002 ONDANSETRON PER 1 MG: Performed by: NURSE PRACTITIONER

## 2025-01-25 PROCEDURE — 25010000002 ALTEPLASE 2 MG RECONSTITUTED SOLUTION: Performed by: INTERNAL MEDICINE

## 2025-01-25 PROCEDURE — 80053 COMPREHEN METABOLIC PANEL: CPT | Performed by: INTERNAL MEDICINE

## 2025-01-25 PROCEDURE — 85025 COMPLETE CBC W/AUTO DIFF WBC: CPT | Performed by: INTERNAL MEDICINE

## 2025-01-25 PROCEDURE — 25010000002 CEFEPIME PER 500 MG: Performed by: STUDENT IN AN ORGANIZED HEALTH CARE EDUCATION/TRAINING PROGRAM

## 2025-01-25 PROCEDURE — 85007 BL SMEAR W/DIFF WBC COUNT: CPT | Performed by: INTERNAL MEDICINE

## 2025-01-25 PROCEDURE — 99232 SBSQ HOSP IP/OBS MODERATE 35: CPT | Performed by: INTERNAL MEDICINE

## 2025-01-25 RX ORDER — SODIUM CHLORIDE 0.9 % (FLUSH) 0.9 %
10 SYRINGE (ML) INJECTION AS NEEDED
Status: DISCONTINUED | OUTPATIENT
Start: 2025-01-25 | End: 2025-01-29 | Stop reason: HOSPADM

## 2025-01-25 RX ORDER — HEPARIN SODIUM (PORCINE) LOCK FLUSH IV SOLN 100 UNIT/ML 100 UNIT/ML
5 SOLUTION INTRAVENOUS AS NEEDED
Status: DISCONTINUED | OUTPATIENT
Start: 2025-01-25 | End: 2025-01-29 | Stop reason: HOSPADM

## 2025-01-25 RX ORDER — SODIUM CHLORIDE 0.9 % (FLUSH) 0.9 %
20 SYRINGE (ML) INJECTION AS NEEDED
Status: DISCONTINUED | OUTPATIENT
Start: 2025-01-25 | End: 2025-01-29 | Stop reason: HOSPADM

## 2025-01-25 RX ORDER — POTASSIUM CHLORIDE 750 MG/1
40 TABLET, FILM COATED, EXTENDED RELEASE ORAL EVERY 4 HOURS
Status: DISPENSED | OUTPATIENT
Start: 2025-01-25 | End: 2025-01-25

## 2025-01-25 RX ORDER — SODIUM CHLORIDE 0.9 % (FLUSH) 0.9 %
10 SYRINGE (ML) INJECTION EVERY 12 HOURS SCHEDULED
Status: DISCONTINUED | OUTPATIENT
Start: 2025-01-25 | End: 2025-01-29 | Stop reason: HOSPADM

## 2025-01-25 RX ADMIN — GABAPENTIN 600 MG: 300 CAPSULE ORAL at 20:07

## 2025-01-25 RX ADMIN — CEFEPIME 2000 MG: 2 INJECTION, POWDER, FOR SOLUTION INTRAVENOUS at 05:21

## 2025-01-25 RX ADMIN — ACETAMINOPHEN 650 MG: 325 TABLET, FILM COATED ORAL at 05:21

## 2025-01-25 RX ADMIN — OXYCODONE HYDROCHLORIDE 5 MG: 5 TABLET ORAL at 13:09

## 2025-01-25 RX ADMIN — ONDANSETRON 4 MG: 2 INJECTION INTRAMUSCULAR; INTRAVENOUS at 10:44

## 2025-01-25 RX ADMIN — ASPIRIN 81 MG: 81 TABLET, COATED ORAL at 08:14

## 2025-01-25 RX ADMIN — OXYCODONE HYDROCHLORIDE 5 MG: 5 TABLET ORAL at 20:08

## 2025-01-25 RX ADMIN — CEFEPIME 2000 MG: 2 INJECTION, POWDER, FOR SOLUTION INTRAVENOUS at 21:35

## 2025-01-25 RX ADMIN — CEFEPIME 2000 MG: 2 INJECTION, POWDER, FOR SOLUTION INTRAVENOUS at 13:09

## 2025-01-25 RX ADMIN — POTASSIUM CHLORIDE 40 MEQ: 750 TABLET, EXTENDED RELEASE ORAL at 08:14

## 2025-01-25 RX ADMIN — ALTEPLASE: 2.2 INJECTION, POWDER, LYOPHILIZED, FOR SOLUTION INTRAVENOUS at 18:10

## 2025-01-25 RX ADMIN — ACETAMINOPHEN 650 MG: 325 TABLET, FILM COATED ORAL at 20:07

## 2025-01-25 RX ADMIN — DICYCLOMINE HYDROCHLORIDE 10 MG: 10 CAPSULE ORAL at 13:09

## 2025-01-25 NOTE — PROGRESS NOTES
REASON FOR CONSULTATION:  breast cancer undergoing therapy, admission with neutropenic fever                               History of Present Illness   Record reviewed, the patient initiated adjuvant chemotherapy with AC on 12/31/2024.  She had fever after the first cycle with borderline neutropenia but no obvious bacterial infections.  She was treated with Levaquin.  Fevers resolved over the weekend.  She denies uncontrolled nausea vomiting or diarrhea.  She denies stomatitis.  Chest wounds have healed.  She was last seen 1/14/2024 with overall plans to proceed with adjuvant therapy including dose dense AC with Neulasta support x 4 followed by weekly Taxol.  Additional discussions included postmastectomy radiation therapy, hormonal therapy with ovarian suppression and tamoxifen versus oophorectomy and AI therapy and review of nonspecific lung nodules after completing chemotherapy.    She was treated with her second cycle of dose dense AC 1/14/2025 presented to the ER 1/22/2025 with fever, generalized malaise and nausea.  Studies included negative respiratory panel,, CBC with progressive neutropenia.  She had been assessed 1/21 with ANC of 0.01, placed on Augmentin.    Unfortunately symptoms worsened and she presented to the ER 1/22/2025.  H&H 8.9 25.9 with white count of 240, platelet count of 240,000, BUN/creatinine of 4 and 0.69, alk phos 271, lactate of 1.1, procalcitonin 0.33 UA with trace ketones, 2+ protein and trace leukocyte esterase, 3-5 WBCs and 7-12 epithelial cells, blood cultures negative,.  Patient admitted placed on cefepime.    Interval history:  1/23/2025  T102.6, pulse 101, respirate 16, BP 90/58  Patient describes continued fever, abdominal cramping and diarrhea  H&H 8.5 and 25.1 with white count of 350, platelet count of 189,000, BUN/creatinine of 3 and 0.58, potassium 2.6    1/24/2025  T97.9, pulse 74, respirations 18, BP 92/46  Patient far more comfortable today, quite fatigued  H&H 7.6  and 23.9, white count is 1140, platelet count 1 82,000, being creatinine of 4 and 0.57, albumin 2.4, total protein 5.6, alk phos 178, AST 12, ALT 19, total bilirubin 0.3    1/25/2025  T98.4, pulse 74, respirations 18, /65, SpO2 97%  Patient now completed transfusion therapy.  She is feeling significantly improved.  H&H of 12.1 and 36.1, white count of 4990, platelet count of 274,000, BUN/creatinine of 5 and 0.70      ONCOLOGY HISTORY:  This is a 44-year-old woman referred from general surgery to discuss adjuvant treatment for recently surgically treated breast cancer.  The patient presented with a large palpable mass in the inner quadrant of the right breast.  The mass had been enlarging for couple years but the patient did not have insurance to get assessed.  The breast imaging is not available to me but she had a CT of the chest abdomen pelvis on 9/16/2024 showed a large mass in the right breast measuring up to 7.7 cm with abnormal left axillary lymphadenopathy.  There were tiny subpleural nodules in the lateral aspect of the left lower lobe likely granulomatous mildly conspicuous lymph node in the central mesentery 1.1 cm.  A PET scan was performed 9/30/2024 showing a hypermetabolic mass in the right breast with thickening throughout the right breast and pathologic hypermetabolic right axillary level 1 and 2 lymph nodes, no hypermetabolic internal mammary or supraclavicular lymph nodes.  The small pulmonary nodules were too small to characterize and mesenteric lymph nodes without hypermetabolic activity.  A biopsy of the right breast mass showed invasive ductal adenocarcinoma.    She was taken to the operating room on 10/9/2024 and underwent a right modified radical mastectomy and axillary dissection, prophylactic left mastectomy.  Pathology from the right breast showed invasive ductal carcinoma [place] grade 3 (3+3+3) measuring 17 cm with extensive lymphovascular invasion and dermal lymphatic invasion.   The tumor extended to the dermis but did not ulcerate the skin.  Tumor extended to skeletal muscle and was present focally at an anterior margin, 0.05 mm of the posterior margin, 10 mm from the lateral margin, 20 mm from the medial margin, 28 mm from the superior margin and 40 mm from inferior margin.  There was ductal carcinoma in situ present within 2.5 mm of the posterior margin.  There were 13 lymph nodes in the axillary dissection 8 oncology plan/recommendations: of which had macrometastases, 1 micrometastases and 1 lymph node with isolated tumor cells.  The left breast had an intraductal papilloma otherwise negative.  The tumor was positive for estrogen receptor 99% of cells, progesterone receptor 50% of cells, HER2 0 and Ki-67 65%.    The patient has medical comorbidities of Crohn's disease.    Past Medical History:   Diagnosis Date    Anxiety     Breast cancer     Right    Crohn's disease     DVT (deep vein thrombosis) in pregnancy     PFO (patent foramen ovale)     Stroke     after the birth of her child at age 34    Tattoos         Past Surgical History:   Procedure Laterality Date     SECTION      COLON RESECTION      COLONOSCOPY      MASTECTOMY Bilateral 10/9/2024    Procedure: Modified radical mastectomy with axillary dissection of the right breast and simple mastectomy of the left breast;  Surgeon: Rebekah Garcia DO;  Location: [facility] OR;  Service: General;  Laterality: Bilateral;    SKIN CANCER EXCISION      VENOUS ACCESS DEVICE (PORT) INSERTION N/A 12/10/2024    Procedure: INSERTION VENOUS ACCESS DEVICE;  Surgeon: Rebekah Garcia DO;  Location: [facility] OR;  Service: General;  Laterality: N/A;        Current Facility-Administered Medications on File Prior to Encounter   Medication Dose Route Frequency Provider Last Rate Last Admin    heparin injection 500 Units  500 Units Intravenous PRN Nishant Witt MD   500 Units at 24 0916    heparin injection 500 Units  500 Units  Intravenous PRN Nishant Witt MD   500 Units at 01/07/25 1049    sodium chloride 0.9 % flush 10 mL  10 mL Intravenous PRN Nishant Witt MD   10 mL at 12/11/24 0916    sodium chloride 0.9 % flush 10 mL  10 mL Intravenous PRN Nishant Witt MD   10 mL at 01/07/25 1048     Current Outpatient Medications on File Prior to Encounter   Medication Sig Dispense Refill    amoxicillin-clavulanate (AUGMENTIN) 500-125 MG per tablet Take 1 tablet by mouth 2 (Two) Times a Day for 7 days. 14 tablet 0    aspirin 81 MG EC tablet Take 1 tablet by mouth Daily.      gabapentin (Neurontin) 300 MG capsule Take 2 capsules by mouth Every Night. 60 capsule 2    Klor-Con M20 20 MEQ CR tablet TAKE 1 TABLET BY MOUTH 2 TIMES A DAY 40 tablet 1    lidocaine-prilocaine (EMLA) 2.5-2.5 % cream Apply 1 Application topically to the appropriate area as directed As Needed for Mild Pain. Apply to port site 30 minutes before access 15 g 3    omeprazole (priLOSEC) 20 MG capsule       ondansetron (ZOFRAN) 8 MG tablet Take 1 tablet by mouth 3 (Three) Times a Day As Needed for Nausea or Vomiting. 30 tablet 5    prochlorperazine (COMPAZINE) 10 MG tablet Take 1 tablet by mouth Every 6 (Six) Hours As Needed for Nausea or Vomiting. 60 tablet 1    venlafaxine XR (Effexor XR) 37.5 MG 24 hr capsule Take 1 capsule by mouth Take As Directed. 1 capsule po q am for two weeks followed by increase to 2 capsules daily (Patient taking differently: Take 1 capsule by mouth Take As Directed. 1 capsule po q am for two weeks followed by increase to 2 capsules daily  Pt has not started yet) 60 capsule 2    OLANZapine (zyPREXA) 5 MG tablet Take 1 tablet by mouth for 4 doses starting night of chemotherapy. 8 tablet 1        ALLERGIES:  No Known Allergies     Social History     Socioeconomic History    Marital status: Single    Number of children: 1   Tobacco Use    Smoking status: Former     Current packs/day: 0.00     Average packs/day: 1 pack/day for 20.0 years (20.0  ttl pk-yrs)     Types: Cigarettes     Start date:      Quit date: 2019     Years since quittin.0    Smokeless tobacco: Current    Tobacco comments:     Nicotine pouches   Vaping Use    Vaping status: Never Used   Substance and Sexual Activity    Alcohol use: Yes     Comment: OCC    Drug use: Never    Sexual activity: Defer        Family History   Problem Relation Age of Onset    Diabetes Mother     Heart disease Father     Diabetes Paternal Grandmother     Hypertension Other     Malig Hyperthermia Neg Hx         Review of Systems   Constitutional:  Positive for fever.   HENT: Negative.     Respiratory: Negative.     Cardiovascular: Negative.    Gastrointestinal:  Positive for abdominal pain and diarrhea. Negative for nausea.   Genitourinary: Negative.    Musculoskeletal: Negative.    Skin:  Negative for wound.   Neurological: Negative.    Hematological: Negative.           Objective     Vitals:    25 2100 25 0010 25 0505 25 0807   BP: 103/62 106/60 94/64 110/65   BP Location:   Left leg Left leg   Patient Position:   Lying Lying   Pulse: 102 93 87 74   Resp: 18 18 18 18   Temp: 99.9 °F (37.7 °C)  (!) 100.9 °F (38.3 °C)  Comment: RN notified 98.4 °F (36.9 °C)   TempSrc: Oral  Oral Oral   SpO2: 99% 98%  97%   Weight:       Height:                 2025     8:31 AM   Current Status   ECOG score 0       Physical Exam    CONSTITUTIONAL: pleasant well-developed adult woman  HEENT: no icterus, no thrush, moist membranes/  LYMPH: no cervical or supraclavicular lad  CV: RRR, S1S2, no murmur  RESP/chest: cta bilat, no wheezing, no rales, port present left chest wall  Breast: Status post bilateral mastectomies, wound mildly erythematous, healed without discharge  GI: soft, generally tender, bowel sounds positive  MUSC: no mele  NEURO: alert and oriented x3, normal strength  PSYCH: Frustrated, alert, oriented      RECENT LABS:  Hematology WBC   Date Value Ref Range Status   2025 4.99  3.40 - 10.80 10*3/mm3 Final     RBC   Date Value Ref Range Status   01/25/2025 4.08 3.77 - 5.28 10*6/mm3 Final     Hemoglobin   Date Value Ref Range Status   01/25/2025 12.1 12.0 - 15.9 g/dL Final     Hematocrit   Date Value Ref Range Status   01/25/2025 36.1 34.0 - 46.6 % Final     Platelets   Date Value Ref Range Status   01/25/2025 274 140 - 450 10*3/mm3 Final        Lab Results   Component Value Date    GLUCOSE 86 01/25/2025    BUN 5 (L) 01/25/2025    CREATININE 0.70 01/25/2025     01/25/2025    K 3.4 (L) 01/25/2025     01/25/2025    CALCIUM 8.7 01/25/2025    PROTEINTOT 6.5 01/25/2025    ALBUMIN 2.1 (L) 01/25/2025    ALT 17 01/25/2025    AST 10 01/25/2025    ALKPHOS 201 (H) 01/25/2025    BILITOT 0.6 01/25/2025    GLOB 4.4 01/25/2025    AGRATIO 0.5 01/25/2025    BCR 7.1 01/25/2025    ANIONGAP 9.3 01/25/2025    EGFR 109.5 01/25/2025     PET scan 9/30/2024:  FINDINGS:     Head/neck: No suspicious uptake.     Chest: An 8 x 5.8 cm mass in the lower inner right breast with max SUV  of 18 (series 4/image 156). Mass comes in close proximity to the  sternum. Hypermetabolic parenchymal thickening throughout the right  breast with max SUV of 9 (series 4/image 140). Diffuse right breast skin  thickening.     Hypermetabolic right axillary level I and II lymph nodes. Reference 1.2  cm level I lymph node with max SUV of 11.9 (series 4/image 103).  Additional reference subcentimeter level II lymph node with max SUV of  3.3 (series 4/image 93). Separate brown fat uptake in the bilateral  axilla and posterior neck base.     No enlarged or hypermetabolic internal mammary or supraclavicular lymph  nodes.     Few subcentimeter pulmonary nodules are too small for accurate PET  characterization without overt hypermetabolism and unchanged from recent  CT. Reference left lower lobe (series 4/image 167) and right upper lobe  (series 4/image 131).     Abdomen/pelvis: Mesenteric lymph nodes are mostly subcentimeter  without  associated hypermetabolism.     Sigmoid colectomy. Tubular hypermetabolism throughout the otherwise  normal-appearing remaining colon may be medication related. Moderate  proximal colonic stool burden.     Bones: No focal osseous hypermetabolism with special attention to the  sternum.           IMPRESSION:  1. Hypermetabolic 8 cm right breast mass with hypermetabolic parenchymal  thickening throughout the right breast, pathologically proven invasive  ductal carcinoma. Mass comes in close proximity to the sternum. No focal  osseous hypermetabolism with special attention to the sternum.  2. Hypermetabolic right axillary level I and II lymph nodes suggesting  alana metastatic disease. No enlarged or hypermetabolic internal mammary  or supraclavicular lymph nodes. Separate brown fat uptake in the  bilateral axilla and posterior neck base.  3. Few subcentimeter pulmonary nodules are too small for accurate PET  characterization and will need follow-up via chest CT.  4. Mesenteric lymph nodes are mostly subcentimeter without associated  hypermetabolism.       Assessment & Plan   *pT3N2a M0 grade 3 invasive ductal adenocarcinoma right breast, ER 99% positive, TN 50% positive, HER2 0, Ki-67 65%, tumor present at anterior margin  She presented with a large palpable mass in the inner quadrant of the right breast; mass had been enlarging for couple years but the patient did not have insurance to get assessed  CT of the chest abdomen pelvis on 9/16/2024 showed a large mass in the right breast measuring up to 7.7 cm with abnormal left axillary lymphadenopathy.  There were tiny subpleural nodules in the lateral aspect of the left lower lobe likely granulomatous mildly conspicuous lymph node in the central mesentery 1.1 cm.    PET scan 9/30/2024 - hypermetabolic mass in the right breast with thickening throughout the right breast and pathologic hypermetabolic right axillary level 1 and 2 lymph nodes, no hypermetabolic  internal mammary or supraclavicular lymph nodes.  The small pulmonary nodules were too small to characterize and mesenteric lymph nodes without hypermetabolic activity.    biopsy of the right breast mass showed invasive ductal adenocarcinoma.  operating room on 10/9/2024 and underwent a right modified radical mastectomy and axillary dissection, prophylactic left mastectomy.  Pathology from the right breast showed invasive ductal carcinoma [place] grade 3 (3+3+3) measuring 17 cm with extensive lymphovascular invasion and dermal lymphatic invasion.  The tumor extended to the dermis but did not ulcerate the skin.  Tumor extended to skeletal muscle and was present focally at an anterior margin, 0.05 mm of the posterior margin, 10 mm from the lateral margin, 20 mm from the medial margin, 28 mm from the superior margin and 40 mm from inferior margin.  There was ductal carcinoma in situ present within 2.5 mm of the posterior margin.  There were 13 lymph nodes in the axillary dissection 8 of which had macrometastases, 1 micrometastases and 1 lymph node with isolated tumor cells.  The left breast had an intraductal papilloma otherwise negative.  The tumor was positive for estrogen receptor 99% of cells, progesterone receptor 50% of cells, HER2 0 and Ki-67 65%.  Initiated adjuvant chemotherapy with Adriamycin/Cytoxan 12/31/2024 (adjuvant chemotherapy delayed because of wound healing issues) nonneutropenic fever    *Admitted 1/22/2025 with neutropenic fever  G-CSF continued from 1/23/2025, discontinued 1/25/2025  Continue cefepime      *Marrow suppression secondary to chemotherapy   resolved with normal CBC-6850, 1/14/2025  Transfusion planned 1/24/2025      * nausea/diarrhea, chemo induced   responding to Imodium/Zofran/Compazine    *elevated LFT-likely s/e chemo     *[employer]ae 19 gene panel-VUS Bard 1    *medical comorbidities of Crohn's disease.    Oncology plan/recommendations:  Again reviewed plan  for adjuvant  chemotherapy to reduce risk of recurrence/help eradicate potential micrometastatic disease with dose dense AC with Neulasta support x 4 cycles followed by weekly Taxol x 12 doses-proceed with cycle 2 AC today pending stable CMP  Patient admitted with neutropenic fever, agree with cefepime, initiation of additional Neupogen, discussion with primary oncologist about subsequent treatment plan, transfusion as needed  Patient reviewed 1/25/2025 with Neupogen discontinued, she is scheduled 1/28/2025 back in office for further chemotherapy.  After chemotherapy she will need postmastectomy radiation given the size and alana involvement  She will need hormonal therapy with ovarian suppression/tamoxifen versus oophorectomy and AI therapy/ck4/6 inhibitor  Nonspecific lung nodules will need reassessment after completing chemotherapy radiographically

## 2025-01-25 NOTE — PROGRESS NOTES
LOS: 3 days     Chief Complaint: Neutropenic fever    Interval History: Febrile but fever curve improving.  Neutropenia has resolved.  Denies any vomiting  or rash.  3 bowel movements but the patient states this is her baseline with Crohn's.  Continues to feel fatigued    Vital Signs  Temp:  [97.8 °F (36.6 °C)-101 °F (38.3 °C)] 98.4 °F (36.9 °C)  Heart Rate:  [] 74  Resp:  [18] 18  BP: ()/(51-70) 110/65    Physical Exam:  General: In no acute distress  Respiratory: Breathing comfortably on room air  Skin: No rashes or lesions in exposed areas  Extremities: No edema, cyanosis  Access: Chest port and peripheral IV    Antibiotics:  Cefepime 2 g IV every 8 hours    Results Review:     I reviewed the patient's new clinical results.    Lab Results   Component Value Date    WBC 4.99 01/25/2025    HGB 12.1 01/25/2025    HCT 36.1 01/25/2025    MCV 88.5 01/25/2025     01/25/2025     Lab Results   Component Value Date    GLUCOSE 86 01/25/2025    BUN 5 (L) 01/25/2025    CREATININE 0.70 01/25/2025    BCR 7.1 01/25/2025    CO2 22.7 01/25/2025    CALCIUM 8.7 01/25/2025    ALBUMIN 2.1 (L) 01/25/2025    AST 10 01/25/2025    ALT 17 01/25/2025     Microbiology:  1/21 blood cultures no growth to date  1/22 respiratory panel negative  1/22 blood culture in process    Assessment    #Neutropenic fever  # pT3N2a M0 grade 3 invasive ductal adenocarcinoma right breast   #Immunosuppressed on chemotherapy with doxorubicin and cyclophosphamide  #Crohn's disease  #Chest port in place    Neutropenia has resolved.  Remains febrile but fever curve has improved.  All blood cultures remain negative to date.  Continue empiric cefepime 2 g IV every 8 hours for now.  Today is day 4 of empiric therapy.  Hopefully with her immune system back on line her fevers will resolve.

## 2025-01-25 NOTE — PROGRESS NOTES
Name: Suzanne Lin ADMIT: 2025   : 1980  PCP: Gia Rodriguez, APRN    MRN: [id number] LOS: 3 days   AGE/SEX: 44 y.o. female  ROOM: [facility]     Subjective   Subjective   Patient is seen at bedside, patient is lying in bed, no new complaints today, she feels better.  Still febrile but curve is improving.       Objective   Objective   Vital Signs  Temp:  [97.8 °F (36.6 °C)-100.9 °F (38.3 °C)] 98.6 °F (37 °C)  Heart Rate:  [] 80  Resp:  [16-18] 16  BP: ()/(56-70) 110/60  SpO2:  [96 %-99 %] 96 %  on   ;   Device (Oxygen Therapy): room air  Body mass index is 24.96 kg/m².  Physical Exam     General, awake and alert.  Head and ENT, normocephalic and atraumatic.  Lungs, symmetric expansion, equal air entry bilaterally.  Heart, regular rate and rhythm.  Abdomen, soft and nontender.  Extremities, no clubbing or cyanosis.  Neuro, no focal deficits.  Skin: Warm and no rash.  Psych, normal mood and affect.  Musculoskeletal, joint examination is grossly normal.        Copied text material from yesterday's note has been reviewed for appropriate changes and remains accurate as of 25.        Results Review     I reviewed the patient's new clinical results.  Results from last 7 days   Lab Units 25  0520 25  0516 25  0552 25   WBC 10*3/mm3 4.99 1.14* 0.35* 0.24*   HEMOGLOBIN g/dL 12.1 7.6* 8.5* 8.9*   PLATELETS 10*3/mm3 274 182 189 241     Results from last 7 days   Lab Units 25  0520 25  0516 25  1621 25  0552 25   SODIUM mmol/L 139 137  --  138 135*   POTASSIUM mmol/L 3.4* 2.9* 2.9* 2.6* 2.7*   CHLORIDE mmol/L 107 103  --  106 98   CO2 mmol/L 22.7 22.7  --  22.6 23.0   BUN mg/dL 5* 4*  --  3* 4*   CREATININE mg/dL 0.70 0.57  --  0.58 0.69   GLUCOSE mg/dL 86 87  --  117* 136*   EGFR mL/min/1.73 109.5 115.1  --  114.6 109.9     Results from last 7 days   Lab Units 25  0520 25  0516 25  0552 25  1934   ALBUMIN  "g/dL 2.1* 2.4* 2.2* 2.9*   BILIRUBIN mg/dL 0.6 0.3 0.6 0.6   ALK PHOS U/L 201* 178* 208* 271*   AST (SGOT) U/L 10 12 12 27   ALT (SGPT) U/L 17 19 19 30     Results from last 7 days   Lab Units 01/25/25  0520 01/24/25  0516 01/23/25  0552 01/22/25  1934 01/21/25  0929   CALCIUM mg/dL 8.7 8.3* 8.0* 8.7 8.9   ALBUMIN g/dL 2.1* 2.4* 2.2* 2.9*  --    MAGNESIUM mg/dL  --   --   --  1.9 2.2     Results from last 7 days   Lab Units 01/22/25 1934   PROCALCITONIN ng/mL 0.33*   LACTATE mmol/L 1.1     No results found for: \"HGBA1C\", \"POCGLU\"    CT Abdomen Pelvis With Contrast    Result Date: 1/23/2025  1. New periportal edema within the liver. No biliary ductal dilation 2. Stable mild nonspecific fluid around the gallbladder fundus 3. Fluid throughout the colon. Stable wall thickening of the hepatic flexure portion of the colon. Stable wall thickening and enhancement of the terminal ileum  Radiation dose reduction techniques were utilized, including automated exposure control and exposure modulation based on body size.   This report was finalized on 1/23/2025 7:05 PM by Dr. Shayan Reyna M.D on Workstation: [id number]       I have personally reviewed all medications:  Scheduled Medications  aspirin, 81 mg, Oral, Daily  cefepime, 2,000 mg, Intravenous, Q8H  gabapentin, 600 mg, Oral, Nightly  sodium chloride, 10 mL, Intravenous, Q12H    Infusions   Diet  Diet: Regular/House; Fluid Consistency: Thin (IDDSI 0)    I have personally reviewed:  [x]  Laboratory   [x]  Microbiology   [x]  Radiology   [x]  EKG/Telemetry  [x]  Cardiology/Vascular   []  Pathology    []  Records       Assessment/Plan     Active Hospital Problems    Diagnosis  POA    **Neutropenic fever [D70.9, R50.81]  Yes    Crohn's disease without complication [K50.90]  Yes    LITA (iron deficiency anemia) [D50.9]  Yes    Breast cancer, stage 3, right [C50.911]  Yes      Resolved Hospital Problems   No resolved problems to display.       44 y.o. female admitted with " Neutropenic fever.    Assessment and plan  1.  Neutropenic fever, high-grade fever noted, infectious disease service on board, continue IV cefepime.  Antibiotic management per ID recommendations.     2.  History of Crohn's disease, currently uncomplicated course.  If she worsens, she may need GI evaluation.     3.  Stage III right-sided breast cancer, status post recent chemotherapy, hematology/oncology has been consulted.  We will follow management recommendations.     4.  Anemia, monitor hemoglobin closely, we will continue to recheck labs.     5.  CODE STATUS is full code.  Further plans based on hospital course.    Expected Discharge Date: 1/27/2025; Expected Discharge Time:       Jim Mane MD  [facility]  01/25/25  15:43 EST

## 2025-01-25 NOTE — NURSING NOTE
Patient received 2units PRBC LR overnight through left port; abx administered through LFA PIV. Patient remained afebrile thus far;  in past 24 hours.  Patient rested without complaints through the night; patient remained free from falls; call light within reach; VSS.    0531 patient spiked temp of 100.9 on am vitals; otherwise asymptomatic; tylenol given as per MAR.  Patient labs obtained from technician PIV; implanted port flushing well and patient reports smelling and tasting flush; unable to get blood return at this time.

## 2025-01-25 NOTE — PLAN OF CARE
Problem: Adult Inpatient Plan of Care  Goal: Plan of Care Review  Outcome: Progressing  Goal: Patient-Specific Goal (Individualized)  Outcome: Progressing  Goal: Absence of Hospital-Acquired Illness or Injury  Outcome: Progressing  Goal: Optimal Comfort and Wellbeing  Outcome: Progressing  Goal: Readiness for Transition of Care  Outcome: Progressing     Problem: Sepsis/Septic Shock  Goal: Optimal Coping  Outcome: Progressing  Goal: Absence of Bleeding  Outcome: Progressing  Goal: Blood Glucose Level Within Target Range  Outcome: Progressing  Goal: Absence of Infection Signs and Symptoms  Outcome: Progressing  Goal: Optimal Nutrition Delivery  Outcome: Progressing     Problem: Fall Injury Risk  Goal: Absence of Fall and Fall-Related Injury  Outcome: Progressing     Problem: Malnutrition  Goal: Improved Nutritional Intake  Outcome: Progressing   Goal Outcome Evaluation:

## 2025-01-26 ENCOUNTER — APPOINTMENT (OUTPATIENT)
Dept: GENERAL RADIOLOGY | Facility: HOSPITAL | Age: 45
End: 2025-01-26
Payer: COMMERCIAL

## 2025-01-26 LAB
ALBUMIN SERPL-MCNC: 2.9 G/DL (ref 3.5–5.2)
ALBUMIN/GLOB SERPL: 0.8 G/DL
ALP SERPL-CCNC: 262 U/L (ref 39–117)
ALT SERPL W P-5'-P-CCNC: 14 U/L (ref 1–33)
ANION GAP SERPL CALCULATED.3IONS-SCNC: 12.2 MMOL/L (ref 5–15)
AST SERPL-CCNC: 13 U/L (ref 1–32)
BACTERIA SPEC AEROBE CULT: NORMAL
BACTERIA SPEC AEROBE CULT: NORMAL
BASOPHILS # BLD AUTO: 0.06 10*3/MM3 (ref 0–0.2)
BASOPHILS NFR BLD AUTO: 0.6 % (ref 0–1.5)
BILIRUB SERPL-MCNC: 0.5 MG/DL (ref 0–1.2)
BUN SERPL-MCNC: 7 MG/DL (ref 6–20)
BUN/CREAT SERPL: 9.9 (ref 7–25)
CALCIUM SPEC-SCNC: 8.8 MG/DL (ref 8.6–10.5)
CHLORIDE SERPL-SCNC: 103 MMOL/L (ref 98–107)
CO2 SERPL-SCNC: 24.8 MMOL/L (ref 22–29)
CREAT SERPL-MCNC: 0.71 MG/DL (ref 0.57–1)
DEPRECATED RDW RBC AUTO: 45.7 FL (ref 37–54)
EGFRCR SERPLBLD CKD-EPI 2021: 107.7 ML/MIN/1.73
EOSINOPHIL # BLD AUTO: 0.02 10*3/MM3 (ref 0–0.4)
EOSINOPHIL NFR BLD AUTO: 0.2 % (ref 0.3–6.2)
ERYTHROCYTE [DISTWIDTH] IN BLOOD BY AUTOMATED COUNT: 14.6 % (ref 12.3–15.4)
GLOBULIN UR ELPH-MCNC: 3.7 GM/DL
GLUCOSE SERPL-MCNC: 89 MG/DL (ref 65–99)
HCT VFR BLD AUTO: 37.2 % (ref 34–46.6)
HGB BLD-MCNC: 12.6 G/DL (ref 12–15.9)
IMM GRANULOCYTES # BLD AUTO: 0.9 10*3/MM3 (ref 0–0.05)
IMM GRANULOCYTES NFR BLD AUTO: 8.6 % (ref 0–0.5)
LYMPHOCYTES # BLD AUTO: 0.72 10*3/MM3 (ref 0.7–3.1)
LYMPHOCYTES NFR BLD AUTO: 6.9 % (ref 19.6–45.3)
MCH RBC QN AUTO: 29.4 PG (ref 26.6–33)
MCHC RBC AUTO-ENTMCNC: 33.9 G/DL (ref 31.5–35.7)
MCV RBC AUTO: 86.7 FL (ref 79–97)
MONOCYTES # BLD AUTO: 1.4 10*3/MM3 (ref 0.1–0.9)
MONOCYTES NFR BLD AUTO: 13.3 % (ref 5–12)
NEUTROPHILS NFR BLD AUTO: 7.41 10*3/MM3 (ref 1.7–7)
NEUTROPHILS NFR BLD AUTO: 70.4 % (ref 42.7–76)
PLATELET # BLD AUTO: 370 10*3/MM3 (ref 140–450)
PMV BLD AUTO: 10.4 FL (ref 6–12)
POTASSIUM SERPL-SCNC: 3.8 MMOL/L (ref 3.5–5.2)
PROCALCITONIN SERPL-MCNC: 1.15 NG/ML (ref 0–0.25)
PROT SERPL-MCNC: 6.6 G/DL (ref 6–8.5)
RBC # BLD AUTO: 4.29 10*6/MM3 (ref 3.77–5.28)
SODIUM SERPL-SCNC: 140 MMOL/L (ref 136–145)
WBC NRBC COR # BLD AUTO: 10.51 10*3/MM3 (ref 3.4–10.8)

## 2025-01-26 PROCEDURE — 25010000002 ONDANSETRON PER 1 MG: Performed by: NURSE PRACTITIONER

## 2025-01-26 PROCEDURE — 71045 X-RAY EXAM CHEST 1 VIEW: CPT

## 2025-01-26 PROCEDURE — 99231 SBSQ HOSP IP/OBS SF/LOW 25: CPT | Performed by: INTERNAL MEDICINE

## 2025-01-26 PROCEDURE — 87040 BLOOD CULTURE FOR BACTERIA: CPT | Performed by: INTERNAL MEDICINE

## 2025-01-26 PROCEDURE — 80053 COMPREHEN METABOLIC PANEL: CPT | Performed by: INTERNAL MEDICINE

## 2025-01-26 PROCEDURE — 84145 PROCALCITONIN (PCT): CPT | Performed by: INTERNAL MEDICINE

## 2025-01-26 PROCEDURE — 99232 SBSQ HOSP IP/OBS MODERATE 35: CPT | Performed by: INTERNAL MEDICINE

## 2025-01-26 PROCEDURE — 85025 COMPLETE CBC W/AUTO DIFF WBC: CPT | Performed by: INTERNAL MEDICINE

## 2025-01-26 PROCEDURE — 25010000002 CEFEPIME PER 500 MG: Performed by: STUDENT IN AN ORGANIZED HEALTH CARE EDUCATION/TRAINING PROGRAM

## 2025-01-26 RX ADMIN — ACETAMINOPHEN 650 MG: 325 TABLET, FILM COATED ORAL at 04:20

## 2025-01-26 RX ADMIN — OXYCODONE HYDROCHLORIDE 5 MG: 5 TABLET ORAL at 21:21

## 2025-01-26 RX ADMIN — ASPIRIN 81 MG: 81 TABLET, COATED ORAL at 08:14

## 2025-01-26 RX ADMIN — ONDANSETRON 4 MG: 2 INJECTION INTRAMUSCULAR; INTRAVENOUS at 15:20

## 2025-01-26 RX ADMIN — Medication 10 ML: at 21:39

## 2025-01-26 RX ADMIN — CEFEPIME 2000 MG: 2 INJECTION, POWDER, FOR SOLUTION INTRAVENOUS at 21:11

## 2025-01-26 RX ADMIN — GABAPENTIN 600 MG: 300 CAPSULE ORAL at 21:11

## 2025-01-26 RX ADMIN — CEFEPIME 2000 MG: 2 INJECTION, POWDER, FOR SOLUTION INTRAVENOUS at 15:20

## 2025-01-26 RX ADMIN — OXYCODONE HYDROCHLORIDE 5 MG: 5 TABLET ORAL at 15:20

## 2025-01-26 RX ADMIN — ACETAMINOPHEN 650 MG: 325 TABLET, FILM COATED ORAL at 16:26

## 2025-01-26 RX ADMIN — Medication 10 ML: at 08:14

## 2025-01-26 RX ADMIN — CEFEPIME 2000 MG: 2 INJECTION, POWDER, FOR SOLUTION INTRAVENOUS at 06:08

## 2025-01-26 NOTE — PROGRESS NOTES
LOS: 4 days     Chief Complaint: Neutropenic fever    Interval History: Febrile, patient reports a dry cough only when febrile.  Denies rhinorrhea or sore throat.  She is having to loose bowel movements per day and states this is her normal with Crohn's disease.  Some abdominal discomfort but nothing new.  No nausea or vomiting.  No rashes no dysuria or increasing urinary frequency    Vital Signs  Temp:  [97.5 °F (36.4 °C)-101.8 °F (38.8 °C)] 97.5 °F (36.4 °C)  Heart Rate:  [66-94] 66  Resp:  [16-18] 16  BP: ()/(54-60) 106/59    Physical Exam:  General: In no acute distress  Respiratory: Breathing comfortably on room air  Skin: No rashes or lesions in exposed areas  Extremities: No edema, cyanosis  Access: Chest port and peripheral IV    Antibiotics:  Cefepime 2 g IV every 8 hours    Results Review:     I reviewed the patient's new clinical results.    Lab Results   Component Value Date    WBC 10.51 01/26/2025    HGB 12.6 01/26/2025    HCT 37.2 01/26/2025    MCV 86.7 01/26/2025     01/26/2025     Lab Results   Component Value Date    GLUCOSE 89 01/26/2025    BUN 7 01/26/2025    CREATININE 0.71 01/26/2025    BCR 9.9 01/26/2025    CO2 24.8 01/26/2025    CALCIUM 8.8 01/26/2025    ALBUMIN 2.9 (L) 01/26/2025    AST 13 01/26/2025    ALT 14 01/26/2025     Microbiology:  1/21 blood cultures no growth to date  1/22 respiratory panel negative  1/22 blood culture in process    Assessment    #Neutropenic fever resolved but fevers persist  # pT3N2a M0 grade 3 invasive ductal adenocarcinoma right breast   #Immunosuppressed on chemotherapy with doxorubicin and cyclophosphamide  #Crohn's disease  #Chest port in place    Neutropenia has resolved but fevers persists.  Blood cultures remain negative to date.  LFTs are normal.  Will obtain a procalcitonin.  Pending results will repeat blood cultures x 2.  Continue empiric cefepime 2 g IV every 8 hours      Addendum  Procalcitonin came back over 1 therefore will obtain  repeat blood cultures x 2

## 2025-01-26 NOTE — PROGRESS NOTES
REASON FOR CONSULTATION:  breast cancer undergoing therapy, admission with neutropenic fever                               History of Present Illness   Record reviewed, the patient initiated adjuvant chemotherapy with AC on 12/31/2024.  She had fever after the first cycle with borderline neutropenia but no obvious bacterial infections.  She was treated with Levaquin.  Fevers resolved over the weekend.  She denies uncontrolled nausea vomiting or diarrhea.  She denies stomatitis.  Chest wounds have healed.  She was last seen 1/14/2024 with overall plans to proceed with adjuvant therapy including dose dense AC with Neulasta support x 4 followed by weekly Taxol.  Additional discussions included postmastectomy radiation therapy, hormonal therapy with ovarian suppression and tamoxifen versus oophorectomy and AI therapy and review of nonspecific lung nodules after completing chemotherapy.    She was treated with her second cycle of dose dense AC 1/14/2025 presented to the ER 1/22/2025 with fever, generalized malaise and nausea.  Studies included negative respiratory panel,, CBC with progressive neutropenia.  She had been assessed 1/21 with ANC of 0.01, placed on Augmentin.    Unfortunately symptoms worsened and she presented to the ER 1/22/2025.  H&H 8.9 25.9 with white count of 240, platelet count of 240,000, BUN/creatinine of 4 and 0.69, alk phos 271, lactate of 1.1, procalcitonin 0.33 UA with trace ketones, 2+ protein and trace leukocyte esterase, 3-5 WBCs and 7-12 epithelial cells, blood cultures negative,.  Patient admitted placed on cefepime.    Interval history:  1/23/2025  T102.6, pulse 101, respirate 16, BP 90/58  Patient describes continued fever, abdominal cramping and diarrhea  H&H 8.5 and 25.1 with white count of 350, platelet count of 189,000, BUN/creatinine of 3 and 0.58, potassium 2.6    1/24/2025  T97.9, pulse 74, respirations 18, BP 92/46  Patient far more comfortable today, quite fatigued  H&H 7.6  and 23.9, white count is 1140, platelet count 1 82,000, being creatinine of 4 and 0.57, albumin 2.4, total protein 5.6, alk phos 178, AST 12, ALT 19, total bilirubin 0.3    1/25/2025  T98.4, pulse 74, respirations 18, /65, SpO2 97%  Patient now completed transfusion therapy.  She is feeling significantly improved.  H&H of 12.1 and 36.1, white count of 4990, platelet count of 274,000, BUN/creatinine of 5 and 0.70    1/26/2025  T97.5, pulse 86 and respirations 18  Patient tearful not certain about continuing therapy  H&H 12.6 and 37.2, white count 10,510, platelet count 370,000, being creatinine 7 and 0.71, albumin 2.9, normal transaminases, alk phos 262, total bilirubin 0.5  Repeat fever last late p.m. subsequent assessment includes all blood cultures negative, patient continues empiric cefepime    ONCOLOGY HISTORY:  This is a 44-year-old woman referred from general surgery to discuss adjuvant treatment for recently surgically treated breast cancer.  The patient presented with a large palpable mass in the inner quadrant of the right breast.  The mass had been enlarging for couple years but the patient did not have insurance to get assessed.  The breast imaging is not available to me but she had a CT of the chest abdomen pelvis on 9/16/2024 showed a large mass in the right breast measuring up to 7.7 cm with abnormal left axillary lymphadenopathy.  There were tiny subpleural nodules in the lateral aspect of the left lower lobe likely granulomatous mildly conspicuous lymph node in the central mesentery 1.1 cm.  A PET scan was performed 9/30/2024 showing a hypermetabolic mass in the right breast with thickening throughout the right breast and pathologic hypermetabolic right axillary level 1 and 2 lymph nodes, no hypermetabolic internal mammary or supraclavicular lymph nodes.  The small pulmonary nodules were too small to characterize and mesenteric lymph nodes without hypermetabolic activity.  A biopsy of the right  breast mass showed invasive ductal adenocarcinoma.    She was taken to the operating room on 10/9/2024 and underwent a right modified radical mastectomy and axillary dissection, prophylactic left mastectomy.  Pathology from the right breast showed invasive ductal carcinoma [place] grade 3 (3+3+3) measuring 17 cm with extensive lymphovascular invasion and dermal lymphatic invasion.  The tumor extended to the dermis but did not ulcerate the skin.  Tumor extended to skeletal muscle and was present focally at an anterior margin, 0.05 mm of the posterior margin, 10 mm from the lateral margin, 20 mm from the medial margin, 28 mm from the superior margin and 40 mm from inferior margin.  There was ductal carcinoma in situ present within 2.5 mm of the posterior margin.  There were 13 lymph nodes in the axillary dissection 8 oncology plan/recommendations: of which had macrometastases, 1 micrometastases and 1 lymph node with isolated tumor cells.  The left breast had an intraductal papilloma otherwise negative.  The tumor was positive for estrogen receptor 99% of cells, progesterone receptor 50% of cells, HER2 0 and Ki-67 65%.    The patient has medical comorbidities of Crohn's disease.    Past Medical History:   Diagnosis Date    Anxiety     Breast cancer     Right    Crohn's disease     DVT (deep vein thrombosis) in pregnancy     PFO (patent foramen ovale)     Stroke     after the birth of her child at age 34    Tattoos         Past Surgical History:   Procedure Laterality Date     SECTION      COLON RESECTION      COLONOSCOPY      MASTECTOMY Bilateral 10/9/2024    Procedure: Modified radical mastectomy with axillary dissection of the right breast and simple mastectomy of the left breast;  Surgeon: Rebekah Garcia DO;  Location: [facility];  Service: General;  Laterality: Bilateral;    SKIN CANCER EXCISION      VENOUS ACCESS DEVICE (PORT) INSERTION N/A 12/10/2024    Procedure: INSERTION VENOUS ACCESS  DEVICE;  Surgeon: Rebekah Garcia DO;  Location: [facility];  Service: General;  Laterality: N/A;        Current Facility-Administered Medications on File Prior to Encounter   Medication Dose Route Frequency Provider Last Rate Last Admin    heparin injection 500 Units  500 Units Intravenous PRN Nishant Witt MD   500 Units at 12/11/24 0916    heparin injection 500 Units  500 Units Intravenous PRN Nishant Witt MD   500 Units at 01/07/25 1049    sodium chloride 0.9 % flush 10 mL  10 mL Intravenous PRN Nishant Witt MD   10 mL at 12/11/24 0916    sodium chloride 0.9 % flush 10 mL  10 mL Intravenous PRN Nishant Witt MD   10 mL at 01/07/25 1048     Current Outpatient Medications on File Prior to Encounter   Medication Sig Dispense Refill    amoxicillin-clavulanate (AUGMENTIN) 500-125 MG per tablet Take 1 tablet by mouth 2 (Two) Times a Day for 7 days. 14 tablet 0    aspirin 81 MG EC tablet Take 1 tablet by mouth Daily.      gabapentin (Neurontin) 300 MG capsule Take 2 capsules by mouth Every Night. 60 capsule 2    Klor-Con M20 20 MEQ CR tablet TAKE 1 TABLET BY MOUTH 2 TIMES A DAY 40 tablet 1    lidocaine-prilocaine (EMLA) 2.5-2.5 % cream Apply 1 Application topically to the appropriate area as directed As Needed for Mild Pain. Apply to port site 30 minutes before access 15 g 3    omeprazole (priLOSEC) 20 MG capsule       ondansetron (ZOFRAN) 8 MG tablet Take 1 tablet by mouth 3 (Three) Times a Day As Needed for Nausea or Vomiting. 30 tablet 5    prochlorperazine (COMPAZINE) 10 MG tablet Take 1 tablet by mouth Every 6 (Six) Hours As Needed for Nausea or Vomiting. 60 tablet 1    venlafaxine XR (Effexor XR) 37.5 MG 24 hr capsule Take 1 capsule by mouth Take As Directed. 1 capsule po q am for two weeks followed by increase to 2 capsules daily (Patient taking differently: Take 1 capsule by mouth Take As Directed. 1 capsule po q am for two weeks followed by increase to 2 capsules daily  Pt has not started  yet) 60 capsule 2    OLANZapine (zyPREXA) 5 MG tablet Take 1 tablet by mouth for 4 doses starting night of chemotherapy. 8 tablet 1        ALLERGIES:  No Known Allergies     Social History     Socioeconomic History    Marital status: Single    Number of children: 1   Tobacco Use    Smoking status: Former     Current packs/day: 0.00     Average packs/day: 1 pack/day for 20.0 years (20.0 ttl pk-yrs)     Types: Cigarettes     Start date:      Quit date: 2019     Years since quittin.0    Smokeless tobacco: Current    Tobacco comments:     Nicotine pouches   Vaping Use    Vaping status: Never Used   Substance and Sexual Activity    Alcohol use: Yes     Comment: OCC    Drug use: Never    Sexual activity: Defer        Family History   Problem Relation Age of Onset    Diabetes Mother     Heart disease Father     Diabetes Paternal Grandmother     Hypertension Other     Malig Hyperthermia Neg Hx                Objective     Vitals:    25 1650 25 2130 25 0416   BP: 96/54 92/55     BP Location: Left leg Left leg     Patient Position: Lying Lying     Pulse: 94 89  86   Resp: 18 16  18   Temp: 99.9 °F (37.7 °C) (!) 101.8 °F (38.8 °C) 98.2 °F (36.8 °C) 97.5 °F (36.4 °C)   TempSrc: Oral Oral Oral Oral   SpO2: 99% 97%  97%   Weight:       Height:                 2025     8:31 AM   Current Status   ECOG score 0       Physical Exam    CONSTITUTIONAL: pleasant well-developed adult woman  HEENT: no icterus, no thrush, moist membranes/  LYMPH: no cervical or supraclavicular lad  CV: RRR, S1S2, no murmur  RESP/chest: cta bilat, no wheezing, no rales, port present left chest wall  Breast: Status post bilateral mastectomies, wound mildly erythematous, healed without discharge  GI: soft, generally tender, bowel sounds positive  MUSC: no mele  NEURO: alert and oriented x3, normal strength  PSYCH: Frustrated, alert, oriented      RECENT LABS:  Hematology WBC   Date Value Ref Range Status    01/26/2025 10.51 3.40 - 10.80 10*3/mm3 Final     RBC   Date Value Ref Range Status   01/26/2025 4.29 3.77 - 5.28 10*6/mm3 Final     Hemoglobin   Date Value Ref Range Status   01/26/2025 12.6 12.0 - 15.9 g/dL Final     Hematocrit   Date Value Ref Range Status   01/26/2025 37.2 34.0 - 46.6 % Final     Platelets   Date Value Ref Range Status   01/26/2025 370 140 - 450 10*3/mm3 Final        Lab Results   Component Value Date    GLUCOSE 89 01/26/2025    BUN 7 01/26/2025    CREATININE 0.71 01/26/2025     01/26/2025    K 3.8 01/26/2025     01/26/2025    CALCIUM 8.8 01/26/2025    PROTEINTOT 6.6 01/26/2025    ALBUMIN 2.9 (L) 01/26/2025    ALT 14 01/26/2025    AST 13 01/26/2025    ALKPHOS 262 (H) 01/26/2025    BILITOT 0.5 01/26/2025    GLOB 3.7 01/26/2025    AGRATIO 0.8 01/26/2025    BCR 9.9 01/26/2025    ANIONGAP 12.2 01/26/2025    EGFR 107.7 01/26/2025     PET scan 9/30/2024:  FINDINGS:     Head/neck: No suspicious uptake.     Chest: An 8 x 5.8 cm mass in the lower inner right breast with max SUV  of 18 (series 4/image 156). Mass comes in close proximity to the  sternum. Hypermetabolic parenchymal thickening throughout the right  breast with max SUV of 9 (series 4/image 140). Diffuse right breast skin  thickening.     Hypermetabolic right axillary level I and II lymph nodes. Reference 1.2  cm level I lymph node with max SUV of 11.9 (series 4/image 103).  Additional reference subcentimeter level II lymph node with max SUV of  3.3 (series 4/image 93). Separate brown fat uptake in the bilateral  axilla and posterior neck base.     No enlarged or hypermetabolic internal mammary or supraclavicular lymph  nodes.     Few subcentimeter pulmonary nodules are too small for accurate PET  characterization without overt hypermetabolism and unchanged from recent  CT. Reference left lower lobe (series 4/image 167) and right upper lobe  (series 4/image 131).     Abdomen/pelvis: Mesenteric lymph nodes are mostly subcentimeter  without  associated hypermetabolism.     Sigmoid colectomy. Tubular hypermetabolism throughout the otherwise  normal-appearing remaining colon may be medication related. Moderate  proximal colonic stool burden.     Bones: No focal osseous hypermetabolism with special attention to the  sternum.           IMPRESSION:  1. Hypermetabolic 8 cm right breast mass with hypermetabolic parenchymal  thickening throughout the right breast, pathologically proven invasive  ductal carcinoma. Mass comes in close proximity to the sternum. No focal  osseous hypermetabolism with special attention to the sternum.  2. Hypermetabolic right axillary level I and II lymph nodes suggesting  alana metastatic disease. No enlarged or hypermetabolic internal mammary  or supraclavicular lymph nodes. Separate brown fat uptake in the  bilateral axilla and posterior neck base.  3. Few subcentimeter pulmonary nodules are too small for accurate PET  characterization and will need follow-up via chest CT.  4. Mesenteric lymph nodes are mostly subcentimeter without associated  hypermetabolism.       Assessment & Plan   *pT3N2a M0 grade 3 invasive ductal adenocarcinoma right breast, ER 99% positive, NJ 50% positive, HER2 0, Ki-67 65%, tumor present at anterior margin  She presented with a large palpable mass in the inner quadrant of the right breast; mass had been enlarging for couple years but the patient did not have insurance to get assessed  CT of the chest abdomen pelvis on 9/16/2024 showed a large mass in the right breast measuring up to 7.7 cm with abnormal left axillary lymphadenopathy.  There were tiny subpleural nodules in the lateral aspect of the left lower lobe likely granulomatous mildly conspicuous lymph node in the central mesentery 1.1 cm.    PET scan 9/30/2024 - hypermetabolic mass in the right breast with thickening throughout the right breast and pathologic hypermetabolic right axillary level 1 and 2 lymph nodes, no hypermetabolic  internal mammary or supraclavicular lymph nodes.  The small pulmonary nodules were too small to characterize and mesenteric lymph nodes without hypermetabolic activity.    biopsy of the right breast mass showed invasive ductal adenocarcinoma.  operating room on 10/9/2024 and underwent a right modified radical mastectomy and axillary dissection, prophylactic left mastectomy.  Pathology from the right breast showed invasive ductal carcinoma [place] grade 3 (3+3+3) measuring 17 cm with extensive lymphovascular invasion and dermal lymphatic invasion.  The tumor extended to the dermis but did not ulcerate the skin.  Tumor extended to skeletal muscle and was present focally at an anterior margin, 0.05 mm of the posterior margin, 10 mm from the lateral margin, 20 mm from the medial margin, 28 mm from the superior margin and 40 mm from inferior margin.  There was ductal carcinoma in situ present within 2.5 mm of the posterior margin.  There were 13 lymph nodes in the axillary dissection 8 of which had macrometastases, 1 micrometastases and 1 lymph node with isolated tumor cells.  The left breast had an intraductal papilloma otherwise negative.  The tumor was positive for estrogen receptor 99% of cells, progesterone receptor 50% of cells, HER2 0 and Ki-67 65%.  Initiated adjuvant chemotherapy with Adriamycin/Cytoxan 12/31/2024 (adjuvant chemotherapy delayed because of wound healing issues) nonneutropenic fever    *Admitted 1/22/2025 with neutropenic fever  G-CSF continued from 1/23/2025, discontinued 1/25/2025  Continue cefepime      *Marrow suppression secondary to chemotherapy   resolved with normal CBC-6850, 1/14/2025  Transfusion planned 1/24/2025      * nausea/diarrhea, chemo induced   responding to Imodium/Zofran/Compazine    *elevated LFT-likely s/e chemo     *[employer]ae 19 gene panel-VUS Bard 1    *medical comorbidities of Crohn's disease.    Oncology plan/recommendations:  Again reviewed plan  for adjuvant  chemotherapy to reduce risk of recurrence/help eradicate potential micrometastatic disease with dose dense AC with Neulasta support x 4 cycles followed by weekly Taxol x 12 doses-proceed with cycle 2 AC today pending stable CMP  Patient admitted with neutropenic fever, agree with cefepime, initiation of additional Neupogen, discussion with primary oncologist about subsequent treatment plan, transfusion as needed  Patient reviewed 1/25/2025 with Neupogen discontinued, she is scheduled 1/28/2025 back in office for further chemotherapy.  After chemotherapy she will need postmastectomy radiation given the size and alana involvement  She will need hormonal therapy with ovarian suppression/tamoxifen versus oophorectomy and AI therapy/ck4/6 inhibitor  Nonspecific lung nodules will need reassessment after completing chemotherapy radiographically

## 2025-01-26 NOTE — PROGRESS NOTES
Name: Suzanne Lin ADMIT: 2025   : 1980  PCP: Gia Rodriguez, APRN    MRN: [id number] LOS: 4 days   AGE/SEX: 44 y.o. female  ROOM: [facility]     Subjective   Subjective   Patient is seen at bedside, she still has continued to spike fever.       Objective   Objective   Vital Signs  Temp:  [97.5 °F (36.4 °C)-101.8 °F (38.8 °C)] 100.6 °F (38.1 °C)  Heart Rate:  [66-89] 66  Resp:  [16-18] 16  BP: ()/(55-62) 103/62  SpO2:  [95 %-100 %] 95 %  on   ;   Device (Oxygen Therapy): room air  Body mass index is 24.96 kg/m².  Physical Exam  General, awake and alert.  Head and ENT, normocephalic and atraumatic.  Lungs, symmetric expansion, equal air entry bilaterally.  Heart, regular rate and rhythm.  Abdomen, soft and nontender.  Extremities, no clubbing or cyanosis.  Neuro, no focal deficits.  Skin: Warm and no rash.  Psych, normal mood and affect.  Musculoskeletal, joint examination is grossly normal.        Copied text material from yesterday's note has been reviewed for appropriate changes and remains accurate as of 25.        Results Review     I reviewed the patient's new clinical results.  Results from last 7 days   Lab Units 25  0410 25  0520 25  0516 25  0552   WBC 10*3/mm3 10.51 4.99 1.14* 0.35*   HEMOGLOBIN g/dL 12.6 12.1 7.6* 8.5*   PLATELETS 10*3/mm3 370 274 182 189     Results from last 7 days   Lab Units 25  0410 25  0520 25  0516 25  1621 25  0552   SODIUM mmol/L 140 139 137  --  138   POTASSIUM mmol/L 3.8 3.4* 2.9* 2.9* 2.6*   CHLORIDE mmol/L 103 107 103  --  106   CO2 mmol/L 24.8 22.7 22.7  --  22.6   BUN mg/dL 7 5* 4*  --  3*   CREATININE mg/dL 0.71 0.70 0.57  --  0.58   GLUCOSE mg/dL 89 86 87  --  117*   EGFR mL/min/1.73 107.7 109.5 115.1  --  114.6     Results from last 7 days   Lab Units 25  0410 25  0520 25  0516 25  0552   ALBUMIN g/dL 2.9* 2.1* 2.4* 2.2*   BILIRUBIN mg/dL 0.5 0.6 0.3 0.6   ALK PHOS  "U/L 262* 201* 178* 208*   AST (SGOT) U/L 13 10 12 12   ALT (SGPT) U/L 14 17 19 19     Results from last 7 days   Lab Units 01/26/25  0410 01/25/25  0520 01/24/25  0516 01/23/25  0552 01/22/25  1934 01/21/25  0929 01/21/25  0929   CALCIUM mg/dL 8.8 8.7 8.3* 8.0* 8.7  --  8.9   ALBUMIN g/dL 2.9* 2.1* 2.4* 2.2* 2.9*   < >  --    MAGNESIUM mg/dL  --   --   --   --  1.9  --  2.2    < > = values in this interval not displayed.     Results from last 7 days   Lab Units 01/26/25 0410 01/22/25 1934   PROCALCITONIN ng/mL 1.15* 0.33*   LACTATE mmol/L  --  1.1     No results found for: \"HGBA1C\", \"POCGLU\"    XR Chest 1 View    Result Date: 1/26/2025  No evidence for acute pulmonary process. Follow-up as clinical indications persist.  This report was finalized on 1/26/2025 3:13 PM by Dr. Chaitanya Espinal M.D on Workstation: [id number]       I have personally reviewed all medications:  Scheduled Medications  aspirin, 81 mg, Oral, Daily  cefepime, 2,000 mg, Intravenous, Q8H  gabapentin, 600 mg, Oral, Nightly  sodium chloride, 10 mL, Intravenous, Q12H    Infusions   Diet  Diet: Regular/House; Fluid Consistency: Thin (IDDSI 0)    I have personally reviewed:  [x]  Laboratory   [x]  Microbiology   [x]  Radiology   [x]  EKG/Telemetry  [x]  Cardiology/Vascular   []  Pathology    []  Records       Assessment/Plan     Active Hospital Problems    Diagnosis  POA    **Neutropenic fever [D70.9, R50.81]  Yes    Crohn's disease without complication [K50.90]  Yes    LITA (iron deficiency anemia) [D50.9]  Yes    Breast cancer, stage 3, right [C50.911]  Yes      Resolved Hospital Problems   No resolved problems to display.       44 y.o. female admitted with Neutropenic fever.    Assessment and plan  1.  Neutropenic fever, high-grade fever noted, infectious disease service on board, continue IV cefepime.  Antibiotic management per ID recommendations.  Repeat procalcitonin elevated, repeat blood cultures ordered.     2.  History of Crohn's disease, " currently uncomplicated course.  If she worsens, she may need GI evaluation.     3.  Stage III right-sided breast cancer, status post recent chemotherapy, hematology/oncology has been consulted.  We will follow management recommendations.     4.  Anemia, monitor hemoglobin closely, we will continue to recheck labs.     5.  CODE STATUS is full code.  Further plans based on hospital course.  Expected Discharge Date: 1/27/2025; Expected Discharge Time:       Jmi Mane MD  [facility]  01/26/25  18:07 EST

## 2025-01-26 NOTE — PLAN OF CARE
Goal Outcome Evaluation:   Pt had one fever overnight of 101.2 around 2000, which came down quickly with tylenol PO.  Pt felt like a fever was coming on around 0400, and asked for tylenol pre-emptively, as temp was 97.5.  Had difficulty sleeping, and is requesting a sleep aide if she stays inpatient another night.

## 2025-01-27 ENCOUNTER — APPOINTMENT (OUTPATIENT)
Dept: GENERAL RADIOLOGY | Facility: HOSPITAL | Age: 45
End: 2025-01-27
Payer: COMMERCIAL

## 2025-01-27 LAB
ALBUMIN SERPL-MCNC: 2.2 G/DL (ref 3.5–5.2)
ALBUMIN/GLOB SERPL: 0.6 G/DL
ALP SERPL-CCNC: 274 U/L (ref 39–117)
ALT SERPL W P-5'-P-CCNC: 13 U/L (ref 1–33)
ANION GAP SERPL CALCULATED.3IONS-SCNC: 12.7 MMOL/L (ref 5–15)
AST SERPL-CCNC: 18 U/L (ref 1–32)
BACTERIA SPEC AEROBE CULT: NORMAL
BACTERIA SPEC AEROBE CULT: NORMAL
BILIRUB SERPL-MCNC: 0.4 MG/DL (ref 0–1.2)
BUN SERPL-MCNC: 7 MG/DL (ref 6–20)
BUN/CREAT SERPL: 11.9 (ref 7–25)
CALCIUM SPEC-SCNC: 8.2 MG/DL (ref 8.6–10.5)
CHLORIDE SERPL-SCNC: 101 MMOL/L (ref 98–107)
CO2 SERPL-SCNC: 23.3 MMOL/L (ref 22–29)
CREAT SERPL-MCNC: 0.59 MG/DL (ref 0.57–1)
DEPRECATED RDW RBC AUTO: 47.5 FL (ref 37–54)
EGFRCR SERPLBLD CKD-EPI 2021: 114.1 ML/MIN/1.73
ERYTHROCYTE [DISTWIDTH] IN BLOOD BY AUTOMATED COUNT: 14.4 % (ref 12.3–15.4)
GLOBULIN UR ELPH-MCNC: 3.8 GM/DL
GLUCOSE SERPL-MCNC: 118 MG/DL (ref 65–99)
HCT VFR BLD AUTO: 34.5 % (ref 34–46.6)
HGB BLD-MCNC: 11.4 G/DL (ref 12–15.9)
LYMPHOCYTES # BLD MANUAL: 0.85 10*3/MM3 (ref 0.7–3.1)
LYMPHOCYTES NFR BLD MANUAL: 13 % (ref 5–12)
MCH RBC QN AUTO: 29.9 PG (ref 26.6–33)
MCHC RBC AUTO-ENTMCNC: 33 G/DL (ref 31.5–35.7)
MCV RBC AUTO: 90.6 FL (ref 79–97)
METAMYELOCYTES NFR BLD MANUAL: 2 % (ref 0–0)
MONOCYTES # BLD: 1.01 10*3/MM3 (ref 0.1–0.9)
MYELOCYTES NFR BLD MANUAL: 5 % (ref 0–0)
NEUTROPHILS # BLD AUTO: 5.35 10*3/MM3 (ref 1.7–7)
NEUTROPHILS NFR BLD MANUAL: 69 % (ref 42.7–76)
NRBC BLD AUTO-RTO: 0 /100 WBC (ref 0–0.2)
PLAT MORPH BLD: NORMAL
PLATELET # BLD AUTO: 371 10*3/MM3 (ref 140–450)
PMV BLD AUTO: 10.5 FL (ref 6–12)
POTASSIUM SERPL-SCNC: 2.9 MMOL/L (ref 3.5–5.2)
PROT SERPL-MCNC: 6 G/DL (ref 6–8.5)
RBC # BLD AUTO: 3.81 10*6/MM3 (ref 3.77–5.28)
RBC MORPH BLD: NORMAL
SODIUM SERPL-SCNC: 137 MMOL/L (ref 136–145)
VARIANT LYMPHS NFR BLD MANUAL: 11 % (ref 19.6–45.3)
WBC MORPH BLD: NORMAL
WBC NRBC COR # BLD AUTO: 7.76 10*3/MM3 (ref 3.4–10.8)

## 2025-01-27 PROCEDURE — 80053 COMPREHEN METABOLIC PANEL: CPT | Performed by: INTERNAL MEDICINE

## 2025-01-27 PROCEDURE — 25010000002 ONDANSETRON PER 1 MG: Performed by: NURSE PRACTITIONER

## 2025-01-27 PROCEDURE — 99232 SBSQ HOSP IP/OBS MODERATE 35: CPT | Performed by: INTERNAL MEDICINE

## 2025-01-27 PROCEDURE — 85007 BL SMEAR W/DIFF WBC COUNT: CPT | Performed by: INTERNAL MEDICINE

## 2025-01-27 PROCEDURE — 70355 PANORAMIC X-RAY OF JAWS: CPT

## 2025-01-27 PROCEDURE — 99232 SBSQ HOSP IP/OBS MODERATE 35: CPT | Performed by: STUDENT IN AN ORGANIZED HEALTH CARE EDUCATION/TRAINING PROGRAM

## 2025-01-27 PROCEDURE — 25010000002 CEFEPIME PER 500 MG: Performed by: STUDENT IN AN ORGANIZED HEALTH CARE EDUCATION/TRAINING PROGRAM

## 2025-01-27 PROCEDURE — 85025 COMPLETE CBC W/AUTO DIFF WBC: CPT | Performed by: INTERNAL MEDICINE

## 2025-01-27 RX ORDER — POTASSIUM CHLORIDE 750 MG/1
40 TABLET, FILM COATED, EXTENDED RELEASE ORAL EVERY 4 HOURS
Status: COMPLETED | OUTPATIENT
Start: 2025-01-27 | End: 2025-01-27

## 2025-01-27 RX ADMIN — POTASSIUM CHLORIDE 40 MEQ: 750 TABLET, EXTENDED RELEASE ORAL at 23:51

## 2025-01-27 RX ADMIN — CEFEPIME 2000 MG: 2 INJECTION, POWDER, FOR SOLUTION INTRAVENOUS at 20:28

## 2025-01-27 RX ADMIN — Medication 10 ML: at 08:18

## 2025-01-27 RX ADMIN — GABAPENTIN 600 MG: 300 CAPSULE ORAL at 20:27

## 2025-01-27 RX ADMIN — ONDANSETRON 4 MG: 2 INJECTION INTRAMUSCULAR; INTRAVENOUS at 18:53

## 2025-01-27 RX ADMIN — CEFEPIME 2000 MG: 2 INJECTION, POWDER, FOR SOLUTION INTRAVENOUS at 05:21

## 2025-01-27 RX ADMIN — OXYCODONE HYDROCHLORIDE 5 MG: 5 TABLET ORAL at 05:25

## 2025-01-27 RX ADMIN — POTASSIUM CHLORIDE 40 MEQ: 750 TABLET, EXTENDED RELEASE ORAL at 20:27

## 2025-01-27 RX ADMIN — POTASSIUM CHLORIDE 40 MEQ: 750 TABLET, EXTENDED RELEASE ORAL at 18:53

## 2025-01-27 RX ADMIN — ASPIRIN 81 MG: 81 TABLET, COATED ORAL at 08:16

## 2025-01-27 RX ADMIN — Medication 10 ML: at 20:16

## 2025-01-27 RX ADMIN — OXYCODONE HYDROCHLORIDE 5 MG: 5 TABLET ORAL at 20:32

## 2025-01-27 RX ADMIN — CEFEPIME 2000 MG: 2 INJECTION, POWDER, FOR SOLUTION INTRAVENOUS at 14:42

## 2025-01-27 NOTE — PROGRESS NOTES
LOS: 5 days     Chief Complaint: Neutropenic fever    Interval History: Remains intermittently febrile however overall fever curve somewhat improved.  Not recovered her WBC count.  States she is feeling better this morning.  No acute complaints.    Vital Signs  Temp:  [97.5 °F (36.4 °C)-100.6 °F (38.1 °C)] 98.6 °F (37 °C)  Heart Rate:  [66-78] 72  Resp:  [16] 16  BP: ()/(50-62) 98/57    Physical Exam:  General: In no acute distress  Respiratory: Breathing comfortably on room air  Skin: No rashes or lesions in exposed areas  Extremities: No edema, cyanosis  Access: Chest port and peripheral IV    Antibiotics:  Cefepime 2 g IV every 8 hours    Results Review:     I reviewed the patient's new clinical results.    Lab Results   Component Value Date    WBC 10.51 01/26/2025    HGB 12.6 01/26/2025    HCT 37.2 01/26/2025    MCV 86.7 01/26/2025     01/26/2025     Lab Results   Component Value Date    GLUCOSE 89 01/26/2025    BUN 7 01/26/2025    CREATININE 0.71 01/26/2025    BCR 9.9 01/26/2025    CO2 24.8 01/26/2025    CALCIUM 8.8 01/26/2025    ALBUMIN 2.9 (L) 01/26/2025    AST 13 01/26/2025    ALT 14 01/26/2025     Microbiology:  1/21 blood cultures no growth to date  1/22 respiratory panel negative  1/22 blood culture no growth to date  1/26 blood cultures in process    Assessment    #Neutropenic fever resolved but fevers persist  # pT3N2a M0 grade 3 invasive ductal adenocarcinoma right breast   #Immunosuppressed on chemotherapy with doxorubicin and cyclophosphamide  #Crohn's disease  #Chest port in place    Repeat blood cultures in process.  Fever curve overall slightly improved.  Has now recovered ANC.  Chest x-ray reassuring.  Continue empiric cefepime 2 g IV every 8 hours.

## 2025-01-27 NOTE — PROGRESS NOTES
REASON FOR FOLLOW-UP:   breast cancer undergoing adjuvant therapy, admission with neutropenic fever                               INTERVAL HISTORY:  The patient is feeling okay this morning.  She denies nausea vomiting diarrhea.  She was febrile again yesterday and repeat blood cultures taken.  She is no longer neutropenic but remains on antibiotics.  She complains of dental issues wondering if that could be the source of persistent fever.    ONCOLOGY HISTORY:  This is a 44-year-old woman referred from general surgery to discuss adjuvant treatment for recently surgically treated breast cancer.  The patient presented with a large palpable mass in the inner quadrant of the right breast.  The mass had been enlarging for couple years but the patient did not have insurance to get assessed.  The breast imaging is not available to me but she had a CT of the chest abdomen pelvis on 9/16/2024 showed a large mass in the right breast measuring up to 7.7 cm with abnormal left axillary lymphadenopathy.  There were tiny subpleural nodules in the lateral aspect of the left lower lobe likely granulomatous mildly conspicuous lymph node in the central mesentery 1.1 cm.  A PET scan was performed 9/30/2024 showing a hypermetabolic mass in the right breast with thickening throughout the right breast and pathologic hypermetabolic right axillary level 1 and 2 lymph nodes, no hypermetabolic internal mammary or supraclavicular lymph nodes.  The small pulmonary nodules were too small to characterize and mesenteric lymph nodes without hypermetabolic activity.  A biopsy of the right breast mass showed invasive ductal adenocarcinoma.    She was taken to the operating room on 10/9/2024 and underwent a right modified radical mastectomy and axillary dissection, prophylactic left mastectomy.  Pathology from the right breast showed invasive ductal carcinoma [place] grade 3 (3+3+3) measuring 17 cm with extensive lymphovascular invasion and  dermal lymphatic invasion.  The tumor extended to the dermis but did not ulcerate the skin.  Tumor extended to skeletal muscle and was present focally at an anterior margin, 0.05 mm of the posterior margin, 10 mm from the lateral margin, 20 mm from the medial margin, 28 mm from the superior margin and 40 mm from inferior margin.  There was ductal carcinoma in situ present within 2.5 mm of the posterior margin.  There were 13 lymph nodes in the axillary dissection 8 oncology plan/recommendations: of which had macrometastases, 1 micrometastases and 1 lymph node with isolated tumor cells.  The left breast had an intraductal papilloma otherwise negative.  The tumor was positive for estrogen receptor 99% of cells, progesterone receptor 50% of cells, HER2 0 and Ki-67 65%.    The patient has medical comorbidities of Crohn's disease.    Past Medical History:   Diagnosis Date    Anxiety     Breast cancer     Right    Crohn's disease     DVT (deep vein thrombosis) in pregnancy     PFO (patent foramen ovale)     Stroke     after the birth of her child at age 34    Tattoos         Past Surgical History:   Procedure Laterality Date     SECTION      COLON RESECTION      COLONOSCOPY      MASTECTOMY Bilateral 10/9/2024    Procedure: Modified radical mastectomy with axillary dissection of the right breast and simple mastectomy of the left breast;  Surgeon: Rebekah Garcia DO;  Location: [facility] OR;  Service: General;  Laterality: Bilateral;    SKIN CANCER EXCISION      VENOUS ACCESS DEVICE (PORT) INSERTION N/A 12/10/2024    Procedure: INSERTION VENOUS ACCESS DEVICE;  Surgeon: Rebekah Garcia DO;  Location: [facility] OR;  Service: General;  Laterality: N/A;        Current Facility-Administered Medications on File Prior to Encounter   Medication Dose Route Frequency Provider Last Rate Last Admin    heparin injection 500 Units  500 Units Intravenous PRN Nishant Witt MD   500 Units at 24 0916    heparin  injection 500 Units  500 Units Intravenous PRN Nishant Witt MD   500 Units at 01/07/25 1049    sodium chloride 0.9 % flush 10 mL  10 mL Intravenous PRN Nishant Witt MD   10 mL at 12/11/24 0916    sodium chloride 0.9 % flush 10 mL  10 mL Intravenous PRN Nishant Witt MD   10 mL at 01/07/25 1048     Current Outpatient Medications on File Prior to Encounter   Medication Sig Dispense Refill    amoxicillin-clavulanate (AUGMENTIN) 500-125 MG per tablet Take 1 tablet by mouth 2 (Two) Times a Day for 7 days. 14 tablet 0    aspirin 81 MG EC tablet Take 1 tablet by mouth Daily.      gabapentin (Neurontin) 300 MG capsule Take 2 capsules by mouth Every Night. 60 capsule 2    Klor-Con M20 20 MEQ CR tablet TAKE 1 TABLET BY MOUTH 2 TIMES A DAY 40 tablet 1    lidocaine-prilocaine (EMLA) 2.5-2.5 % cream Apply 1 Application topically to the appropriate area as directed As Needed for Mild Pain. Apply to port site 30 minutes before access 15 g 3    omeprazole (priLOSEC) 20 MG capsule       ondansetron (ZOFRAN) 8 MG tablet Take 1 tablet by mouth 3 (Three) Times a Day As Needed for Nausea or Vomiting. 30 tablet 5    prochlorperazine (COMPAZINE) 10 MG tablet Take 1 tablet by mouth Every 6 (Six) Hours As Needed for Nausea or Vomiting. 60 tablet 1    venlafaxine XR (Effexor XR) 37.5 MG 24 hr capsule Take 1 capsule by mouth Take As Directed. 1 capsule po q am for two weeks followed by increase to 2 capsules daily (Patient taking differently: Take 1 capsule by mouth Take As Directed. 1 capsule po q am for two weeks followed by increase to 2 capsules daily  Pt has not started yet) 60 capsule 2    OLANZapine (zyPREXA) 5 MG tablet Take 1 tablet by mouth for 4 doses starting night of chemotherapy. 8 tablet 1        ALLERGIES:  No Known Allergies     Social History     Socioeconomic History    Marital status: Single    Number of children: 1   Tobacco Use    Smoking status: Former     Current packs/day: 0.00     Average packs/day: 1  pack/day for 20.0 years (20.0 ttl pk-yrs)     Types: Cigarettes     Start date:      Quit date: 2019     Years since quittin.0    Smokeless tobacco: Current    Tobacco comments:     Nicotine pouches   Vaping Use    Vaping status: Never Used   Substance and Sexual Activity    Alcohol use: Yes     Comment: OCC    Drug use: Never    Sexual activity: Defer        Family History   Problem Relation Age of Onset    Diabetes Mother     Heart disease Father     Diabetes Paternal Grandmother     Hypertension Other     Malig Hyperthermia Neg Hx         ROS reviewed and pertinent as per the HPI      Objective     Vitals:    25 1618 25 1949 25 0426 25 0730   BP: 103/62 (!) 88/52 100/50 98/57   BP Location: Left leg Left leg Left leg Left leg   Patient Position: Lying Lying Lying Lying   Pulse: 66 78 75 72   Resp: 16 16 16 16   Temp: (!) 100.6 °F (38.1 °C) 98.1 °F (36.7 °C) 98.6 °F (37 °C) 98.6 °F (37 °C)   TempSrc: Oral Oral Oral Oral   SpO2: 95% 95% 94% 97%   Weight:       Height:                 2025     8:31 AM   Current Status   ECOG score 0       Physical Exam    CONSTITUTIONAL: pleasant well-developed adult woman  HEENT: no icterus, no thrush, moist membranes/  LYMPH: no cervical or supraclavicular lad  CV: RRR, S1S2, no murmur  RESP/chest: cta bilat, no wheezing, no rales, port present left chest wall  Breast: Status post bilateral mastectomies, wound mildly erythematous, healed without discharge  GI: soft, generally tender, bowel sounds positive  MUSC: no edema  NEURO: alert and oriented x3, normal strength  PSYCH: Normal mood and affect      RECENT LABS:  Hematology WBC   Date Value Ref Range Status   2025 10.51 3.40 - 10.80 10*3/mm3 Final     RBC   Date Value Ref Range Status   2025 4.29 3.77 - 5.28 10*6/mm3 Final     Hemoglobin   Date Value Ref Range Status   2025 12.6 12.0 - 15.9 g/dL Final     Hematocrit   Date Value Ref Range Status   2025 37.2 34.0 -  46.6 % Final     Platelets   Date Value Ref Range Status   01/26/2025 370 140 - 450 10*3/mm3 Final        Lab Results   Component Value Date    GLUCOSE 89 01/26/2025    BUN 7 01/26/2025    CREATININE 0.71 01/26/2025     01/26/2025    K 3.8 01/26/2025     01/26/2025    CALCIUM 8.8 01/26/2025    PROTEINTOT 6.6 01/26/2025    ALBUMIN 2.9 (L) 01/26/2025    ALT 14 01/26/2025    AST 13 01/26/2025    ALKPHOS 262 (H) 01/26/2025    BILITOT 0.5 01/26/2025    GLOB 3.7 01/26/2025    AGRATIO 0.8 01/26/2025    BCR 9.9 01/26/2025    ANIONGAP 12.2 01/26/2025    EGFR 107.7 01/26/2025     PET scan 9/30/2024:  FINDINGS:     Head/neck: No suspicious uptake.     Chest: An 8 x 5.8 cm mass in the lower inner right breast with max SUV  of 18 (series 4/image 156). Mass comes in close proximity to the  sternum. Hypermetabolic parenchymal thickening throughout the right  breast with max SUV of 9 (series 4/image 140). Diffuse right breast skin  thickening.     Hypermetabolic right axillary level I and II lymph nodes. Reference 1.2  cm level I lymph node with max SUV of 11.9 (series 4/image 103).  Additional reference subcentimeter level II lymph node with max SUV of  3.3 (series 4/image 93). Separate brown fat uptake in the bilateral  axilla and posterior neck base.     No enlarged or hypermetabolic internal mammary or supraclavicular lymph  nodes.     Few subcentimeter pulmonary nodules are too small for accurate PET  characterization without overt hypermetabolism and unchanged from recent  CT. Reference left lower lobe (series 4/image 167) and right upper lobe  (series 4/image 131).     Abdomen/pelvis: Mesenteric lymph nodes are mostly subcentimeter without  associated hypermetabolism.     Sigmoid colectomy. Tubular hypermetabolism throughout the otherwise  normal-appearing remaining colon may be medication related. Moderate  proximal colonic stool burden.     Bones: No focal osseous hypermetabolism with special attention to  the  sternum.           IMPRESSION:  1. Hypermetabolic 8 cm right breast mass with hypermetabolic parenchymal  thickening throughout the right breast, pathologically proven invasive  ductal carcinoma. Mass comes in close proximity to the sternum. No focal  osseous hypermetabolism with special attention to the sternum.  2. Hypermetabolic right axillary level I and II lymph nodes suggesting  alana metastatic disease. No enlarged or hypermetabolic internal mammary  or supraclavicular lymph nodes. Separate brown fat uptake in the  bilateral axilla and posterior neck base.  3. Few subcentimeter pulmonary nodules are too small for accurate PET  characterization and will need follow-up via chest CT.  4. Mesenteric lymph nodes are mostly subcentimeter without associated  hypermetabolism.       Assessment & Plan   *pT3N2a M0 grade 3 invasive ductal adenocarcinoma right breast, ER 99% positive, TX 50% positive, HER2 0, Ki-67 65%, tumor present at anterior margin  She presented with a large palpable mass in the inner quadrant of the right breast; mass had been enlarging for couple years but the patient did not have insurance to get assessed  CT of the chest abdomen pelvis on 9/16/2024 showed a large mass in the right breast measuring up to 7.7 cm with abnormal left axillary lymphadenopathy.  There were tiny subpleural nodules in the lateral aspect of the left lower lobe likely granulomatous mildly conspicuous lymph node in the central mesentery 1.1 cm.    PET scan 9/30/2024 - hypermetabolic mass in the right breast with thickening throughout the right breast and pathologic hypermetabolic right axillary level 1 and 2 lymph nodes, no hypermetabolic internal mammary or supraclavicular lymph nodes.  The small pulmonary nodules were too small to characterize and mesenteric lymph nodes without hypermetabolic activity.    biopsy of the right breast mass showed invasive ductal adenocarcinoma.  operating room on 10/9/2024 and underwent  a right modified radical mastectomy and axillary dissection, prophylactic left mastectomy.  Pathology from the right breast showed invasive ductal carcinoma [place] grade 3 (3+3+3) measuring 17 cm with extensive lymphovascular invasion and dermal lymphatic invasion.  The tumor extended to the dermis but did not ulcerate the skin.  Tumor extended to skeletal muscle and was present focally at an anterior margin, 0.05 mm of the posterior margin, 10 mm from the lateral margin, 20 mm from the medial margin, 28 mm from the superior margin and 40 mm from inferior margin.  There was ductal carcinoma in situ present within 2.5 mm of the posterior margin.  There were 13 lymph nodes in the axillary dissection 8 of which had macrometastases, 1 micrometastases and 1 lymph node with isolated tumor cells.  The left breast had an intraductal papilloma otherwise negative.  The tumor was positive for estrogen receptor 99% of cells, progesterone receptor 50% of cells, HER2 0 and Ki-67 65%.  Initiated adjuvant chemotherapy with Adriamycin/Cytoxan 12/31/2024 (adjuvant chemotherapy delayed because of wound healing issues) nonneutropenic fever  C2 AC with Neulasta 1/14/25    *Admitted 1/22/2025 with neutropenic fever  Received neulasta onpro 1/15/25  G-CSF continued from 1/23/2025, discontinued 1/25/2025 after neutrophil recovery  Blood cultures from 1/21/2025 and 1/22/2025-no growth  Respiratory viral panel-negative  CT abdomen 1/23/2025-periportal edema within the liver, no biliary ductal dilatation, nonspecific fluid around the gallbladder fundus, fluid throughout the colon, stable wall thickening terminal ileum  Chest x-rays 1/22/2025 and 1/26/2025-no acute pulmonary process    *Myelosuppression secondary to chemotherapy  1/26/2025-normal CBC including ANC 7.4    * nausea/diarrhea, chemo induced   responding to Imodium/Zofran/Compazine    *elevated LFT-likely s/e chemo   1/26/2025-normal LFTs other than alk phos 262    *medical  comorbidities of Crohn's disease.    Oncology plan/recommendations:  Monitor CBC  Check dental Panorex  Infectious disease managing antibiotics  Next cycle of AC chemotherapy will be dose delayed 1 week

## 2025-01-27 NOTE — PAYOR COMM NOTE
"Suzanne Lin (44 y.o. Female)          U/D FOR [id number]      F# [phone]         Date of Birth   1980    Social Security Number       Address   [address] [place] [address]    Home Phone   [phone]    MRN   [id number]       Yazidi   None    Marital Status   Single                            Admission Date   1/22/25    Admission Type   Emergency    Admitting Provider   Jim Mane MD    Attending Provider   Jim Mane MD    Department, Room/Bed   [facility] [address] [place], P391/1       Discharge Date       Discharge Disposition       Discharge Destination                                 Attending Provider: Jim Mane MD    Allergies: No Known Allergies    Isolation: None   Infection: None   Code Status: CPR    Ht: 165.1 cm (65\")   Wt: 68 kg (150 lb)    Admission Cmt: None   Principal Problem: Neutropenic fever [D70.9,R50.81]                   Active Insurance as of 1/22/2025       Primary Coverage       Payor Plan Insurance Group Employer/Plan Group    [employer] [facility] [id number]       Payor Plan Address Payor Plan Phone Number Payor Plan Fax Number Effective Dates    [address] [phone]  9/1/2024 - None Entered    [place] [id number]         Subscriber Name Subscriber Birth Date Member ID       SUZANNE LIN 1980 [id number]                     Emergency Contacts        (Rel.) Home Phone Work Phone Mobile Phone    denise see (Significant Other) -- -- [phone]    soumya milan (Relative) -- -- [phone]                 Physician Progress Notes (last 4 days)        Denise Thompson DO at 01/27/25 0811           LOS: 5 days     Chief Complaint: Neutropenic fever    Interval History: Remains intermittently febrile however overall fever curve somewhat improved.  Not recovered her WBC count.  States she is feeling better this morning.  No acute complaints.    Vital Signs  Temp:  [97.5 " °F (36.4 °C)-100.6 °F (38.1 °C)] 98.6 °F (37 °C)  Heart Rate:  [66-78] 72  Resp:  [16] 16  BP: ()/(50-62) 98/57    Physical Exam:  General: In no acute distress  Respiratory: Breathing comfortably on room air  Skin: No rashes or lesions in exposed areas  Extremities: No edema, cyanosis  Access: Chest port and peripheral IV    Antibiotics:  Cefepime 2 g IV every 8 hours    Results Review:     I reviewed the patient's new clinical results.    Lab Results   Component Value Date    WBC 10.51 01/26/2025    HGB 12.6 01/26/2025    HCT 37.2 01/26/2025    MCV 86.7 01/26/2025     01/26/2025     Lab Results   Component Value Date    GLUCOSE 89 01/26/2025    BUN 7 01/26/2025    CREATININE 0.71 01/26/2025    BCR 9.9 01/26/2025    CO2 24.8 01/26/2025    CALCIUM 8.8 01/26/2025    ALBUMIN 2.9 (L) 01/26/2025    AST 13 01/26/2025    ALT 14 01/26/2025     Microbiology:  1/21 blood cultures no growth to date  1/22 respiratory panel negative  1/22 blood culture no growth to date  1/26 blood cultures in process    Assessment    #Neutropenic fever resolved but fevers persist  # pT3N2a M0 grade 3 invasive ductal adenocarcinoma right breast   #Immunosuppressed on chemotherapy with doxorubicin and cyclophosphamide  #Crohn's disease  #Chest port in place    Repeat blood cultures in process.  Fever curve overall slightly improved.  Has now recovered ANC.  Chest x-ray reassuring.  Continue empiric cefepime 2 g IV every 8 hours.      Electronically signed by Mamadou Thompson DO at 01/27/25 0812       Nishant Witt MD at 01/27/25 8548                REASON FOR FOLLOW-UP:   breast cancer undergoing adjuvant therapy, admission with neutropenic fever                               INTERVAL HISTORY:  The patient is feeling okay this morning.  She denies nausea vomiting diarrhea.  She was febrile again yesterday and repeat blood cultures taken.  She is no longer neutropenic but remains on antibiotics.  She complains of dental  issues wondering if that could be the source of persistent fever.    ONCOLOGY HISTORY:  This is a 44-year-old woman referred from general surgery to discuss adjuvant treatment for recently surgically treated breast cancer.  The patient presented with a large palpable mass in the inner quadrant of the right breast.  The mass had been enlarging for couple years but the patient did not have insurance to get assessed.  The breast imaging is not available to me but she had a CT of the chest abdomen pelvis on 9/16/2024 showed a large mass in the right breast measuring up to 7.7 cm with abnormal left axillary lymphadenopathy.  There were tiny subpleural nodules in the lateral aspect of the left lower lobe likely granulomatous mildly conspicuous lymph node in the central mesentery 1.1 cm.  A PET scan was performed 9/30/2024 showing a hypermetabolic mass in the right breast with thickening throughout the right breast and pathologic hypermetabolic right axillary level 1 and 2 lymph nodes, no hypermetabolic internal mammary or supraclavicular lymph nodes.  The small pulmonary nodules were too small to characterize and mesenteric lymph nodes without hypermetabolic activity.  A biopsy of the right breast mass showed invasive ductal adenocarcinoma.    She was taken to the operating room on 10/9/2024 and underwent a right modified radical mastectomy and axillary dissection, prophylactic left mastectomy.  Pathology from the right breast showed invasive ductal carcinoma [place] grade 3 (3+3+3) measuring 17 cm with extensive lymphovascular invasion and dermal lymphatic invasion.  The tumor extended to the dermis but did not ulcerate the skin.  Tumor extended to skeletal muscle and was present focally at an anterior margin, 0.05 mm of the posterior margin, 10 mm from the lateral margin, 20 mm from the medial margin, 28 mm from the superior margin and 40 mm from inferior margin.  There was ductal carcinoma in situ present within 2.5  mm of the posterior margin.  There were 13 lymph nodes in the axillary dissection 8 oncology plan/recommendations: of which had macrometastases, 1 micrometastases and 1 lymph node with isolated tumor cells.  The left breast had an intraductal papilloma otherwise negative.  The tumor was positive for estrogen receptor 99% of cells, progesterone receptor 50% of cells, HER2 0 and Ki-67 65%.    The patient has medical comorbidities of Crohn's disease.    Past Medical History:   Diagnosis Date    Anxiety     Breast cancer     Right    Crohn's disease     DVT (deep vein thrombosis) in pregnancy     PFO (patent foramen ovale)     Stroke     after the birth of her child at age 34    Tattoos         Past Surgical History:   Procedure Laterality Date     SECTION      COLON RESECTION      COLONOSCOPY      MASTECTOMY Bilateral 10/9/2024    Procedure: Modified radical mastectomy with axillary dissection of the right breast and simple mastectomy of the left breast;  Surgeon: Rebekah Garcia DO;  Location: [facility];  Service: General;  Laterality: Bilateral;    SKIN CANCER EXCISION      VENOUS ACCESS DEVICE (PORT) INSERTION N/A 12/10/2024    Procedure: INSERTION VENOUS ACCESS DEVICE;  Surgeon: Rebekah Garcia DO;  Location: [facility];  Service: General;  Laterality: N/A;        Current Facility-Administered Medications on File Prior to Encounter   Medication Dose Route Frequency Provider Last Rate Last Admin    heparin injection 500 Units  500 Units Intravenous PRN Nishant Witt MD   500 Units at 24 0916    heparin injection 500 Units  500 Units Intravenous PRN Nishant Witt MD   500 Units at 25 1049    sodium chloride 0.9 % flush 10 mL  10 mL Intravenous PRN Nishant Witt MD   10 mL at 24 0916    sodium chloride 0.9 % flush 10 mL  10 mL Intravenous PRN Nishant Witt MD   10 mL at 25 1048     Current Outpatient Medications on File Prior to Encounter   Medication Sig  Dispense Refill    amoxicillin-clavulanate (AUGMENTIN) 500-125 MG per tablet Take 1 tablet by mouth 2 (Two) Times a Day for 7 days. 14 tablet 0    aspirin 81 MG EC tablet Take 1 tablet by mouth Daily.      gabapentin (Neurontin) 300 MG capsule Take 2 capsules by mouth Every Night. 60 capsule 2    Klor-Con M20 20 MEQ CR tablet TAKE 1 TABLET BY MOUTH 2 TIMES A DAY 40 tablet 1    lidocaine-prilocaine (EMLA) 2.5-2.5 % cream Apply 1 Application topically to the appropriate area as directed As Needed for Mild Pain. Apply to port site 30 minutes before access 15 g 3    omeprazole (priLOSEC) 20 MG capsule       ondansetron (ZOFRAN) 8 MG tablet Take 1 tablet by mouth 3 (Three) Times a Day As Needed for Nausea or Vomiting. 30 tablet 5    prochlorperazine (COMPAZINE) 10 MG tablet Take 1 tablet by mouth Every 6 (Six) Hours As Needed for Nausea or Vomiting. 60 tablet 1    venlafaxine XR (Effexor XR) 37.5 MG 24 hr capsule Take 1 capsule by mouth Take As Directed. 1 capsule po q am for two weeks followed by increase to 2 capsules daily (Patient taking differently: Take 1 capsule by mouth Take As Directed. 1 capsule po q am for two weeks followed by increase to 2 capsules daily  Pt has not started yet) 60 capsule 2    OLANZapine (zyPREXA) 5 MG tablet Take 1 tablet by mouth for 4 doses starting night of chemotherapy. 8 tablet 1        ALLERGIES:  No Known Allergies     Social History     Socioeconomic History    Marital status: Single    Number of children: 1   Tobacco Use    Smoking status: Former     Current packs/day: 0.00     Average packs/day: 1 pack/day for 20.0 years (20.0 ttl pk-yrs)     Types: Cigarettes     Start date:      Quit date: 2019     Years since quittin.0    Smokeless tobacco: Current    Tobacco comments:     Nicotine pouches   Vaping Use    Vaping status: Never Used   Substance and Sexual Activity    Alcohol use: Yes     Comment: OCC    Drug use: Never    Sexual activity: Defer        Family History    Problem Relation Age of Onset    Diabetes Mother     Heart disease Father     Diabetes Paternal Grandmother     Hypertension Other     Malig Hyperthermia Neg Hx         ROS reviewed and pertinent as per the HPI      Objective     Vitals:    01/26/25 1618 01/26/25 1949 01/27/25 0426 01/27/25 0730   BP: 103/62 (!) 88/52 100/50 98/57   BP Location: Left leg Left leg Left leg Left leg   Patient Position: Lying Lying Lying Lying   Pulse: 66 78 75 72   Resp: 16 16 16 16   Temp: (!) 100.6 °F (38.1 °C) 98.1 °F (36.7 °C) 98.6 °F (37 °C) 98.6 °F (37 °C)   TempSrc: Oral Oral Oral Oral   SpO2: 95% 95% 94% 97%   Weight:       Height:                 1/14/2025     8:31 AM   Current Status   ECOG score 0       Physical Exam    CONSTITUTIONAL: pleasant well-developed adult woman  HEENT: no icterus, no thrush, moist membranes/  LYMPH: no cervical or supraclavicular lad  CV: RRR, S1S2, no murmur  RESP/chest: cta bilat, no wheezing, no rales, port present left chest wall  Breast: Status post bilateral mastectomies, wound mildly erythematous, healed without discharge  GI: soft, generally tender, bowel sounds positive  MUSC: no edema  NEURO: alert and oriented x3, normal strength  PSYCH: Normal mood and affect      RECENT LABS:  Hematology WBC   Date Value Ref Range Status   01/26/2025 10.51 3.40 - 10.80 10*3/mm3 Final     RBC   Date Value Ref Range Status   01/26/2025 4.29 3.77 - 5.28 10*6/mm3 Final     Hemoglobin   Date Value Ref Range Status   01/26/2025 12.6 12.0 - 15.9 g/dL Final     Hematocrit   Date Value Ref Range Status   01/26/2025 37.2 34.0 - 46.6 % Final     Platelets   Date Value Ref Range Status   01/26/2025 370 140 - 450 10*3/mm3 Final        Lab Results   Component Value Date    GLUCOSE 89 01/26/2025    BUN 7 01/26/2025    CREATININE 0.71 01/26/2025     01/26/2025    K 3.8 01/26/2025     01/26/2025    CALCIUM 8.8 01/26/2025    PROTEINTOT 6.6 01/26/2025    ALBUMIN 2.9 (L) 01/26/2025    ALT 14 01/26/2025     AST 13 01/26/2025    ALKPHOS 262 (H) 01/26/2025    BILITOT 0.5 01/26/2025    GLOB 3.7 01/26/2025    AGRATIO 0.8 01/26/2025    BCR 9.9 01/26/2025    ANIONGAP 12.2 01/26/2025    EGFR 107.7 01/26/2025     PET scan 9/30/2024:  FINDINGS:     Head/neck: No suspicious uptake.     Chest: An 8 x 5.8 cm mass in the lower inner right breast with max SUV  of 18 (series 4/image 156). Mass comes in close proximity to the  sternum. Hypermetabolic parenchymal thickening throughout the right  breast with max SUV of 9 (series 4/image 140). Diffuse right breast skin  thickening.     Hypermetabolic right axillary level I and II lymph nodes. Reference 1.2  cm level I lymph node with max SUV of 11.9 (series 4/image 103).  Additional reference subcentimeter level II lymph node with max SUV of  3.3 (series 4/image 93). Separate brown fat uptake in the bilateral  axilla and posterior neck base.     No enlarged or hypermetabolic internal mammary or supraclavicular lymph  nodes.     Few subcentimeter pulmonary nodules are too small for accurate PET  characterization without overt hypermetabolism and unchanged from recent  CT. Reference left lower lobe (series 4/image 167) and right upper lobe  (series 4/image 131).     Abdomen/pelvis: Mesenteric lymph nodes are mostly subcentimeter without  associated hypermetabolism.     Sigmoid colectomy. Tubular hypermetabolism throughout the otherwise  normal-appearing remaining colon may be medication related. Moderate  proximal colonic stool burden.     Bones: No focal osseous hypermetabolism with special attention to the  sternum.           IMPRESSION:  1. Hypermetabolic 8 cm right breast mass with hypermetabolic parenchymal  thickening throughout the right breast, pathologically proven invasive  ductal carcinoma. Mass comes in close proximity to the sternum. No focal  osseous hypermetabolism with special attention to the sternum.  2. Hypermetabolic right axillary level I and II lymph nodes  suggesting  alana metastatic disease. No enlarged or hypermetabolic internal mammary  or supraclavicular lymph nodes. Separate brown fat uptake in the  bilateral axilla and posterior neck base.  3. Few subcentimeter pulmonary nodules are too small for accurate PET  characterization and will need follow-up via chest CT.  4. Mesenteric lymph nodes are mostly subcentimeter without associated  hypermetabolism.       Assessment & Plan   *pT3N2a M0 grade 3 invasive ductal adenocarcinoma right breast, ER 99% positive, NM 50% positive, HER2 0, Ki-67 65%, tumor present at anterior margin  She presented with a large palpable mass in the inner quadrant of the right breast; mass had been enlarging for couple years but the patient did not have insurance to get assessed  CT of the chest abdomen pelvis on 9/16/2024 showed a large mass in the right breast measuring up to 7.7 cm with abnormal left axillary lymphadenopathy.  There were tiny subpleural nodules in the lateral aspect of the left lower lobe likely granulomatous mildly conspicuous lymph node in the central mesentery 1.1 cm.    PET scan 9/30/2024 - hypermetabolic mass in the right breast with thickening throughout the right breast and pathologic hypermetabolic right axillary level 1 and 2 lymph nodes, no hypermetabolic internal mammary or supraclavicular lymph nodes.  The small pulmonary nodules were too small to characterize and mesenteric lymph nodes without hypermetabolic activity.    biopsy of the right breast mass showed invasive ductal adenocarcinoma.  operating room on 10/9/2024 and underwent a right modified radical mastectomy and axillary dissection, prophylactic left mastectomy.  Pathology from the right breast showed invasive ductal carcinoma [place] grade 3 (3+3+3) measuring 17 cm with extensive lymphovascular invasion and dermal lymphatic invasion.  The tumor extended to the dermis but did not ulcerate the skin.  Tumor extended to skeletal muscle and was  present focally at an anterior margin, 0.05 mm of the posterior margin, 10 mm from the lateral margin, 20 mm from the medial margin, 28 mm from the superior margin and 40 mm from inferior margin.  There was ductal carcinoma in situ present within 2.5 mm of the posterior margin.  There were 13 lymph nodes in the axillary dissection 8 of which had macrometastases, 1 micrometastases and 1 lymph node with isolated tumor cells.  The left breast had an intraductal papilloma otherwise negative.  The tumor was positive for estrogen receptor 99% of cells, progesterone receptor 50% of cells, HER2 0 and Ki-67 65%.  Initiated adjuvant chemotherapy with Adriamycin/Cytoxan 12/31/2024 (adjuvant chemotherapy delayed because of wound healing issues) nonneutropenic fever  C2 AC with Neulasta 1/14/25    *Admitted 1/22/2025 with neutropenic fever  Received neulasta onpro 1/15/25  G-CSF continued from 1/23/2025, discontinued 1/25/2025 after neutrophil recovery  Blood cultures from 1/21/2025 and 1/22/2025-no growth  Respiratory viral panel-negative  CT abdomen 1/23/2025-periportal edema within the liver, no biliary ductal dilatation, nonspecific fluid around the gallbladder fundus, fluid throughout the colon, stable wall thickening terminal ileum  Chest x-rays 1/22/2025 and 1/26/2025-no acute pulmonary process    *Myelosuppression secondary to chemotherapy  1/26/2025-normal CBC including ANC 7.4    * nausea/diarrhea, chemo induced   responding to Imodium/Zofran/Compazine    *elevated LFT-likely s/e chemo   1/26/2025-normal LFTs other than alk phos 262    *medical comorbidities of Crohn's disease.    Oncology plan/recommendations:  Monitor CBC  Check dental Panorex  Infectious disease managing antibiotics  Next cycle of AC chemotherapy will be dose delayed 1 week                    Electronically signed by Nishant Witt MD at 01/27/25 6435       Jim Mane MD at 01/26/25 8565              Name: Suzanne Lin ADMIT: 1/22/2025    : 1980  PCP: Gia Rodriguez APRN    MRN: [id number] LOS: 4 days   AGE/SEX: 44 y.o. female  ROOM: [facility]/1     Subjective   Subjective   Patient is seen at bedside, she still has continued to spike fever.      Objective   Objective   Vital Signs  Temp:  [97.5 °F (36.4 °C)-101.8 °F (38.8 °C)] 100.6 °F (38.1 °C)  Heart Rate:  [66-89] 66  Resp:  [16-18] 16  BP: ()/(55-62) 103/62  SpO2:  [95 %-100 %] 95 %  on   ;   Device (Oxygen Therapy): room air  Body mass index is 24.96 kg/m².  Physical Exam  General, awake and alert.  Head and ENT, normocephalic and atraumatic.  Lungs, symmetric expansion, equal air entry bilaterally.  Heart, regular rate and rhythm.  Abdomen, soft and nontender.  Extremities, no clubbing or cyanosis.  Neuro, no focal deficits.  Skin: Warm and no rash.  Psych, normal mood and affect.  Musculoskeletal, joint examination is grossly normal.        Copied text material from yesterday's note has been reviewed for appropriate changes and remains accurate as of 25.        Results Review     I reviewed the patient's new clinical results.  Results from last 7 days   Lab Units 25  0410 25  0520 25  0516 25  0552   WBC 10*3/mm3 10.51 4.99 1.14* 0.35*   HEMOGLOBIN g/dL 12.6 12.1 7.6* 8.5*   PLATELETS 10*3/mm3 370 274 182 189     Results from last 7 days   Lab Units 25  0410 25  0520 25  0516 25  1621 25  0552   SODIUM mmol/L 140 139 137  --  138   POTASSIUM mmol/L 3.8 3.4* 2.9* 2.9* 2.6*   CHLORIDE mmol/L 103 107 103  --  106   CO2 mmol/L 24.8 22.7 22.7  --  22.6   BUN mg/dL 7 5* 4*  --  3*   CREATININE mg/dL 0.71 0.70 0.57  --  0.58   GLUCOSE mg/dL 89 86 87  --  117*   EGFR mL/min/1.73 107.7 109.5 115.1  --  114.6     Results from last 7 days   Lab Units 25  0410 25  0520 25  0516 25  0552   ALBUMIN g/dL 2.9* 2.1* 2.4* 2.2*   BILIRUBIN mg/dL 0.5 0.6 0.3 0.6   ALK PHOS U/L 262* 201* 178* 208*   AST (SGOT) U/L  "13 10 12 12   ALT (SGPT) U/L 14 17 19 19     Results from last 7 days   Lab Units 01/26/25  0410 01/25/25  0520 01/24/25  0516 01/23/25  0552 01/22/25  1934 01/21/25  0929 01/21/25  0929   CALCIUM mg/dL 8.8 8.7 8.3* 8.0* 8.7  --  8.9   ALBUMIN g/dL 2.9* 2.1* 2.4* 2.2* 2.9*   < >  --    MAGNESIUM mg/dL  --   --   --   --  1.9  --  2.2    < > = values in this interval not displayed.     Results from last 7 days   Lab Units 01/26/25 0410 01/22/25 1934   PROCALCITONIN ng/mL 1.15* 0.33*   LACTATE mmol/L  --  1.1     No results found for: \"HGBA1C\", \"POCGLU\"    XR Chest 1 View    Result Date: 1/26/2025  No evidence for acute pulmonary process. Follow-up as clinical indications persist.  This report was finalized on 1/26/2025 3:13 PM by Dr. Chaitanya Espinal M.D on Workstation: [id number]       I have personally reviewed all medications:  Scheduled Medications  aspirin, 81 mg, Oral, Daily  cefepime, 2,000 mg, Intravenous, Q8H  gabapentin, 600 mg, Oral, Nightly  sodium chloride, 10 mL, Intravenous, Q12H    Infusions   Diet  Diet: Regular/House; Fluid Consistency: Thin (IDDSI 0)    I have personally reviewed:  [x]  Laboratory   [x]  Microbiology   [x]  Radiology   [x]  EKG/Telemetry  [x]  Cardiology/Vascular   []  Pathology    []  Records      Assessment/Plan     Active Hospital Problems    Diagnosis  POA    **Neutropenic fever [D70.9, R50.81]  Yes    Crohn's disease without complication [K50.90]  Yes    LITA (iron deficiency anemia) [D50.9]  Yes    Breast cancer, stage 3, right [C50.911]  Yes      Resolved Hospital Problems   No resolved problems to display.       44 y.o. female admitted with Neutropenic fever.    Assessment and plan  1.  Neutropenic fever, high-grade fever noted, infectious disease service on board, continue IV cefepime.  Antibiotic management per ID recommendations.  Repeat procalcitonin elevated, repeat blood cultures ordered.     2.  History of Crohn's disease, currently uncomplicated course.  If she " worsens, she may need GI evaluation.     3.  Stage III right-sided breast cancer, status post recent chemotherapy, hematology/oncology has been consulted.  We will follow management recommendations.     4.  Anemia, monitor hemoglobin closely, we will continue to recheck labs.     5.  CODE STATUS is full code.  Further plans based on hospital course.  Expected Discharge Date: 1/27/2025; Expected Discharge Time:       Jim Mane MD  [facility]  01/26/25  18:07 EST    Electronically signed by Jim Mane MD at 01/26/25 5498       Keeley Huber MD at 01/26/25 3135           LOS: 4 days     Chief Complaint: Neutropenic fever    Interval History: Febrile, patient reports a dry cough only when febrile.  Denies rhinorrhea or sore throat.  She is having to loose bowel movements per day and states this is her normal with Crohn's disease.  Some abdominal discomfort but nothing new.  No nausea or vomiting.  No rashes no dysuria or increasing urinary frequency    Vital Signs  Temp:  [97.5 °F (36.4 °C)-101.8 °F (38.8 °C)] 97.5 °F (36.4 °C)  Heart Rate:  [66-94] 66  Resp:  [16-18] 16  BP: ()/(54-60) 106/59    Physical Exam:  General: In no acute distress  Respiratory: Breathing comfortably on room air  Skin: No rashes or lesions in exposed areas  Extremities: No edema, cyanosis  Access: Chest port and peripheral IV    Antibiotics:  Cefepime 2 g IV every 8 hours    Results Review:     I reviewed the patient's new clinical results.    Lab Results   Component Value Date    WBC 10.51 01/26/2025    HGB 12.6 01/26/2025    HCT 37.2 01/26/2025    MCV 86.7 01/26/2025     01/26/2025     Lab Results   Component Value Date    GLUCOSE 89 01/26/2025    BUN 7 01/26/2025    CREATININE 0.71 01/26/2025    BCR 9.9 01/26/2025    CO2 24.8 01/26/2025    CALCIUM 8.8 01/26/2025    ALBUMIN 2.9 (L) 01/26/2025    AST 13 01/26/2025    ALT 14 01/26/2025     Microbiology:  1/21 blood cultures no growth to  date  1/22 respiratory panel negative  1/22 blood culture in process    Assessment    #Neutropenic fever resolved but fevers persist  # pT3N2a M0 grade 3 invasive ductal adenocarcinoma right breast   #Immunosuppressed on chemotherapy with doxorubicin and cyclophosphamide  #Crohn's disease  #Chest port in place    Neutropenia has resolved but fevers persists.  Blood cultures remain negative to date.  LFTs are normal.  Will obtain a procalcitonin.  Pending results will repeat blood cultures x 2.  Continue empiric cefepime 2 g IV every 8 hours      Addendum  Procalcitonin came back over 1 therefore will obtain repeat blood cultures x 2    Electronically signed by Keeley Huber MD at 01/26/25 8469       Sampson Quintanilla MD at 01/26/25 0801                REASON FOR CONSULTATION:  breast cancer undergoing therapy, admission with neutropenic fever                               History of Present Illness   Record reviewed, the patient initiated adjuvant chemotherapy with AC on 12/31/2024.  She had fever after the first cycle with borderline neutropenia but no obvious bacterial infections.  She was treated with Levaquin.  Fevers resolved over the weekend.  She denies uncontrolled nausea vomiting or diarrhea.  She denies stomatitis.  Chest wounds have healed.  She was last seen 1/14/2024 with overall plans to proceed with adjuvant therapy including dose dense AC with Neulasta support x 4 followed by weekly Taxol.  Additional discussions included postmastectomy radiation therapy, hormonal therapy with ovarian suppression and tamoxifen versus oophorectomy and AI therapy and review of nonspecific lung nodules after completing chemotherapy.    She was treated with her second cycle of dose dense AC 1/14/2025 presented to the ER 1/22/2025 with fever, generalized malaise and nausea.  Studies included negative respiratory panel,, CBC with progressive neutropenia.  She had been assessed 1/21 with ANC of 0.01, placed on  Augmentin.    Unfortunately symptoms worsened and she presented to the ER 1/22/2025.  H&H 8.9 25.9 with white count of 240, platelet count of 240,000, BUN/creatinine of 4 and 0.69, alk phos 271, lactate of 1.1, procalcitonin 0.33 UA with trace ketones, 2+ protein and trace leukocyte esterase, 3-5 WBCs and 7-12 epithelial cells, blood cultures negative,.  Patient admitted placed on cefepime.    Interval history:  1/23/2025  T102.6, pulse 101, respirate 16, BP 90/58  Patient describes continued fever, abdominal cramping and diarrhea  H&H 8.5 and 25.1 with white count of 350, platelet count of 189,000, BUN/creatinine of 3 and 0.58, potassium 2.6    1/24/2025  T97.9, pulse 74, respirations 18, BP 92/46  Patient far more comfortable today, quite fatigued  H&H 7.6 and 23.9, white count is 1140, platelet count 1 82,000, being creatinine of 4 and 0.57, albumin 2.4, total protein 5.6, alk phos 178, AST 12, ALT 19, total bilirubin 0.3    1/25/2025  T98.4, pulse 74, respirations 18, /65, SpO2 97%  Patient now completed transfusion therapy.  She is feeling significantly improved.  H&H of 12.1 and 36.1, white count of 4990, platelet count of 274,000, BUN/creatinine of 5 and 0.70    1/26/2025  T97.5, pulse 86 and respirations 18  Patient tearful not certain about continuing therapy  H&H 12.6 and 37.2, white count 10,510, platelet count 370,000, being creatinine 7 and 0.71, albumin 2.9, normal transaminases, alk phos 262, total bilirubin 0.5  Repeat fever last late p.m. subsequent assessment includes all blood cultures negative, patient continues empiric cefepime    ONCOLOGY HISTORY:  This is a 44-year-old woman referred from general surgery to discuss adjuvant treatment for recently surgically treated breast cancer.  The patient presented with a large palpable mass in the inner quadrant of the right breast.  The mass had been enlarging for couple years but the patient did not have insurance to get assessed.  The breast  imaging is not available to me but she had a CT of the chest abdomen pelvis on 9/16/2024 showed a large mass in the right breast measuring up to 7.7 cm with abnormal left axillary lymphadenopathy.  There were tiny subpleural nodules in the lateral aspect of the left lower lobe likely granulomatous mildly conspicuous lymph node in the central mesentery 1.1 cm.  A PET scan was performed 9/30/2024 showing a hypermetabolic mass in the right breast with thickening throughout the right breast and pathologic hypermetabolic right axillary level 1 and 2 lymph nodes, no hypermetabolic internal mammary or supraclavicular lymph nodes.  The small pulmonary nodules were too small to characterize and mesenteric lymph nodes without hypermetabolic activity.  A biopsy of the right breast mass showed invasive ductal adenocarcinoma.    She was taken to the operating room on 10/9/2024 and underwent a right modified radical mastectomy and axillary dissection, prophylactic left mastectomy.  Pathology from the right breast showed invasive ductal carcinoma Bruce grade 3 (3+3+3) measuring 17 cm with extensive lymphovascular invasion and dermal lymphatic invasion.  The tumor extended to the dermis but did not ulcerate the skin.  Tumor extended to skeletal muscle and was present focally at an anterior margin, 0.05 mm of the posterior margin, 10 mm from the lateral margin, 20 mm from the medial margin, 28 mm from the superior margin and 40 mm from inferior margin.  There was ductal carcinoma in situ present within 2.5 mm of the posterior margin.  There were 13 lymph nodes in the axillary dissection 8 oncology plan/recommendations: of which had macrometastases, 1 micrometastases and 1 lymph node with isolated tumor cells.  The left breast had an intraductal papilloma otherwise negative.  The tumor was positive for estrogen receptor 99% of cells, progesterone receptor 50% of cells, HER2 0 and Ki-67 65%.    The patient has medical  comorbidities of Crohn's disease.    Past Medical History:   Diagnosis Date    Anxiety     Breast cancer     Right    Crohn's disease     DVT (deep vein thrombosis) in pregnancy     PFO (patent foramen ovale)     Stroke     after the birth of her child at age 34    Tattoos         Past Surgical History:   Procedure Laterality Date     SECTION      COLON RESECTION      COLONOSCOPY      MASTECTOMY Bilateral 10/9/2024    Procedure: Modified radical mastectomy with axillary dissection of the right breast and simple mastectomy of the left breast;  Surgeon: Rebekah Garcia DO;  Location: [facility] OR;  Service: General;  Laterality: Bilateral;    SKIN CANCER EXCISION      VENOUS ACCESS DEVICE (PORT) INSERTION N/A 12/10/2024    Procedure: INSERTION VENOUS ACCESS DEVICE;  Surgeon: Rebekah Garcia DO;  Location: [facility] OR;  Service: General;  Laterality: N/A;        Current Facility-Administered Medications on File Prior to Encounter   Medication Dose Route Frequency Provider Last Rate Last Admin    heparin injection 500 Units  500 Units Intravenous PRN Nishant Witt MD   500 Units at 24 0916    heparin injection 500 Units  500 Units Intravenous PRN Nishant Witt MD   500 Units at 25 1049    sodium chloride 0.9 % flush 10 mL  10 mL Intravenous PRN Nishant Witt MD   10 mL at 24 0916    sodium chloride 0.9 % flush 10 mL  10 mL Intravenous PRN Nishant Witt MD   10 mL at 25 1048     Current Outpatient Medications on File Prior to Encounter   Medication Sig Dispense Refill    amoxicillin-clavulanate (AUGMENTIN) 500-125 MG per tablet Take 1 tablet by mouth 2 (Two) Times a Day for 7 days. 14 tablet 0    aspirin 81 MG EC tablet Take 1 tablet by mouth Daily.      gabapentin (Neurontin) 300 MG capsule Take 2 capsules by mouth Every Night. 60 capsule 2    Klor-Con M20 20 MEQ CR tablet TAKE 1 TABLET BY MOUTH 2 TIMES A DAY 40 tablet 1    lidocaine-prilocaine (EMLA) 2.5-2.5 %  cream Apply 1 Application topically to the appropriate area as directed As Needed for Mild Pain. Apply to port site 30 minutes before access 15 g 3    omeprazole (priLOSEC) 20 MG capsule       ondansetron (ZOFRAN) 8 MG tablet Take 1 tablet by mouth 3 (Three) Times a Day As Needed for Nausea or Vomiting. 30 tablet 5    prochlorperazine (COMPAZINE) 10 MG tablet Take 1 tablet by mouth Every 6 (Six) Hours As Needed for Nausea or Vomiting. 60 tablet 1    venlafaxine XR (Effexor XR) 37.5 MG 24 hr capsule Take 1 capsule by mouth Take As Directed. 1 capsule po q am for two weeks followed by increase to 2 capsules daily (Patient taking differently: Take 1 capsule by mouth Take As Directed. 1 capsule po q am for two weeks followed by increase to 2 capsules daily  Pt has not started yet) 60 capsule 2    OLANZapine (zyPREXA) 5 MG tablet Take 1 tablet by mouth for 4 doses starting night of chemotherapy. 8 tablet 1        ALLERGIES:  No Known Allergies     Social History     Socioeconomic History    Marital status: Single    Number of children: 1   Tobacco Use    Smoking status: Former     Current packs/day: 0.00     Average packs/day: 1 pack/day for 20.0 years (20.0 ttl pk-yrs)     Types: Cigarettes     Start date:      Quit date: 2019     Years since quittin.0    Smokeless tobacco: Current    Tobacco comments:     Nicotine pouches   Vaping Use    Vaping status: Never Used   Substance and Sexual Activity    Alcohol use: Yes     Comment: OCC    Drug use: Never    Sexual activity: Defer        Family History   Problem Relation Age of Onset    Diabetes Mother     Heart disease Father     Diabetes Paternal Grandmother     Hypertension Other     Malig Hyperthermia Neg Hx                Objective     Vitals:    25 1650 25 2130 25 0416   BP: 96/54 92/55     BP Location: Left leg Left leg     Patient Position: Lying Lying     Pulse: 94 89  86   Resp: 18 16  18   Temp: 99.9 °F (37.7 °C) (!) 101.8  °F (38.8 °C) 98.2 °F (36.8 °C) 97.5 °F (36.4 °C)   TempSrc: Oral Oral Oral Oral   SpO2: 99% 97%  97%   Weight:       Height:                 1/14/2025     8:31 AM   Current Status   ECOG score 0       Physical Exam    CONSTITUTIONAL: pleasant well-developed adult woman  HEENT: no icterus, no thrush, moist membranes/  LYMPH: no cervical or supraclavicular lad  CV: RRR, S1S2, no murmur  RESP/chest: cta bilat, no wheezing, no rales, port present left chest wall  Breast: Status post bilateral mastectomies, wound mildly erythematous, healed without discharge  GI: soft, generally tender, bowel sounds positive  MUSC: no mele  NEURO: alert and oriented x3, normal strength  PSYCH: Frustrated, alert, oriented      RECENT LABS:  Hematology WBC   Date Value Ref Range Status   01/26/2025 10.51 3.40 - 10.80 10*3/mm3 Final     RBC   Date Value Ref Range Status   01/26/2025 4.29 3.77 - 5.28 10*6/mm3 Final     Hemoglobin   Date Value Ref Range Status   01/26/2025 12.6 12.0 - 15.9 g/dL Final     Hematocrit   Date Value Ref Range Status   01/26/2025 37.2 34.0 - 46.6 % Final     Platelets   Date Value Ref Range Status   01/26/2025 370 140 - 450 10*3/mm3 Final        Lab Results   Component Value Date    GLUCOSE 89 01/26/2025    BUN 7 01/26/2025    CREATININE 0.71 01/26/2025     01/26/2025    K 3.8 01/26/2025     01/26/2025    CALCIUM 8.8 01/26/2025    PROTEINTOT 6.6 01/26/2025    ALBUMIN 2.9 (L) 01/26/2025    ALT 14 01/26/2025    AST 13 01/26/2025    ALKPHOS 262 (H) 01/26/2025    BILITOT 0.5 01/26/2025    GLOB 3.7 01/26/2025    AGRATIO 0.8 01/26/2025    BCR 9.9 01/26/2025    ANIONGAP 12.2 01/26/2025    EGFR 107.7 01/26/2025     PET scan 9/30/2024:  FINDINGS:     Head/neck: No suspicious uptake.     Chest: An 8 x 5.8 cm mass in the lower inner right breast with max SUV  of 18 (series 4/image 156). Mass comes in close proximity to the  sternum. Hypermetabolic parenchymal thickening throughout the right  breast with max SUV of  9 (series 4/image 140). Diffuse right breast skin  thickening.     Hypermetabolic right axillary level I and II lymph nodes. Reference 1.2  cm level I lymph node with max SUV of 11.9 (series 4/image 103).  Additional reference subcentimeter level II lymph node with max SUV of  3.3 (series 4/image 93). Separate brown fat uptake in the bilateral  axilla and posterior neck base.     No enlarged or hypermetabolic internal mammary or supraclavicular lymph  nodes.     Few subcentimeter pulmonary nodules are too small for accurate PET  characterization without overt hypermetabolism and unchanged from recent  CT. Reference left lower lobe (series 4/image 167) and right upper lobe  (series 4/image 131).     Abdomen/pelvis: Mesenteric lymph nodes are mostly subcentimeter without  associated hypermetabolism.     Sigmoid colectomy. Tubular hypermetabolism throughout the otherwise  normal-appearing remaining colon may be medication related. Moderate  proximal colonic stool burden.     Bones: No focal osseous hypermetabolism with special attention to the  sternum.           IMPRESSION:  1. Hypermetabolic 8 cm right breast mass with hypermetabolic parenchymal  thickening throughout the right breast, pathologically proven invasive  ductal carcinoma. Mass comes in close proximity to the sternum. No focal  osseous hypermetabolism with special attention to the sternum.  2. Hypermetabolic right axillary level I and II lymph nodes suggesting  alana metastatic disease. No enlarged or hypermetabolic internal mammary  or supraclavicular lymph nodes. Separate brown fat uptake in the  bilateral axilla and posterior neck base.  3. Few subcentimeter pulmonary nodules are too small for accurate PET  characterization and will need follow-up via chest CT.  4. Mesenteric lymph nodes are mostly subcentimeter without associated  hypermetabolism.       Assessment & Plan   *pT3N2a M0 grade 3 invasive ductal adenocarcinoma right breast, ER 99%  positive, MD 50% positive, HER2 0, Ki-67 65%, tumor present at anterior margin  She presented with a large palpable mass in the inner quadrant of the right breast; mass had been enlarging for couple years but the patient did not have insurance to get assessed  CT of the chest abdomen pelvis on 9/16/2024 showed a large mass in the right breast measuring up to 7.7 cm with abnormal left axillary lymphadenopathy.  There were tiny subpleural nodules in the lateral aspect of the left lower lobe likely granulomatous mildly conspicuous lymph node in the central mesentery 1.1 cm.    PET scan 9/30/2024 - hypermetabolic mass in the right breast with thickening throughout the right breast and pathologic hypermetabolic right axillary level 1 and 2 lymph nodes, no hypermetabolic internal mammary or supraclavicular lymph nodes.  The small pulmonary nodules were too small to characterize and mesenteric lymph nodes without hypermetabolic activity.    biopsy of the right breast mass showed invasive ductal adenocarcinoma.  operating room on 10/9/2024 and underwent a right modified radical mastectomy and axillary dissection, prophylactic left mastectomy.  Pathology from the right breast showed invasive ductal carcinoma Bruce grade 3 (3+3+3) measuring 17 cm with extensive lymphovascular invasion and dermal lymphatic invasion.  The tumor extended to the dermis but did not ulcerate the skin.  Tumor extended to skeletal muscle and was present focally at an anterior margin, 0.05 mm of the posterior margin, 10 mm from the lateral margin, 20 mm from the medial margin, 28 mm from the superior margin and 40 mm from inferior margin.  There was ductal carcinoma in situ present within 2.5 mm of the posterior margin.  There were 13 lymph nodes in the axillary dissection 8 of which had macrometastases, 1 micrometastases and 1 lymph node with isolated tumor cells.  The left breast had an intraductal papilloma otherwise negative.  The tumor was  positive for estrogen receptor 99% of cells, progesterone receptor 50% of cells, HER2 0 and Ki-67 65%.  Initiated adjuvant chemotherapy with Adriamycin/Cytoxan 2024 (adjuvant chemotherapy delayed because of wound healing issues) nonneutropenic fever    *Admitted 2025 with neutropenic fever  G-CSF continued from 2025, discontinued 2025  Continue cefepime      *Marrow suppression secondary to chemotherapy   resolved with normal CBC-6850, 2025  Transfusion planned 2025      * nausea/diarrhea, chemo induced   responding to Imodium/Zofran/Compazine    *elevated LFT-likely s/e chemo     *Inv[employer]ae 19 gene panel-VUS Bard 1    *medical comorbidities of Crohn's disease.    Oncology plan/recommendations:  Again reviewed plan  for adjuvant chemotherapy to reduce risk of recurrence/help eradicate potential micrometastatic disease with dose dense AC with Neulasta support x 4 cycles followed by weekly Taxol x 12 doses-proceed with cycle 2 AC today pending stable CMP  Patient admitted with neutropenic fever, agree with cefepime, initiation of additional Neupogen, discussion with primary oncologist about subsequent treatment plan, transfusion as needed  Patient reviewed 2025 with Neupogen discontinued, she is scheduled 2025 back in office for further chemotherapy.  After chemotherapy she will need postmastectomy radiation given the size and alana involvement  She will need hormonal therapy with ovarian suppression/tamoxifen versus oophorectomy and AI therapy/ck4/6 inhibitor  Nonspecific lung nodules will need reassessment after completing chemotherapy radiographically                    Electronically signed by Sampson Quintanilla MD at 25 1043       Jim Mane MD at 25 1549              Name: Suzanne Lin ADMIT: 2025   : 1980  PCP: Gia Rodriguez APRN    MRN: [id number] LOS: 3 days   AGE/SEX: 44 y.o. female  ROOM: [facility]     Subjective   Subjective    Patient is seen at bedside, patient is lying in bed, no new complaints today, she feels better.  Still febrile but curve is improving.      Objective   Objective   Vital Signs  Temp:  [97.8 °F (36.6 °C)-100.9 °F (38.3 °C)] 98.6 °F (37 °C)  Heart Rate:  [] 80  Resp:  [16-18] 16  BP: ()/(56-70) 110/60  SpO2:  [96 %-99 %] 96 %  on   ;   Device (Oxygen Therapy): room air  Body mass index is 24.96 kg/m².  Physical Exam     General, awake and alert.  Head and ENT, normocephalic and atraumatic.  Lungs, symmetric expansion, equal air entry bilaterally.  Heart, regular rate and rhythm.  Abdomen, soft and nontender.  Extremities, no clubbing or cyanosis.  Neuro, no focal deficits.  Skin: Warm and no rash.  Psych, normal mood and affect.  Musculoskeletal, joint examination is grossly normal.        Copied text material from yesterday's note has been reviewed for appropriate changes and remains accurate as of 1/25/25.        Results Review     I reviewed the patient's new clinical results.  Results from last 7 days   Lab Units 01/25/25  0520 01/24/25  0516 01/23/25  0552 01/22/25  1934   WBC 10*3/mm3 4.99 1.14* 0.35* 0.24*   HEMOGLOBIN g/dL 12.1 7.6* 8.5* 8.9*   PLATELETS 10*3/mm3 274 182 189 241     Results from last 7 days   Lab Units 01/25/25  0520 01/24/25  0516 01/23/25  1621 01/23/25  0552 01/22/25  1934   SODIUM mmol/L 139 137  --  138 135*   POTASSIUM mmol/L 3.4* 2.9* 2.9* 2.6* 2.7*   CHLORIDE mmol/L 107 103  --  106 98   CO2 mmol/L 22.7 22.7  --  22.6 23.0   BUN mg/dL 5* 4*  --  3* 4*   CREATININE mg/dL 0.70 0.57  --  0.58 0.69   GLUCOSE mg/dL 86 87  --  117* 136*   EGFR mL/min/1.73 109.5 115.1  --  114.6 109.9     Results from last 7 days   Lab Units 01/25/25  0520 01/24/25  0516 01/23/25  0552 01/22/25  1934   ALBUMIN g/dL 2.1* 2.4* 2.2* 2.9*   BILIRUBIN mg/dL 0.6 0.3 0.6 0.6   ALK PHOS U/L 201* 178* 208* 271*   AST (SGOT) U/L 10 12 12 27   ALT (SGPT) U/L 17 19 19 30     Results from last 7 days   Lab  "Units 01/25/25  0520 01/24/25  0516 01/23/25  0552 01/22/25 1934 01/21/25  0929   CALCIUM mg/dL 8.7 8.3* 8.0* 8.7 8.9   ALBUMIN g/dL 2.1* 2.4* 2.2* 2.9*  --    MAGNESIUM mg/dL  --   --   --  1.9 2.2     Results from last 7 days   Lab Units 01/22/25 1934   PROCALCITONIN ng/mL 0.33*   LACTATE mmol/L 1.1     No results found for: \"HGBA1C\", \"POCGLU\"    CT Abdomen Pelvis With Contrast    Result Date: 1/23/2025  1. New periportal edema within the liver. No biliary ductal dilation 2. Stable mild nonspecific fluid around the gallbladder fundus 3. Fluid throughout the colon. Stable wall thickening of the hepatic flexure portion of the colon. Stable wall thickening and enhancement of the terminal ileum  Radiation dose reduction techniques were utilized, including automated exposure control and exposure modulation based on body size.   This report was finalized on 1/23/2025 7:05 PM by Dr. Shayan Reyna M.D on Workstation: [id number]       I have personally reviewed all medications:  Scheduled Medications  aspirin, 81 mg, Oral, Daily  cefepime, 2,000 mg, Intravenous, Q8H  gabapentin, 600 mg, Oral, Nightly  sodium chloride, 10 mL, Intravenous, Q12H    Infusions   Diet  Diet: Regular/House; Fluid Consistency: Thin (IDDSI 0)    I have personally reviewed:  [x]  Laboratory   [x]  Microbiology   [x]  Radiology   [x]  EKG/Telemetry  [x]  Cardiology/Vascular   []  Pathology    []  Records      Assessment/Plan     Active Hospital Problems    Diagnosis  POA    **Neutropenic fever [D70.9, R50.81]  Yes    Crohn's disease without complication [K50.90]  Yes    LITA (iron deficiency anemia) [D50.9]  Yes    Breast cancer, stage 3, right [C50.911]  Yes      Resolved Hospital Problems   No resolved problems to display.       44 y.o. female admitted with Neutropenic fever.    Assessment and plan  1.  Neutropenic fever, high-grade fever noted, infectious disease service on board, continue IV cefepime.  Antibiotic management per ID " recommendations.     2.  History of Crohn's disease, currently uncomplicated course.  If she worsens, she may need GI evaluation.     3.  Stage III right-sided breast cancer, status post recent chemotherapy, hematology/oncology has been consulted.  We will follow management recommendations.     4.  Anemia, monitor hemoglobin closely, we will continue to recheck labs.     5.  CODE STATUS is full code.  Further plans based on hospital course.    Expected Discharge Date: 1/27/2025; Expected Discharge Time:       Jim Mane MD  [facility]  01/25/25  15:43 EST    Electronically signed by Jim Mane MD at 01/25/25 1544       Keeley Huber MD at 01/25/25 1153           LOS: 3 days     Chief Complaint: Neutropenic fever    Interval History: Febrile but fever curve improving.  Neutropenia has resolved.  Denies any vomiting  or rash.  3 bowel movements but the patient states this is her baseline with Crohn's.  Continues to feel fatigued    Vital Signs  Temp:  [97.8 °F (36.6 °C)-101 °F (38.3 °C)] 98.4 °F (36.9 °C)  Heart Rate:  [] 74  Resp:  [18] 18  BP: ()/(51-70) 110/65    Physical Exam:  General: In no acute distress  Respiratory: Breathing comfortably on room air  Skin: No rashes or lesions in exposed areas  Extremities: No edema, cyanosis  Access: Chest port and peripheral IV    Antibiotics:  Cefepime 2 g IV every 8 hours    Results Review:     I reviewed the patient's new clinical results.    Lab Results   Component Value Date    WBC 4.99 01/25/2025    HGB 12.1 01/25/2025    HCT 36.1 01/25/2025    MCV 88.5 01/25/2025     01/25/2025     Lab Results   Component Value Date    GLUCOSE 86 01/25/2025    BUN 5 (L) 01/25/2025    CREATININE 0.70 01/25/2025    BCR 7.1 01/25/2025    CO2 22.7 01/25/2025    CALCIUM 8.7 01/25/2025    ALBUMIN 2.1 (L) 01/25/2025    AST 10 01/25/2025    ALT 17 01/25/2025     Microbiology:  1/21 blood cultures no growth to date  1/22 respiratory panel  negative  1/22 blood culture in process    Assessment    #Neutropenic fever  # pT3N2a M0 grade 3 invasive ductal adenocarcinoma right breast   #Immunosuppressed on chemotherapy with doxorubicin and cyclophosphamide  #Crohn's disease  #Chest port in place    Neutropenia has resolved.  Remains febrile but fever curve has improved.  All blood cultures remain negative to date.  Continue empiric cefepime 2 g IV every 8 hours for now.  Today is day 4 of empiric therapy.  Hopefully with her immune system back on line her fevers will resolve.        Electronically signed by Keeley Huber MD at 01/25/25 1319       Sampson Quintanilla MD at 01/25/25 4517                REASON FOR CONSULTATION:  breast cancer undergoing therapy, admission with neutropenic fever                               History of Present Illness   Record reviewed, the patient initiated adjuvant chemotherapy with AC on 12/31/2024.  She had fever after the first cycle with borderline neutropenia but no obvious bacterial infections.  She was treated with Levaquin.  Fevers resolved over the weekend.  She denies uncontrolled nausea vomiting or diarrhea.  She denies stomatitis.  Chest wounds have healed.  She was last seen 1/14/2024 with overall plans to proceed with adjuvant therapy including dose dense AC with Neulasta support x 4 followed by weekly Taxol.  Additional discussions included postmastectomy radiation therapy, hormonal therapy with ovarian suppression and tamoxifen versus oophorectomy and AI therapy and review of nonspecific lung nodules after completing chemotherapy.    She was treated with her second cycle of dose dense AC 1/14/2025 presented to the ER 1/22/2025 with fever, generalized malaise and nausea.  Studies included negative respiratory panel,, CBC with progressive neutropenia.  She had been assessed 1/21 with ANC of 0.01, placed on Augmentin.    Unfortunately symptoms worsened and she presented to the ER 1/22/2025.  H&H 8.9 25.9 with  white count of 240, platelet count of 240,000, BUN/creatinine of 4 and 0.69, alk phos 271, lactate of 1.1, procalcitonin 0.33 UA with trace ketones, 2+ protein and trace leukocyte esterase, 3-5 WBCs and 7-12 epithelial cells, blood cultures negative,.  Patient admitted placed on cefepime.    Interval history:  1/23/2025  T102.6, pulse 101, respirate 16, BP 90/58  Patient describes continued fever, abdominal cramping and diarrhea  H&H 8.5 and 25.1 with white count of 350, platelet count of 189,000, BUN/creatinine of 3 and 0.58, potassium 2.6    1/24/2025  T97.9, pulse 74, respirations 18, BP 92/46  Patient far more comfortable today, quite fatigued  H&H 7.6 and 23.9, white count is 1140, platelet count 1 82,000, being creatinine of 4 and 0.57, albumin 2.4, total protein 5.6, alk phos 178, AST 12, ALT 19, total bilirubin 0.3    1/25/2025  T98.4, pulse 74, respirations 18, /65, SpO2 97%  Patient now completed transfusion therapy.  She is feeling significantly improved.  H&H of 12.1 and 36.1, white count of 4990, platelet count of 274,000, BUN/creatinine of 5 and 0.70      ONCOLOGY HISTORY:  This is a 44-year-old woman referred from general surgery to discuss adjuvant treatment for recently surgically treated breast cancer.  The patient presented with a large palpable mass in the inner quadrant of the right breast.  The mass had been enlarging for couple years but the patient did not have insurance to get assessed.  The breast imaging is not available to me but she had a CT of the chest abdomen pelvis on 9/16/2024 showed a large mass in the right breast measuring up to 7.7 cm with abnormal left axillary lymphadenopathy.  There were tiny subpleural nodules in the lateral aspect of the left lower lobe likely granulomatous mildly conspicuous lymph node in the central mesentery 1.1 cm.  A PET scan was performed 9/30/2024 showing a hypermetabolic mass in the right breast with thickening throughout the right breast and  pathologic hypermetabolic right axillary level 1 and 2 lymph nodes, no hypermetabolic internal mammary or supraclavicular lymph nodes.  The small pulmonary nodules were too small to characterize and mesenteric lymph nodes without hypermetabolic activity.  A biopsy of the right breast mass showed invasive ductal adenocarcinoma.    She was taken to the operating room on 10/9/2024 and underwent a right modified radical mastectomy and axillary dissection, prophylactic left mastectomy.  Pathology from the right breast showed invasive ductal carcinoma [place] grade 3 (3+3+3) measuring 17 cm with extensive lymphovascular invasion and dermal lymphatic invasion.  The tumor extended to the dermis but did not ulcerate the skin.  Tumor extended to skeletal muscle and was present focally at an anterior margin, 0.05 mm of the posterior margin, 10 mm from the lateral margin, 20 mm from the medial margin, 28 mm from the superior margin and 40 mm from inferior margin.  There was ductal carcinoma in situ present within 2.5 mm of the posterior margin.  There were 13 lymph nodes in the axillary dissection 8 oncology plan/recommendations: of which had macrometastases, 1 micrometastases and 1 lymph node with isolated tumor cells.  The left breast had an intraductal papilloma otherwise negative.  The tumor was positive for estrogen receptor 99% of cells, progesterone receptor 50% of cells, HER2 0 and Ki-67 65%.    The patient has medical comorbidities of Crohn's disease.    Past Medical History:   Diagnosis Date    Anxiety     Breast cancer     Right    Crohn's disease     DVT (deep vein thrombosis) in pregnancy     PFO (patent foramen ovale)     Stroke     after the birth of her child at age 34    Tattoos         Past Surgical History:   Procedure Laterality Date     SECTION      COLON RESECTION      COLONOSCOPY      MASTECTOMY Bilateral 10/9/2024    Procedure: Modified radical mastectomy with axillary dissection of the  right breast and simple mastectomy of the left breast;  Surgeon: Rebekah Garcia DO;  Location:  [facility];  Service: General;  Laterality: Bilateral;    SKIN CANCER EXCISION      VENOUS ACCESS DEVICE (PORT) INSERTION N/A 12/10/2024    Procedure: INSERTION VENOUS ACCESS DEVICE;  Surgeon: Rebekah Garcia DO;  Location:  [facility];  Service: General;  Laterality: N/A;        Current Facility-Administered Medications on File Prior to Encounter   Medication Dose Route Frequency Provider Last Rate Last Admin    heparin injection 500 Units  500 Units Intravenous PRN Nishant Witt MD   500 Units at 12/11/24 0916    heparin injection 500 Units  500 Units Intravenous PRN Nishant Witt MD   500 Units at 01/07/25 1049    sodium chloride 0.9 % flush 10 mL  10 mL Intravenous PRN Nishant Witt MD   10 mL at 12/11/24 0916    sodium chloride 0.9 % flush 10 mL  10 mL Intravenous PRN Nishant Witt MD   10 mL at 01/07/25 1048     Current Outpatient Medications on File Prior to Encounter   Medication Sig Dispense Refill    amoxicillin-clavulanate (AUGMENTIN) 500-125 MG per tablet Take 1 tablet by mouth 2 (Two) Times a Day for 7 days. 14 tablet 0    aspirin 81 MG EC tablet Take 1 tablet by mouth Daily.      gabapentin (Neurontin) 300 MG capsule Take 2 capsules by mouth Every Night. 60 capsule 2    Klor-Con M20 20 MEQ CR tablet TAKE 1 TABLET BY MOUTH 2 TIMES A DAY 40 tablet 1    lidocaine-prilocaine (EMLA) 2.5-2.5 % cream Apply 1 Application topically to the appropriate area as directed As Needed for Mild Pain. Apply to port site 30 minutes before access 15 g 3    omeprazole (priLOSEC) 20 MG capsule       ondansetron (ZOFRAN) 8 MG tablet Take 1 tablet by mouth 3 (Three) Times a Day As Needed for Nausea or Vomiting. 30 tablet 5    prochlorperazine (COMPAZINE) 10 MG tablet Take 1 tablet by mouth Every 6 (Six) Hours As Needed for Nausea or Vomiting. 60 tablet 1    venlafaxine XR (Effexor XR) 37.5 MG 24 hr capsule  Take 1 capsule by mouth Take As Directed. 1 capsule po q am for two weeks followed by increase to 2 capsules daily (Patient taking differently: Take 1 capsule by mouth Take As Directed. 1 capsule po q am for two weeks followed by increase to 2 capsules daily  Pt has not started yet) 60 capsule 2    OLANZapine (zyPREXA) 5 MG tablet Take 1 tablet by mouth for 4 doses starting night of chemotherapy. 8 tablet 1        ALLERGIES:  No Known Allergies     Social History     Socioeconomic History    Marital status: Single    Number of children: 1   Tobacco Use    Smoking status: Former     Current packs/day: 0.00     Average packs/day: 1 pack/day for 20.0 years (20.0 ttl pk-yrs)     Types: Cigarettes     Start date:      Quit date:      Years since quittin.0    Smokeless tobacco: Current    Tobacco comments:     Nicotine pouches   Vaping Use    Vaping status: Never Used   Substance and Sexual Activity    Alcohol use: Yes     Comment: OCC    Drug use: Never    Sexual activity: Defer        Family History   Problem Relation Age of Onset    Diabetes Mother     Heart disease Father     Diabetes Paternal Grandmother     Hypertension Other     Malig Hyperthermia Neg Hx         Review of Systems   Constitutional:  Positive for fever.   HENT: Negative.     Respiratory: Negative.     Cardiovascular: Negative.    Gastrointestinal:  Positive for abdominal pain and diarrhea. Negative for nausea.   Genitourinary: Negative.    Musculoskeletal: Negative.    Skin:  Negative for wound.   Neurological: Negative.    Hematological: Negative.           Objective     Vitals:    25 2100 25 0010 25 0505 25 0807   BP: 103/62 106/60 94/64 110/65   BP Location:   Left leg Left leg   Patient Position:   Lying Lying   Pulse: 102 93 87 74   Resp: 18 18 18 18   Temp: 99.9 °F (37.7 °C)  (!) 100.9 °F (38.3 °C)  Comment: RN notified 98.4 °F (36.9 °C)   TempSrc: Oral  Oral Oral   SpO2: 99% 98%  97%   Weight:       Height:                  1/14/2025     8:31 AM   Current Status   ECOG score 0       Physical Exam    CONSTITUTIONAL: pleasant well-developed adult woman  HEENT: no icterus, no thrush, moist membranes/  LYMPH: no cervical or supraclavicular lad  CV: RRR, S1S2, no murmur  RESP/chest: cta bilat, no wheezing, no rales, port present left chest wall  Breast: Status post bilateral mastectomies, wound mildly erythematous, healed without discharge  GI: soft, generally tender, bowel sounds positive  MUSC: no mele  NEURO: alert and oriented x3, normal strength  PSYCH: Frustrated, alert, oriented      RECENT LABS:  Hematology WBC   Date Value Ref Range Status   01/25/2025 4.99 3.40 - 10.80 10*3/mm3 Final     RBC   Date Value Ref Range Status   01/25/2025 4.08 3.77 - 5.28 10*6/mm3 Final     Hemoglobin   Date Value Ref Range Status   01/25/2025 12.1 12.0 - 15.9 g/dL Final     Hematocrit   Date Value Ref Range Status   01/25/2025 36.1 34.0 - 46.6 % Final     Platelets   Date Value Ref Range Status   01/25/2025 274 140 - 450 10*3/mm3 Final        Lab Results   Component Value Date    GLUCOSE 86 01/25/2025    BUN 5 (L) 01/25/2025    CREATININE 0.70 01/25/2025     01/25/2025    K 3.4 (L) 01/25/2025     01/25/2025    CALCIUM 8.7 01/25/2025    PROTEINTOT 6.5 01/25/2025    ALBUMIN 2.1 (L) 01/25/2025    ALT 17 01/25/2025    AST 10 01/25/2025    ALKPHOS 201 (H) 01/25/2025    BILITOT 0.6 01/25/2025    GLOB 4.4 01/25/2025    AGRATIO 0.5 01/25/2025    BCR 7.1 01/25/2025    ANIONGAP 9.3 01/25/2025    EGFR 109.5 01/25/2025     PET scan 9/30/2024:  FINDINGS:     Head/neck: No suspicious uptake.     Chest: An 8 x 5.8 cm mass in the lower inner right breast with max SUV  of 18 (series 4/image 156). Mass comes in close proximity to the  sternum. Hypermetabolic parenchymal thickening throughout the right  breast with max SUV of 9 (series 4/image 140). Diffuse right breast skin  thickening.     Hypermetabolic right axillary level I and II lymph  nodes. Reference 1.2  cm level I lymph node with max SUV of 11.9 (series 4/image 103).  Additional reference subcentimeter level II lymph node with max SUV of  3.3 (series 4/image 93). Separate brown fat uptake in the bilateral  axilla and posterior neck base.     No enlarged or hypermetabolic internal mammary or supraclavicular lymph  nodes.     Few subcentimeter pulmonary nodules are too small for accurate PET  characterization without overt hypermetabolism and unchanged from recent  CT. Reference left lower lobe (series 4/image 167) and right upper lobe  (series 4/image 131).     Abdomen/pelvis: Mesenteric lymph nodes are mostly subcentimeter without  associated hypermetabolism.     Sigmoid colectomy. Tubular hypermetabolism throughout the otherwise  normal-appearing remaining colon may be medication related. Moderate  proximal colonic stool burden.     Bones: No focal osseous hypermetabolism with special attention to the  sternum.           IMPRESSION:  1. Hypermetabolic 8 cm right breast mass with hypermetabolic parenchymal  thickening throughout the right breast, pathologically proven invasive  ductal carcinoma. Mass comes in close proximity to the sternum. No focal  osseous hypermetabolism with special attention to the sternum.  2. Hypermetabolic right axillary level I and II lymph nodes suggesting  alana metastatic disease. No enlarged or hypermetabolic internal mammary  or supraclavicular lymph nodes. Separate brown fat uptake in the  bilateral axilla and posterior neck base.  3. Few subcentimeter pulmonary nodules are too small for accurate PET  characterization and will need follow-up via chest CT.  4. Mesenteric lymph nodes are mostly subcentimeter without associated  hypermetabolism.       Assessment & Plan   *pT3N2a M0 grade 3 invasive ductal adenocarcinoma right breast, ER 99% positive, RI 50% positive, HER2 0, Ki-67 65%, tumor present at anterior margin  She presented with a large palpable mass in  the inner quadrant of the right breast; mass had been enlarging for couple years but the patient did not have insurance to get assessed  CT of the chest abdomen pelvis on 9/16/2024 showed a large mass in the right breast measuring up to 7.7 cm with abnormal left axillary lymphadenopathy.  There were tiny subpleural nodules in the lateral aspect of the left lower lobe likely granulomatous mildly conspicuous lymph node in the central mesentery 1.1 cm.    PET scan 9/30/2024 - hypermetabolic mass in the right breast with thickening throughout the right breast and pathologic hypermetabolic right axillary level 1 and 2 lymph nodes, no hypermetabolic internal mammary or supraclavicular lymph nodes.  The small pulmonary nodules were too small to characterize and mesenteric lymph nodes without hypermetabolic activity.    biopsy of the right breast mass showed invasive ductal adenocarcinoma.  operating room on 10/9/2024 and underwent a right modified radical mastectomy and axillary dissection, prophylactic left mastectomy.  Pathology from the right breast showed invasive ductal carcinoma [place] grade 3 (3+3+3) measuring 17 cm with extensive lymphovascular invasion and dermal lymphatic invasion.  The tumor extended to the dermis but did not ulcerate the skin.  Tumor extended to skeletal muscle and was present focally at an anterior margin, 0.05 mm of the posterior margin, 10 mm from the lateral margin, 20 mm from the medial margin, 28 mm from the superior margin and 40 mm from inferior margin.  There was ductal carcinoma in situ present within 2.5 mm of the posterior margin.  There were 13 lymph nodes in the axillary dissection 8 of which had macrometastases, 1 micrometastases and 1 lymph node with isolated tumor cells.  The left breast had an intraductal papilloma otherwise negative.  The tumor was positive for estrogen receptor 99% of cells, progesterone receptor 50% of cells, HER2 0 and Ki-67 65%.  Initiated adjuvant  chemotherapy with Adriamycin/Cytoxan 2024 (adjuvant chemotherapy delayed because of wound healing issues) nonneutropenic fever    *Admitted 2025 with neutropenic fever  G-CSF continued from 2025, discontinued 2025  Continue cefepime      *Marrow suppression secondary to chemotherapy   resolved with normal CBC-6850, 2025  Transfusion planned 2025      * nausea/diarrhea, chemo induced   responding to Imodium/Zofran/Compazine    *elevated LFT-likely s/e chemo     *[employer]ae 19 gene panel-VUS Bard 1    *medical comorbidities of Crohn's disease.    Oncology plan/recommendations:  Again reviewed plan  for adjuvant chemotherapy to reduce risk of recurrence/help eradicate potential micrometastatic disease with dose dense AC with Neulasta support x 4 cycles followed by weekly Taxol x 12 doses-proceed with cycle 2 AC today pending stable CMP  Patient admitted with neutropenic fever, agree with cefepime, initiation of additional Neupogen, discussion with primary oncologist about subsequent treatment plan, transfusion as needed  Patient reviewed 2025 with Neupogen discontinued, she is scheduled 2025 back in office for further chemotherapy.  After chemotherapy she will need postmastectomy radiation given the size and alana involvement  She will need hormonal therapy with ovarian suppression/tamoxifen versus oophorectomy and AI therapy/ck4/6 inhibitor  Nonspecific lung nodules will need reassessment after completing chemotherapy radiographically                    Electronically signed by Sampson Quintanilla MD at 25 1030       Jim Mane MD at 25 1821              Name: Suzanne Lin ADMIT: 2025   : 1980  PCP: Gia Rodriguez APRN    MRN: [id number] LOS: 2 days   AGE/SEX: 44 y.o. female  ROOM: [facility]/     Subjective   Subjective   Patient is seen at bedside, she feels better today.  No acute issues have been reported from overnight to me.      Objective    Objective   Vital Signs  Temp:  [97 °F (36.1 °C)-101.2 °F (38.4 °C)] 97.8 °F (36.6 °C)  Heart Rate:  [74-98] 81  Resp:  [16-18] 18  BP: ()/(46-70) 101/70  SpO2:  [95 %-99 %] 99 %  on   ;   Device (Oxygen Therapy): room air  Body mass index is 24.96 kg/m².  Physical Exam        General, awake and alert.  Head and ENT, normocephalic and atraumatic.  Lungs, symmetric expansion, equal air entry bilaterally.  Heart, regular rate and rhythm.  Abdomen, soft and nontender.  Extremities, no clubbing or cyanosis.  Neuro, no focal deficits.  Skin: Warm and no rash.  Psych, normal mood and affect.  Musculoskeletal, joint examination is grossly normal.      Copied text material from yesterday's note has been reviewed for appropriate changes and remains accurate as of 1/24/25.    Results Review     I reviewed the patient's new clinical results.  Results from last 7 days   Lab Units 01/24/25  0516 01/23/25  0552 01/22/25 1934 01/21/25  0944   WBC 10*3/mm3 1.14* 0.35* 0.24* 0.17*   HEMOGLOBIN g/dL 7.6* 8.5* 8.9* 10.2*   PLATELETS 10*3/mm3 182 189 241 280     Results from last 7 days   Lab Units 01/24/25  0516 01/23/25  1621 01/23/25  0552 01/22/25 1934 01/21/25  0929   SODIUM mmol/L 137  --  138 135* 135*   POTASSIUM mmol/L 2.9* 2.9* 2.6* 2.7* 3.2*   CHLORIDE mmol/L 103  --  106 98 101   CO2 mmol/L 22.7  --  22.6 23.0 22.9   BUN mg/dL 4*  --  3* 4* 9   CREATININE mg/dL 0.57  --  0.58 0.69 0.52*   GLUCOSE mg/dL 87  --  117* 136* 120*   EGFR mL/min/1.73 115.1  --  114.6 109.9 117.7     Results from last 7 days   Lab Units 01/24/25  0516 01/23/25 0552 01/22/25 1934   ALBUMIN g/dL 2.4* 2.2* 2.9*   BILIRUBIN mg/dL 0.3 0.6 0.6   ALK PHOS U/L 178* 208* 271*   AST (SGOT) U/L 12 12 27   ALT (SGPT) U/L 19 19 30     Results from last 7 days   Lab Units 01/24/25  0516 01/23/25  0552 01/22/25 1934 01/21/25  0929   CALCIUM mg/dL 8.3* 8.0* 8.7 8.9   ALBUMIN g/dL 2.4* 2.2* 2.9*  --    MAGNESIUM mg/dL  --   --  1.9 2.2     Results from  "last 7 days   Lab Units 01/22/25 1934   PROCALCITONIN ng/mL 0.33*   LACTATE mmol/L 1.1     No results found for: \"HGBA1C\", \"POCGLU\"    CT Abdomen Pelvis With Contrast    Result Date: 1/23/2025  1. New periportal edema within the liver. No biliary ductal dilation 2. Stable mild nonspecific fluid around the gallbladder fundus 3. Fluid throughout the colon. Stable wall thickening of the hepatic flexure portion of the colon. Stable wall thickening and enhancement of the terminal ileum  Radiation dose reduction techniques were utilized, including automated exposure control and exposure modulation based on body size.   This report was finalized on 1/23/2025 7:05 PM by Dr. Shayan Reyna M.D on Workstation: [id number]      XR Chest 1 View    Result Date: 1/22/2025  As above    This report was finalized on 1/22/2025 7:46 PM by Dr. Shayan Reyna M.D on Workstation: [id number]       I have personally reviewed all medications:  Scheduled Medications  aspirin, 81 mg, Oral, Daily  cefepime, 2,000 mg, Intravenous, Q8H  filgrastim (NEUPOGEN) injection, 300 mcg, Subcutaneous, Q PM  gabapentin, 600 mg, Oral, Nightly    Infusions   Diet  Diet: Regular/House; Fluid Consistency: Thin (IDDSI 0)    I have personally reviewed:  [x]  Laboratory   [x]  Microbiology   [x]  Radiology   [x]  EKG/Telemetry  [x]  Cardiology/Vascular   []  Pathology    []  Records      Assessment/Plan     Active Hospital Problems    Diagnosis  POA    **Neutropenic fever [D70.9, R50.81]  Yes    Crohn's disease without complication [K50.90]  Yes    LITA (iron deficiency anemia) [D50.9]  Yes    Breast cancer, stage 3, right [C50.911]  Yes      Resolved Hospital Problems   No resolved problems to display.       44 y.o. female admitted with Neutropenic fever.    Assessment and plan  1.  Neutropenic fever, high-grade fever noted, infectious disease service on board, continue IV cefepime.  Antibiotic management per ID recommendations.     2.  History of Crohn's " disease, currently uncomplicated course.  If she worsens, she may need GI evaluation.     3.  Stage III right-sided breast cancer, status post recent chemotherapy, hematology/oncology has been consulted.  We will follow management recommendations.     4.  Anemia, monitor hemoglobin closely, we will continue to recheck labs.     5.  CODE STATUS is full code.  Further plans based on hospital course.     Expected Discharge Date: 1/27/2025; Expected Discharge Time:       Jim Mane MD  [facility]  01/24/25  18:21 EST    Electronically signed by Jim Mane MD at 01/24/25 1544       Sampson Quintanilla MD at 01/24/25 4593                REASON FOR CONSULTATION:  breast cancer undergoing therapy, admission with neutropenic fever                               History of Present Illness   Record reviewed, the patient initiated adjuvant chemotherapy with AC on 12/31/2024.  She had fever after the first cycle with borderline neutropenia but no obvious bacterial infections.  She was treated with Levaquin.  Fevers resolved over the weekend.  She denies uncontrolled nausea vomiting or diarrhea.  She denies stomatitis.  Chest wounds have healed.  She was last seen 1/14/2024 with overall plans to proceed with adjuvant therapy including dose dense AC with Neulasta support x 4 followed by weekly Taxol.  Additional discussions included postmastectomy radiation therapy, hormonal therapy with ovarian suppression and tamoxifen versus oophorectomy and AI therapy and review of nonspecific lung nodules after completing chemotherapy.    She was treated with her second cycle of dose dense AC 1/14/2025 presented to the ER 1/22/2025 with fever, generalized malaise and nausea.  Studies included negative respiratory panel,, CBC with progressive neutropenia.  She had been assessed 1/21 with ANC of 0.01, placed on Augmentin.    Unfortunately symptoms worsened and she presented to the ER 1/22/2025.  H&H 8.9 25.9  with white count of 240, platelet count of 240,000, BUN/creatinine of 4 and 0.69, alk phos 271, lactate of 1.1, procalcitonin 0.33 UA with trace ketones, 2+ protein and trace leukocyte esterase, 3-5 WBCs and 7-12 epithelial cells, blood cultures negative,.  Patient admitted placed on cefepime.    Interval history:  1/23/2025  T102.6, pulse 101, respirate 16, BP 90/58  Patient describes continued fever, abdominal cramping and diarrhea  H&H 8.5 and 25.1 with white count of 350, platelet count of 189,000, BUN/creatinine of 3 and 0.58, potassium 2.6    1/24/2025  T97.9, pulse 74, respirations 18, BP 92/46  Patient far more comfortable today, quite fatigued  H&H 7.6 and 23.9, white count is 1140, platelet count 1 82,000, being creatinine of 4 and 0.57, albumin 2.4, total protein 5.6, alk phos 178, AST 12, ALT 19, total bilirubin 0.3      ONCOLOGY HISTORY:  This is a 44-year-old woman referred from general surgery to discuss adjuvant treatment for recently surgically treated breast cancer.  The patient presented with a large palpable mass in the inner quadrant of the right breast.  The mass had been enlarging for couple years but the patient did not have insurance to get assessed.  The breast imaging is not available to me but she had a CT of the chest abdomen pelvis on 9/16/2024 showed a large mass in the right breast measuring up to 7.7 cm with abnormal left axillary lymphadenopathy.  There were tiny subpleural nodules in the lateral aspect of the left lower lobe likely granulomatous mildly conspicuous lymph node in the central mesentery 1.1 cm.  A PET scan was performed 9/30/2024 showing a hypermetabolic mass in the right breast with thickening throughout the right breast and pathologic hypermetabolic right axillary level 1 and 2 lymph nodes, no hypermetabolic internal mammary or supraclavicular lymph nodes.  The small pulmonary nodules were too small to characterize and mesenteric lymph nodes without hypermetabolic  activity.  A biopsy of the right breast mass showed invasive ductal adenocarcinoma.    She was taken to the operating room on 10/9/2024 and underwent a right modified radical mastectomy and axillary dissection, prophylactic left mastectomy.  Pathology from the right breast showed invasive ductal carcinoma [place] grade 3 (3+3+3) measuring 17 cm with extensive lymphovascular invasion and dermal lymphatic invasion.  The tumor extended to the dermis but did not ulcerate the skin.  Tumor extended to skeletal muscle and was present focally at an anterior margin, 0.05 mm of the posterior margin, 10 mm from the lateral margin, 20 mm from the medial margin, 28 mm from the superior margin and 40 mm from inferior margin.  There was ductal carcinoma in situ present within 2.5 mm of the posterior margin.  There were 13 lymph nodes in the axillary dissection 8 oncology plan/recommendations: of which had macrometastases, 1 micrometastases and 1 lymph node with isolated tumor cells.  The left breast had an intraductal papilloma otherwise negative.  The tumor was positive for estrogen receptor 99% of cells, progesterone receptor 50% of cells, HER2 0 and Ki-67 65%.    The patient has medical comorbidities of Crohn's disease.    Past Medical History:   Diagnosis Date    Anxiety     Breast cancer     Right    Crohn's disease     DVT (deep vein thrombosis) in pregnancy     PFO (patent foramen ovale)     Stroke     after the birth of her child at age 34    Tattoos         Past Surgical History:   Procedure Laterality Date     SECTION      COLON RESECTION      COLONOSCOPY      MASTECTOMY Bilateral 10/9/2024    Procedure: Modified radical mastectomy with axillary dissection of the right breast and simple mastectomy of the left breast;  Surgeon: Rebekah Garcia DO;  Location: [facility];  Service: General;  Laterality: Bilateral;    SKIN CANCER EXCISION      VENOUS ACCESS DEVICE (PORT) INSERTION N/A 12/10/2024     Procedure: INSERTION VENOUS ACCESS DEVICE;  Surgeon: Rebekah Garcia DO;  Location: [facility];  Service: General;  Laterality: N/A;        Current Facility-Administered Medications on File Prior to Encounter   Medication Dose Route Frequency Provider Last Rate Last Admin    heparin injection 500 Units  500 Units Intravenous PRN Nishant Witt MD   500 Units at 12/11/24 0916    heparin injection 500 Units  500 Units Intravenous PRN iNshant Witt MD   500 Units at 01/07/25 1049    sodium chloride 0.9 % flush 10 mL  10 mL Intravenous PRN Nishant Witt MD   10 mL at 12/11/24 0916    sodium chloride 0.9 % flush 10 mL  10 mL Intravenous PRN Nishant Witt MD   10 mL at 01/07/25 1048     Current Outpatient Medications on File Prior to Encounter   Medication Sig Dispense Refill    amoxicillin-clavulanate (AUGMENTIN) 500-125 MG per tablet Take 1 tablet by mouth 2 (Two) Times a Day for 7 days. 14 tablet 0    aspirin 81 MG EC tablet Take 1 tablet by mouth Daily.      gabapentin (Neurontin) 300 MG capsule Take 2 capsules by mouth Every Night. 60 capsule 2    Klor-Con M20 20 MEQ CR tablet TAKE 1 TABLET BY MOUTH 2 TIMES A DAY 40 tablet 1    lidocaine-prilocaine (EMLA) 2.5-2.5 % cream Apply 1 Application topically to the appropriate area as directed As Needed for Mild Pain. Apply to port site 30 minutes before access 15 g 3    omeprazole (priLOSEC) 20 MG capsule       ondansetron (ZOFRAN) 8 MG tablet Take 1 tablet by mouth 3 (Three) Times a Day As Needed for Nausea or Vomiting. 30 tablet 5    prochlorperazine (COMPAZINE) 10 MG tablet Take 1 tablet by mouth Every 6 (Six) Hours As Needed for Nausea or Vomiting. 60 tablet 1    venlafaxine XR (Effexor XR) 37.5 MG 24 hr capsule Take 1 capsule by mouth Take As Directed. 1 capsule po q am for two weeks followed by increase to 2 capsules daily (Patient taking differently: Take 1 capsule by mouth Take As Directed. 1 capsule po q am for two weeks followed by increase to 2  capsules daily  Pt has not started yet) 60 capsule 2    OLANZapine (zyPREXA) 5 MG tablet Take 1 tablet by mouth for 4 doses starting night of chemotherapy. 8 tablet 1        ALLERGIES:  No Known Allergies     Social History     Socioeconomic History    Marital status: Single    Number of children: 1   Tobacco Use    Smoking status: Former     Current packs/day: 0.00     Average packs/day: 1 pack/day for 20.0 years (20.0 ttl pk-yrs)     Types: Cigarettes     Start date:      Quit date: 2019     Years since quittin.0    Smokeless tobacco: Current    Tobacco comments:     Nicotine pouches   Vaping Use    Vaping status: Never Used   Substance and Sexual Activity    Alcohol use: Yes     Comment: OCC    Drug use: Never    Sexual activity: Defer        Family History   Problem Relation Age of Onset    Diabetes Mother     Heart disease Father     Diabetes Paternal Grandmother     Hypertension Other     Malig Hyperthermia Neg Hx         Review of Systems   Constitutional:  Positive for fever.   HENT: Negative.     Respiratory: Negative.     Cardiovascular: Negative.    Gastrointestinal:  Positive for abdominal pain and diarrhea. Negative for nausea.   Genitourinary: Negative.    Musculoskeletal: Negative.    Skin:  Negative for wound.   Neurological: Negative.    Hematological: Negative.           Objective     Vitals:    25 0006 25 0155 25 0808   BP: 93/61 (!) 81/52 95/57 92/46   BP Location: Left arm Left arm Left leg Left leg   Patient Position: Lying Lying Lying Lying   Pulse: 78 83 95 74   Resp: 16 18 18 18   Temp: 97 °F (36.1 °C) 98.1 °F (36.7 °C) (!) 101.2 °F (38.4 °C)  Comment: RN is aware 97.9 °F (36.6 °C)   TempSrc: Oral Oral Oral Oral   SpO2: 99% 95% 98% 98%   Weight:       Height:                 2025     8:31 AM   Current Status   ECOG score 0       Physical Exam    CONSTITUTIONAL: pleasant well-developed adult woman  HEENT: no icterus, no thrush, moist  membranes/  LYMPH: no cervical or supraclavicular lad  CV: RRR, S1S2, no murmur  RESP/chest: cta bilat, no wheezing, no rales, port present left chest wall  Breast: Status post bilateral mastectomies, wound mildly erythematous, healed without discharge  GI: soft, generally tender, bowel sounds positive  MUSC: no mele  NEURO: alert and oriented x3, normal strength  PSYCH: Frustrated, alert, oriented      RECENT LABS:  Hematology WBC   Date Value Ref Range Status   01/24/2025 1.14 (C) 3.40 - 10.80 10*3/mm3 Final     RBC   Date Value Ref Range Status   01/24/2025 2.65 (L) 3.77 - 5.28 10*6/mm3 Final     Hemoglobin   Date Value Ref Range Status   01/24/2025 7.6 (L) 12.0 - 15.9 g/dL Final     Hematocrit   Date Value Ref Range Status   01/24/2025 23.6 (L) 34.0 - 46.6 % Final     Platelets   Date Value Ref Range Status   01/24/2025 182 140 - 450 10*3/mm3 Final        Lab Results   Component Value Date    GLUCOSE 87 01/24/2025    BUN 4 (L) 01/24/2025    CREATININE 0.57 01/24/2025     01/24/2025    K 2.9 (L) 01/24/2025     01/24/2025    CALCIUM 8.3 (L) 01/24/2025    PROTEINTOT 5.6 (L) 01/24/2025    ALBUMIN 2.4 (L) 01/24/2025    ALT 19 01/24/2025    AST 12 01/24/2025    ALKPHOS 178 (H) 01/24/2025    BILITOT 0.3 01/24/2025    GLOB 3.2 01/24/2025    AGRATIO 0.8 01/24/2025    BCR 7.0 01/24/2025    ANIONGAP 11.3 01/24/2025    EGFR 115.1 01/24/2025     PET scan 9/30/2024:  FINDINGS:     Head/neck: No suspicious uptake.     Chest: An 8 x 5.8 cm mass in the lower inner right breast with max SUV  of 18 (series 4/image 156). Mass comes in close proximity to the  sternum. Hypermetabolic parenchymal thickening throughout the right  breast with max SUV of 9 (series 4/image 140). Diffuse right breast skin  thickening.     Hypermetabolic right axillary level I and II lymph nodes. Reference 1.2  cm level I lymph node with max SUV of 11.9 (series 4/image 103).  Additional reference subcentimeter level II lymph node with max SUV  of  3.3 (series 4/image 93). Separate brown fat uptake in the bilateral  axilla and posterior neck base.     No enlarged or hypermetabolic internal mammary or supraclavicular lymph  nodes.     Few subcentimeter pulmonary nodules are too small for accurate PET  characterization without overt hypermetabolism and unchanged from recent  CT. Reference left lower lobe (series 4/image 167) and right upper lobe  (series 4/image 131).     Abdomen/pelvis: Mesenteric lymph nodes are mostly subcentimeter without  associated hypermetabolism.     Sigmoid colectomy. Tubular hypermetabolism throughout the otherwise  normal-appearing remaining colon may be medication related. Moderate  proximal colonic stool burden.     Bones: No focal osseous hypermetabolism with special attention to the  sternum.           IMPRESSION:  1. Hypermetabolic 8 cm right breast mass with hypermetabolic parenchymal  thickening throughout the right breast, pathologically proven invasive  ductal carcinoma. Mass comes in close proximity to the sternum. No focal  osseous hypermetabolism with special attention to the sternum.  2. Hypermetabolic right axillary level I and II lymph nodes suggesting  alana metastatic disease. No enlarged or hypermetabolic internal mammary  or supraclavicular lymph nodes. Separate brown fat uptake in the  bilateral axilla and posterior neck base.  3. Few subcentimeter pulmonary nodules are too small for accurate PET  characterization and will need follow-up via chest CT.  4. Mesenteric lymph nodes are mostly subcentimeter without associated  hypermetabolism.       Assessment & Plan   *pT3N2a M0 grade 3 invasive ductal adenocarcinoma right breast, ER 99% positive, RI 50% positive, HER2 0, Ki-67 65%, tumor present at anterior margin  She presented with a large palpable mass in the inner quadrant of the right breast; mass had been enlarging for couple years but the patient did not have insurance to get assessed  CT of the chest  abdomen pelvis on 9/16/2024 showed a large mass in the right breast measuring up to 7.7 cm with abnormal left axillary lymphadenopathy.  There were tiny subpleural nodules in the lateral aspect of the left lower lobe likely granulomatous mildly conspicuous lymph node in the central mesentery 1.1 cm.    PET scan 9/30/2024 - hypermetabolic mass in the right breast with thickening throughout the right breast and pathologic hypermetabolic right axillary level 1 and 2 lymph nodes, no hypermetabolic internal mammary or supraclavicular lymph nodes.  The small pulmonary nodules were too small to characterize and mesenteric lymph nodes without hypermetabolic activity.    biopsy of the right breast mass showed invasive ductal adenocarcinoma.  operating room on 10/9/2024 and underwent a right modified radical mastectomy and axillary dissection, prophylactic left mastectomy.  Pathology from the right breast showed invasive ductal carcinoma Bruce grade 3 (3+3+3) measuring 17 cm with extensive lymphovascular invasion and dermal lymphatic invasion.  The tumor extended to the dermis but did not ulcerate the skin.  Tumor extended to skeletal muscle and was present focally at an anterior margin, 0.05 mm of the posterior margin, 10 mm from the lateral margin, 20 mm from the medial margin, 28 mm from the superior margin and 40 mm from inferior margin.  There was ductal carcinoma in situ present within 2.5 mm of the posterior margin.  There were 13 lymph nodes in the axillary dissection 8 of which had macrometastases, 1 micrometastases and 1 lymph node with isolated tumor cells.  The left breast had an intraductal papilloma otherwise negative.  The tumor was positive for estrogen receptor 99% of cells, progesterone receptor 50% of cells, HER2 0 and Ki-67 65%.  Initiated adjuvant chemotherapy with Adriamycin/Cytoxan 12/31/2024 (adjuvant chemotherapy delayed because of wound healing issues) nonneutropenic fever    *Admitted 1/22/2025  with neutropenic fever  G-CSF continued from 1/23/2025  Continue cefepime      *Marrow suppression secondary to chemotherapy   resolved with normal CBC-6850, 1/14/2025  Transfusion planned 1/24/2025      * nausea/diarrhea, chemo induced   responding to Imodium/Zofran/Compazine    *elevated LFT-likely s/e chemo     *Inv[employer]ae 19 gene panel-VUS Bard 1    *medical comorbidities of Crohn's disease.    Oncology plan/recommendations:  Again reviewed plan  for adjuvant chemotherapy to reduce risk of recurrence/help eradicate potential micrometastatic disease with dose dense AC with Neulasta support x 4 cycles followed by weekly Taxol x 12 doses-proceed with cycle 2 AC today pending stable CMP  Patient admitted with neutropenic fever, agree with cefepime, initiation of additional Neupogen, discussion with primary oncologist about subsequent treatment plan, transfusion as needed  After chemotherapy she will need postmastectomy radiation given the size and alana involvement  She will need hormonal therapy with ovarian suppression/tamoxifen versus oophorectomy and AI therapy/ck4/6 inhibitor  Nonspecific lung nodules will need reassessment after completing chemotherapy radiographically                    Electronically signed by Sampson Quintanilla MD at 01/24/25 1124       Mamadou Thompson DO at 01/24/25 0823           LOS: 2 days     Chief Complaint: Neutropenic fever    Interval History: Patient resting comfortably this morning.  Did not wake the patient from sleep.  Tmax last night of 101.2.  WBC improving.    Vital Signs  Temp:  [97 °F (36.1 °C)-102.6 °F (39.2 °C)] 97.9 °F (36.6 °C)  Heart Rate:  [] 74  Resp:  [16-18] 18  BP: (81-95)/(46-61) 92/46    Physical Exam:  General: In no acute distress  HEENT: Oropharynx clear, moist mucous membranes  Respiratory: Normal work of breathing  Skin: No rashes or lesions in exposed areas  Extremities: No edema, cyanosis  Access: Chest port and peripheral  IV    Antibiotics:  Anti-Infectives (From admission, onward)      Ordered     Dose/Rate Route Frequency Start Stop    01/23/25 1255  Vancomycin HCl 1,250 mg in sodium chloride 0.9 % 250 mL VTB  Status:  Discontinued        Ordering Provider: Jim Mane MD    1,250 mg  200 mL/hr over 75 Minutes Intravenous Every 12 Hours 01/24/25 0000 01/23/25 1300    01/23/25 1240  piperacillin-tazobactam (ZOSYN) 3.375 g IVPB in 100 mL NS MBP (CD)  Status:  Discontinued        Ordering Provider: Jim Mane MD    3.375 g  over 4 Hours Intravenous Every 8 Hours 01/23/25 1930 01/23/25 1305    01/23/25 1305  cefepime 2000 mg IVPB in 100 mL NS (MBP)        Ordering Provider: Mamadou Thompson DO    2,000 mg  over 4 Hours Intravenous Every 8 Hours 01/23/25 1400 01/30/25 1359    01/23/25 1240  Vancomycin HCl 1,250 mg in sodium chloride 0.9 % 250 mL VTB  Status:  Discontinued        Ordering Provider: Jim Mane MD    20 mg/kg × 68 kg  200 mL/hr over 75 Minutes Intravenous Once 01/23/25 1330 01/23/25 1300    01/23/25 1240  piperacillin-tazobactam (ZOSYN) 3.375 g IVPB in 100 mL NS MBP (CD)  Status:  Discontinued        Ordering Provider: Jim Mane MD    3.375 g  over 30 Minutes Intravenous Once 01/23/25 1330 01/23/25 1305    01/23/25 1224  Pharmacy to Dose Zosyn  Status:  Discontinued        Ordering Provider: Jim Mane MD     Not Applicable Continuous PRN 01/23/25 1224 01/23/25 1255    01/22/25 1919  cefepime 2000 mg IVPB in 100 mL NS (MBP)        Ordering Provider: Luis Armando Reyna MD    2,000 mg  over 30 Minutes Intravenous Once 01/22/25 1935 01/22/25 2153             Results Review:     I reviewed the patient's new clinical results.    Lab Results   Component Value Date    WBC 1.14 (C) 01/24/2025    HGB 7.6 (L) 01/24/2025    HCT 23.6 (L) 01/24/2025    MCV 89.1 01/24/2025     01/24/2025     Lab Results   Component Value Date    GLUCOSE 87 01/24/2025    BUN 4 (L) 01/24/2025    CREATININE 0.57  "01/24/2025    BCR 7.0 01/24/2025    CO2 22.7 01/24/2025    CALCIUM 8.3 (L) 01/24/2025    ALBUMIN 2.4 (L) 01/24/2025    AST 12 01/24/2025    ALT 19 01/24/2025       No results found for: \"VANCOPEAK\", \"VANCOTROUGH\", \"VANCORANDOM\"     Isolation:   No active isolations      Microbiology:  1/21 blood cultures no growth to date  1/22 respiratory panel negative  1/22 blood culture in process    Assessment    #Neutropenic fever  # pT3N2a M0 grade 3 invasive ductal adenocarcinoma right breast   #Immunosuppressed on chemotherapy with doxorubicin and cyclophosphamide  #Crohn's disease  #Chest port in place    Blood cultures remain negative.  Overall slight decrease in Tmax.  WBC slowly improving.  Continue empiric cefepime 2 g every 8 hours.  Will follow-up culture results and ANC.    ID will follow.       Electronically signed by Mamadou Thompson DO at 01/24/25 0848       Consult Notes (last 4 days)  Notes from 01/23/25 1422 through 01/27/25 1422   No notes of this type exist for this encounter.       "

## 2025-01-27 NOTE — PLAN OF CARE
Patient is alert and oriented x4. Oral surgery consult placed and called this shift. Patient is a SBA in the room. No complaints of pain this shift. IV abx administered. Max temperature this shift 99.9, no tylenol given.

## 2025-01-27 NOTE — PROGRESS NOTES
Name: Suzanne Lin ADMIT: 2025   : 1980  PCP: Gia Rodriguez, APRN    MRN: [id number] LOS: 5 days   AGE/SEX: 44 y.o. female  ROOM: [facility]     Subjective   Subjective   Patient is seen at bedside.  She is feeling better today.       Objective   Objective   Vital Signs  Temp:  [98.1 °F (36.7 °C)-99.9 °F (37.7 °C)] 98.2 °F (36.8 °C)  Heart Rate:  [72-81] 81  Resp:  [16] 16  BP: ()/(50-62) 106/58  SpO2:  [94 %-97 %] 96 %  on   ;   Device (Oxygen Therapy): room air  Body mass index is 24.96 kg/m².  Physical Exam  General, awake and alert.  Head and ENT, normocephalic and atraumatic.  Lungs, symmetric expansion, equal air entry bilaterally.  Heart, regular rate and rhythm.  Abdomen, soft and nontender.  Extremities, no clubbing or cyanosis.  Neuro, no focal deficits.  Skin: Warm and no rash.  Psych, normal mood and affect.  Musculoskeletal, joint examination is grossly normal.        Copied text material from yesterday's note has been reviewed for appropriate changes and remains accurate as of 25.      Results Review     I reviewed the patient's new clinical results.  Results from last 7 days   Lab Units 25  0925  0410 25  0520 25  0516   WBC 10*3/mm3 7.76 10.51 4.99 1.14*   HEMOGLOBIN g/dL 11.4* 12.6 12.1 7.6*   PLATELETS 10*3/mm3 371 370 274 182     Results from last 7 days   Lab Units 25  0925  0410 25  0520 25  0516   SODIUM mmol/L 137 140 139 137   POTASSIUM mmol/L 2.9* 3.8 3.4* 2.9*   CHLORIDE mmol/L 101 103 107 103   CO2 mmol/L 23.3 24.8 22.7 22.7   BUN mg/dL 7 7 5* 4*   CREATININE mg/dL 0.59 0.71 0.70 0.57   GLUCOSE mg/dL 118* 89 86 87   EGFR mL/min/1.73 114.1 107.7 109.5 115.1     Results from last 7 days   Lab Units 25  0925 25  0410 25  0520 25  0516   ALBUMIN g/dL 2.2* 2.9* 2.1* 2.4*   BILIRUBIN mg/dL 0.4 0.5 0.6 0.3   ALK PHOS U/L 274* 262* 201* 178*   AST (SGOT) U/L 18 13 10 12   ALT (SGPT) U/L 13  "14 17 19     Results from last 7 days   Lab Units 01/27/25  0925 01/26/25  0410 01/25/25  0520 01/24/25  0516 01/23/25  0552 01/22/25 1934 01/21/25  0929   CALCIUM mg/dL 8.2* 8.8 8.7 8.3*   < > 8.7 8.9   ALBUMIN g/dL 2.2* 2.9* 2.1* 2.4*   < > 2.9*  --    MAGNESIUM mg/dL  --   --   --   --   --  1.9 2.2    < > = values in this interval not displayed.     Results from last 7 days   Lab Units 01/26/25 0410 01/22/25 1934   PROCALCITONIN ng/mL 1.15* 0.33*   LACTATE mmol/L  --  1.1     No results found for: \"HGBA1C\", \"POCGLU\"    XR Panorex    Result Date: 1/27/2025  1. Periapical lucency around the root of the second to last right lower tooth may represent periapical abscess. 2. Please see full discussion above. No bony abnormalities are seen in the mandible   This report was finalized on 1/27/2025 3:16 PM by Dr. Alex Mei M.D on Workstation: [id number]      XR Chest 1 View    Result Date: 1/26/2025  No evidence for acute pulmonary process. Follow-up as clinical indications persist.  This report was finalized on 1/26/2025 3:13 PM by Dr. Chaitanya Espinal M.D on Workstation: [id number]       I have personally reviewed all medications:  Scheduled Medications  aspirin, 81 mg, Oral, Daily  cefepime, 2,000 mg, Intravenous, Q8H  gabapentin, 600 mg, Oral, Nightly  sodium chloride, 10 mL, Intravenous, Q12H    Infusions   Diet  Diet: Regular/House; Fluid Consistency: Thin (IDDSI 0)  NPO Diet NPO Type: Sips with Meds    I have personally reviewed:  [x]  Laboratory   [x]  Microbiology   [x]  Radiology   [x]  EKG/Telemetry  [x]  Cardiology/Vascular   []  Pathology    []  Records       Assessment/Plan     Active Hospital Problems    Diagnosis  POA    **Neutropenic fever [D70.9, R50.81]  Yes    Crohn's disease without complication [K50.90]  Yes    LITA (iron deficiency anemia) [D50.9]  Yes    Breast cancer, stage 3, right [C50.911]  Yes      Resolved Hospital Problems   No resolved problems to display.       44 y.o. female " admitted with Neutropenic fever.      Assessment and plan  1.  Neutropenic fever, high-grade fever noted, infectious disease service on board, continue IV cefepime.  Antibiotic management per ID recommendations.  Repeat procalcitonin elevated, repeat blood cultures ordered.  Patient reports dental pain, multiple teeth are potential sources of infection, to OR for extraction of teeth.  Oral surgery on board now.     2.  History of Crohn's disease, currently uncomplicated course.  If she worsens, she may need GI evaluation.     3.  Stage III right-sided breast cancer, status post recent chemotherapy, hematology/oncology has been consulted.  We will follow management recommendations.     4.  Anemia, monitor hemoglobin closely, we will continue to recheck labs.     5.  CODE STATUS is full code.  Further plans based on hospital course.      Expected Discharge Date: 1/27/2025; Expected Discharge Time:       Jim Mane MD  [facility]  01/27/25  17:54 EST

## 2025-01-27 NOTE — CONSULTS
[facility]   Consult Note    Patient Name: Suzanne Lin  : 1980  MRN: [id number]  Primary Care Physician:  Gai Rodriguez APRN  Referring Physician: Luis Armando Reyna MD  Date of admission: 2025    Inpatient Oral & Maxillofacial Surgery Consult  Consult performed by: Pramod Zambrano DMD  Consult ordered by: Nishant Witt MD  Reason for consult: fe        Subjective   Subjective     Reason for Consult/ Chief Complaint: fever    History of Present Illness  Suzanne Lin is a 44 y.o. female who has been admitted with neutropenic fever of unknown origin.  Undergoing chemo for breast cancer. Reports intermittent dental pain    Review of Systems dental pain    Personal History     Past Medical History:   Diagnosis Date    Anxiety     Breast cancer     Right    Crohn's disease     DVT (deep vein thrombosis) in pregnancy     PFO (patent foramen ovale)     Stroke     after the birth of her child at age 34    Tattoos        Past Surgical History:   Procedure Laterality Date     SECTION      COLON RESECTION      COLONOSCOPY      MASTECTOMY Bilateral 10/9/2024    Procedure: Modified radical mastectomy with axillary dissection of the right breast and simple mastectomy of the left breast;  Surgeon: Rebekah Garcia DO;  Location: [facility] OR;  Service: General;  Laterality: Bilateral;    SKIN CANCER EXCISION      VENOUS ACCESS DEVICE (PORT) INSERTION N/A 12/10/2024    Procedure: INSERTION VENOUS ACCESS DEVICE;  Surgeon: Rebekah Garcia DO;  Location: [facility] OR;  Service: General;  Laterality: N/A;       Family History: family history includes Diabetes in her mother and paternal grandmother; Heart disease in her father; Hypertension in an other family member. Otherwise pertinent FHx was reviewed and not pertinent to current issue.    Social History:  reports that she quit smoking about 6 years ago. Her smoking use included cigarettes. She started smoking about 26 years ago. She has a 20  pack-year smoking history. She uses smokeless tobacco. She reports current alcohol use. She reports that she does not use drugs.    Home Medications:   OLANZapine, amoxicillin-clavulanate, aspirin, gabapentin, lidocaine-prilocaine, omeprazole, ondansetron, potassium chloride, prochlorperazine, and venlafaxine XR    Allergies:  No Known Allergies    Objective    Objective     Vitals:  Temp:  [98.1 °F (36.7 °C)-99.9 °F (37.7 °C)] 98.2 °F (36.8 °C)  Heart Rate:  [72-81] 81  Resp:  [16] 16  BP: ()/(50-62) 106/58    Physical Exam oral mucosa pink and healthy, no swelling, no trismus, multiple carious teeth and periodontal bone loss.    Result Review    Result Review:  I have personally reviewed the results from the time of this admission to 1/27/2025 17:15 EST and agree with these findings:  [x]  Laboratory list / accordion  [x]  Microbiology  [x]  Radiology  []  EKG/Telemetry   []  Cardiology/Vascular   []  Pathology  []  Old records  []  Other:  Most notable findings include: dental caries, periapical dental abscess      Assessment & Plan   Assessment / Plan     Brief Patient Summary:  Suzanne Lin is a 44 y.o. female who has fevers of unknown origin and immunosuppressed from chemotherapy.  Multiple teeth are potential sources of infection    Active Hospital Problems:  Active Hospital Problems    Diagnosis     **Neutropenic fever     Crohn's disease without complication     LITA (iron deficiency anemia)     Breast cancer, stage 3, right      Plan: To OR for extraction of teeth      Pramod Zambrano, DMD

## 2025-01-27 NOTE — PLAN OF CARE
Goal Outcome Evaluation:      Patient resting in bed at this time, pain meds this shift x 2, neutropenic precautions maintained. Cefepime 2g q8h as ordered. Power port not accessed, stand by assist, full code, room air. Plan of care is ongoing.

## 2025-01-28 ENCOUNTER — ANESTHESIA (OUTPATIENT)
Dept: PERIOP | Facility: HOSPITAL | Age: 45
End: 2025-01-28
Payer: COMMERCIAL

## 2025-01-28 ENCOUNTER — ANESTHESIA EVENT (OUTPATIENT)
Dept: PERIOP | Facility: HOSPITAL | Age: 45
End: 2025-01-28
Payer: COMMERCIAL

## 2025-01-28 LAB
ALBUMIN SERPL-MCNC: 2.5 G/DL (ref 3.5–5.2)
ALBUMIN/GLOB SERPL: 0.7 G/DL
ALP SERPL-CCNC: 309 U/L (ref 39–117)
ALT SERPL W P-5'-P-CCNC: 14 U/L (ref 1–33)
ANION GAP SERPL CALCULATED.3IONS-SCNC: 9 MMOL/L (ref 5–15)
ANISOCYTOSIS BLD QL: ABNORMAL
AST SERPL-CCNC: 15 U/L (ref 1–32)
B-HCG UR QL: NEGATIVE
BASOPHILS # BLD MANUAL: 0.09 10*3/MM3 (ref 0–0.2)
BASOPHILS NFR BLD MANUAL: 1 % (ref 0–1.5)
BILIRUB SERPL-MCNC: 0.4 MG/DL (ref 0–1.2)
BUN SERPL-MCNC: 6 MG/DL (ref 6–20)
BUN/CREAT SERPL: 12.5 (ref 7–25)
CALCIUM SPEC-SCNC: 8.3 MG/DL (ref 8.6–10.5)
CHLORIDE SERPL-SCNC: 105 MMOL/L (ref 98–107)
CO2 SERPL-SCNC: 25 MMOL/L (ref 22–29)
CREAT SERPL-MCNC: 0.48 MG/DL (ref 0.57–1)
DEPRECATED RDW RBC AUTO: 46.4 FL (ref 37–54)
EGFRCR SERPLBLD CKD-EPI 2021: 120 ML/MIN/1.73
EOSINOPHIL # BLD MANUAL: 0 10*3/MM3 (ref 0–0.4)
EOSINOPHIL NFR BLD MANUAL: 0 % (ref 0.3–6.2)
ERYTHROCYTE [DISTWIDTH] IN BLOOD BY AUTOMATED COUNT: 14.5 % (ref 12.3–15.4)
GLOBULIN UR ELPH-MCNC: 3.4 GM/DL
GLUCOSE SERPL-MCNC: 72 MG/DL (ref 65–99)
HCT VFR BLD AUTO: 34.2 % (ref 34–46.6)
HGB BLD-MCNC: 11.4 G/DL (ref 12–15.9)
LYMPHOCYTES # BLD MANUAL: 0.65 10*3/MM3 (ref 0.7–3.1)
LYMPHOCYTES NFR BLD MANUAL: 14 % (ref 5–12)
MCH RBC QN AUTO: 29.8 PG (ref 26.6–33)
MCHC RBC AUTO-ENTMCNC: 33.3 G/DL (ref 31.5–35.7)
MCV RBC AUTO: 89.3 FL (ref 79–97)
METAMYELOCYTES NFR BLD MANUAL: 2 % (ref 0–0)
MONOCYTES # BLD: 1.3 10*3/MM3 (ref 0.1–0.9)
MYELOCYTES NFR BLD MANUAL: 12 % (ref 0–0)
NEUTROPHILS # BLD AUTO: 5.96 10*3/MM3 (ref 1.7–7)
NEUTROPHILS NFR BLD MANUAL: 64 % (ref 42.7–76)
PLAT MORPH BLD: NORMAL
PLATELET # BLD AUTO: 426 10*3/MM3 (ref 140–450)
PMV BLD AUTO: 10.4 FL (ref 6–12)
POTASSIUM SERPL-SCNC: 4.3 MMOL/L (ref 3.5–5.2)
PROT SERPL-MCNC: 5.9 G/DL (ref 6–8.5)
RBC # BLD AUTO: 3.83 10*6/MM3 (ref 3.77–5.28)
SODIUM SERPL-SCNC: 139 MMOL/L (ref 136–145)
VARIANT LYMPHS NFR BLD MANUAL: 7 % (ref 19.6–45.3)
WBC MORPH BLD: NORMAL
WBC NRBC COR # BLD AUTO: 9.31 10*3/MM3 (ref 3.4–10.8)

## 2025-01-28 PROCEDURE — 25010000002 FENTANYL CITRATE (PF) 50 MCG/ML SOLUTION: Performed by: ANESTHESIOLOGY

## 2025-01-28 PROCEDURE — 25010000002 LIDOCAINE 2% SOLUTION: Performed by: ANESTHESIOLOGY

## 2025-01-28 PROCEDURE — 25010000002 DEXAMETHASONE SODIUM PHOSPHATE 20 MG/5ML SOLUTION: Performed by: ANESTHESIOLOGY

## 2025-01-28 PROCEDURE — 0CDXXZ1 EXTRACTION OF LOWER TOOTH, MULTIPLE, EXTERNAL APPROACH: ICD-10-PCS | Performed by: DENTIST

## 2025-01-28 PROCEDURE — 81025 URINE PREGNANCY TEST: CPT | Performed by: DENTIST

## 2025-01-28 PROCEDURE — 85007 BL SMEAR W/DIFF WBC COUNT: CPT | Performed by: INTERNAL MEDICINE

## 2025-01-28 PROCEDURE — 25010000002 HYDROMORPHONE PER 4 MG: Performed by: ANESTHESIOLOGY

## 2025-01-28 PROCEDURE — 85025 COMPLETE CBC W/AUTO DIFF WBC: CPT | Performed by: INTERNAL MEDICINE

## 2025-01-28 PROCEDURE — 25810000003 LACTATED RINGERS PER 1000 ML: Performed by: ANESTHESIOLOGY

## 2025-01-28 PROCEDURE — 99232 SBSQ HOSP IP/OBS MODERATE 35: CPT | Performed by: INTERNAL MEDICINE

## 2025-01-28 PROCEDURE — 25010000002 PROPOFOL 200 MG/20ML EMULSION: Performed by: ANESTHESIOLOGY

## 2025-01-28 PROCEDURE — 25010000002 ONDANSETRON PER 1 MG: Performed by: ANESTHESIOLOGY

## 2025-01-28 PROCEDURE — 25010000002 SUGAMMADEX 200 MG/2ML SOLUTION: Performed by: ANESTHESIOLOGY

## 2025-01-28 PROCEDURE — 99232 SBSQ HOSP IP/OBS MODERATE 35: CPT | Performed by: STUDENT IN AN ORGANIZED HEALTH CARE EDUCATION/TRAINING PROGRAM

## 2025-01-28 PROCEDURE — 25010000002 CEFEPIME PER 500 MG: Performed by: STUDENT IN AN ORGANIZED HEALTH CARE EDUCATION/TRAINING PROGRAM

## 2025-01-28 PROCEDURE — 0CDWXZ1 EXTRACTION OF UPPER TOOTH, MULTIPLE, EXTERNAL APPROACH: ICD-10-PCS | Performed by: DENTIST

## 2025-01-28 PROCEDURE — 80053 COMPREHEN METABOLIC PANEL: CPT | Performed by: INTERNAL MEDICINE

## 2025-01-28 RX ORDER — SODIUM CHLORIDE 0.9 % (FLUSH) 0.9 %
3-10 SYRINGE (ML) INJECTION AS NEEDED
Status: DISCONTINUED | OUTPATIENT
Start: 2025-01-28 | End: 2025-01-28 | Stop reason: HOSPADM

## 2025-01-28 RX ORDER — SODIUM CHLORIDE 0.9 % (FLUSH) 0.9 %
3 SYRINGE (ML) INJECTION EVERY 12 HOURS SCHEDULED
Status: DISCONTINUED | OUTPATIENT
Start: 2025-01-28 | End: 2025-01-28 | Stop reason: HOSPADM

## 2025-01-28 RX ORDER — OXYCODONE AND ACETAMINOPHEN 7.5; 325 MG/1; MG/1
1 TABLET ORAL EVERY 4 HOURS PRN
Status: DISCONTINUED | OUTPATIENT
Start: 2025-01-28 | End: 2025-01-28 | Stop reason: HOSPADM

## 2025-01-28 RX ORDER — HYDROCODONE BITARTRATE AND ACETAMINOPHEN 5; 325 MG/1; MG/1
1 TABLET ORAL ONCE AS NEEDED
Status: DISCONTINUED | OUTPATIENT
Start: 2025-01-28 | End: 2025-01-28 | Stop reason: HOSPADM

## 2025-01-28 RX ORDER — BUPIVACAINE HYDROCHLORIDE AND EPINEPHRINE 5; 5 MG/ML; UG/ML
INJECTION, SOLUTION EPIDURAL; INTRACAUDAL; PERINEURAL AS NEEDED
Status: DISCONTINUED | OUTPATIENT
Start: 2025-01-28 | End: 2025-01-28 | Stop reason: HOSPADM

## 2025-01-28 RX ORDER — SODIUM CHLORIDE, SODIUM LACTATE, POTASSIUM CHLORIDE, CALCIUM CHLORIDE 600; 310; 30; 20 MG/100ML; MG/100ML; MG/100ML; MG/100ML
9 INJECTION, SOLUTION INTRAVENOUS CONTINUOUS
Status: DISCONTINUED | OUTPATIENT
Start: 2025-01-28 | End: 2025-01-29 | Stop reason: HOSPADM

## 2025-01-28 RX ORDER — ONDANSETRON 2 MG/ML
4 INJECTION INTRAMUSCULAR; INTRAVENOUS ONCE AS NEEDED
Status: DISCONTINUED | OUTPATIENT
Start: 2025-01-28 | End: 2025-01-28 | Stop reason: HOSPADM

## 2025-01-28 RX ORDER — HYDROMORPHONE HYDROCHLORIDE 1 MG/ML
0.5 INJECTION, SOLUTION INTRAMUSCULAR; INTRAVENOUS; SUBCUTANEOUS
Status: DISCONTINUED | OUTPATIENT
Start: 2025-01-28 | End: 2025-01-28 | Stop reason: HOSPADM

## 2025-01-28 RX ORDER — PROMETHAZINE HYDROCHLORIDE 25 MG/1
25 SUPPOSITORY RECTAL ONCE AS NEEDED
Status: DISCONTINUED | OUTPATIENT
Start: 2025-01-28 | End: 2025-01-28 | Stop reason: HOSPADM

## 2025-01-28 RX ORDER — FLUMAZENIL 0.1 MG/ML
0.2 INJECTION INTRAVENOUS AS NEEDED
Status: DISCONTINUED | OUTPATIENT
Start: 2025-01-28 | End: 2025-01-28 | Stop reason: HOSPADM

## 2025-01-28 RX ORDER — PROPOFOL 10 MG/ML
INJECTION, EMULSION INTRAVENOUS AS NEEDED
Status: DISCONTINUED | OUTPATIENT
Start: 2025-01-28 | End: 2025-01-28 | Stop reason: SURG

## 2025-01-28 RX ORDER — MIDAZOLAM HYDROCHLORIDE 1 MG/ML
1 INJECTION, SOLUTION INTRAMUSCULAR; INTRAVENOUS
Status: DISCONTINUED | OUTPATIENT
Start: 2025-01-28 | End: 2025-01-28 | Stop reason: HOSPADM

## 2025-01-28 RX ORDER — EPHEDRINE SULFATE 50 MG/ML
5 INJECTION, SOLUTION INTRAVENOUS ONCE AS NEEDED
Status: DISCONTINUED | OUTPATIENT
Start: 2025-01-28 | End: 2025-01-28 | Stop reason: HOSPADM

## 2025-01-28 RX ORDER — FENTANYL CITRATE 50 UG/ML
INJECTION, SOLUTION INTRAMUSCULAR; INTRAVENOUS AS NEEDED
Status: DISCONTINUED | OUTPATIENT
Start: 2025-01-28 | End: 2025-01-28 | Stop reason: SURG

## 2025-01-28 RX ORDER — DEXAMETHASONE SODIUM PHOSPHATE 4 MG/ML
INJECTION, SOLUTION INTRA-ARTICULAR; INTRALESIONAL; INTRAMUSCULAR; INTRAVENOUS; SOFT TISSUE AS NEEDED
Status: DISCONTINUED | OUTPATIENT
Start: 2025-01-28 | End: 2025-01-28 | Stop reason: SURG

## 2025-01-28 RX ORDER — SODIUM CHLORIDE 9 MG/ML
40 INJECTION, SOLUTION INTRAVENOUS AS NEEDED
Status: DISCONTINUED | OUTPATIENT
Start: 2025-01-28 | End: 2025-01-28 | Stop reason: HOSPADM

## 2025-01-28 RX ORDER — HYDRALAZINE HYDROCHLORIDE 20 MG/ML
5 INJECTION INTRAMUSCULAR; INTRAVENOUS
Status: DISCONTINUED | OUTPATIENT
Start: 2025-01-28 | End: 2025-01-28 | Stop reason: HOSPADM

## 2025-01-28 RX ORDER — ACETAMINOPHEN 500 MG
1000 TABLET ORAL ONCE
Status: COMPLETED | OUTPATIENT
Start: 2025-01-28 | End: 2025-01-28

## 2025-01-28 RX ORDER — FENTANYL CITRATE 50 UG/ML
50 INJECTION, SOLUTION INTRAMUSCULAR; INTRAVENOUS
Status: DISCONTINUED | OUTPATIENT
Start: 2025-01-28 | End: 2025-01-28 | Stop reason: HOSPADM

## 2025-01-28 RX ORDER — PROMETHAZINE HYDROCHLORIDE 25 MG/1
25 TABLET ORAL ONCE AS NEEDED
Status: DISCONTINUED | OUTPATIENT
Start: 2025-01-28 | End: 2025-01-28 | Stop reason: HOSPADM

## 2025-01-28 RX ORDER — DIPHENHYDRAMINE HYDROCHLORIDE 50 MG/ML
12.5 INJECTION INTRAMUSCULAR; INTRAVENOUS
Status: DISCONTINUED | OUTPATIENT
Start: 2025-01-28 | End: 2025-01-28 | Stop reason: HOSPADM

## 2025-01-28 RX ORDER — ATROPINE SULFATE 0.4 MG/ML
0.4 INJECTION, SOLUTION INTRAMUSCULAR; INTRAVENOUS; SUBCUTANEOUS ONCE AS NEEDED
Status: DISCONTINUED | OUTPATIENT
Start: 2025-01-28 | End: 2025-01-28 | Stop reason: HOSPADM

## 2025-01-28 RX ORDER — ONDANSETRON 2 MG/ML
INJECTION INTRAMUSCULAR; INTRAVENOUS AS NEEDED
Status: DISCONTINUED | OUTPATIENT
Start: 2025-01-28 | End: 2025-01-28 | Stop reason: SURG

## 2025-01-28 RX ORDER — ROCURONIUM BROMIDE 10 MG/ML
INJECTION, SOLUTION INTRAVENOUS AS NEEDED
Status: DISCONTINUED | OUTPATIENT
Start: 2025-01-28 | End: 2025-01-28 | Stop reason: SURG

## 2025-01-28 RX ORDER — IPRATROPIUM BROMIDE AND ALBUTEROL SULFATE 2.5; .5 MG/3ML; MG/3ML
3 SOLUTION RESPIRATORY (INHALATION) ONCE AS NEEDED
Status: DISCONTINUED | OUTPATIENT
Start: 2025-01-28 | End: 2025-01-28 | Stop reason: HOSPADM

## 2025-01-28 RX ORDER — LIDOCAINE HYDROCHLORIDE 20 MG/ML
INJECTION, SOLUTION INFILTRATION; PERINEURAL AS NEEDED
Status: DISCONTINUED | OUTPATIENT
Start: 2025-01-28 | End: 2025-01-28 | Stop reason: SURG

## 2025-01-28 RX ORDER — LABETALOL HYDROCHLORIDE 5 MG/ML
5 INJECTION, SOLUTION INTRAVENOUS
Status: DISCONTINUED | OUTPATIENT
Start: 2025-01-28 | End: 2025-01-28 | Stop reason: HOSPADM

## 2025-01-28 RX ORDER — NALOXONE HCL 0.4 MG/ML
0.2 VIAL (ML) INJECTION AS NEEDED
Status: DISCONTINUED | OUTPATIENT
Start: 2025-01-28 | End: 2025-01-28 | Stop reason: HOSPADM

## 2025-01-28 RX ORDER — FAMOTIDINE 10 MG/ML
20 INJECTION, SOLUTION INTRAVENOUS ONCE
Status: COMPLETED | OUTPATIENT
Start: 2025-01-28 | End: 2025-01-28

## 2025-01-28 RX ORDER — MAGNESIUM HYDROXIDE 1200 MG/15ML
LIQUID ORAL AS NEEDED
Status: DISCONTINUED | OUTPATIENT
Start: 2025-01-28 | End: 2025-01-28 | Stop reason: HOSPADM

## 2025-01-28 RX ADMIN — ONDANSETRON 4 MG: 2 INJECTION INTRAMUSCULAR; INTRAVENOUS at 18:18

## 2025-01-28 RX ADMIN — CEFEPIME 2000 MG: 2 INJECTION, POWDER, FOR SOLUTION INTRAVENOUS at 04:09

## 2025-01-28 RX ADMIN — FENTANYL CITRATE 50 MCG: 50 INJECTION, SOLUTION INTRAMUSCULAR; INTRAVENOUS at 18:06

## 2025-01-28 RX ADMIN — CEFEPIME 2000 MG: 2 INJECTION, POWDER, FOR SOLUTION INTRAVENOUS at 21:12

## 2025-01-28 RX ADMIN — OXYCODONE HYDROCHLORIDE 5 MG: 5 TABLET ORAL at 04:09

## 2025-01-28 RX ADMIN — CEFEPIME 2000 MG: 2 INJECTION, POWDER, FOR SOLUTION INTRAVENOUS at 13:50

## 2025-01-28 RX ADMIN — ASPIRIN 81 MG: 81 TABLET, COATED ORAL at 08:23

## 2025-01-28 RX ADMIN — SUGAMMADEX 200 MG: 100 INJECTION, SOLUTION INTRAVENOUS at 18:18

## 2025-01-28 RX ADMIN — LIDOCAINE HYDROCHLORIDE 100 MG: 20 INJECTION, SOLUTION INFILTRATION; PERINEURAL at 17:45

## 2025-01-28 RX ADMIN — HYDROMORPHONE HYDROCHLORIDE 0.5 MG: 0.5 INJECTION, SOLUTION INTRAMUSCULAR; INTRAVENOUS; SUBCUTANEOUS at 19:31

## 2025-01-28 RX ADMIN — FENTANYL CITRATE 50 MCG: 50 INJECTION, SOLUTION INTRAMUSCULAR; INTRAVENOUS at 17:55

## 2025-01-28 RX ADMIN — Medication 10 ML: at 08:23

## 2025-01-28 RX ADMIN — GABAPENTIN 600 MG: 300 CAPSULE ORAL at 21:12

## 2025-01-28 RX ADMIN — OXYCODONE HYDROCHLORIDE 5 MG: 5 TABLET ORAL at 11:40

## 2025-01-28 RX ADMIN — PROPOFOL 150 MG: 10 INJECTION, EMULSION INTRAVENOUS at 17:45

## 2025-01-28 RX ADMIN — ROCURONIUM BROMIDE 40 MG: 10 INJECTION, SOLUTION INTRAVENOUS at 17:45

## 2025-01-28 RX ADMIN — ACETAMINOPHEN 1000 MG: 500 TABLET, FILM COATED ORAL at 17:28

## 2025-01-28 RX ADMIN — DEXAMETHASONE SODIUM PHOSPHATE 4 MG: 4 INJECTION, SOLUTION INTRAMUSCULAR; INTRAVENOUS at 17:55

## 2025-01-28 RX ADMIN — HYDROMORPHONE HYDROCHLORIDE 0.5 MG: 0.5 INJECTION, SOLUTION INTRAMUSCULAR; INTRAVENOUS; SUBCUTANEOUS at 18:36

## 2025-01-28 RX ADMIN — SODIUM CHLORIDE, POTASSIUM CHLORIDE, SODIUM LACTATE AND CALCIUM CHLORIDE 9 ML/HR: 600; 310; 30; 20 INJECTION, SOLUTION INTRAVENOUS at 17:25

## 2025-01-28 RX ADMIN — FAMOTIDINE 20 MG: 10 INJECTION INTRAVENOUS at 17:29

## 2025-01-28 NOTE — ANESTHESIA PROCEDURE NOTES
Airway  Urgency: elective    Date/Time: 1/28/2025 5:47 PM  Airway not difficult    General Information and Staff    Patient location during procedure: OR  Anesthesiologist: Edilberto Mayen MD    Indications and Patient Condition  Indications for airway management: airway protection    Preoxygenated: yes  MILS maintained throughout  Mask difficulty assessment: 1 - vent by mask    Final Airway Details  Final airway type: endotracheal airway      Successful airway: ETT  Cuffed: yes   Successful intubation technique: direct laryngoscopy  Endotracheal tube insertion site: oral  Blade: Joellen  Blade size: 3  ETT size (mm): 7.0  Cormack-Lehane Classification: grade IIa - partial view of glottis  Placement verified by: chest auscultation and capnometry   Measured from: lips  ETT/EBT  to lips (cm): 21  Number of attempts at approach: 1  Assessment: lips, teeth, and gum same as pre-op and atraumatic intubation

## 2025-01-28 NOTE — CASE MANAGEMENT/SOCIAL WORK
Discharge Planning Assessment  [facility]     Patient Name: Suzanne Lin  MRN: [id number]  Today's Date: 1/28/2025    Admit Date: 1/22/2025    Plan: plans to return home- CCP will follow for needs   Discharge Needs Assessment       Row Name 01/28/25 1432       Living Environment    People in Home child(fede), dependent    Current Living Arrangements home    Potentially Unsafe Housing Conditions none    Primary Care Provided by self    Provides Primary Care For child(fede)    Caregiving Concerns 11 year old son    Family Caregiver if Needed significant other    Quality of Family Relationships helpful;involved;supportive       Resource/Environmental Concerns    Resource/Environmental Concerns financial    Transportation Concerns none       Discharge Needs Assessment    Equipment Currently Used at Home none    Concerns to be Addressed denies needs/concerns at this time    Anticipated Changes Related to Illness none    Equipment Needed After Discharge none                   Discharge Plan       Row Name 01/28/25 1439       Plan    Plan plans to return home- CCP will follow for needs    Patient/Family in Agreement with Plan yes    Plan Comments Spoke with patient at bedside. Introduced self and explained role. Facesheet verified. Patient lives with her 11 year old son. At baseline, she is IADLS. She does not use any DME and does not have a HH or SNF history. At MA, she plans to return home and does not anticiapte any needs. Family will transport home. OSW to see. CCP will follow.                  Continued Care and Services - Admitted Since 1/22/2025    No active coordination exists for this encounter.       Expected Discharge Date and Time       Expected Discharge Date Expected Discharge Time    Jan 27, 2025            Demographic Summary       Row Name 01/28/25 1432       General Information    Admission Type inpatient    Arrived From emergency department    Referral Source admission list    Reason for Consult  discharge planning    Preferred Language English                   Functional Status       Row Name 01/28/25 1432       Functional Status    Usual Activity Tolerance good    Current Activity Tolerance moderate       Functional Status, IADL    Medications independent    Meal Preparation independent    Housekeeping independent    Laundry independent    Shopping independent       Mental Status    General Appearance WDL WDL       Mental Status Summary    Recent Changes in Mental Status/Cognitive Functioning no changes                   Psychosocial    No documentation.                  Abuse/Neglect    No documentation.                  Legal    No documentation.                  Substance Abuse    No documentation.                  Patient Forms    No documentation.                     Amy Jolly RN

## 2025-01-28 NOTE — CASE MANAGEMENT/SOCIAL WORK
Oncology Social Work  Social Work referral submitted for the patient. Dr. Witt was contacted regarding referral and informed OSW that nursing staff expressed concerns about the patient being a single mother of an 11 year old and may have financial issues.  OSW is familiar with this patient as financial assistance has been provided to the patient previously.      OSW met with the patient and discussed nursing concerns expressed to Dr. Witt.  Discussed previous assistance provided to patient including information on virtual therapy and a gift card from [employer] and a referral to [facility] for virtual therapy and a gift card. The patient states that she was never contacted by [facility] and OSW will follow up on that referral.  The patient states that she has been seeing Nilam Abrams for behavioral health virtually and is now prescribed Effexor and Gabapentin. The patient states that she has not started her Effexor yet but plans to begin it after her hospitalization and states that the Gabapentin seems to helping her as she has already started taking that medication.  The patient states that she is no longer taking Wellbutrin or Ambien.      The patient states that she was using [facility] for her virtual therapy as she received a bill for her previous therapist because of her deductible which she cannot pay so she is using [facility].  OSW will follow up with [facility] also regarding if they have any free virtual therapy available.      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  The patient is unsure how long her employer will pay her but states that she is able to pay her bills.  The patient currently denies needing any financial assistance and was provided with OSW's business card and informed to contact OSW if that changes and she needs assistance.  The patient continues to deny any current  needs.  MENDOZA TorresW

## 2025-01-28 NOTE — OP NOTE
PREOPERATIVE DIAGNOSES:   Fevers of unknown origin.   Dental caries.   Dental abscess.     POSTOPERATIVE DIAGNOSES:  Fevers of unknown origin.   Dental caries.   Dental abscess.     PROCEDURE: Extraction of teeth.     SURGEON: Pramod Zambrano DMD    ANESTHESIA: General.    ESTIMATED BLOOD LOSS: Minimal.    SPECIMEN: None.     DRAINS: None.     INDICATIONS AND CONSENT: This is a 44-year-old female, who is undergoing chemotherapy for breast cancer, who presented to the emergency department for fevers of unknown origin and neutropenia. Blood cultures had no growth and after several days, it was noted that the patient had carious teeth that were painful. After clinical and radiographic evaluation, she was diagnosed with multiple infected teeth, and it was felt she would benefit from having removal of these teeth to rule out them out as a source of her fevers. The procedure, potential risks and complications were  explained to the patient and both verbal and written consent were obtained.    DESCRIPTION OF PROCEDURE: The patient was taken to the operating room and placed in a supine position on the operating table. A state of general anesthesia was induced and an oral endotracheal tube was placed. A throat pack was placed. The maxilla and mandible were infiltrated with 0.5% Marcaine with 1:200,000 epinephrine. Teeth #2, 15, and 19 were extracted with forceps. Teeth #28 and 29 were surgically extracted by making an incision along the gingival sulcus on the buccal and reflecting a full-thickness mucoperiosteal flap, removing buccal bone and removing the roots of the teeth.  The wounds were then irrigated and the mucosa closed using 3-0 chromic gut in an interrupted fashion. Dry dressings were placed, throat pack was removed. The patient was awakened, extubated and taken to the recovery room. There were no complications. All counts were correct x3.

## 2025-01-28 NOTE — PROGRESS NOTES
REASON FOR FOLLOW-UP:   breast cancer undergoing adjuvant therapy, admission with neutropenic fever                               INTERVAL HISTORY:  The patient is feeling okay this morning.   Panorex yesterday showed at least 1 apical tooth abscess.  OMFS consulted and plans dental extractions later today.  Patient otherwise currently without complaints.    ONCOLOGY HISTORY:  This is a 44-year-old woman referred from general surgery to discuss adjuvant treatment for recently surgically treated breast cancer.  The patient presented with a large palpable mass in the inner quadrant of the right breast.  The mass had been enlarging for couple years but the patient did not have insurance to get assessed.  The breast imaging is not available to me but she had a CT of the chest abdomen pelvis on 9/16/2024 showed a large mass in the right breast measuring up to 7.7 cm with abnormal left axillary lymphadenopathy.  There were tiny subpleural nodules in the lateral aspect of the left lower lobe likely granulomatous mildly conspicuous lymph node in the central mesentery 1.1 cm.  A PET scan was performed 9/30/2024 showing a hypermetabolic mass in the right breast with thickening throughout the right breast and pathologic hypermetabolic right axillary level 1 and 2 lymph nodes, no hypermetabolic internal mammary or supraclavicular lymph nodes.  The small pulmonary nodules were too small to characterize and mesenteric lymph nodes without hypermetabolic activity.  A biopsy of the right breast mass showed invasive ductal adenocarcinoma.    She was taken to the operating room on 10/9/2024 and underwent a right modified radical mastectomy and axillary dissection, prophylactic left mastectomy.  Pathology from the right breast showed invasive ductal carcinoma [place] grade 3 (3+3+3) measuring 17 cm with extensive lymphovascular invasion and dermal lymphatic invasion.  The tumor extended to the dermis but did not ulcerate the  skin.  Tumor extended to skeletal muscle and was present focally at an anterior margin, 0.05 mm of the posterior margin, 10 mm from the lateral margin, 20 mm from the medial margin, 28 mm from the superior margin and 40 mm from inferior margin.  There was ductal carcinoma in situ present within 2.5 mm of the posterior margin.  There were 13 lymph nodes in the axillary dissection 8 oncology plan/recommendations: of which had macrometastases, 1 micrometastases and 1 lymph node with isolated tumor cells.  The left breast had an intraductal papilloma otherwise negative.  The tumor was positive for estrogen receptor 99% of cells, progesterone receptor 50% of cells, HER2 0 and Ki-67 65%.    The patient has medical comorbidities of Crohn's disease.    Past Medical History:   Diagnosis Date    Anxiety     Breast cancer     Right    Crohn's disease     DVT (deep vein thrombosis) in pregnancy     PFO (patent foramen ovale)     Stroke     after the birth of her child at age 34    Tattoos         Past Surgical History:   Procedure Laterality Date     SECTION      COLON RESECTION      COLONOSCOPY      MASTECTOMY Bilateral 10/9/2024    Procedure: Modified radical mastectomy with axillary dissection of the right breast and simple mastectomy of the left breast;  Surgeon: Rebekah Garcia DO;  Location: [facility] OR;  Service: General;  Laterality: Bilateral;    SKIN CANCER EXCISION      VENOUS ACCESS DEVICE (PORT) INSERTION N/A 12/10/2024    Procedure: INSERTION VENOUS ACCESS DEVICE;  Surgeon: Rebekah Garcia DO;  Location: [facility] OR;  Service: General;  Laterality: N/A;        Current Facility-Administered Medications on File Prior to Encounter   Medication Dose Route Frequency Provider Last Rate Last Admin    heparin injection 500 Units  500 Units Intravenous PRN Nishant Witt MD   500 Units at 24 0916    heparin injection 500 Units  500 Units Intravenous PRN Nishant Witt MD   500 Units at  01/07/25 1049    sodium chloride 0.9 % flush 10 mL  10 mL Intravenous PRN Nishant Witt MD   10 mL at 12/11/24 0916    sodium chloride 0.9 % flush 10 mL  10 mL Intravenous PRN Nishant Witt MD   10 mL at 01/07/25 1048     Current Outpatient Medications on File Prior to Encounter   Medication Sig Dispense Refill    amoxicillin-clavulanate (AUGMENTIN) 500-125 MG per tablet Take 1 tablet by mouth 2 (Two) Times a Day for 7 days. 14 tablet 0    aspirin 81 MG EC tablet Take 1 tablet by mouth Daily.      gabapentin (Neurontin) 300 MG capsule Take 2 capsules by mouth Every Night. 60 capsule 2    Klor-Con M20 20 MEQ CR tablet TAKE 1 TABLET BY MOUTH 2 TIMES A DAY 40 tablet 1    lidocaine-prilocaine (EMLA) 2.5-2.5 % cream Apply 1 Application topically to the appropriate area as directed As Needed for Mild Pain. Apply to port site 30 minutes before access 15 g 3    omeprazole (priLOSEC) 20 MG capsule       ondansetron (ZOFRAN) 8 MG tablet Take 1 tablet by mouth 3 (Three) Times a Day As Needed for Nausea or Vomiting. 30 tablet 5    prochlorperazine (COMPAZINE) 10 MG tablet Take 1 tablet by mouth Every 6 (Six) Hours As Needed for Nausea or Vomiting. 60 tablet 1    venlafaxine XR (Effexor XR) 37.5 MG 24 hr capsule Take 1 capsule by mouth Take As Directed. 1 capsule po q am for two weeks followed by increase to 2 capsules daily (Patient taking differently: Take 1 capsule by mouth Take As Directed. 1 capsule po q am for two weeks followed by increase to 2 capsules daily  Pt has not started yet) 60 capsule 2    OLANZapine (zyPREXA) 5 MG tablet Take 1 tablet by mouth for 4 doses starting night of chemotherapy. 8 tablet 1        ALLERGIES:  No Known Allergies     Social History     Socioeconomic History    Marital status: Single    Number of children: 1   Tobacco Use    Smoking status: Former     Current packs/day: 0.00     Average packs/day: 1 pack/day for 20.0 years (20.0 ttl pk-yrs)     Types: Cigarettes     Start date: 1999      Quit date: 2019     Years since quittin.0    Smokeless tobacco: Current    Tobacco comments:     Nicotine pouches   Vaping Use    Vaping status: Never Used   Substance and Sexual Activity    Alcohol use: Yes     Comment: OCC    Drug use: Never    Sexual activity: Defer        Family History   Problem Relation Age of Onset    Diabetes Mother     Heart disease Father     Diabetes Paternal Grandmother     Hypertension Other     Malig Hyperthermia Neg Hx         ROS reviewed and pertinent as per the HPI-2025      Objective     Vitals:    25 1515 25 1941 25 0410 25 0805   BP: 106/58 109/56 97/60 96/57   BP Location: Left leg Left leg Left leg Left leg   Patient Position: Lying Lying Lying Lying   Pulse: 81 82 77 65   Resp: 16 16 16 16   Temp: 98.2 °F (36.8 °C) 99.5 °F (37.5 °C) 97.5 °F (36.4 °C) 97.7 °F (36.5 °C)   TempSrc: Oral Oral Oral Oral   SpO2: 96% 97% 96% 97%   Weight:       Height:                 2025     8:31 AM   Current Status   ECOG score 0       Physical Exam    CONSTITUTIONAL: pleasant well-developed adult woman  HEENT: no icterus, no thrush, moist membranes/  LYMPH: no cervical or supraclavicular lad  CV: RRR, S1S2, no murmur  RESP/chest: cta bilat, no wheezing, no rales, port present left chest wall  Breast: Status post bilateral mastectomies, wound mildly erythematous, healed without discharge  GI: soft, generally tender, bowel sounds positive  MUSC: no edema  NEURO: alert and oriented x3, normal strength  PSYCH: Normal mood and affect  Unchanged 2025    RECENT LABS:  Hematology WBC   Date Value Ref Range Status   2025 9.31 3.40 - 10.80 10*3/mm3 Final     RBC   Date Value Ref Range Status   2025 3.83 3.77 - 5.28 10*6/mm3 Final     Hemoglobin   Date Value Ref Range Status   2025 11.4 (L) 12.0 - 15.9 g/dL Final     Hematocrit   Date Value Ref Range Status   2025 34.2 34.0 - 46.6 % Final     Platelets   Date Value Ref Range Status    01/28/2025 426 140 - 450 10*3/mm3 Final        Lab Results   Component Value Date    GLUCOSE 72 01/28/2025    BUN 6 01/28/2025    CREATININE 0.48 (L) 01/28/2025     01/28/2025    K 4.3 01/28/2025     01/28/2025    CALCIUM 8.3 (L) 01/28/2025    PROTEINTOT 5.9 (L) 01/28/2025    ALBUMIN 2.5 (L) 01/28/2025    ALT 14 01/28/2025    AST 15 01/28/2025    ALKPHOS 309 (H) 01/28/2025    BILITOT 0.4 01/28/2025    GLOB 3.4 01/28/2025    AGRATIO 0.7 01/28/2025    BCR 12.5 01/28/2025    ANIONGAP 9.0 01/28/2025    EGFR 120.0 01/28/2025     PET scan 9/30/2024:  FINDINGS:     Head/neck: No suspicious uptake.     Chest: An 8 x 5.8 cm mass in the lower inner right breast with max SUV  of 18 (series 4/image 156). Mass comes in close proximity to the  sternum. Hypermetabolic parenchymal thickening throughout the right  breast with max SUV of 9 (series 4/image 140). Diffuse right breast skin  thickening.     Hypermetabolic right axillary level I and II lymph nodes. Reference 1.2  cm level I lymph node with max SUV of 11.9 (series 4/image 103).  Additional reference subcentimeter level II lymph node with max SUV of  3.3 (series 4/image 93). Separate brown fat uptake in the bilateral  axilla and posterior neck base.     No enlarged or hypermetabolic internal mammary or supraclavicular lymph  nodes.     Few subcentimeter pulmonary nodules are too small for accurate PET  characterization without overt hypermetabolism and unchanged from recent  CT. Reference left lower lobe (series 4/image 167) and right upper lobe  (series 4/image 131).     Abdomen/pelvis: Mesenteric lymph nodes are mostly subcentimeter without  associated hypermetabolism.     Sigmoid colectomy. Tubular hypermetabolism throughout the otherwise  normal-appearing remaining colon may be medication related. Moderate  proximal colonic stool burden.     Bones: No focal osseous hypermetabolism with special attention to the  sternum.           IMPRESSION:  1.  Hypermetabolic 8 cm right breast mass with hypermetabolic parenchymal  thickening throughout the right breast, pathologically proven invasive  ductal carcinoma. Mass comes in close proximity to the sternum. No focal  osseous hypermetabolism with special attention to the sternum.  2. Hypermetabolic right axillary level I and II lymph nodes suggesting  alana metastatic disease. No enlarged or hypermetabolic internal mammary  or supraclavicular lymph nodes. Separate brown fat uptake in the  bilateral axilla and posterior neck base.  3. Few subcentimeter pulmonary nodules are too small for accurate PET  characterization and will need follow-up via chest CT.  4. Mesenteric lymph nodes are mostly subcentimeter without associated  hypermetabolism.     Panorex 1/27/2025-    There is a fracture of the posterior most tooth on the upper right.  There is periapical lucency surrounding the second to last right lower  tooth which may represent periapical abscess. No other fracture teeth  are seen. Mandible otherwise appears unremarkable. Temporomandibular  joints appear within normal limits.     IMPRESSION:  1. Periapical lucency around the root of the second to last right lower  tooth may represent periapical abscess.  2. Please see full discussion above. No bony abnormalities are seen in  the mandible    Assessment & Plan   *pT3N2a M0 grade 3 invasive ductal adenocarcinoma right breast, ER 99% positive, NC 50% positive, HER2 0, Ki-67 65%, tumor present at anterior margin  She presented with a large palpable mass in the inner quadrant of the right breast; mass had been enlarging for couple years but the patient did not have insurance to get assessed  CT of the chest abdomen pelvis on 9/16/2024 showed a large mass in the right breast measuring up to 7.7 cm with abnormal left axillary lymphadenopathy.  There were tiny subpleural nodules in the lateral aspect of the left lower lobe likely granulomatous mildly conspicuous lymph node  in the central mesentery 1.1 cm.    PET scan 9/30/2024 - hypermetabolic mass in the right breast with thickening throughout the right breast and pathologic hypermetabolic right axillary level 1 and 2 lymph nodes, no hypermetabolic internal mammary or supraclavicular lymph nodes.  The small pulmonary nodules were too small to characterize and mesenteric lymph nodes without hypermetabolic activity.    biopsy of the right breast mass showed invasive ductal adenocarcinoma.  operating room on 10/9/2024 and underwent a right modified radical mastectomy and axillary dissection, prophylactic left mastectomy.  Pathology from the right breast showed invasive ductal carcinoma [place] grade 3 (3+3+3) measuring 17 cm with extensive lymphovascular invasion and dermal lymphatic invasion.  The tumor extended to the dermis but did not ulcerate the skin.  Tumor extended to skeletal muscle and was present focally at an anterior margin, 0.05 mm of the posterior margin, 10 mm from the lateral margin, 20 mm from the medial margin, 28 mm from the superior margin and 40 mm from inferior margin.  There was ductal carcinoma in situ present within 2.5 mm of the posterior margin.  There were 13 lymph nodes in the axillary dissection 8 of which had macrometastases, 1 micrometastases and 1 lymph node with isolated tumor cells.  The left breast had an intraductal papilloma otherwise negative.  The tumor was positive for estrogen receptor 99% of cells, progesterone receptor 50% of cells, HER2 0 and Ki-67 65%.  Initiated adjuvant chemotherapy with Adriamycin/Cytoxan 12/31/2024 (adjuvant chemotherapy delayed because of wound healing issues) nonneutropenic fever  C2 AC with Neulasta 1/14/25    *Admitted 1/22/2025 with neutropenic fever  Received neulasta onpro 1/15/25  G-CSF continued from 1/23/2025, discontinued 1/25/2025 after neutrophil recovery  Blood cultures from 1/21/2025 and 1/22/2025-no growth  Respiratory viral panel-negative  CT abdomen  1/23/2025-periportal edema within the liver, no biliary ductal dilatation, nonspecific fluid around the gallbladder fundus, fluid throughout the colon, stable wall thickening terminal ileum  Chest x-rays 1/22/2025 and 1/26/2025-no acute pulmonary process  Panorex 1/27/2025-at least 1 periapical abscess; OMFS consulted plans dental extractions of decayed teeth later today    *Myelosuppression secondary to chemotherapy  Counts have now recovered    * nausea/diarrhea, chemo induced   responding to Imodium/Zofran/Compazine    *elevated LFT-likely s/e chemo   1/26/2025-normal LFTs other than alk phos 309    *medical comorbidities of Crohn's disease.    Oncology plan/recommendations:  Patient to undergo dental extractions later today and I am hoping this will help resolve the recurrent fevers for future cycles of chemo  Next cycle of chemotherapy has been delayed till next week      Oncology will sign off at this time no opposition to discharge when cleared by other services.  Please call as needed.

## 2025-01-28 NOTE — BRIEF OP NOTE
TOOTH EXTRACTION  Progress Note    Suzanne Lin  1/28/2025    Pre-op Diagnosis:   Neutropenic fever [D70.9, R50.81]       Post-Op Diagnosis Codes:     * Neutropenic fever [D70.9, R50.81]    Procedure/CPT® Codes:  CT UNLISTED PROCEDURE DENTOALVEOLAR STRUCTURES [57192]      Procedure(s):  TOOTH EXTRACTION #2,15,19,28,29              Surgeon(s):  Pramod Zambrano DMD    Anesthesia: General    Staff:   Circulator: Kristi Adam RN  Scrub Person: Irasema Welch         Estimated Blood Loss: minimal    Urine Voided: * No values recorded between 1/28/2025  5:32 PM and 1/28/2025  6:16 PM *    Specimens:                None          Drains: * No LDAs found *    Findings: none        Complications: none          Pramod Zambrano DMD     Date: 1/28/2025  Time: 18:19 EST

## 2025-01-28 NOTE — PROGRESS NOTES
Name: Suzanne Lin ADMIT: 2025   : 1980  PCP: Gia Rodriguez, APRN    MRN: [id number] LOS: 6 days   AGE/SEX: 44 y.o. female  ROOM: [facility]     Subjective   Subjective   Patient is seen at bedside today, she is comfortably lying in bed, she is scheduled for dental extractions this afternoon.  This is considered to be the likely source of infection in her case.  Her fever curve has improved.       Objective   Objective   Vital Signs  Temp:  [97.5 °F (36.4 °C)-99.5 °F (37.5 °C)] 98.1 °F (36.7 °C)  Heart Rate:  [65-82] 79  Resp:  [16-18] 18  BP: ()/(56-60) 86/58  SpO2:  [96 %-97 %] 97 %  on   ;   Device (Oxygen Therapy): room air  Body mass index is 24.96 kg/m².  Physical Exam  General, awake and alert.  Head and ENT, normocephalic and atraumatic.  Lungs, symmetric expansion, equal air entry bilaterally.  Heart, regular rate and rhythm.  Abdomen, soft and nontender.  Extremities, no clubbing or cyanosis.  Neuro, no focal deficits.  Skin: Warm and no rash.  Psych, normal mood and affect.  Musculoskeletal, joint examination is grossly normal.        Copied text material from yesterday's note has been reviewed for appropriate changes and remains accurate as of 25.      Results Review     I reviewed the patient's new clinical results.  Results from last 7 days   Lab Units 25  0520   WBC 10*3/mm3 9.31 7.76 10.51 4.99   HEMOGLOBIN g/dL 11.4* 11.4* 12.6 12.1   PLATELETS 10*3/mm3 426 371 370 274     Results from last 7 days   Lab Units 250 25  0520   SODIUM mmol/L 139 137 140 139   POTASSIUM mmol/L 4.3 2.9* 3.8 3.4*   CHLORIDE mmol/L 105 101 103 107   CO2 mmol/L 25.0 23.3 24.8 22.7   BUN mg/dL 6 7 7 5*   CREATININE mg/dL 0.48* 0.59 0.71 0.70   GLUCOSE mg/dL 72 118* 89 86   EGFR mL/min/1.73 120.0 114.1 107.7 109.5     Results from last 7 days   Lab Units 25  0346 25  0925 25  0410  "01/25/25  0520   ALBUMIN g/dL 2.5* 2.2* 2.9* 2.1*   BILIRUBIN mg/dL 0.4 0.4 0.5 0.6   ALK PHOS U/L 309* 274* 262* 201*   AST (SGOT) U/L 15 18 13 10   ALT (SGPT) U/L 14 13 14 17     Results from last 7 days   Lab Units 01/28/25  0346 01/27/25  0925 01/26/25  0410 01/25/25  0520 01/23/25  0552 01/22/25  1934   CALCIUM mg/dL 8.3* 8.2* 8.8 8.7   < > 8.7   ALBUMIN g/dL 2.5* 2.2* 2.9* 2.1*   < > 2.9*   MAGNESIUM mg/dL  --   --   --   --   --  1.9    < > = values in this interval not displayed.     Results from last 7 days   Lab Units 01/26/25 0410 01/22/25 1934   PROCALCITONIN ng/mL 1.15* 0.33*   LACTATE mmol/L  --  1.1     No results found for: \"HGBA1C\", \"POCGLU\"    XR Panorex    Result Date: 1/27/2025  1. Periapical lucency around the root of the second to last right lower tooth may represent periapical abscess. 2. Please see full discussion above. No bony abnormalities are seen in the mandible   This report was finalized on 1/27/2025 3:16 PM by Dr. Alex Mei M.D on Workstation: [id number]       I have personally reviewed all medications:  Scheduled Medications  aspirin, 81 mg, Oral, Daily  cefepime, 2,000 mg, Intravenous, Q8H  gabapentin, 600 mg, Oral, Nightly  sodium chloride, 10 mL, Intravenous, Q12H    Infusions   Diet  NPO Diet NPO Type: Sips with Meds    I have personally reviewed:  [x]  Laboratory   [x]  Microbiology   [x]  Radiology   [x]  EKG/Telemetry  [x]  Cardiology/Vascular   []  Pathology    []  Records       Assessment/Plan     Active Hospital Problems    Diagnosis  POA    **Neutropenic fever [D70.9, R50.81]  Yes    Crohn's disease without complication [K50.90]  Yes    LITA (iron deficiency anemia) [D50.9]  Yes    Breast cancer, stage 3, right [C50.911]  Yes      Resolved Hospital Problems   No resolved problems to display.       44 y.o. female admitted with Neutropenic fever.    Assessment and plan  1.  Neutropenic fever, her fever curve has improved today, infectious disease service on " board, continue IV cefepime.  Antibiotic management per ID recommendations.    Patient reports dental pain, multiple teeth are potential sources of infection, to OR for extraction of teeth today.  Oral surgery on board now.  Hopefully this will help resolve the recurrent fevers.     2.  History of Crohn's disease, currently uncomplicated course.  From GI standpoint she has remained asymptomatic.     3.  Stage III right-sided breast cancer, status post recent chemotherapy, hematology/oncology has been consulted.  We will follow management recommendations.  Next cycle of chemotherapy has been delayed till next week per oncology.     4.  Anemia, monitor hemoglobin closely, we will continue to recheck labs.     5.  CODE STATUS is full code.  Further plans based on hospital course.         Jim Mane MD  [facility]  01/28/25  15:11 EST

## 2025-01-28 NOTE — PLAN OF CARE
Goal Outcome Evaluation:               Patient resting in bed, stand by assist, on room air, cont to be cont of b&b, cont inues on Cefepime q8h, as ordered. PT has been NPO to have 5 teeth extracted, consent signed and ticket to ride printed and in patient folder. Last BM 01/27/25, alert and oriented x4, morning labs drawn and sent. Plan of care is ongoing.

## 2025-01-28 NOTE — PROGRESS NOTES
LOS: 6 days     Chief Complaint: Neutropenic fever    Interval History: Fever curve improved today.  Continues to have normalized WBC.  Tolerating antibiotics.      Vital Signs  Temp:  [97.5 °F (36.4 °C)-99.9 °F (37.7 °C)] 97.7 °F (36.5 °C)  Heart Rate:  [65-82] 65  Resp:  [16] 16  BP: ()/(56-62) 96/57    Physical Exam:  General: In no acute distress  Respiratory: Breathing comfortably on room air  Skin: No rashes or lesions in exposed areas  Extremities: No edema, cyanosis  Access: Chest port and peripheral IV    Antibiotics:  Cefepime 2 g IV every 8 hours    Results Review:     I reviewed the patient's new clinical results.    Lab Results   Component Value Date    WBC 9.31 01/28/2025    HGB 11.4 (L) 01/28/2025    HCT 34.2 01/28/2025    MCV 89.3 01/28/2025     01/28/2025     Lab Results   Component Value Date    GLUCOSE 72 01/28/2025    BUN 6 01/28/2025    CREATININE 0.48 (L) 01/28/2025    BCR 12.5 01/28/2025    CO2 25.0 01/28/2025    CALCIUM 8.3 (L) 01/28/2025    ALBUMIN 2.5 (L) 01/28/2025    AST 15 01/28/2025    ALT 14 01/28/2025     Microbiology:  1/21 blood cultures no growth to date  1/22 respiratory panel negative  1/22 blood culture no growth to date  1/26 blood cultures no growth to date    Assessment    #Neutropenic fever, resolved  # pT3N2a M0 grade 3 invasive ductal adenocarcinoma right breast   #Immunosuppressed on chemotherapy with doxorubicin and cyclophosphamide  #Crohn's disease  #Chest port in place    Repeat blood cultures have remained negative.  No further fevers.  Has now recovered ANC.  Will continue cefepime 2 g every 8 hours today and if remains afebrile then likely stop antibiotics tomorrow.

## 2025-01-29 ENCOUNTER — READMISSION MANAGEMENT (OUTPATIENT)
Dept: CALL CENTER | Facility: HOSPITAL | Age: 45
End: 2025-01-29
Payer: COMMERCIAL

## 2025-01-29 VITALS
DIASTOLIC BLOOD PRESSURE: 66 MMHG | HEIGHT: 65 IN | RESPIRATION RATE: 18 BRPM | BODY MASS INDEX: 24.99 KG/M2 | OXYGEN SATURATION: 98 % | HEART RATE: 82 BPM | TEMPERATURE: 96.4 F | WEIGHT: 150 LBS | SYSTOLIC BLOOD PRESSURE: 99 MMHG

## 2025-01-29 PROBLEM — T45.1X5A IMMUNOSUPPRESSED DUE TO CHEMOTHERAPY: Status: ACTIVE | Noted: 2025-01-29

## 2025-01-29 PROBLEM — Z79.69 IMMUNOSUPPRESSED DUE TO CHEMOTHERAPY: Status: ACTIVE | Noted: 2025-01-29

## 2025-01-29 PROBLEM — D70.9 NEUTROPENIC FEVER: Status: RESOLVED | Noted: 2025-01-22 | Resolved: 2025-01-29

## 2025-01-29 PROBLEM — E43 SEVERE PROTEIN-CALORIE MALNUTRITION: Status: ACTIVE | Noted: 2025-01-29

## 2025-01-29 PROBLEM — D84.821 IMMUNOSUPPRESSED DUE TO CHEMOTHERAPY: Status: ACTIVE | Noted: 2025-01-29

## 2025-01-29 PROBLEM — R50.81 NEUTROPENIC FEVER: Status: RESOLVED | Noted: 2025-01-22 | Resolved: 2025-01-29

## 2025-01-29 PROBLEM — Z79.899 IMMUNOSUPPRESSED DUE TO CHEMOTHERAPY: Status: ACTIVE | Noted: 2025-01-29

## 2025-01-29 LAB
ALBUMIN SERPL-MCNC: 2.4 G/DL (ref 3.5–5.2)
ALBUMIN/GLOB SERPL: 0.6 G/DL
ALP SERPL-CCNC: 322 U/L (ref 39–117)
ALT SERPL W P-5'-P-CCNC: 13 U/L (ref 1–33)
ANION GAP SERPL CALCULATED.3IONS-SCNC: 11.5 MMOL/L (ref 5–15)
ANISOCYTOSIS BLD QL: ABNORMAL
AST SERPL-CCNC: 19 U/L (ref 1–32)
BASOPHILS # BLD MANUAL: 0 10*3/MM3 (ref 0–0.2)
BASOPHILS NFR BLD MANUAL: 0 % (ref 0–1.5)
BILIRUB SERPL-MCNC: 0.4 MG/DL (ref 0–1.2)
BUN SERPL-MCNC: 8 MG/DL (ref 6–20)
BUN/CREAT SERPL: 15.1 (ref 7–25)
CALCIUM SPEC-SCNC: 8.6 MG/DL (ref 8.6–10.5)
CHLORIDE SERPL-SCNC: 103 MMOL/L (ref 98–107)
CO2 SERPL-SCNC: 23.5 MMOL/L (ref 22–29)
CREAT SERPL-MCNC: 0.53 MG/DL (ref 0.57–1)
DEPRECATED RDW RBC AUTO: 44.8 FL (ref 37–54)
EGFRCR SERPLBLD CKD-EPI 2021: 117.1 ML/MIN/1.73
EOSINOPHIL # BLD MANUAL: 0 10*3/MM3 (ref 0–0.4)
EOSINOPHIL NFR BLD MANUAL: 0 % (ref 0.3–6.2)
ERYTHROCYTE [DISTWIDTH] IN BLOOD BY AUTOMATED COUNT: 14 % (ref 12.3–15.4)
GLOBULIN UR ELPH-MCNC: 4.1 GM/DL
GLUCOSE SERPL-MCNC: 124 MG/DL (ref 65–99)
HCT VFR BLD AUTO: 37 % (ref 34–46.6)
HGB BLD-MCNC: 12.3 G/DL (ref 12–15.9)
LYMPHOCYTES # BLD MANUAL: 0.58 10*3/MM3 (ref 0.7–3.1)
LYMPHOCYTES NFR BLD MANUAL: 0 % (ref 5–12)
MCH RBC QN AUTO: 29.6 PG (ref 26.6–33)
MCHC RBC AUTO-ENTMCNC: 33.2 G/DL (ref 31.5–35.7)
MCV RBC AUTO: 89.2 FL (ref 79–97)
METAMYELOCYTES NFR BLD MANUAL: 1 % (ref 0–0)
MONOCYTES # BLD: 0 10*3/MM3 (ref 0.1–0.9)
MYELOCYTES NFR BLD MANUAL: 2 % (ref 0–0)
NEUTROPHILS # BLD AUTO: 7.34 10*3/MM3 (ref 1.7–7)
NEUTROPHILS NFR BLD MANUAL: 89.8 % (ref 42.7–76)
NRBC BLD AUTO-RTO: 0 /100 WBC (ref 0–0.2)
PLAT MORPH BLD: NORMAL
PLATELET # BLD AUTO: 536 10*3/MM3 (ref 140–450)
PMV BLD AUTO: 10.5 FL (ref 6–12)
POLYCHROMASIA BLD QL SMEAR: ABNORMAL
POTASSIUM SERPL-SCNC: 4.7 MMOL/L (ref 3.5–5.2)
PROT SERPL-MCNC: 6.5 G/DL (ref 6–8.5)
RBC # BLD AUTO: 4.15 10*6/MM3 (ref 3.77–5.28)
SODIUM SERPL-SCNC: 138 MMOL/L (ref 136–145)
VARIANT LYMPHS NFR BLD MANUAL: 7.1 % (ref 19.6–45.3)
WBC MORPH BLD: NORMAL
WBC NRBC COR # BLD AUTO: 8.17 10*3/MM3 (ref 3.4–10.8)

## 2025-01-29 PROCEDURE — 25010000002 CEFEPIME PER 500 MG: Performed by: STUDENT IN AN ORGANIZED HEALTH CARE EDUCATION/TRAINING PROGRAM

## 2025-01-29 PROCEDURE — 85025 COMPLETE CBC W/AUTO DIFF WBC: CPT | Performed by: DENTIST

## 2025-01-29 PROCEDURE — 85007 BL SMEAR W/DIFF WBC COUNT: CPT | Performed by: DENTIST

## 2025-01-29 PROCEDURE — 80053 COMPREHEN METABOLIC PANEL: CPT | Performed by: DENTIST

## 2025-01-29 PROCEDURE — 25010000002 HEPARIN LOCK FLUSH PER 10 UNITS: Performed by: DENTIST

## 2025-01-29 PROCEDURE — 99232 SBSQ HOSP IP/OBS MODERATE 35: CPT | Performed by: STUDENT IN AN ORGANIZED HEALTH CARE EDUCATION/TRAINING PROGRAM

## 2025-01-29 RX ORDER — AMOXICILLIN 250 MG/1
500 CAPSULE ORAL EVERY 8 HOURS SCHEDULED
Status: DISCONTINUED | OUTPATIENT
Start: 2025-01-29 | End: 2025-01-29 | Stop reason: HOSPADM

## 2025-01-29 RX ORDER — AMOXICILLIN 500 MG/1
500 CAPSULE ORAL EVERY 8 HOURS SCHEDULED
Qty: 8 CAPSULE | Refills: 0 | Status: SHIPPED | OUTPATIENT
Start: 2025-01-29 | End: 2025-02-01

## 2025-01-29 RX ORDER — OXYCODONE AND ACETAMINOPHEN 5; 325 MG/1; MG/1
1 TABLET ORAL EVERY 6 HOURS PRN
Qty: 12 TABLET | Refills: 0 | Status: SHIPPED | OUTPATIENT
Start: 2025-01-29

## 2025-01-29 RX ADMIN — AMOXICILLIN 500 MG: 250 CAPSULE ORAL at 14:09

## 2025-01-29 RX ADMIN — Medication 10 ML: at 00:50

## 2025-01-29 RX ADMIN — OXYCODONE HYDROCHLORIDE 5 MG: 5 TABLET ORAL at 13:19

## 2025-01-29 RX ADMIN — HEPARIN 500 UNITS: 100 SYRINGE at 17:25

## 2025-01-29 RX ADMIN — Medication 10 ML: at 08:28

## 2025-01-29 RX ADMIN — OXYCODONE HYDROCHLORIDE 5 MG: 5 TABLET ORAL at 04:06

## 2025-01-29 RX ADMIN — ASPIRIN 81 MG: 81 TABLET, COATED ORAL at 08:27

## 2025-01-29 RX ADMIN — CEFEPIME 2000 MG: 2 INJECTION, POWDER, FOR SOLUTION INTRAVENOUS at 04:06

## 2025-01-29 NOTE — PLAN OF CARE
Goal Outcome Evaluation:      Patient resting bed at this time, see MAR for medications administered, alert and oriented recovering from having 5 teeth extracted yesterday, pain meds given. Pt on full liq diet, continent of b&b, room air. Pt to rinse with salt water, use ice packs, bite on gauze which she has completed all tasks this shift. Plan of care is ongoing.

## 2025-01-29 NOTE — PROGRESS NOTES
Subjective     REASON FOR CONSULTATION:  breast cancer  Provide an opinion on any further workup or treatment                             REQUESTING PHYSICIAN:  Jose    RECORDS OBTAINED:  Records of the patients history including those obtained from the referring provider were reviewed and summarized in detail.    HISTORY OF PRESENT ILLNESS:  The patient is a 44 y.o. year old female who is here for an opinion about the above issue.    History of Present Illness   The patient initiated adjuvant chemotherapy with AC on 12/31/2024 and has completed 2 cycles.  She was admitted after cycle 2 with neutropenic fever.  Evaluation was negative for pneumonia urinary tract infection bacteremia etc.  She continued to have fever after neutrophil recovery.  Panorex showed tooth abscess and she underwent 5 dental extractions.    She returns for follow-up.  She is doing okay.  She has abdominal cramping and occasional diarrhea probably related to underlying Crohn's.  She took Bentyl during hospitalization which helped with cramping.  She denies fever since discharge.    ONCOLOGY HISTORY:  This is a 44-year-old woman referred from general surgery to discuss adjuvant treatment for recently surgically treated breast cancer.  The patient presented with a large palpable mass in the inner quadrant of the right breast.  The mass had been enlarging for couple years but the patient did not have insurance to get assessed.  The breast imaging is not available to me but she had a CT of the chest abdomen pelvis on 9/16/2024 showed a large mass in the right breast measuring up to 7.7 cm with abnormal left axillary lymphadenopathy.  There were tiny subpleural nodules in the lateral aspect of the left lower lobe likely granulomatous mildly conspicuous lymph node in the central mesentery 1.1 cm.  A PET scan was performed 9/30/2024 showing a hypermetabolic mass in the right breast with thickening throughout the right breast and pathologic  hypermetabolic right axillary level 1 and 2 lymph nodes, no hypermetabolic internal mammary or supraclavicular lymph nodes.  The small pulmonary nodules were too small to characterize and mesenteric lymph nodes without hypermetabolic activity.  A biopsy of the right breast mass showed invasive ductal adenocarcinoma.    She was taken to the operating room on 10/9/2024 and underwent a right modified radical mastectomy and axillary dissection, prophylactic left mastectomy.  Pathology from the right breast showed invasive ductal carcinoma [place] grade 3 (3+3+3) measuring 17 cm with extensive lymphovascular invasion and dermal lymphatic invasion.  The tumor extended to the dermis but did not ulcerate the skin.  Tumor extended to skeletal muscle and was present focally at an anterior margin, 0.05 mm of the posterior margin, 10 mm from the lateral margin, 20 mm from the medial margin, 28 mm from the superior margin and 40 mm from inferior margin.  There was ductal carcinoma in situ present within 2.5 mm of the posterior margin.  There were 13 lymph nodes in the axillary dissection 8 oncology plan/recommendations: of which had macrometastases, 1 micrometastases and 1 lymph node with isolated tumor cells.  The left breast had an intraductal papilloma otherwise negative.  The tumor was positive for estrogen receptor 99% of cells, progesterone receptor 50% of cells, HER2 0 and Ki-67 65%.    The patient has medical comorbidities of Crohn's disease.    Past Medical History:   Diagnosis Date    Anxiety     Breast cancer     Right    Crohn's disease     DVT (deep vein thrombosis) in pregnancy     PFO (patent foramen ovale)     Stroke     after the birth of her child at age 34    Tattoos         Past Surgical History:   Procedure Laterality Date     SECTION      COLON RESECTION      COLONOSCOPY      MASTECTOMY Bilateral 10/9/2024    Procedure: Modified radical mastectomy with axillary dissection of the right breast  and simple mastectomy of the left breast;  Surgeon: Rebekah Garcia DO;  Location:  [facility] OR;  Service: General;  Laterality: Bilateral;    SKIN CANCER EXCISION      TEETH EXTRACTION N/A 1/28/2025    Procedure: TOOTH EXTRACTION #2,15,19,28,29;  Surgeon: Pramod Zambrano DMD;  Location: [facility] MAIN OR;  Service: Oral Surgery;  Laterality: N/A;    VENOUS ACCESS DEVICE (PORT) INSERTION N/A 12/10/2024    Procedure: INSERTION VENOUS ACCESS DEVICE;  Surgeon: Rebekah Garcia DO;  Location:  [facility] OR;  Service: General;  Laterality: N/A;        Current Outpatient Medications on File Prior to Visit   Medication Sig Dispense Refill    aspirin 81 MG EC tablet Take 1 tablet by mouth Daily.      gabapentin (Neurontin) 300 MG capsule Take 2 capsules by mouth Every Night. 60 capsule 2    Klor-Con M20 20 MEQ CR tablet TAKE 1 TABLET BY MOUTH 2 TIMES A DAY 40 tablet 1    lidocaine-prilocaine (EMLA) 2.5-2.5 % cream Apply 1 Application topically to the appropriate area as directed As Needed for Mild Pain. Apply to port site 30 minutes before access 15 g 3    omeprazole (priLOSEC) 20 MG capsule       ondansetron (ZOFRAN) 8 MG tablet Take 1 tablet by mouth 3 (Three) Times a Day As Needed for Nausea or Vomiting. 30 tablet 5    oxyCODONE-acetaminophen (Percocet) 5-325 MG per tablet Take 1 tablet by mouth Every 6 (Six) Hours As Needed for Moderate Pain. 12 tablet 0    prochlorperazine (COMPAZINE) 10 MG tablet Take 1 tablet by mouth Every 6 (Six) Hours As Needed for Nausea or Vomiting. 60 tablet 1    venlafaxine XR (Effexor XR) 37.5 MG 24 hr capsule Take 1 capsule by mouth Take As Directed. 1 capsule po q am for two weeks followed by increase to 2 capsules daily (Patient taking differently: Take 1 capsule by mouth Take As Directed. 1 capsule po q am for two weeks followed by increase to 2 capsules daily  Pt has not started yet) 60 capsule 2     Current Facility-Administered Medications on File Prior to Visit   Medication Dose Route  "Frequency Provider Last Rate Last Admin    heparin injection 500 Units  500 Units Intravenous PRNishant Padilla MD   500 Units at 24 0916    heparin injection 500 Units  500 Units Intravenous PRN Nishant Witt MD   500 Units at 25 1049    sodium chloride 0.9 % flush 10 mL  10 mL Intravenous PRN Nishant Witt MD   10 mL at 24 0916    sodium chloride 0.9 % flush 10 mL  10 mL Intravenous PRN Nishant Witt MD   10 mL at 25 1048        ALLERGIES:  No Known Allergies     Social History     Socioeconomic History    Marital status: Single    Number of children: 1   Tobacco Use    Smoking status: Former     Current packs/day: 0.00     Average packs/day: 1 pack/day for 20.0 years (20.0 ttl pk-yrs)     Types: Cigarettes     Start date:      Quit date:      Years since quittin.0    Smokeless tobacco: Current    Tobacco comments:     Nicotine pouches   Vaping Use    Vaping status: Never Used   Substance and Sexual Activity    Alcohol use: Yes     Comment: OCC    Drug use: Never    Sexual activity: Defer        Family History   Problem Relation Age of Onset    Diabetes Mother     Heart disease Father     Diabetes Paternal Grandmother     Hypertension Other     Malig Hyperthermia Neg Hx         Review of Systems   Constitutional:  Positive for fatigue. Negative for fever.   HENT: Negative.     Respiratory: Negative.     Cardiovascular: Negative.    Gastrointestinal:  Positive for diarrhea. Negative for nausea.        Intermittent cramping   Genitourinary: Negative.    Musculoskeletal: Negative.    Skin:  Negative for wound.   Neurological: Negative.    Hematological: Negative.    Psychiatric/Behavioral:  Positive for dysphoric mood.           Objective     Vitals:    25 0738 25 0744   BP: (!) 88/74 106/74   Pulse: 98    Resp: 16    Temp: 97.8 °F (36.6 °C)    TempSrc: Infrared    SpO2: 100%    Weight: 65 kg (143 lb 3.2 oz)    Height: 165.1 cm (65\")    PainSc: 0-No pain "                2/4/2025     7:40 AM   Current Status   ECOG score 0       Physical Exam    CONSTITUTIONAL: pleasant well-developed adult woman  HEENT: no icterus, no thrush, moist membranes/status post dental extractions  LYMPH: no cervical or supraclavicular lad  CV: RRR, S1S2, no murmur  RESP/chest: cta bilat, no wheezing, no rales, port present left chest wall  Breast: Deferred  GI: soft, nontender, no splenomegaly, +BS  MUSC: no edema, normal gait  NEURO: alert and oriented x3, normal strength  PSYCH: normal mood and affect      RECENT LABS:  Hematology WBC   Date Value Ref Range Status   02/04/2025 11.57 (H) 3.40 - 10.80 10*3/mm3 Final     RBC   Date Value Ref Range Status   02/04/2025 4.92 3.77 - 5.28 10*6/mm3 Final     Hemoglobin   Date Value Ref Range Status   02/04/2025 14.6 12.0 - 15.9 g/dL Final     Hematocrit   Date Value Ref Range Status   02/04/2025 46.3 34.0 - 46.6 % Final     Platelets   Date Value Ref Range Status   02/04/2025 1,196 (C) 140 - 450 10*3/mm3 Final        Lab Results   Component Value Date    GLUCOSE 136 (H) 02/04/2025    BUN 8 02/04/2025    CREATININE 0.65 02/04/2025     02/04/2025    K 3.5 02/04/2025     02/04/2025    CALCIUM 9.3 02/04/2025    PROTEINTOT 7.3 02/04/2025    ALBUMIN 3.6 02/04/2025    ALT 24 02/04/2025    AST 34 (H) 02/04/2025    ALKPHOS 243 (H) 02/04/2025    BILITOT 0.3 02/04/2025    GLOB 3.7 02/04/2025    AGRATIO 1.0 02/04/2025    BCR 12.3 02/04/2025    ANIONGAP 14.3 02/04/2025    EGFR 111.5 02/04/2025     PET scan 9/30/2024:  FINDINGS:     Head/neck: No suspicious uptake.     Chest: An 8 x 5.8 cm mass in the lower inner right breast with max SUV  of 18 (series 4/image 156). Mass comes in close proximity to the  sternum. Hypermetabolic parenchymal thickening throughout the right  breast with max SUV of 9 (series 4/image 140). Diffuse right breast skin  thickening.     Hypermetabolic right axillary level I and II lymph nodes. Reference 1.2  cm level I lymph  node with max SUV of 11.9 (series 4/image 103).  Additional reference subcentimeter level II lymph node with max SUV of  3.3 (series 4/image 93). Separate brown fat uptake in the bilateral  axilla and posterior neck base.     No enlarged or hypermetabolic internal mammary or supraclavicular lymph  nodes.     Few subcentimeter pulmonary nodules are too small for accurate PET  characterization without overt hypermetabolism and unchanged from recent  CT. Reference left lower lobe (series 4/image 167) and right upper lobe  (series 4/image 131).     Abdomen/pelvis: Mesenteric lymph nodes are mostly subcentimeter without  associated hypermetabolism.     Sigmoid colectomy. Tubular hypermetabolism throughout the otherwise  normal-appearing remaining colon may be medication related. Moderate  proximal colonic stool burden.     Bones: No focal osseous hypermetabolism with special attention to the  sternum.           IMPRESSION:  1. Hypermetabolic 8 cm right breast mass with hypermetabolic parenchymal  thickening throughout the right breast, pathologically proven invasive  ductal carcinoma. Mass comes in close proximity to the sternum. No focal  osseous hypermetabolism with special attention to the sternum.  2. Hypermetabolic right axillary level I and II lymph nodes suggesting  alana metastatic disease. No enlarged or hypermetabolic internal mammary  or supraclavicular lymph nodes. Separate brown fat uptake in the  bilateral axilla and posterior neck base.  3. Few subcentimeter pulmonary nodules are too small for accurate PET  characterization and will need follow-up via chest CT.  4. Mesenteric lymph nodes are mostly subcentimeter without associated  hypermetabolism.       Assessment & Plan   *pT3N2a M0 grade 3 invasive ductal adenocarcinoma right breast, ER 99% positive, TX 50% positive, HER2 0, Ki-67 65%, tumor present at anterior margin  presented with a large palpable mass in the inner quadrant of the right breast; mass  had been enlarging for couple years but the patient did not have insurance to get assessed  CT of the chest abdomen pelvis on 9/16/2024 showed a large mass in the right breast measuring up to 7.7 cm with abnormal left axillary lymphadenopathy.  There were tiny subpleural nodules in the lateral aspect of the left lower lobe likely granulomatous mildly conspicuous lymph node in the central mesentery 1.1 cm.    PET scan 9/30/2024 - hypermetabolic mass in the right breast with thickening throughout the right breast and pathologic hypermetabolic right axillary level 1 and 2 lymph nodes, no hypermetabolic internal mammary or supraclavicular lymph nodes.  The small pulmonary nodules were too small to characterize and mesenteric lymph nodes without hypermetabolic activity.    biopsy of the right breast mass showed invasive ductal adenocarcinoma.  operating room on 10/9/2024 and underwent a right modified radical mastectomy and axillary dissection, prophylactic left mastectomy.  Pathology from the right breast showed invasive ductal carcinoma [place] grade 3 (3+3+3) measuring 17 cm with extensive lymphovascular invasion and dermal lymphatic invasion.  The tumor extended to the dermis but did not ulcerate the skin.  Tumor extended to skeletal muscle and was present focally at an anterior margin, 0.05 mm of the posterior margin, 10 mm from the lateral margin, 20 mm from the medial margin, 28 mm from the superior margin and 40 mm from inferior margin.  There was ductal carcinoma in situ present within 2.5 mm of the posterior margin.  There were 13 lymph nodes in the axillary dissection 8 of which had macrometastases, 1 micrometastases and 1 lymph node with isolated tumor cells.  The left breast had an intraductal papilloma otherwise negative.  The tumor was positive for estrogen receptor 99% of cells, progesterone receptor 50% of cells, HER2 0 and Ki-67 65%.  Initiated adjuvant chemotherapy with Adriamycin/Cytoxan 12/31/2024  (adjuvant chemotherapy delayed because of wound healing issues)     *Myelosuppression secondary to chemotherapy   Patient had fever after cycle 1 AC chemotherapy covered with antibiotics outpatient  Patient had persistent fever after cycle 2 AC chemotherapy despite antibiotics and was admitted for IV antibiotics.  She continued to have fever after neutrophil recovery despite negative infectious workup.  Panorex showed dental abscess and she underwent 5 dental extractions by Dr. Zambrano      * nausea/diarrhea/cramping, chemo induced compounded by Crohn's  responding to Imodium/Zofran/Compazine    *elevated LFT-likely s/e chemo     *[employer] 19 gene panel-VUS Bard 1    *medical comorbidities of Crohn's disease.    Oncology plan/recommendations:  Proceed with cycle 3 AC chemotherapy today; return to clinic 2 weeks for lab practitioner and cycle 4 AC  Return to clinic in 4 weeks to initiate Taxol  After chemotherapy she will need postmastectomy radiation given the size and alana involvement  She will need hormonal therapy with ovarian suppression/tamoxifen versus oophorectomy and AI therapy/ck4/6 inhibitor  Nonspecific lung nodules will need reassessment after completing chemotherapy radiographically  Continue Imodium Zofran and Compazine for nausea/diarrhea; prescribed Bentyl 10 mg 3 times daily as needed cramping

## 2025-01-29 NOTE — ANESTHESIA POSTPROCEDURE EVALUATION
"Patient: Suzanne Lin    Procedure Summary       Date: 01/28/25 Room / Location: [facility] OR [address] [place] [address] MAIN OR    Anesthesia Start: 1739 Anesthesia Stop: 1832    Procedure: TOOTH EXTRACTION #2,15,19,28,29 (Mouth) Diagnosis:       Neutropenic fever      (Neutropenic fever [D70.9, R50.81])    Surgeons: Pramod Zambrano DMD Provider:     Anesthesia Type: general ASA Status: 2            Anesthesia Type: general    Vitals  Vitals Value Taken Time   BP 96/66 01/28/25 1930   Temp 36.9 °C (98.5 °F) 01/28/25 1833   Pulse 82 01/28/25 1939   Resp 11 01/28/25 1833   SpO2 94 % 01/28/25 1939   Vitals shown include unfiled device data.        Post Anesthesia Care and Evaluation    Pain management: adequate    Airway patency: patent  Anesthetic complications: No anesthetic complications    Cardiovascular status: acceptable  Respiratory status: acceptable  Hydration status: acceptable    Comments: BP 96/66   Pulse 85   Temp 36.9 °C (98.5 °F) (Axillary)   Resp 11   Ht 165.1 cm (65\")   Wt 68 kg (150 lb)   SpO2 94%   BMI 24.96 kg/m²       "

## 2025-01-29 NOTE — PROGRESS NOTES
LOS: 7 days     Chief Complaint: Neutropenic fever    Interval History: Patient status post multiple tooth extraction yesterday.  Reports she is feeling well this morning.  No acute complaints.  Remains afebrile.      Vital Signs  Temp:  [97.1 °F (36.2 °C)-98.5 °F (36.9 °C)] 97.1 °F (36.2 °C)  Heart Rate:  [56-96] 62  Resp:  [11-18] 18  BP: ()/(55-82) 95/64    Physical Exam:  General: In no acute distress  Respiratory: Breathing comfortably on room air  Skin: No rashes or lesions in exposed areas  Extremities: No edema, cyanosis  Access: Chest port and peripheral IV    Antibiotics:  Cefepime 2 g IV every 8 hours    Results Review:     I reviewed the patient's new clinical results.    Lab Results   Component Value Date    WBC 8.17 01/29/2025    HGB 12.3 01/29/2025    HCT 37.0 01/29/2025    MCV 89.2 01/29/2025     (H) 01/29/2025     Lab Results   Component Value Date    GLUCOSE 124 (H) 01/29/2025    BUN 8 01/29/2025    CREATININE 0.53 (L) 01/29/2025    BCR 15.1 01/29/2025    CO2 23.5 01/29/2025    CALCIUM 8.6 01/29/2025    ALBUMIN 2.4 (L) 01/29/2025    AST 19 01/29/2025    ALT 13 01/29/2025     Microbiology:  1/21 blood cultures no growth to date  1/22 respiratory panel negative  1/22 blood culture no growth to date  1/26 blood cultures no growth to date    Assessment    #Neutropenic fever, resolved  # pT3N2a M0 grade 3 invasive ductal adenocarcinoma right breast   #Immunosuppressed on chemotherapy with doxorubicin and cyclophosphamide  #Crohn's disease  #Chest port in place  #Multiple infected teeth status post extraction    Now status post extraction of multiple teeth in the setting of odontogenic infections.     Fever curve and WBC remain improved.  Plan to stop cefepime today and will transition to prophylactic amoxicillin 500 mg 3 times daily for 3-day course.    Thank you for allowing me to be involved in the care of this patient. Infectious diseases will sign off at this time with antibiotics plan  in place, but please call me at [phone] if any further ID questions or new ID concerns.

## 2025-01-30 NOTE — OUTREACH NOTE
Prep Survey      Flowsheet Row Responses   [facility] facility patient discharged from? [place]   Is LACE score < 7 ? No   Eligibility Readm Mgmt   Discharge diagnosis Neutoprenic fever/Tooth extraction   Does the patient have one of the following disease processes/diagnoses(primary or secondary)? Other   Does the patient have Home health ordered? No   Is there a DME ordered? No   Prep survey completed? Yes            SANDY KIRBY - Registered Nurse

## 2025-01-30 NOTE — PAYOR COMM NOTE
"Suzanne Lin (44 y.o. Female)          DC SUMMARY FOR [id number]      F# [phone]         Date of Birth   1980    Social Security Number       Address   [address] [place], [place] [address]    Home Phone   [phone]    MRN   [id number]       Jehovah's witness   None    Marital Status   Single                            Admission Date   25    Admission Type   Emergency    Admitting Provider   Jim Mane MD    Attending Provider       Department, Room/Bed   [address], 91/1       Discharge Date   2025    Discharge Disposition   Home or Self Care    Discharge Destination                                 Attending Provider: (none)   Allergies: No Known Allergies    Isolation: None   Infection: None   Code Status: Prior    Ht: 165.1 cm (65\")   Wt: 68 kg (150 lb)    Admission Cmt: None   Principal Problem: Neutropenic fever [D70.9,R50.81]                   Active Insurance as of 2025       Primary Coverage       Payor Plan Insurance Group Employer/Plan Group    [employer] [facility] [employer] Sales [facility] [id number]       Payor Plan Address Payor Plan Phone Number Payor Plan Fax Number Effective Dates    [address] [phone]  2024 - None Entered    [place] [address]         Subscriber Name Subscriber Birth Date Member ID       SUZANNE LIN 1980 [id number]                     Emergency Contacts        (Rel.) Home Phone Work Phone Mobile Phone    denise see (Significant Other) -- -- [phone]    soumya milan (Relative) -- -- [phone]                 Discharge Summary        Dirk Bone MD at 25 1748              Patient Name: Suzanne Lin  : 1980  MRN: [id number]    Date of Admission: 2025  Date of Discharge:  2025  Primary Care Physician: Gia Rodriguez, PHIL      Chief Complaint:   Fever      Discharge Diagnoses     Active Hospital Problems    Diagnosis  POA    " Immunosuppressed due to chemotherapy [D84.821, T45.1X5A, Z79.899]  Not Applicable    Severe protein-calorie malnutrition [E43]  Yes    Crohn's disease without complication [K50.90]  Yes    LITA (iron deficiency anemia) [D50.9]  Yes    Breast cancer, stage 3, right [C50.911]  Yes      Resolved Hospital Problems    Diagnosis Date Resolved POA    **Neutropenic fever [D70.9, R50.81] 01/29/2025 Yes        Hospital Course     Ms. Lin is a 44 y.o. female with a history of Crohn's, breast cancer and anemia who presented with neutropenic fevers. See H&P for details. She was started on empiric abx and ID was consulted along with oncology. Severe hypokalemia wa snoted and was replaced. She was given Neupogen.  Cultures were all negative. She did report some tooth pain and Panorex was done showing periapical abscess. She was seen by Oral surgery and was recommended to have several teeth extracted. She tolerated this well and is ready for dc today. Her blood counts have all rebounded and she has been rescheduled for next round of chemo until next week.      Day of Discharge     Subjective:  Feels pretty good, no pain    Physical Exam:  Temp:  [96.4 °F (35.8 °C)-97.1 °F (36.2 °C)] 96.4 °F (35.8 °C)  Heart Rate:  [56-82] 82  Resp:  [16-18] 18  BP: ()/(62-82) 99/66  Body mass index is 24.96 kg/m².  Physical Exam  Vitals reviewed.   Constitutional:       General: She is not in acute distress.  Cardiovascular:      Rate and Rhythm: Normal rate.   Pulmonary:      Effort: No respiratory distress.      Breath sounds: Normal breath sounds.   Abdominal:      General: There is no distension.      Palpations: Abdomen is soft.      Tenderness: There is no abdominal tenderness.   Musculoskeletal:      Right lower leg: No edema.      Left lower leg: No edema.   Skin:     General: Skin is warm and dry.   Neurological:      Mental Status: She is alert.   Psychiatric:         Mood and Affect: Mood normal.         Consultants     Consult  Orders (all) (From admission, onward)       Start     Ordered    01/27/25 1537  Inpatient Oral & Maxillofacial Surgery Consult  Once        Specialty:  Oral Surgery  Provider:  Pramod Zambrano DMD    01/27/25 1538    01/27/25 1523  Inpatient Oncology Social Worker Consult  Once        Provider:  (Not yet assigned)    01/27/25 1522    01/23/25 1223  Inpatient Infectious Diseases Consult  Once        Specialty:  Infectious Diseases  Provider:  Monster Treadwell MD    01/23/25 1222    01/22/25 2107  [facility] (on-call MD unless specified) Details  Once,   Status:  Canceled        Specialty:  Hospitalist  Provider:  (Not yet assigned)    01/22/25 2107 01/22/25 2107  Hematology and Oncology (on-call MD unless specified)  Once        Specialty:  Hematology and Oncology  Provider:  Nishant Witt MD    01/22/25 2107                  Procedures     TOOTH EXTRACTION #2,15,19,28,29    Imaging Results (All)       Procedure Component Value Units Date/Time    XR Panorex [[id number]] Collected: 01/27/25 1514     Updated: 01/27/25 1519    Narrative:      XR PANOREX-1/27/2025     HISTORY: Fever. Poor dentition.     There is a fracture of the posterior most tooth on the upper right.  There is periapical lucency surrounding the second to last right lower  tooth which may represent periapical abscess. No other fracture teeth  are seen. Mandible otherwise appears unremarkable. Temporomandibular  joints appear within normal limits.       Impression:      1. Periapical lucency around the root of the second to last right lower  tooth may represent periapical abscess.  2. Please see full discussion above. No bony abnormalities are seen in  the mandible        This report was finalized on 1/27/2025 3:16 PM by Dr. Alex Mei M.D on Workstation: [id number]       XR Chest 1 View [[id number]] Collected: 01/26/25 1509     Updated: 01/26/25 1516    Narrative:      XR CHEST 1 VW-     HISTORY: Female who is 44 years-old, cough      TECHNIQUE: Frontal view of the chest     COMPARISON: 1/22/2025     FINDINGS: Left chest port appears stable. Heart, mediastinum and  pulmonary vasculature are unremarkable. No focal pulmonary  consolidation, pleural effusion, or pneumothorax. No acute osseous  process.       Impression:      No evidence for acute pulmonary process. Follow-up as  clinical indications persist.     This report was finalized on 1/26/2025 3:13 PM by Dr. Chaitanya Espinal M.D on Workstation: [id number]       CT Abdomen Pelvis With Contrast [[id number]] Collected: 01/23/25 1900     Updated: 01/23/25 1908    Narrative:      CT ABDOMEN AND PELVIS WITH IV CONTRAST     HISTORY: Neutropenic fever, abdominal pain     TECHNIQUE: Radiation dose reduction techniques were utilized, including  automated exposure control and exposure modulation based on body size.  Axial images were obtained through the abdomen and pelvis after the  administration of IV contrast. Coronal and sagittal reformatted images  obtained.     COMPARISON: 1/10/2025     FINDINGS:      ABDOMEN:  There is increased atelectasis in the right lung base. There is some  periportal edema within the liver which is new since the comparison  study. There is no biliary ductal dilation. There is some mild  nonspecific fluid around the gallbladder fundus again noted. The spleen  is unremarkable. The kidneys, adrenal glands and pancreas are  unremarkable. Shotty mesenteric lymph nodes are again identified.     PELVIS:  The bladder is unremarkable. The uterus and adnexal structures are  unremarkable. Focal segment of colonic wall thickening is again seen at  the hepatic flexure. Postsurgical changes of the sigmoid. Wall  thickening and enhancement of the terminal ileum is again seen. There is  some fluid demonstrated throughout the colon. There is no abscess. No  acute bony abnormality       Impression:      1. New periportal edema within the liver. No biliary ductal dilation  2. Stable mild  nonspecific fluid around the gallbladder fundus  3. Fluid throughout the colon. Stable wall thickening of the hepatic  flexure portion of the colon. Stable wall thickening and enhancement of  the terminal ileum     Radiation dose reduction techniques were utilized, including automated  exposure control and exposure modulation based on body size.        This report was finalized on 1/23/2025 7:05 PM by Dr. Shayan Reyna M.D on Workstation: [id number]       XR Chest 1 View [[id number]] Collected: 01/22/25 1946     Updated: 01/22/25 1950    Narrative:      AP CHEST     HISTORY: Neutropenic fever     COMPARISON: 1/9/2025     FINDINGS: Lungs are clear. Heart size stable. Is a stable chest port.  There is prominent bowel gas in the upper abdomen       Impression:      As above           This report was finalized on 1/22/2025 7:46 PM by Dr. Shayan Reyna M.D on Workstation: [id number]                 Pertinent Labs     Results from last 7 days   Lab Units 01/29/25 0351 01/28/25 0346 01/27/25 0925 01/26/25  0410   WBC 10*3/mm3 8.17 9.31 7.76 10.51   HEMOGLOBIN g/dL 12.3 11.4* 11.4* 12.6   PLATELETS 10*3/mm3 536* 426 371 370     Results from last 7 days   Lab Units 01/29/25  0351 01/28/25 0346 01/27/25 0925 01/26/25  0410   SODIUM mmol/L 138 139 137 140   POTASSIUM mmol/L 4.7 4.3 2.9* 3.8   CHLORIDE mmol/L 103 105 101 103   CO2 mmol/L 23.5 25.0 23.3 24.8   BUN mg/dL 8 6 7 7   CREATININE mg/dL 0.53* 0.48* 0.59 0.71   GLUCOSE mg/dL 124* 72 118* 89   EGFR mL/min/1.73 117.1 120.0 114.1 107.7     Results from last 7 days   Lab Units 01/29/25  0351 01/28/25 0346 01/27/25 0925 01/26/25  0410   ALBUMIN g/dL 2.4* 2.5* 2.2* 2.9*   BILIRUBIN mg/dL 0.4 0.4 0.4 0.5   ALK PHOS U/L 322* 309* 274* 262*   AST (SGOT) U/L 19 15 18 13   ALT (SGPT) U/L 13 14 13 14     Results from last 7 days   Lab Units 01/29/25  0351 01/28/25  0346 01/27/25  0925 01/26/25  0410   CALCIUM mg/dL 8.6 8.3* 8.2* 8.8   ALBUMIN g/dL 2.4* 2.5* 2.2* 2.9*  "              Invalid input(s): \"LDLCALC\"  Results from last 7 days   Lab Units 01/26/25  1552 01/26/25  1506   BLOODCX  No growth at 3 days No growth at 3 days         Test Results Pending at Discharge     Pending Results       None              Discharge Details        Discharge Medications        New Medications        Instructions Start Date   amoxicillin 500 MG capsule  Commonly known as: AMOXIL   500 mg, Oral, Every 8 Hours Scheduled      oxyCODONE-acetaminophen 5-325 MG per tablet  Commonly known as: Percocet   1 tablet, Oral, Every 6 Hours PRN             Continue These Medications        Instructions Start Date   aspirin 81 MG EC tablet   81 mg, Daily      gabapentin 300 MG capsule  Commonly known as: Neurontin   600 mg, Oral, Nightly      Klor-Con M20 20 MEQ CR tablet  Generic drug: potassium chloride   20 mEq, Oral, 2 Times Daily      lidocaine-prilocaine 2.5-2.5 % cream  Commonly known as: EMLA   1 Application, Topical, As Needed, Apply to port site 30 minutes before access      omeprazole 20 MG capsule  Commonly known as: priLOSEC       ondansetron 8 MG tablet  Commonly known as: ZOFRAN   8 mg, Oral, 3 Times Daily PRN      prochlorperazine 10 MG tablet  Commonly known as: COMPAZINE   10 mg, Oral, Every 6 Hours PRN      venlafaxine XR 37.5 MG 24 hr capsule  Commonly known as: Effexor XR   37.5 mg, Oral, Take As Directed, 1 capsule po q am for two weeks followed by increase to 2 capsules daily             Stop These Medications      amoxicillin-clavulanate 500-125 MG per tablet  Commonly known as: AUGMENTIN     OLANZapine 5 MG tablet  Commonly known as: zyPREXA              No Known Allergies    Discharge Disposition:  Home or Self Care      Discharge Diet:  No active diet order      Discharge Activity:       CODE STATUS:    Code Status and Medical Interventions: CPR (Attempt to Resuscitate); Full Support   Ordered at: 01/22/25 1293     Code Status (Patient has no pulse and is not breathing):    CPR " (Attempt to Resuscitate)     Medical Interventions (Patient has pulse or is breathing):    Full Support       Future Appointments   Date Time Provider Department Center   2/4/2025  7:30 AM INF [facility] PORT CHAIR  [facility]   2/4/2025  8:00 AM Nishant Witt MD [facility]   2/4/2025  8:20 AM CHAIR 4 CBC [facility]   2/13/2025  2:30 PM Sonal Abrams APRN [facility] BE[facility] ONC None   4/16/2025  1:00 PM Rebekah Garcia DO [facility]  [facility]      Follow-up Information       Gia Rodriguez APRN .    Specialty: Family Medicine  Contact information:  [address] [place], [place] [address] [phone]                             Time Spent on Discharge:  Greater than 30 minutes      Dirk Bone MD  [facility]  01/29/25  23:09 EST    Electronically signed by Dirk Bone MD at 01/29/25 0530

## 2025-01-30 NOTE — DISCHARGE SUMMARY
Patient Name: Suzanne Lin  : 1980  MRN: [id number]    Date of Admission: 2025  Date of Discharge:  2025  Primary Care Physician: Gia Rodriguez APRN      Chief Complaint:   Fever      Discharge Diagnoses     Active Hospital Problems    Diagnosis  POA    Immunosuppressed due to chemotherapy [D84.821, T45.1X5A, Z79.899]  Not Applicable    Severe protein-calorie malnutrition [E43]  Yes    Crohn's disease without complication [K50.90]  Yes    LITA (iron deficiency anemia) [D50.9]  Yes    Breast cancer, stage 3, right [C50.911]  Yes      Resolved Hospital Problems    Diagnosis Date Resolved POA    **Neutropenic fever [D70.9, R50.81] 2025 Yes        Hospital Course     Ms. Lin is a 44 y.o. female with a history of Crohn's, breast cancer and anemia who presented with neutropenic fevers. See H&P for details. She was started on empiric abx and ID was consulted along with oncology. Severe hypokalemia wa snoted and was replaced. She was given Neupogen.  Cultures were all negative. She did report some tooth pain and Panorex was done showing periapical abscess. She was seen by Oral surgery and was recommended to have several teeth extracted. She tolerated this well and is ready for dc today. Her blood counts have all rebounded and she has been rescheduled for next round of chemo until next week.      Day of Discharge     Subjective:  Feels pretty good, no pain    Physical Exam:  Temp:  [96.4 °F (35.8 °C)-97.1 °F (36.2 °C)] 96.4 °F (35.8 °C)  Heart Rate:  [56-82] 82  Resp:  [16-18] 18  BP: ()/(62-82) 99/66  Body mass index is 24.96 kg/m².  Physical Exam  Vitals reviewed.   Constitutional:       General: She is not in acute distress.  Cardiovascular:      Rate and Rhythm: Normal rate.   Pulmonary:      Effort: No respiratory distress.      Breath sounds: Normal breath sounds.   Abdominal:      General: There is no distension.      Palpations: Abdomen is soft.      Tenderness: There is no  abdominal tenderness.   Musculoskeletal:      Right lower leg: No edema.      Left lower leg: No edema.   Skin:     General: Skin is warm and dry.   Neurological:      Mental Status: She is alert.   Psychiatric:         Mood and Affect: Mood normal.         Consultants     Consult Orders (all) (From admission, onward)       Start     Ordered    01/27/25 1537  Inpatient Oral & Maxillofacial Surgery Consult  Once        Specialty:  Oral Surgery  Provider:  Pramod Zambrano DMD    01/27/25 1538    01/27/25 1523  Inpatient Oncology Social Worker Consult  Once        Provider:  (Not yet assigned)    01/27/25 1522    01/23/25 1223  Inpatient Infectious Diseases Consult  Once        Specialty:  Infectious Diseases  Provider:  Monster Treadwell MD    01/23/25 1222    01/22/25 2107  [facility] (on-call MD unless specified) Details  Once,   Status:  Canceled        Specialty:  Hospitalist  Provider:  (Not yet assigned)    01/22/25 2107 01/22/25 2107  Hematology and Oncology (on-call MD unless specified)  Once        Specialty:  Hematology and Oncology  Provider:  Nishant Witt MD    01/22/25 2107                  Procedures     TOOTH EXTRACTION #2,15,19,28,29    Imaging Results (All)       Procedure Component Value Units Date/Time    XR Panorex [[id number]] Collected: 01/27/25 1514     Updated: 01/27/25 1519    Narrative:      XR PANOREX-1/27/2025     HISTORY: Fever. Poor dentition.     There is a fracture of the posterior most tooth on the upper right.  There is periapical lucency surrounding the second to last right lower  tooth which may represent periapical abscess. No other fracture teeth  are seen. Mandible otherwise appears unremarkable. Temporomandibular  joints appear within normal limits.       Impression:      1. Periapical lucency around the root of the second to last right lower  tooth may represent periapical abscess.  2. Please see full discussion above. No bony abnormalities are seen in  the  mandible        This report was finalized on 1/27/2025 3:16 PM by Dr. Alex Mei M.D on Workstation: [id number]       XR Chest 1 View [[id number]] Collected: 01/26/25 1509     Updated: 01/26/25 1516    Narrative:      XR CHEST 1 VW-     HISTORY: Female who is 44 years-old, cough     TECHNIQUE: Frontal view of the chest     COMPARISON: 1/22/2025     FINDINGS: Left chest port appears stable. Heart, mediastinum and  pulmonary vasculature are unremarkable. No focal pulmonary  consolidation, pleural effusion, or pneumothorax. No acute osseous  process.       Impression:      No evidence for acute pulmonary process. Follow-up as  clinical indications persist.     This report was finalized on 1/26/2025 3:13 PM by Dr. Chaitanya Espinal M.D on Workstation: [id number]       CT Abdomen Pelvis With Contrast [[id number]] Collected: 01/23/25 1900     Updated: 01/23/25 1908    Narrative:      CT ABDOMEN AND PELVIS WITH IV CONTRAST     HISTORY: Neutropenic fever, abdominal pain     TECHNIQUE: Radiation dose reduction techniques were utilized, including  automated exposure control and exposure modulation based on body size.  Axial images were obtained through the abdomen and pelvis after the  administration of IV contrast. Coronal and sagittal reformatted images  obtained.     COMPARISON: 1/10/2025     FINDINGS:      ABDOMEN:  There is increased atelectasis in the right lung base. There is some  periportal edema within the liver which is new since the comparison  study. There is no biliary ductal dilation. There is some mild  nonspecific fluid around the gallbladder fundus again noted. The spleen  is unremarkable. The kidneys, adrenal glands and pancreas are  unremarkable. Shotty mesenteric lymph nodes are again identified.     PELVIS:  The bladder is unremarkable. The uterus and adnexal structures are  unremarkable. Focal segment of colonic wall thickening is again seen at  the hepatic flexure. Postsurgical changes of the  sigmoid. Wall  thickening and enhancement of the terminal ileum is again seen. There is  some fluid demonstrated throughout the colon. There is no abscess. No  acute bony abnormality       Impression:      1. New periportal edema within the liver. No biliary ductal dilation  2. Stable mild nonspecific fluid around the gallbladder fundus  3. Fluid throughout the colon. Stable wall thickening of the hepatic  flexure portion of the colon. Stable wall thickening and enhancement of  the terminal ileum     Radiation dose reduction techniques were utilized, including automated  exposure control and exposure modulation based on body size.        This report was finalized on 1/23/2025 7:05 PM by Dr. Shayan Reyna M.D on Workstation: [id number]       XR Chest 1 View [[id number]] Collected: 01/22/25 1946     Updated: 01/22/25 1950    Narrative:      AP CHEST     HISTORY: Neutropenic fever     COMPARISON: 1/9/2025     FINDINGS: Lungs are clear. Heart size stable. Is a stable chest port.  There is prominent bowel gas in the upper abdomen       Impression:      As above           This report was finalized on 1/22/2025 7:46 PM by Dr. Shayan Reyna M.D on Workstation: [id number]                 Pertinent Labs     Results from last 7 days   Lab Units 01/29/25  0351 01/28/25  0346 01/27/25  0925 01/26/25  0410   WBC 10*3/mm3 8.17 9.31 7.76 10.51   HEMOGLOBIN g/dL 12.3 11.4* 11.4* 12.6   PLATELETS 10*3/mm3 536* 426 371 370     Results from last 7 days   Lab Units 01/29/25  0351 01/28/25  0346 01/27/25  0925 01/26/25  0410   SODIUM mmol/L 138 139 137 140   POTASSIUM mmol/L 4.7 4.3 2.9* 3.8   CHLORIDE mmol/L 103 105 101 103   CO2 mmol/L 23.5 25.0 23.3 24.8   BUN mg/dL 8 6 7 7   CREATININE mg/dL 0.53* 0.48* 0.59 0.71   GLUCOSE mg/dL 124* 72 118* 89   EGFR mL/min/1.73 117.1 120.0 114.1 107.7     Results from last 7 days   Lab Units 01/29/25  0351 01/28/25  0346 01/27/25  0925 01/26/25  0410   ALBUMIN g/dL 2.4* 2.5* 2.2* 2.9*  "  BILIRUBIN mg/dL 0.4 0.4 0.4 0.5   ALK PHOS U/L 322* 309* 274* 262*   AST (SGOT) U/L 19 15 18 13   ALT (SGPT) U/L 13 14 13 14     Results from last 7 days   Lab Units 01/29/25  0351 01/28/25  0346 01/27/25  0925 01/26/25  0410   CALCIUM mg/dL 8.6 8.3* 8.2* 8.8   ALBUMIN g/dL 2.4* 2.5* 2.2* 2.9*               Invalid input(s): \"LDLCALC\"  Results from last 7 days   Lab Units 01/26/25  1552 01/26/25  1506   BLOODCX  No growth at 3 days No growth at 3 days         Test Results Pending at Discharge     Pending Results       None              Discharge Details        Discharge Medications        New Medications        Instructions Start Date   amoxicillin 500 MG capsule  Commonly known as: AMOXIL   500 mg, Oral, Every 8 Hours Scheduled      oxyCODONE-acetaminophen 5-325 MG per tablet  Commonly known as: Percocet   1 tablet, Oral, Every 6 Hours PRN             Continue These Medications        Instructions Start Date   aspirin 81 MG EC tablet   81 mg, Daily      gabapentin 300 MG capsule  Commonly known as: Neurontin   600 mg, Oral, Nightly      Klor-Con M20 20 MEQ CR tablet  Generic drug: potassium chloride   20 mEq, Oral, 2 Times Daily      lidocaine-prilocaine 2.5-2.5 % cream  Commonly known as: EMLA   1 Application, Topical, As Needed, Apply to port site 30 minutes before access      omeprazole 20 MG capsule  Commonly known as: priLOSEC       ondansetron 8 MG tablet  Commonly known as: ZOFRAN   8 mg, Oral, 3 Times Daily PRN      prochlorperazine 10 MG tablet  Commonly known as: COMPAZINE   10 mg, Oral, Every 6 Hours PRN      venlafaxine XR 37.5 MG 24 hr capsule  Commonly known as: Effexor XR   37.5 mg, Oral, Take As Directed, 1 capsule po q am for two weeks followed by increase to 2 capsules daily             Stop These Medications      amoxicillin-clavulanate 500-125 MG per tablet  Commonly known as: AUGMENTIN     OLANZapine 5 MG tablet  Commonly known as: zyPREXA              No Known Allergies    Discharge " Disposition:  Home or Self Care      Discharge Diet:  No active diet order      Discharge Activity:       CODE STATUS:    Code Status and Medical Interventions: CPR (Attempt to Resuscitate); Full Support   Ordered at: 01/22/25 2141     Code Status (Patient has no pulse and is not breathing):    CPR (Attempt to Resuscitate)     Medical Interventions (Patient has pulse or is breathing):    Full Support       Future Appointments   Date Time Provider Department Center   2/4/2025  7:30 AM INF [facility] PORT CHAIR  [facility]   2/4/2025  8:00 AM Nishant Witt MD [facility]   2/4/2025  8:20 AM CHAIR 4 CBC [facility] INFUS [facility]   2/13/2025  2:30 PM Sonal Abrams APRN [facility] ONC None   4/16/2025  1:00 PM Rebekah Garcia DO [facility]  [facility]      Follow-up Information       Gia Rodriguez APRN .    Specialty: Family Medicine  Contact information:  [address] [place], [place] [address] [phone]                             Time Spent on Discharge:  Greater than 30 minutes      Dirk Bone MD  [facility]  01/29/25  23:09 EST

## 2025-01-31 LAB
BACTERIA SPEC AEROBE CULT: NORMAL
BACTERIA SPEC AEROBE CULT: NORMAL

## 2025-01-31 NOTE — CASE MANAGEMENT/SOCIAL WORK
Case Management Discharge Note      Final Note: Dc home         Selected Continued Care - Discharged on 1/29/2025 Admission date: 1/22/2025 - Discharge disposition: Home or Self Care      Destination    No services have been selected for the patient.                Durable Medical Equipment    No services have been selected for the patient.                Dialysis/Infusion    No services have been selected for the patient.                Home Medical Care    No services have been selected for the patient.                Therapy    No services have been selected for the patient.                Community Resources    No services have been selected for the patient.                Community & DME    No services have been selected for the patient.                         Final Discharge Disposition Code: 01 - home or self-care

## 2025-02-03 ENCOUNTER — READMISSION MANAGEMENT (OUTPATIENT)
Dept: CALL CENTER | Facility: HOSPITAL | Age: 45
End: 2025-02-03
Payer: COMMERCIAL

## 2025-02-03 RX ORDER — AMOXICILLIN AND CLAVULANATE POTASSIUM 500; 125 MG/1; MG/1
1 TABLET, FILM COATED ORAL 2 TIMES DAILY
Qty: 14 TABLET | Refills: 0 | OUTPATIENT
Start: 2025-02-03

## 2025-02-03 NOTE — OUTREACH NOTE
Medical Week 1 Survey      Flowsheet Row Responses   [facility] patient discharged from? [place]   Does the patient have one of the following disease processes/diagnoses(primary or secondary)? Other   Week 1 attempt successful? No   Unsuccessful attempts Attempt 1            Lashanda Mcdonnell Registered Nurse

## 2025-02-04 ENCOUNTER — INFUSION (OUTPATIENT)
Dept: ONCOLOGY | Facility: HOSPITAL | Age: 45
End: 2025-02-04
Payer: COMMERCIAL

## 2025-02-04 ENCOUNTER — OFFICE VISIT (OUTPATIENT)
Dept: ONCOLOGY | Facility: CLINIC | Age: 45
End: 2025-02-04
Payer: COMMERCIAL

## 2025-02-04 VITALS
HEIGHT: 65 IN | WEIGHT: 143.2 LBS | DIASTOLIC BLOOD PRESSURE: 74 MMHG | OXYGEN SATURATION: 100 % | SYSTOLIC BLOOD PRESSURE: 106 MMHG | HEART RATE: 98 BPM | RESPIRATION RATE: 16 BRPM | BODY MASS INDEX: 23.86 KG/M2 | TEMPERATURE: 97.8 F

## 2025-02-04 DIAGNOSIS — C50.911 BREAST CANCER, STAGE 3, RIGHT: ICD-10-CM

## 2025-02-04 DIAGNOSIS — Z79.899 IMMUNOSUPPRESSED DUE TO CHEMOTHERAPY: ICD-10-CM

## 2025-02-04 DIAGNOSIS — Z45.2 ENCOUNTER FOR CENTRAL LINE CARE: ICD-10-CM

## 2025-02-04 DIAGNOSIS — C50.919 MALIGNANT NEOPLASM OF BREAST IN FEMALE, ESTROGEN RECEPTOR POSITIVE, UNSPECIFIED LATERALITY, UNSPECIFIED SITE OF BREAST: Primary | ICD-10-CM

## 2025-02-04 DIAGNOSIS — C50.911 BREAST CANCER, STAGE 3, RIGHT: Primary | ICD-10-CM

## 2025-02-04 DIAGNOSIS — Z17.0 MALIGNANT NEOPLASM OF BREAST IN FEMALE, ESTROGEN RECEPTOR POSITIVE, UNSPECIFIED LATERALITY, UNSPECIFIED SITE OF BREAST: Primary | ICD-10-CM

## 2025-02-04 DIAGNOSIS — D84.821 IMMUNOSUPPRESSED DUE TO CHEMOTHERAPY: ICD-10-CM

## 2025-02-04 DIAGNOSIS — Z79.899 NEED FOR PROPHYLACTIC CHEMOTHERAPY: ICD-10-CM

## 2025-02-04 DIAGNOSIS — T45.1X5A IMMUNOSUPPRESSED DUE TO CHEMOTHERAPY: ICD-10-CM

## 2025-02-04 LAB
ALBUMIN SERPL-MCNC: 3.6 G/DL (ref 3.5–5.2)
ALBUMIN/GLOB SERPL: 1 G/DL
ALP SERPL-CCNC: 243 U/L (ref 39–117)
ALT SERPL W P-5'-P-CCNC: 24 U/L (ref 1–33)
ANION GAP SERPL CALCULATED.3IONS-SCNC: 14.3 MMOL/L (ref 5–15)
AST SERPL-CCNC: 34 U/L (ref 1–32)
BASOPHILS # BLD AUTO: 0.18 10*3/MM3 (ref 0–0.2)
BASOPHILS NFR BLD AUTO: 1.6 % (ref 0–1.5)
BILIRUB SERPL-MCNC: 0.3 MG/DL (ref 0–1.2)
BUN SERPL-MCNC: 8 MG/DL (ref 6–20)
BUN/CREAT SERPL: 12.3 (ref 7–25)
CALCIUM SPEC-SCNC: 9.3 MG/DL (ref 8.6–10.5)
CHLORIDE SERPL-SCNC: 104 MMOL/L (ref 98–107)
CO2 SERPL-SCNC: 23.7 MMOL/L (ref 22–29)
CREAT SERPL-MCNC: 0.65 MG/DL (ref 0.57–1)
DEPRECATED RDW RBC AUTO: 51.8 FL (ref 37–54)
EGFRCR SERPLBLD CKD-EPI 2021: 111.5 ML/MIN/1.73
EOSINOPHIL # BLD AUTO: 0.02 10*3/MM3 (ref 0–0.4)
EOSINOPHIL NFR BLD AUTO: 0.2 % (ref 0.3–6.2)
ERYTHROCYTE [DISTWIDTH] IN BLOOD BY AUTOMATED COUNT: 15.1 % (ref 12.3–15.4)
GLOBULIN UR ELPH-MCNC: 3.7 GM/DL
GLUCOSE SERPL-MCNC: 136 MG/DL (ref 65–99)
HCT VFR BLD AUTO: 46.3 % (ref 34–46.6)
HGB BLD-MCNC: 14.6 G/DL (ref 12–15.9)
IMM GRANULOCYTES # BLD AUTO: 0.69 10*3/MM3 (ref 0–0.05)
IMM GRANULOCYTES NFR BLD AUTO: 6 % (ref 0–0.5)
LYMPHOCYTES # BLD AUTO: 1.7 10*3/MM3 (ref 0.7–3.1)
LYMPHOCYTES NFR BLD AUTO: 14.7 % (ref 19.6–45.3)
MCH RBC QN AUTO: 29.7 PG (ref 26.6–33)
MCHC RBC AUTO-ENTMCNC: 31.5 G/DL (ref 31.5–35.7)
MCV RBC AUTO: 94.1 FL (ref 79–97)
MONOCYTES # BLD AUTO: 0.6 10*3/MM3 (ref 0.1–0.9)
MONOCYTES NFR BLD AUTO: 5.2 % (ref 5–12)
NEUTROPHILS NFR BLD AUTO: 72.3 % (ref 42.7–76)
NEUTROPHILS NFR BLD AUTO: 8.38 10*3/MM3 (ref 1.7–7)
NRBC BLD AUTO-RTO: 0 /100 WBC (ref 0–0.2)
PLATELET # BLD AUTO: 1196 10*3/MM3 (ref 140–450)
PMV BLD AUTO: 9.1 FL (ref 6–12)
POTASSIUM SERPL-SCNC: 3.5 MMOL/L (ref 3.5–5.2)
PROT SERPL-MCNC: 7.3 G/DL (ref 6–8.5)
RBC # BLD AUTO: 4.92 10*6/MM3 (ref 3.77–5.28)
SODIUM SERPL-SCNC: 142 MMOL/L (ref 136–145)
WBC NRBC COR # BLD AUTO: 11.57 10*3/MM3 (ref 3.4–10.8)

## 2025-02-04 PROCEDURE — 25010000002 DOXORUBICIN PER 10 MG: Performed by: INTERNAL MEDICINE

## 2025-02-04 PROCEDURE — 80053 COMPREHEN METABOLIC PANEL: CPT | Performed by: INTERNAL MEDICINE

## 2025-02-04 PROCEDURE — 85025 COMPLETE CBC W/AUTO DIFF WBC: CPT | Performed by: INTERNAL MEDICINE

## 2025-02-04 PROCEDURE — 96375 TX/PRO/DX INJ NEW DRUG ADDON: CPT

## 2025-02-04 PROCEDURE — 25810000003 SODIUM CHLORIDE 0.9 % SOLUTION 250 ML FLEX CONT: Performed by: INTERNAL MEDICINE

## 2025-02-04 PROCEDURE — 96413 CHEMO IV INFUSION 1 HR: CPT

## 2025-02-04 PROCEDURE — 25010000002 FOSAPREPITANT PER 1 MG: Performed by: INTERNAL MEDICINE

## 2025-02-04 PROCEDURE — 25810000003 SODIUM CHLORIDE 0.9 % SOLUTION: Performed by: INTERNAL MEDICINE

## 2025-02-04 PROCEDURE — 96377 APPLICATON ON-BODY INJECTOR: CPT

## 2025-02-04 PROCEDURE — 96411 CHEMO IV PUSH ADDL DRUG: CPT

## 2025-02-04 PROCEDURE — 25010000002 HEPARIN LOCK FLUSH PER 10 UNITS: Performed by: INTERNAL MEDICINE

## 2025-02-04 PROCEDURE — 25010000002 PALONOSETRON 0.25 MG/5ML SOLUTION PREFILLED SYRINGE: Performed by: INTERNAL MEDICINE

## 2025-02-04 PROCEDURE — 25010000002 PEGFILGRASTIM 6 MG/0.6ML PREFILLED SYRINGE KIT: Performed by: INTERNAL MEDICINE

## 2025-02-04 PROCEDURE — 25010000002 CYCLOPHOSPHAMIDE 1 GM/5ML SOLUTION 5 ML VIAL: Performed by: INTERNAL MEDICINE

## 2025-02-04 PROCEDURE — 25010000002 DEXAMETHASONE SODIUM PHOSPHATE 100 MG/10ML SOLUTION: Performed by: INTERNAL MEDICINE

## 2025-02-04 PROCEDURE — 96367 TX/PROPH/DG ADDL SEQ IV INF: CPT

## 2025-02-04 RX ORDER — HEPARIN SODIUM (PORCINE) LOCK FLUSH IV SOLN 100 UNIT/ML 100 UNIT/ML
500 SOLUTION INTRAVENOUS AS NEEDED
OUTPATIENT
Start: 2025-02-04

## 2025-02-04 RX ORDER — SODIUM CHLORIDE 0.9 % (FLUSH) 0.9 %
10 SYRINGE (ML) INJECTION AS NEEDED
OUTPATIENT
Start: 2025-02-04

## 2025-02-04 RX ORDER — PALONOSETRON 0.05 MG/ML
0.25 INJECTION, SOLUTION INTRAVENOUS ONCE
Status: COMPLETED | OUTPATIENT
Start: 2025-02-04 | End: 2025-02-04

## 2025-02-04 RX ORDER — SODIUM CHLORIDE 0.9 % (FLUSH) 0.9 %
10 SYRINGE (ML) INJECTION AS NEEDED
Status: DISCONTINUED | OUTPATIENT
Start: 2025-02-04 | End: 2025-02-04 | Stop reason: HOSPADM

## 2025-02-04 RX ORDER — SODIUM CHLORIDE 9 MG/ML
20 INJECTION, SOLUTION INTRAVENOUS ONCE
Status: CANCELLED | OUTPATIENT
Start: 2025-02-04

## 2025-02-04 RX ORDER — PALONOSETRON 0.05 MG/ML
0.25 INJECTION, SOLUTION INTRAVENOUS ONCE
Status: CANCELLED | OUTPATIENT
Start: 2025-02-04

## 2025-02-04 RX ORDER — HEPARIN SODIUM (PORCINE) LOCK FLUSH IV SOLN 100 UNIT/ML 100 UNIT/ML
500 SOLUTION INTRAVENOUS AS NEEDED
Status: DISCONTINUED | OUTPATIENT
Start: 2025-02-04 | End: 2025-02-04 | Stop reason: HOSPADM

## 2025-02-04 RX ORDER — DOXORUBICIN HYDROCHLORIDE 2 MG/ML
60 INJECTION, SOLUTION INTRAVENOUS ONCE
Status: CANCELLED | OUTPATIENT
Start: 2025-02-04

## 2025-02-04 RX ORDER — DOXORUBICIN HYDROCHLORIDE 2 MG/ML
100 INJECTION, SOLUTION INTRAVENOUS ONCE
Status: COMPLETED | OUTPATIENT
Start: 2025-02-04 | End: 2025-02-04

## 2025-02-04 RX ORDER — SODIUM CHLORIDE 9 MG/ML
20 INJECTION, SOLUTION INTRAVENOUS ONCE
Status: COMPLETED | OUTPATIENT
Start: 2025-02-04 | End: 2025-02-04

## 2025-02-04 RX ORDER — DICYCLOMINE HYDROCHLORIDE 10 MG/1
10 CAPSULE ORAL 3 TIMES DAILY PRN
Qty: 90 CAPSULE | Refills: 2 | Status: SHIPPED | OUTPATIENT
Start: 2025-02-04

## 2025-02-04 RX ADMIN — PALONOSETRON 0.25 MG: 0.25 INJECTION, SOLUTION INTRAVENOUS at 08:46

## 2025-02-04 RX ADMIN — HEPARIN 500 UNITS: 100 SYRINGE at 10:47

## 2025-02-04 RX ADMIN — Medication 10 ML: at 10:47

## 2025-02-04 RX ADMIN — CYCLOPHOSPHAMIDE 1000 MG: 1 INJECTION INTRAVENOUS at 10:07

## 2025-02-04 RX ADMIN — PEGFILGRASTIM 6 MG: KIT SUBCUTANEOUS at 10:46

## 2025-02-04 RX ADMIN — FOSAPREPITANT DIMEGLUMINE 150 MG: 150 INJECTION, POWDER, LYOPHILIZED, FOR SOLUTION INTRAVENOUS at 09:11

## 2025-02-04 RX ADMIN — DEXAMETHASONE SODIUM PHOSPHATE 12 MG: 10 INJECTION, SOLUTION INTRAMUSCULAR; INTRAVENOUS at 08:46

## 2025-02-04 RX ADMIN — DOXORUBICIN HYDROCHLORIDE 50 MG: 2 INJECTION, SOLUTION INTRAVENOUS at 09:54

## 2025-02-04 RX ADMIN — SODIUM CHLORIDE 20 ML/HR: 900 INJECTION, SOLUTION INTRAVENOUS at 08:46

## 2025-02-04 NOTE — NURSING NOTE
Adriamycin given slow IVP with free flowing normal saline.  Blood return checked every 2-5 cc throughout administration. Brisk blood return noted before, during and after Adriamycin given. No redness or swelling noted around the area of the needle insertion site.  Patient tolerated well with no complaints noted throughout administration.  Patient provided ice chips to eat during treatment to prevent mouth soreness.

## 2025-02-05 ENCOUNTER — PATIENT ROUNDING (BHMG ONLY) (OUTPATIENT)
Dept: ONCOLOGY | Facility: CLINIC | Age: 45
End: 2025-02-05
Payer: COMMERCIAL

## 2025-02-05 NOTE — PROGRESS NOTES
A My-Chart message has been sent to the patient for [place] [facility]

## 2025-02-06 ENCOUNTER — READMISSION MANAGEMENT (OUTPATIENT)
Dept: CALL CENTER | Facility: HOSPITAL | Age: 45
End: 2025-02-06
Payer: COMMERCIAL

## 2025-02-06 NOTE — OUTREACH NOTE
Medical Week 1 Survey      Flowsheet Row Responses   [facility] patient discharged from? [place]   Does the patient have one of the following disease processes/diagnoses(primary or secondary)? Other   Week 1 attempt successful? No   Unsuccessful attempts Attempt 2            Bernadette SOMMERS - Registered Nurse

## 2025-02-10 ENCOUNTER — READMISSION MANAGEMENT (OUTPATIENT)
Dept: CALL CENTER | Facility: HOSPITAL | Age: 45
End: 2025-02-10
Payer: COMMERCIAL

## 2025-02-10 NOTE — OUTREACH NOTE
Medical Week 3 Survey      Flowsheet Row Responses   [facility] patient discharged from? [place]   Does the patient have one of the following disease processes/diagnoses(primary or secondary)? Other   Revoke Decline to participate            Smitha GONZALEZ - Registered Nurse

## 2025-02-13 ENCOUNTER — TELEMEDICINE (OUTPATIENT)
Dept: PSYCHIATRY | Facility: HOSPITAL | Age: 45
End: 2025-02-13
Payer: COMMERCIAL

## 2025-02-13 DIAGNOSIS — F41.1 GAD (GENERALIZED ANXIETY DISORDER): Primary | ICD-10-CM

## 2025-02-13 NOTE — PROGRESS NOTES
"Behavioral Oncology Services  Video visit conducted via [employer] Video Visit  You have chosen to receive care through a telehealth visit.  Do you consent to use a video/audio connection for your medical care today? YES  Telehealth provided in patient's home.  Location of Provider: [place], [place]     Subjective  Patient ID: Suzanne Lin is a 44 y.o. female is seen via telehealth in the Behavioral Oncology Clinic.    CC: Anxiety, fatigue    HPI/ Interval History:   Pt is seen in follow up regarding anxiety, ongoing treatment for breast cancer.    Reports having more energy today as compared to previously, while still having to take breaks when trying to put sheets on bed, complete laundry, etc. \"Feels like muscles are lead.\" Continues to rest often, allowing body to do this as needed. Continues with ability to cook meals, care for son.     Mourns occasional fecal incontinence at night, noting impact of crohsn, worsening since initiation of treatment. Does take immodium, but this is not always sufficient. Sees diarrhea, cramping, and intermittent nausea as most prominent symptoms, not too intrusive.  Anticipates 4th AC next Tuesday prior to transitioning to taxol for 12 weeks.     Does share recent hospitalization for recurrent fevers, ultimately discovering abscessed tooth, removing 5 teeth during hospitalization. Fortunately, has not had fevers since this time. Does deny having dental insurance, unsure what plan is for dentures.    Mood is OK. Denies feelings of depression. Anxiety is fairly well managed. Is taking gabapentin as night, reporting significant benefit to sleep, although disrupted with hospitalization due to need for more aggressive pain control. Since home, reports some sleep disruption with coming off of these medications, but generally doing well. Does have a difficult time initiating sleep, then able to continue for appx 7 hours nightly.Has not yet initiated venlafaxine due to recent events, but " plans to start this.    Remains off work at this time, noting anxiety regarding potential for disrupted finances in this. Fortunately, continues to get paid and able to afford bills at this time.     Exam: As above    Recent Labs Reviewed:  Infusion on 02/04/2025   Component Date Value    Glucose 02/04/2025 136 (H)     BUN 02/04/2025 8     Creatinine 02/04/2025 0.65     Sodium 02/04/2025 142     Potassium 02/04/2025 3.5     Chloride 02/04/2025 104     CO2 02/04/2025 23.7     Calcium 02/04/2025 9.3     Total Protein 02/04/2025 7.3     Albumin 02/04/2025 3.6     ALT (SGPT) 02/04/2025 24     AST (SGOT) 02/04/2025 34 (H)     Alkaline Phosphatase 02/04/2025 243 (H)     Total Bilirubin 02/04/2025 0.3     Globulin 02/04/2025 3.7     A/G Ratio 02/04/2025 1.0     BUN/Creatinine Ratio 02/04/2025 12.3     Anion Gap 02/04/2025 14.3     eGFR 02/04/2025 111.5     WBC 02/04/2025 11.57 (H)     RBC 02/04/2025 4.92     Hemoglobin 02/04/2025 14.6     Hematocrit 02/04/2025 46.3     MCV 02/04/2025 94.1     MCH 02/04/2025 29.7     MCHC 02/04/2025 31.5     RDW 02/04/2025 15.1     RDW-SD 02/04/2025 51.8     MPV 02/04/2025 9.1     Platelets 02/04/2025 1,196 (C)     Neutrophil % 02/04/2025 72.3     Lymphocyte % 02/04/2025 14.7 (L)     Monocyte % 02/04/2025 5.2     Eosinophil % 02/04/2025 0.2 (L)     Basophil % 02/04/2025 1.6 (H)     Immature Grans % 02/04/2025 6.0 (H)     Neutrophils, Absolute 02/04/2025 8.38 (H)     Lymphocytes, Absolute 02/04/2025 1.70     Monocytes, Absolute 02/04/2025 0.60     Eosinophils, Absolute 02/04/2025 0.02     Basophils, Absolute 02/04/2025 0.18     Immature Grans, Absolute 02/04/2025 0.69 (H)     nRBC 02/04/2025 0.0    No results displayed because visit has over 200 results.      Infusion on 01/21/2025   Component Date Value    Ferritin 01/21/2025 2,525.00 (H)     Iron 01/21/2025 19 (L)     Iron Saturation (TSAT) 01/21/2025 11 (L)     Transferrin 01/21/2025 119 (L)     TIBC 01/21/2025 177 (L)     Glucose  01/21/2025 120 (H)     BUN 01/21/2025 9     Creatinine 01/21/2025 0.52 (L)     Sodium 01/21/2025 135 (L)     Potassium 01/21/2025 3.2 (L)     Chloride 01/21/2025 101     CO2 01/21/2025 22.9     Calcium 01/21/2025 8.9     BUN/Creatinine Ratio 01/21/2025 17.3     Anion Gap 01/21/2025 11.1     eGFR 01/21/2025 117.7     Magnesium 01/21/2025 2.2     Color, UA 01/21/2025 Dark Yellow (A)     Appearance, UA 01/21/2025 Slightly Cloudy (A)     pH, UA 01/21/2025 6.0     Specific Gravity, UA 01/21/2025 1.010     Glucose, UA 01/21/2025 Negative     Ketones, UA 01/21/2025 Negative     Bilirubin, UA 01/21/2025 Negative     Blood, UA 01/21/2025 Negative     Protein, UA 01/21/2025 30 mg/dL (1+) (A)     Leuk Esterase, UA 01/21/2025 Negative     Nitrite, UA 01/21/2025 Negative     Urobilinogen, UA 01/21/2025 0.2 E.U./dL     WBC 01/21/2025 0.17 (C)     RBC 01/21/2025 3.50 (L)     Hemoglobin 01/21/2025 10.2 (L)     Hematocrit 01/21/2025 31.8 (L)     MCV 01/21/2025 90.9     MCH 01/21/2025 29.1     MCHC 01/21/2025 32.1     RDW 01/21/2025 13.8     RDW-SD 01/21/2025 47.2     MPV 01/21/2025 9.5     Platelets 01/21/2025 280     Neutrophil % 01/21/2025 5.9 (L)     Lymphocyte % 01/21/2025 64.7 (H)     Monocyte % 01/21/2025 17.6 (H)     Eosinophil % 01/21/2025 0.0 (L)     Basophil % 01/21/2025 5.9 (H)     Immature Grans % 01/21/2025 5.9 (H)     Neutrophils, Absolute 01/21/2025 0.01 (L)     Lymphocytes, Absolute 01/21/2025 0.11 (L)     Monocytes, Absolute 01/21/2025 0.03 (L)     Eosinophils, Absolute 01/21/2025 0.00     Basophils, Absolute 01/21/2025 0.01     Immature Grans, Absolute 01/21/2025 0.01     RBC, UA 01/21/2025 None Seen     WBC, UA 01/21/2025 None Seen     Bacteria, UA 01/21/2025 None Seen     Squamous Epithelial Cell* 01/21/2025 0-2     Hyaline Casts, UA 01/21/2025 None Seen     Methodology 01/21/2025 Manual Light Microscopy     Blood Culture 01/21/2025 No growth at 5 days     Blood Culture 01/21/2025 No growth at 5 days    Infusion  on 01/14/2025   Component Date Value    Glucose 01/14/2025 109 (H)     BUN 01/14/2025 7     Creatinine 01/14/2025 0.63     Sodium 01/14/2025 140     Potassium 01/14/2025 3.7     Chloride 01/14/2025 104     CO2 01/14/2025 23.9     Calcium 01/14/2025 8.9     Total Protein 01/14/2025 7.2     Albumin 01/14/2025 3.3 (L)     ALT (SGPT) 01/14/2025 33     AST (SGOT) 01/14/2025 14     Alkaline Phosphatase 01/14/2025 277 (H)     Total Bilirubin 01/14/2025 0.5     Globulin 01/14/2025 3.9     A/G Ratio 01/14/2025 0.8     BUN/Creatinine Ratio 01/14/2025 11.1     Anion Gap 01/14/2025 12.1     eGFR 01/14/2025 112.3     WBC 01/14/2025 6.85     RBC 01/14/2025 3.94     Hemoglobin 01/14/2025 11.9 (L)     Hematocrit 01/14/2025 36.2     MCV 01/14/2025 91.9     MCH 01/14/2025 30.2     MCHC 01/14/2025 32.9     RDW 01/14/2025 14.6     RDW-SD 01/14/2025 49.7     MPV 01/14/2025 9.4     Platelets 01/14/2025 567 (H)     Neutrophil % 01/14/2025 60.4     Lymphocyte % 01/14/2025 14.7 (L)     Monocyte % 01/14/2025 13.0 (H)     Eosinophil % 01/14/2025 1.2     Basophil % 01/14/2025 1.6 (H)     Immature Grans % 01/14/2025 9.1 (H)     Neutrophils, Absolute 01/14/2025 4.14     Lymphocytes, Absolute 01/14/2025 1.01     Monocytes, Absolute 01/14/2025 0.89     Eosinophils, Absolute 01/14/2025 0.08     Basophils, Absolute 01/14/2025 0.11     Immature Grans, Absolute 01/14/2025 0.62 (H)     nRBC 01/14/2025 0.0    Admission on 01/09/2025, Discharged on 01/10/2025   Component Date Value    COVID19 01/09/2025 Not Detected     Influenza A PCR 01/09/2025 Not Detected     Influenza B PCR 01/09/2025 Not Detected     RSV, PCR 01/09/2025 Not Detected     Glucose 01/09/2025 111 (H)     BUN 01/09/2025 5 (L)     Creatinine 01/09/2025 0.63     Sodium 01/09/2025 135 (L)     Potassium 01/09/2025 3.2 (L)     Chloride 01/09/2025 100     CO2 01/09/2025 22.8     Calcium 01/09/2025 8.5 (L)     Total Protein 01/09/2025 6.7     Albumin 01/09/2025 3.3 (L)     ALT (SGPT)  01/09/2025 110 (H)     AST (SGOT) 01/09/2025 54 (H)     Alkaline Phosphatase 01/09/2025 351 (H)     Total Bilirubin 01/09/2025 0.9     Globulin 01/09/2025 3.4     A/G Ratio 01/09/2025 1.0     BUN/Creatinine Ratio 01/09/2025 7.9     Anion Gap 01/09/2025 12.2     eGFR 01/09/2025 112.3     Lipase 01/09/2025 11 (L)     WBC 01/09/2025 0.77 (C)     RBC 01/09/2025 3.37 (L)     Hemoglobin 01/09/2025 10.2 (L)     Hematocrit 01/09/2025 30.6 (L)     MCV 01/09/2025 90.8     MCH 01/09/2025 30.3     MCHC 01/09/2025 33.3     RDW 01/09/2025 14.6     RDW-SD 01/09/2025 48.6     MPV 01/09/2025 10.1     Platelets 01/09/2025 237     Neutrophil % 01/09/2025 15.6 (L)     Lymphocyte % 01/09/2025 51.9 (H)     Monocyte % 01/09/2025 26.0 (H)     Eosinophil % 01/09/2025 3.9     Basophil % 01/09/2025 2.6 (H)     Immature Grans % 01/09/2025 0.0     Neutrophils, Absolute 01/09/2025 0.12 (L)     Lymphocytes, Absolute 01/09/2025 0.40 (L)     Monocytes, Absolute 01/09/2025 0.20     Eosinophils, Absolute 01/09/2025 0.03     Basophils, Absolute 01/09/2025 0.02     Immature Grans, Absolute 01/09/2025 0.00     nRBC 01/09/2025 0.0    Infusion on 01/07/2025   Component Date Value    Glucose 01/07/2025 140 (H)     BUN 01/07/2025 7     Creatinine 01/07/2025 0.57     Sodium 01/07/2025 131 (L)     Potassium 01/07/2025 3.3 (L)     Chloride 01/07/2025 97 (L)     CO2 01/07/2025 23.8     Calcium 01/07/2025 8.6     Total Protein 01/07/2025 7.4     Albumin 01/07/2025 3.7     ALT (SGPT) 01/07/2025 110 (H)     AST (SGOT) 01/07/2025 71 (H)     Alkaline Phosphatase 01/07/2025 256 (H)     Total Bilirubin 01/07/2025 0.8     Globulin 01/07/2025 3.7     A/G Ratio 01/07/2025 1.0     BUN/Creatinine Ratio 01/07/2025 12.3     Anion Gap 01/07/2025 10.2     eGFR 01/07/2025 115.1     WBC 01/07/2025 2.31 (L)     RBC 01/07/2025 3.78     Hemoglobin 01/07/2025 11.4 (L)     Hematocrit 01/07/2025 35.4     MCV 01/07/2025 93.7     MCH 01/07/2025 30.2     MCHC 01/07/2025 32.2     RDW  01/07/2025 14.2     RDW-SD 01/07/2025 49.7     MPV 01/07/2025 9.3     Platelets 01/07/2025 328     Neutrophil % 01/07/2025 73.1     Lymphocyte % 01/07/2025 15.6 (L)     Monocyte % 01/07/2025 2.2 (L)     Eosinophil % 01/07/2025 6.1     Basophil % 01/07/2025 1.3     Immature Grans % 01/07/2025 1.7 (H)     Neutrophils, Absolute 01/07/2025 1.69 (L)     Lymphocytes, Absolute 01/07/2025 0.36 (L)     Monocytes, Absolute 01/07/2025 0.05 (L)     Eosinophils, Absolute 01/07/2025 0.14     Basophils, Absolute 01/07/2025 0.03     Immature Grans, Absolute 01/07/2025 0.04    Infusion on 12/31/2024   Component Date Value    Glucose 12/31/2024 94     BUN 12/31/2024 9     Creatinine 12/31/2024 0.66     Sodium 12/31/2024 138     Potassium 12/31/2024 3.5     Chloride 12/31/2024 104     CO2 12/31/2024 22.7     Calcium 12/31/2024 8.6     Total Protein 12/31/2024 7.3     Albumin 12/31/2024 3.5     ALT (SGPT) 12/31/2024 21     AST (SGOT) 12/31/2024 18     Alkaline Phosphatase 12/31/2024 124 (H)     Total Bilirubin 12/31/2024 0.2     Globulin 12/31/2024 3.8     A/G Ratio 12/31/2024 0.9     BUN/Creatinine Ratio 12/31/2024 13.6     Anion Gap 12/31/2024 11.3     eGFR 12/31/2024 111.1     HCG, Urine QL 12/31/2024 Negative     WBC 12/31/2024 9.59     RBC 12/31/2024 3.98     Hemoglobin 12/31/2024 12.0     Hematocrit 12/31/2024 37.6     MCV 12/31/2024 94.5     MCH 12/31/2024 30.2     MCHC 12/31/2024 31.9     RDW 12/31/2024 15.8 (H)     RDW-SD 12/31/2024 54.9 (H)     MPV 12/31/2024 9.0     Platelets 12/31/2024 418     Neutrophil % 12/31/2024 82.4 (H)     Lymphocyte % 12/31/2024 8.3 (L)     Monocyte % 12/31/2024 4.6 (L)     Eosinophil % 12/31/2024 3.4     Basophil % 12/31/2024 0.6     Immature Grans % 12/31/2024 0.7 (H)     Neutrophils, Absolute 12/31/2024 7.89 (H)     Lymphocytes, Absolute 12/31/2024 0.80     Monocytes, Absolute 12/31/2024 0.44     Eosinophils, Absolute 12/31/2024 0.33     Basophils, Absolute 12/31/2024 0.06     Immature Grans,  Absolute 12/31/2024 0.07 (H)    Infusion on 12/26/2024   Component Date Value    HCG, Urine QL 12/26/2024 Negative     Glucose 12/26/2024 98     BUN 12/26/2024 10     Creatinine 12/26/2024 0.57     Sodium 12/26/2024 133 (L)     Potassium 12/26/2024 3.4 (L)     Chloride 12/26/2024 100     CO2 12/26/2024 24.8     Calcium 12/26/2024 9.0     Total Protein 12/26/2024 7.2     Albumin 12/26/2024 3.6     ALT (SGPT) 12/26/2024 20     AST (SGOT) 12/26/2024 16     Alkaline Phosphatase 12/26/2024 114     Total Bilirubin 12/26/2024 0.2     Globulin 12/26/2024 3.6     A/G Ratio 12/26/2024 1.0     BUN/Creatinine Ratio 12/26/2024 17.5     Anion Gap 12/26/2024 8.2     eGFR 12/26/2024 115.1     WBC 12/26/2024 7.81     RBC 12/26/2024 3.85     Hemoglobin 12/26/2024 11.4 (L)     Hematocrit 12/26/2024 36.1     MCV 12/26/2024 93.8     MCH 12/26/2024 29.6     MCHC 12/26/2024 31.6     RDW 12/26/2024 16.0 (H)     RDW-SD 12/26/2024 55.2 (H)     MPV 12/26/2024 9.3     Platelets 12/26/2024 362     Neutrophil % 12/26/2024 78.7 (H)     Lymphocyte % 12/26/2024 12.4 (L)     Monocyte % 12/26/2024 5.6     Eosinophil % 12/26/2024 2.4     Basophil % 12/26/2024 0.5     Immature Grans % 12/26/2024 0.4     Neutrophils, Absolute 12/26/2024 6.14     Lymphocytes, Absolute 12/26/2024 0.97     Monocytes, Absolute 12/26/2024 0.44     Eosinophils, Absolute 12/26/2024 0.19     Basophils, Absolute 12/26/2024 0.04     Immature Grans, Absolute 12/26/2024 0.03    Office Visit on 12/17/2024   Component Date Value    Hepatitis B Surface Ag 12/17/2024 Non-Reactive     Hep A IgM 12/17/2024 Non-Reactive     Hep B C IgM 12/17/2024 Non-Reactive     Hepatitis C Ab 12/17/2024 Non-Reactive    Labs reviewed    Current Psychotropic Medications:  Gabapentin 600 mg q hs  Venlafaxine XR 37.5 mg daily - not yet initiated    Plan of Care/ Medical Decision Making  Continue gabapentin as written. Support initiating venlafaxine as discussed.  Supported rest as needed, behavioral  activation as able.  Reviewed available supports in cancer center and community; pt reports being well connected to [facility]W team.   Discussed potential benefit of  Dental school regarding denture as well as pelvic floor therapy regarding issues of incontinence. Pt not prepared to do either of these until completion of chemo.  FU scheduled in 4 weeks.    Diagnoses and all orders for this visit:    1. LOUIS (generalized anxiety disorder) (Primary)    I spent 36 minutes caring for Suzanne on this date of service. This time includes time spent by me in the following activities: preparing for the visit, reviewing tests, obtaining and/or reviewing a separately obtained history, performing a medically appropriate examination and/or evaluation, counseling and educating the patient/family/caregiver, ordering medications, tests, or procedures, and documenting information in the medical record.

## 2025-02-19 ENCOUNTER — PATIENT OUTREACH (OUTPATIENT)
Dept: OTHER | Facility: HOSPITAL | Age: 45
End: 2025-02-19
Payer: COMMERCIAL

## 2025-02-25 ENCOUNTER — INFUSION (OUTPATIENT)
Dept: ONCOLOGY | Facility: HOSPITAL | Age: 45
End: 2025-02-25
Payer: COMMERCIAL

## 2025-02-25 ENCOUNTER — OFFICE VISIT (OUTPATIENT)
Dept: ONCOLOGY | Facility: CLINIC | Age: 45
End: 2025-02-25
Payer: COMMERCIAL

## 2025-02-25 VITALS
DIASTOLIC BLOOD PRESSURE: 64 MMHG | RESPIRATION RATE: 16 BRPM | WEIGHT: 140.2 LBS | OXYGEN SATURATION: 98 % | HEART RATE: 87 BPM | TEMPERATURE: 97.4 F | SYSTOLIC BLOOD PRESSURE: 90 MMHG | BODY MASS INDEX: 23.36 KG/M2 | HEIGHT: 65 IN

## 2025-02-25 DIAGNOSIS — D50.9 IRON DEFICIENCY ANEMIA, UNSPECIFIED IRON DEFICIENCY ANEMIA TYPE: ICD-10-CM

## 2025-02-25 DIAGNOSIS — C50.911 BREAST CANCER, STAGE 3, RIGHT: Primary | ICD-10-CM

## 2025-02-25 DIAGNOSIS — C50.911 BREAST CANCER, STAGE 3, RIGHT: ICD-10-CM

## 2025-02-25 DIAGNOSIS — Z79.899 NEED FOR PROPHYLACTIC CHEMOTHERAPY: ICD-10-CM

## 2025-02-25 DIAGNOSIS — Z45.2 ENCOUNTER FOR CENTRAL LINE CARE: ICD-10-CM

## 2025-02-25 LAB
ALBUMIN SERPL-MCNC: 3.5 G/DL (ref 3.5–5.2)
ALBUMIN/GLOB SERPL: 1.1 G/DL
ALP SERPL-CCNC: 173 U/L (ref 39–117)
ALT SERPL W P-5'-P-CCNC: 20 U/L (ref 1–33)
ANION GAP SERPL CALCULATED.3IONS-SCNC: 13.5 MMOL/L (ref 5–15)
AST SERPL-CCNC: 31 U/L (ref 1–32)
BASOPHILS # BLD AUTO: 0.1 10*3/MM3 (ref 0–0.2)
BASOPHILS NFR BLD AUTO: 1.1 % (ref 0–1.5)
BILIRUB SERPL-MCNC: 0.5 MG/DL (ref 0–1.2)
BUN SERPL-MCNC: 11 MG/DL (ref 6–20)
BUN/CREAT SERPL: 17.7 (ref 7–25)
CALCIUM SPEC-SCNC: 9 MG/DL (ref 8.6–10.5)
CHLORIDE SERPL-SCNC: 104 MMOL/L (ref 98–107)
CO2 SERPL-SCNC: 23.5 MMOL/L (ref 22–29)
CREAT SERPL-MCNC: 0.62 MG/DL (ref 0.57–1)
DEPRECATED RDW RBC AUTO: 53.9 FL (ref 37–54)
EGFRCR SERPLBLD CKD-EPI 2021: 112.8 ML/MIN/1.73
EOSINOPHIL # BLD AUTO: 0.08 10*3/MM3 (ref 0–0.4)
EOSINOPHIL NFR BLD AUTO: 0.9 % (ref 0.3–6.2)
ERYTHROCYTE [DISTWIDTH] IN BLOOD BY AUTOMATED COUNT: 16.5 % (ref 12.3–15.4)
GLOBULIN UR ELPH-MCNC: 3.3 GM/DL
GLUCOSE SERPL-MCNC: 125 MG/DL (ref 65–99)
HCT VFR BLD AUTO: 36.3 % (ref 34–46.6)
HGB BLD-MCNC: 12 G/DL (ref 12–15.9)
IMM GRANULOCYTES # BLD AUTO: 0.14 10*3/MM3 (ref 0–0.05)
IMM GRANULOCYTES NFR BLD AUTO: 1.5 % (ref 0–0.5)
LYMPHOCYTES # BLD AUTO: 1.22 10*3/MM3 (ref 0.7–3.1)
LYMPHOCYTES NFR BLD AUTO: 13 % (ref 19.6–45.3)
MCH RBC QN AUTO: 29.9 PG (ref 26.6–33)
MCHC RBC AUTO-ENTMCNC: 33.1 G/DL (ref 31.5–35.7)
MCV RBC AUTO: 90.5 FL (ref 79–97)
MONOCYTES # BLD AUTO: 0.6 10*3/MM3 (ref 0.1–0.9)
MONOCYTES NFR BLD AUTO: 6.4 % (ref 5–12)
NEUTROPHILS NFR BLD AUTO: 7.23 10*3/MM3 (ref 1.7–7)
NEUTROPHILS NFR BLD AUTO: 77.1 % (ref 42.7–76)
NRBC BLD AUTO-RTO: 0 /100 WBC (ref 0–0.2)
PLATELET # BLD AUTO: 541 10*3/MM3 (ref 140–450)
PMV BLD AUTO: 9.4 FL (ref 6–12)
POTASSIUM SERPL-SCNC: 3.6 MMOL/L (ref 3.5–5.2)
PROT SERPL-MCNC: 6.8 G/DL (ref 6–8.5)
RBC # BLD AUTO: 4.01 10*6/MM3 (ref 3.77–5.28)
SODIUM SERPL-SCNC: 141 MMOL/L (ref 136–145)
WBC NRBC COR # BLD AUTO: 9.37 10*3/MM3 (ref 3.4–10.8)

## 2025-02-25 PROCEDURE — 25010000002 HEPARIN LOCK FLUSH PER 10 UNITS: Performed by: INTERNAL MEDICINE

## 2025-02-25 PROCEDURE — 85025 COMPLETE CBC W/AUTO DIFF WBC: CPT | Performed by: INTERNAL MEDICINE

## 2025-02-25 PROCEDURE — 80053 COMPREHEN METABOLIC PANEL: CPT | Performed by: INTERNAL MEDICINE

## 2025-02-25 PROCEDURE — 96375 TX/PRO/DX INJ NEW DRUG ADDON: CPT

## 2025-02-25 PROCEDURE — 25010000002 DOXORUBICIN PER 10 MG: Performed by: INTERNAL MEDICINE

## 2025-02-25 PROCEDURE — 96413 CHEMO IV INFUSION 1 HR: CPT

## 2025-02-25 PROCEDURE — 25810000003 SODIUM CHLORIDE 0.9 % SOLUTION: Performed by: INTERNAL MEDICINE

## 2025-02-25 PROCEDURE — 25810000003 SODIUM CHLORIDE 0.9 % SOLUTION 250 ML FLEX CONT: Performed by: INTERNAL MEDICINE

## 2025-02-25 PROCEDURE — 25010000002 FOSAPREPITANT PER 1 MG: Performed by: INTERNAL MEDICINE

## 2025-02-25 PROCEDURE — 25010000002 PEGFILGRASTIM 6 MG/0.6ML PREFILLED SYRINGE KIT: Performed by: INTERNAL MEDICINE

## 2025-02-25 PROCEDURE — 25010000002 PALONOSETRON 0.25 MG/5ML SOLUTION PREFILLED SYRINGE: Performed by: INTERNAL MEDICINE

## 2025-02-25 PROCEDURE — 25010000002 CYCLOPHOSPHAMIDE 1 GM/5ML SOLUTION 5 ML VIAL: Performed by: INTERNAL MEDICINE

## 2025-02-25 PROCEDURE — 96367 TX/PROPH/DG ADDL SEQ IV INF: CPT

## 2025-02-25 PROCEDURE — 25010000002 DEXAMETHASONE SODIUM PHOSPHATE 100 MG/10ML SOLUTION: Performed by: INTERNAL MEDICINE

## 2025-02-25 PROCEDURE — 96377 APPLICATON ON-BODY INJECTOR: CPT

## 2025-02-25 PROCEDURE — 99214 OFFICE O/P EST MOD 30 MIN: CPT | Performed by: INTERNAL MEDICINE

## 2025-02-25 PROCEDURE — 96411 CHEMO IV PUSH ADDL DRUG: CPT

## 2025-02-25 RX ORDER — SODIUM CHLORIDE 0.9 % (FLUSH) 0.9 %
10 SYRINGE (ML) INJECTION AS NEEDED
Status: DISCONTINUED | OUTPATIENT
Start: 2025-02-25 | End: 2025-02-25 | Stop reason: HOSPADM

## 2025-02-25 RX ORDER — DEXAMETHASONE 4 MG/1
8 TABLET ORAL 2 TIMES DAILY WITH MEALS
Qty: 4 TABLET | Refills: 0 | Status: SHIPPED | OUTPATIENT
Start: 2025-02-25 | End: 2025-02-26

## 2025-02-25 RX ORDER — ONDANSETRON 8 MG/1
8 TABLET, FILM COATED ORAL 3 TIMES DAILY PRN
Qty: 30 TABLET | Refills: 5 | Status: SHIPPED | OUTPATIENT
Start: 2025-02-25

## 2025-02-25 RX ORDER — PALONOSETRON 0.05 MG/ML
0.25 INJECTION, SOLUTION INTRAVENOUS ONCE
Status: CANCELLED | OUTPATIENT
Start: 2025-02-25

## 2025-02-25 RX ORDER — PALONOSETRON 0.05 MG/ML
0.25 INJECTION, SOLUTION INTRAVENOUS ONCE
Status: COMPLETED | OUTPATIENT
Start: 2025-02-25 | End: 2025-02-25

## 2025-02-25 RX ORDER — SODIUM CHLORIDE 9 MG/ML
20 INJECTION, SOLUTION INTRAVENOUS ONCE
Status: COMPLETED | OUTPATIENT
Start: 2025-02-25 | End: 2025-02-25

## 2025-02-25 RX ORDER — SODIUM CHLORIDE 0.9 % (FLUSH) 0.9 %
10 SYRINGE (ML) INJECTION AS NEEDED
OUTPATIENT
Start: 2025-02-25

## 2025-02-25 RX ORDER — SODIUM CHLORIDE 9 MG/ML
20 INJECTION, SOLUTION INTRAVENOUS ONCE
Status: CANCELLED | OUTPATIENT
Start: 2025-02-25

## 2025-02-25 RX ORDER — DOXORUBICIN HYDROCHLORIDE 2 MG/ML
60 INJECTION, SOLUTION INTRAVENOUS ONCE
Status: CANCELLED | OUTPATIENT
Start: 2025-02-25

## 2025-02-25 RX ORDER — DOXORUBICIN HYDROCHLORIDE 2 MG/ML
100 INJECTION, SOLUTION INTRAVENOUS ONCE
Status: COMPLETED | OUTPATIENT
Start: 2025-02-25 | End: 2025-02-25

## 2025-02-25 RX ORDER — HEPARIN SODIUM (PORCINE) LOCK FLUSH IV SOLN 100 UNIT/ML 100 UNIT/ML
500 SOLUTION INTRAVENOUS AS NEEDED
OUTPATIENT
Start: 2025-02-25

## 2025-02-25 RX ORDER — HEPARIN SODIUM (PORCINE) LOCK FLUSH IV SOLN 100 UNIT/ML 100 UNIT/ML
500 SOLUTION INTRAVENOUS AS NEEDED
Status: DISCONTINUED | OUTPATIENT
Start: 2025-02-25 | End: 2025-02-25 | Stop reason: HOSPADM

## 2025-02-25 RX ADMIN — FOSAPREPITANT 150 MG: 150 INJECTION, POWDER, LYOPHILIZED, FOR SOLUTION INTRAVENOUS at 09:17

## 2025-02-25 RX ADMIN — HEPARIN 500 UNITS: 100 SYRINGE at 10:57

## 2025-02-25 RX ADMIN — CYCLOPHOSPHAMIDE 1000 MG: 1 INJECTION INTRAVENOUS at 10:09

## 2025-02-25 RX ADMIN — DEXAMETHASONE SODIUM PHOSPHATE 12 MG: 10 INJECTION, SOLUTION INTRAMUSCULAR; INTRAVENOUS at 08:54

## 2025-02-25 RX ADMIN — DOXORUBICIN HYDROCHLORIDE 50 MG: 2 INJECTION, SOLUTION INTRAVENOUS at 09:59

## 2025-02-25 RX ADMIN — Medication 10 ML: at 10:57

## 2025-02-25 RX ADMIN — SODIUM CHLORIDE 20 ML/HR: 900 INJECTION, SOLUTION INTRAVENOUS at 08:52

## 2025-02-25 RX ADMIN — PEGFILGRASTIM 6 MG: KIT SUBCUTANEOUS at 10:13

## 2025-02-25 RX ADMIN — PALONOSETRON 0.25 MG: 0.25 INJECTION, SOLUTION INTRAVENOUS at 08:52

## 2025-02-25 NOTE — PROGRESS NOTES
Subjective     REASON FOR CONSULTATION:  breast cancer  Provide an opinion on any further workup or treatment                             REQUESTING PHYSICIAN:  Jose    RECORDS OBTAINED:  Records of the patients history including those obtained from the referring provider were reviewed and summarized in detail.    HISTORY OF PRESENT ILLNESS:  The patient is a 44 y.o. year old female who is here for an opinion about the above issue.    History of Present Illness   The patient initiated adjuvant chemotherapy with AC on 12/31/2024 and has completed 2 cycles.  She was admitted after cycle 2 with neutropenic fever.  Evaluation was negative for pneumonia urinary tract infection bacteremia etc.  She continued to have fever after neutrophil recovery.  Panorex showed tooth abscess and she underwent 5 dental extractions.  Cycle 3 of chemotherapy went without issue with respect to fever.  She continues to have fairly significant fatigue.        ONCOLOGY HISTORY:  This is a 44-year-old woman referred from general surgery to discuss adjuvant treatment for recently surgically treated breast cancer.  The patient presented with a large palpable mass in the inner quadrant of the right breast.  The mass had been enlarging for couple years but the patient did not have insurance to get assessed.  The breast imaging is not available to me but she had a CT of the chest abdomen pelvis on 9/16/2024 showed a large mass in the right breast measuring up to 7.7 cm with abnormal left axillary lymphadenopathy.  There were tiny subpleural nodules in the lateral aspect of the left lower lobe likely granulomatous mildly conspicuous lymph node in the central mesentery 1.1 cm.  A PET scan was performed 9/30/2024 showing a hypermetabolic mass in the right breast with thickening throughout the right breast and pathologic hypermetabolic right axillary level 1 and 2 lymph nodes, no hypermetabolic internal mammary or supraclavicular lymph nodes.   The small pulmonary nodules were too small to characterize and mesenteric lymph nodes without hypermetabolic activity.  A biopsy of the right breast mass showed invasive ductal adenocarcinoma.    She was taken to the operating room on 10/9/2024 and underwent a right modified radical mastectomy and axillary dissection, prophylactic left mastectomy.  Pathology from the right breast showed invasive ductal carcinoma [place] grade 3 (3+3+3) measuring 17 cm with extensive lymphovascular invasion and dermal lymphatic invasion.  The tumor extended to the dermis but did not ulcerate the skin.  Tumor extended to skeletal muscle and was present focally at an anterior margin, 0.05 mm of the posterior margin, 10 mm from the lateral margin, 20 mm from the medial margin, 28 mm from the superior margin and 40 mm from inferior margin.  There was ductal carcinoma in situ present within 2.5 mm of the posterior margin.  There were 13 lymph nodes in the axillary dissection 8 oncology plan/recommendations: of which had macrometastases, 1 micrometastases and 1 lymph node with isolated tumor cells.  The left breast had an intraductal papilloma otherwise negative.  The tumor was positive for estrogen receptor 99% of cells, progesterone receptor 50% of cells, HER2 0 and Ki-67 65%.    The patient has medical comorbidities of Crohn's disease.    Past Medical History:   Diagnosis Date    Anxiety     Breast cancer     Right    Crohn's disease     DVT (deep vein thrombosis) in pregnancy     PFO (patent foramen ovale)     Stroke     after the birth of her child at age 34    Tattoos         Past Surgical History:   Procedure Laterality Date     SECTION      COLON RESECTION      COLONOSCOPY      MASTECTOMY Bilateral 10/9/2024    Procedure: Modified radical mastectomy with axillary dissection of the right breast and simple mastectomy of the left breast;  Surgeon: Rebekah Garcia DO;  Location: [facility];  Service: General;   Laterality: Bilateral;    SKIN CANCER EXCISION      TEETH EXTRACTION N/A 1/28/2025    Procedure: TOOTH EXTRACTION #2,15,19,28,29;  Surgeon: Pramod Zambrano DMD;  Location: [facility] MAIN OR;  Service: Oral Surgery;  Laterality: N/A;    VENOUS ACCESS DEVICE (PORT) INSERTION N/A 12/10/2024    Procedure: INSERTION VENOUS ACCESS DEVICE;  Surgeon: Rebekah Garcia DO;  Location:  [facility] OR;  Service: General;  Laterality: N/A;        Current Outpatient Medications on File Prior to Visit   Medication Sig Dispense Refill    aspirin 81 MG EC tablet Take 1 tablet by mouth Daily.      dicyclomine (BENTYL) 10 MG capsule Take 1 capsule by mouth 3 (Three) Times a Day As Needed for Abdominal Cramping. 90 capsule 2    gabapentin (Neurontin) 300 MG capsule Take 2 capsules by mouth Every Night. 60 capsule 2    Klor-Con M20 20 MEQ CR tablet TAKE 1 TABLET BY MOUTH 2 TIMES A DAY 40 tablet 1    lidocaine-prilocaine (EMLA) 2.5-2.5 % cream Apply 1 Application topically to the appropriate area as directed As Needed for Mild Pain. Apply to port site 30 minutes before access 15 g 3    omeprazole (priLOSEC) 20 MG capsule Take 1 capsule by mouth As Needed.      ondansetron (ZOFRAN) 8 MG tablet Take 1 tablet by mouth 3 (Three) Times a Day As Needed for Nausea or Vomiting. 30 tablet 5    oxyCODONE-acetaminophen (Percocet) 5-325 MG per tablet Take 1 tablet by mouth Every 6 (Six) Hours As Needed for Moderate Pain. 12 tablet 0    prochlorperazine (COMPAZINE) 10 MG tablet Take 1 tablet by mouth Every 6 (Six) Hours As Needed for Nausea or Vomiting. 60 tablet 1    venlafaxine XR (Effexor XR) 37.5 MG 24 hr capsule Take 1 capsule by mouth Take As Directed. 1 capsule po q am for two weeks followed by increase to 2 capsules daily (Patient taking differently: Take 1 capsule by mouth Take As Directed. 1 capsule po q am for two weeks followed by increase to 2 capsules daily  Pt has not started yet) 60 capsule 2     Current Facility-Administered Medications  on File Prior to Visit   Medication Dose Route Frequency Provider Last Rate Last Admin    heparin injection 500 Units  500 Units Intravenous PRN Nishant Witt MD   500 Units at 24 0916    heparin injection 500 Units  500 Units Intravenous PRN Nishant Witt MD   500 Units at 25 1049    sodium chloride 0.9 % flush 10 mL  10 mL Intravenous PRN Nishant Witt MD   10 mL at 24 0916    sodium chloride 0.9 % flush 10 mL  10 mL Intravenous PRN Nishant Witt MD   10 mL at 25 1048        ALLERGIES:  No Known Allergies     Social History     Socioeconomic History    Marital status: Single    Number of children: 1   Tobacco Use    Smoking status: Former     Current packs/day: 0.00     Average packs/day: 1 pack/day for 20.0 years (20.0 ttl pk-yrs)     Types: Cigarettes     Start date:      Quit date:      Years since quittin.1    Smokeless tobacco: Current    Tobacco comments:     Nicotine pouches   Vaping Use    Vaping status: Never Used   Substance and Sexual Activity    Alcohol use: Yes     Comment: OCC    Drug use: Never    Sexual activity: Defer        Family History   Problem Relation Age of Onset    Diabetes Mother     Heart disease Father     Diabetes Paternal Grandmother     Hypertension Other     Malig Hyperthermia Neg Hx         Review of Systems   Constitutional:  Positive for fatigue. Negative for fever.   HENT: Negative.     Respiratory: Negative.     Cardiovascular: Negative.    Gastrointestinal:  Positive for diarrhea. Negative for nausea.        Intermittent cramping   Genitourinary: Negative.    Musculoskeletal: Negative.    Skin:  Negative for wound.   Neurological: Negative.    Hematological: Negative.    Psychiatric/Behavioral:  Positive for dysphoric mood.    Unchanged-2025      Objective     Vitals:    25 0743   BP: 90/64   Pulse: 87   Resp: 16   Temp: 97.4 °F (36.3 °C)   TempSrc: Infrared   SpO2: 98%   Weight: 63.6 kg (140 lb 3.2 oz)   Height:  "165.1 cm (65\")   PainSc: 0-No pain                 2/25/2025     7:56 AM   Current Status   ECOG score 0       Physical Exam    CONSTITUTIONAL: pleasant well-developed adult woman  HEENT: no icterus, no thrush, moist membranes/status post dental extractions  LYMPH: no cervical or supraclavicular lad  CV: RRR, S1S2, no murmur  RESP/chest: cta bilat, no wheezing, no rales, port present left chest wall  Breast: Deferred  GI: soft, nontender, no splenomegaly, +BS  MUSC: no edema, normal gait  NEURO: alert and oriented x3, normal strength  PSYCH: normal mood and affect  Unchanged-2/25/2025    RECENT LABS:  Hematology WBC   Date Value Ref Range Status   02/25/2025 9.37 3.40 - 10.80 10*3/mm3 Final     RBC   Date Value Ref Range Status   02/25/2025 4.01 3.77 - 5.28 10*6/mm3 Final     Hemoglobin   Date Value Ref Range Status   02/25/2025 12.0 12.0 - 15.9 g/dL Final     Hematocrit   Date Value Ref Range Status   02/25/2025 36.3 34.0 - 46.6 % Final     Platelets   Date Value Ref Range Status   02/25/2025 541 (H) 140 - 450 10*3/mm3 Final        Lab Results   Component Value Date    GLUCOSE 125 (H) 02/25/2025    BUN 11 02/25/2025    CREATININE 0.62 02/25/2025     02/25/2025    K 3.6 02/25/2025     02/25/2025    CALCIUM 9.0 02/25/2025    PROTEINTOT 6.8 02/25/2025    ALBUMIN 3.5 02/25/2025    ALT 20 02/25/2025    AST 31 02/25/2025    ALKPHOS 173 (H) 02/25/2025    BILITOT 0.5 02/25/2025    GLOB 3.3 02/25/2025    AGRATIO 1.1 02/25/2025    BCR 17.7 02/25/2025    ANIONGAP 13.5 02/25/2025    EGFR 112.8 02/25/2025     PET scan 9/30/2024:  FINDINGS:     Head/neck: No suspicious uptake.     Chest: An 8 x 5.8 cm mass in the lower inner right breast with max SUV  of 18 (series 4/image 156). Mass comes in close proximity to the  sternum. Hypermetabolic parenchymal thickening throughout the right  breast with max SUV of 9 (series 4/image 140). Diffuse right breast skin  thickening.     Hypermetabolic right axillary level I and II " lymph nodes. Reference 1.2  cm level I lymph node with max SUV of 11.9 (series 4/image 103).  Additional reference subcentimeter level II lymph node with max SUV of  3.3 (series 4/image 93). Separate brown fat uptake in the bilateral  axilla and posterior neck base.     No enlarged or hypermetabolic internal mammary or supraclavicular lymph  nodes.     Few subcentimeter pulmonary nodules are too small for accurate PET  characterization without overt hypermetabolism and unchanged from recent  CT. Reference left lower lobe (series 4/image 167) and right upper lobe  (series 4/image 131).     Abdomen/pelvis: Mesenteric lymph nodes are mostly subcentimeter without  associated hypermetabolism.     Sigmoid colectomy. Tubular hypermetabolism throughout the otherwise  normal-appearing remaining colon may be medication related. Moderate  proximal colonic stool burden.     Bones: No focal osseous hypermetabolism with special attention to the  sternum.           IMPRESSION:  1. Hypermetabolic 8 cm right breast mass with hypermetabolic parenchymal  thickening throughout the right breast, pathologically proven invasive  ductal carcinoma. Mass comes in close proximity to the sternum. No focal  osseous hypermetabolism with special attention to the sternum.  2. Hypermetabolic right axillary level I and II lymph nodes suggesting  alana metastatic disease. No enlarged or hypermetabolic internal mammary  or supraclavicular lymph nodes. Separate brown fat uptake in the  bilateral axilla and posterior neck base.  3. Few subcentimeter pulmonary nodules are too small for accurate PET  characterization and will need follow-up via chest CT.  4. Mesenteric lymph nodes are mostly subcentimeter without associated  hypermetabolism.       Assessment & Plan   *pT3N2a M0 grade 3 invasive ductal adenocarcinoma right breast, ER 99% positive, [place] 50% positive, HER2 0, Ki-67 65%, tumor present at anterior margin  presented with a large palpable mass in  the inner quadrant of the right breast; mass had been enlarging for couple years but the patient did not have insurance to get assessed  CT of the chest abdomen pelvis on 9/16/2024 showed a large mass in the right breast measuring up to 7.7 cm with abnormal left axillary lymphadenopathy.  There were tiny subpleural nodules in the lateral aspect of the left lower lobe likely granulomatous mildly conspicuous lymph node in the central mesentery 1.1 cm.    PET scan 9/30/2024 - hypermetabolic mass in the right breast with thickening throughout the right breast and pathologic hypermetabolic right axillary level 1 and 2 lymph nodes, no hypermetabolic internal mammary or supraclavicular lymph nodes.  The small pulmonary nodules were too small to characterize and mesenteric lymph nodes without hypermetabolic activity.    biopsy of the right breast mass showed invasive ductal adenocarcinoma.  operating room on 10/9/2024 and underwent a right modified radical mastectomy and axillary dissection, prophylactic left mastectomy.  Pathology from the right breast showed invasive ductal carcinoma [place] grade 3 (3+3+3) measuring 17 cm with extensive lymphovascular invasion and dermal lymphatic invasion.  The tumor extended to the dermis but did not ulcerate the skin.  Tumor extended to skeletal muscle and was present focally at an anterior margin, 0.05 mm of the posterior margin, 10 mm from the lateral margin, 20 mm from the medial margin, 28 mm from the superior margin and 40 mm from inferior margin.  There was ductal carcinoma in situ present within 2.5 mm of the posterior margin.  There were 13 lymph nodes in the axillary dissection 8 of which had macrometastases, 1 micrometastases and 1 lymph node with isolated tumor cells.  The left breast had an intraductal papilloma otherwise negative.  The tumor was positive for estrogen receptor 99% of cells, progesterone receptor 50% of cells, HER2 0 and Ki-67 65%.  Initiated adjuvant  chemotherapy with Adriamycin/Cytoxan 12/31/2024 (adjuvant chemotherapy delayed because of wound healing issues)     *Myelosuppression/neutropenic fever secondary to chemotherapy   Patient had fever after cycle 1 AC chemotherapy covered with antibiotics outpatient  Patient had persistent fever after cycle 2 AC chemotherapy despite antibiotics and was admitted for IV antibiotics.  She continued to have fever after neutrophil recovery despite negative infectious workup.  Panorex showed dental abscess and she underwent 5 dental extractions by Dr. Zambrano  ANC normal today      * nausea/diarrhea/cramping, chemo induced compounded by Crohn's  responding to Imodium/Zofran/Compazine    *elevated LFT-likely s/e chemo     *[employer] 19 gene panel-VUS Bard 1    *medical comorbidities of Crohn's disease.    Oncology plan/recommendations:  Proceed with cycle 4 AC chemotherapy today; return to clinic 2 weeks for lab practitioner and initiate weekly Taxol  After chemotherapy she will need postmastectomy radiation given the size and alana involvement  She will need hormonal therapy with ovarian suppression/tamoxifen versus oophorectomy and AI therapy/ck4/6 inhibitor  Nonspecific lung nodules will need reassessment after completing chemotherapy radiographically  Continue Imodium Zofran and Compazine for nausea/diarrhea; prescribed Bentyl 10 mg 3 times daily as needed cramping

## 2025-03-07 ENCOUNTER — TELEPHONE (OUTPATIENT)
Dept: ONCOLOGY | Facility: CLINIC | Age: 45
End: 2025-03-07
Payer: COMMERCIAL

## 2025-03-07 RX ORDER — AMOXICILLIN AND CLAVULANATE POTASSIUM 500; 125 MG/1; MG/1
1 TABLET, FILM COATED ORAL 2 TIMES DAILY
Qty: 10 TABLET | Refills: 0 | Status: SHIPPED | OUTPATIENT
Start: 2025-03-07 | End: 2025-03-10 | Stop reason: HOSPADM

## 2025-03-07 NOTE — TELEPHONE ENCOUNTER
"Pt called in to report yesterday evening she had a fever of 100.9. No other symptoms at all, but has a history tooth infections and recently had multiple teeth extracted due to this. She states she has another tooth that \"looks bad\" and has an odor to it. No pain currently or drainage. No fever right now, but states she usually spikes a fever in the evening. Does not have any f/u scheduled with dentist or oral surgeon. Reviewed with Dr Witt and Augmentin prescription sent it. Reviewed with pt and also advised that she try to get in with a dentist soon. She tells me she does have a dentist she has seen in the past and will try to get in with them next week. She is hoping they will remove this tooth. Advised that fevers became worse over the weekend or she gets any other accompanying symptoms she  should report to ER for evaluation.   "

## 2025-03-08 ENCOUNTER — APPOINTMENT (OUTPATIENT)
Dept: CT IMAGING | Facility: HOSPITAL | Age: 45
End: 2025-03-08
Payer: COMMERCIAL

## 2025-03-08 ENCOUNTER — DOCUMENTATION (OUTPATIENT)
Dept: ONCOLOGY | Facility: CLINIC | Age: 45
End: 2025-03-08
Payer: COMMERCIAL

## 2025-03-08 ENCOUNTER — APPOINTMENT (OUTPATIENT)
Dept: GENERAL RADIOLOGY | Facility: HOSPITAL | Age: 45
End: 2025-03-08
Payer: COMMERCIAL

## 2025-03-08 ENCOUNTER — HOSPITAL ENCOUNTER (INPATIENT)
Facility: HOSPITAL | Age: 45
LOS: 2 days | Discharge: HOME | End: 2025-03-10
Attending: EMERGENCY MEDICINE | Admitting: STUDENT IN AN ORGANIZED HEALTH CARE EDUCATION/TRAINING PROGRAM
Payer: COMMERCIAL

## 2025-03-08 DIAGNOSIS — R11.10 VOMITING AND DIARRHEA: ICD-10-CM

## 2025-03-08 DIAGNOSIS — R06.09 EXERTIONAL DYSPNEA: ICD-10-CM

## 2025-03-08 DIAGNOSIS — R19.7 VOMITING AND DIARRHEA: ICD-10-CM

## 2025-03-08 DIAGNOSIS — C50.919 MALIGNANT NEOPLASM OF FEMALE BREAST, UNSPECIFIED ESTROGEN RECEPTOR STATUS, UNSPECIFIED LATERALITY, UNSPECIFIED SITE OF BREAST: ICD-10-CM

## 2025-03-08 DIAGNOSIS — R00.2 PALPITATIONS: ICD-10-CM

## 2025-03-08 DIAGNOSIS — E87.6 HYPOKALEMIA: Primary | ICD-10-CM

## 2025-03-08 DIAGNOSIS — D72.819 LEUKOPENIA, UNSPECIFIED TYPE: ICD-10-CM

## 2025-03-08 LAB
ALBUMIN SERPL-MCNC: 3.4 G/DL (ref 3.5–5.2)
ALBUMIN/GLOB SERPL: 0.9 G/DL
ALP SERPL-CCNC: 205 U/L (ref 39–117)
ALT SERPL W P-5'-P-CCNC: 26 U/L (ref 1–33)
ANION GAP SERPL CALCULATED.3IONS-SCNC: 12.6 MMOL/L (ref 5–15)
ANION GAP SERPL CALCULATED.3IONS-SCNC: 16.8 MMOL/L (ref 5–15)
ANISOCYTOSIS BLD QL: ABNORMAL
AST SERPL-CCNC: 17 U/L (ref 1–32)
BASOPHILS # BLD MANUAL: 0 10*3/MM3 (ref 0–0.2)
BASOPHILS NFR BLD MANUAL: 0 % (ref 0–1.5)
BILIRUB SERPL-MCNC: 1.3 MG/DL (ref 0–1.2)
BUN SERPL-MCNC: 7 MG/DL (ref 6–20)
BUN SERPL-MCNC: 8 MG/DL (ref 6–20)
BUN/CREAT SERPL: 14 (ref 7–25)
BUN/CREAT SERPL: 15.2 (ref 7–25)
CALCIUM SPEC-SCNC: 8.2 MG/DL (ref 8.6–10.5)
CALCIUM SPEC-SCNC: 9.4 MG/DL (ref 8.6–10.5)
CHLORIDE SERPL-SCNC: 93 MMOL/L (ref 98–107)
CHLORIDE SERPL-SCNC: 97 MMOL/L (ref 98–107)
CO2 SERPL-SCNC: 24.4 MMOL/L (ref 22–29)
CO2 SERPL-SCNC: 26.2 MMOL/L (ref 22–29)
CREAT SERPL-MCNC: 0.46 MG/DL (ref 0.57–1)
CREAT SERPL-MCNC: 0.57 MG/DL (ref 0.57–1)
D DIMER PPP FEU-MCNC: 0.67 MCGFEU/ML (ref 0–0.5)
DEPRECATED RDW RBC AUTO: 49.1 FL (ref 37–54)
EGFRCR SERPLBLD CKD-EPI 2021: 115.1 ML/MIN/1.73
EGFRCR SERPLBLD CKD-EPI 2021: 121.2 ML/MIN/1.73
EOSINOPHIL # BLD MANUAL: 0 10*3/MM3 (ref 0–0.4)
EOSINOPHIL NFR BLD MANUAL: 0 % (ref 0.3–6.2)
ERYTHROCYTE [DISTWIDTH] IN BLOOD BY AUTOMATED COUNT: 15.9 % (ref 12.3–15.4)
GEN 5 1HR TROPONIN T REFLEX: 14 NG/L
GLOBULIN UR ELPH-MCNC: 3.8 GM/DL
GLUCOSE BLDC GLUCOMTR-MCNC: 83 MG/DL (ref 70–130)
GLUCOSE SERPL-MCNC: 110 MG/DL (ref 65–99)
GLUCOSE SERPL-MCNC: 70 MG/DL (ref 65–99)
HCT VFR BLD AUTO: 26.9 % (ref 34–46.6)
HGB BLD-MCNC: 9.3 G/DL (ref 12–15.9)
INR PPP: 1.25 (ref 0.9–1.1)
LYMPHOCYTES # BLD MANUAL: 0.34 10*3/MM3 (ref 0.7–3.1)
LYMPHOCYTES NFR BLD MANUAL: 11.5 % (ref 5–12)
MAGNESIUM SERPL-MCNC: 1.6 MG/DL (ref 1.6–2.6)
MCH RBC QN AUTO: 29.8 PG (ref 26.6–33)
MCHC RBC AUTO-ENTMCNC: 34.6 G/DL (ref 31.5–35.7)
MCV RBC AUTO: 86.2 FL (ref 79–97)
MICROCYTES BLD QL: ABNORMAL
MONOCYTES # BLD: 0.18 10*3/MM3 (ref 0.1–0.9)
NEUTROPHILS # BLD AUTO: 1.05 10*3/MM3 (ref 1.7–7)
NEUTROPHILS NFR BLD MANUAL: 66.7 % (ref 42.7–76)
NT-PROBNP SERPL-MCNC: 112 PG/ML (ref 0–450)
PLAT MORPH BLD: NORMAL
PLATELET # BLD AUTO: 153 10*3/MM3 (ref 140–450)
PMV BLD AUTO: 10.8 FL (ref 6–12)
POLYCHROMASIA BLD QL SMEAR: ABNORMAL
POTASSIUM SERPL-SCNC: 2 MMOL/L (ref 3.5–5.2)
POTASSIUM SERPL-SCNC: 2.4 MMOL/L (ref 3.5–5.2)
POTASSIUM SERPL-SCNC: 3.7 MMOL/L (ref 3.5–5.2)
PROT SERPL-MCNC: 7.2 G/DL (ref 6–8.5)
PROTHROMBIN TIME: 15.6 SECONDS (ref 11.7–14.2)
RBC # BLD AUTO: 3.12 10*6/MM3 (ref 3.77–5.28)
SODIUM SERPL-SCNC: 134 MMOL/L (ref 136–145)
SODIUM SERPL-SCNC: 136 MMOL/L (ref 136–145)
TROPONIN T NUMERIC DELTA: 2 NG/L
TROPONIN T SERPL HS-MCNC: 12 NG/L
VARIANT LYMPHS NFR BLD MANUAL: 21.9 % (ref 19.6–45.3)
WBC MORPH BLD: NORMAL
WBC NRBC COR # BLD AUTO: 1.57 10*3/MM3 (ref 3.4–10.8)

## 2025-03-08 PROCEDURE — 84132 ASSAY OF SERUM POTASSIUM: CPT | Performed by: STUDENT IN AN ORGANIZED HEALTH CARE EDUCATION/TRAINING PROGRAM

## 2025-03-08 PROCEDURE — 83735 ASSAY OF MAGNESIUM: CPT | Performed by: EMERGENCY MEDICINE

## 2025-03-08 PROCEDURE — 25810000003 SODIUM CHLORIDE 0.9 % SOLUTION: Performed by: INTERNAL MEDICINE

## 2025-03-08 PROCEDURE — 84484 ASSAY OF TROPONIN QUANT: CPT | Performed by: EMERGENCY MEDICINE

## 2025-03-08 PROCEDURE — 99285 EMERGENCY DEPT VISIT HI MDM: CPT

## 2025-03-08 PROCEDURE — 99291 CRITICAL CARE FIRST HOUR: CPT

## 2025-03-08 PROCEDURE — 93010 ELECTROCARDIOGRAM REPORT: CPT | Performed by: INTERNAL MEDICINE

## 2025-03-08 PROCEDURE — 93005 ELECTROCARDIOGRAM TRACING: CPT | Performed by: EMERGENCY MEDICINE

## 2025-03-08 PROCEDURE — 85025 COMPLETE CBC W/AUTO DIFF WBC: CPT | Performed by: EMERGENCY MEDICINE

## 2025-03-08 PROCEDURE — 71045 X-RAY EXAM CHEST 1 VIEW: CPT

## 2025-03-08 PROCEDURE — 85610 PROTHROMBIN TIME: CPT | Performed by: EMERGENCY MEDICINE

## 2025-03-08 PROCEDURE — 80053 COMPREHEN METABOLIC PANEL: CPT | Performed by: EMERGENCY MEDICINE

## 2025-03-08 PROCEDURE — 25510000001 IOPAMIDOL PER 1 ML: Performed by: EMERGENCY MEDICINE

## 2025-03-08 PROCEDURE — 82948 REAGENT STRIP/BLOOD GLUCOSE: CPT

## 2025-03-08 PROCEDURE — 25810000003 SODIUM CHLORIDE 0.9 % SOLUTION: Performed by: EMERGENCY MEDICINE

## 2025-03-08 PROCEDURE — 96365 THER/PROPH/DIAG IV INF INIT: CPT

## 2025-03-08 PROCEDURE — 71275 CT ANGIOGRAPHY CHEST: CPT

## 2025-03-08 PROCEDURE — 36415 COLL VENOUS BLD VENIPUNCTURE: CPT

## 2025-03-08 PROCEDURE — 85379 FIBRIN DEGRADATION QUANT: CPT | Performed by: EMERGENCY MEDICINE

## 2025-03-08 PROCEDURE — 25010000002 POTASSIUM CHLORIDE 10 MEQ/100ML SOLUTION: Performed by: EMERGENCY MEDICINE

## 2025-03-08 PROCEDURE — 83880 ASSAY OF NATRIURETIC PEPTIDE: CPT | Performed by: EMERGENCY MEDICINE

## 2025-03-08 PROCEDURE — 85007 BL SMEAR W/DIFF WBC COUNT: CPT | Performed by: EMERGENCY MEDICINE

## 2025-03-08 RX ORDER — SODIUM CHLORIDE 9 MG/ML
40 INJECTION, SOLUTION INTRAVENOUS AS NEEDED
Status: DISCONTINUED | OUTPATIENT
Start: 2025-03-08 | End: 2025-03-10 | Stop reason: HOSPADM

## 2025-03-08 RX ORDER — ASPIRIN 81 MG/1
81 TABLET ORAL DAILY
Status: DISCONTINUED | OUTPATIENT
Start: 2025-03-09 | End: 2025-03-10 | Stop reason: HOSPADM

## 2025-03-08 RX ORDER — ONDANSETRON 8 MG/1
8 TABLET, FILM COATED ORAL 3 TIMES DAILY PRN
Status: DISCONTINUED | OUTPATIENT
Start: 2025-03-08 | End: 2025-03-10 | Stop reason: HOSPADM

## 2025-03-08 RX ORDER — SODIUM CHLORIDE 0.9 % (FLUSH) 0.9 %
10 SYRINGE (ML) INJECTION AS NEEDED
Status: DISCONTINUED | OUTPATIENT
Start: 2025-03-08 | End: 2025-03-10 | Stop reason: HOSPADM

## 2025-03-08 RX ORDER — SODIUM CHLORIDE 9 MG/ML
125 INJECTION, SOLUTION INTRAVENOUS CONTINUOUS
Status: DISCONTINUED | OUTPATIENT
Start: 2025-03-08 | End: 2025-03-10 | Stop reason: HOSPADM

## 2025-03-08 RX ORDER — NITROGLYCERIN 0.4 MG/1
0.4 TABLET SUBLINGUAL
Status: DISCONTINUED | OUTPATIENT
Start: 2025-03-08 | End: 2025-03-10 | Stop reason: HOSPADM

## 2025-03-08 RX ORDER — OXYCODONE AND ACETAMINOPHEN 5; 325 MG/1; MG/1
1 TABLET ORAL EVERY 6 HOURS PRN
Refills: 0 | Status: DISCONTINUED | OUTPATIENT
Start: 2025-03-08 | End: 2025-03-10 | Stop reason: HOSPADM

## 2025-03-08 RX ORDER — POTASSIUM CHLORIDE 1.5 G/1.58G
40 POWDER, FOR SOLUTION ORAL
Status: COMPLETED | OUTPATIENT
Start: 2025-03-08 | End: 2025-03-08

## 2025-03-08 RX ORDER — HEPARIN SODIUM (PORCINE) LOCK FLUSH IV SOLN 100 UNIT/ML 100 UNIT/ML
5 SOLUTION INTRAVENOUS AS NEEDED
Status: DISCONTINUED | OUTPATIENT
Start: 2025-03-08 | End: 2025-03-10 | Stop reason: HOSPADM

## 2025-03-08 RX ORDER — SODIUM CHLORIDE 0.9 % (FLUSH) 0.9 %
20 SYRINGE (ML) INJECTION AS NEEDED
Status: DISCONTINUED | OUTPATIENT
Start: 2025-03-08 | End: 2025-03-10 | Stop reason: HOSPADM

## 2025-03-08 RX ORDER — VENLAFAXINE HYDROCHLORIDE 37.5 MG/1
37.5 CAPSULE, EXTENDED RELEASE ORAL
Status: DISCONTINUED | OUTPATIENT
Start: 2025-03-09 | End: 2025-03-10 | Stop reason: HOSPADM

## 2025-03-08 RX ORDER — GABAPENTIN 300 MG/1
600 CAPSULE ORAL NIGHTLY
Status: DISCONTINUED | OUTPATIENT
Start: 2025-03-08 | End: 2025-03-10 | Stop reason: HOSPADM

## 2025-03-08 RX ORDER — SODIUM CHLORIDE 0.9 % (FLUSH) 0.9 %
10 SYRINGE (ML) INJECTION EVERY 12 HOURS SCHEDULED
Status: DISCONTINUED | OUTPATIENT
Start: 2025-03-08 | End: 2025-03-10 | Stop reason: HOSPADM

## 2025-03-08 RX ORDER — DICYCLOMINE HYDROCHLORIDE 10 MG/1
10 CAPSULE ORAL 3 TIMES DAILY PRN
Status: DISCONTINUED | OUTPATIENT
Start: 2025-03-08 | End: 2025-03-10 | Stop reason: HOSPADM

## 2025-03-08 RX ORDER — ONDANSETRON 8 MG/1
8 TABLET, FILM COATED ORAL 3 TIMES DAILY PRN
Status: DISCONTINUED | OUTPATIENT
Start: 2025-03-08 | End: 2025-03-08 | Stop reason: SDUPTHER

## 2025-03-08 RX ORDER — BISACODYL 5 MG/1
5 TABLET, DELAYED RELEASE ORAL DAILY PRN
Status: DISCONTINUED | OUTPATIENT
Start: 2025-03-08 | End: 2025-03-10 | Stop reason: HOSPADM

## 2025-03-08 RX ORDER — POLYETHYLENE GLYCOL 3350 17 G/17G
17 POWDER, FOR SOLUTION ORAL DAILY PRN
Status: DISCONTINUED | OUTPATIENT
Start: 2025-03-08 | End: 2025-03-10 | Stop reason: HOSPADM

## 2025-03-08 RX ORDER — IOPAMIDOL 755 MG/ML
100 INJECTION, SOLUTION INTRAVASCULAR
Status: COMPLETED | OUTPATIENT
Start: 2025-03-08 | End: 2025-03-08

## 2025-03-08 RX ORDER — LOPERAMIDE HYDROCHLORIDE 2 MG/1
2 CAPSULE ORAL 4 TIMES DAILY PRN
Status: DISCONTINUED | OUTPATIENT
Start: 2025-03-08 | End: 2025-03-10 | Stop reason: HOSPADM

## 2025-03-08 RX ORDER — PROCHLORPERAZINE MALEATE 10 MG
5 TABLET ORAL EVERY 6 HOURS PRN
Status: DISCONTINUED | OUTPATIENT
Start: 2025-03-08 | End: 2025-03-10 | Stop reason: HOSPADM

## 2025-03-08 RX ORDER — POTASSIUM CHLORIDE 7.45 MG/ML
10 INJECTION INTRAVENOUS
Status: COMPLETED | OUTPATIENT
Start: 2025-03-08 | End: 2025-03-08

## 2025-03-08 RX ORDER — BISACODYL 10 MG
10 SUPPOSITORY, RECTAL RECTAL DAILY PRN
Status: DISCONTINUED | OUTPATIENT
Start: 2025-03-08 | End: 2025-03-10 | Stop reason: HOSPADM

## 2025-03-08 RX ORDER — AMOXICILLIN 250 MG
2 CAPSULE ORAL 2 TIMES DAILY
Status: DISCONTINUED | OUTPATIENT
Start: 2025-03-08 | End: 2025-03-10 | Stop reason: HOSPADM

## 2025-03-08 RX ADMIN — LOPERAMIDE HYDROCHLORIDE 2 MG: 2 CAPSULE ORAL at 21:20

## 2025-03-08 RX ADMIN — SODIUM CHLORIDE 125 ML/HR: 0.9 INJECTION, SOLUTION INTRAVENOUS at 21:22

## 2025-03-08 RX ADMIN — SODIUM CHLORIDE 1000 ML: 9 INJECTION, SOLUTION INTRAVENOUS at 14:00

## 2025-03-08 RX ADMIN — POTASSIUM CHLORIDE 40 MEQ: 1.5 FOR SOLUTION ORAL at 15:45

## 2025-03-08 RX ADMIN — SODIUM CHLORIDE 1000 ML: 0.9 INJECTION, SOLUTION INTRAVENOUS at 23:41

## 2025-03-08 RX ADMIN — POTASSIUM CHLORIDE 10 MEQ: 7.46 INJECTION, SOLUTION INTRAVENOUS at 15:44

## 2025-03-08 RX ADMIN — POTASSIUM CHLORIDE 10 MEQ: 7.46 INJECTION, SOLUTION INTRAVENOUS at 18:22

## 2025-03-08 RX ADMIN — GABAPENTIN 600 MG: 300 CAPSULE ORAL at 21:20

## 2025-03-08 RX ADMIN — Medication 10 ML: at 21:24

## 2025-03-08 RX ADMIN — IOPAMIDOL 95 ML: 755 INJECTION, SOLUTION INTRAVENOUS at 13:51

## 2025-03-08 RX ADMIN — POTASSIUM CHLORIDE 40 MEQ: 1.5 FOR SOLUTION ORAL at 18:07

## 2025-03-08 NOTE — PROGRESS NOTES
On call note: I received a call from the patient regarding progressive symptoms including profound lightheadedness and dizziness, near syncope and shortness of breath on exertion.  She reports she is not able to sit up in her bed without becoming profoundly dizzy.  She attempted to walk to her kitchen to eat something and this resulted in shortness of breath and near syncope.  The patient was instructed to present immediately to emergency department.  She stated she was unsure if she could sit up in a car to drive to the ER.  She was therefore advised to call 911 for transportation.    Flora Cole

## 2025-03-08 NOTE — ED NOTES
"Nursing report ED to floor  Suzanne Lin  44 y.o.  female    HPI :  HPI  Stated Reason for Visit: generalized weakness dizziness    Chief Complaint  Chief Complaint   Patient presents with    Weakness - Generalized    Dizziness       Admitting doctor:   Gulshan Gallo DO    Admitting diagnosis:   The primary encounter diagnosis was Hypokalemia. Diagnoses of Exertional dyspnea, Palpitations, Vomiting and diarrhea, Leukopenia, unspecified type, and Malignant neoplasm of female breast, unspecified estrogen receptor status, unspecified laterality, unspecified site of breast on chemotherapy were also pertinent to this visit.    Code status:   Current Code Status       Date Active Code Status Order ID Comments User Context       Prior            Allergies:   Patient has no known allergies.    Isolation:   No active isolations    Intake and Output    Intake/Output Summary (Last 24 hours) at 3/8/2025 1618  Last data filed at 3/8/2025 1545  Gross per 24 hour   Intake 1000 ml   Output --   Net 1000 ml       Weight:       03/08/25  1313   Weight: 63.6 kg (140 lb 3.4 oz)       Most recent vitals:   Vitals:    03/08/25 1249 03/08/25 1312 03/08/25 1313 03/08/25 1446   BP:  104/72  97/61   Pulse: (!) 130 84  78   Resp: 16 16  12   Temp: 97.6 °F (36.4 °C)      SpO2: 97% 97%  100%   Weight:   63.6 kg (140 lb 3.4 oz)    Height:   165.1 cm (65\")        Active LDAs/IV Access:   Lines, Drains & Airways       Active LDAs       Name Placement date Placement time Site Days    Peripheral IV 03/08/25 1326 Left Antecubital 03/08/25  1326  Antecubital  less than 1    Single Lumen Implantable Port 12/10/24 Left Chest 12/10/24  1234  Chest  88                    Labs (abnormal labs have a star):   Labs Reviewed   COMPREHENSIVE METABOLIC PANEL - Abnormal; Notable for the following components:       Result Value    Glucose 110 (*)     Potassium 2.0 (*)     Chloride 93 (*)     Albumin 3.4 (*)     Alkaline Phosphatase 205 (*)     Total " "Bilirubin 1.3 (*)     Anion Gap 16.8 (*)     All other components within normal limits    Narrative:     GFR Categories in Chronic Kidney Disease (CKD)      GFR Category          GFR (mL/min/1.73)    Interpretation  G1                     90 or greater         Normal or high (1)  G2                      60-89                Mild decrease (1)  G3a                   45-59                Mild to moderate decrease  G3b                   30-44                Moderate to severe decrease  G4                    15-29                Severe decrease  G5                    14 or less           Kidney failure          (1)In the absence of evidence of kidney disease, neither GFR category G1 or G2 fulfill the criteria for CKD.    eGFR calculation 2021 CKD-EPI creatinine equation, which does not include race as a factor   PROTIME-INR - Abnormal; Notable for the following components:    Protime 15.6 (*)     INR 1.25 (*)     All other components within normal limits   D-DIMER, QUANTITATIVE - Abnormal; Notable for the following components:    D-Dimer, Quantitative 0.67 (*)     All other components within normal limits    Narrative:     According to the assay 's published package insert, a normal (<0.50 MCGFEU/mL) D-dimer result in conjunction with a non-high clinical probability assessment, excludes deep vein thrombosis (DVT) and pulmonary embolism (PE) with high sensitivity.    D-dimer values increase with age and this can make VTE exclusion of an older population difficult. To address this, the [employer], based on best available evidence and recent guidelines, recommends that clinicians use age-adjusted D-dimer thresholds in patients greater than 50 years of age with: a) a low probability of PE who do not meet all Pulmonary Embolism Rule Out Criteria, or b) in those with intermediate probability of PE.   The formula for an age-adjusted D-dimer cut-off is \"age/100\".  For example, a 60 year old " patient would have an age-adjusted cut-off of 0.60 MCGFEU/mL and an 80 year old 0.80 MCGFEU/mL.   CBC WITH AUTO DIFFERENTIAL - Abnormal; Notable for the following components:    WBC 1.57 (*)     RBC 3.12 (*)     Hemoglobin 9.3 (*)     Hematocrit 26.9 (*)     RDW 15.9 (*)     All other components within normal limits   MANUAL DIFFERENTIAL - Abnormal; Notable for the following components:    Eosinophil % 0.0 (*)     Neutrophils Absolute 1.05 (*)     Lymphocytes Absolute 0.34 (*)     All other components within normal limits   HIGH SENSITIVITIY TROPONIN T 1HR - Abnormal; Notable for the following components:    HS Troponin T 14 (*)     All other components within normal limits    Narrative:     High Sensitive Troponin T Reference Range:  <14.0 ng/L- Negative Female for AMI  <22.0 ng/L- Negative Male for AMI  >=14 - Abnormal Female indicating possible myocardial injury.  >=22 - Abnormal Male indicating possible myocardial injury.   Clinicians would have to utilize clinical acumen, EKG, Troponin, and serial changes to determine if it is an Acute Myocardial Infarction or myocardial injury due to an underlying chronic condition.        MAGNESIUM - Normal   BNP (IN-HOUSE) - Normal    Narrative:     This assay is used as an aid in the diagnosis of individuals suspected of having heart failure. It can be used as an aid in the diagnosis of acute decompensated heart failure (ADHF) in patients presenting with signs and symptoms of ADHF to the emergency department (ED). In addition, NT-proBNP of <300 pg/mL indicates ADHF is not likely.    Age Range Result Interpretation  NT-proBNP Concentration (pg/mL:      <50             Positive            >450                   Gray                 300-450                    Negative             <300    50-75           Positive            >900                  Gray                300-900                  Negative            <300      >75             Positive            >1800                   [place]                300-1800                  Negative            <300   TROPONIN - Normal    Narrative:     High Sensitive Troponin T Reference Range:  <14.0 ng/L- Negative Female for AMI  <22.0 ng/L- Negative Male for AMI  >=14 - Abnormal Female indicating possible myocardial injury.  >=22 - Abnormal Male indicating possible myocardial injury.   Clinicians would have to utilize clinical acumen, EKG, Troponin, and serial changes to determine if it is an Acute Myocardial Infarction or myocardial injury due to an underlying chronic condition.        URINALYSIS W/ MICROSCOPIC IF INDICATED (NO CULTURE)   CBC AND DIFFERENTIAL    Narrative:     The following orders were created for panel order CBC & Differential.  Procedure                               Abnormality         Status                     ---------                               -----------         ------                     CBC Auto Differential[[id number]]        Abnormal            Final result                 Please view results for these tests on the individual orders.       EKG:   ECG 12 Lead Dyspnea   Preliminary Result   HEART RATE=80  bpm   RR Etcpdtnv=209  ms   WV Rldedbmx=776  ms   P Horizontal Axis=9  deg   P Front Axis=75  deg   QRSD Interval=98  ms   QT Lvwotbou=225  ms   UCoU=464  ms   QRS Axis=55  deg   T Wave Axis=-5  deg   - BORDERLINE ECG -   Sinus rhythm   Borderline repolarization abnormality   Date and Time of Study:2025-03-08 13:15:23          Meds given in ED:   Medications   sodium chloride 0.9 % flush 10 mL (has no administration in time range)   Potassium Replacement - Follow Nurse / BPA Driven Protocol (has no administration in time range)   potassium chloride (KLOR-CON) packet 40 mEq (40 mEq Oral Given 3/8/25 1545)   potassium chloride 10 mEq in 100 mL IVPB (10 mEq Intravenous New Bag 3/8/25 5024)   sodium chloride 0.9 % flush 10 mL (has no administration in time range)   sodium chloride 0.9 % flush 10 mL (has no administration in  time range)   sodium chloride 0.9 % flush 10 mL (has no administration in time range)   sodium chloride 0.9 % flush 20 mL (has no administration in time range)   sodium chloride 0.9 % infusion 40 mL (has no administration in time range)   heparin injection 500 Units (has no administration in time range)   sodium chloride 0.9 % bolus 1,000 mL (0 mL Intravenous Stopped 3/8/25 1545)   iopamidol (ISOVUE-370) 76 % injection 100 mL (95 mL Intravenous Given 3/8/25 1351)       Imaging results:  CT Angiogram Chest  Result Date: 3/8/2025  1. No evidence of pulmonary thromboembolism. 2. Few sub-6 mm solid pulmonary nodules are unchanged dating back to 2024. Continued attention on follow-up. 3. Approximately 2 cm right thyroid nodule. Follow-up nonemergent thyroid ultrasound.   This report was finalized on 3/8/2025 2:56 PM by Dr. Dirk Mccray M.D on Workstation: [id number]      XR Chest 1 View  Result Date: 3/8/2025  Left subclavian port with tip overlying the mid SVC. No focal consolidation. No pleural effusion or pneumothorax.  Normal size cardiomediastinal silhouette.  No focal osseous abnormality.   This report was finalized on 3/8/2025 1:59 PM by Dr. Dirk Mccray M.D on Workstation: [id number]        Ambulatory status:   - ax2    Social issues:   Social History     Socioeconomic History    Marital status: Single    Number of children: 1   Tobacco Use    Smoking status: Former     Current packs/day: 0.00     Average packs/day: 1 pack/day for 20.0 years (20.0 ttl pk-yrs)     Types: Cigarettes     Start date:      Quit date: 2019     Years since quittin.1    Smokeless tobacco: Current    Tobacco comments:     Nicotine pouches   Vaping Use    Vaping status: Never Used   Substance and Sexual Activity    Alcohol use: Yes     Comment: OCC    Drug use: Never    Sexual activity: Defer       Peripheral Neurovascular  Peripheral Neurovascular (Adult)  Peripheral Neurovascular WDL: WDL    Neuro  Cognitive  Neuro Cognitive (Adult)  Cognitive/Neuro/Behavioral WDL: WDL    Learning  Learning Assessment  Learning Readiness and Ability: no barriers identified  Education Provided  Person Taught: patient  Teaching Method: verbal instruction    Respiratory  Respiratory  Airway WDL: WDL  Respiratory WDL  Respiratory WDL: WDL    Abdominal Pain       Pain Assessments  Pain (Adult)  (0-10) Pain Rating: Rest: 0    NIH Stroke Scale       Pratibha Salinas RN  03/08/25 16:18 EST

## 2025-03-08 NOTE — ED TRIAGE NOTES
Patient to ER via car from home for dizziness and generalized weakness     Patient had chemo 2 weeks ago

## 2025-03-08 NOTE — H&P
[facility]  [phone]  Dr. Gulshan Gallo      Subjective   LOS: 0 days     Patient presents today to the ED with progressive symptoms of lightheadedness, dizziness, near syncope and shortness of breath on exertion.  She had called her oncology office.  She had told them that she was getting shortness of breath with fairly minimal activity and they directed her to go to the ER immediately.  Workup in the ED with CTA chest showed no PE and otherwise clear lungs.  Her laboratory workup showed that she has profound hypokalemia As well as pancytopenia.    Suzanne Lin  reports current alcohol use.,  reports that she quit smoking about 6 years ago. Her smoking use included cigarettes. She started smoking about 26 years ago. She has a 20 pack-year smoking history. She uses smokeless tobacco.     Past Hx:  has a past medical history of Anxiety, Breast cancer (), Crohn's disease, DVT (deep vein thrombosis) in pregnancy, PFO (patent foramen ovale), Stroke, and Tattoos.  Surg Hx:  has a past surgical history that includes Colectomy; Skin cancer excision;  section; Colonoscopy; Mastectomy (Bilateral, 10/9/2024); Venous Access Device (Port) (N/A, 12/10/2024); and Teeth Extraction (N/A, 2025).  FH: family history includes Diabetes in her mother and paternal grandmother; Heart disease in her father; Hypertension in an other family member.  SH:  reports that she quit smoking about 6 years ago. Her smoking use included cigarettes. She started smoking about 26 years ago. She has a 20 pack-year smoking history. She uses smokeless tobacco. She reports current alcohol use. She reports that she does not use drugs.    (Not in a hospital admission)    No Known Allergies    Review of Systems   All other systems reviewed and are negative.    Vital Signs past 24hrs  BP range: BP: ()/(61-72) 97/61  Pulse range: Heart Rate:  [] 78  Resp rate range: Resp:  [12-16] 12  Temp range: Temp (24hrs),  Av.6 °F (36.4 °C), Min:97.6 °F (36.4 °C), Max:97.6 °F (36.4 °C)    Oxygen range: SpO2:  [97 %-100 %] 100 %;  ;   Device (Oxygen Therapy): room air  63.6 kg (140 lb 3.4 oz); Body mass index is 23.33 kg/m².  Net IO Since Admission: 1,000 mL [25 1611]        Mechanical Ventilator:     Physical Exam  Vitals and nursing note reviewed.   Constitutional:       General: She is not in acute distress.  HENT:      Head: Normocephalic and atraumatic.   Cardiovascular:      Rate and Rhythm: Normal rate and regular rhythm.      Heart sounds: No murmur heard.  Pulmonary:      Effort: Pulmonary effort is normal. No respiratory distress.      Breath sounds: Normal breath sounds. No wheezing, rhonchi or rales.   Abdominal:      General: Abdomen is flat.      Tenderness: There is no abdominal tenderness.   Skin:     General: Skin is warm and dry.      Findings: No rash.   Neurological:      Mental Status: She is alert.         Results Review:    I have reviewed the laboratory and imaging data from current admission. My annotations are as noted in assessment and plan.  Result Review:  I have personally reviewed the results from the time of this admission to 3/8/2025 16:11 EST and agree with these findings:  [x]  Laboratory list / accordion  [x]  Microbiology  [x]  Radiology  [x]  EKG/Telemetry   [x]  Cardiology/Vascular   [x]  Pathology  [x]  Old records  []  Other:        Medication Review:  I have reviewed the current MAR. My annotations are as noted in assessment and plan.       Lines, Drains & Airways       Active LDAs       Name Placement date Placement time Site Days    Peripheral IV 25 1326 Left Antecubital 25  1326  Antecubital  less than 1    Single Lumen Implantable Port 12/10/24 Left Chest 12/10/24  1234  Chest  88                  Diet Orders (active) (From admission, onward)      None          No active isolations    Assessment  Hypokalemia  Chronic diarrhea due to chemotherapy and Crohn's  disease  Nausea/vomiting  Right breast cancer with invasive ductal carcinoma  Pancytopenia  Crohn's disease  Recent febrile neutropenia with tooth extraction      Plan  -Patient presented on 3/8/2025 after having significant lightheadedness, dizziness and shortness of breath with exertion.  Found to have significant hypokalemia with bicytopenia/pancytopenia related to her chemotherapy.  -Will monitor in ICU for dysrhythmias  - Continue aggressive replacement of potassium  - Will consult heme-onc  - Symptomatic treatment of nausea, vomiting, diarrhea  -Given patient's recent febrile neutropenia that required multiple tooth extractions.  We will watch her closely and if develops a fever then will start antibiotics  - Placed in neutropenic precautions  - Resume regular diet  - ICU core measures    Electronically signed by Gulshan Gallo DO, 03/08/25, 4:11 PM EST.      Part of this note may be an electronic transcription/translation of spoken language to printed text using the Dragon Dictation System.

## 2025-03-08 NOTE — ED PROVIDER NOTES
EMERGENCY DEPARTMENT ENCOUNTER    Room Number:  32/32  Date of encounter:  3/8/2025  PCP: Gia Rodriguez APRN  Historian: Patient and significant other  Relevant information and history provided by sources other than the patient will be included below and in the ED Course.  Review of pertinent past medical records may also be included in record below and ED Course.    HPI:  Chief Complaint: Feeling weak, shortness of breath, palpitation  A complete HPI/ROS/PMH/PSH/SH/FH are unobtainable due to: Not applicable  Context: Suzanne Lin is a 44 y.o. female who presents to the ED c/o patient since she started chemotherapy in January she is progressively gotten worse.  She has no energy.  When she gets up to do anything her symptoms are worse.  She will get short of breath and she will feel like her heart is racing.  Her ability to do activity has gradually decreased over the past several weeks.  Is come to the point where she cannot stand up for a minute or even walk across the room.  She will get a fast heart rate and she will get short of breath and feels weak and has a sensation that she can a pass out.  It is better than when she sits down or lays down.  Does not have any shortness of breath when she is laying down.  Her last chemotherapy was approximately 2 weeks ago.  Her diarrhea is still present but is improved with Imodium.  Her nausea is still present but is improved with Zofran and Compazine.  She is not eating or drinking much.  She is losing weight.  She denies any fevers or coughs.  She has had occasional abdominal cramps but no cramps now.  She does have a history of Crohn's with previous surgery in the past.  She is also had a port in her left chest.  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  She has a history of a PFO.  The only medicine that she takes for blood thinning medicine is aspirin.  In the distant past  when she had surgery from her Crohn's she did have a DVT to her left lower extremity.  Has not had a DVT or blood clot since that time.  At this time I am seeing her denies any pain.        Previous Episodes: Yes but progressively worsening since starting chemotherapy.  Current Symptoms: See above    MEDICAL HISTORY REVIEWED  I can see this patient was discharged from [facility] on January 29, 2025.  She presented with neutropenic fevers.  She has a history of Crohn's disease and breast cancer.  She was started on empiric antibiotics and ID was consulted as well as oncology she has severe protein malnutrition she also had severe hypokalemia.  Cultures were all negative she did have a periapical abscess and was scheduled to follow-up with oral surgery to have some teeth extracted.  She responded well her counts normalized and was discharged home.  She did receive Neupogen.    I reviewed the note from Dr. 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  On the CT scan that she had in September she did have lymphadenopathy and I did review the CT scan report and the PET scan report.      PAST MEDICAL HISTORY  Active Ambulatory Problems     Diagnosis Date Noted    PFO (patent foramen ovale) 09/18/2024    Palpitations 09/28/2024    Breast cancer, stage 3, right 10/02/2024    Breast cancer in female 10/09/2024    Need for prophylactic chemotherapy 10/31/2024    Encounter for central line care 10/31/2024    LITA (iron deficiency anemia) 12/12/2024    Adverse effect of iron 12/12/2024    Crohn's disease without complication 12/17/2024    Encounter for screening colonoscopy 12/17/2024    Immunosuppressed due to chemotherapy 01/29/2025    Severe protein-calorie malnutrition 01/29/2025     Resolved  Ambulatory Problems     Diagnosis Date Noted    Neutropenic fever 2025     Past Medical History:   Diagnosis Date    Anxiety     Breast cancer     Crohn's disease     DVT (deep vein thrombosis) in pregnancy     Stroke     Tattoos          PAST SURGICAL HISTORY  Past Surgical History:   Procedure Laterality Date     SECTION      COLON RESECTION      COLONOSCOPY      MASTECTOMY Bilateral 10/9/2024    Procedure: Modified radical mastectomy with axillary dissection of the right breast and simple mastectomy of the left breast;  Surgeon: Rebekah Garcia DO;  Location: [facility] OR;  Service: General;  Laterality: Bilateral;    SKIN CANCER EXCISION      TEETH EXTRACTION N/A 2025    Procedure: TOOTH EXTRACTION #2,15,19,28,29;  Surgeon: Pramod Zambrano DMD;  Location: [facility] MA[facility] OR;  Service: Oral Surgery;  Laterality: N/A;    VENOUS ACCESS DEVICE (PORT) INSERTION N/A 12/10/2024    Procedure: INSERTION VENOUS ACCESS DEVICE;  Surgeon: Rebekah Garcia DO;  Location: [facility] OR;  Service: General;  Laterality: N/A;         FAMILY HISTORY  Family History   Problem Relation Age of Onset    Diabetes Mother     Heart disease Father     Diabetes Paternal Grandmother     Hypertension Other     Malig Hyperthermia Neg Hx          SOCIAL HISTORY  Social History     Socioeconomic History    Marital status: Single    Number of children: 1   Tobacco Use    Smoking status: Former     Current packs/day: 0.00     Average packs/day: 1 pack/day for 20.0 years (20.0 ttl pk-yrs)     Types: Cigarettes     Start date:      Quit date: 2019     Years since quittin.1    Smokeless tobacco: Current    Tobacco comments:     Nicotine pouches   Vaping Use    Vaping status: Never Used   Substance and Sexual Activity    Alcohol use: Yes     Comment: OCC    Drug use: Never    Sexual activity: Defer         ALLERGIES  Patient has no known allergies.        REVIEW OF SYSTEMS  Review of Systems     All systems reviewed and  negative except for those discussed in HPI.       PHYSICAL EXAM    I have reviewed the triage vital signs and nursing notes.    ED Triage Vitals [03/08/25 1249]   Temp Heart Rate Resp BP SpO2   97.6 °F (36.4 °C) (!) 130 16 -- 97 %      Temp src Heart Rate Source Patient Position BP Location FiO2 (%)   -- -- -- -- --       GENERAL: This is a frail chronically malnourished female.  She looks older than her stated age.  She looks tired and weak.  .Vital signs on my initial evaluation have been reviewed.  She is afebrile she is tachycardic with her heart rate about 1 10-1 20s on my exam.  HENT: nares patent  Head/neck/ face are symmetric without gross deformity, signs of trauma, or swelling  EYES: no scleral icterus, no conjunctival pallor.  NECK: Supple, no meningismus  CV: Tachycardia.  No obvious murmur or rub.  RESPIRATORY: normal effort and no respiratory distress.  Clear to auscultation bilaterally.  ABDOMEN: soft and nontender.  Normal inspection previous scars to her lower abdomen.  No tenderness on diffuse exam  MUSCULOSKELETAL: no deformity.  Muscle wasting.  No edema.  Intact distal pulses to upper and lower extremities are equal strong and symmetric.  NEURO: alert and appropriate, moves all extremities, follows commands.  Generalized weakness.  No focal motor or sensory changes.  SKIN: warm, dry    Vital signs and nursing notes reviewed.        LAB RESULTS  Recent Results (from the past 24 hours)   ECG 12 Lead Dyspnea    Collection Time: 03/08/25  1:15 PM   Result Value Ref Range    QT Interval 413 ms    QTC Interval 476 ms   Comprehensive Metabolic Panel    Collection Time: 03/08/25  1:27 PM    Specimen: Blood   Result Value Ref Range    Glucose 110 (H) 65 - 99 mg/dL    BUN 8 6 - 20 mg/dL    Creatinine 0.57 0.57 - 1.00 mg/dL    Sodium 136 136 - 145 mmol/L    Potassium 2.0 (C) 3.5 - 5.2 mmol/L    Chloride 93 (L) 98 - 107 mmol/L    CO2 26.2 22.0 - 29.0 mmol/L    Calcium 9.4 8.6 - 10.5 mg/dL    Total Protein  7.2 6.0 - 8.5 g/dL    Albumin 3.4 (L) 3.5 - 5.2 g/dL    ALT (SGPT) 26 1 - 33 U/L    AST (SGOT) 17 1 - 32 U/L    Alkaline Phosphatase 205 (H) 39 - 117 U/L    Total Bilirubin 1.3 (H) 0.0 - 1.2 mg/dL    Globulin 3.8 gm/dL    A/G Ratio 0.9 g/dL    BUN/Creatinine Ratio 14.0 7.0 - 25.0    Anion Gap 16.8 (H) 5.0 - 15.0 mmol/L    eGFR 115.1 >60.0 mL/min/1.73   Protime-INR    Collection Time: 03/08/25  1:27 PM    Specimen: Blood   Result Value Ref Range    Protime 15.6 (H) 11.7 - 14.2 Seconds    INR 1.25 (H) 0.90 - 1.10   Magnesium    Collection Time: 03/08/25  1:27 PM    Specimen: Blood   Result Value Ref Range    Magnesium 1.6 1.6 - 2.6 mg/dL   BNP    Collection Time: 03/08/25  1:27 PM    Specimen: Blood   Result Value Ref Range    proBNP 112.0 0.0 - 450.0 pg/mL   D-dimer, Quantitative    Collection Time: 03/08/25  1:27 PM    Specimen: Blood   Result Value Ref Range    D-Dimer, Quantitative 0.67 (H) 0.00 - 0.50 MCGFEU/mL   High Sensitivity Troponin T    Collection Time: 03/08/25  1:27 PM    Specimen: Blood   Result Value Ref Range    HS Troponin T 12 <14 ng/L   CBC Auto Differential    Collection Time: 03/08/25  1:27 PM    Specimen: Blood   Result Value Ref Range    WBC 1.57 (C) 3.40 - 10.80 10*3/mm3    RBC 3.12 (L) 3.77 - 5.28 10*6/mm3    Hemoglobin 9.3 (L) 12.0 - 15.9 g/dL    Hematocrit 26.9 (L) 34.0 - 46.6 %    MCV 86.2 79.0 - 97.0 fL    MCH 29.8 26.6 - 33.0 pg    MCHC 34.6 31.5 - 35.7 g/dL    RDW 15.9 (H) 12.3 - 15.4 %    RDW-SD 49.1 37.0 - 54.0 fl    MPV 10.8 6.0 - 12.0 fL    Platelets 153 140 - 450 10*3/mm3   Manual Differential    Collection Time: 03/08/25  1:27 PM    Specimen: Blood   Result Value Ref Range    Neutrophil % 66.7 42.7 - 76.0 %    Lymphocyte % 21.9 19.6 - 45.3 %    Monocyte % 11.5 5.0 - 12.0 %    Eosinophil % 0.0 (L) 0.3 - 6.2 %    Basophil % 0.0 0.0 - 1.5 %    Neutrophils Absolute 1.05 (L) 1.70 - 7.00 10*3/mm3    Lymphocytes Absolute 0.34 (L) 0.70 - 3.10 10*3/mm3    Monocytes Absolute 0.18 0.10 -  0.90 10*3/mm3    Eosinophils Absolute 0.00 0.00 - 0.40 10*3/mm3    Basophils Absolute 0.00 0.00 - 0.20 10*3/mm3    Anisocytosis Slight/1+ None Seen    Microcytes Slight/1+ None Seen    Polychromasia Slight/1+ None Seen    WBC Morphology Normal Normal    Platelet Morphology Normal Normal   High Sensitivity Troponin T 1Hr    Collection Time: 03/08/25  2:48 PM    Specimen: Blood   Result Value Ref Range    HS Troponin T 14 (H) <14 ng/L    Troponin T Numeric Delta 2 Abnormal if >/=3 ng/L       Ordered the above labs and independently reviewed the results.        RADIOLOGY  CT Angiogram Chest  Result Date: 3/8/2025  CT ANGIOGRAM CHEST PULMONARY EMBOLISM  TECHNIQUE: Radiation dose reduction techniques were utilized, including automated exposure control and exposure modulation based on body size. 2 mm images were obtained through the chest after the administration of IV contrast. 3D reformat images were created. Multiple coronal, sagittal, and 3-D reconstruction images were obtained.  HISTORY: Clinical concern for pulmonary thromboembolism. Breast cancer.  COMPARISON: CT chest 9/16/2024    FINDINGS: Approximately 2 cm right thyroid nodule (series 4/image 10). Bilateral mastectomies. No soft tissue nodularity within the chest wall. No axillary, internal mammary or visualized supraclavicular lymphadenopathy.  Nondilated main pulmonary artery. Pulmonary arteries are patent. Heart is normal in size. Trace pericardial fluid. Nondilated thoracic aorta. No mediastinal/hilar lymphadenopathy. Decompressed esophagus.  Trachea and main bronchi are patent. Diffuse mild bronchial wall thickening. No bronchiectasis. No emphysematous changes. Few sub-6 mm solid pulmonary nodules are unchanged dating back to September 2024. Reference left lower lobe (series 5/image 102) and right lower lobe (5/84). Small right upper lobe calcified granuloma. No focal groundglass opacity or consolidation. No pleural effusion.         1. No evidence of  pulmonary thromboembolism. 2. Few sub-6 mm solid pulmonary nodules are unchanged dating back to September 2024. Continued attention on follow-up. 3. Approximately 2 cm right thyroid nodule. Follow-up nonemergent thyroid ultrasound.   This report was finalized on 3/8/2025 2:56 PM by Dr. Dirk Mccray M.D on Workstation: [id number]      XR Chest 1 View  Result Date: 3/8/2025  XR CHEST 1 VW-  INDICATION: Shortness of breath. Breast cancer.  COMPARISON: Chest radiographs dating back to 12/10/2024      Left subclavian port with tip overlying the mid SVC. No focal consolidation. No pleural effusion or pneumothorax.  Normal size cardiomediastinal silhouette.  No focal osseous abnormality.   This report was finalized on 3/8/2025 1:59 PM by Dr. Dirk Mccray M.D on Workstation: [id number]        I ordered the above noted radiological studies. Reviewed by me and discussed with radiologist.  See dictation for official radiology interpretation.      PROCEDURES    Critical Care    Performed by: Buddy Perez MD  Authorized by: Gulshan Gallo DO    Critical care provider statement:     Critical care time (minutes): 30.    Critical care time was exclusive of:  Separately billable procedures and treating other patients    Critical care was necessary to treat or prevent imminent or life-threatening deterioration of the following conditions:  Respiratory failure and metabolic crisis    Critical care was time spent personally by me on the following activities:  Blood draw for specimens, development of treatment plan with patient or surrogate, evaluation of patient's response to treatment, examination of patient, review of old charts, re-evaluation of patient's condition, pulse oximetry, ordering and review of radiographic studies, ordering and review of laboratory studies and ordering and performing treatments and interventions    Care discussed with: admitting provider          MEDICATIONS GIVEN IN  ER    Medications   sodium chloride 0.9 % flush 10 mL (has no administration in time range)   Potassium Replacement - Follow Nurse / BPA Driven Protocol (has no administration in time range)   potassium chloride (KLOR-CON) packet 40 mEq (40 mEq Oral Given 3/8/25 0255)   potassium chloride 10 mEq in 100 mL IVPB (10 mEq Intravenous New Bag 3/8/25 6334)   sodium chloride 0.9 % flush 10 mL (has no administration in time range)   sodium chloride 0.9 % flush 10 mL (has no administration in time range)   sodium chloride 0.9 % flush 10 mL (has no administration in time range)   sodium chloride 0.9 % flush 20 mL (has no administration in time range)   sodium chloride 0.9 % infusion 40 mL (has no administration in time range)   heparin injection 500 Units (has no administration in time range)   nitroglycerin (NITROSTAT) SL tablet 0.4 mg (has no administration in time range)   sodium chloride 0.9 % flush 10 mL (has no administration in time range)   sodium chloride 0.9 % flush 10 mL (has no administration in time range)   sodium chloride 0.9 % infusion 40 mL (has no administration in time range)   mupirocin (BACTROBAN) 2 % nasal ointment 1 Application (has no administration in time range)   sennosides-docusate (PERICOLACE) 8.6-50 MG per tablet 2 tablet (has no administration in time range)     And   polyethylene glycol (MIRALAX) packet 17 g (has no administration in time range)     And   bisacodyl (DULCOLAX) EC tablet 5 mg (has no administration in time range)     And   bisacodyl (DULCOLAX) suppository 10 mg (has no administration in time range)   Potassium Replacement - Follow Nurse / BPA Driven Protocol (has no administration in time range)   Magnesium Standard Dose Replacement - Follow Nurse / BPA Driven Protocol (has no administration in time range)   Phosphorus Replacement - Follow Nurse / BPA Driven Protocol (has no administration in time range)   Calcium Replacement - Follow Nurse / BPA Driven Protocol (has no  administration in time range)   sodium chloride 0.9 % bolus 1,000 mL (0 mL Intravenous Stopped 3/8/25 1545)   iopamidol (ISOVUE-370) 76 % injection 100 mL (95 mL Intravenous Given 3/8/25 1351)         All labs have been independently reviewed by me.  All radiology studies have been reviewed by me and I discussed with radiologist dictating the report when indicated below.  All EKG's independently viewed and interpreted by me.  Discussion below represents my analysis of pertinent findings related to patient's condition, differential diagnosis, treatment plan and final disposition.        PROGRESS, DATA ANALYSIS, CONSULTS, AND MEDICAL DECISION MAKING    DDx includes CHF, acute coronary syndrome, pulmonary embolism, pneumothorax, pneumonia, asthma/COPD,aspiration,  pulmonary hypertension, metabolic acidosis, deconditioning, anemia, other hematologic etiologies such as CO poisoning, anxiety.   This patient seems symptoms have progressively getting worse since starting chemotherapy.  This was in January.  She looks chronically malnourished she looks weak and tired if she is not eating or drinking much and losing weight.  Start IV fluids on her.  I am going to also check a CT angiogram of her chest to make sure she has not had a pulmonary embolism.  This very well could be due to her marked progressive deconditioning and as a result of her comorbidities and chemotherapy.  She is having this persistent diarrhea but it is improved with Imodium.  History of electrolyte abnormalities in the past as well.  Informed patient and significant other of my clinical assessment and treatment plan.  This patient will need to be admitted to the hospital.  All questions answered at this time.      ED Course as of 03/08/25 1638   Sat Mar 08, 2025   1408 My own independent interpretation of the EKG that was done at 1:15 PM reveals a rate of 80 it is sinus rhythm there is some intraventricular conduction delay nonspecific normal axis some  nonspecific T wave changes in several leads I do not see any definitive acute injury pattern  This EKG looks fairly similar to an EKG that was done on 10/9/2024.  I also do not see any prolongation of the QT interval on this EKG. [MM]   1410 Hemoglobin(!): 9.3 [MM]   1410 Platelets: 153 [MM]   1410 11 days ago white blood cell count was 9.3.  Hemoglobin was 12 and platelet count was 541.  She has had a history of pancytopenia from chemotherapy in the past.      Chest x-ray looks unremarkable there is no obvious acute active disease seen chest x-ray independently and also reviewed the radiologist report. [MM]   1529 D-Dimer, Quant(!): 0.67 [MM]   1529 Potassium(!!): 2.0 [MM]   1529 Magnesium: 1.6 [MM]   1530 Anion Gap(!): 16.8 [MM]   1530 CT angiogram of the chest so no obvious pulmonary embolism there is some pulmonary nodules that are unchanged there is a thyroid nodule as well.  I looked at the CT scan and I did not see any obvious large pulmonary embolism.  I did review the report from the radiologist as well.  Please see complete dictated report from radiologist [MM]   1558 I reevaluated the patient.  Blood pressures in the 90 systolic range heart rate is normal.  O2 sats 100% on room air.  Informed her of the results of the lab work and the CT scan.  Informed her about the admission to the intensive care unit.  All questions answered at this time. [MM]   1558 I did discuss the case with Dr. Gallo who is on for intensive care unit.  Informed him of the patient's presenting symptoms results of test.  Agrees to admit the patient to the hospital.  All questions answered [MM]      ED Course User Index  [MM] Buddy Perez MD       AS OF 16:38 EST VITALS:    BP - 97/61  HR - 78  TEMP - 97.6 °F (36.4 °C)  02 SATS - 100%    SOCIAL DETERMINANTS OF HEALTH THAT IMPACT OR LIMIT CARE (For example..Homelessness,safe discharge, inability to obtain care, follow up, or prescriptions):      DIAGNOSIS  Final diagnoses:    Hypokalemia   Exertional dyspnea   Palpitations   Vomiting and diarrhea   Leukopenia, unspecified type   Malignant neoplasm of female breast, unspecified estrogen receptor status, unspecified laterality, unspecified site of breast on chemotherapy         DISPOSITION  Patient is admitted to the intensive care unit.          DICTATED UTILIZING DRAGON DICTATION    Note Disclaimer: At [facility], we believe that sharing information builds trust and better relationships. You are receiving this note because you recently visited [facility]. It is possible you will see health information before a provider has talked with you about it. This kind of information can be easy to misunderstand. To help you fully understand what it means for your health, we urge you to discuss this note with your provider.       Buddy Perez MD  03/08/25 3582

## 2025-03-09 ENCOUNTER — APPOINTMENT (OUTPATIENT)
Dept: ULTRASOUND IMAGING | Facility: HOSPITAL | Age: 45
End: 2025-03-09
Payer: COMMERCIAL

## 2025-03-09 LAB
ABO GROUP BLD: NORMAL
ALBUMIN SERPL-MCNC: 2.4 G/DL (ref 3.5–5.2)
ALBUMIN/GLOB SERPL: 0.9 G/DL
ALP SERPL-CCNC: 144 U/L (ref 39–117)
ALT SERPL W P-5'-P-CCNC: 14 U/L (ref 1–33)
ANION GAP SERPL CALCULATED.3IONS-SCNC: 9.2 MMOL/L (ref 5–15)
AST SERPL-CCNC: 11 U/L (ref 1–32)
BASOPHILS # BLD AUTO: 0.03 10*3/MM3 (ref 0–0.2)
BASOPHILS NFR BLD AUTO: 1.6 % (ref 0–1.5)
BILIRUB SERPL-MCNC: 0.4 MG/DL (ref 0–1.2)
BLD GP AB SCN SERPL QL: NEGATIVE
BUN SERPL-MCNC: 4 MG/DL (ref 6–20)
BUN/CREAT SERPL: 9.5 (ref 7–25)
CALCIUM SPEC-SCNC: 7.8 MG/DL (ref 8.6–10.5)
CHLORIDE SERPL-SCNC: 108 MMOL/L (ref 98–107)
CO2 SERPL-SCNC: 21.8 MMOL/L (ref 22–29)
CREAT SERPL-MCNC: 0.42 MG/DL (ref 0.57–1)
DEPRECATED RDW RBC AUTO: 52.1 FL (ref 37–54)
EGFRCR SERPLBLD CKD-EPI 2021: 123.9 ML/MIN/1.73
EOSINOPHIL # BLD AUTO: 0.04 10*3/MM3 (ref 0–0.4)
EOSINOPHIL NFR BLD AUTO: 2.1 % (ref 0.3–6.2)
ERYTHROCYTE [DISTWIDTH] IN BLOOD BY AUTOMATED COUNT: 16.6 % (ref 12.3–15.4)
GLOBULIN UR ELPH-MCNC: 2.7 GM/DL
GLUCOSE BLDC GLUCOMTR-MCNC: 125 MG/DL (ref 70–130)
GLUCOSE BLDC GLUCOMTR-MCNC: 128 MG/DL (ref 70–130)
GLUCOSE BLDC GLUCOMTR-MCNC: 85 MG/DL (ref 70–130)
GLUCOSE BLDC GLUCOMTR-MCNC: 87 MG/DL (ref 70–130)
GLUCOSE BLDC GLUCOMTR-MCNC: 93 MG/DL (ref 70–130)
GLUCOSE BLDC GLUCOMTR-MCNC: 96 MG/DL (ref 70–130)
GLUCOSE SERPL-MCNC: 99 MG/DL (ref 65–99)
HCT VFR BLD AUTO: 20.6 % (ref 34–46.6)
HGB BLD-MCNC: 7 G/DL (ref 12–15.9)
IMM GRANULOCYTES # BLD AUTO: 0.08 10*3/MM3 (ref 0–0.05)
IMM GRANULOCYTES NFR BLD AUTO: 4.2 % (ref 0–0.5)
LYMPHOCYTES # BLD AUTO: 0.4 10*3/MM3 (ref 0.7–3.1)
LYMPHOCYTES NFR BLD AUTO: 21.1 % (ref 19.6–45.3)
MAGNESIUM SERPL-MCNC: 1.6 MG/DL (ref 1.6–2.6)
MCH RBC QN AUTO: 30 PG (ref 26.6–33)
MCHC RBC AUTO-ENTMCNC: 34 G/DL (ref 31.5–35.7)
MCV RBC AUTO: 88.4 FL (ref 79–97)
MONOCYTES # BLD AUTO: 0.33 10*3/MM3 (ref 0.1–0.9)
MONOCYTES NFR BLD AUTO: 17.4 % (ref 5–12)
NEUTROPHILS NFR BLD AUTO: 1.02 10*3/MM3 (ref 1.7–7)
NEUTROPHILS NFR BLD AUTO: 53.6 % (ref 42.7–76)
PHOSPHATE SERPL-MCNC: 3.3 MG/DL (ref 2.5–4.5)
PLATELET # BLD AUTO: 120 10*3/MM3 (ref 140–450)
PMV BLD AUTO: 10.8 FL (ref 6–12)
POTASSIUM SERPL-SCNC: 3 MMOL/L (ref 3.5–5.2)
POTASSIUM SERPL-SCNC: 3.8 MMOL/L (ref 3.5–5.2)
PROT SERPL-MCNC: 5.1 G/DL (ref 6–8.5)
QT INTERVAL: 413 MS
QTC INTERVAL: 476 MS
RBC # BLD AUTO: 2.33 10*6/MM3 (ref 3.77–5.28)
RH BLD: POSITIVE
SODIUM SERPL-SCNC: 139 MMOL/L (ref 136–145)
T&S EXPIRATION DATE: NORMAL
WBC NRBC COR # BLD AUTO: 1.9 10*3/MM3 (ref 3.4–10.8)

## 2025-03-09 PROCEDURE — 84132 ASSAY OF SERUM POTASSIUM: CPT | Performed by: INTERNAL MEDICINE

## 2025-03-09 PROCEDURE — 86900 BLOOD TYPING SEROLOGIC ABO: CPT

## 2025-03-09 PROCEDURE — 80053 COMPREHEN METABOLIC PANEL: CPT | Performed by: STUDENT IN AN ORGANIZED HEALTH CARE EDUCATION/TRAINING PROGRAM

## 2025-03-09 PROCEDURE — 86901 BLOOD TYPING SEROLOGIC RH(D): CPT | Performed by: INTERNAL MEDICINE

## 2025-03-09 PROCEDURE — 82948 REAGENT STRIP/BLOOD GLUCOSE: CPT

## 2025-03-09 PROCEDURE — 86850 RBC ANTIBODY SCREEN: CPT | Performed by: INTERNAL MEDICINE

## 2025-03-09 PROCEDURE — 36430 TRANSFUSION BLD/BLD COMPNT: CPT

## 2025-03-09 PROCEDURE — 76536 US EXAM OF HEAD AND NECK: CPT

## 2025-03-09 PROCEDURE — 86923 COMPATIBILITY TEST ELECTRIC: CPT

## 2025-03-09 PROCEDURE — 99222 1ST HOSP IP/OBS MODERATE 55: CPT | Performed by: INTERNAL MEDICINE

## 2025-03-09 PROCEDURE — 25810000003 SODIUM CHLORIDE 0.9 % SOLUTION: Performed by: INTERNAL MEDICINE

## 2025-03-09 PROCEDURE — P9016 RBC LEUKOCYTES REDUCED: HCPCS

## 2025-03-09 PROCEDURE — 86900 BLOOD TYPING SEROLOGIC ABO: CPT | Performed by: INTERNAL MEDICINE

## 2025-03-09 PROCEDURE — 84100 ASSAY OF PHOSPHORUS: CPT | Performed by: STUDENT IN AN ORGANIZED HEALTH CARE EDUCATION/TRAINING PROGRAM

## 2025-03-09 PROCEDURE — 85025 COMPLETE CBC W/AUTO DIFF WBC: CPT | Performed by: STUDENT IN AN ORGANIZED HEALTH CARE EDUCATION/TRAINING PROGRAM

## 2025-03-09 PROCEDURE — 83735 ASSAY OF MAGNESIUM: CPT | Performed by: STUDENT IN AN ORGANIZED HEALTH CARE EDUCATION/TRAINING PROGRAM

## 2025-03-09 RX ORDER — POTASSIUM CHLORIDE 1500 MG/1
40 TABLET, EXTENDED RELEASE ORAL EVERY 4 HOURS
Status: COMPLETED | OUTPATIENT
Start: 2025-03-09 | End: 2025-03-09

## 2025-03-09 RX ORDER — ACETAMINOPHEN 325 MG/1
650 TABLET ORAL EVERY 6 HOURS PRN
Status: DISCONTINUED | OUTPATIENT
Start: 2025-03-09 | End: 2025-03-10 | Stop reason: HOSPADM

## 2025-03-09 RX ADMIN — POTASSIUM CHLORIDE 40 MEQ: 1500 TABLET, EXTENDED RELEASE ORAL at 14:14

## 2025-03-09 RX ADMIN — ACETAMINOPHEN 650 MG: 325 TABLET, FILM COATED ORAL at 12:42

## 2025-03-09 RX ADMIN — POTASSIUM CHLORIDE 40 MEQ: 1500 TABLET, EXTENDED RELEASE ORAL at 10:35

## 2025-03-09 RX ADMIN — MUPIROCIN 1 APPLICATION: 20 OINTMENT TOPICAL at 20:01

## 2025-03-09 RX ADMIN — LOPERAMIDE HYDROCHLORIDE 2 MG: 2 CAPSULE ORAL at 05:57

## 2025-03-09 RX ADMIN — Medication 10 ML: at 08:34

## 2025-03-09 RX ADMIN — POTASSIUM CHLORIDE 40 MEQ: 1500 TABLET, EXTENDED RELEASE ORAL at 05:53

## 2025-03-09 RX ADMIN — DICYCLOMINE HYDROCHLORIDE 10 MG: 10 CAPSULE ORAL at 21:23

## 2025-03-09 RX ADMIN — GABAPENTIN 600 MG: 300 CAPSULE ORAL at 20:01

## 2025-03-09 RX ADMIN — Medication 10 ML: at 20:01

## 2025-03-09 RX ADMIN — ASPIRIN 81 MG: 81 TABLET, COATED ORAL at 08:33

## 2025-03-09 RX ADMIN — SODIUM CHLORIDE 125 ML/HR: 0.9 INJECTION, SOLUTION INTRAVENOUS at 10:38

## 2025-03-09 RX ADMIN — LOPERAMIDE HYDROCHLORIDE 2 MG: 2 CAPSULE ORAL at 15:38

## 2025-03-09 RX ADMIN — VENLAFAXINE HYDROCHLORIDE 37.5 MG: 37.5 CAPSULE, EXTENDED RELEASE ORAL at 08:33

## 2025-03-09 NOTE — CONSULTS
Subjective     REASON FOR CONSULTATION: Breast cancer, chemotherapy-induced anemia, hypokalemia  Provide an opinion on any further workup or treatment                             REQUESTING PHYSICIAN: Sabino    RECORDS OBTAINED:  Records of the patients history including those obtained from the referring provider were reviewed and summarized in detail.    HISTORY OF PRESENT ILLNESS:  The patient is a 44 y.o. year old female who is here for an opinion about the above issue.    History of Present Illness   This is a 44-year-old woman with pT3 N2a M0 grade 3 ductal adenocarcinoma of the right breast ER/TX positive status post surgery and undergoing adjuvant chemotherapy.  She completed cycle 4 AC chemotherapy on 2025 with Neulasta support.  The patient had fever on Friday for which she was called in Augmentin.  She subsequently presented to the hospital with global weakness, lightheadedness and progressive shortness of breath.    The patient's workup has included a CBC with differential which shows ANC of 1.0, hemoglobin initially 9.3 but dropped to 7.0 after IV fluids, significant hypokalemia potassium 2.0 with normal magnesium 1.6.  She has been afebrile since admission.  Chest CT angiogram in the ER which showed no PE and stable sub-6 mm pulmonary nodules since 2024.    Past Medical History:   Diagnosis Date    Anxiety     Breast cancer     Right    Crohn's disease     DVT (deep vein thrombosis) in pregnancy     PFO (patent foramen ovale)     Stroke     after the birth of her child at age 34    Tattoos         Past Surgical History:   Procedure Laterality Date     SECTION      COLON RESECTION      COLONOSCOPY      MASTECTOMY Bilateral 10/9/2024    Procedure: Modified radical mastectomy with axillary dissection of the right breast and simple mastectomy of the left breast;  Surgeon: Rebekah Garcia DO;  Location: [facility];  Service: General;  Laterality: Bilateral;    SKIN CANCER  EXCISION      TEETH EXTRACTION N/A 1/28/2025    Procedure: TOOTH EXTRACTION #2,15,19,28,29;  Surgeon: Pramod Zambrano DMD;  Location: [facility] MAIN OR;  Service: Oral Surgery;  Laterality: N/A;    VENOUS ACCESS DEVICE (PORT) INSERTION N/A 12/10/2024    Procedure: INSERTION VENOUS ACCESS DEVICE;  Surgeon: Rebekah Garcia DO;  Location:  [facility] OR;  Service: General;  Laterality: N/A;        Current Facility-Administered Medications on File Prior to Encounter   Medication Dose Route Frequency Provider Last Rate Last Admin    heparin injection 500 Units  500 Units Intravenous PRN Nishant Witt MD   500 Units at 12/11/24 0916    heparin injection 500 Units  500 Units Intravenous PRN Nishant Witt MD   500 Units at 01/07/25 1049    sodium chloride 0.9 % flush 10 mL  10 mL Intravenous PRN Nishant Witt MD   10 mL at 12/11/24 0916    sodium chloride 0.9 % flush 10 mL  10 mL Intravenous PRN Nishant Witt MD   10 mL at 01/07/25 1048     Current Outpatient Medications on File Prior to Encounter   Medication Sig Dispense Refill    aspirin 81 MG EC tablet Take 1 tablet by mouth Daily.      dicyclomine (BENTYL) 10 MG capsule Take 1 capsule by mouth 3 (Three) Times a Day As Needed for Abdominal Cramping. 90 capsule 2    gabapentin (Neurontin) 300 MG capsule Take 2 capsules by mouth Every Night. 60 capsule 2    Klor-Con M20 20 MEQ CR tablet TAKE 1 TABLET BY MOUTH 2 TIMES A DAY 40 tablet 1    lidocaine-prilocaine (EMLA) 2.5-2.5 % cream Apply 1 Application topically to the appropriate area as directed As Needed for Mild Pain. Apply to port site 30 minutes before access 15 g 3    omeprazole (priLOSEC) 20 MG capsule Take 1 capsule by mouth As Needed.      ondansetron (ZOFRAN) 8 MG tablet Take 1 tablet by mouth 3 (Three) Times a Day As Needed for Nausea or Vomiting. 30 tablet 5    ondansetron (ZOFRAN) 8 MG tablet Take 1 tablet by mouth 3 (Three) Times a Day As Needed for Nausea or Vomiting. 30 tablet 5     prochlorperazine (COMPAZINE) 10 MG tablet Take 1 tablet by mouth Every 6 (Six) Hours As Needed for Nausea or Vomiting. 60 tablet 1    venlafaxine XR (Effexor XR) 37.5 MG 24 hr capsule Take 1 capsule by mouth Take As Directed. 1 capsule po q am for two weeks followed by increase to 2 capsules daily (Patient taking differently: Take 1 capsule by mouth Take As Directed. 1 capsule po q am for two weeks followed by increase to 2 capsules daily  Pt has not started yet) 60 capsule 2    amoxicillin-clavulanate (AUGMENTIN) 500-125 MG per tablet Take 1 tablet by mouth 2 (Two) Times a Day for 5 days. 10 tablet 0    oxyCODONE-acetaminophen (Percocet) 5-325 MG per tablet Take 1 tablet by mouth Every 6 (Six) Hours As Needed for Moderate Pain. 12 tablet 0        ALLERGIES:  No Known Allergies     Social History     Socioeconomic History    Marital status: Single    Number of children: 1   Tobacco Use    Smoking status: Former     Current packs/day: 0.00     Average packs/day: 1 pack/day for 20.0 years (20.0 ttl pk-yrs)     Types: Cigarettes     Start date:      Quit date: 2019     Years since quittin.1    Smokeless tobacco: Current    Tobacco comments:     Nicotine pouches   Vaping Use    Vaping status: Never Used   Substance and Sexual Activity    Alcohol use: Yes     Comment: OCC    Drug use: Never    Sexual activity: Defer        Family History   Problem Relation Age of Onset    Diabetes Mother     Heart disease Father     Diabetes Paternal Grandmother     Hypertension Other     Malig Hyperthermia Neg Hx         Review of Systems   Constitutional:  Positive for activity change, appetite change and fatigue. Negative for fever.   HENT:  Negative for sore throat and trouble swallowing.    Respiratory:  Positive for shortness of breath. Negative for cough and wheezing.    Cardiovascular:  Negative for palpitations and leg swelling.   Gastrointestinal:  Positive for nausea. Negative for diarrhea and vomiting.    Musculoskeletal: Negative.    Skin:  Positive for pallor. Negative for rash.   Allergic/Immunologic: Positive for immunocompromised state.   Neurological:  Positive for dizziness, weakness and light-headedness.   Hematological: Negative.           Objective     Vitals:    03/09/25 0523 03/09/25 0600 03/09/25 0700 03/09/25 0833   BP: 125/63 123/61 124/54 107/58   BP Location:       Patient Position:       Pulse: 75 73 80 69   Resp:       Temp:       TempSrc:       SpO2: 100% 98% 99%    Weight:       Height:             2/25/2025     7:56 AM   Current Status   ECOG score 0       Physical Exam    CONSTITUTIONAL: pleasant well-developed woman  HEENT: no icterus, no thrush, moist membranes  LYMPH: no cervical or supraclavicular lad  CV: RRR, S1S2, no murmur  RESP/CHEST: cta bilat, no wheezing, no rales, port w/o redness tenderness  GI: soft, nontender, no splenomegaly, +BS  MUSC: no edema   NEURO: alert and oriented x3, normal strength  PSYCH: normal mood and affect   SKIN: pallor no rash    RECENT LABS:  Hematology WBC   Date Value Ref Range Status   03/09/2025 1.90 (C) 3.40 - 10.80 10*3/mm3 Final     RBC   Date Value Ref Range Status   03/09/2025 2.33 (L) 3.77 - 5.28 10*6/mm3 Final     Hemoglobin   Date Value Ref Range Status   03/09/2025 7.0 (L) 12.0 - 15.9 g/dL Final     Hematocrit   Date Value Ref Range Status   03/09/2025 20.6 (C) 34.0 - 46.6 % Final     Platelets   Date Value Ref Range Status   03/09/2025 120 (L) 140 - 450 10*3/mm3 Final        Lab Results   Component Value Date    GLUCOSE 99 03/09/2025    BUN 4 (L) 03/09/2025    CREATININE 0.42 (L) 03/09/2025     03/09/2025    K 3.0 (L) 03/09/2025     (H) 03/09/2025    CALCIUM 7.8 (L) 03/09/2025    PROTEINTOT 5.1 (L) 03/09/2025    ALBUMIN 2.4 (L) 03/09/2025    ALT 14 03/09/2025    AST 11 03/09/2025    ALKPHOS 144 (H) 03/09/2025    BILITOT 0.4 03/09/2025    GLOB 2.7 03/09/2025    AGRATIO 0.9 03/09/2025    BCR 9.5 03/09/2025    ANIONGAP 9.2 03/09/2025     EGFR 123.9 03/09/2025     CT IMPRESSION:  1. No evidence of pulmonary thromboembolism.  2. Few sub-6 mm solid pulmonary nodules are unchanged dating back to  September 2024. Continued attention on follow-up.  3. Approximately 2 cm right thyroid nodule. Follow-up nonemergent  thyroid ultrasound.CHEST:    Assessment & Plan     [id number] g3 invasive ductal adenocarcinoma right breast, ER 99% positive, MI 50% positive, HER2 0, Ki-67 65%  Status post right modified radical mastectomy with axillary lymph node dissection left prophylactic mastectomy 10/9/2025 with delayed wound healing  12/31/2024-initiated adjuvant chemotherapy with AC  2/25/2025-completed AC cycle 4    *Pancytopenia secondary to chemotherapy  ANC 1.0 hemoglobin 7.0 platelets 120 today  Patient received Neulasta support with chemotherapy    *Significant hypokalemia secondary to poor oral intake-2.0 on admission, normal Mg/Phos    *Calorie/protein malnutrition-albumin 2.4    *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 for suspected source    *Underlying Crohn's disease    Oncology plan/recommendations:  Transfuse 2 units PRBCs today  Daily CBC with differential  Electrolyte replacement per ICU protocol  Monitor for fever  Zofran as needed nausea  Patient scheduled to begin weekly Taxol this week but may need to delay pending labs and hospital course

## 2025-03-09 NOTE — PROGRESS NOTES
[facility]  [phone]  Dr. Nihsant Reed    Subjective:  LOS: 1    No acute events overnight.  Doing well this morning.  Does not have much appetite at baseline.  A lot of different foods that she eats tears up her stomach she uses.    Objective:  Vital Signs past 24hrs    Temp range: Temp (24hrs), Av.1 °F (36.7 °C), Min:97.6 °F (36.4 °C), Max:98.5 °F (36.9 °C)    BP range: BP: ()/(41-72) 118/60  Pulse range: Heart Rate:  [] 76  Resp rate range: Resp:  [12-20] 18  61.5 kg (135 lb 9.3 oz); Body mass index is 22.56 kg/m².    Device (Oxygen Therapy): room air     Oxygen range:SpO2:  [93 %-100 %] 100 %            Intake/Output Summary (Last 24 hours) at 3/9/2025 1121  Last data filed at 3/8/2025 1821  Gross per 24 hour   Intake 1100 ml   Output --   Net 1100 ml       Physical Exam  Constitutional:       Appearance: Normal appearance.   HENT:      Head: Normocephalic and atraumatic.      Nose: Nose normal.      Mouth/Throat:      Mouth: Mucous membranes are moist.      Pharynx: Oropharynx is clear.   Eyes:      Extraocular Movements: Extraocular movements intact.      Conjunctiva/sclera: Conjunctivae normal.   Cardiovascular:      Rate and Rhythm: Normal rate and regular rhythm.      Pulses: Normal pulses.      Heart sounds: Normal heart sounds. No murmur heard.  Pulmonary:      Effort: Pulmonary effort is normal. No respiratory distress.      Breath sounds: Normal breath sounds. No stridor. No wheezing or rales.   Abdominal:      General: Abdomen is flat. Bowel sounds are normal.      Palpations: Abdomen is soft.   Skin:     General: Skin is warm and dry.   Neurological:      General: No focal deficit present.      Mental Status: She is alert and oriented to person, place, and time. Mental status is at baseline.   Psychiatric:         Mood and Affect: Mood normal.         Behavior: Behavior normal.            Result Review:  I have reviewed the results from last note by [facility] physician  and agree with these findings:  [x]  Laboratory accordion  [x]  Microbiology  [x]  Radiology  [x]  EKG/Telemetry   [x]  Cardiology/Vascular   [x]  Pathology  [x]  Old records  []  Other:    Medication Review:  I have reviewed the current MAR.  Antibiotics  amoxicillin-clavulanate - 500-125 MG     Scheduled Medications  aspirin, 81 mg, Oral, Daily  gabapentin, 600 mg, Oral, Nightly  mupirocin, 1 Application, Each Nare, BID  potassium chloride ER, 40 mEq, Oral, Q4H  senna-docusate sodium, 2 tablet, Oral, BID  sodium chloride, 10 mL, Intravenous, Q12H  sodium chloride, 10 mL, Intravenous, Q12H  venlafaxine XR, 37.5 mg, Oral, Daily With Breakfast      ICU Drips  sodium chloride, 125 mL/hr, Last Rate: 125 mL/hr (03/09/25 1038)      PRN Medications    senna-docusate sodium **AND** polyethylene glycol **AND** bisacodyl **AND** bisacodyl    Calcium Replacement - Follow Nurse / BPA Driven Protocol    dicyclomine    heparin    loperamide    Magnesium Standard Dose Replacement - Follow Nurse / BPA Driven Protocol    nitroglycerin    ondansetron    oxyCODONE-acetaminophen    Phosphorus Replacement - Follow Nurse / BPA Driven Protocol    Potassium Replacement - Follow Nurse / BPA Driven Protocol    Potassium Replacement - Follow Nurse / BPA Driven Protocol    prochlorperazine    [COMPLETED] Insert Peripheral IV **AND** sodium chloride    Access VAD **AND** sodium chloride    sodium chloride    sodium chloride    sodium chloride    sodium chloride    sodium chloride    Lines, Drains & Airways       Active LDAs       Name Placement date Placement time Site Days    Peripheral IV 03/08/25 1326 Left Antecubital 03/08/25  1326  Antecubital  less than 1    Single Lumen Implantable Port 12/10/24 Left Chest 12/10/24  1234  Chest  88                    Diet Orders (active) (From admission, onward)       Start     Ordered    03/08/25 1832  Diet: Regular/House; Neutropenic/Low Microbial; Fluid Consistency: Thin (IDDSI 0)  Diet Effective Now          03/08/25 1831                      Assessment:  Severe hypokalemia secondary to poor p.o. intake  Chronic diarrhea secondary to chemotherapy and Crohn's disease  Nausea  Vomiting  Pancytopenia due to chemotherapy  Crohn's disease  Febrile neutropenia with tooth extraction  [id number] g3 invasive ductal adenocarcinoma right breast   Protein calorie malnutrition  Thyroid nodule, 2 cm  Pulmonary nodules    THESE ARE NEW MEDICAL PROBLEMS TO ME.    Plan of Treatment  -Receiving 2 units of packed RBCs per heme-onc  -Daily CBC and BMP  -Electrolyte replacement protocol  -Zofran as needed  -2 cm thyroid nodule found on CT chest, thyroid ultrasound pending  -Sub-6 mm pulmonary nodules on CT chest, chronic.  Continue to monitor as outpatient per oncology recs.  Nothing to do at this time.    Transfer out of ICU.  Will consult [facility] to take over management as primary.  She has no acute pulmonary issues, so pulmonary will sign off afterwards.      Nishant Reed MD  [facility], [facility]  Pulmonary and Critical Care Medicine    Electronically signed by Nishant Reed MD, 03/09/25, 11:21 AM EDT.  Part of this note may be an electronic transcription/translation of spoken language to printed text using the Dragon Dictation System.

## 2025-03-09 NOTE — PROGRESS NOTES
"DAILY PROGRESS NOTE  [facility]    Patient Identification:  Name: Suzanne Lin  Age: 44 y.o.  Sex: female  :  1980  MRN: [id number]         Primary Care Physician: Gia Rodriguez APRN    Subjective: patient is resting; feels better; transfusion in progress    Interval History: follow up for breast cancer, chemo induced pancytopenia, hypokalemia,  crohns disease    Objective:    Scheduled Meds:aspirin, 81 mg, Oral, Daily  gabapentin, 600 mg, Oral, Nightly  mupirocin, 1 Application, Each Nare, BID  senna-docusate sodium, 2 tablet, Oral, BID  sodium chloride, 10 mL, Intravenous, Q12H  sodium chloride, 10 mL, Intravenous, Q12H  venlafaxine XR, 37.5 mg, Oral, Daily With Breakfast      Continuous Infusions:sodium chloride, 125 mL/hr, Last Rate: 125 mL/hr (25 1038)        Vital signs in last 24 hours:  Temp:  [97.4 °F (36.3 °C)-98.5 °F (36.9 °C)] 98.3 °F (36.8 °C)  Heart Rate:  [62-91] 72  Resp:  [14-20] 18  BP: ()/(41-67) 117/64    Intake/Output:    Intake/Output Summary (Last 24 hours) at 3/9/2025 1907  Last data filed at 3/9/2025 1700  Gross per 24 hour   Intake 680 ml   Output 400 ml   Net 280 ml       Exam:  /64   Pulse 72   Temp 98.3 °F (36.8 °C) (Oral)   Resp 18   Ht 165.1 cm (65\")   Wt 61.5 kg (135 lb 9.3 oz)   LMP 2025 (Approximate)   SpO2 100%   BMI 22.56 kg/m²     General Appearance:    Alert, cooperative, no distress, AAOx3; alopecia                          Head:    Normocephalic, without obvious abnormality, atraumatic                           Eyes:    PERRL, conjunctivae/corneas clear, EOM's intact, both eyes                         Throat:   Lips, tongue, gums normal; oral mucosa pink and moist                           Neck:   Supple, symmetrical, trachea midline, no JVD                         Lungs:    Clear to auscultation bilaterally, respirations unlabored                 Chest Wall:    No tenderness or deformity                          " Heart:    Regular rate and rhythm, S1 and S2 normal, no murmur,no  rub or gallop                  Abdomen:     Soft, nontender, bowel sounds active, no masses, no organomegaly                  Extremities:   Extremities normal, atraumatic, no cyanosis or edema                                Data Review:  Labs in chart were reviewed.  WBC   Date Value Ref Range Status   03/09/2025 1.90 (C) 3.40 - 10.80 10*3/mm3 Final     Hemoglobin   Date Value Ref Range Status   03/09/2025 7.0 (L) 12.0 - 15.9 g/dL Final     Hematocrit   Date Value Ref Range Status   03/09/2025 20.6 (C) 34.0 - 46.6 % Final     Platelets   Date Value Ref Range Status   03/09/2025 120 (L) 140 - 450 10*3/mm3 Final     Sodium   Date Value Ref Range Status   03/09/2025 139 136 - 145 mmol/L Final     Potassium   Date Value Ref Range Status   03/09/2025 3.0 (L) 3.5 - 5.2 mmol/L Final     Chloride   Date Value Ref Range Status   03/09/2025 108 (H) 98 - 107 mmol/L Final     CO2   Date Value Ref Range Status   03/09/2025 21.8 (L) 22.0 - 29.0 mmol/L Final     BUN   Date Value Ref Range Status   03/09/2025 4 (L) 6 - 20 mg/dL Final     Creatinine   Date Value Ref Range Status   03/09/2025 0.42 (L) 0.57 - 1.00 mg/dL Final     Glucose   Date Value Ref Range Status   03/09/2025 99 65 - 99 mg/dL Final     Calcium   Date Value Ref Range Status   03/09/2025 7.8 (L) 8.6 - 10.5 mg/dL Final     Magnesium   Date Value Ref Range Status   03/09/2025 1.6 1.6 - 2.6 mg/dL Final     Phosphorus   Date Value Ref Range Status   03/09/2025 3.3 2.5 - 4.5 mg/dL Final     AST (SGOT)   Date Value Ref Range Status   03/09/2025 11 1 - 32 U/L Final     ALT (SGPT)   Date Value Ref Range Status   03/09/2025 14 1 - 33 U/L Final     Alkaline Phosphatase   Date Value Ref Range Status   03/09/2025 144 (H) 39 - 117 U/L Final         Assessment:  Active Hospital Problems    Diagnosis  POA    **Hypokalemia [E87.6]  Yes      Resolved Hospital Problems   No resolved problems to display.    Dizziness  Breast cancer  Chemo induced pancytopenia    Plan:  Replacing potassium  Being transfused  Dizziness has improved  Oncology following  Dw patient  Will follow with you  Thanks for involving us in her care    Cammie Cary MD  3/9/2025  19:07 EDT

## 2025-03-10 ENCOUNTER — READMISSION MANAGEMENT (OUTPATIENT)
Dept: CALL CENTER | Facility: HOSPITAL | Age: 45
End: 2025-03-10
Payer: COMMERCIAL

## 2025-03-10 VITALS
BODY MASS INDEX: 23.29 KG/M2 | WEIGHT: 139.77 LBS | OXYGEN SATURATION: 100 % | DIASTOLIC BLOOD PRESSURE: 63 MMHG | HEART RATE: 74 BPM | TEMPERATURE: 97.7 F | SYSTOLIC BLOOD PRESSURE: 103 MMHG | HEIGHT: 65 IN | RESPIRATION RATE: 18 BRPM

## 2025-03-10 PROBLEM — E87.6 HYPOKALEMIA: Status: RESOLVED | Noted: 2025-03-08 | Resolved: 2025-03-10

## 2025-03-10 LAB
ALBUMIN SERPL-MCNC: 2.7 G/DL (ref 3.5–5.2)
ALBUMIN/GLOB SERPL: 0.9 G/DL
ALP SERPL-CCNC: 150 U/L (ref 39–117)
ALT SERPL W P-5'-P-CCNC: 12 U/L (ref 1–33)
ANION GAP SERPL CALCULATED.3IONS-SCNC: 7.1 MMOL/L (ref 5–15)
AST SERPL-CCNC: 9 U/L (ref 1–32)
BASOPHILS # BLD AUTO: 0.05 10*3/MM3 (ref 0–0.2)
BASOPHILS NFR BLD AUTO: 1.3 % (ref 0–1.5)
BH BB BLOOD EXPIRATION DATE: NORMAL
BH BB BLOOD EXPIRATION DATE: NORMAL
BH BB BLOOD TYPE BARCODE: 6200
BH BB BLOOD TYPE BARCODE: 6200
BH BB DISPENSE STATUS: NORMAL
BH BB DISPENSE STATUS: NORMAL
BH BB PRODUCT CODE: NORMAL
BH BB PRODUCT CODE: NORMAL
BH BB UNIT NUMBER: NORMAL
BH BB UNIT NUMBER: NORMAL
BILIRUB SERPL-MCNC: 0.5 MG/DL (ref 0–1.2)
BUN SERPL-MCNC: 2 MG/DL (ref 6–20)
BUN/CREAT SERPL: 5 (ref 7–25)
CALCIUM SPEC-SCNC: 8.4 MG/DL (ref 8.6–10.5)
CHLORIDE SERPL-SCNC: 108 MMOL/L (ref 98–107)
CO2 SERPL-SCNC: 25.9 MMOL/L (ref 22–29)
CREAT SERPL-MCNC: 0.4 MG/DL (ref 0.57–1)
CROSSMATCH INTERPRETATION: NORMAL
CROSSMATCH INTERPRETATION: NORMAL
DEPRECATED RDW RBC AUTO: 51.1 FL (ref 37–54)
EGFRCR SERPLBLD CKD-EPI 2021: 125.3 ML/MIN/1.73
EOSINOPHIL # BLD AUTO: 0.04 10*3/MM3 (ref 0–0.4)
EOSINOPHIL NFR BLD AUTO: 1.1 % (ref 0.3–6.2)
ERYTHROCYTE [DISTWIDTH] IN BLOOD BY AUTOMATED COUNT: 17.1 % (ref 12.3–15.4)
GLOBULIN UR ELPH-MCNC: 2.9 GM/DL
GLUCOSE SERPL-MCNC: 80 MG/DL (ref 65–99)
HCT VFR BLD AUTO: 30.8 % (ref 34–46.6)
HGB BLD-MCNC: 10.4 G/DL (ref 12–15.9)
IMM GRANULOCYTES # BLD AUTO: 0.23 10*3/MM3 (ref 0–0.05)
IMM GRANULOCYTES NFR BLD AUTO: 6.1 % (ref 0–0.5)
LYMPHOCYTES # BLD AUTO: 0.59 10*3/MM3 (ref 0.7–3.1)
LYMPHOCYTES NFR BLD AUTO: 15.8 % (ref 19.6–45.3)
MAGNESIUM SERPL-MCNC: 1.5 MG/DL (ref 1.6–2.6)
MCH RBC QN AUTO: 28.3 PG (ref 26.6–33)
MCHC RBC AUTO-ENTMCNC: 33.8 G/DL (ref 31.5–35.7)
MCV RBC AUTO: 83.9 FL (ref 79–97)
MONOCYTES # BLD AUTO: 0.55 10*3/MM3 (ref 0.1–0.9)
MONOCYTES NFR BLD AUTO: 14.7 % (ref 5–12)
NEUTROPHILS NFR BLD AUTO: 2.28 10*3/MM3 (ref 1.7–7)
NEUTROPHILS NFR BLD AUTO: 61 % (ref 42.7–76)
PHOSPHATE SERPL-MCNC: 2.6 MG/DL (ref 2.5–4.5)
PLATELET # BLD AUTO: 176 10*3/MM3 (ref 140–450)
PMV BLD AUTO: 10.4 FL (ref 6–12)
POTASSIUM SERPL-SCNC: 3.7 MMOL/L (ref 3.5–5.2)
PROT SERPL-MCNC: 5.6 G/DL (ref 6–8.5)
RBC # BLD AUTO: 3.67 10*6/MM3 (ref 3.77–5.28)
SODIUM SERPL-SCNC: 141 MMOL/L (ref 136–145)
UNIT  ABO: NORMAL
UNIT  ABO: NORMAL
UNIT  RH: NORMAL
UNIT  RH: NORMAL
WBC NRBC COR # BLD AUTO: 3.74 10*3/MM3 (ref 3.4–10.8)

## 2025-03-10 PROCEDURE — 85025 COMPLETE CBC W/AUTO DIFF WBC: CPT | Performed by: STUDENT IN AN ORGANIZED HEALTH CARE EDUCATION/TRAINING PROGRAM

## 2025-03-10 PROCEDURE — 25010000002 HEPARIN LOCK FLUSH PER 10 UNITS: Performed by: EMERGENCY MEDICINE

## 2025-03-10 PROCEDURE — 83735 ASSAY OF MAGNESIUM: CPT | Performed by: STUDENT IN AN ORGANIZED HEALTH CARE EDUCATION/TRAINING PROGRAM

## 2025-03-10 PROCEDURE — 96367 TX/PROPH/DG ADDL SEQ IV INF: CPT

## 2025-03-10 PROCEDURE — 99232 SBSQ HOSP IP/OBS MODERATE 35: CPT | Performed by: INTERNAL MEDICINE

## 2025-03-10 PROCEDURE — 84100 ASSAY OF PHOSPHORUS: CPT | Performed by: STUDENT IN AN ORGANIZED HEALTH CARE EDUCATION/TRAINING PROGRAM

## 2025-03-10 PROCEDURE — 96366 THER/PROPH/DIAG IV INF ADDON: CPT

## 2025-03-10 PROCEDURE — 80053 COMPREHEN METABOLIC PANEL: CPT | Performed by: STUDENT IN AN ORGANIZED HEALTH CARE EDUCATION/TRAINING PROGRAM

## 2025-03-10 PROCEDURE — 25010000002 MAGNESIUM SULFATE 2 GM/50ML SOLUTION: Performed by: STUDENT IN AN ORGANIZED HEALTH CARE EDUCATION/TRAINING PROGRAM

## 2025-03-10 RX ORDER — SODIUM CHLORIDE 0.9 % (FLUSH) 0.9 %
10 SYRINGE (ML) INJECTION EVERY 12 HOURS SCHEDULED
Status: DISCONTINUED | OUTPATIENT
Start: 2025-03-10 | End: 2025-03-10 | Stop reason: HOSPADM

## 2025-03-10 RX ORDER — SODIUM CHLORIDE 9 MG/ML
40 INJECTION, SOLUTION INTRAVENOUS AS NEEDED
Status: DISCONTINUED | OUTPATIENT
Start: 2025-03-10 | End: 2025-03-10 | Stop reason: HOSPADM

## 2025-03-10 RX ORDER — MAGNESIUM SULFATE HEPTAHYDRATE 40 MG/ML
2 INJECTION, SOLUTION INTRAVENOUS
Status: COMPLETED | OUTPATIENT
Start: 2025-03-10 | End: 2025-03-10

## 2025-03-10 RX ORDER — HEPARIN SODIUM (PORCINE) LOCK FLUSH IV SOLN 100 UNIT/ML 100 UNIT/ML
5 SOLUTION INTRAVENOUS AS NEEDED
Status: DISCONTINUED | OUTPATIENT
Start: 2025-03-10 | End: 2025-03-10 | Stop reason: HOSPADM

## 2025-03-10 RX ORDER — SODIUM CHLORIDE 0.9 % (FLUSH) 0.9 %
10 SYRINGE (ML) INJECTION AS NEEDED
Status: DISCONTINUED | OUTPATIENT
Start: 2025-03-10 | End: 2025-03-10 | Stop reason: HOSPADM

## 2025-03-10 RX ORDER — SODIUM CHLORIDE 0.9 % (FLUSH) 0.9 %
20 SYRINGE (ML) INJECTION AS NEEDED
Status: DISCONTINUED | OUTPATIENT
Start: 2025-03-10 | End: 2025-03-10 | Stop reason: HOSPADM

## 2025-03-10 RX ADMIN — ASPIRIN 81 MG: 81 TABLET, COATED ORAL at 09:07

## 2025-03-10 RX ADMIN — Medication 500 UNITS: at 14:51

## 2025-03-10 RX ADMIN — MAGNESIUM SULFATE HEPTAHYDRATE 2 G: 40 INJECTION, SOLUTION INTRAVENOUS at 09:17

## 2025-03-10 RX ADMIN — Medication 10 ML: at 09:39

## 2025-03-10 RX ADMIN — VENLAFAXINE HYDROCHLORIDE 37.5 MG: 37.5 CAPSULE, EXTENDED RELEASE ORAL at 11:38

## 2025-03-10 RX ADMIN — MAGNESIUM SULFATE HEPTAHYDRATE 2 G: 40 INJECTION, SOLUTION INTRAVENOUS at 11:38

## 2025-03-10 RX ADMIN — LOPERAMIDE HYDROCHLORIDE 2 MG: 2 CAPSULE ORAL at 00:18

## 2025-03-10 RX ADMIN — MAGNESIUM SULFATE HEPTAHYDRATE 2 G: 40 INJECTION, SOLUTION INTRAVENOUS at 14:44

## 2025-03-10 NOTE — DISCHARGE SUMMARY
"    Patient Name: Suzanne Lin  : 1980  MRN: [id number]    Date of Admission: 3/8/2025  Date of Discharge:  3/10/2025  Primary Care Physician: Gia Rodriguez APRN      Chief Complaint:   Weakness - Generalized and Dizziness      Discharge Diagnoses     Active Hospital Problems    Diagnosis  POA    Immunosuppressed due to chemotherapy [D84.821, T45.1X5A, Z79.899]  Not Applicable    LITA (iron deficiency anemia) [D50.9]  Yes    Breast cancer, stage 3, right [C50.911]  Yes      Resolved Hospital Problems    Diagnosis Date Resolved POA    **Hypokalemia [E87.6] 03/10/2025 Yes        Admitting HPI     \"Patient presents today to the ED with progressive symptoms of lightheadedness, dizziness, near syncope and shortness of breath on exertion.  She had called her oncology office.  She had told them that she was getting shortness of breath with fairly minimal activity and they directed her to go to the ER immediately.  Workup in the ED with CTA chest showed no PE and otherwise clear lungs.  Her laboratory workup showed that she has profound hypokalemia As well as pancytopenia.     Suzanne Lin  reports current alcohol use.,  reports that she quit smoking about 6 years ago. Her smoking use included cigarettes. She started smoking about 26 years ago. She has a 20 pack-year smoking history. She uses smokeless tobacco.\"    Hospital Course     Pt admitted for dizziness, near syncope, dyspnea.  Initially treated in the ICU due to severe hypokalemia.  Her electrolytes were repleted.  She was seen in consultation with oncology.  She received Neulasta with chemotherapy with improvement in her pancytopenia.  Her p.o. intake has picked up and her electrolytes have stabilized.  Plan to delay initiation of Taxol by 1 week per oncology.  She is feeling much better and is stable for discharge home.  Oncology plans to address thyroid nodule seen on imaging this admission as an outpatient.    Day of Discharge     Physical " Exam:  Temp:  [97.4 °F (36.3 °C)-98.3 °F (36.8 °C)] 98.1 °F (36.7 °C)  Heart Rate:  [62-87] 68  Resp:  [12-18] 16  BP: (102-126)/(51-72) 112/68  Body mass index is 23.26 kg/m².  Physical Exam  Constitutional:       General: She is not in acute distress.  Pulmonary:      Effort: Pulmonary effort is normal. No respiratory distress.      Breath sounds: No stridor.   Skin:     Coloration: Skin is not jaundiced.      Findings: No bruising.   Neurological:      General: No focal deficit present.      Mental Status: She is alert and oriented to person, place, and time.   Psychiatric:         Mood and Affect: Mood normal.         Behavior: Behavior normal.         Thought Content: Thought content normal.         Judgment: Judgment normal.         Consultants     Consult Orders (all) (From admission, onward)       Start     Ordered    03/09/25 1122  Inpatient Hospitalist Consult  Once        Specialty:  Hospitalist  Provider:  Balaji Meyers MD    03/09/25 1121    03/08/25 1621  Hematology & Oncology Inpatient Consult  Once        Specialty:  Hematology and Oncology  Provider:  (Not yet assigned)    03/08/25 1622    03/08/25 1532  Pulmonology (on-call MD unless specified)  Once        Specialty:  Pulmonary Disease  Provider:  Gulshan Gallo DO    03/08/25 1531                  Procedures     * Surgery not found *      Imaging Results (All)       Procedure Component Value Units Date/Time    US Thyroid [[id number]] Collected: 03/09/25 1653     Updated: 03/09/25 1706    Narrative:         EXAMINATION: US THYROID-     DATE: 3/9/2025 3:59 PM     HISTORY: Nodule in the right lobe of the thyroid gland on prior CT  chest.     COMPARISON: CT chest 3/8/2025 and 9/16/2024. PET/CT 9/30/2024.     TECHNIQUE: Grayscale and color Doppler images of the thyroid were  performed.     FINDINGS:  The right lobe of the thyroid measures 4.8 cm x 2 cm x 1.9 cm. The  thyroid isthmus measures 0.4 cm. The left lobe of the thyroid  measures  4.4 cm x 1.3 cm x 1.2 cm.     The thyroid demonstrates diffusely homogenous background echotexture.  Heterogeneous ill-defined ovoid wider than tall solid slightly  hypoechoic 2.9 cm nodule in the right lobe of the thyroid gland with  internal scattered echogenic foci, TI-RADS category 4. Adjacent 1.9 cm  solid nodule with similar imaging characteristics, TI-RADS category 4.  Thyroid vascularity is within normal limits. No pathologically enlarged  lymph nodes are imaged.       Impression:      TI-RADS category 5 highly suspicious nodules in the right lobe of the  thyroid gland fitting consensus recommendations for ultrasound-guided  FNA.     This report was finalized on 3/9/2025 5:03 PM by Edward Dumont MD on  Workstation: [id number]       CT Angiogram Chest [[id number]] Collected: 03/08/25 1444     Updated: 03/08/25 1500    Narrative:      CT ANGIOGRAM CHEST PULMONARY EMBOLISM     TECHNIQUE: Radiation dose reduction techniques were utilized, including  automated exposure control and exposure modulation based on body size. 2  mm images were obtained through the chest after the administration of IV  contrast. 3D reformat images were created. Multiple coronal, sagittal,  and 3-D reconstruction images were obtained.     HISTORY: Clinical concern for pulmonary thromboembolism. Breast cancer.     COMPARISON: CT chest 9/16/2024           FINDINGS: Approximately 2 cm right thyroid nodule (series 4/image 10).  Bilateral mastectomies. No soft tissue nodularity within the chest wall.  No axillary, internal mammary or visualized supraclavicular  lymphadenopathy.     Nondilated main pulmonary artery. Pulmonary arteries are patent. Heart  is normal in size. Trace pericardial fluid. Nondilated thoracic aorta.  No mediastinal/hilar lymphadenopathy. Decompressed esophagus.     Trachea and main bronchi are patent. Diffuse mild bronchial wall  thickening. No bronchiectasis. No emphysematous changes. Few sub-6 mm  solid  pulmonary nodules are unchanged dating back to September 2024.  Reference left lower lobe (series 5/image 102) and right lower lobe  (5/84). Small right upper lobe calcified granuloma. No focal groundglass  opacity or consolidation. No pleural effusion.                Impression:      1. No evidence of pulmonary thromboembolism.  2. Few sub-6 mm solid pulmonary nodules are unchanged dating back to  September 2024. Continued attention on follow-up.  3. Approximately 2 cm right thyroid nodule. Follow-up nonemergent  thyroid ultrasound.        This report was finalized on 3/8/2025 2:56 PM by Dr. Dirk Mccray M.D on Workstation: [id number]       XR Chest 1 View [[id number]] Collected: 03/08/25 1356     Updated: 03/08/25 1402    Narrative:      XR CHEST 1 VW-     INDICATION: Shortness of breath. Breast cancer.     COMPARISON: Chest radiographs dating back to 12/10/2024       Impression:      Left subclavian port with tip overlying the mid SVC. No  focal consolidation. No pleural effusion or pneumothorax.  Normal size  cardiomediastinal silhouette.  No focal osseous abnormality.       This report was finalized on 3/8/2025 1:59 PM by Dr. Dirk Mccray M.D on Workstation: [id number]               Results for orders placed during the hospital encounter of 11/13/24    Adult Transthoracic Echo Complete W/ Cont if Necessary Per Protocol    Interpretation Summary    Left ventricular systolic function is normal. Estimated left ventricular EF = 55% Normal global longitudinal LV strain (GLS) = -18.8%. Left ventricle strain data was reviewed by the physician. Left ventricular diastolic function was normal.    Pertinent Labs     Results from last 7 days   Lab Units 03/10/25  0623 03/09/25  0400 03/08/25  1327   WBC 10*3/mm3 3.74 1.90* 1.57*   HEMOGLOBIN g/dL 10.4* 7.0* 9.3*   PLATELETS 10*3/mm3 176 120* 153     Results from last 7 days   Lab Units 03/10/25  0623 03/09/25  1836 03/09/25  0400 03/08/25  1953  "03/08/25  1448 03/08/25  1327   SODIUM mmol/L 141  --  139  --  134* 136   POTASSIUM mmol/L 3.7 3.8 3.0* 3.7 2.4* 2.0*   CHLORIDE mmol/L 108*  --  108*  --  97* 93*   CO2 mmol/L 25.9  --  21.8*  --  24.4 26.2   BUN mg/dL 2*  --  4*  --  7 8   CREATININE mg/dL 0.40*  --  0.42*  --  0.46* 0.57   GLUCOSE mg/dL 80  --  99  --  70 110*   EGFR mL/min/1.73 125.3  --  123.9  --  121.2 115.1     Results from last 7 days   Lab Units 03/10/25  0623 03/09/25  0400 03/08/25  1327   ALBUMIN g/dL 2.7* 2.4* 3.4*   BILIRUBIN mg/dL 0.5 0.4 1.3*   ALK PHOS U/L 150* 144* 205*   AST (SGOT) U/L 9 11 17   ALT (SGPT) U/L 12 14 26     Results from last 7 days   Lab Units 03/10/25  0623 03/09/25  0400 03/08/25  1448 03/08/25  1327   CALCIUM mg/dL 8.4* 7.8* 8.2* 9.4   ALBUMIN g/dL 2.7* 2.4*  --  3.4*   MAGNESIUM mg/dL 1.5* 1.6  --  1.6   PHOSPHORUS mg/dL 2.6 3.3  --   --        Results from last 7 days   Lab Units 03/08/25  1448 03/08/25  1327   HSTROP T ng/L 14* 12   PROBNP pg/mL  --  112.0   D DIMER QUANT MCGFEU/mL  --  0.67*           Invalid input(s): \"LDLCALC\"          Test Results Pending at Discharge       Discharge Details        Discharge Medications        Changes to Medications        Instructions Start Date   venlafaxine XR 37.5 MG 24 hr capsule  Commonly known as: Effexor XR  What changed: additional instructions   37.5 mg, Oral, Take As Directed, 1 capsule po q am for two weeks followed by increase to 2 capsules daily             Continue These Medications        Instructions Start Date   aspirin 81 MG EC tablet   81 mg, Daily      dicyclomine 10 MG capsule  Commonly known as: BENTYL   10 mg, Oral, 3 Times Daily PRN      gabapentin 300 MG capsule  Commonly known as: Neurontin   600 mg, Oral, Nightly      Klor-Con M20 20 MEQ CR tablet  Generic drug: potassium chloride   20 mEq, Oral, 2 Times Daily      lidocaine-prilocaine 2.5-2.5 % cream  Commonly known as: EMLA   1 Application, Topical, As Needed, Apply to port site 30 minutes " before access      omeprazole 20 MG capsule  Commonly known as: priLOSEC   Take 1 capsule by mouth As Needed.      ondansetron 8 MG tablet  Commonly known as: ZOFRAN   8 mg, Oral, 3 Times Daily PRN      ondansetron 8 MG tablet  Commonly known as: ZOFRAN   8 mg, Oral, 3 Times Daily PRN      oxyCODONE-acetaminophen 5-325 MG per tablet  Commonly known as: Percocet   1 tablet, Oral, Every 6 Hours PRN      prochlorperazine 10 MG tablet  Commonly known as: COMPAZINE   10 mg, Oral, Every 6 Hours PRN             Stop These Medications      amoxicillin-clavulanate 500-125 MG per tablet  Commonly known as: AUGMENTIN              No Known Allergies    Discharge Disposition:  Home or Self Care      Discharge Diet:  Diet Order   Procedures    Diet: Regular/House; Neutropenic/Low Microbial; Fluid Consistency: Thin (IDDSI 0)       Discharge Activity:   Activity Instructions       Activity as Tolerated              CODE STATUS:    Code Status and Medical Interventions: CPR (Attempt to Resuscitate); Full Support   Ordered at: 03/08/25 1620     Code Status (Patient has no pulse and is not breathing):    CPR (Attempt to Resuscitate)     Medical Interventions (Patient has pulse or is breathing):    Full Support       Future Appointments   Date Time Provider Department Center   3/11/2025 11:15 AM INFUSION ACCESS CHAIR- [facility]   3/11/2025 11:40 AM Azalia Clark APRN [facility]   3/11/2025 12:00 PM CHAIR 2 [facility]   3/13/2025  2:30 PM Sonal Abrams APRN [facility] None   4/16/2025  1:00 PM Rebekah Garcia DO [facility]      Follow-up Information       Gia Rodriguez APRN .    Specialty: Family Medicine  Contact information:  [address] [place] [address]  [phone]                             Time Spent on Discharge:  Greater than 30 minutes spent on discharge management including final examination, discussion of hospital stay and patient education, preparation  of records, medication reconciliation, follow up planning      Luis Armando Lee MD  [facility]  03/10/25  11:23 EDT

## 2025-03-10 NOTE — PAYOR COMM NOTE
"Suzanne Lin (44 y.o. Female)        SEE FOR INPATIENT:  REF# [id number]    UR: [phone]    [facility]: NPI [id number] [facility]# [id number]    ZION PEOPLES RN, [facility]     Date of Birth   1980    Social Security Number       Address   [address] [place] [address]    Home Phone   [phone]    MRN   [id number]       Scientology   None    Marital Status   Single                            Admission Date   3/8/2025    Admission Type   Emergency    Admitting Provider   Gulshan Gallo DO    Attending Provider   Luis Armando Lee MD    Department, Room/Bed   [address], 93/1       Discharge Date       Discharge Disposition   Home or Self Care    Discharge Destination                                 Attending Provider: Luis Armando Lee MD    Allergies: No Known Allergies    Isolation: None   Infection: None   Code Status: CPR    Ht: 165.1 cm (65\")   Wt: 63.4 kg (139 lb 12.4 oz)    Admission Cmt: None   Principal Problem: Hypokalemia [E87.6]                   Active Insurance as of 3/8/2025       Primary Coverage       Payor Plan Insurance Group Employer/Plan Group    ANTHEM BLUE CROSS ANTHEM BLUE CROSS BLUE SHIELD PPO [id number]       Payor Plan Address Payor Plan Phone Number Payor Plan Fax Number Effective Dates    [address] [phone]  9/1/2024 - None Entered    [place] [address]         Subscriber Name Subscriber Birth Date Member ID       SUZANNE LIN 1980 [id number]                     Emergency Contacts        (Rel.) Home Phone Work Phone Mobile Phone    denise see (Significant Other) -- -- [phone]    soumya milan (Relative) -- -- [phone]                 History & Physical        Gulshan Gallo DO at 03/08/25 1611              [facility]  [phone]  Dr. Gulshan Gallo      Subjective   LOS: 0 days     Patient presents today to the ED with progressive " symptoms of lightheadedness, dizziness, near syncope and shortness of breath on exertion.  She had called her oncology office.  She had told them that she was getting shortness of breath with fairly minimal activity and they directed her to go to the ER immediately.  Workup in the ED with CTA chest showed no PE and otherwise clear lungs.  Her laboratory workup showed that she has profound hypokalemia As well as pancytopenia.    Suzanne Lin  reports current alcohol use.,  reports that she quit smoking about 6 years ago. Her smoking use included cigarettes. She started smoking about 26 years ago. She has a 20 pack-year smoking history. She uses smokeless tobacco.     Past Hx:  has a past medical history of Anxiety, Breast cancer (), Crohn's disease, DVT (deep vein thrombosis) in pregnancy, PFO (patent foramen ovale), Stroke, and Tattoos.  Surg Hx:  has a past surgical history that includes Colectomy; Skin cancer excision;  section; Colonoscopy; Mastectomy (Bilateral, 10/9/2024); Venous Access Device (Port) (N/A, 12/10/2024); and Teeth Extraction (N/A, 2025).  FH: family history includes Diabetes in her mother and paternal grandmother; Heart disease in her father; Hypertension in an other family member.  SH:  reports that she quit smoking about 6 years ago. Her smoking use included cigarettes. She started smoking about 26 years ago. She has a 20 pack-year smoking history. She uses smokeless tobacco. She reports current alcohol use. She reports that she does not use drugs.    (Not in a hospital admission)    No Known Allergies    Review of Systems   All other systems reviewed and are negative.    Vital Signs past 24hrs  BP range: BP: ()/(61-72) 97/61  Pulse range: Heart Rate:  [] 78  Resp rate range: Resp:  [12-16] 12  Temp range: Temp (24hrs), Av.6 °F (36.4 °C), Min:97.6 °F (36.4 °C), Max:97.6 °F (36.4 °C)    Oxygen range: SpO2:  [97 %-100 %] 100 %;  ;   Device (Oxygen Therapy): room  air  63.6 kg (140 lb 3.4 oz); Body mass index is 23.33 kg/m².  Net IO Since Admission: 1,000 mL [03/08/25 1611]        Mechanical Ventilator:     Physical Exam  Vitals and nursing note reviewed.   Constitutional:       General: She is not in acute distress.  HENT:      Head: Normocephalic and atraumatic.   Cardiovascular:      Rate and Rhythm: Normal rate and regular rhythm.      Heart sounds: No murmur heard.  Pulmonary:      Effort: Pulmonary effort is normal. No respiratory distress.      Breath sounds: Normal breath sounds. No wheezing, rhonchi or rales.   Abdominal:      General: Abdomen is flat.      Tenderness: There is no abdominal tenderness.   Skin:     General: Skin is warm and dry.      Findings: No rash.   Neurological:      Mental Status: She is alert.         Results Review:    I have reviewed the laboratory and imaging data from current admission. My annotations are as noted in assessment and plan.  Result Review:  I have personally reviewed the results from the time of this admission to 3/8/2025 16:11 EST and agree with these findings:  [x]  Laboratory list / accordion  [x]  Microbiology  [x]  Radiology  [x]  EKG/Telemetry   [x]  Cardiology/Vascular   [x]  Pathology  [x]  Old records  []  Other:        Medication Review:  I have reviewed the current MAR. My annotations are as noted in assessment and plan.       Lines, Drains & Airways       Active LDAs       Name Placement date Placement time Site Days    Peripheral IV 03/08/25 1326 Left Antecubital 03/08/25  1326  Antecubital  less than 1    Single Lumen Implantable Port 12/10/24 Left Chest 12/10/24  1234  Chest  88                  Diet Orders (active) (From admission, onward)      None          No active isolations    Assessment  Hypokalemia  Chronic diarrhea due to chemotherapy and Crohn's disease  Nausea/vomiting  Right breast cancer with invasive ductal carcinoma  Pancytopenia  Crohn's disease  Recent febrile neutropenia with tooth  extraction      Plan  -Patient presented on 3/8/2025 after having significant lightheadedness, dizziness and shortness of breath with exertion.  Found to have significant hypokalemia with bicytopenia/pancytopenia related to her chemotherapy.  -Will monitor in ICU for dysrhythmias  - Continue aggressive replacement of potassium  - Will consult heme-onc  - Symptomatic treatment of nausea, vomiting, diarrhea  -Given patient's recent febrile neutropenia that required multiple tooth extractions.  We will watch her closely and if develops a fever then will start antibiotics  - Placed in neutropenic precautions  - Resume regular diet  - ICU core measures    Electronically signed by Gulshan Gallo DO, 03/08/25, 4:11 PM EST.      Part of this note may be an electronic transcription/translation of spoken language to printed text using the Dragon Dictation System.      Electronically signed by Gulshan Gallo DO at 03/08/25 2248          Emergency Department Notes        Pratibha Salinas RN at 03/08/25 1618          Nursing report ED to floor  Suzanne Lin  44 y.o.  female    HPI :  HPI  Stated Reason for Visit: generalized weakness dizziness    Chief Complaint  Chief Complaint   Patient presents with    Weakness - Generalized    Dizziness       Admitting doctor:   Gulshan Gallo DO    Admitting diagnosis:   The primary encounter diagnosis was Hypokalemia. Diagnoses of Exertional dyspnea, Palpitations, Vomiting and diarrhea, Leukopenia, unspecified type, and Malignant neoplasm of female breast, unspecified estrogen receptor status, unspecified laterality, unspecified site of breast on chemotherapy were also pertinent to this visit.    Code status:   Current Code Status       Date Active Code Status Order ID Comments User Context       Prior            Allergies:   Patient has no known allergies.    Isolation:   No active isolations    Intake and Output    Intake/Output Summary (Last 24 hours) at 3/8/2025 1618  Last  "data filed at 3/8/2025 1545  Gross per 24 hour   Intake 1000 ml   Output --   Net 1000 ml       Weight:       03/08/25  1313   Weight: 63.6 kg (140 lb 3.4 oz)       Most recent vitals:   Vitals:    03/08/25 1249 03/08/25 1312 03/08/25 1313 03/08/25 1446   BP:  104/72  97/61   Pulse: (!) 130 84  78   Resp: 16 16  12   Temp: 97.6 °F (36.4 °C)      SpO2: 97% 97%  100%   Weight:   63.6 kg (140 lb 3.4 oz)    Height:   165.1 cm (65\")        Active LDAs/IV Access:   Lines, Drains & Airways       Active LDAs       Name Placement date Placement time Site Days    Peripheral IV 03/08/25 1326 Left Antecubital 03/08/25  1326  Antecubital  less than 1    Single Lumen Implantable Port 12/10/24 Left Chest 12/10/24  1234  Chest  88                    Labs (abnormal labs have a star):   Labs Reviewed   COMPREHENSIVE METABOLIC PANEL - Abnormal; Notable for the following components:       Result Value    Glucose 110 (*)     Potassium 2.0 (*)     Chloride 93 (*)     Albumin 3.4 (*)     Alkaline Phosphatase 205 (*)     Total Bilirubin 1.3 (*)     Anion Gap 16.8 (*)     All other components within normal limits    Narrative:     GFR Categories in Chronic Kidney Disease (CKD)      GFR Category          GFR (mL/min/1.73)    Interpretation  G1                     90 or greater         Normal or high (1)  G2                      60-89                Mild decrease (1)  G3a                   45-59                Mild to moderate decrease  G3b                   30-44                Moderate to severe decrease  G4                    15-29                Severe decrease  G5                    14 or less           Kidney failure          (1)In the absence of evidence of kidney disease, neither GFR category G1 or G2 fulfill the criteria for CKD.    eGFR calculation 2021 CKD-EPI creatinine equation, which does not include race as a factor   PROTIME-INR - Abnormal; Notable for the following components:    Protime 15.6 (*)     INR 1.25 (*)     All " "other components within normal limits   D-DIMER, QUANTITATIVE - Abnormal; Notable for the following components:    D-Dimer, Quantitative 0.67 (*)     All other components within normal limits    Narrative:     According to the assay 's published package insert, a normal (<0.50 MCGFEU/mL) D-dimer result in conjunction with a non-high clinical probability assessment, excludes deep vein thrombosis (DVT) and pulmonary embolism (PE) with high sensitivity.    D-dimer values increase with age and this can make VTE exclusion of an older population difficult. To address this, the American College of Physicians, based on best available evidence and recent guidelines, recommends that clinicians use age-adjusted D-dimer thresholds in patients greater than 50 years of age with: a) a low probability of PE who do not meet all Pulmonary Embolism Rule Out Criteria, or b) in those with intermediate probability of PE.   The formula for an age-adjusted D-dimer cut-off is \"age/100\".  For example, a 60 year old patient would have an age-adjusted cut-off of 0.60 MCGFEU/mL and an 80 year old 0.80 MCGFEU/mL.   CBC WITH AUTO DIFFERENTIAL - Abnormal; Notable for the following components:    WBC 1.57 (*)     RBC 3.12 (*)     Hemoglobin 9.3 (*)     Hematocrit 26.9 (*)     RDW 15.9 (*)     All other components within normal limits   MANUAL DIFFERENTIAL - Abnormal; Notable for the following components:    Eosinophil % 0.0 (*)     Neutrophils Absolute 1.05 (*)     Lymphocytes Absolute 0.34 (*)     All other components within normal limits   HIGH SENSITIVITIY TROPONIN T 1HR - Abnormal; Notable for the following components:    HS Troponin T 14 (*)     All other components within normal limits    Narrative:     High Sensitive Troponin T Reference Range:  <14.0 ng/L- Negative Female for AMI  <22.0 ng/L- Negative Male for AMI  >=14 - Abnormal Female indicating possible myocardial injury.  >=22 - Abnormal Male indicating possible myocardial " injury.   Clinicians would have to utilize clinical acumen, EKG, Troponin, and serial changes to determine if it is an Acute Myocardial Infarction or myocardial injury due to an underlying chronic condition.        MAGNESIUM - Normal   BNP (IN-HOUSE) - Normal    Narrative:     This assay is used as an aid in the diagnosis of individuals suspected of having heart failure. It can be used as an aid in the diagnosis of acute decompensated heart failure (ADHF) in patients presenting with signs and symptoms of ADHF to the emergency department (ED). In addition, NT-proBNP of <300 pg/mL indicates ADHF is not likely.    Age Range Result Interpretation  NT-proBNP Concentration (pg/mL:      <50             Positive            >450                   Gray                 300-450                    Negative             <300    50-75           Positive            >900                  Gray                300-900                  Negative            <300      >75             Positive            >1800                  Gray                300-1800                  Negative            <300   TROPONIN - Normal    Narrative:     High Sensitive Troponin T Reference Range:  <14.0 ng/L- Negative Female for AMI  <22.0 ng/L- Negative Male for AMI  >=14 - Abnormal Female indicating possible myocardial injury.  >=22 - Abnormal Male indicating possible myocardial injury.   Clinicians would have to utilize clinical acumen, EKG, Troponin, and serial changes to determine if it is an Acute Myocardial Infarction or myocardial injury due to an underlying chronic condition.        URINALYSIS W/ MICROSCOPIC IF INDICATED (NO CULTURE)   CBC AND DIFFERENTIAL    Narrative:     The following orders were created for panel order CBC & Differential.  Procedure                               Abnormality         Status                     ---------                               -----------         ------                     CBC Auto Differential[[id number]]         Abnormal            Final result                 Please view results for these tests on the individual orders.       EKG:   ECG 12 Lead Dyspnea   Preliminary Result   HEART RATE=80  bpm   RR Vzjevfsd=006  ms   HI Acesvywh=853  ms   P Horizontal Axis=9  deg   P Front Axis=75  deg   QRSD Interval=98  ms   QT Uudsvurx=417  ms   JMzH=517  ms   QRS Axis=55  deg   T Wave Axis=-5  deg   - BORDERLINE ECG -   Sinus rhythm   Borderline repolarization abnormality   Date and Time of Study:2025-03-08 13:15:23          Meds given in ED:   Medications   sodium chloride 0.9 % flush 10 mL (has no administration in time range)   Potassium Replacement - Follow Nurse / BPA Driven Protocol (has no administration in time range)   potassium chloride (KLOR-CON) packet 40 mEq (40 mEq Oral Given 3/8/25 1545)   potassium chloride 10 mEq in 100 mL IVPB (10 mEq Intravenous New Bag 3/8/25 1544)   sodium chloride 0.9 % flush 10 mL (has no administration in time range)   sodium chloride 0.9 % flush 10 mL (has no administration in time range)   sodium chloride 0.9 % flush 10 mL (has no administration in time range)   sodium chloride 0.9 % flush 20 mL (has no administration in time range)   sodium chloride 0.9 % infusion 40 mL (has no administration in time range)   heparin injection 500 Units (has no administration in time range)   sodium chloride 0.9 % bolus 1,000 mL (0 mL Intravenous Stopped 3/8/25 1545)   iopamidol (ISOVUE-370) 76 % injection 100 mL (95 mL Intravenous Given 3/8/25 1351)       Imaging results:  CT Angiogram Chest  Result Date: 3/8/2025  1. No evidence of pulmonary thromboembolism. 2. Few sub-6 mm solid pulmonary nodules are unchanged dating back to September 2024. Continued attention on follow-up. 3. Approximately 2 cm right thyroid nodule. Follow-up nonemergent thyroid ultrasound.   This report was finalized on 3/8/2025 2:56 PM by Dr. Dirk Mccray M.D on Workstation: [id number]      MTailor Chest 1 View  Result Date:  3/8/2025  Left subclavian port with tip overlying the mid SVC. No focal consolidation. No pleural effusion or pneumothorax.  Normal size cardiomediastinal silhouette.  No focal osseous abnormality.   This report was finalized on 3/8/2025 1:59 PM by Dr. Dirk Mccray M.D on Workstation: [id number]        Ambulatory status:   - ax2    Social issues:   Social History     Socioeconomic History    Marital status: Single    Number of children: 1   Tobacco Use    Smoking status: Former     Current packs/day: 0.00     Average packs/day: 1 pack/day for 20.0 years (20.0 ttl pk-yrs)     Types: Cigarettes     Start date:      Quit date: 2019     Years since quittin.1    Smokeless tobacco: Current    Tobacco comments:     Nicotine pouches   Vaping Use    Vaping status: Never Used   Substance and Sexual Activity    Alcohol use: Yes     Comment: OCC    Drug use: Never    Sexual activity: Defer       Peripheral Neurovascular  Peripheral Neurovascular (Adult)  Peripheral Neurovascular WDL: WDL    Neuro Cognitive  Neuro Cognitive (Adult)  Cognitive/Neuro/Behavioral WDL: WDL    Learning  Learning Assessment  Learning Readiness and Ability: no barriers identified  Education Provided  Person Taught: patient  Teaching Method: verbal instruction    Respiratory  Respiratory  Airway WDL: WDL  Respiratory WDL  Respiratory WDL: WDL    Abdominal Pain       Pain Assessments  Pain (Adult)  (0-10) Pain Rating: Rest: 0    NIH Stroke Scale       Pratibha Salinas RN  25 16:18 EST     Electronically signed by Pratibha Salinas RN at 25 1618       Buddy Perez MD at 25 1255        Procedure Orders    1. Critical Care [[id number]] ordered by Buddy Perez MD                  EMERGENCY DEPARTMENT ENCOUNTER    Room Number:  32/32  Date of encounter:  3/8/2025  PCP: Gia Rodriguez APRN  Historian: Patient and significant other  Relevant information and history provided by sources other than the patient will be included  below and in the ED Course.  Review of pertinent past medical records may also be included in record below and ED Course.    HPI:  Chief Complaint: Feeling weak, shortness of breath, palpitation  A complete HPI/ROS/PMH/PSH/SH/FH are unobtainable due to: Not applicable  Context: Suzanne Lin is a 44 y.o. female who presents to the ED c/o patient since she started chemotherapy in January she is progressively gotten worse.  She has no energy.  When she gets up to do anything her symptoms are worse.  She will get short of breath and she will feel like her heart is racing.  Her ability to do activity has gradually decreased over the past several weeks.  Is come to the point where she cannot stand up for a minute or even walk across the room.  She will get a fast heart rate and she will get short of breath and feels weak and has a sensation that she can a pass out.  It is better than when she sits down or lays down.  Does not have any shortness of breath when she is laying down.  Her last chemotherapy was approximately 2 weeks ago.  Her diarrhea is still present but is improved with Imodium.  Her nausea is still present but is improved with Zofran and Compazine.  She is not eating or drinking much.  She is losing weight.  She denies any fevers or coughs.  She has had occasional abdominal cramps but no cramps now.  She does have a history of Crohn's with previous surgery in the past.  She is also had a port in her left chest.  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  She has a history of a PFO.  The only medicine that she takes for blood thinning medicine is aspirin.  In the distant past when she had surgery from her Crohn's she did have a DVT to her left lower extremity.  Has not had a DVT or blood clot since that time.  At this time I am seeing her denies any pain.        Previous Episodes: Yes but progressively worsening since  starting chemotherapy.  Current Symptoms: See above    MEDICAL HISTORY REVIEWED  I can see this patient was discharged from [facility] on January 29, 2025.  She presented with neutropenic fevers.  She has a history of Crohn's disease and breast cancer.  She was started on empiric antibiotics and ID was consulted as well as oncology she has severe protein malnutrition she also had severe hypokalemia.  Cultures were all negative she did have a periapical abscess and was scheduled to follow-up with oral surgery to have some teeth extracted.  She responded well her counts normalized and was discharged home.  She did receive Neupogen.    I reviewed the note from Dr. 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  On the CT scan that she had in September she did have lymphadenopathy and I did review the CT scan report and the PET scan report.      PAST MEDICAL HISTORY  Active Ambulatory Problems     Diagnosis Date Noted    PFO (patent foramen ovale) 09/18/2024    Palpitations 09/28/2024    Breast cancer, stage 3, right 10/02/2024    Breast cancer in female 10/09/2024    Need for prophylactic chemotherapy 10/31/2024    Encounter for central line care 10/31/2024    LITA (iron deficiency anemia) 12/12/2024    Adverse effect of iron 12/12/2024    Crohn's disease without complication 12/17/2024    Encounter for screening colonoscopy 12/17/2024    Immunosuppressed due to chemotherapy 01/29/2025    Severe protein-calorie malnutrition 01/29/2025     Resolved Ambulatory Problems     Diagnosis Date Noted    Neutropenic fever 01/22/2025     Past Medical History:   Diagnosis Date    Anxiety     Breast cancer 2024    Crohn's disease     DVT (deep vein thrombosis) in pregnancy     Stroke     Tattoos           PAST SURGICAL HISTORY  Past Surgical History:   Procedure Laterality Date     SECTION      COLON RESECTION      COLONOSCOPY      MASTECTOMY Bilateral 10/9/2024    Procedure: Modified radical mastectomy with axillary dissection of the right breast and simple mastectomy of the left breast;  Surgeon: Rebekah Garcia DO;  Location: [facility] OR;  Service: General;  Laterality: Bilateral;    SKIN CANCER EXCISION      TEETH EXTRACTION N/A 2025    Procedure: TOOTH EXTRACTION #2,15,19,28,29;  Surgeon: Pramod Zambrano DMD;  Location: [facility] OR;  Service: Oral Surgery;  Laterality: N/A;    VENOUS ACCESS DEVICE (PORT) INSERTION N/A 12/10/2024    Procedure: INSERTION VENOUS ACCESS DEVICE;  Surgeon: Rebekah Garcia DO;  Location: [facility] OR;  Service: General;  Laterality: N/A;         FAMILY HISTORY  Family History   Problem Relation Age of Onset    Diabetes Mother     Heart disease Father     Diabetes Paternal Grandmother     Hypertension Other     Malig Hyperthermia Neg Hx          SOCIAL HISTORY  Social History     Socioeconomic History    Marital status: Single    Number of children: 1   Tobacco Use    Smoking status: Former     Current packs/day: 0.00     Average packs/day: 1 pack/day for 20.0 years (20.0 ttl pk-yrs)     Types: Cigarettes     Start date:      Quit date: 2019     Years since quittin.1    Smokeless tobacco: Current    Tobacco comments:     Nicotine pouches   Vaping Use    Vaping status: Never Used   Substance and Sexual Activity    Alcohol use: Yes     Comment: OCC    Drug use: Never    Sexual activity: Defer         ALLERGIES  Patient has no known allergies.        REVIEW OF SYSTEMS  Review of Systems     All systems reviewed and negative except for those discussed in HPI.       PHYSICAL EXAM    I have reviewed the triage vital signs and nursing notes.    ED Triage Vitals [25 1249]   Temp Heart Rate Resp BP SpO2   97.6 °F (36.4 °C) (!) 130 16 -- 97 %      Temp  src Heart Rate Source Patient Position BP Location FiO2 (%)   -- -- -- -- --       GENERAL: This is a frail chronically malnourished female.  She looks older than her stated age.  She looks tired and weak.  .Vital signs on my initial evaluation have been reviewed.  She is afebrile she is tachycardic with her heart rate about 1 10-1 20s on my exam.  HENT: nares patent  Head/neck/ face are symmetric without gross deformity, signs of trauma, or swelling  EYES: no scleral icterus, no conjunctival pallor.  NECK: Supple, no meningismus  CV: Tachycardia.  No obvious murmur or rub.  RESPIRATORY: normal effort and no respiratory distress.  Clear to auscultation bilaterally.  ABDOMEN: soft and nontender.  Normal inspection previous scars to her lower abdomen.  No tenderness on diffuse exam  MUSCULOSKELETAL: no deformity.  Muscle wasting.  No edema.  Intact distal pulses to upper and lower extremities are equal strong and symmetric.  NEURO: alert and appropriate, moves all extremities, follows commands.  Generalized weakness.  No focal motor or sensory changes.  SKIN: warm, dry    Vital signs and nursing notes reviewed.        LAB RESULTS  Recent Results (from the past 24 hours)   ECG 12 Lead Dyspnea    Collection Time: 03/08/25  1:15 PM   Result Value Ref Range    QT Interval 413 ms    QTC Interval 476 ms   Comprehensive Metabolic Panel    Collection Time: 03/08/25  1:27 PM    Specimen: Blood   Result Value Ref Range    Glucose 110 (H) 65 - 99 mg/dL    BUN 8 6 - 20 mg/dL    Creatinine 0.57 0.57 - 1.00 mg/dL    Sodium 136 136 - 145 mmol/L    Potassium 2.0 (C) 3.5 - 5.2 mmol/L    Chloride 93 (L) 98 - 107 mmol/L    CO2 26.2 22.0 - 29.0 mmol/L    Calcium 9.4 8.6 - 10.5 mg/dL    Total Protein 7.2 6.0 - 8.5 g/dL    Albumin 3.4 (L) 3.5 - 5.2 g/dL    ALT (SGPT) 26 1 - 33 U/L    AST (SGOT) 17 1 - 32 U/L    Alkaline Phosphatase 205 (H) 39 - 117 U/L    Total Bilirubin 1.3 (H) 0.0 - 1.2 mg/dL    Globulin 3.8 gm/dL    A/G Ratio 0.9 g/dL     BUN/Creatinine Ratio 14.0 7.0 - 25.0    Anion Gap 16.8 (H) 5.0 - 15.0 mmol/L    eGFR 115.1 >60.0 mL/min/1.73   Protime-INR    Collection Time: 03/08/25  1:27 PM    Specimen: Blood   Result Value Ref Range    Protime 15.6 (H) 11.7 - 14.2 Seconds    INR 1.25 (H) 0.90 - 1.10   Magnesium    Collection Time: 03/08/25  1:27 PM    Specimen: Blood   Result Value Ref Range    Magnesium 1.6 1.6 - 2.6 mg/dL   BNP    Collection Time: 03/08/25  1:27 PM    Specimen: Blood   Result Value Ref Range    proBNP 112.0 0.0 - 450.0 pg/mL   D-dimer, Quantitative    Collection Time: 03/08/25  1:27 PM    Specimen: Blood   Result Value Ref Range    D-Dimer, Quantitative 0.67 (H) 0.00 - 0.50 MCGFEU/mL   High Sensitivity Troponin T    Collection Time: 03/08/25  1:27 PM    Specimen: Blood   Result Value Ref Range    HS Troponin T 12 <14 ng/L   CBC Auto Differential    Collection Time: 03/08/25  1:27 PM    Specimen: Blood   Result Value Ref Range    WBC 1.57 (C) 3.40 - 10.80 10*3/mm3    RBC 3.12 (L) 3.77 - 5.28 10*6/mm3    Hemoglobin 9.3 (L) 12.0 - 15.9 g/dL    Hematocrit 26.9 (L) 34.0 - 46.6 %    MCV 86.2 79.0 - 97.0 fL    MCH 29.8 26.6 - 33.0 pg    MCHC 34.6 31.5 - 35.7 g/dL    RDW 15.9 (H) 12.3 - 15.4 %    RDW-SD 49.1 37.0 - 54.0 fl    MPV 10.8 6.0 - 12.0 fL    Platelets 153 140 - 450 10*3/mm3   Manual Differential    Collection Time: 03/08/25  1:27 PM    Specimen: Blood   Result Value Ref Range    Neutrophil % 66.7 42.7 - 76.0 %    Lymphocyte % 21.9 19.6 - 45.3 %    Monocyte % 11.5 5.0 - 12.0 %    Eosinophil % 0.0 (L) 0.3 - 6.2 %    Basophil % 0.0 0.0 - 1.5 %    Neutrophils Absolute 1.05 (L) 1.70 - 7.00 10*3/mm3    Lymphocytes Absolute 0.34 (L) 0.70 - 3.10 10*3/mm3    Monocytes Absolute 0.18 0.10 - 0.90 10*3/mm3    Eosinophils Absolute 0.00 0.00 - 0.40 10*3/mm3    Basophils Absolute 0.00 0.00 - 0.20 10*3/mm3    Anisocytosis Slight/1+ None Seen    Microcytes Slight/1+ None Seen    Polychromasia Slight/1+ None Seen    WBC Morphology Normal  Normal    Platelet Morphology Normal Normal   High Sensitivity Troponin T 1Hr    Collection Time: 03/08/25  2:48 PM    Specimen: Blood   Result Value Ref Range    HS Troponin T 14 (H) <14 ng/L    Troponin T Numeric Delta 2 Abnormal if >/=3 ng/L       Ordered the above labs and independently reviewed the results.        RADIOLOGY  CT Angiogram Chest  Result Date: 3/8/2025  CT ANGIOGRAM CHEST PULMONARY EMBOLISM  TECHNIQUE: Radiation dose reduction techniques were utilized, including automated exposure control and exposure modulation based on body size. 2 mm images were obtained through the chest after the administration of IV contrast. 3D reformat images were created. Multiple coronal, sagittal, and 3-D reconstruction images were obtained.  HISTORY: Clinical concern for pulmonary thromboembolism. Breast cancer.  COMPARISON: CT chest 9/16/2024    FINDINGS: Approximately 2 cm right thyroid nodule (series 4/image 10). Bilateral mastectomies. No soft tissue nodularity within the chest wall. No axillary, internal mammary or visualized supraclavicular lymphadenopathy.  Nondilated main pulmonary artery. Pulmonary arteries are patent. Heart is normal in size. Trace pericardial fluid. Nondilated thoracic aorta. No mediastinal/hilar lymphadenopathy. Decompressed esophagus.  Trachea and main bronchi are patent. Diffuse mild bronchial wall thickening. No bronchiectasis. No emphysematous changes. Few sub-6 mm solid pulmonary nodules are unchanged dating back to September 2024. Reference left lower lobe (series 5/image 102) and right lower lobe (5/84). Small right upper lobe calcified granuloma. No focal groundglass opacity or consolidation. No pleural effusion.         1. No evidence of pulmonary thromboembolism. 2. Few sub-6 mm solid pulmonary nodules are unchanged dating back to September 2024. Continued attention on follow-up. 3. Approximately 2 cm right thyroid nodule. Follow-up nonemergent thyroid ultrasound.   This report  was finalized on 3/8/2025 2:56 PM by Dr. Dirk Mccray M.D on Workstation: [id number]      XR Chest 1 View  Result Date: 3/8/2025  XR CHEST 1 VW-  INDICATION: Shortness of breath. Breast cancer.  COMPARISON: Chest radiographs dating back to 12/10/2024      Left subclavian port with tip overlying the mid SVC. No focal consolidation. No pleural effusion or pneumothorax.  Normal size cardiomediastinal silhouette.  No focal osseous abnormality.   This report was finalized on 3/8/2025 1:59 PM by Dr. Dirk Mccray M.D on Workstation: [id number]        I ordered the above noted radiological studies. Reviewed by me and discussed with radiologist.  See dictation for official radiology interpretation.      PROCEDURES    Critical Care    Performed by: Buddy Perez MD  Authorized by: Gulshan Gallo DO    Critical care provider statement:     Critical care time (minutes): 30.    Critical care time was exclusive of:  Separately billable procedures and treating other patients    Critical care was necessary to treat or prevent imminent or life-threatening deterioration of the following conditions:  Respiratory failure and metabolic crisis    Critical care was time spent personally by me on the following activities:  Blood draw for specimens, development of treatment plan with patient or surrogate, evaluation of patient's response to treatment, examination of patient, review of old charts, re-evaluation of patient's condition, pulse oximetry, ordering and review of radiographic studies, ordering and review of laboratory studies and ordering and performing treatments and interventions    Care discussed with: admitting provider          MEDICATIONS GIVEN IN ER    Medications   sodium chloride 0.9 % flush 10 mL (has no administration in time range)   Potassium Replacement - Follow Nurse / BPA Driven Protocol (has no administration in time range)   potassium chloride (KLOR-CON) packet 40 mEq (40 mEq Oral Given  3/8/25 1545)   potassium chloride 10 mEq in 100 mL IVPB (10 mEq Intravenous New Bag 3/8/25 1544)   sodium chloride 0.9 % flush 10 mL (has no administration in time range)   sodium chloride 0.9 % flush 10 mL (has no administration in time range)   sodium chloride 0.9 % flush 10 mL (has no administration in time range)   sodium chloride 0.9 % flush 20 mL (has no administration in time range)   sodium chloride 0.9 % infusion 40 mL (has no administration in time range)   heparin injection 500 Units (has no administration in time range)   nitroglycerin (NITROSTAT) SL tablet 0.4 mg (has no administration in time range)   sodium chloride 0.9 % flush 10 mL (has no administration in time range)   sodium chloride 0.9 % flush 10 mL (has no administration in time range)   sodium chloride 0.9 % infusion 40 mL (has no administration in time range)   mupirocin (BACTROBAN) 2 % nasal ointment 1 Application (has no administration in time range)   sennosides-docusate (PERICOLACE) 8.6-50 MG per tablet 2 tablet (has no administration in time range)     And   polyethylene glycol (MIRALAX) packet 17 g (has no administration in time range)     And   bisacodyl (DULCOLAX) EC tablet 5 mg (has no administration in time range)     And   bisacodyl (DULCOLAX) suppository 10 mg (has no administration in time range)   Potassium Replacement - Follow Nurse / BPA Driven Protocol (has no administration in time range)   Magnesium Standard Dose Replacement - Follow Nurse / BPA Driven Protocol (has no administration in time range)   Phosphorus Replacement - Follow Nurse / BPA Driven Protocol (has no administration in time range)   Calcium Replacement - Follow Nurse / BPA Driven Protocol (has no administration in time range)   sodium chloride 0.9 % bolus 1,000 mL (0 mL Intravenous Stopped 3/8/25 1545)   iopamidol (ISOVUE-370) 76 % injection 100 mL (95 mL Intravenous Given 3/8/25 1351)         All labs have been independently reviewed by me.  All radiology  studies have been reviewed by me and I discussed with radiologist dictating the report when indicated below.  All EKG's independently viewed and interpreted by me.  Discussion below represents my analysis of pertinent findings related to patient's condition, differential diagnosis, treatment plan and final disposition.        PROGRESS, DATA ANALYSIS, CONSULTS, AND MEDICAL DECISION MAKING    DDx includes CHF, acute coronary syndrome, pulmonary embolism, pneumothorax, pneumonia, asthma/COPD,aspiration,  pulmonary hypertension, metabolic acidosis, deconditioning, anemia, other hematologic etiologies such as CO poisoning, anxiety.   This patient seems symptoms have progressively getting worse since starting chemotherapy.  This was in January.  She looks chronically malnourished she looks weak and tired if she is not eating or drinking much and losing weight.  Start IV fluids on her.  I am going to also check a CT angiogram of her chest to make sure she has not had a pulmonary embolism.  This very well could be due to her marked progressive deconditioning and as a result of her comorbidities and chemotherapy.  She is having this persistent diarrhea but it is improved with Imodium.  History of electrolyte abnormalities in the past as well.  Informed patient and significant other of my clinical assessment and treatment plan.  This patient will need to be admitted to the hospital.  All questions answered at this time.      ED Course as of 03/08/25 1638   Sat Mar 08, 2025   1408 My own independent interpretation of the EKG that was done at 1:15 PM reveals a rate of 80 it is sinus rhythm there is some intraventricular conduction delay nonspecific normal axis some nonspecific T wave changes in several leads I do not see any definitive acute injury pattern  This EKG looks fairly similar to an EKG that was done on 10/9/2024.  I also do not see any prolongation of the QT interval on this EKG. [MM]   1410 Hemoglobin(!): 9.3 [MM]    1410 Platelets: 153 [MM]   1410 11 days ago white blood cell count was 9.3.  Hemoglobin was 12 and platelet count was 541.  She has had a history of pancytopenia from chemotherapy in the past.      Chest x-ray looks unremarkable there is no obvious acute active disease seen chest x-ray independently and also reviewed the radiologist report. [MM]   1529 D-Dimer, Quant(!): 0.67 [MM]   1529 Potassium(!!): 2.0 [MM]   1529 Magnesium: 1.6 [MM]   1530 Anion Gap(!): 16.8 [MM]   1530 CT angiogram of the chest so no obvious pulmonary embolism there is some pulmonary nodules that are unchanged there is a thyroid nodule as well.  I looked at the CT scan and I did not see any obvious large pulmonary embolism.  I did review the report from the radiologist as well.  Please see complete dictated report from radiologist [MM]   1558 I reevaluated the patient.  Blood pressures in the 90 systolic range heart rate is normal.  O2 sats 100% on room air.  Informed her of the results of the lab work and the CT scan.  Informed her about the admission to the intensive care unit.  All questions answered at this time. [MM]   1558 I did discuss the case with Dr. Gallo who is on for intensive care unit.  Informed him of the patient's presenting symptoms results of test.  Agrees to admit the patient to the hospital.  All questions answered [MM]      ED Course User Index  [MM] Buddy Perez MD       AS OF 16:38 EST VITALS:    BP - 97/61  HR - 78  TEMP - 97.6 °F (36.4 °C)  02 SATS - 100%    SOCIAL DETERMINANTS OF HEALTH THAT IMPACT OR LIMIT CARE (For example..Homelessness,safe discharge, inability to obtain care, follow up, or prescriptions):      DIAGNOSIS  Final diagnoses:   Hypokalemia   Exertional dyspnea   Palpitations   Vomiting and diarrhea   Leukopenia, unspecified type   Malignant neoplasm of female breast, unspecified estrogen receptor status, unspecified laterality, unspecified site of breast on chemotherapy          DISPOSITION  Patient is admitted to the intensive care unit.          DICTATED UTILIZING DRAGON DICTATION    Note Disclaimer: At [facility], we believe that sharing information builds trust and better relationships. You are receiving this note because you recently visited [facility]. It is possible you will see health information before a provider has talked with you about it. This kind of information can be easy to misunderstand. To help you fully understand what it means for your health, we urge you to discuss this note with your provider.       Buddy Perez MD  03/08/25 1638      Electronically signed by Buddy Perez MD at 03/08/25 1638       Mignon Angel, RN at 03/08/25 1247          Patient to ER via car from home for dizziness and generalized weakness     Patient had chemo 2 weeks ago     Electronically signed by Mignon Angel, RN at 03/08/25 1247       Vital Signs (last 3 days)       Date/Time Temp Temp src Pulse Resp BP Patient Position SpO2    03/10/25 0825 98.1 (36.7) Oral 68 16 112/68 Lying 98    03/10/25 0823 -- Oral 82 18 112/68 Lying 98    03/10/25 0334 98.1 (36.7) Oral 65 16 109/67 Lying 99    03/10/25 0200 -- -- 70 -- 117/62 -- 93    03/10/25 0100 -- -- 67 -- 126/72 -- 97    03/10/25 0016 97.7 (36.5) Oral -- -- -- -- --    03/10/25 0000 -- -- 66 -- 111/59 -- 95    03/09/25 2300 -- -- 67 -- 125/71 -- 95    03/09/25 2200 -- -- 74 -- 111/65 -- 98    03/09/25 2100 -- -- 76 -- 110/67 -- 98    03/09/25 2000 97.4 (36.3) -- 69 12 119/70 -- 100    03/09/25 1900 -- -- 76 -- 119/71 -- 99    03/09/25 1830 -- -- 72 -- -- -- 100    03/09/25 1800 -- -- 78 -- 117/64 -- 98    03/09/25 1730 -- -- 75 -- -- -- 100    03/09/25 1700 -- -- 72 -- 120/65 -- 99    03/09/25 1655 98.3 (36.8) Oral 62 18 102/66 -- 99    03/09/25 1654 98.3 (36.8) -- -- -- -- -- --    03/09/25 1630 -- -- 73 -- -- -- 98    03/09/25 1600 -- -- 67 -- 109/61 -- 99    03/09/25 1530 -- -- 70 -- -- -- 98    03/09/25 1528 97.8  (36.6) Oral 72 -- 111/51 -- 98    03/09/25 1500 -- -- 75 -- 111/56 -- 100    03/09/25 1430 -- -- 68 -- -- -- 97    03/09/25 1400 -- -- 63 -- 102/52 -- 95    03/09/25 1330 -- -- 72 -- -- -- 95    03/09/25 1300 -- -- 77 -- 109/55 -- 99    03/09/25 1233 98 (36.7) -- 78 14 106/54 -- 98    03/09/25 1230 -- -- 77 -- -- -- 98    03/09/25 1207 98 (36.7) Oral -- 14 114/60 -- --    03/09/25 1200 -- -- 87 -- 110/60 -- 100    03/09/25 1130 97.4 (36.3) -- -- -- -- -- --    03/09/25 1100 -- -- 74 -- 113/58 -- 100    03/09/25 1000 -- -- 76 -- 118/60 -- 100    03/09/25 0900 -- -- 79 -- 109/65 -- 96    03/09/25 0833 -- -- 69 -- 107/58 -- --    03/09/25 0800 97.8 (36.6) -- 76 -- 107/58 -- 94    03/09/25 0700 -- -- 80 -- 124/54 -- 99    03/09/25 0600 -- -- 73 -- 123/61 -- 98    03/09/25 0523 -- -- 75 -- 125/63 -- 100    03/09/25 0400 98.5 (36.9) Oral 81 18 114/50 Lying 99    03/09/25 0300 -- -- 73 -- 116/59 -- 95    03/09/25 0130 -- -- 76 -- 90/48 -- 94    03/09/25 0102 -- -- 79 -- 110/67 -- 96    03/09/25 0100 -- -- 74 -- 85/41 -- 93    03/09/25 0000 98.5 (36.9) Oral 80 20 88/48 Lying 96    03/08/25 2300 -- -- 84 -- 86/54 -- 98    03/08/25 2200 -- -- 81 -- 91/63 -- 98    03/08/25 2130 -- -- 91 -- 91/63 -- 96    03/08/25 2100 -- -- 89 -- 86/60 -- 100    03/08/25 2030 -- -- 83 -- 85/58 -- 100    03/08/25 1900 97.9 (36.6) Oral -- -- -- -- --    03/08/25 1805 -- -- 86 -- 85/62 -- 99    03/08/25 1721 -- -- 83 -- -- -- 100    03/08/25 1446 -- -- 78 12 97/61 -- 100    03/08/25 1312 -- -- 84 16 104/72 -- 97    03/08/25 1249 97.6 (36.4) -- 130 16 -- -- 97          Oxygen Therapy (last 3 days)       Date/Time SpO2 Device (Oxygen Therapy) Flow (L/min) (Oxygen Therapy) Oxygen Concentration (%) ETCO2 (mmHg)    03/10/25 0907 -- room air -- -- --    03/10/25 0825 98 room air -- -- --    03/10/25 0823 98 room air -- -- --    03/10/25 0334 99 room air -- -- --    03/10/25 0200 93 -- -- -- --    03/10/25 0100 97 -- -- -- --    03/10/25 0000 95 -- --  -- --    03/09/25 2300 95 -- -- -- --    03/09/25 2200 98 -- -- -- --    03/09/25 2100 98 -- -- -- --    03/09/25 2000 100 room air -- -- --    03/09/25 1900 99 -- -- -- --    03/09/25 1830 100 -- -- -- --    03/09/25 1800 98 -- -- -- --    03/09/25 1730 100 -- -- -- --    03/09/25 1700 99 -- -- -- --    03/09/25 1655 99 room air -- -- --    03/09/25 1630 98 -- -- -- --    03/09/25 1600 99 room air -- -- --    03/09/25 1530 98 -- -- -- --    03/09/25 1528 98 -- -- -- --    03/09/25 1500 100 -- -- -- --    03/09/25 1430 97 -- -- -- --    03/09/25 1400 95 -- -- -- --    03/09/25 1330 95 -- -- -- --    03/09/25 1300 99 -- -- -- --    03/09/25 1233 98 -- -- -- --    03/09/25 1230 98 -- -- -- --    03/09/25 1207 -- room air -- -- --    03/09/25 1200 100 room air -- -- --    03/09/25 1100 100 -- -- -- --    03/09/25 1000 100 -- -- -- --    03/09/25 0900 96 -- -- -- --    03/09/25 0800 94 room air -- -- --    03/09/25 0700 99 -- -- -- --    03/09/25 0600 98 -- -- -- --    03/09/25 0523 100 -- -- -- --    03/09/25 0400 99 room air -- -- --    03/09/25 0300 95 -- -- -- --    03/09/25 0130 94 -- -- -- --    03/09/25 0102 96 -- -- -- --    03/09/25 0100 93 -- -- -- --    03/09/25 0000 96 room air -- -- --    03/08/25 2300 98 -- -- -- --    03/08/25 2200 98 -- -- -- --    03/08/25 2130 96 -- -- -- --    03/08/25 2100 100 -- -- -- --    03/08/25 2030 100 -- -- -- --    03/08/25 2000 -- room air -- -- --    03/08/25 1805 99 room air -- -- --    03/08/25 1721 100 -- -- -- --    03/08/25 1446 100 room air -- -- --    03/08/25 1312 97 room air -- -- --    03/08/25 1249 97 -- -- -- --          Intake & Output (last 3 days)         03/07 0701  03/08 0700 03/08 0701 03/09 0700 03/09 0701  03/10 0700 03/10 0701 03/11 0700    P.O.   510     Blood   670     IV Piggyback  1100      Total Intake(mL/kg)  1100 (17.9) 1180 (18.6)     Urine (mL/kg/hr)   400 (0.3)     Stool   0     Total Output   400     Net  +1100 +780             Urine  Unmeasured Occurrence  1 x 4 x     Stool Unmeasured Occurrence   3 x            Lines, Drains & Airways       Active LDAs       Name Placement date Placement time Site Days    Peripheral IV 03/08/25 1326 Left Antecubital 03/08/25  1326  Antecubital  1    Single Lumen Implantable Port 12/10/24 Left Chest 12/10/24  1234  Chest  89                  Current Facility-Administered Medications   Medication Dose Route Frequency Provider Last Rate Last Admin    acetaminophen (TYLENOL) tablet 650 mg  650 mg Oral Q6H PRN Nishant Witt MD   650 mg at 03/09/25 1242    aspirin EC tablet 81 mg  81 mg Oral Daily Gulshan Gallo DO   81 mg at 03/10/25 0907    sennosides-docusate (PERICOLACE) 8.6-50 MG per tablet 2 tablet  2 tablet Oral BID Gulshan Gallo DO        And    polyethylene glycol (MIRALAX) packet 17 g  17 g Oral Daily PRN Gulshan Gallo DO        And    bisacodyl (DULCOLAX) EC tablet 5 mg  5 mg Oral Daily PRN Gulsahn Gallo DO        And    bisacodyl (DULCOLAX) suppository 10 mg  10 mg Rectal Daily PRN Gulshan Gallo DO        Calcium Replacement - Follow Nurse / BPA Driven Protocol   Not Applicable PRN Gulshan Gallo DO        dicyclomine (BENTYL) capsule 10 mg  10 mg Oral TID PRN Chava Lewis MD   10 mg at 03/09/25 2123    gabapentin (NEURONTIN) capsule 600 mg  600 mg Oral Nightly Chava Lewis MD   600 mg at 03/09/25 2001    heparin injection 500 Units  5 mL Intravenous PRN Buddy Perez MD        loperamide (IMODIUM) capsule 2 mg  2 mg Oral 4x Daily PRN Chava Lewis MD   2 mg at 03/10/25 0018    Magnesium Standard Dose Replacement - Follow Nurse / BPA Driven Protocol   Not Applicable PRN Gulshan Gallo DO        magnesium sulfate 2g/50 mL (PREMIX) infusion  2 g Intravenous Q2H Gulshan Gallo DO   2 g at 03/10/25 1138    mupirocin (BACTROBAN) 2 % nasal ointment 1 Application  1 Application Each Nare BID Gulshan Gallo DO   1  Application at 03/09/25 2001    nitroglycerin (NITROSTAT) SL tablet 0.4 mg  0.4 mg Sublingual Q5 Min PRN Gulshan Gallo DO        ondansetron (ZOFRAN) tablet 8 mg  8 mg Oral TID PRN Chava Lewis MD        oxyCODONE-acetaminophen (PERCOCET) 5-325 MG per tablet 1 tablet  1 tablet Oral Q6H PRN Chava Lewis MD        Phosphorus Replacement - Follow Nurse / BPA Driven Protocol   Not Applicable PRN Gulshan Gallo DO        Potassium Replacement - Follow Nurse / BPA Driven Protocol   Not Applicable PRN Buddy Perez MD        Potassium Replacement - Follow Nurse / BPA Driven Protocol   Not Applicable PRN Gulshan Gallo DO        prochlorperazine (COMPAZINE) tablet 5 mg  5 mg Oral Q6H PRN Gulshan Gallo DO        sodium chloride 0.9 % flush 10 mL  10 mL Intravenous PRN Buddy Perez MD        sodium chloride 0.9 % flush 10 mL  10 mL Intravenous PRN Buddy Perez MD        sodium chloride 0.9 % flush 10 mL  10 mL Intravenous Q12H Buddy Perez MD   10 mL at 03/10/25 0939    sodium chloride 0.9 % flush 10 mL  10 mL Intravenous PRN Buddy Perez MD        sodium chloride 0.9 % flush 10 mL  10 mL Intravenous Q12H Gulshan Gallo,    10 mL at 03/10/25 0939    sodium chloride 0.9 % flush 10 mL  10 mL Intravenous PRN Gulshan Gallo DO        sodium chloride 0.9 % flush 20 mL  20 mL Intravenous PRN Buddy Perez MD        sodium chloride 0.9 % infusion 40 mL  40 mL Intravenous PRN Buddy Perez MD        sodium chloride 0.9 % infusion 40 mL  40 mL Intravenous PRN Gulshan Gallo DO        sodium chloride 0.9 % infusion  125 mL/hr Intravenous Continuous Chava Lewis  mL/hr at 03/09/25 1038 125 mL/hr at 03/09/25 1038    venlafaxine XR (EFFEXOR-XR) 24 hr capsule 37.5 mg  37.5 mg Oral Daily With Breakfast Chava Lewis MD   37.5 mg at 03/10/25 1138     Facility-Administered Medications Ordered in Other Encounters   Medication Dose Route Frequency Provider  Last Rate Last Admin    heparin injection 500 Units  500 Units Intravenous Nishant Desir MD   500 Units at 12/11/24 0916    heparin injection 500 Units  500 Units Intravenous PRN Nishant Witt MD   500 Units at 01/07/25 1049    sodium chloride 0.9 % flush 10 mL  10 mL Intravenous Nishant Desir MD   10 mL at 12/11/24 0916    sodium chloride 0.9 % flush 10 mL  10 mL Intravenous PRN Nishant Witt MD   10 mL at 01/07/25 1048     Blood Administration Record (From admission, onward)      Completed transfusions       Ordered     Start    03/09/25 0922  Transfuse RBC, 2 Units Infuse Each Unit Over: 3.5H  Transfusion      Released Time Blood Unit Number Status   03/09/25 1208   25  [id number]  [id number] Completed 03/09/25 1530 03/09/25 1655   25  [id number]  [id number] Completed 03/09/25 2004 03/09/25 0921                  Lab Results (all)       Procedure Component Value Units Date/Time    Comprehensive Metabolic Panel [[id number]]  (Abnormal) Collected: 03/10/25 0623    Specimen: Blood Updated: 03/10/25 0717     Glucose 80 mg/dL      BUN 2 mg/dL      Creatinine 0.40 mg/dL      Sodium 141 mmol/L      Potassium 3.7 mmol/L      Chloride 108 mmol/L      CO2 25.9 mmol/L      Calcium 8.4 mg/dL      Total Protein 5.6 g/dL      Albumin 2.7 g/dL      ALT (SGPT) 12 U/L      AST (SGOT) 9 U/L      Alkaline Phosphatase 150 U/L      Total Bilirubin 0.5 mg/dL      Globulin 2.9 gm/dL      A/G Ratio 0.9 g/dL      BUN/Creatinine Ratio 5.0     Anion Gap 7.1 mmol/L      eGFR 125.3 mL/min/1.73     Narrative:      GFR Categories in Chronic Kidney Disease (CKD)      GFR Category          GFR (mL/min/1.73)    Interpretation  G1                     90 or greater         Normal or high (1)  G2                      60-89                Mild decrease (1)  G3a                   45-59                Mild to moderate decrease  G3b                   30-44                Moderate to severe decrease  G4                     15-29                Severe decrease  G5                    14 or less           Kidney failure          (1)In the absence of evidence of kidney disease, neither GFR category G1 or G2 fulfill the criteria for CKD.    eGFR calculation 2021 CKD-EPI creatinine equation, which does not include race as a factor    Magnesium [[id number]]  (Abnormal) Collected: 03/10/25 0623    Specimen: Blood Updated: 03/10/25 0717     Magnesium 1.5 mg/dL     Phosphorus [[id number]]  (Normal) Collected: 03/10/25 0623    Specimen: Blood Updated: 03/10/25 0717     Phosphorus 2.6 mg/dL     CBC & Differential [[id number]]  (Abnormal) Collected: 03/10/25 0623    Specimen: Blood Updated: 03/10/25 0712    Narrative:      The following orders were created for panel order CBC & Differential.  Procedure                               Abnormality         Status                     ---------                               -----------         ------                     CBC Auto Differential[[id number]]        Abnormal            Final result                 Please view results for these tests on the individual orders.    CBC Auto Differential [[id number]]  (Abnormal) Collected: 03/10/25 0623    Specimen: Blood Updated: 03/10/25 0712     WBC 3.74 10*3/mm3      RBC 3.67 10*6/mm3      Hemoglobin 10.4 g/dL      Hematocrit 30.8 %      MCV 83.9 fL      MCH 28.3 pg      MCHC 33.8 g/dL      RDW 17.1 %      RDW-SD 51.1 fl      MPV 10.4 fL      Platelets 176 10*3/mm3      Neutrophil % 61.0 %      Lymphocyte % 15.8 %      Monocyte % 14.7 %      Eosinophil % 1.1 %      Basophil % 1.3 %      Immature Grans % 6.1 %      Neutrophils, Absolute 2.28 10*3/mm3      Lymphocytes, Absolute 0.59 10*3/mm3      Monocytes, Absolute 0.55 10*3/mm3      Eosinophils, Absolute 0.04 10*3/mm3      Basophils, Absolute 0.05 10*3/mm3      Immature Grans, Absolute 0.23 10*3/mm3     POC Glucose Once [[id number]]  (Normal) Collected: 03/09/25 2336    Specimen: Blood Updated: 03/09/25 2337      Glucose 96 mg/dL     Potassium [[id number]]  (Normal) Collected: 03/09/25 1836    Specimen: Blood Updated: 03/09/25 1917     Potassium 3.8 mmol/L     POC Glucose Once [[id number]]  (Normal) Collected: 03/09/25 1645    Specimen: Blood Updated: 03/09/25 1647     Glucose 87 mg/dL     POC Glucose Once [[id number]]  (Normal) Collected: 03/09/25 1137    Specimen: Blood Updated: 03/09/25 1139     Glucose 93 mg/dL     POC Glucose Once [[id number]]  (Normal) Collected: 03/09/25 0844    Specimen: Blood Updated: 03/09/25 0845     Glucose 85 mg/dL     POC Glucose Once [[id number]]  (Normal) Collected: 03/09/25 0631    Specimen: Blood Updated: 03/09/25 0632     Glucose 125 mg/dL     CBC & Differential [[id number]]  (Abnormal) Collected: 03/09/25 0400    Specimen: Blood Updated: 03/09/25 0505    Narrative:      The following orders were created for panel order CBC & Differential.  Procedure                               Abnormality         Status                     ---------                               -----------         ------                     CBC Auto Differential[[id number]]        Abnormal            Final result                 Please view results for these tests on the individual orders.    CBC Auto Differential [[id number]]  (Abnormal) Collected: 03/09/25 0400    Specimen: Blood Updated: 03/09/25 0505     WBC 1.90 10*3/mm3      RBC 2.33 10*6/mm3      Hemoglobin 7.0 g/dL      Hematocrit 20.6 %      MCV 88.4 fL      MCH 30.0 pg      MCHC 34.0 g/dL      RDW 16.6 %      RDW-SD 52.1 fl      MPV 10.8 fL      Platelets 120 10*3/mm3      Neutrophil % 53.6 %      Lymphocyte % 21.1 %      Monocyte % 17.4 %      Eosinophil % 2.1 %      Basophil % 1.6 %      Immature Grans % 4.2 %      Neutrophils, Absolute 1.02 10*3/mm3      Lymphocytes, Absolute 0.40 10*3/mm3      Monocytes, Absolute 0.33 10*3/mm3      Eosinophils, Absolute 0.04 10*3/mm3      Basophils, Absolute 0.03 10*3/mm3      Immature Grans, Absolute 0.08 10*3/mm3      Comprehensive Metabolic Panel [[id number]]  (Abnormal) Collected: 03/09/25 0400    Specimen: Blood Updated: 03/09/25 0456     Glucose 99 mg/dL      BUN 4 mg/dL      Creatinine 0.42 mg/dL      Sodium 139 mmol/L      Potassium 3.0 mmol/L      Chloride 108 mmol/L      CO2 21.8 mmol/L      Calcium 7.8 mg/dL      Total Protein 5.1 g/dL      Albumin 2.4 g/dL      ALT (SGPT) 14 U/L      AST (SGOT) 11 U/L      Alkaline Phosphatase 144 U/L      Total Bilirubin 0.4 mg/dL      Globulin 2.7 gm/dL      A/G Ratio 0.9 g/dL      BUN/Creatinine Ratio 9.5     Anion Gap 9.2 mmol/L      eGFR 123.9 mL/min/1.73     Narrative:      GFR Categories in Chronic Kidney Disease (CKD)      GFR Category          GFR (mL/min/1.73)    Interpretation  G1                     90 or greater         Normal or high (1)  G2                      60-89                Mild decrease (1)  G3a                   45-59                Mild to moderate decrease  G3b                   30-44                Moderate to severe decrease  G4                    15-29                Severe decrease  G5                    14 or less           Kidney failure          (1)In the absence of evidence of kidney disease, neither GFR category G1 or G2 fulfill the criteria for CKD.    eGFR calculation 2021 CKD-EPI creatinine equation, which does not include race as a factor    Magnesium [[id number]]  (Normal) Collected: 03/09/25 0400    Specimen: Blood Updated: 03/09/25 0455     Magnesium 1.6 mg/dL     Phosphorus [[id number]]  (Normal) Collected: 03/09/25 0400    Specimen: Blood Updated: 03/09/25 0448     Phosphorus 3.3 mg/dL     POC Glucose Once [[id number]]  (Normal) Collected: 03/09/25 0008    Specimen: Blood Updated: 03/09/25 0010     Glucose 128 mg/dL     Potassium [[id number]]  (Normal) Collected: 03/08/25 1953    Specimen: Blood Updated: 03/08/25 2034     Potassium 3.7 mmol/L     Basic Metabolic Panel [[id number]]  (Abnormal) Collected: 03/08/25 1448    Specimen: Blood Updated:  03/08/25 1900     Glucose 70 mg/dL      BUN 7 mg/dL      Creatinine 0.46 mg/dL      Sodium 134 mmol/L      Potassium 2.4 mmol/L      Chloride 97 mmol/L      CO2 24.4 mmol/L      Calcium 8.2 mg/dL      BUN/Creatinine Ratio 15.2     Anion Gap 12.6 mmol/L      eGFR 121.2 mL/min/1.73     Narrative:      GFR Categories in Chronic Kidney Disease (CKD)      GFR Category          GFR (mL/min/1.73)    Interpretation  G1                     90 or greater         Normal or high (1)  G2                      60-89                Mild decrease (1)  G3a                   45-59                Mild to moderate decrease  G3b                   30-44                Moderate to severe decrease  G4                    15-29                Severe decrease  G5                    14 or less           Kidney failure          (1)In the absence of evidence of kidney disease, neither GFR category G1 or G2 fulfill the criteria for CKD.    eGFR calculation 2021 CKD-EPI creatinine equation, which does not include race as a factor    POC Glucose Once [[id number]]  (Normal) Collected: 03/08/25 1837    Specimen: Blood Updated: 03/08/25 1838     Glucose 83 mg/dL     High Sensitivity Troponin T 1Hr [[id number]]  (Abnormal) Collected: 03/08/25 1448    Specimen: Blood Updated: 03/08/25 1522     HS Troponin T 14 ng/L      Troponin T Numeric Delta 2 ng/L     Narrative:      High Sensitive Troponin T Reference Range:  <14.0 ng/L- Negative Female for AMI  <22.0 ng/L- Negative Male for AMI  >=14 - Abnormal Female indicating possible myocardial injury.  >=22 - Abnormal Male indicating possible myocardial injury.   Clinicians would have to utilize clinical acumen, EKG, Troponin, and serial changes to determine if it is an Acute Myocardial Infarction or myocardial injury due to an underlying chronic condition.         Manual Differential [[id number]]  (Abnormal) Collected: 03/08/25 1327    Specimen: Blood Updated: 03/08/25 1419     Neutrophil % 66.7 %       "Lymphocyte % 21.9 %      Monocyte % 11.5 %      Eosinophil % 0.0 %      Basophil % 0.0 %      Neutrophils Absolute 1.05 10*3/mm3      Lymphocytes Absolute 0.34 10*3/mm3      Monocytes Absolute 0.18 10*3/mm3      Eosinophils Absolute 0.00 10*3/mm3      Basophils Absolute 0.00 10*3/mm3      Anisocytosis Slight/1+     Microcytes Slight/1+     Polychromasia Slight/1+     WBC Morphology Normal     Platelet Morphology Normal    Protime-INR [[id number]]  (Abnormal) Collected: 03/08/25 1327    Specimen: Blood Updated: 03/08/25 1417     Protime 15.6 Seconds      INR 1.25    D-dimer, Quantitative [[id number]]  (Abnormal) Collected: 03/08/25 1327    Specimen: Blood Updated: 03/08/25 1417     D-Dimer, Quantitative 0.67 MCGFEU/mL     Narrative:      According to the assay 's published package insert, a normal (<0.50 MCGFEU/mL) D-dimer result in conjunction with a non-high clinical probability assessment, excludes deep vein thrombosis (DVT) and pulmonary embolism (PE) with high sensitivity.    D-dimer values increase with age and this can make VTE exclusion of an older population difficult. To address this, the [employer], based on best available evidence and recent guidelines, recommends that clinicians use age-adjusted D-dimer thresholds in patients greater than 50 years of age with: a) a low probability of PE who do not meet all Pulmonary Embolism Rule Out Criteria, or b) in those with intermediate probability of PE.   The formula for an age-adjusted D-dimer cut-off is \"age/100\".  For example, a 60 year old patient would have an age-adjusted cut-off of 0.60 MCGFEU/mL and an 80 year old 0.80 MCGFEU/mL.    Comprehensive Metabolic Panel [[id number]]  (Abnormal) Collected: 03/08/25 1327    Specimen: Blood Updated: 03/08/25 1412     Glucose 110 mg/dL      BUN 8 mg/dL      Creatinine 0.57 mg/dL      Sodium 136 mmol/L      Potassium 2.0 mmol/L      Chloride 93 mmol/L      CO2 26.2 mmol/L      Calcium 9.4 " mg/dL      Total Protein 7.2 g/dL      Albumin 3.4 g/dL      ALT (SGPT) 26 U/L      AST (SGOT) 17 U/L      Alkaline Phosphatase 205 U/L      Total Bilirubin 1.3 mg/dL      Globulin 3.8 gm/dL      A/G Ratio 0.9 g/dL      BUN/Creatinine Ratio 14.0     Anion Gap 16.8 mmol/L      eGFR 115.1 mL/min/1.73     Narrative:      GFR Categories in Chronic Kidney Disease (CKD)      GFR Category          GFR (mL/min/1.73)    Interpretation  G1                     90 or greater         Normal or high (1)  G2                      60-89                Mild decrease (1)  G3a                   45-59                Mild to moderate decrease  G3b                   30-44                Moderate to severe decrease  G4                    15-29                Severe decrease  G5                    14 or less           Kidney failure          (1)In the absence of evidence of kidney disease, neither GFR category G1 or G2 fulfill the criteria for CKD.    eGFR calculation 2021 CKD-EPI creatinine equation, which does not include race as a factor    Magnesium [[id number]]  (Normal) Collected: 03/08/25 1327    Specimen: Blood Updated: 03/08/25 1412     Magnesium 1.6 mg/dL     BNP [[id number]]  (Normal) Collected: 03/08/25 1327    Specimen: Blood Updated: 03/08/25 1403     proBNP 112.0 pg/mL     Narrative:      This assay is used as an aid in the diagnosis of individuals suspected of having heart failure. It can be used as an aid in the diagnosis of acute decompensated heart failure (ADHF) in patients presenting with signs and symptoms of ADHF to the emergency department (ED). In addition, NT-proBNP of <300 pg/mL indicates ADHF is not likely.    Age Range Result Interpretation  NT-proBNP Concentration (pg/mL:      <50             Positive            >450                   Gray                 300-450                    Negative             <300    50-75           Positive            >900                  Gray                300-900                   Negative            <300      >75             Positive            >1800                  Gray                300-1800                  Negative            <300    High Sensitivity Troponin T [[id number]]  (Normal) Collected: 03/08/25 1327    Specimen: Blood Updated: 03/08/25 1403     HS Troponin T 12 ng/L     Narrative:      High Sensitive Troponin T Reference Range:  <14.0 ng/L- Negative Female for AMI  <22.0 ng/L- Negative Male for AMI  >=14 - Abnormal Female indicating possible myocardial injury.  >=22 - Abnormal Male indicating possible myocardial injury.   Clinicians would have to utilize clinical acumen, EKG, Troponin, and serial changes to determine if it is an Acute Myocardial Infarction or myocardial injury due to an underlying chronic condition.         CBC & Differential [[id number]]  (Abnormal) Collected: 03/08/25 1327    Specimen: Blood Updated: 03/08/25 1344    Narrative:      The following orders were created for panel order CBC & Differential.  Procedure                               Abnormality         Status                     ---------                               -----------         ------                     CBC Auto Differential[[id number]]        Abnormal            Final result                 Please view results for these tests on the individual orders.    CBC Auto Differential [[id number]]  (Abnormal) Collected: 03/08/25 1327    Specimen: Blood Updated: 03/08/25 1344     WBC 1.57 10*3/mm3      RBC 3.12 10*6/mm3      Hemoglobin 9.3 g/dL      Hematocrit 26.9 %      MCV 86.2 fL      MCH 29.8 pg      MCHC 34.6 g/dL      RDW 15.9 %      RDW-SD 49.1 fl      MPV 10.8 fL      Platelets 153 10*3/mm3           Imaging Results (All)       Procedure Component Value Units Date/Time    US Thyroid [[id number]] Collected: 03/09/25 1653     Updated: 03/09/25 1706    Narrative:         EXAMINATION: US THYROID-     DATE: 3/9/2025 3:59 PM     HISTORY: Nodule in the right lobe of the thyroid gland on prior  CT  chest.     COMPARISON: CT chest 3/8/2025 and 9/16/2024. PET/CT 9/30/2024.     TECHNIQUE: Grayscale and color Doppler images of the thyroid were  performed.     FINDINGS:  The right lobe of the thyroid measures 4.8 cm x 2 cm x 1.9 cm. The  thyroid isthmus measures 0.4 cm. The left lobe of the thyroid measures  4.4 cm x 1.3 cm x 1.2 cm.     The thyroid demonstrates diffusely homogenous background echotexture.  Heterogeneous ill-defined ovoid wider than tall solid slightly  hypoechoic 2.9 cm nodule in the right lobe of the thyroid gland with  internal scattered echogenic foci, TI-RADS category 4. Adjacent 1.9 cm  solid nodule with similar imaging characteristics, TI-RADS category 4.  Thyroid vascularity is within normal limits. No pathologically enlarged  lymph nodes are imaged.       Impression:      TI-RADS category 5 highly suspicious nodules in the right lobe of the  thyroid gland fitting consensus recommendations for ultrasound-guided  FNA.     This report was finalized on 3/9/2025 5:03 PM by Edward Dumont MD on  Workstation: [id number]       CT Angiogram Chest [[id number]] Collected: 03/08/25 1444     Updated: 03/08/25 1500    Narrative:      CT ANGIOGRAM CHEST PULMONARY EMBOLISM     TECHNIQUE: Radiation dose reduction techniques were utilized, including  automated exposure control and exposure modulation based on body size. 2  mm images were obtained through the chest after the administration of IV  contrast. 3D reformat images were created. Multiple coronal, sagittal,  and 3-D reconstruction images were obtained.     HISTORY: Clinical concern for pulmonary thromboembolism. Breast cancer.     COMPARISON: CT chest 9/16/2024           FINDINGS: Approximately 2 cm right thyroid nodule (series 4/image 10).  Bilateral mastectomies. No soft tissue nodularity within the chest wall.  No axillary, internal mammary or visualized supraclavicular  lymphadenopathy.     Nondilated main pulmonary artery. Pulmonary  arteries are patent. Heart  is normal in size. Trace pericardial fluid. Nondilated thoracic aorta.  No mediastinal/hilar lymphadenopathy. Decompressed esophagus.     Trachea and main bronchi are patent. Diffuse mild bronchial wall  thickening. No bronchiectasis. No emphysematous changes. Few sub-6 mm  solid pulmonary nodules are unchanged dating back to September 2024.  Reference left lower lobe (series 5/image 102) and right lower lobe  (5/84). Small right upper lobe calcified granuloma. No focal groundglass  opacity or consolidation. No pleural effusion.                Impression:      1. No evidence of pulmonary thromboembolism.  2. Few sub-6 mm solid pulmonary nodules are unchanged dating back to  September 2024. Continued attention on follow-up.  3. Approximately 2 cm right thyroid nodule. Follow-up nonemergent  thyroid ultrasound.        This report was finalized on 3/8/2025 2:56 PM by Dr. Dirk Mccray M.D on Workstation: [id number]       XR Chest 1 View [[id number]] Collected: 03/08/25 1356     Updated: 03/08/25 1402    Narrative:      XR CHEST 1 VW-     INDICATION: Shortness of breath. Breast cancer.     COMPARISON: Chest radiographs dating back to 12/10/2024       Impression:      Left subclavian port with tip overlying the mid SVC. No  focal consolidation. No pleural effusion or pneumothorax.  Normal size  cardiomediastinal silhouette.  No focal osseous abnormality.       This report was finalized on 3/8/2025 1:59 PM by Dr. Dirk Mccray M.D on Workstation: [id number]             ECG/EMG Results (all)       Procedure Component Value Units Date/Time    ECG 12 Lead Dyspnea [[id number]] Collected: 03/08/25 1315     Updated: 03/09/25 2211     QT Interval 413 ms      QTC Interval 476 ms     Narrative:      HEART RATE=80  bpm  RR Rjsqjxpj=395  ms  NC Eqlhnxdu=111  ms  P Horizontal Axis=9  deg  P Front Axis=75  deg  QRSD Interval=98  ms  QT Vazzwdbj=450  ms  FUkI=752  ms  QRS Axis=55  deg  T Wave  Axis=-5  deg  - BORDERLINE ECG -  Sinus rhythm  Borderline  repolarization abnormality  When compared with ECG of 09-Oct-2024 07:26:06,  No significant change  Electronically Signed By: Deni Hernandes ([facility]) 2025-03-09 22:10:44  Date and Time of Study:2025-03-08 13:15:23             Physician Progress Notes (all)        Nishant Witt MD at 03/10/25 1056          CHIEF COMPLAINT: Breast cancer, anemia, hypokalemia    Interval history:  The patient received 2 units of packed red blood cells transfused on 3/9/2025.  She has been undergoing potassium replacement.  Hemoglobin today substantially improved 10.4, neutrophils recovered and platelets up to 176.  Potassium better 0.4 and magnesium 1.5.    The patient is feeling a lot better today after blood and electrolyte replacement.      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  She completed cycle 4 AC chemotherapy on 2/25/2025 with Neulasta support.  The patient had fever on Friday for which she was called in Augmentin.  She subsequently presented to the hospital with global weakness, lightheadedness and progressive shortness of breath.     The patient's workup has included a CBC with differential which shows ANC of 1.0, hemoglobin initially 9.3 but dropped to 7.0 after IV fluids, significant hypokalemia potassium 2.0 with normal magnesium 1.6.  She has been afebrile since admission.  Chest CT angiogram in the ER which showed no PE and stable sub-6 mm pulmonary nodules since September 2024.      Past Medical History, Past Surgical History, Social History, Family History have been reviewed and are without significant changes except as mentioned.    Review of Systems   A comprehensive 14 point review of systems was performed and was negative except as mentioned.    Medications:  The current medication list was  "reviewed in the EMR    ALLERGIES:  No Known Allergies    Objective      Vitals:    03/10/25 0825   BP: 112/68   Pulse: 68   Resp: 16   Temp: 98.1 °F (36.7 °C)   SpO2: 98%          Physical Exam    CONSTITUTIONAL: pleasant well-developed adult woman  HEENT: no icterus, no thrush, moist membranes  LYMPH: no cervical or supraclavicular lad  CV: RRR, S1S2, no murmur  RESP: cta bilat, no wheezing, no rales  GI: soft, nontender, no splenomegaly, +BS  MUSC: no edema   NEURO: alert and oriented x3, normal strength  PSYCH: normal mood and affect   SKIN: alopecia    RECENT LABS:  Hematology Results from last 7 days   Lab Units 03/10/25  0623 03/09/25  0400 03/08/25  1327   WBC 10*3/mm3 3.74 1.90* 1.57*   HEMOGLOBIN g/dL 10.4* 7.0* 9.3*   HEMATOCRIT % 30.8* 20.6* 26.9*   PLATELETS 10*3/mm3 176 120* 153     2  Lab Results   Component Value Date    GLUCOSE 80 03/10/2025    BUN 2 (L) 03/10/2025    CREATININE 0.40 (L) 03/10/2025    BCR 5.0 (L) 03/10/2025    CO2 25.9 03/10/2025    CALCIUM 8.4 (L) 03/10/2025    ALBUMIN 2.7 (L) 03/10/2025    AST 9 03/10/2025    ALT 12 03/10/2025       Lab Results   Component Value Date    IRON 19 (L) 01/21/2025    TIBC 177 (L) 01/21/2025    FERRITIN 2,525.00 (H) 01/21/2025       No results found for: \"FOCDDUSM91\"    No results found for: \"FOLATE\"       Assessment & Plan   kA1O9nH2 g3 invasive ductal adenocarcinoma right breast, ER 99% positive, FL 50% positive, HER2 0, Ki-67 65%  Status post right modified radical mastectomy with axillary lymph node dissection left prophylactic mastectomy 10/9/2025 with delayed wound healing  12/31/2024-initiated adjuvant chemotherapy with AC  2/25/2025-completed AC cycle 4     *Pancytopenia secondary to chemotherapy  ANC 1.0 hemoglobin 7.0 platelets 120 -transfused 2 units PRBCs  Patient received Neulasta support with chemotherapy  3/10/2025-WBC 3.7, hemoglobin 10.4, platelets 176     *Significant hypokalemia secondary to poor oral intake-2.0 on admission, normal " Mg/Phos  3/10/2025-potassium 3.7, magnesium 1.5     *Calorie/protein malnutrition-albumin 2.4-2.7     *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 for suspected source     *Underlying Crohn's disease    *Thyroid nodules  3/9/2025 ultrasound-2 suspicious right thyroid lobe nodules 2.9 cm and 1.9 cm     Oncology plan/recommendations:  Delay initiation of Taxol by 1 week (next week)  IV magnesium replacement today  Okay to discharge today after IV magnesium from oncology perspective  I will address the thyroid nodule workup outpatient                 3/10/2025      CC:             Electronically signed by Nishant Witt MD at 03/10/25 1110       Stingl, Cammie Travis MD at 25 1906          DAILY PROGRESS NOTE  [facility]    Patient Identification:  Name: Suzanne Lin  Age: 44 y.o.  Sex: female  :  1980  MRN: [id number]         Primary Care Physician: Gia Rodriguez, PHIL    Subjective: patient is resting; feels better; transfusion in progress    Interval History: follow up for breast cancer, chemo induced pancytopenia, hypokalemia,  crohns disease    Objective:    Scheduled Meds:aspirin, 81 mg, Oral, Daily  gabapentin, 600 mg, Oral, Nightly  mupirocin, 1 Application, Each Nare, BID  senna-docusate sodium, 2 tablet, Oral, BID  sodium chloride, 10 mL, Intravenous, Q12H  sodium chloride, 10 mL, Intravenous, Q12H  venlafaxine XR, 37.5 mg, Oral, Daily With Breakfast      Continuous Infusions:sodium chloride, 125 mL/hr, Last Rate: 125 mL/hr (25 1038)        Vital signs in last 24 hours:  Temp:  [97.4 °F (36.3 °C)-98.5 °F (36.9 °C)] 98.3 °F (36.8 °C)  Heart Rate:  [62-91] 72  Resp:  [14-20] 18  BP: ()/(41-67) 117/64    Intake/Output:    Intake/Output Summary (Last 24 hours) at 3/9/2025 190  Last data filed at 3/9/2025 1700  Gross per 24 hour   Intake 680 ml   Output 400 ml   Net 280 ml       Exam:  /64   Pulse 72   Temp 98.3 °F (36.8  "°C) (Oral)   Resp 18   Ht 165.1 cm (65\")   Wt 61.5 kg (135 lb 9.3 oz)   LMP 01/08/2025 (Approximate)   SpO2 100%   BMI 22.56 kg/m²     General Appearance:    Alert, cooperative, no distress, AAOx3; alopecia                          Head:    Normocephalic, without obvious abnormality, atraumatic                           Eyes:    PERRL, conjunctivae/corneas clear, EOM's intact, both eyes                         Throat:   Lips, tongue, gums normal; oral mucosa pink and moist                           Neck:   Supple, symmetrical, trachea midline, no JVD                         Lungs:    Clear to auscultation bilaterally, respirations unlabored                 Chest Wall:    No tenderness or deformity                          Heart:    Regular rate and rhythm, S1 and S2 normal, no murmur,no  rub or gallop                  Abdomen:     Soft, nontender, bowel sounds active, no masses, no organomegaly                  Extremities:   Extremities normal, atraumatic, no cyanosis or edema                                Data Review:  Labs in chart were reviewed.  WBC   Date Value Ref Range Status   03/09/2025 1.90 (C) 3.40 - 10.80 10*3/mm3 Final     Hemoglobin   Date Value Ref Range Status   03/09/2025 7.0 (L) 12.0 - 15.9 g/dL Final     Hematocrit   Date Value Ref Range Status   03/09/2025 20.6 (C) 34.0 - 46.6 % Final     Platelets   Date Value Ref Range Status   03/09/2025 120 (L) 140 - 450 10*3/mm3 Final     Sodium   Date Value Ref Range Status   03/09/2025 139 136 - 145 mmol/L Final     Potassium   Date Value Ref Range Status   03/09/2025 3.0 (L) 3.5 - 5.2 mmol/L Final     Chloride   Date Value Ref Range Status   03/09/2025 108 (H) 98 - 107 mmol/L Final     CO2   Date Value Ref Range Status   03/09/2025 21.8 (L) 22.0 - 29.0 mmol/L Final     BUN   Date Value Ref Range Status   03/09/2025 4 (L) 6 - 20 mg/dL Final     Creatinine   Date Value Ref Range Status   03/09/2025 0.42 (L) 0.57 - 1.00 mg/dL Final     Glucose   Date " Value Ref Range Status   2025 99 65 - 99 mg/dL Final     Calcium   Date Value Ref Range Status   2025 7.8 (L) 8.6 - 10.5 mg/dL Final     Magnesium   Date Value Ref Range Status   2025 1.6 1.6 - 2.6 mg/dL Final     Phosphorus   Date Value Ref Range Status   2025 3.3 2.5 - 4.5 mg/dL Final     AST (SGOT)   Date Value Ref Range Status   2025 11 1 - 32 U/L Final     ALT (SGPT)   Date Value Ref Range Status   2025 14 1 - 33 U/L Final     Alkaline Phosphatase   Date Value Ref Range Status   2025 144 (H) 39 - 117 U/L Final         Assessment:  Active Hospital Problems    Diagnosis  POA    **Hypokalemia [E87.6]  Yes      Resolved Hospital Problems   No resolved problems to display.   Dizziness  Breast cancer  Chemo induced pancytopenia    Plan:  Replacing potassium  Being transfused  Dizziness has improved  Oncology following  Dw patient  Will follow with you  Thanks for involving us in her care    Cammie Cary MD  3/9/2025  19:07 EDT      Electronically signed by Cammie Cary MD at 25 1910       Nishant Reed MD at 25 1126            [facility]  [phone]  Dr. Nishant Reed    Subjective:  LOS: 1    No acute events overnight.  Doing well this morning.  Does not have much appetite at baseline.  A lot of different foods that she eats tears up her stomach she uses.    Objective:  Vital Signs past 24hrs    Temp range: Temp (24hrs), Av.1 °F (36.7 °C), Min:97.6 °F (36.4 °C), Max:98.5 °F (36.9 °C)    BP range: BP: ()/(41-72) 118/60  Pulse range: Heart Rate:  [] 76  Resp rate range: Resp:  [12-20] 18  61.5 kg (135 lb 9.3 oz); Body mass index is 22.56 kg/m².    Device (Oxygen Therapy): room air     Oxygen range:SpO2:  [93 %-100 %] 100 %            Intake/Output Summary (Last 24 hours) at 3/9/2025 1121  Last data filed at 3/8/2025 1821  Gross per 24 hour   Intake 1100 ml   Output --   Net 1100 ml       Physical  Exam  Constitutional:       Appearance: Normal appearance.   HENT:      Head: Normocephalic and atraumatic.      Nose: Nose normal.      Mouth/Throat:      Mouth: Mucous membranes are moist.      Pharynx: Oropharynx is clear.   Eyes:      Extraocular Movements: Extraocular movements intact.      Conjunctiva/sclera: Conjunctivae normal.   Cardiovascular:      Rate and Rhythm: Normal rate and regular rhythm.      Pulses: Normal pulses.      Heart sounds: Normal heart sounds. No murmur heard.  Pulmonary:      Effort: Pulmonary effort is normal. No respiratory distress.      Breath sounds: Normal breath sounds. No stridor. No wheezing or rales.   Abdominal:      General: Abdomen is flat. Bowel sounds are normal.      Palpations: Abdomen is soft.   Skin:     General: Skin is warm and dry.   Neurological:      General: No focal deficit present.      Mental Status: She is alert and oriented to person, place, and time. Mental status is at baseline.   Psychiatric:         Mood and Affect: Mood normal.         Behavior: Behavior normal.            Result Review:  I have reviewed the results from last note by LPC physician and agree with these findings:  [x]  Laboratory accordion  [x]  Microbiology  [x]  Radiology  [x]  EKG/Telemetry   [x]  Cardiology/Vascular   [x]  Pathology  [x]  Old records  []  Other:    Medication Review:  I have reviewed the current MAR.  Antibiotics  amoxicillin-clavulanate - 500-125 MG     Scheduled Medications  aspirin, 81 mg, Oral, Daily  gabapentin, 600 mg, Oral, Nightly  mupirocin, 1 Application, Each Nare, BID  potassium chloride ER, 40 mEq, Oral, Q4H  senna-docusate sodium, 2 tablet, Oral, BID  sodium chloride, 10 mL, Intravenous, Q12H  sodium chloride, 10 mL, Intravenous, Q12H  venlafaxine XR, 37.5 mg, Oral, Daily With Breakfast      ICU Drips  sodium chloride, 125 mL/hr, Last Rate: 125 mL/hr (03/09/25 [facility])      PRN Medications    senna-docusate sodium **AND** polyethylene glycol **AND**  bisacodyl **AND** bisacodyl    Calcium Replacement - Follow Nurse / BPA Driven Protocol    dicyclomine    heparin    loperamide    Magnesium Standard Dose Replacement - Follow Nurse / BPA Driven Protocol    nitroglycerin    ondansetron    oxyCODONE-acetaminophen    Phosphorus Replacement - Follow Nurse / BPA Driven Protocol    Potassium Replacement - Follow Nurse / BPA Driven Protocol    Potassium Replacement - Follow Nurse / BPA Driven Protocol    prochlorperazine    [COMPLETED] Insert Peripheral IV **AND** sodium chloride    Access VAD **AND** sodium chloride    sodium chloride    sodium chloride    sodium chloride    sodium chloride    sodium chloride    Lines, Drains & Airways       Active LDAs       Name Placement date Placement time Site Days    Peripheral IV 03/08/25 1326 Left Antecubital 03/08/25  1326  Antecubital  less than 1    Single Lumen Implantable Port 12/10/24 Left Chest 12/10/24  1234  Chest  88                    Diet Orders (active) (From admission, onward)       Start     Ordered    03/08/25 1832  Diet: Regular/House; Neutropenic/Low Microbial; Fluid Consistency: Thin (IDDSI 0)  Diet Effective Now         03/08/25 1831                      Assessment:  Severe hypokalemia secondary to poor p.o. intake  Chronic diarrhea secondary to chemotherapy and Crohn's disease  Nausea  Vomiting  Pancytopenia due to chemotherapy  Crohn's disease  Febrile neutropenia with tooth extraction  [id number] g3 invasive ductal adenocarcinoma right breast   Protein calorie malnutrition  Thyroid nodule, 2 cm  Pulmonary nodules    THESE ARE NEW MEDICAL PROBLEMS TO ME.    Plan of Treatment  -Receiving 2 units of packed RBCs per heme-onc  -Daily CBC and BMP  -Electrolyte replacement protocol  -Zofran as needed  -2 cm thyroid nodule found on CT chest, thyroid ultrasound pending  -Sub-6 mm pulmonary nodules on CT chest, chronic.  Continue to monitor as outpatient per oncology recs.  Nothing to do at this time.    Transfer out  of ICU.  Will consult [facility] to take over management as primary.  She has no acute pulmonary issues, so pulmonary will sign off afterwards.      Nishant Reed MD  [facility], [facility]  Pulmonary and Critical Care Medicine    Electronically signed by Nishant Reed MD, 03/09/25, 11:21 AM EDT.  Part of this note may be an electronic transcription/translation of spoken language to printed text using the Dragon Dictation System.     Electronically signed by Nishant Reed MD at 03/09/25 1126          Consult Notes (all)        Nishant Witt MD at 03/09/25 0947        Consult Orders    1. Hematology & Oncology Inpatient Consult [[id number]] ordered by Gulshan Gallo DO at 03/08/25 1622    2. Hematology and Oncology (on-call MD unless specified) [[id number]] ordered by Luis Armando Reyna MD at 01/22/25 2108                   Subjective     REASON FOR CONSULTATION: Breast cancer, chemotherapy-induced anemia, hypokalemia  Provide an opinion on any further workup or treatment                             REQUESTING PHYSICIAN: Sabino    RECORDS OBTAINED:  Records of the patients history including those obtained from the referring provider were reviewed and summarized in detail.    HISTORY OF PRESENT ILLNESS:  The patient is a 44 y.o. year old female who is here for an opinion about the above issue.    History of Present Illness   This is a 44-year-old woman with pT3 N2a M0 grade 3 ductal adenocarcinoma of the right breast ER/RI positive status post surgery and undergoing adjuvant chemotherapy.  She completed cycle 4 AC chemotherapy on 2/25/2025 with Neulasta support.  The patient had fever on Friday for which she was called in Augmentin.  She subsequently presented to the hospital with global weakness, lightheadedness and progressive shortness of breath.    The patient's workup has included a CBC with differential which shows ANC of 1.0, hemoglobin initially 9.3 but dropped to 7.0 after IV fluids, significant  hypokalemia potassium 2.0 with normal magnesium 1.6.  She has been afebrile since admission.  Chest CT angiogram in the ER which showed no PE and stable sub-6 mm pulmonary nodules since 2024.    Past Medical History:   Diagnosis Date    Anxiety     Breast cancer     Right    Crohn's disease     DVT (deep vein thrombosis) in pregnancy     PFO (patent foramen ovale)     Stroke     after the birth of her child at age 34    Tattoos         Past Surgical History:   Procedure Laterality Date     SECTION      COLON RESECTION      COLONOSCOPY      MASTECTOMY Bilateral 10/9/2024    Procedure: Modified radical mastectomy with axillary dissection of the right breast and simple mastectomy of the left breast;  Surgeon: Rebekah Garcia DO;  Location: [facility] OR;  Service: General;  Laterality: Bilateral;    SKIN CANCER EXCISION      TEETH EXTRACTION N/A 2025    Procedure: TOOTH EXTRACTION #2,15,19,28,29;  Surgeon: Pramod Zambrano DMD;  Location: [facility] OR;  Service: Oral Surgery;  Laterality: N/A;    VENOUS ACCESS DEVICE (PORT) INSERTION N/A 12/10/2024    Procedure: INSERTION VENOUS ACCESS DEVICE;  Surgeon: Rebekah Garcia DO;  Location: [facility] OR;  Service: General;  Laterality: N/A;        Current Facility-Administered Medications on File Prior to Encounter   Medication Dose Route Frequency Provider Last Rate Last Admin    heparin injection 500 Units  500 Units Intravenous PRN Nishant Witt MD   500 Units at 24 0916    heparin injection 500 Units  500 Units Intravenous PRN Nishant Witt MD   500 Units at 25 1049    sodium chloride 0.9 % flush 10 mL  10 mL Intravenous PRN Nishant Witt MD   10 mL at 24 0916    sodium chloride 0.9 % flush 10 mL  10 mL Intravenous PRN Nishant Witt MD   10 mL at 25 1048     Current Outpatient Medications on File Prior to Encounter   Medication Sig Dispense Refill    aspirin 81 MG EC tablet Take 1 tablet by mouth  Daily.      dicyclomine (BENTYL) 10 MG capsule Take 1 capsule by mouth 3 (Three) Times a Day As Needed for Abdominal Cramping. 90 capsule 2    gabapentin (Neurontin) 300 MG capsule Take 2 capsules by mouth Every Night. 60 capsule 2    Klor-Con M20 20 MEQ CR tablet TAKE 1 TABLET BY MOUTH 2 TIMES A DAY 40 tablet 1    lidocaine-prilocaine (EMLA) 2.5-2.5 % cream Apply 1 Application topically to the appropriate area as directed As Needed for Mild Pain. Apply to port site 30 minutes before access 15 g 3    omeprazole (priLOSEC) 20 MG capsule Take 1 capsule by mouth As Needed.      ondansetron (ZOFRAN) 8 MG tablet Take 1 tablet by mouth 3 (Three) Times a Day As Needed for Nausea or Vomiting. 30 tablet 5    ondansetron (ZOFRAN) 8 MG tablet Take 1 tablet by mouth 3 (Three) Times a Day As Needed for Nausea or Vomiting. 30 tablet 5    prochlorperazine (COMPAZINE) 10 MG tablet Take 1 tablet by mouth Every 6 (Six) Hours As Needed for Nausea or Vomiting. 60 tablet 1    venlafaxine XR (Effexor XR) 37.5 MG 24 hr capsule Take 1 capsule by mouth Take As Directed. 1 capsule po q am for two weeks followed by increase to 2 capsules daily (Patient taking differently: Take 1 capsule by mouth Take As Directed. 1 capsule po q am for two weeks followed by increase to 2 capsules daily  Pt has not started yet) 60 capsule 2    amoxicillin-clavulanate (AUGMENTIN) 500-125 MG per tablet Take 1 tablet by mouth 2 (Two) Times a Day for 5 days. 10 tablet 0    oxyCODONE-acetaminophen (Percocet) 5-325 MG per tablet Take 1 tablet by mouth Every 6 (Six) Hours As Needed for Moderate Pain. 12 tablet 0        ALLERGIES:  No Known Allergies     Social History     Socioeconomic History    Marital status: Single    Number of children: 1   Tobacco Use    Smoking status: Former     Current packs/day: 0.00     Average packs/day: 1 pack/day for 20.0 years (20.0 ttl pk-yrs)     Types: Cigarettes     Start date:      Quit date: 2019     Years since quittin.1     Smokeless tobacco: Current    Tobacco comments:     Nicotine pouches   Vaping Use    Vaping status: Never Used   Substance and Sexual Activity    Alcohol use: Yes     Comment: OCC    Drug use: Never    Sexual activity: Defer        Family History   Problem Relation Age of Onset    Diabetes Mother     Heart disease Father     Diabetes Paternal Grandmother     Hypertension Other     Malig Hyperthermia Neg Hx         Review of Systems   Constitutional:  Positive for activity change, appetite change and fatigue. Negative for fever.   HENT:  Negative for sore throat and trouble swallowing.    Respiratory:  Positive for shortness of breath. Negative for cough and wheezing.    Cardiovascular:  Negative for palpitations and leg swelling.   Gastrointestinal:  Positive for nausea. Negative for diarrhea and vomiting.   Musculoskeletal: Negative.    Skin:  Positive for pallor. Negative for rash.   Allergic/Immunologic: Positive for immunocompromised state.   Neurological:  Positive for dizziness, weakness and light-headedness.   Hematological: Negative.           Objective     Vitals:    03/09/25 0523 03/09/25 0600 03/09/25 0700 03/09/25 0833   BP: 125/63 123/61 124/54 107/58   BP Location:       Patient Position:       Pulse: 75 73 80 69   Resp:       Temp:       TempSrc:       SpO2: 100% 98% 99%    Weight:       Height:             2/25/2025     7:56 AM   Current Status   ECOG score 0       Physical Exam    CONSTITUTIONAL: pleasant well-developed woman  HEENT: no icterus, no thrush, moist membranes  LYMPH: no cervical or supraclavicular lad  CV: RRR, S1S2, no murmur  RESP/CHEST: cta bilat, no wheezing, no rales, port w/o redness tenderness  GI: soft, nontender, no splenomegaly, +BS  MUSC: no edema   NEURO: alert and oriented x3, normal strength  PSYCH: normal mood and affect   SKIN: pallor no rash    RECENT LABS:  Hematology WBC   Date Value Ref Range Status   03/09/2025 1.90 (C) 3.40 - 10.80 10*3/mm3 Final     RBC   Date  Value Ref Range Status   03/09/2025 2.33 (L) 3.77 - 5.28 10*6/mm3 Final     Hemoglobin   Date Value Ref Range Status   03/09/2025 7.0 (L) 12.0 - 15.9 g/dL Final     Hematocrit   Date Value Ref Range Status   03/09/2025 20.6 (C) 34.0 - 46.6 % Final     Platelets   Date Value Ref Range Status   03/09/2025 120 (L) 140 - 450 10*3/mm3 Final        Lab Results   Component Value Date    GLUCOSE 99 03/09/2025    BUN 4 (L) 03/09/2025    CREATININE 0.42 (L) 03/09/2025     03/09/2025    K 3.0 (L) 03/09/2025     (H) 03/09/2025    CALCIUM 7.8 (L) 03/09/2025    PROTEINTOT 5.1 (L) 03/09/2025    ALBUMIN 2.4 (L) 03/09/2025    ALT 14 03/09/2025    AST 11 03/09/2025    ALKPHOS 144 (H) 03/09/2025    BILITOT 0.4 03/09/2025    GLOB 2.7 03/09/2025    AGRATIO 0.9 03/09/2025    BCR 9.5 03/09/2025    ANIONGAP 9.2 03/09/2025    EGFR 123.9 03/09/2025     CT IMPRESSION:  1. No evidence of pulmonary thromboembolism.  2. Few sub-6 mm solid pulmonary nodules are unchanged dating back to  September 2024. Continued attention on follow-up.  3. Approximately 2 cm right thyroid nodule. Follow-up nonemergent  thyroid ultrasound.CHEST:    Assessment & Plan     [id number] g3 invasive ductal adenocarcinoma right breast, ER 99% positive, OR 50% positive, HER2 0, Ki-67 65%  Status post right modified radical mastectomy with axillary lymph node dissection left prophylactic mastectomy 10/9/2025 with delayed wound healing  12/31/2024-initiated adjuvant chemotherapy with AC  2/25/2025-completed AC cycle 4    *Pancytopenia secondary to chemotherapy  ANC 1.0 hemoglobin 7.0 platelets 120 today  Patient received Neulasta support with chemotherapy    *Significant hypokalemia secondary to poor oral intake-2.0 on admission, normal Mg/Phos    *Calorie/protein malnutrition-albumin 2.4    *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 for suspected source    *Underlying Crohn's disease    Oncology plan/recommendations:  Transfuse 2  units PRBCs today  Daily CBC with differential  Electrolyte replacement per ICU protocol  Monitor for fever  Zofran as needed nausea  Patient scheduled to begin weekly Taxol this week but may need to delay pending labs and hospital course        Electronically signed by Nishant Witt MD at 03/09/25 3608

## 2025-03-10 NOTE — PROGRESS NOTES
CHIEF COMPLAINT: Breast cancer, anemia, hypokalemia    Interval history:  The patient received 2 units of packed red blood cells transfused on 3/9/2025.  She has been undergoing potassium replacement.  Hemoglobin today substantially improved 10.4, neutrophils recovered and platelets up to 176.  Potassium better 0.4 and magnesium 1.5.    The patient is feeling a lot better today after blood and electrolyte replacement.      HISTORY OF PRESENT ILLNESS:   This is a 44-year-old woman with pT3 N2a M0 grade 3 ductal adenocarcinoma of the right breast ER/MO positive status post surgery and undergoing adjuvant chemotherapy.  She completed cycle 4 AC chemotherapy on 2/25/2025 with Neulasta support.  The patient had fever on Friday for which she was called in Augmentin.  She subsequently presented to the hospital with global weakness, lightheadedness and progressive shortness of breath.     The patient's workup has included a CBC with differential which shows ANC of 1.0, hemoglobin initially 9.3 but dropped to 7.0 after IV fluids, significant hypokalemia potassium 2.0 with normal magnesium 1.6.  She has been afebrile since admission.  Chest CT angiogram in the ER which showed no PE and stable sub-6 mm pulmonary nodules since September 2024.      Past Medical History, Past Surgical History, Social History, Family History have been reviewed and are without significant changes except as mentioned.    Review of Systems   A comprehensive 14 point review of systems was performed and was negative except as mentioned.    Medications:  The current medication list was reviewed in the EMR    ALLERGIES:  No Known Allergies    Objective      Vitals:    03/10/25 0825   BP: 112/68   Pulse: 68   Resp: 16   Temp: 98.1 °F (36.7 °C)   SpO2: 98%          Physical Exam    CONSTITUTIONAL: pleasant well-developed adult woman  HEENT: no icterus, no thrush, moist membranes  LYMPH: no cervical or supraclavicular lad  CV: RRR, S1S2, no murmur  RESP:  "cta bilat, no wheezing, no rales  GI: soft, nontender, no splenomegaly, +BS  MUSC: no edema   NEURO: alert and oriented x3, normal strength  PSYCH: normal mood and affect   SKIN: alopecia    RECENT LABS:  Hematology Results from last 7 days   Lab Units 03/10/25  0623 03/09/25  0400 03/08/25  1327   WBC 10*3/mm3 3.74 1.90* 1.57*   HEMOGLOBIN g/dL 10.4* 7.0* 9.3*   HEMATOCRIT % 30.8* 20.6* 26.9*   PLATELETS 10*3/mm3 176 120* 153     2  Lab Results   Component Value Date    GLUCOSE 80 03/10/2025    BUN 2 (L) 03/10/2025    CREATININE 0.40 (L) 03/10/2025    BCR 5.0 (L) 03/10/2025    CO2 25.9 03/10/2025    CALCIUM 8.4 (L) 03/10/2025    ALBUMIN 2.7 (L) 03/10/2025    AST 9 03/10/2025    ALT 12 03/10/2025       Lab Results   Component Value Date    IRON 19 (L) 01/21/2025    TIBC 177 (L) 01/21/2025    FERRITIN 2,525.00 (H) 01/21/2025       No results found for: \"[id number]\"    No results found for: \"FOLATE\"       Assessment & Plan   [id number] g3 invasive ductal adenocarcinoma right breast, ER 99% positive, [place] 50% positive, HER2 0, Ki-67 65%  Status post right modified radical mastectomy with axillary lymph node dissection left prophylactic mastectomy 10/9/2025 with delayed wound healing  12/31/2024-initiated adjuvant chemotherapy with AC  2/25/2025-completed AC cycle 4     *Pancytopenia secondary to chemotherapy  ANC 1.0 hemoglobin 7.0 platelets 120 -transfused 2 units PRBCs  Patient received Neulasta support with chemotherapy  3/10/2025-WBC 3.7, hemoglobin 10.4, platelets 176     *Significant hypokalemia secondary to poor oral intake-2.0 on admission, normal Mg/Phos  3/10/2025-potassium 3.7, magnesium 1.5     *Calorie/protein malnutrition-albumin 2.4-2.7     *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 for suspected source     *Underlying Crohn's disease    *Thyroid nodules  3/9/2025 ultrasound-2 suspicious right thyroid lobe nodules 2.9 cm and 1.9 cm     Oncology plan/recommendations:  Delay " initiation of Taxol by 1 week (next week)  IV magnesium replacement today  Okay to discharge today after IV magnesium from oncology perspective  I will address the thyroid nodule workup outpatient                  3/10/2025      CC:

## 2025-03-10 NOTE — NURSING NOTE
Patient arrived to  as transfer from [facility] at 218 a.m. via wheelchair by the transport team. This nurse agrees with the previous nurses assessment and will assume care for this patient for the remainder of this shift. Plan of care is ongoing.

## 2025-03-10 NOTE — PLAN OF CARE
Goal Outcome Evaluation:            Patient alert and oriented, resting in bed at this time. Pt is neutropenic, receiving fluids NS @ 125ml/hr, left chest port is accessed. Pt on regular diet however, reports no appetite, chemo was last administered 2 weeks ago and pt is to receive taxol weekly starting this week for right breast cancer, pt on room air, has bright yellow diarrhea, up to BSC, cont on ABX-Augmentin. K+ was corrected 3/9/25 with last level 3.8 plan of care is ongoing.

## 2025-03-10 NOTE — OUTREACH NOTE
Prep Survey      Flowsheet Row Responses   [facility] facility patient discharged from? [place]   Is LACE score < 7 ? No   Eligibility Readm Mgmt   Discharge diagnosis Hypokalemia   Does the patient have one of the following disease processes/diagnoses(primary or secondary)? Other   Prep survey completed? Yes            Bernadette SOMMERS - Registered Nurse

## 2025-03-11 NOTE — CASE MANAGEMENT/SOCIAL WORK
Case Management Discharge Note      Final Note: Home on 3/10         Selected Continued Care - Discharged on 3/10/2025 Admission date: 3/8/2025 - Discharge disposition: Home or Self Care      Destination    No services have been selected for the patient.                Durable Medical Equipment    No services have been selected for the patient.                Dialysis/Infusion    No services have been selected for the patient.                Home Medical Care    No services have been selected for the patient.                Therapy    No services have been selected for the patient.                Community Resources    No services have been selected for the patient.                Community & DME    No services have been selected for the patient.                         Final Discharge Disposition Code: 01 - home or self-care

## 2025-03-12 NOTE — PROGRESS NOTES
Subjective     REASON FOR CONSULTATION:  breast cancer  Provide an opinion on any further workup or treatment                             REQUESTING PHYSICIAN:  Jose    RECORDS OBTAINED:  Records of the patients history including those obtained from the referring provider were reviewed and summarized in detail.    HISTORY OF PRESENT ILLNESS:  The patient is a 44 y.o. year old female who is here for an opinion about the above issue.    History of Present Illness   The patient return today for follow-up and to initiate Taxol component of her adjuvant chemotherapy.  She was admitted after cycle 4 of AC chemotherapy with pancytopenia and electrolyte abnormalities.  She required blood transfusion and potassium replacement.    The patient is currently feeling better since hospital discharge.  She has no ongoing nausea vomiting mild diarrhea improved stamina no fever.    ONCOLOGY HISTORY:  This is a 44-year-old woman referred from general surgery to discuss adjuvant treatment for recently surgically treated breast cancer.  The patient presented with a large palpable mass in the inner quadrant of the right breast.  The mass had been enlarging for couple years but the patient did not have insurance to get assessed.  The breast imaging is not available to me but she had a CT of the chest abdomen pelvis on 9/16/2024 showed a large mass in the right breast measuring up to 7.7 cm with abnormal left axillary lymphadenopathy.  There were tiny subpleural nodules in the lateral aspect of the left lower lobe likely granulomatous mildly conspicuous lymph node in the central mesentery 1.1 cm.  A PET scan was performed 9/30/2024 showing a hypermetabolic mass in the right breast with thickening throughout the right breast and pathologic hypermetabolic right axillary level 1 and 2 lymph nodes, no hypermetabolic internal mammary or supraclavicular lymph nodes.  The small pulmonary nodules were too small to characterize and  mesenteric lymph nodes without hypermetabolic activity.  A biopsy of the right breast mass showed invasive ductal adenocarcinoma.    She was taken to the operating room on 10/9/2024 and underwent a right modified radical mastectomy and axillary dissection, prophylactic left mastectomy.  Pathology from the right breast showed invasive ductal carcinoma [place] grade 3 (3+3+3) measuring 17 cm with extensive lymphovascular invasion and dermal lymphatic invasion.  The tumor extended to the dermis but did not ulcerate the skin.  Tumor extended to skeletal muscle and was present focally at an anterior margin, 0.05 mm of the posterior margin, 10 mm from the lateral margin, 20 mm from the medial margin, 28 mm from the superior margin and 40 mm from inferior margin.  There was ductal carcinoma in situ present within 2.5 mm of the posterior margin.  There were 13 lymph nodes in the axillary dissection 8 oncology plan/recommendations: of which had macrometastases, 1 micrometastases and 1 lymph node with isolated tumor cells.  The left breast had an intraductal papilloma otherwise negative.  The tumor was positive for estrogen receptor 99% of cells, progesterone receptor 50% of cells, HER2 0 and Ki-67 65%.    The patient has medical comorbidities of Crohn's disease.    Past Medical History:   Diagnosis Date    Anxiety     Breast cancer     Right    Crohn's disease     DVT (deep vein thrombosis) in pregnancy     PFO (patent foramen ovale)     Stroke     after the birth of her child at age 34    Tattoos         Past Surgical History:   Procedure Laterality Date     SECTION      COLON RESECTION      COLONOSCOPY      MASTECTOMY Bilateral 10/9/2024    Procedure: Modified radical mastectomy with axillary dissection of the right breast and simple mastectomy of the left breast;  Surgeon: Rebekah Garcia DO;  Location: [facility];  Service: General;  Laterality: Bilateral;    SKIN CANCER EXCISION      TEETH EXTRACTION  N/A 1/28/2025    Procedure: TOOTH EXTRACTION #2,15,19,28,29;  Surgeon: Pramod Zambrano DMD;  [facility]: [facility] MAIN OR;  Service: Oral Surgery;  Laterality: N/A;    VENOUS ACCESS DEVICE (PORT) INSERTION N/A 12/10/2024    Procedure: INSERTION VENOUS ACCESS DEVICE;  Surgeon: Rebekah Garcia DO;  Location:  [facility] OR;  Service: General;  Laterality: N/A;        Current Outpatient Medications on File Prior to Visit   Medication Sig Dispense Refill    aspirin 81 MG EC tablet Take 1 tablet by mouth Daily.      dicyclomine (BENTYL) 10 MG capsule Take 1 capsule by mouth 3 (Three) Times a Day As Needed for Abdominal Cramping. 90 capsule 2    gabapentin (Neurontin) 300 MG capsule Take 2 capsules by mouth Every Night. 60 capsule 2    Klor-Con M20 20 MEQ CR tablet TAKE 1 TABLET BY MOUTH 2 TIMES A DAY 40 tablet 1    lidocaine-prilocaine (EMLA) 2.5-2.5 % cream Apply 1 Application topically to the appropriate area as directed As Needed for Mild Pain. Apply to port site 30 minutes before access 15 g 3    omeprazole (priLOSEC) 20 MG capsule Take 1 capsule by mouth As Needed.      ondansetron (ZOFRAN) 8 MG tablet Take 1 tablet by mouth 3 (Three) Times a Day As Needed for Nausea or Vomiting. 30 tablet 5    ondansetron (ZOFRAN) 8 MG tablet Take 1 tablet by mouth 3 (Three) Times a Day As Needed for Nausea or Vomiting. 30 tablet 5    prochlorperazine (COMPAZINE) 10 MG tablet Take 1 tablet by mouth Every 6 (Six) Hours As Needed for Nausea or Vomiting. 60 tablet 1    venlafaxine XR (Effexor XR) 37.5 MG 24 hr capsule Take 1 capsule by mouth Take As Directed. 1 capsule po q am for two weeks followed by increase to 2 capsules daily 60 capsule 2    oxyCODONE-acetaminophen (Percocet) 5-325 MG per tablet Take 1 tablet by mouth Every 6 (Six) Hours As Needed for Moderate Pain. 12 tablet 0     Current Facility-Administered Medications on File Prior to Visit   Medication Dose Route Frequency Provider Last Rate Last Admin    heparin injection 500  "Units  500 Units Intravenous PRN Nishant Witt MD   500 Units at 24 0916    heparin injection 500 Units  500 Units Intravenous PRN Nishant Witt MD   500 Units at 25 1049    sodium chloride 0.9 % flush 10 mL  10 mL Intravenous PRN Nishant Witt MD   10 mL at 24 0916    sodium chloride 0.9 % flush 10 mL  10 mL Intravenous PRN Nishant Witt MD   10 mL at 25 1048        ALLERGIES:  No Known Allergies     Social History     Socioeconomic History    Marital status: Single    Number of children: 1   Tobacco Use    Smoking status: Former     Current packs/day: 0.00     Average packs/day: 1 pack/day for 20.0 years (20.0 ttl pk-yrs)     Types: Cigarettes     Start date:      Quit date:      Years since quittin.2    Smokeless tobacco: Current    Tobacco comments:     Nicotine pouches   Vaping Use    Vaping status: Never Used   Substance and Sexual Activity    Alcohol use: Yes     Comment: OCC    Drug use: Never    Sexual activity: Defer        Family History   Problem Relation Age of Onset    Diabetes Mother     Heart disease Father     Diabetes Paternal Grandmother     Hypertension Other     Malig Hyperthermia Neg Hx         Review of Systems   Constitutional:  Positive for fatigue. Negative for fever.   HENT: Negative.     Respiratory: Negative.     Cardiovascular: Negative.    Gastrointestinal:  Positive for diarrhea. Negative for nausea.        Intermittent cramping   Genitourinary: Negative.    Musculoskeletal: Negative.    Skin:  Negative for wound.   Neurological: Negative.    Hematological: Negative.    Psychiatric/Behavioral:  Negative for dysphoric mood.           Objective     Vitals:    25 1130   BP: 106/74   Pulse: 101   Resp: 16   Temp: 97.3 °F (36.3 °C)   TempSrc: Infrared   SpO2: 98%   Weight: 61.8 kg (136 lb 3.2 oz)   Height: 165.1 cm (65\")   PainSc: 0-No pain                   3/18/2025    12:25 PM   Current Status   ECOG score 0       Physical Exam  "   CONSTITUTIONAL: pleasant well-developed adult woman  HEENT: no icterus, no thrush, moist membranes   LYMPH: no cervical or supraclavicular lad  CV: RRR, S1S2, no murmur  RESP/chest: cta bilat, no wheezing, no rales, port present left chest wall  Breast: Deferred  GI: soft, nontender, no splenomegaly, +BS  MUSC: no edema, normal gait  NEURO: alert and oriented x3, normal strength  PSYCH: normal mood and affect  Unchanged-3/18/2025    RECENT LABS:  Hematology WBC   Date Value Ref Range Status   03/18/2025 14.60 (H) 3.40 - 10.80 10*3/mm3 Final     RBC   Date Value Ref Range Status   03/18/2025 4.57 3.77 - 5.28 10*6/mm3 Final     Hemoglobin   Date Value Ref Range Status   03/18/2025 13.2 12.0 - 15.9 g/dL Final     Hematocrit   Date Value Ref Range Status   03/18/2025 40.4 34.0 - 46.6 % Final     Platelets   Date Value Ref Range Status   03/18/2025 662 (H) 140 - 450 10*3/mm3 Final        Lab Results   Component Value Date    GLUCOSE 164 (H) 03/18/2025    BUN 12 03/18/2025    CREATININE 0.61 03/18/2025     03/18/2025    K 3.7 03/18/2025     03/18/2025    CALCIUM 9.6 03/18/2025    PROTEINTOT 7.2 03/18/2025    ALBUMIN 3.7 03/18/2025    ALT 12 03/18/2025    AST 17 03/18/2025    ALKPHOS 138 (H) 03/18/2025    BILITOT 0.3 03/18/2025    GLOB 3.5 03/18/2025    AGRATIO 1.1 03/18/2025    BCR 19.7 03/18/2025    ANIONGAP 15.7 (H) 03/18/2025    EGFR 113.2 03/18/2025     PET scan 9/30/2024:  FINDINGS:     Head/neck: No suspicious uptake.     Chest: An 8 x 5.8 cm mass in the lower inner right breast with max SUV  of 18 (series 4/image 156). Mass comes in close proximity to the  sternum. Hypermetabolic parenchymal thickening throughout the right  breast with max SUV of 9 (series 4/image 140). Diffuse right breast skin  thickening.     Hypermetabolic right axillary level I and II lymph nodes. Reference 1.2  cm level I lymph node with max SUV of 11.9 (series 4/image 103).  Additional reference subcentimeter level II lymph  node with max SUV of  3.3 (series 4/image 93). Separate brown fat uptake in the bilateral  axilla and posterior neck base.     No enlarged or hypermetabolic internal mammary or supraclavicular lymph  nodes.     Few subcentimeter pulmonary nodules are too small for accurate PET  characterization without overt hypermetabolism and unchanged from recent  CT. Reference left lower lobe (series 4/image 167) and right upper lobe  (series 4/image 131).     Abdomen/pelvis: Mesenteric lymph nodes are mostly subcentimeter without  associated hypermetabolism.     Sigmoid colectomy. Tubular hypermetabolism throughout the otherwise  normal-appearing remaining colon may be medication related. Moderate  proximal colonic stool burden.     Bones: No focal osseous hypermetabolism with special attention to the  sternum.           IMPRESSION:  1. Hypermetabolic 8 cm right breast mass with hypermetabolic parenchymal  thickening throughout the right breast, pathologically proven invasive  ductal carcinoma. Mass comes in close proximity to the sternum. No focal  osseous hypermetabolism with special attention to the sternum.  2. Hypermetabolic right axillary level I and II lymph nodes suggesting  alana metastatic disease. No enlarged or hypermetabolic internal mammary  or supraclavicular lymph nodes. Separate brown fat uptake in the  bilateral axilla and posterior neck base.  3. Few subcentimeter pulmonary nodules are too small for accurate PET  characterization and will need follow-up via chest CT.  4. Mesenteric lymph nodes are mostly subcentimeter without associated  hypermetabolism.       Assessment & Plan   *pT3N2a M0 grade 3 invasive ductal adenocarcinoma right breast, ER 99% positive, MS 50% positive, HER2 0, Ki-67 65%, tumor present at anterior margin  presented with a large palpable mass in the inner quadrant of the right breast; mass had been enlarging for couple years but the patient did not have insurance to get assessed  CT of  the chest abdomen pelvis on 9/16/2024 showed a large mass in the right breast measuring up to 7.7 cm with abnormal left axillary lymphadenopathy.  There were tiny subpleural nodules in the lateral aspect of the left lower lobe likely granulomatous mildly conspicuous lymph node in the central mesentery 1.1 cm.    PET scan 9/30/2024 - hypermetabolic mass in the right breast with thickening throughout the right breast and pathologic hypermetabolic right axillary level 1 and 2 lymph nodes, no hypermetabolic internal mammary or supraclavicular lymph nodes.  The small pulmonary nodules were too small to characterize and mesenteric lymph nodes without hypermetabolic activity.    biopsy of the right breast mass showed invasive ductal adenocarcinoma.  operating room on 10/9/2024 and underwent a right modified radical mastectomy and axillary dissection, prophylactic left mastectomy.  Pathology from the right breast showed invasive ductal carcinoma [place] grade 3 (3+3+3) measuring 17 cm with extensive lymphovascular invasion and dermal lymphatic invasion.  The tumor extended to the dermis but did not ulcerate the skin.  Tumor extended to skeletal muscle and was present focally at an anterior margin, 0.05 mm of the posterior margin, 10 mm from the lateral margin, 20 mm from the medial margin, 28 mm from the superior margin and 40 mm from inferior margin.  There was ductal carcinoma in situ present within 2.5 mm of the posterior margin.  There were 13 lymph nodes in the axillary dissection 8 of which had macrometastases, 1 micrometastases and 1 lymph node with isolated tumor cells.  The left breast had an intraductal papilloma otherwise negative.  The tumor was positive for estrogen receptor 99% of cells, progesterone receptor 50% of cells, HER2 0 and Ki-67 65%.  Initiated adjuvant chemotherapy with Adriamycin/Cytoxan 12/31/2024 (adjuvant chemotherapy delayed because of wound healing issues)   3/18/2025-initiated weekly  Taxol    *Myelosuppression/neutropenic fever secondary to chemotherapy   Blood counts adequate to proceed with Taxol today      * nausea/diarrhea/cramping, chemo induced compounded by Crohn's  responding to Imodium/Zofran/Compazine    *elevated LFT-likely s/e chemo     *[employer] 19 gene panel-VUS Bard 1    *medical comorbidities of Crohn's disease.    Oncology plan/recommendations:  Proceed first week of Taxol today  After chemotherapy she will need postmastectomy radiation given the size and alana involvement  She will need hormonal therapy with ovarian suppression/tamoxifen versus oophorectomy and AI therapy/ck4/6 inhibitor  Nonspecific lung nodules will need reassessment after completing chemotherapy radiographically  5.  1 week lab plus Taxol 2-week lab MD/NP Taxol

## 2025-03-13 ENCOUNTER — TELEMEDICINE (OUTPATIENT)
Dept: PSYCHIATRY | Facility: HOSPITAL | Age: 45
End: 2025-03-13
Payer: COMMERCIAL

## 2025-03-13 DIAGNOSIS — R23.2 HOT FLASHES: Primary | ICD-10-CM

## 2025-03-13 DIAGNOSIS — F43.23 ADJUSTMENT DISORDER WITH MIXED ANXIETY AND DEPRESSED MOOD: ICD-10-CM

## 2025-03-13 RX ORDER — GABAPENTIN 300 MG/1
600 CAPSULE ORAL NIGHTLY
Qty: 60 CAPSULE | Refills: 2 | Status: SHIPPED | OUTPATIENT
Start: 2025-03-13 | End: 2026-03-13

## 2025-03-13 NOTE — PROGRESS NOTES
[facility]  Video visit conducted via [employer] Video Visit  You have chosen to receive care through a telehealth visit.  Do you consent to use a video/audio connection for your medical care today? YES  Telehealth provided in patient's home.  Location of Provider: [place], [place]     Subjective  Patient ID: Suzanne Lin is a 44 y.o. female is seen via telehealth in the [facility].    CC: Persistent health complexity, coping well    HPI/ Interval History:   Pt is seen in follow up regarding ongoing anxiety, deprssion alongside continued treatment for breast cancer.    Details of recent hospitalization reviewed, noting GI distress leading to acute weakness, SOA, hypokalemia, and anemia. Taxol has been delayed a week to allow recovery from this, grateful to have completed AC. Continus to take one day at a time, allowing robert and giving self space to approach a moment at a time. Is sleeping acceptably, although using benadryl 25 mg q hs to assist with this as gabapentin alone has not been helpful. Continues to get up and jannette as needed in the morning. Not working now. Continues to get paid, managing finances and care for son acceptably at this time. Appreciates neighbors and available support who step in to help as needed.     Fortunately, appetite has improved, intentionally limiting overeating. GI symptoms are stable, well managed. Diarrhea remains complicated with history. Has increased fiber.     Is not having hot flashes. Suspects she may have been confusing thiese with fevers. Continues on low dose venlafaxine, not interested in increasing beyond current dose.    Exam: As above    Recent Labs Reviewed:  Admission on 03/08/2025, Discharged on 03/10/2025   Component Date Value    Glucose 03/08/2025 110 (H)     BUN 03/08/2025 8     Creatinine 03/08/2025 0.57     Sodium 03/08/2025 136     Potassium 03/08/2025 2.0 (C)     Chloride 03/08/2025 93 (L)     CO2 03/08/2025 26.2      Calcium 03/08/2025 9.4     Total Protein 03/08/2025 7.2     Albumin 03/08/2025 3.4 (L)     ALT (SGPT) 03/08/2025 26     AST (SGOT) 03/08/2025 17     Alkaline Phosphatase 03/08/2025 205 (H)     Total Bilirubin 03/08/2025 1.3 (H)     Globulin 03/08/2025 3.8     A/G Ratio 03/08/2025 0.9     BUN/Creatinine Ratio 03/08/2025 14.0     Anion Gap 03/08/2025 16.8 (H)     eGFR 03/08/2025 115.1     Protime 03/08/2025 15.6 (H)     INR 03/08/2025 1.25 (H)     QT Interval 03/08/2025 413     QTC Interval 03/08/2025 476     Magnesium 03/08/2025 1.6     proBNP 03/08/2025 112.0     D-Dimer, Quantitative 03/08/2025 0.67 (H)     HS Troponin T 03/08/2025 12     WBC 03/08/2025 1.57 (C)     RBC 03/08/2025 3.12 (L)     Hemoglobin 03/08/2025 9.3 (L)     Hematocrit 03/08/2025 26.9 (L)     MCV 03/08/2025 86.2     MCH 03/08/2025 29.8     MCHC 03/08/2025 34.6     RDW 03/08/2025 15.9 (H)     RDW-SD 03/08/2025 49.1     MPV 03/08/2025 10.8     Platelets 03/08/2025 153     Neutrophil % 03/08/2025 66.7     Lymphocyte % 03/08/2025 21.9     Monocyte % 03/08/2025 11.5     Eosinophil % 03/08/2025 0.0 (L)     Basophil % 03/08/2025 0.0     Neutrophils Absolute 03/08/2025 1.05 (L)     Lymphocytes Absolute 03/08/2025 0.34 (L)     Monocytes Absolute 03/08/2025 0.18     Eosinophils Absolute 03/08/2025 0.00     Basophils Absolute 03/08/2025 0.00     Anisocytosis 03/08/2025 Slight/1+     Microcytes 03/08/2025 Slight/1+     Polychromasia 03/08/2025 Slight/1+     WBC Morphology 03/08/2025 Normal     Platelet Morphology 03/08/2025 Normal     HS Troponin T 03/08/2025 14 (H)     Troponin T Numeric Delta 03/08/2025 2     Glucose 03/08/2025 70     BUN 03/08/2025 7     Creatinine 03/08/2025 0.46 (L)     Sodium 03/08/2025 134 (L)     Potassium 03/08/2025 2.4 (C)     Chloride 03/08/2025 97 (L)     CO2 03/08/2025 24.4     Calcium 03/08/2025 8.2 (L)     BUN/Creatinine Ratio 03/08/2025 15.2     Anion Gap 03/08/2025 12.6     eGFR 03/08/2025 121.2     Glucose 03/08/2025 83      Potassium 03/08/2025 3.7     Glucose 03/09/2025 99     BUN 03/09/2025 4 (L)     Creatinine 03/09/2025 0.42 (L)     Sodium 03/09/2025 139     Potassium 03/09/2025 3.0 (L)     Chloride 03/09/2025 108 (H)     CO2 03/09/2025 21.8 (L)     Calcium 03/09/2025 7.8 (L)     Total Protein 03/09/2025 5.1 (L)     Albumin 03/09/2025 2.4 (L)     ALT (SGPT) 03/09/2025 14     AST (SGOT) 03/09/2025 11     Alkaline Phosphatase 03/09/2025 144 (H)     Total Bilirubin 03/09/2025 0.4     Globulin 03/09/2025 2.7     A/G Ratio 03/09/2025 0.9     BUN/Creatinine Ratio 03/09/2025 9.5     Anion Gap 03/09/2025 9.2     eGFR 03/09/2025 123.9     Magnesium 03/09/2025 1.6     Phosphorus 03/09/2025 3.3     WBC 03/09/2025 1.90 (C)     RBC 03/09/2025 2.33 (L)     Hemoglobin 03/09/2025 7.0 (L)     Hematocrit 03/09/2025 20.6 (C)     MCV 03/09/2025 88.4     MCH 03/09/2025 30.0     MCHC 03/09/2025 34.0     RDW 03/09/2025 16.6 (H)     RDW-SD 03/09/2025 52.1     MPV 03/09/2025 10.8     Platelets 03/09/2025 120 (L)     Neutrophil % 03/09/2025 53.6     Lymphocyte % 03/09/2025 21.1     Monocyte % 03/09/2025 17.4 (H)     Eosinophil % 03/09/2025 2.1     Basophil % 03/09/2025 1.6 (H)     Immature Grans % 03/09/2025 4.2 (H)     Neutrophils, Absolute 03/09/2025 1.02 (L)     Lymphocytes, Absolute 03/09/2025 0.40 (L)     Monocytes, Absolute 03/09/2025 0.33     Eosinophils, Absolute 03/09/2025 0.04     Basophils, Absolute 03/09/2025 0.03     Immature Grans, Absolute 03/09/2025 0.08 (H)     Glucose 03/09/2025 128     Glucose 03/09/2025 125     Glucose 03/09/2025 85     ABO Type 03/09/2025 A     RH type 03/09/2025 Positive     Antibody Screen 03/09/2025 Negative     T&S Expiration Date 03/09/2025 3/12/2025 11:59:59 PM     Product Code 03/10/2025 [id number]     Unit Number 03/10/2025 [id number]     UNIT  ABO 03/10/2025 A     UNIT  RH 03/10/2025 POS     Crossmatch Interpretation 03/10/2025 Compatible     Dispense Status 03/10/2025 PT     Blood Expiration Date 03/10/2025  [id number]     Blood Type Barcode 03/10/2025 6200     Product Code 03/10/2025 [id number]     Unit Number 03/10/2025 [id number]     UNIT  ABO 03/10/2025 A     UNIT  RH 03/10/2025 POS     Crossmatch Interpretation 03/10/2025 Compatible     Dispense Status 03/10/2025 PT     Blood Expiration Date 03/10/2025 [id number]     Blood Type Barcode 03/10/2025 6200     Potassium 03/09/2025 3.8     Glucose 03/09/2025 93     Glucose 03/09/2025 87     Glucose 03/10/2025 80     BUN 03/10/2025 2 (L)     Creatinine 03/10/2025 0.40 (L)     Sodium 03/10/2025 141     Potassium 03/10/2025 3.7     Chloride 03/10/2025 108 (H)     CO2 03/10/2025 25.9     Calcium 03/10/2025 8.4 (L)     Total Protein 03/10/2025 5.6 (L)     Albumin 03/10/2025 2.7 (L)     ALT (SGPT) 03/10/2025 12     AST (SGOT) 03/10/2025 9     Alkaline Phosphatase 03/10/2025 150 (H)     Total Bilirubin 03/10/2025 0.5     Globulin 03/10/2025 2.9     A/G Ratio 03/10/2025 0.9     BUN/Creatinine Ratio 03/10/2025 5.0 (L)     Anion Gap 03/10/2025 7.1     eGFR 03/10/2025 125.3     Magnesium 03/10/2025 1.5 (L)     Phosphorus 03/10/2025 2.6     WBC 03/10/2025 3.74     RBC 03/10/2025 3.67 (L)     Hemoglobin 03/10/2025 10.4 (L)     Hematocrit 03/10/2025 30.8 (L)     MCV 03/10/2025 83.9     MCH 03/10/2025 28.3     MCHC 03/10/2025 33.8     RDW 03/10/2025 17.1 (H)     RDW-SD 03/10/2025 51.1     MPV 03/10/2025 10.4     Platelets 03/10/2025 176     Neutrophil % 03/10/2025 61.0     Lymphocyte % 03/10/2025 15.8 (L)     Monocyte % 03/10/2025 14.7 (H)     Eosinophil % 03/10/2025 1.1     Basophil % 03/10/2025 1.3     Immature Grans % 03/10/2025 6.1 (H)     Neutrophils, Absolute 03/10/2025 2.28     Lymphocytes, Absolute 03/10/2025 0.59 (L)     Monocytes, Absolute 03/10/2025 0.55     Eosinophils, Absolute 03/10/2025 0.04     Basophils, Absolute 03/10/2025 0.05     Immature Grans, Absolute 03/10/2025 0.23 (H)     Glucose 03/09/2025 96    Infusion on 02/25/2025   Component Date Value    Glucose  02/25/2025 125 (H)     BUN 02/25/2025 11     Creatinine 02/25/2025 0.62     Sodium 02/25/2025 141     Potassium 02/25/2025 3.6     Chloride 02/25/2025 104     CO2 02/25/2025 23.5     Calcium 02/25/2025 9.0     Total Protein 02/25/2025 6.8     Albumin 02/25/2025 3.5     ALT (SGPT) 02/25/2025 20     AST (SGOT) 02/25/2025 31     Alkaline Phosphatase 02/25/2025 173 (H)     Total Bilirubin 02/25/2025 0.5     Globulin 02/25/2025 3.3     A/G Ratio 02/25/2025 1.1     BUN/Creatinine Ratio 02/25/2025 17.7     Anion Gap 02/25/2025 13.5     eGFR 02/25/2025 112.8     WBC 02/25/2025 9.37     RBC 02/25/2025 4.01     Hemoglobin 02/25/2025 12.0     Hematocrit 02/25/2025 36.3     MCV 02/25/2025 90.5     MCH 02/25/2025 29.9     MCHC 02/25/2025 33.1     RDW 02/25/2025 16.5 (H)     RDW-SD 02/25/2025 53.9     MPV 02/25/2025 9.4     Platelets 02/25/2025 541 (H)     Neutrophil % 02/25/2025 77.1 (H)     Lymphocyte % 02/25/2025 13.0 (L)     Monocyte % 02/25/2025 6.4     Eosinophil % 02/25/2025 0.9     Basophil % 02/25/2025 1.1     Immature Grans % 02/25/2025 1.5 (H)     Neutrophils, Absolute 02/25/2025 7.23 (H)     Lymphocytes, Absolute 02/25/2025 1.22     Monocytes, Absolute 02/25/2025 0.60     Eosinophils, Absolute 02/25/2025 0.08     Basophils, Absolute 02/25/2025 0.10     Immature Grans, Absolute 02/25/2025 0.14 (H)     nRBC 02/25/2025 0.0    Infusion on 02/04/2025   Component Date Value    Glucose 02/04/2025 136 (H)     BUN 02/04/2025 8     Creatinine 02/04/2025 0.65     Sodium 02/04/2025 142     Potassium 02/04/2025 3.5     Chloride 02/04/2025 104     CO2 02/04/2025 23.7     Calcium 02/04/2025 9.3     Total Protein 02/04/2025 7.3     Albumin 02/04/2025 3.6     ALT (SGPT) 02/04/2025 24     AST (SGOT) 02/04/2025 34 (H)     Alkaline Phosphatase 02/04/2025 243 (H)     Total Bilirubin 02/04/2025 0.3     Globulin 02/04/2025 3.7     A/G Ratio 02/04/2025 1.0     BUN/Creatinine Ratio 02/04/2025 12.3     Anion Gap 02/04/2025 14.3     eGFR  02/04/2025 111.5     WBC 02/04/2025 11.57 (H)     RBC 02/04/2025 4.92     Hemoglobin 02/04/2025 14.6     Hematocrit 02/04/2025 46.3     MCV 02/04/2025 94.1     MCH 02/04/2025 29.7     MCHC 02/04/2025 31.5     RDW 02/04/2025 15.1     RDW-SD 02/04/2025 51.8     MPV 02/04/2025 9.1     Platelets 02/04/2025 1,196 (C)     Neutrophil % 02/04/2025 72.3     Lymphocyte % 02/04/2025 14.7 (L)     Monocyte % 02/04/2025 5.2     Eosinophil % 02/04/2025 0.2 (L)     Basophil % 02/04/2025 1.6 (H)     Immature Grans % 02/04/2025 6.0 (H)     Neutrophils, Absolute 02/04/2025 8.38 (H)     Lymphocytes, Absolute 02/04/2025 1.70     Monocytes, Absolute 02/04/2025 0.60     Eosinophils, Absolute 02/04/2025 0.02     Basophils, Absolute 02/04/2025 0.18     Immature Grans, Absolute 02/04/2025 0.69 (H)     nRBC 02/04/2025 0.0    No results displayed because visit has over 200 results.      Infusion on 01/21/2025   Component Date Value    Ferritin 01/21/2025 2,525.00 (H)     Iron 01/21/2025 19 (L)     Iron Saturation (TSAT) 01/21/2025 11 (L)     Transferrin 01/21/2025 119 (L)     TIBC 01/21/2025 177 (L)     Glucose 01/21/2025 120 (H)     BUN 01/21/2025 9     Creatinine 01/21/2025 0.52 (L)     Sodium 01/21/2025 135 (L)     Potassium 01/21/2025 3.2 (L)     Chloride 01/21/2025 101     CO2 01/21/2025 22.9     Calcium 01/21/2025 8.9     BUN/Creatinine Ratio 01/21/2025 17.3     Anion Gap 01/21/2025 11.1     eGFR 01/21/2025 117.7     Magnesium 01/21/2025 2.2     Color, UA 01/21/2025 Dark Yellow (A)     Appearance, UA 01/21/2025 Slightly Cloudy (A)     pH, UA 01/21/2025 6.0     Specific Gravity, UA 01/21/2025 1.010     Glucose, UA 01/21/2025 Negative     Ketones, UA 01/21/2025 Negative     Bilirubin, UA 01/21/2025 Negative     Blood, UA 01/21/2025 Negative     Protein, UA 01/21/2025 30 mg/dL (1+) (A)     Leuk Esterase, UA 01/21/2025 Negative     Nitrite, UA 01/21/2025 Negative     Urobilinogen, UA 01/21/2025 0.2 E.U./dL     WBC 01/21/2025 0.17 (C)     RBC  01/21/2025 3.50 (L)     Hemoglobin 01/21/2025 10.2 (L)     Hematocrit 01/21/2025 31.8 (L)     MCV 01/21/2025 90.9     MCH 01/21/2025 29.1     MCHC 01/21/2025 32.1     RDW 01/21/2025 13.8     RDW-SD 01/21/2025 47.2     MPV 01/21/2025 9.5     Platelets 01/21/2025 280     Neutrophil % 01/21/2025 5.9 (L)     Lymphocyte % 01/21/2025 64.7 (H)     Monocyte % 01/21/2025 17.6 (H)     Eosinophil % 01/21/2025 0.0 (L)     Basophil % 01/21/2025 5.9 (H)     Immature Grans % 01/21/2025 5.9 (H)     Neutrophils, Absolute 01/21/2025 0.01 (L)     Lymphocytes, Absolute 01/21/2025 0.11 (L)     Monocytes, Absolute 01/21/2025 0.03 (L)     Eosinophils, Absolute 01/21/2025 0.00     Basophils, Absolute 01/21/2025 0.01     Immature Grans, Absolute 01/21/2025 0.01     RBC, UA 01/21/2025 None Seen     WBC, UA 01/21/2025 None Seen     Bacteria, UA 01/21/2025 None Seen     Squamous Epithelial Cell* 01/21/2025 0-2     Hyaline Casts, UA 01/21/2025 None Seen     Methodology 01/21/2025 Manual Light Microscopy     Blood Culture 01/21/2025 No growth at 5 days     Blood Culture 01/21/2025 No growth at 5 days    Infusion on 01/14/2025   Component Date Value    Glucose 01/14/2025 109 (H)     BUN 01/14/2025 7     Creatinine 01/14/2025 0.63     Sodium 01/14/2025 140     Potassium 01/14/2025 3.7     Chloride 01/14/2025 104     CO2 01/14/2025 23.9     Calcium 01/14/2025 8.9     Total Protein 01/14/2025 7.2     Albumin 01/14/2025 3.3 (L)     ALT (SGPT) 01/14/2025 33     AST (SGOT) 01/14/2025 14     Alkaline Phosphatase 01/14/2025 277 (H)     Total Bilirubin 01/14/2025 0.5     Globulin 01/14/2025 3.9     A/G Ratio 01/14/2025 0.8     BUN/Creatinine Ratio 01/14/2025 11.1     Anion Gap 01/14/2025 12.1     eGFR 01/14/2025 112.3     WBC 01/14/2025 6.85     RBC 01/14/2025 3.94     Hemoglobin 01/14/2025 11.9 (L)     Hematocrit 01/14/2025 36.2     MCV 01/14/2025 91.9     MCH 01/14/2025 30.2     MCHC 01/14/2025 32.9     RDW 01/14/2025 14.6     RDW-SD 01/14/2025 49.7      MPV 01/14/2025 9.4     Platelets 01/14/2025 567 (H)     Neutrophil % 01/14/2025 60.4     Lymphocyte % 01/14/2025 14.7 (L)     Monocyte % 01/14/2025 13.0 (H)     Eosinophil % 01/14/2025 1.2     Basophil % 01/14/2025 1.6 (H)     Immature Grans % 01/14/2025 9.1 (H)     Neutrophils, Absolute 01/14/2025 4.14     Lymphocytes, Absolute 01/14/2025 1.01     Monocytes, Absolute 01/14/2025 0.89     Eosinophils, Absolute 01/14/2025 0.08     Basophils, Absolute 01/14/2025 0.11     Immature Grans, Absolute 01/14/2025 0.62 (H)     nRBC 01/14/2025 0.0    Labs reviewed    Current Psychotropic Medications:  Venlafaxine XL 37.5 mg daily   Gabapentin 600 mg q hs    Plan of Care/ Medical Decision Making  Continue medications as written. Discussed low dosing, opportunities for increase. Support engagement as able, rest as needed.  Peer support opportunities reviewed in clinic and community, specifically given opportunities for virtual connection.  Ongoing counseling continued, q 4 week follow up planned 1:1.    Diagnoses and all orders for this visit:    1. Hot flashes (Primary)  -     gabapentin (Neurontin) 300 MG capsule; Take 2 capsules by mouth Every Night.  Dispense: 60 capsule; Refill: 2    2. Adjustment disorder with mixed anxiety and depressed mood    I spent 34 minutes caring for Suzanne on this date of service. This time includes time spent by me in the following activities: preparing for the visit, reviewing tests, obtaining and/or reviewing a separately obtained history, performing a medically appropriate examination and/or evaluation, counseling and educating the patient/family/caregiver, ordering medications, tests, or procedures, and documenting information in the medical record.

## 2025-03-17 ENCOUNTER — READMISSION MANAGEMENT (OUTPATIENT)
Dept: CALL CENTER | Facility: HOSPITAL | Age: 45
End: 2025-03-17
Payer: COMMERCIAL

## 2025-03-17 NOTE — OUTREACH NOTE
Medical Week 1 Survey      Flowsheet Row Responses   [facility] patient discharged from? [facility]   Does the patient have one of the following disease processes/diagnoses(primary or secondary)? Other   Week 1 attempt successful? No   Unsuccessful attempts Attempt 1            LOURDES TRUJILLO - Registered Nurse

## 2025-03-18 ENCOUNTER — INFUSION (OUTPATIENT)
Dept: ONCOLOGY | Facility: HOSPITAL | Age: 45
End: 2025-03-18
Payer: COMMERCIAL

## 2025-03-18 ENCOUNTER — OFFICE VISIT (OUTPATIENT)
Dept: ONCOLOGY | Facility: CLINIC | Age: 45
End: 2025-03-18
Payer: COMMERCIAL

## 2025-03-18 VITALS
DIASTOLIC BLOOD PRESSURE: 74 MMHG | BODY MASS INDEX: 22.69 KG/M2 | RESPIRATION RATE: 16 BRPM | OXYGEN SATURATION: 98 % | HEIGHT: 65 IN | TEMPERATURE: 97.3 F | WEIGHT: 136.2 LBS | SYSTOLIC BLOOD PRESSURE: 106 MMHG | HEART RATE: 101 BPM

## 2025-03-18 VITALS — DIASTOLIC BLOOD PRESSURE: 68 MMHG | SYSTOLIC BLOOD PRESSURE: 100 MMHG | HEART RATE: 72 BPM

## 2025-03-18 DIAGNOSIS — C50.911 BREAST CANCER, STAGE 3, RIGHT: Primary | ICD-10-CM

## 2025-03-18 DIAGNOSIS — Z79.899 NEED FOR PROPHYLACTIC CHEMOTHERAPY: ICD-10-CM

## 2025-03-18 DIAGNOSIS — E83.42 HYPOMAGNESEMIA: Primary | ICD-10-CM

## 2025-03-18 DIAGNOSIS — Z45.2 ENCOUNTER FOR CENTRAL LINE CARE: ICD-10-CM

## 2025-03-18 DIAGNOSIS — C50.911 BREAST CANCER, STAGE 3, RIGHT: ICD-10-CM

## 2025-03-18 LAB
ALBUMIN SERPL-MCNC: 3.7 G/DL (ref 3.5–5.2)
ALBUMIN/GLOB SERPL: 1.1 G/DL
ALP SERPL-CCNC: 138 U/L (ref 39–117)
ALT SERPL W P-5'-P-CCNC: 12 U/L (ref 1–33)
ANION GAP SERPL CALCULATED.3IONS-SCNC: 15.7 MMOL/L (ref 5–15)
AST SERPL-CCNC: 17 U/L (ref 1–32)
B-HCG UR QL: NEGATIVE
BASOPHILS # BLD AUTO: 0.04 10*3/MM3 (ref 0–0.2)
BASOPHILS NFR BLD AUTO: 0.3 % (ref 0–1.5)
BILIRUB SERPL-MCNC: 0.3 MG/DL (ref 0–1.2)
BUN SERPL-MCNC: 12 MG/DL (ref 6–20)
BUN/CREAT SERPL: 19.7 (ref 7–25)
CALCIUM SPEC-SCNC: 9.6 MG/DL (ref 8.6–10.5)
CHLORIDE SERPL-SCNC: 100 MMOL/L (ref 98–107)
CO2 SERPL-SCNC: 22.3 MMOL/L (ref 22–29)
CREAT SERPL-MCNC: 0.61 MG/DL (ref 0.57–1)
DEPRECATED RDW RBC AUTO: 56.5 FL (ref 37–54)
EGFRCR SERPLBLD CKD-EPI 2021: 113.2 ML/MIN/1.73
EOSINOPHIL # BLD AUTO: 0 10*3/MM3 (ref 0–0.4)
EOSINOPHIL NFR BLD AUTO: 0 % (ref 0.3–6.2)
ERYTHROCYTE [DISTWIDTH] IN BLOOD BY AUTOMATED COUNT: 17.7 % (ref 12.3–15.4)
GLOBULIN UR ELPH-MCNC: 3.5 GM/DL
GLUCOSE SERPL-MCNC: 164 MG/DL (ref 65–99)
HCT VFR BLD AUTO: 40.4 % (ref 34–46.6)
HGB BLD-MCNC: 13.2 G/DL (ref 12–15.9)
IMM GRANULOCYTES # BLD AUTO: 0.25 10*3/MM3 (ref 0–0.05)
IMM GRANULOCYTES NFR BLD AUTO: 1.7 % (ref 0–0.5)
LYMPHOCYTES # BLD AUTO: 0.64 10*3/MM3 (ref 0.7–3.1)
LYMPHOCYTES NFR BLD AUTO: 4.4 % (ref 19.6–45.3)
MAGNESIUM SERPL-MCNC: 1.8 MG/DL (ref 1.6–2.6)
MCH RBC QN AUTO: 28.9 PG (ref 26.6–33)
MCHC RBC AUTO-ENTMCNC: 32.7 G/DL (ref 31.5–35.7)
MCV RBC AUTO: 88.4 FL (ref 79–97)
MONOCYTES # BLD AUTO: 0.46 10*3/MM3 (ref 0.1–0.9)
MONOCYTES NFR BLD AUTO: 3.2 % (ref 5–12)
NEUTROPHILS NFR BLD AUTO: 13.21 10*3/MM3 (ref 1.7–7)
NEUTROPHILS NFR BLD AUTO: 90.4 % (ref 42.7–76)
NRBC BLD AUTO-RTO: 0 /100 WBC (ref 0–0.2)
PLATELET # BLD AUTO: 662 10*3/MM3 (ref 140–450)
PMV BLD AUTO: 9.2 FL (ref 6–12)
POTASSIUM SERPL-SCNC: 3.7 MMOL/L (ref 3.5–5.2)
PROT SERPL-MCNC: 7.2 G/DL (ref 6–8.5)
RBC # BLD AUTO: 4.57 10*6/MM3 (ref 3.77–5.28)
SODIUM SERPL-SCNC: 138 MMOL/L (ref 136–145)
WBC NRBC COR # BLD AUTO: 14.6 10*3/MM3 (ref 3.4–10.8)

## 2025-03-18 PROCEDURE — 83735 ASSAY OF MAGNESIUM: CPT | Performed by: INTERNAL MEDICINE

## 2025-03-18 PROCEDURE — 25010000002 DEXAMETHASONE SODIUM PHOSPHATE 100 MG/10ML SOLUTION: Performed by: INTERNAL MEDICINE

## 2025-03-18 PROCEDURE — 25810000003 SODIUM CHLORIDE 0.9 % SOLUTION 250 ML FLEX CONT: Performed by: INTERNAL MEDICINE

## 2025-03-18 PROCEDURE — 25010000002 HEPARIN LOCK FLUSH PER 10 UNITS: Performed by: INTERNAL MEDICINE

## 2025-03-18 PROCEDURE — 25810000003 SODIUM CHLORIDE 0.9 % SOLUTION: Performed by: INTERNAL MEDICINE

## 2025-03-18 PROCEDURE — 85025 COMPLETE CBC W/AUTO DIFF WBC: CPT | Performed by: INTERNAL MEDICINE

## 2025-03-18 PROCEDURE — 81025 URINE PREGNANCY TEST: CPT | Performed by: INTERNAL MEDICINE

## 2025-03-18 PROCEDURE — 25010000002 PACLITAXEL PER 1 MG: Performed by: INTERNAL MEDICINE

## 2025-03-18 PROCEDURE — 96413 CHEMO IV INFUSION 1 HR: CPT

## 2025-03-18 PROCEDURE — 96375 TX/PRO/DX INJ NEW DRUG ADDON: CPT

## 2025-03-18 PROCEDURE — 25010000002 DIPHENHYDRAMINE PER 50 MG: Performed by: INTERNAL MEDICINE

## 2025-03-18 PROCEDURE — 99214 OFFICE O/P EST MOD 30 MIN: CPT | Performed by: INTERNAL MEDICINE

## 2025-03-18 PROCEDURE — 80053 COMPREHEN METABOLIC PANEL: CPT | Performed by: INTERNAL MEDICINE

## 2025-03-18 RX ORDER — SODIUM CHLORIDE 9 MG/ML
20 INJECTION, SOLUTION INTRAVENOUS ONCE
Status: CANCELLED | OUTPATIENT
Start: 2025-03-18

## 2025-03-18 RX ORDER — SODIUM CHLORIDE 9 MG/ML
20 INJECTION, SOLUTION INTRAVENOUS ONCE
OUTPATIENT
Start: 2025-03-26

## 2025-03-18 RX ORDER — FAMOTIDINE 10 MG/ML
20 INJECTION, SOLUTION INTRAVENOUS AS NEEDED
OUTPATIENT
Start: 2025-03-26

## 2025-03-18 RX ORDER — DIPHENHYDRAMINE HYDROCHLORIDE 50 MG/ML
50 INJECTION INTRAMUSCULAR; INTRAVENOUS AS NEEDED
Status: DISCONTINUED | OUTPATIENT
Start: 2025-03-18 | End: 2025-03-18 | Stop reason: HOSPADM

## 2025-03-18 RX ORDER — HYDROCORTISONE SODIUM SUCCINATE 100 MG/2ML
100 INJECTION INTRAMUSCULAR; INTRAVENOUS AS NEEDED
Status: DISCONTINUED | OUTPATIENT
Start: 2025-03-18 | End: 2025-03-18 | Stop reason: HOSPADM

## 2025-03-18 RX ORDER — FAMOTIDINE 10 MG/ML
20 INJECTION, SOLUTION INTRAVENOUS AS NEEDED
Status: DISCONTINUED | OUTPATIENT
Start: 2025-03-18 | End: 2025-03-18 | Stop reason: HOSPADM

## 2025-03-18 RX ORDER — SODIUM CHLORIDE 9 MG/ML
20 INJECTION, SOLUTION INTRAVENOUS ONCE
Status: COMPLETED | OUTPATIENT
Start: 2025-03-18 | End: 2025-03-18

## 2025-03-18 RX ORDER — SODIUM CHLORIDE 0.9 % (FLUSH) 0.9 %
10 SYRINGE (ML) INJECTION AS NEEDED
OUTPATIENT
Start: 2025-03-18

## 2025-03-18 RX ORDER — FAMOTIDINE 10 MG/ML
20 INJECTION, SOLUTION INTRAVENOUS ONCE
Status: COMPLETED | OUTPATIENT
Start: 2025-03-18 | End: 2025-03-18

## 2025-03-18 RX ORDER — MONTELUKAST SODIUM 10 MG/1
10 TABLET ORAL ONCE
Status: COMPLETED | OUTPATIENT
Start: 2025-03-18 | End: 2025-03-18

## 2025-03-18 RX ORDER — MONTELUKAST SODIUM 10 MG/1
10 TABLET ORAL ONCE
Start: 2025-03-26 | End: 2025-03-25

## 2025-03-18 RX ORDER — SODIUM CHLORIDE 0.9 % (FLUSH) 0.9 %
10 SYRINGE (ML) INJECTION AS NEEDED
Status: DISCONTINUED | OUTPATIENT
Start: 2025-03-18 | End: 2025-03-18 | Stop reason: HOSPADM

## 2025-03-18 RX ORDER — FAMOTIDINE 10 MG/ML
20 INJECTION, SOLUTION INTRAVENOUS ONCE
Status: CANCELLED | OUTPATIENT
Start: 2025-03-18

## 2025-03-18 RX ORDER — HEPARIN SODIUM (PORCINE) LOCK FLUSH IV SOLN 100 UNIT/ML 100 UNIT/ML
500 SOLUTION INTRAVENOUS AS NEEDED
OUTPATIENT
Start: 2025-03-18

## 2025-03-18 RX ORDER — FAMOTIDINE 10 MG/ML
20 INJECTION, SOLUTION INTRAVENOUS ONCE
OUTPATIENT
Start: 2025-03-26

## 2025-03-18 RX ORDER — HYDROCORTISONE SODIUM SUCCINATE 100 MG/2ML
100 INJECTION INTRAMUSCULAR; INTRAVENOUS AS NEEDED
Status: CANCELLED | OUTPATIENT
Start: 2025-03-18

## 2025-03-18 RX ORDER — DIPHENHYDRAMINE HYDROCHLORIDE 50 MG/ML
50 INJECTION INTRAMUSCULAR; INTRAVENOUS AS NEEDED
OUTPATIENT
Start: 2025-03-26

## 2025-03-18 RX ORDER — HYDROCORTISONE SODIUM SUCCINATE 100 MG/2ML
100 INJECTION INTRAMUSCULAR; INTRAVENOUS AS NEEDED
OUTPATIENT
Start: 2025-03-26

## 2025-03-18 RX ORDER — DIPHENHYDRAMINE HYDROCHLORIDE 50 MG/ML
50 INJECTION INTRAMUSCULAR; INTRAVENOUS AS NEEDED
Status: CANCELLED | OUTPATIENT
Start: 2025-03-18

## 2025-03-18 RX ORDER — HEPARIN SODIUM (PORCINE) LOCK FLUSH IV SOLN 100 UNIT/ML 100 UNIT/ML
500 SOLUTION INTRAVENOUS AS NEEDED
Status: DISCONTINUED | OUTPATIENT
Start: 2025-03-18 | End: 2025-03-18 | Stop reason: HOSPADM

## 2025-03-18 RX ORDER — MONTELUKAST SODIUM 10 MG/1
10 TABLET ORAL ONCE
Status: CANCELLED
Start: 2025-03-18 | End: 2025-03-18

## 2025-03-18 RX ORDER — FAMOTIDINE 10 MG/ML
20 INJECTION, SOLUTION INTRAVENOUS AS NEEDED
Status: CANCELLED | OUTPATIENT
Start: 2025-03-18

## 2025-03-18 RX ADMIN — DEXAMETHASONE SODIUM PHOSPHATE 12 MG: 10 INJECTION, SOLUTION INTRAMUSCULAR; INTRAVENOUS at 13:06

## 2025-03-18 RX ADMIN — Medication 10 ML: at 15:31

## 2025-03-18 RX ADMIN — HEPARIN 500 UNITS: 100 SYRINGE at 15:31

## 2025-03-18 RX ADMIN — MONTELUKAST 10 MG: 10 TABLET, FILM COATED ORAL at 12:41

## 2025-03-18 RX ADMIN — SODIUM CHLORIDE 20 ML/HR: 900 INJECTION, SOLUTION INTRAVENOUS at 12:42

## 2025-03-18 RX ADMIN — DIPHENHYDRAMINE HYDROCHLORIDE 50 MG: 50 INJECTION, SOLUTION INTRAMUSCULAR; INTRAVENOUS at 12:42

## 2025-03-18 RX ADMIN — FAMOTIDINE 20 MG: 10 INJECTION INTRAVENOUS at 12:41

## 2025-03-18 RX ADMIN — SODIUM CHLORIDE 140 MG: 9 INJECTION, SOLUTION INTRAVENOUS at 14:00

## 2025-03-21 ENCOUNTER — READMISSION MANAGEMENT (OUTPATIENT)
Dept: CALL CENTER | Facility: HOSPITAL | Age: 45
End: 2025-03-21
Payer: COMMERCIAL

## 2025-03-21 NOTE — OUTREACH NOTE
Medical Week 1 Survey      Flowsheet Row Responses   [facility] patient discharged from? [facility]   Does the patient have one of the following disease processes/diagnoses(primary or secondary)? Other   Week 1 attempt successful? Yes   Call start time 1045   Call end time 1048   Discharge diagnosis Hypokalemia   Person spoke with today (if not patient) and relationship Mamadou, priscila other   Meds reviewed with patient/caregiver? Yes   Is the patient having any side effects they believe may be caused by any medication additions or changes? No   Does the patient have all medications ordered at discharge? N/A   Is the patient taking all medications as directed (includes completed medication regime)? Yes   Does the patient have a primary care provider?  Yes   Does the patient have an appointment with their PCP within 7 days of discharge? No   What is preventing the patient from scheduling follow up appointments within 7 days of discharge? Haven't had time   Nursing Interventions Advised patient to make appointment   Has home health visited the patient within 72 hours of discharge? N/A   Psychosocial issues? No   Did the patient receive a copy of their discharge instructions? Yes   Nursing interventions Reviewed instructions with patient   What is the patient's perception of their health status since discharge? Improving   Is the patient/caregiver able to teach back signs and symptoms related to disease process for when to call PCP? Yes   Is the patient/caregiver able to teach back signs and symptoms related to disease process for when to call 911? Yes   Is the patient/caregiver able to teach back the hierarchy of who to call/visit for symptoms/problems? PCP, Specialist, Home health nurse, Urgent Care, ED, 911 Yes   Week 1 call completed? Yes   Call end time 1048            Padmini GONZALEZ - Registered Nurse

## 2025-03-26 ENCOUNTER — INFUSION (OUTPATIENT)
Dept: ONCOLOGY | Facility: HOSPITAL | Age: 45
End: 2025-03-26
Payer: COMMERCIAL

## 2025-03-26 VITALS
OXYGEN SATURATION: 99 % | SYSTOLIC BLOOD PRESSURE: 96 MMHG | HEART RATE: 88 BPM | BODY MASS INDEX: 23.16 KG/M2 | WEIGHT: 139.2 LBS | TEMPERATURE: 98.2 F | DIASTOLIC BLOOD PRESSURE: 61 MMHG | RESPIRATION RATE: 20 BRPM

## 2025-03-26 DIAGNOSIS — C50.911 BREAST CANCER, STAGE 3, RIGHT: Primary | ICD-10-CM

## 2025-03-26 DIAGNOSIS — Z79.899 NEED FOR PROPHYLACTIC CHEMOTHERAPY: ICD-10-CM

## 2025-03-26 DIAGNOSIS — Z45.2 ENCOUNTER FOR CENTRAL LINE CARE: ICD-10-CM

## 2025-03-26 LAB
ALBUMIN SERPL-MCNC: 3.2 G/DL (ref 3.5–5.2)
ALBUMIN/GLOB SERPL: 1.1 G/DL
ALP SERPL-CCNC: 102 U/L (ref 39–117)
ALT SERPL W P-5'-P-CCNC: 21 U/L (ref 1–33)
ANION GAP SERPL CALCULATED.3IONS-SCNC: 8 MMOL/L (ref 5–15)
AST SERPL-CCNC: 21 U/L (ref 1–32)
BASOPHILS # BLD AUTO: 0.07 10*3/MM3 (ref 0–0.2)
BASOPHILS NFR BLD AUTO: 1.5 % (ref 0–1.5)
BILIRUB SERPL-MCNC: 0.3 MG/DL (ref 0–1.2)
BUN SERPL-MCNC: 10 MG/DL (ref 6–20)
BUN/CREAT SERPL: 17.9 (ref 7–25)
CALCIUM SPEC-SCNC: 8.8 MG/DL (ref 8.6–10.5)
CHLORIDE SERPL-SCNC: 107 MMOL/L (ref 98–107)
CO2 SERPL-SCNC: 25 MMOL/L (ref 22–29)
CREAT SERPL-MCNC: 0.56 MG/DL (ref 0.57–1)
DEPRECATED RDW RBC AUTO: 57.8 FL (ref 37–54)
EGFRCR SERPLBLD CKD-EPI 2021: 114.9 ML/MIN/1.73
EOSINOPHIL # BLD AUTO: 0.02 10*3/MM3 (ref 0–0.4)
EOSINOPHIL NFR BLD AUTO: 0.4 % (ref 0.3–6.2)
ERYTHROCYTE [DISTWIDTH] IN BLOOD BY AUTOMATED COUNT: 17.9 % (ref 12.3–15.4)
GLOBULIN UR ELPH-MCNC: 2.8 GM/DL
GLUCOSE SERPL-MCNC: 97 MG/DL (ref 65–99)
HCT VFR BLD AUTO: 34 % (ref 34–46.6)
HGB BLD-MCNC: 11 G/DL (ref 12–15.9)
IMM GRANULOCYTES # BLD AUTO: 0.05 10*3/MM3 (ref 0–0.05)
IMM GRANULOCYTES NFR BLD AUTO: 1.1 % (ref 0–0.5)
LYMPHOCYTES # BLD AUTO: 0.69 10*3/MM3 (ref 0.7–3.1)
LYMPHOCYTES NFR BLD AUTO: 15.2 % (ref 19.6–45.3)
MCH RBC QN AUTO: 29.3 PG (ref 26.6–33)
MCHC RBC AUTO-ENTMCNC: 32.4 G/DL (ref 31.5–35.7)
MCV RBC AUTO: 90.4 FL (ref 79–97)
MONOCYTES # BLD AUTO: 0.29 10*3/MM3 (ref 0.1–0.9)
MONOCYTES NFR BLD AUTO: 6.4 % (ref 5–12)
NEUTROPHILS NFR BLD AUTO: 3.41 10*3/MM3 (ref 1.7–7)
NEUTROPHILS NFR BLD AUTO: 75.4 % (ref 42.7–76)
NRBC BLD AUTO-RTO: 0 /100 WBC (ref 0–0.2)
PLATELET # BLD AUTO: 272 10*3/MM3 (ref 140–450)
PMV BLD AUTO: 10 FL (ref 6–12)
POTASSIUM SERPL-SCNC: 4 MMOL/L (ref 3.5–5.2)
PROT SERPL-MCNC: 6 G/DL (ref 6–8.5)
RBC # BLD AUTO: 3.76 10*6/MM3 (ref 3.77–5.28)
SODIUM SERPL-SCNC: 140 MMOL/L (ref 136–145)
WBC NRBC COR # BLD AUTO: 4.53 10*3/MM3 (ref 3.4–10.8)

## 2025-03-26 PROCEDURE — 96375 TX/PRO/DX INJ NEW DRUG ADDON: CPT

## 2025-03-26 PROCEDURE — 25010000002 DEXAMETHASONE SODIUM PHOSPHATE 100 MG/10ML SOLUTION: Performed by: INTERNAL MEDICINE

## 2025-03-26 PROCEDURE — 25010000002 PACLITAXEL PER 1 MG: Performed by: INTERNAL MEDICINE

## 2025-03-26 PROCEDURE — 96413 CHEMO IV INFUSION 1 HR: CPT

## 2025-03-26 PROCEDURE — 25810000003 SODIUM CHLORIDE 0.9 % SOLUTION 250 ML FLEX CONT: Performed by: INTERNAL MEDICINE

## 2025-03-26 PROCEDURE — 25010000002 DIPHENHYDRAMINE PER 50 MG: Performed by: INTERNAL MEDICINE

## 2025-03-26 PROCEDURE — 25810000003 SODIUM CHLORIDE 0.9 % SOLUTION: Performed by: INTERNAL MEDICINE

## 2025-03-26 PROCEDURE — 80053 COMPREHEN METABOLIC PANEL: CPT | Performed by: INTERNAL MEDICINE

## 2025-03-26 PROCEDURE — 25010000002 HEPARIN LOCK FLUSH PER 10 UNITS: Performed by: INTERNAL MEDICINE

## 2025-03-26 PROCEDURE — 85025 COMPLETE CBC W/AUTO DIFF WBC: CPT | Performed by: INTERNAL MEDICINE

## 2025-03-26 RX ORDER — DIPHENHYDRAMINE HYDROCHLORIDE 50 MG/ML
25 INJECTION, SOLUTION INTRAMUSCULAR; INTRAVENOUS ONCE
Status: COMPLETED | OUTPATIENT
Start: 2025-03-26 | End: 2025-03-26

## 2025-03-26 RX ORDER — FAMOTIDINE 10 MG/ML
20 INJECTION, SOLUTION INTRAVENOUS ONCE
Status: COMPLETED | OUTPATIENT
Start: 2025-03-26 | End: 2025-03-26

## 2025-03-26 RX ORDER — MONTELUKAST SODIUM 10 MG/1
10 TABLET ORAL ONCE
Status: COMPLETED | OUTPATIENT
Start: 2025-03-26 | End: 2025-03-26

## 2025-03-26 RX ORDER — SODIUM CHLORIDE 0.9 % (FLUSH) 0.9 %
10 SYRINGE (ML) INJECTION AS NEEDED
Status: DISCONTINUED | OUTPATIENT
Start: 2025-03-26 | End: 2025-03-26 | Stop reason: HOSPADM

## 2025-03-26 RX ORDER — SODIUM CHLORIDE 0.9 % (FLUSH) 0.9 %
10 SYRINGE (ML) INJECTION AS NEEDED
OUTPATIENT
Start: 2025-03-26

## 2025-03-26 RX ORDER — SODIUM CHLORIDE 9 MG/ML
20 INJECTION, SOLUTION INTRAVENOUS ONCE
Status: COMPLETED | OUTPATIENT
Start: 2025-03-26 | End: 2025-03-26

## 2025-03-26 RX ORDER — HEPARIN SODIUM (PORCINE) LOCK FLUSH IV SOLN 100 UNIT/ML 100 UNIT/ML
500 SOLUTION INTRAVENOUS AS NEEDED
Status: DISCONTINUED | OUTPATIENT
Start: 2025-03-26 | End: 2025-03-26 | Stop reason: HOSPADM

## 2025-03-26 RX ORDER — HEPARIN SODIUM (PORCINE) LOCK FLUSH IV SOLN 100 UNIT/ML 100 UNIT/ML
500 SOLUTION INTRAVENOUS AS NEEDED
OUTPATIENT
Start: 2025-03-26

## 2025-03-26 RX ADMIN — SODIUM CHLORIDE 140 MG: 9 INJECTION, SOLUTION INTRAVENOUS at 14:30

## 2025-03-26 RX ADMIN — DEXAMETHASONE SODIUM PHOSPHATE 12 MG: 10 INJECTION, SOLUTION INTRAMUSCULAR; INTRAVENOUS at 13:55

## 2025-03-26 RX ADMIN — Medication 10 ML: at 15:36

## 2025-03-26 RX ADMIN — HEPARIN 500 UNITS: 100 SYRINGE at 15:36

## 2025-03-26 RX ADMIN — FAMOTIDINE 20 MG: 10 INJECTION INTRAVENOUS at 13:54

## 2025-03-26 RX ADMIN — SODIUM CHLORIDE 20 ML/HR: 900 INJECTION, SOLUTION INTRAVENOUS at 13:50

## 2025-03-26 RX ADMIN — DIPHENHYDRAMINE HYDROCHLORIDE 25 MG: 50 INJECTION, SOLUTION INTRAMUSCULAR; INTRAVENOUS at 13:51

## 2025-03-26 RX ADMIN — MONTELUKAST 10 MG: 10 TABLET, FILM COATED ORAL at 13:50

## 2025-03-31 ENCOUNTER — READMISSION MANAGEMENT (OUTPATIENT)
Dept: CALL CENTER | Facility: HOSPITAL | Age: 45
End: 2025-03-31
Payer: COMMERCIAL

## 2025-03-31 NOTE — PROGRESS NOTES
Subjective     REASON FOR CONSULTATION:  breast cancer  Provide an opinion on any further workup or treatment                             REQUESTING PHYSICIAN:  Jose    RECORDS OBTAINED:  Records of the patients history including those obtained from the referring provider were reviewed and summarized in detail.    HISTORY OF PRESENT ILLNESS:  The patient is a 45 y.o. year old female who is here for an opinion about the above issue.    History of Present Illness   The patient return today for follow-up and to continue Taxol component of her adjuvant chemotherapy.  She has so far completed AC x 4 cycles and 2 weeks of Taxol.    The patient is tolerating Taxol well with no significant nausea vomiting diarrhea.  She has no significant peripheral neuropathy.  She complains of the shingles rash on the right thigh.    ONCOLOGY HISTORY:  This is a 44-year-old woman referred from general surgery to discuss adjuvant treatment for recently surgically treated breast cancer.  The patient presented with a large palpable mass in the inner quadrant of the right breast.  The mass had been enlarging for couple years but the patient did not have insurance to get assessed.  The breast imaging is not available to me but she had a CT of the chest abdomen pelvis on 9/16/2024 showed a large mass in the right breast measuring up to 7.7 cm with abnormal left axillary lymphadenopathy.  There were tiny subpleural nodules in the lateral aspect of the left lower lobe likely granulomatous mildly conspicuous lymph node in the central mesentery 1.1 cm.  A PET scan was performed 9/30/2024 showing a hypermetabolic mass in the right breast with thickening throughout the right breast and pathologic hypermetabolic right axillary level 1 and 2 lymph nodes, no hypermetabolic internal mammary or supraclavicular lymph nodes.  The small pulmonary nodules were too small to characterize and mesenteric lymph nodes without hypermetabolic activity.  A  biopsy of the right breast mass showed invasive ductal adenocarcinoma.    She was taken to the operating room on 10/9/2024 and underwent a right modified radical mastectomy and axillary dissection, prophylactic left mastectomy.  Pathology from the right breast showed invasive ductal carcinoma Bruce grade 3 (3+3+3) measuring 17 cm with extensive lymphovascular invasion and dermal lymphatic invasion.  The tumor extended to the dermis but did not ulcerate the skin.  Tumor extended to skeletal muscle and was present focally at an anterior margin, 0.05 mm of the posterior margin, 10 mm from the lateral margin, 20 mm from the medial margin, 28 mm from the superior margin and 40 mm from inferior margin.  There was ductal carcinoma in situ present within 2.5 mm of the posterior margin.  There were 13 lymph nodes in the axillary dissection 8 oncology plan/recommendations: of which had macrometastases, 1 micrometastases and 1 lymph node with isolated tumor cells.  The left breast had an intraductal papilloma otherwise negative.  The tumor was positive for estrogen receptor 99% of cells, progesterone receptor 50% of cells, HER2 0 and Ki-67 65%.    The patient has medical comorbidities of Crohn's disease.    Past Medical History:   Diagnosis Date    Anxiety     Breast cancer     Right    Crohn's disease     DVT (deep vein thrombosis) in pregnancy     PFO (patent foramen ovale)     Stroke     after the birth of her child at age 34    Tattoos         Past Surgical History:   Procedure Laterality Date     SECTION      COLON RESECTION      COLONOSCOPY      MASTECTOMY Bilateral 10/9/2024    Procedure: Modified radical mastectomy with axillary dissection of the right breast and simple mastectomy of the left breast;  Surgeon: Rebekah Garcia DO;  Location: [facility];  Service: General;  Laterality: Bilateral;    SKIN CANCER EXCISION      TEETH EXTRACTION N/A 2025    Procedure: TOOTH EXTRACTION  #2,15,19,28,29;  Surgeon: Pramod Zambrano DMD;  Location: [facility] MAIN OR;  Service: Oral Surgery;  Laterality: N/A;    VENOUS ACCESS DEVICE (PORT) INSERTION N/A 12/10/2024    Procedure: INSERTION VENOUS ACCESS DEVICE;  Surgeon: Rebekah Garcia DO;  Location:  [facility] OR;  Service: General;  Laterality: N/A;        Current Outpatient Medications on File Prior to Visit   Medication Sig Dispense Refill    aspirin 81 MG EC tablet Take 1 tablet by mouth Daily.      dicyclomine (BENTYL) 10 MG capsule Take 1 capsule by mouth 3 (Three) Times a Day As Needed for Abdominal Cramping. 90 capsule 2    gabapentin (Neurontin) 300 MG capsule Take 2 capsules by mouth Every Night. 60 capsule 2    Klor-Con M20 20 MEQ CR tablet TAKE 1 TABLET BY MOUTH 2 TIMES A DAY 40 tablet 1    lidocaine-prilocaine (EMLA) 2.5-2.5 % cream Apply 1 Application topically to the appropriate area as directed As Needed for Mild Pain. Apply to port site 30 minutes before access 15 g 3    omeprazole (priLOSEC) 20 MG capsule Take 1 capsule by mouth As Needed.      ondansetron (ZOFRAN) 8 MG tablet Take 1 tablet by mouth 3 (Three) Times a Day As Needed for Nausea or Vomiting. 30 tablet 5    ondansetron (ZOFRAN) 8 MG tablet Take 1 tablet by mouth 3 (Three) Times a Day As Needed for Nausea or Vomiting. 30 tablet 5    prochlorperazine (COMPAZINE) 10 MG tablet Take 1 tablet by mouth Every 6 (Six) Hours As Needed for Nausea or Vomiting. 60 tablet 1    venlafaxine XR (Effexor XR) 37.5 MG 24 hr capsule Take 1 capsule by mouth Take As Directed. 1 capsule po q am for two weeks followed by increase to 2 capsules daily 60 capsule 2     Current Facility-Administered Medications on File Prior to Visit   Medication Dose Route Frequency Provider Last Rate Last Admin    heparin injection 500 Units  500 Units Intravenous PRN Nishant Witt MD   500 Units at 12/11/24 0916    heparin injection 500 Units  500 Units Intravenous PRN Nishant Witt MD   500 Units at 01/07/25  "1049    sodium chloride 0.9 % flush 10 mL  10 mL Intravenous PRN Nishant Witt MD   10 mL at 24 0916    sodium chloride 0.9 % flush 10 mL  10 mL Intravenous PRN Nishant Witt MD   10 mL at 25 1048        ALLERGIES:  No Known Allergies     Social History     Socioeconomic History    Marital status: Single    Number of children: 1   Tobacco Use    Smoking status: Former     Current packs/day: 0.00     Average packs/day: 1 pack/day for 20.0 years (20.0 ttl pk-yrs)     Types: Cigarettes     Start date:      Quit date:      Years since quittin.2    Smokeless tobacco: Current    Tobacco comments:     Nicotine pouches   Vaping Use    Vaping status: Never Used   Substance and Sexual Activity    Alcohol use: Yes     Comment: OCC    Drug use: Never    Sexual activity: Defer        Family History   Problem Relation Age of Onset    Diabetes Mother     Heart disease Father     Diabetes Paternal Grandmother     Hypertension Other     Malig Hyperthermia Neg Hx         Review of Systems   Constitutional:  Positive for fatigue. Negative for fever.   HENT: Negative.     Respiratory: Negative.     Cardiovascular: Negative.    Gastrointestinal:  Negative for diarrhea and nausea.   Genitourinary: Negative.    Musculoskeletal: Negative.    Skin:  Positive for rash. Negative for wound.   Neurological: Negative.    Hematological: Negative.    Psychiatric/Behavioral:  Negative for dysphoric mood.           Objective     Vitals:    25 0738   BP: 93/69   Pulse: 96   Resp: 16   Temp: 97.7 °F (36.5 °C)   TempSrc: Infrared   SpO2: 99%   Weight: 65.5 kg (144 lb 6.4 oz)   Height: 165.1 cm (65\")   PainSc: 0-No pain                     2025     7:39 AM   Current Status   ECOG score 0       Physical Exam    CONSTITUTIONAL: pleasant well-developed adult woman  HEENT: no icterus, no thrush, moist membranes   LYMPH: no cervical or supraclavicular lad  CV: RRR, S1S2, no murmur  RESP/chest: cta bilat, no wheezing, " no rales, port present left chest wall  Breast: Deferred  GI: soft, nontender, no splenomegaly, +BS  MUSC: no edema, normal gait  NEURO: alert and oriented x3, normal strength  PSYCH: normal mood and affect  Skin: Shingles rash right thigh    RECENT LABS:  Hematology WBC   Date Value Ref Range Status   04/02/2025 2.98 (L) 3.40 - 10.80 10*3/mm3 Final     RBC   Date Value Ref Range Status   04/02/2025 3.55 (L) 3.77 - 5.28 10*6/mm3 Final     Hemoglobin   Date Value Ref Range Status   04/02/2025 10.8 (L) 12.0 - 15.9 g/dL Final     Hematocrit   Date Value Ref Range Status   04/02/2025 32.6 (L) 34.0 - 46.6 % Final     Platelets   Date Value Ref Range Status   04/02/2025 242 140 - 450 10*3/mm3 Final        Lab Results   Component Value Date    GLUCOSE 97 03/26/2025    BUN 10 03/26/2025    CREATININE 0.56 (L) 03/26/2025     03/26/2025    K 4.0 03/26/2025     03/26/2025    CALCIUM 8.8 03/26/2025    PROTEINTOT 6.0 03/26/2025    ALBUMIN 3.2 (L) 03/26/2025    ALT 21 03/26/2025    AST 21 03/26/2025    ALKPHOS 102 03/26/2025    BILITOT 0.3 03/26/2025    GLOB 2.8 03/26/2025    AGRATIO 1.1 03/26/2025    BCR 17.9 03/26/2025    ANIONGAP 8.0 03/26/2025    EGFR 114.9 03/26/2025     PET scan 9/30/2024:  FINDINGS:     Head/neck: No suspicious uptake.     Chest: An 8 x 5.8 cm mass in the lower inner right breast with max SUV  of 18 (series 4/image 156). Mass comes in close proximity to the  sternum. Hypermetabolic parenchymal thickening throughout the right  breast with max SUV of 9 (series 4/image 140). Diffuse right breast skin  thickening.     Hypermetabolic right axillary level I and II lymph nodes. Reference 1.2  cm level I lymph node with max SUV of 11.9 (series 4/image 103).  Additional reference subcentimeter level II lymph node with max SUV of  3.3 (series 4/image 93). Separate brown fat uptake in the bilateral  axilla and posterior neck base.     No enlarged or hypermetabolic internal mammary or supraclavicular  lymph  nodes.     Few subcentimeter pulmonary nodules are too small for accurate PET  characterization without overt hypermetabolism and unchanged from recent  CT. Reference left lower lobe (series 4/image 167) and right upper lobe  (series 4/image 131).     Abdomen/pelvis: Mesenteric lymph nodes are mostly subcentimeter without  associated hypermetabolism.     Sigmoid colectomy. Tubular hypermetabolism throughout the otherwise  normal-appearing remaining colon may be medication related. Moderate  proximal colonic stool burden.     Bones: No focal osseous hypermetabolism with special attention to the  sternum.           IMPRESSION:  1. Hypermetabolic 8 cm right breast mass with hypermetabolic parenchymal  thickening throughout the right breast, pathologically proven invasive  ductal carcinoma. Mass comes in close proximity to the sternum. No focal  osseous hypermetabolism with special attention to the sternum.  2. Hypermetabolic right axillary level I and II lymph nodes suggesting  alana metastatic disease. No enlarged or hypermetabolic internal mammary  or supraclavicular lymph nodes. Separate brown fat uptake in the  bilateral axilla and posterior neck base.  3. Few subcentimeter pulmonary nodules are too small for accurate PET  characterization and will need follow-up via chest CT.  4. Mesenteric lymph nodes are mostly subcentimeter without associated  hypermetabolism.       Assessment & Plan   *pT3N2a M0 grade 3 invasive ductal adenocarcinoma right breast, ER 99% positive, SC 50% positive, HER2 0, Ki-67 65%, tumor present at anterior margin  presented with a large palpable mass in the inner quadrant of the right breast; mass had been enlarging for couple years but the patient did not have insurance to get assessed  CT of the chest abdomen pelvis on 9/16/2024 showed a large mass in the right breast measuring up to 7.7 cm with abnormal left axillary lymphadenopathy.  There were tiny subpleural nodules in the  lateral aspect of the left lower lobe likely granulomatous mildly conspicuous lymph node in the central mesentery 1.1 cm.    PET scan 9/30/2024 - hypermetabolic mass in the right breast with thickening throughout the right breast and pathologic hypermetabolic right axillary level 1 and 2 lymph nodes, no hypermetabolic internal mammary or supraclavicular lymph nodes.  The small pulmonary nodules were too small to characterize and mesenteric lymph nodes without hypermetabolic activity.    biopsy of the right breast mass showed invasive ductal adenocarcinoma.  operating room on 10/9/2024 and underwent a right modified radical mastectomy and axillary dissection, prophylactic left mastectomy.  Pathology from the right breast showed invasive ductal carcinoma Manson grade 3 (3+3+3) measuring 17 cm with extensive lymphovascular invasion and dermal lymphatic invasion.  The tumor extended to the dermis but did not ulcerate the skin.  Tumor extended to skeletal muscle and was present focally at an anterior margin, 0.05 mm of the posterior margin, 10 mm from the lateral margin, 20 mm from the medial margin, 28 mm from the superior margin and 40 mm from inferior margin.  There was ductal carcinoma in situ present within 2.5 mm of the posterior margin.  There were 13 lymph nodes in the axillary dissection 8 of which had macrometastases, 1 micrometastases and 1 lymph node with isolated tumor cells.  The left breast had an intraductal papilloma otherwise negative.  The tumor was positive for estrogen receptor 99% of cells, progesterone receptor 50% of cells, HER2 0 and Ki-67 65%.  Initiated adjuvant chemotherapy with Adriamycin/Cytoxan 12/31/2024 (adjuvant chemotherapy delayed because of wound healing issues)   3/18/2025-initiated weekly Taxol    *Myelosuppression secondary to chemotherapy   Blood counts adequate to proceed with Taxol today      * nausea/diarrhea/cramping, chemo induced compounded by Crohn's  responding to  Imodium/Zofran/Compazine    *Herpes zoster right thigh    *Inv[employer]ae 19 gene panel-VUS Bard 1    *medical comorbidities of Crohn's disease.    Oncology plan/recommendations:  Continue weekly Taxol with lab monitoring  Valtrex 1 g 3 times daily x 10 days for shingles in an immunocompromised patient  After chemotherapy she will need postmastectomy radiation given the size and alana involvement  She will need hormonal therapy with ovarian suppression/tamoxifen versus oophorectomy and AI therapy/ck4/6 inhibitor  Nonspecific lung nodules will need reassessment after completing chemotherapy radiographically  5.  MD visit plus lab and Taxol 3 weeks

## 2025-03-31 NOTE — OUTREACH NOTE
Medical Week 2 Survey      Flowsheet Row Responses   [facility] patient discharged from? [place]   Does the patient have one of the following disease processes/diagnoses(primary or secondary)? Other   Week 2 attempt successful? No   Unsuccessful attempts Attempt 1   Revoke Other transitional program   Revoke Other transitional program            Shivani Mcdonnell Registered Nurse

## 2025-04-02 ENCOUNTER — INFUSION (OUTPATIENT)
Dept: ONCOLOGY | Facility: HOSPITAL | Age: 45
End: 2025-04-02
Payer: COMMERCIAL

## 2025-04-02 ENCOUNTER — OFFICE VISIT (OUTPATIENT)
Dept: ONCOLOGY | Facility: CLINIC | Age: 45
End: 2025-04-02
Payer: COMMERCIAL

## 2025-04-02 VITALS
WEIGHT: 144.4 LBS | RESPIRATION RATE: 16 BRPM | SYSTOLIC BLOOD PRESSURE: 93 MMHG | DIASTOLIC BLOOD PRESSURE: 69 MMHG | TEMPERATURE: 97.7 F | HEIGHT: 65 IN | OXYGEN SATURATION: 99 % | HEART RATE: 96 BPM | BODY MASS INDEX: 24.06 KG/M2

## 2025-04-02 VITALS — HEART RATE: 83 BPM | DIASTOLIC BLOOD PRESSURE: 66 MMHG | SYSTOLIC BLOOD PRESSURE: 93 MMHG

## 2025-04-02 DIAGNOSIS — Z79.69 NEED FOR PROPHYLACTIC CHEMOTHERAPY: ICD-10-CM

## 2025-04-02 DIAGNOSIS — C50.911 BREAST CANCER, STAGE 3, RIGHT: ICD-10-CM

## 2025-04-02 DIAGNOSIS — Z45.2 ENCOUNTER FOR CENTRAL LINE CARE: ICD-10-CM

## 2025-04-02 DIAGNOSIS — C50.911 BREAST CANCER, STAGE 3, RIGHT: Primary | ICD-10-CM

## 2025-04-02 DIAGNOSIS — Z79.69 NEED FOR PROPHYLACTIC CHEMOTHERAPY: Primary | ICD-10-CM

## 2025-04-02 LAB
ALBUMIN SERPL-MCNC: 3.1 G/DL (ref 3.5–5.2)
ALBUMIN/GLOB SERPL: 1.1 G/DL
ALP SERPL-CCNC: 101 U/L (ref 39–117)
ALT SERPL W P-5'-P-CCNC: 21 U/L (ref 1–33)
ANION GAP SERPL CALCULATED.3IONS-SCNC: 11.5 MMOL/L (ref 5–15)
AST SERPL-CCNC: 22 U/L (ref 1–32)
BASOPHILS # BLD AUTO: 0.06 10*3/MM3 (ref 0–0.2)
BASOPHILS NFR BLD AUTO: 2 % (ref 0–1.5)
BILIRUB SERPL-MCNC: 0.3 MG/DL (ref 0–1.2)
BUN SERPL-MCNC: 11 MG/DL (ref 6–20)
BUN/CREAT SERPL: 18.3 (ref 7–25)
CALCIUM SPEC-SCNC: 8.6 MG/DL (ref 8.6–10.5)
CHLORIDE SERPL-SCNC: 107 MMOL/L (ref 98–107)
CO2 SERPL-SCNC: 22.5 MMOL/L (ref 22–29)
CREAT SERPL-MCNC: 0.6 MG/DL (ref 0.57–1)
DEPRECATED RDW RBC AUTO: 61.1 FL (ref 37–54)
EGFRCR SERPLBLD CKD-EPI 2021: 113 ML/MIN/1.73
EOSINOPHIL # BLD AUTO: 0.07 10*3/MM3 (ref 0–0.4)
EOSINOPHIL NFR BLD AUTO: 2.3 % (ref 0.3–6.2)
ERYTHROCYTE [DISTWIDTH] IN BLOOD BY AUTOMATED COUNT: 19.1 % (ref 12.3–15.4)
GLOBULIN UR ELPH-MCNC: 2.7 GM/DL
GLUCOSE SERPL-MCNC: 103 MG/DL (ref 65–99)
HCT VFR BLD AUTO: 32.6 % (ref 34–46.6)
HGB BLD-MCNC: 10.8 G/DL (ref 12–15.9)
IMM GRANULOCYTES # BLD AUTO: 0.11 10*3/MM3 (ref 0–0.05)
IMM GRANULOCYTES NFR BLD AUTO: 3.7 % (ref 0–0.5)
LYMPHOCYTES # BLD AUTO: 0.64 10*3/MM3 (ref 0.7–3.1)
LYMPHOCYTES NFR BLD AUTO: 21.5 % (ref 19.6–45.3)
MCH RBC QN AUTO: 30.4 PG (ref 26.6–33)
MCHC RBC AUTO-ENTMCNC: 33.1 G/DL (ref 31.5–35.7)
MCV RBC AUTO: 91.8 FL (ref 79–97)
MONOCYTES # BLD AUTO: 0.26 10*3/MM3 (ref 0.1–0.9)
MONOCYTES NFR BLD AUTO: 8.7 % (ref 5–12)
NEUTROPHILS NFR BLD AUTO: 1.84 10*3/MM3 (ref 1.7–7)
NEUTROPHILS NFR BLD AUTO: 61.8 % (ref 42.7–76)
NRBC BLD AUTO-RTO: 0 /100 WBC (ref 0–0.2)
PLATELET # BLD AUTO: 242 10*3/MM3 (ref 140–450)
PMV BLD AUTO: 10.3 FL (ref 6–12)
POTASSIUM SERPL-SCNC: 3.2 MMOL/L (ref 3.5–5.2)
PROT SERPL-MCNC: 5.8 G/DL (ref 6–8.5)
RBC # BLD AUTO: 3.55 10*6/MM3 (ref 3.77–5.28)
SODIUM SERPL-SCNC: 141 MMOL/L (ref 136–145)
WBC NRBC COR # BLD AUTO: 2.98 10*3/MM3 (ref 3.4–10.8)

## 2025-04-02 PROCEDURE — 25010000002 DEXAMETHASONE SODIUM PHOSPHATE 100 MG/10ML SOLUTION: Performed by: INTERNAL MEDICINE

## 2025-04-02 PROCEDURE — 25010000002 HEPARIN LOCK FLUSH PER 10 UNITS: Performed by: INTERNAL MEDICINE

## 2025-04-02 PROCEDURE — 25010000002 DIPHENHYDRAMINE PER 50 MG: Performed by: INTERNAL MEDICINE

## 2025-04-02 PROCEDURE — 25810000003 SODIUM CHLORIDE 0.9 % SOLUTION: Performed by: INTERNAL MEDICINE

## 2025-04-02 PROCEDURE — 85025 COMPLETE CBC W/AUTO DIFF WBC: CPT | Performed by: INTERNAL MEDICINE

## 2025-04-02 PROCEDURE — 25010000002 PACLITAXEL PER 1 MG: Performed by: INTERNAL MEDICINE

## 2025-04-02 PROCEDURE — 25810000003 SODIUM CHLORIDE 0.9 % SOLUTION 250 ML FLEX CONT: Performed by: INTERNAL MEDICINE

## 2025-04-02 PROCEDURE — 25010000002 FAMOTIDINE 10 MG/ML SOLUTION: Performed by: INTERNAL MEDICINE

## 2025-04-02 PROCEDURE — 96375 TX/PRO/DX INJ NEW DRUG ADDON: CPT

## 2025-04-02 PROCEDURE — 96413 CHEMO IV INFUSION 1 HR: CPT

## 2025-04-02 PROCEDURE — 80053 COMPREHEN METABOLIC PANEL: CPT | Performed by: INTERNAL MEDICINE

## 2025-04-02 RX ORDER — HYDROCORTISONE SODIUM SUCCINATE 100 MG/2ML
100 INJECTION INTRAMUSCULAR; INTRAVENOUS AS NEEDED
Status: CANCELLED | OUTPATIENT
Start: 2025-04-02

## 2025-04-02 RX ORDER — FAMOTIDINE 10 MG/ML
20 INJECTION, SOLUTION INTRAVENOUS ONCE
OUTPATIENT
Start: 2025-04-16

## 2025-04-02 RX ORDER — DIPHENHYDRAMINE HYDROCHLORIDE 50 MG/ML
50 INJECTION, SOLUTION INTRAMUSCULAR; INTRAVENOUS AS NEEDED
OUTPATIENT
Start: 2025-04-09

## 2025-04-02 RX ORDER — FAMOTIDINE 10 MG/ML
20 INJECTION, SOLUTION INTRAVENOUS ONCE
Status: COMPLETED | OUTPATIENT
Start: 2025-04-02 | End: 2025-04-02

## 2025-04-02 RX ORDER — SODIUM CHLORIDE 0.9 % (FLUSH) 0.9 %
10 SYRINGE (ML) INJECTION AS NEEDED
Status: DISCONTINUED | OUTPATIENT
Start: 2025-04-02 | End: 2025-04-02 | Stop reason: HOSPADM

## 2025-04-02 RX ORDER — FAMOTIDINE 10 MG/ML
20 INJECTION, SOLUTION INTRAVENOUS AS NEEDED
OUTPATIENT
Start: 2025-04-16

## 2025-04-02 RX ORDER — HEPARIN SODIUM (PORCINE) LOCK FLUSH IV SOLN 100 UNIT/ML 100 UNIT/ML
500 SOLUTION INTRAVENOUS AS NEEDED
Status: DISCONTINUED | OUTPATIENT
Start: 2025-04-02 | End: 2025-04-02 | Stop reason: HOSPADM

## 2025-04-02 RX ORDER — SODIUM CHLORIDE 0.9 % (FLUSH) 0.9 %
10 SYRINGE (ML) INJECTION AS NEEDED
OUTPATIENT
Start: 2025-04-02

## 2025-04-02 RX ORDER — HEPARIN SODIUM (PORCINE) LOCK FLUSH IV SOLN 100 UNIT/ML 100 UNIT/ML
500 SOLUTION INTRAVENOUS AS NEEDED
OUTPATIENT
Start: 2025-04-02

## 2025-04-02 RX ORDER — DIPHENHYDRAMINE HYDROCHLORIDE 50 MG/ML
50 INJECTION, SOLUTION INTRAMUSCULAR; INTRAVENOUS AS NEEDED
OUTPATIENT
Start: 2025-04-16

## 2025-04-02 RX ORDER — VALACYCLOVIR HYDROCHLORIDE 500 MG/1
1000 TABLET, FILM COATED ORAL 3 TIMES DAILY
Qty: 60 TABLET | Refills: 0 | Status: SHIPPED | OUTPATIENT
Start: 2025-04-02 | End: 2025-04-12

## 2025-04-02 RX ORDER — DIPHENHYDRAMINE HYDROCHLORIDE 50 MG/ML
50 INJECTION, SOLUTION INTRAMUSCULAR; INTRAVENOUS AS NEEDED
Status: CANCELLED | OUTPATIENT
Start: 2025-04-02

## 2025-04-02 RX ORDER — FAMOTIDINE 10 MG/ML
20 INJECTION, SOLUTION INTRAVENOUS ONCE
Status: CANCELLED | OUTPATIENT
Start: 2025-04-02

## 2025-04-02 RX ORDER — MONTELUKAST SODIUM 10 MG/1
10 TABLET ORAL ONCE
Start: 2025-04-16 | End: 2025-04-16

## 2025-04-02 RX ORDER — SODIUM CHLORIDE 9 MG/ML
20 INJECTION, SOLUTION INTRAVENOUS ONCE
Status: CANCELLED | OUTPATIENT
Start: 2025-04-02

## 2025-04-02 RX ORDER — DIPHENHYDRAMINE HYDROCHLORIDE 50 MG/ML
25 INJECTION, SOLUTION INTRAMUSCULAR; INTRAVENOUS ONCE
Status: COMPLETED | OUTPATIENT
Start: 2025-04-02 | End: 2025-04-02

## 2025-04-02 RX ORDER — HYDROCORTISONE SODIUM SUCCINATE 100 MG/2ML
100 INJECTION INTRAMUSCULAR; INTRAVENOUS AS NEEDED
OUTPATIENT
Start: 2025-04-16

## 2025-04-02 RX ORDER — MONTELUKAST SODIUM 10 MG/1
10 TABLET ORAL ONCE
Start: 2025-04-09 | End: 2025-04-09

## 2025-04-02 RX ORDER — SODIUM CHLORIDE 9 MG/ML
20 INJECTION, SOLUTION INTRAVENOUS ONCE
OUTPATIENT
Start: 2025-04-09

## 2025-04-02 RX ORDER — SODIUM CHLORIDE 9 MG/ML
20 INJECTION, SOLUTION INTRAVENOUS ONCE
Status: COMPLETED | OUTPATIENT
Start: 2025-04-02 | End: 2025-04-02

## 2025-04-02 RX ORDER — MONTELUKAST SODIUM 10 MG/1
10 TABLET ORAL ONCE
Status: CANCELLED
Start: 2025-04-02 | End: 2025-04-02

## 2025-04-02 RX ORDER — FAMOTIDINE 10 MG/ML
20 INJECTION, SOLUTION INTRAVENOUS AS NEEDED
Status: CANCELLED | OUTPATIENT
Start: 2025-04-02

## 2025-04-02 RX ORDER — FAMOTIDINE 10 MG/ML
20 INJECTION, SOLUTION INTRAVENOUS ONCE
OUTPATIENT
Start: 2025-04-09

## 2025-04-02 RX ORDER — SODIUM CHLORIDE 9 MG/ML
20 INJECTION, SOLUTION INTRAVENOUS ONCE
OUTPATIENT
Start: 2025-04-16

## 2025-04-02 RX ORDER — HYDROCORTISONE SODIUM SUCCINATE 100 MG/2ML
100 INJECTION INTRAMUSCULAR; INTRAVENOUS AS NEEDED
OUTPATIENT
Start: 2025-04-09

## 2025-04-02 RX ORDER — MONTELUKAST SODIUM 10 MG/1
10 TABLET ORAL ONCE
Status: COMPLETED | OUTPATIENT
Start: 2025-04-02 | End: 2025-04-02

## 2025-04-02 RX ORDER — FAMOTIDINE 10 MG/ML
20 INJECTION, SOLUTION INTRAVENOUS AS NEEDED
OUTPATIENT
Start: 2025-04-09

## 2025-04-02 RX ADMIN — HEPARIN 500 UNITS: 100 SYRINGE at 11:01

## 2025-04-02 RX ADMIN — Medication 10 ML: at 11:01

## 2025-04-02 RX ADMIN — DIPHENHYDRAMINE HYDROCHLORIDE 25 MG: 50 INJECTION, SOLUTION INTRAMUSCULAR; INTRAVENOUS at 09:02

## 2025-04-02 RX ADMIN — FAMOTIDINE 20 MG: 10 INJECTION INTRAVENOUS at 08:58

## 2025-04-02 RX ADMIN — SODIUM CHLORIDE 20 ML/HR: 900 INJECTION, SOLUTION INTRAVENOUS at 08:55

## 2025-04-02 RX ADMIN — DEXAMETHASONE SODIUM PHOSPHATE 12 MG: 10 INJECTION, SOLUTION INTRAMUSCULAR; INTRAVENOUS at 09:08

## 2025-04-02 RX ADMIN — SODIUM CHLORIDE 140 MG: 9 INJECTION, SOLUTION INTRAVENOUS at 09:56

## 2025-04-02 RX ADMIN — MONTELUKAST 10 MG: 10 TABLET, FILM COATED ORAL at 08:57

## 2025-04-02 NOTE — NURSING NOTE
K+ today was 3.2. The pt was instructed per Dr. Witt to increase KlorCon to 3 tabs daily (total of 60meQ) for 1 week.Pt v/u of above. Potassium will be rechecked next week.

## 2025-04-08 DIAGNOSIS — Z79.69 NEED FOR PROPHYLACTIC CHEMOTHERAPY: ICD-10-CM

## 2025-04-08 DIAGNOSIS — C50.911 BREAST CANCER, STAGE 3, RIGHT: ICD-10-CM

## 2025-04-08 RX ORDER — OLANZAPINE 5 MG/1
TABLET ORAL
Qty: 8 TABLET | Refills: 1 | Status: SHIPPED | OUTPATIENT
Start: 2025-04-08

## 2025-04-08 RX ORDER — DEXAMETHASONE 4 MG/1
TABLET ORAL
Qty: 4 TABLET | Refills: 0 | OUTPATIENT
Start: 2025-04-08

## 2025-04-09 ENCOUNTER — PATIENT OUTREACH (OUTPATIENT)
Dept: RADIATION ONCOLOGY | Facility: HOSPITAL | Age: 45
End: 2025-04-09
Payer: COMMERCIAL

## 2025-04-09 ENCOUNTER — APPOINTMENT (OUTPATIENT)
Dept: LAB | Facility: HOSPITAL | Age: 45
End: 2025-04-09
Payer: COMMERCIAL

## 2025-04-09 ENCOUNTER — INFUSION (OUTPATIENT)
Dept: ONCOLOGY | Facility: HOSPITAL | Age: 45
End: 2025-04-09
Payer: COMMERCIAL

## 2025-04-09 ENCOUNTER — APPOINTMENT (OUTPATIENT)
Dept: ONCOLOGY | Facility: HOSPITAL | Age: 45
End: 2025-04-09
Payer: COMMERCIAL

## 2025-04-09 VITALS
DIASTOLIC BLOOD PRESSURE: 68 MMHG | WEIGHT: 141.2 LBS | RESPIRATION RATE: 16 BRPM | TEMPERATURE: 98 F | OXYGEN SATURATION: 99 % | HEART RATE: 81 BPM | BODY MASS INDEX: 23.5 KG/M2 | SYSTOLIC BLOOD PRESSURE: 99 MMHG

## 2025-04-09 DIAGNOSIS — C50.911 BREAST CANCER, STAGE 3, RIGHT: ICD-10-CM

## 2025-04-09 DIAGNOSIS — Z79.69 NEED FOR PROPHYLACTIC CHEMOTHERAPY: Primary | ICD-10-CM

## 2025-04-09 DIAGNOSIS — Z45.2 ENCOUNTER FOR CENTRAL LINE CARE: ICD-10-CM

## 2025-04-09 LAB
ALBUMIN SERPL-MCNC: 3.2 G/DL (ref 3.5–5.2)
ALBUMIN/GLOB SERPL: 1.2 G/DL
ALP SERPL-CCNC: 109 U/L (ref 39–117)
ALT SERPL W P-5'-P-CCNC: 21 U/L (ref 1–33)
ANION GAP SERPL CALCULATED.3IONS-SCNC: 12 MMOL/L (ref 5–15)
AST SERPL-CCNC: 20 U/L (ref 1–32)
BASOPHILS # BLD AUTO: 0.05 10*3/MM3 (ref 0–0.2)
BASOPHILS NFR BLD AUTO: 1.1 % (ref 0–1.5)
BILIRUB SERPL-MCNC: 0.3 MG/DL (ref 0–1.2)
BUN SERPL-MCNC: 10 MG/DL (ref 6–20)
BUN/CREAT SERPL: 17.9 (ref 7–25)
CALCIUM SPEC-SCNC: 8.6 MG/DL (ref 8.6–10.5)
CHLORIDE SERPL-SCNC: 106 MMOL/L (ref 98–107)
CO2 SERPL-SCNC: 21 MMOL/L (ref 22–29)
CREAT SERPL-MCNC: 0.56 MG/DL (ref 0.57–1)
DEPRECATED RDW RBC AUTO: 68.8 FL (ref 37–54)
EGFRCR SERPLBLD CKD-EPI 2021: 114.9 ML/MIN/1.73
EOSINOPHIL # BLD AUTO: 0.17 10*3/MM3 (ref 0–0.4)
EOSINOPHIL NFR BLD AUTO: 3.8 % (ref 0.3–6.2)
ERYTHROCYTE [DISTWIDTH] IN BLOOD BY AUTOMATED COUNT: 20.7 % (ref 12.3–15.4)
GLOBULIN UR ELPH-MCNC: 2.6 GM/DL
GLUCOSE SERPL-MCNC: 104 MG/DL (ref 65–99)
HCT VFR BLD AUTO: 32.7 % (ref 34–46.6)
HGB BLD-MCNC: 10.7 G/DL (ref 12–15.9)
IMM GRANULOCYTES # BLD AUTO: 0.08 10*3/MM3 (ref 0–0.05)
IMM GRANULOCYTES NFR BLD AUTO: 1.8 % (ref 0–0.5)
LYMPHOCYTES # BLD AUTO: 0.85 10*3/MM3 (ref 0.7–3.1)
LYMPHOCYTES NFR BLD AUTO: 19.1 % (ref 19.6–45.3)
MCH RBC QN AUTO: 30.6 PG (ref 26.6–33)
MCHC RBC AUTO-ENTMCNC: 32.7 G/DL (ref 31.5–35.7)
MCV RBC AUTO: 93.4 FL (ref 79–97)
MONOCYTES # BLD AUTO: 0.29 10*3/MM3 (ref 0.1–0.9)
MONOCYTES NFR BLD AUTO: 6.5 % (ref 5–12)
NEUTROPHILS NFR BLD AUTO: 3 10*3/MM3 (ref 1.7–7)
NEUTROPHILS NFR BLD AUTO: 67.7 % (ref 42.7–76)
NRBC BLD AUTO-RTO: 0 /100 WBC (ref 0–0.2)
PLATELET # BLD AUTO: 402 10*3/MM3 (ref 140–450)
PMV BLD AUTO: 9.6 FL (ref 6–12)
POTASSIUM SERPL-SCNC: 3.1 MMOL/L (ref 3.5–5.2)
PROT SERPL-MCNC: 5.8 G/DL (ref 6–8.5)
RBC # BLD AUTO: 3.5 10*6/MM3 (ref 3.77–5.28)
SODIUM SERPL-SCNC: 139 MMOL/L (ref 136–145)
WBC NRBC COR # BLD AUTO: 4.44 10*3/MM3 (ref 3.4–10.8)

## 2025-04-09 PROCEDURE — 80053 COMPREHEN METABOLIC PANEL: CPT | Performed by: INTERNAL MEDICINE

## 2025-04-09 PROCEDURE — 25810000003 SODIUM CHLORIDE 0.9 % SOLUTION: Performed by: INTERNAL MEDICINE

## 2025-04-09 PROCEDURE — 96367 TX/PROPH/DG ADDL SEQ IV INF: CPT

## 2025-04-09 PROCEDURE — 25810000003 SODIUM CHLORIDE 0.9 % SOLUTION 250 ML FLEX CONT: Performed by: INTERNAL MEDICINE

## 2025-04-09 PROCEDURE — 25010000002 DEXAMETHASONE SODIUM PHOSPHATE 100 MG/10ML SOLUTION: Performed by: INTERNAL MEDICINE

## 2025-04-09 PROCEDURE — 25010000002 DIPHENHYDRAMINE PER 50 MG: Performed by: INTERNAL MEDICINE

## 2025-04-09 PROCEDURE — 25010000002 FAMOTIDINE 10 MG/ML SOLUTION: Performed by: INTERNAL MEDICINE

## 2025-04-09 PROCEDURE — 25010000002 HEPARIN LOCK FLUSH PER 10 UNITS: Performed by: INTERNAL MEDICINE

## 2025-04-09 PROCEDURE — 96375 TX/PRO/DX INJ NEW DRUG ADDON: CPT

## 2025-04-09 PROCEDURE — 85025 COMPLETE CBC W/AUTO DIFF WBC: CPT | Performed by: INTERNAL MEDICINE

## 2025-04-09 PROCEDURE — 96413 CHEMO IV INFUSION 1 HR: CPT

## 2025-04-09 PROCEDURE — 36415 COLL VENOUS BLD VENIPUNCTURE: CPT

## 2025-04-09 PROCEDURE — 25010000002 PACLITAXEL PER 1 MG: Performed by: INTERNAL MEDICINE

## 2025-04-09 RX ORDER — SODIUM CHLORIDE 9 MG/ML
20 INJECTION, SOLUTION INTRAVENOUS ONCE
Status: COMPLETED | OUTPATIENT
Start: 2025-04-09 | End: 2025-04-09

## 2025-04-09 RX ORDER — POTASSIUM CHLORIDE 1500 MG/1
40 TABLET, EXTENDED RELEASE ORAL 2 TIMES DAILY
Qty: 40 TABLET | Refills: 1 | Status: SHIPPED | OUTPATIENT
Start: 2025-04-09

## 2025-04-09 RX ORDER — HEPARIN SODIUM (PORCINE) LOCK FLUSH IV SOLN 100 UNIT/ML 100 UNIT/ML
500 SOLUTION INTRAVENOUS AS NEEDED
OUTPATIENT
Start: 2025-04-09

## 2025-04-09 RX ORDER — MONTELUKAST SODIUM 10 MG/1
10 TABLET ORAL ONCE
Status: COMPLETED | OUTPATIENT
Start: 2025-04-09 | End: 2025-04-09

## 2025-04-09 RX ORDER — DIPHENHYDRAMINE HYDROCHLORIDE 50 MG/ML
25 INJECTION, SOLUTION INTRAMUSCULAR; INTRAVENOUS ONCE
Status: COMPLETED | OUTPATIENT
Start: 2025-04-09 | End: 2025-04-09

## 2025-04-09 RX ORDER — FAMOTIDINE 10 MG/ML
20 INJECTION, SOLUTION INTRAVENOUS ONCE
Status: COMPLETED | OUTPATIENT
Start: 2025-04-09 | End: 2025-04-09

## 2025-04-09 RX ORDER — SODIUM CHLORIDE 0.9 % (FLUSH) 0.9 %
10 SYRINGE (ML) INJECTION AS NEEDED
OUTPATIENT
Start: 2025-04-09

## 2025-04-09 RX ORDER — SODIUM CHLORIDE 0.9 % (FLUSH) 0.9 %
10 SYRINGE (ML) INJECTION AS NEEDED
Status: DISCONTINUED | OUTPATIENT
Start: 2025-04-09 | End: 2025-04-09 | Stop reason: HOSPADM

## 2025-04-09 RX ORDER — HEPARIN SODIUM (PORCINE) LOCK FLUSH IV SOLN 100 UNIT/ML 100 UNIT/ML
500 SOLUTION INTRAVENOUS AS NEEDED
Status: DISCONTINUED | OUTPATIENT
Start: 2025-04-09 | End: 2025-04-09 | Stop reason: HOSPADM

## 2025-04-09 RX ADMIN — HEPARIN 500 UNITS: 100 SYRINGE at 10:47

## 2025-04-09 RX ADMIN — FAMOTIDINE 20 MG: 10 INJECTION INTRAVENOUS at 08:53

## 2025-04-09 RX ADMIN — MONTELUKAST 10 MG: 10 TABLET, FILM COATED ORAL at 08:49

## 2025-04-09 RX ADMIN — DEXAMETHASONE SODIUM PHOSPHATE 12 MG: 10 INJECTION, SOLUTION INTRAMUSCULAR; INTRAVENOUS at 08:56

## 2025-04-09 RX ADMIN — PACLITAXEL 140 MG: 6 INJECTION, SOLUTION INTRAVENOUS at 09:41

## 2025-04-09 RX ADMIN — Medication 10 ML: at 10:47

## 2025-04-09 RX ADMIN — SODIUM CHLORIDE 20 ML/HR: 900 INJECTION, SOLUTION INTRAVENOUS at 08:49

## 2025-04-09 RX ADMIN — DIPHENHYDRAMINE HYDROCHLORIDE 25 MG: 50 INJECTION, SOLUTION INTRAMUSCULAR; INTRAVENOUS at 08:50

## 2025-04-09 NOTE — NURSING NOTE
Pt to infusion room for taxol.  Pt K+ today 3.1  Discussed with Dr Witt.   RX for potassium sent for refill to pt pharmacy with instructions to take 20meq , two tablets twice a day.  Instructed patient on new dosing and refill sent to pharmacy.   Pt V/U

## 2025-04-11 ENCOUNTER — TELEPHONE (OUTPATIENT)
Dept: ONCOLOGY | Facility: CLINIC | Age: 45
End: 2025-04-11
Payer: COMMERCIAL

## 2025-04-11 NOTE — TELEPHONE ENCOUNTER
Provider: Eduar  Caller: patient  Relationship to Patient: self  Call Back Phone Number: [phone]  Reason for Call: patient thinks she might have a sinus infection

## 2025-04-11 NOTE — TELEPHONE ENCOUNTER
Pt called stating that for the past few days, she has developed a sinus HA, and bloody drainage when blowing nose. She denies cough or fevers. She hasn't tried any allergy medication or another other OTC medication. D/W Keeley Altamirano NP. Per Keeley, have pt take OTC claritin to see if this helps with sinus pressure. She can also take Advil cold and sinus for HA pressure. If symptoms do not improve within 48 hours, she needs to call our office back. Informed pt and she v/u.

## 2025-04-16 ENCOUNTER — INFUSION (OUTPATIENT)
Dept: ONCOLOGY | Facility: HOSPITAL | Age: 45
End: 2025-04-16
Payer: COMMERCIAL

## 2025-04-16 VITALS
OXYGEN SATURATION: 94 % | SYSTOLIC BLOOD PRESSURE: 106 MMHG | HEART RATE: 99 BPM | WEIGHT: 143.6 LBS | BODY MASS INDEX: 23.9 KG/M2 | TEMPERATURE: 97.1 F | DIASTOLIC BLOOD PRESSURE: 69 MMHG

## 2025-04-16 DIAGNOSIS — Z45.2 ENCOUNTER FOR CENTRAL LINE CARE: ICD-10-CM

## 2025-04-16 DIAGNOSIS — E87.6 HYPOKALEMIA: Primary | ICD-10-CM

## 2025-04-16 DIAGNOSIS — C50.911 BREAST CANCER, STAGE 3, RIGHT: ICD-10-CM

## 2025-04-16 DIAGNOSIS — Z79.69 NEED FOR PROPHYLACTIC CHEMOTHERAPY: ICD-10-CM

## 2025-04-16 DIAGNOSIS — E83.42 HYPOMAGNESEMIA: ICD-10-CM

## 2025-04-16 LAB
ALBUMIN SERPL-MCNC: 3.4 G/DL (ref 3.5–5.2)
ALBUMIN/GLOB SERPL: 1.2 G/DL
ALP SERPL-CCNC: 111 U/L (ref 39–117)
ALT SERPL W P-5'-P-CCNC: 21 U/L (ref 1–33)
ANION GAP SERPL CALCULATED.3IONS-SCNC: 14.3 MMOL/L (ref 5–15)
AST SERPL-CCNC: 25 U/L (ref 1–32)
BASOPHILS # BLD AUTO: 0.05 10*3/MM3 (ref 0–0.2)
BASOPHILS NFR BLD AUTO: 1.4 % (ref 0–1.5)
BILIRUB SERPL-MCNC: 0.4 MG/DL (ref 0–1.2)
BUN SERPL-MCNC: 15 MG/DL (ref 6–20)
BUN/CREAT SERPL: 24.2 (ref 7–25)
CALCIUM SPEC-SCNC: 8.9 MG/DL (ref 8.6–10.5)
CHLORIDE SERPL-SCNC: 103 MMOL/L (ref 98–107)
CO2 SERPL-SCNC: 21.7 MMOL/L (ref 22–29)
CREAT SERPL-MCNC: 0.62 MG/DL (ref 0.57–1)
DEPRECATED RDW RBC AUTO: 72 FL (ref 37–54)
EGFRCR SERPLBLD CKD-EPI 2021: 112.1 ML/MIN/1.73
EOSINOPHIL # BLD AUTO: 0.13 10*3/MM3 (ref 0–0.4)
EOSINOPHIL NFR BLD AUTO: 3.8 % (ref 0.3–6.2)
ERYTHROCYTE [DISTWIDTH] IN BLOOD BY AUTOMATED COUNT: 20.6 % (ref 12.3–15.4)
GLOBULIN UR ELPH-MCNC: 2.8 GM/DL
GLUCOSE SERPL-MCNC: 137 MG/DL (ref 65–99)
HCT VFR BLD AUTO: 35.7 % (ref 34–46.6)
HGB BLD-MCNC: 11.6 G/DL (ref 12–15.9)
IMM GRANULOCYTES # BLD AUTO: 0.22 10*3/MM3 (ref 0–0.05)
IMM GRANULOCYTES NFR BLD AUTO: 6.4 % (ref 0–0.5)
LYMPHOCYTES # BLD AUTO: 0.46 10*3/MM3 (ref 0.7–3.1)
LYMPHOCYTES NFR BLD AUTO: 13.3 % (ref 19.6–45.3)
MAGNESIUM SERPL-MCNC: 1.2 MG/DL (ref 1.6–2.6)
MCH RBC QN AUTO: 31.4 PG (ref 26.6–33)
MCHC RBC AUTO-ENTMCNC: 32.5 G/DL (ref 31.5–35.7)
MCV RBC AUTO: 96.5 FL (ref 79–97)
MONOCYTES # BLD AUTO: 0.14 10*3/MM3 (ref 0.1–0.9)
MONOCYTES NFR BLD AUTO: 4 % (ref 5–12)
NEUTROPHILS NFR BLD AUTO: 2.46 10*3/MM3 (ref 1.7–7)
NEUTROPHILS NFR BLD AUTO: 71.1 % (ref 42.7–76)
PLATELET # BLD AUTO: 336 10*3/MM3 (ref 140–450)
PMV BLD AUTO: 9.9 FL (ref 6–12)
POTASSIUM SERPL-SCNC: 3.3 MMOL/L (ref 3.5–5.2)
PROT SERPL-MCNC: 6.2 G/DL (ref 6–8.5)
RBC # BLD AUTO: 3.7 10*6/MM3 (ref 3.77–5.28)
SODIUM SERPL-SCNC: 139 MMOL/L (ref 136–145)
WBC NRBC COR # BLD AUTO: 3.46 10*3/MM3 (ref 3.4–10.8)

## 2025-04-16 PROCEDURE — 83735 ASSAY OF MAGNESIUM: CPT | Performed by: NURSE PRACTITIONER

## 2025-04-16 PROCEDURE — 25010000002 HEPARIN LOCK FLUSH PER 10 UNITS: Performed by: INTERNAL MEDICINE

## 2025-04-16 PROCEDURE — 25010000002 DIPHENHYDRAMINE PER 50 MG: Performed by: INTERNAL MEDICINE

## 2025-04-16 PROCEDURE — 80053 COMPREHEN METABOLIC PANEL: CPT | Performed by: INTERNAL MEDICINE

## 2025-04-16 PROCEDURE — 25010000002 DEXAMETHASONE SODIUM PHOSPHATE 100 MG/10ML SOLUTION: Performed by: INTERNAL MEDICINE

## 2025-04-16 PROCEDURE — 25010000002 MAGNESIUM SULFATE 4 GM/100ML SOLUTION: Performed by: NURSE PRACTITIONER

## 2025-04-16 PROCEDURE — 96375 TX/PRO/DX INJ NEW DRUG ADDON: CPT

## 2025-04-16 PROCEDURE — 96413 CHEMO IV INFUSION 1 HR: CPT

## 2025-04-16 PROCEDURE — 25010000002 FAMOTIDINE 10 MG/ML SOLUTION: Performed by: INTERNAL MEDICINE

## 2025-04-16 PROCEDURE — 25810000003 SODIUM CHLORIDE 0.9 % SOLUTION 250 ML FLEX CONT: Performed by: INTERNAL MEDICINE

## 2025-04-16 PROCEDURE — 96367 TX/PROPH/DG ADDL SEQ IV INF: CPT

## 2025-04-16 PROCEDURE — 25010000002 PACLITAXEL PER 1 MG: Performed by: INTERNAL MEDICINE

## 2025-04-16 PROCEDURE — 25810000003 SODIUM CHLORIDE 0.9 % SOLUTION: Performed by: INTERNAL MEDICINE

## 2025-04-16 PROCEDURE — 85025 COMPLETE CBC W/AUTO DIFF WBC: CPT | Performed by: INTERNAL MEDICINE

## 2025-04-16 PROCEDURE — 96368 THER/DIAG CONCURRENT INF: CPT

## 2025-04-16 PROCEDURE — 96366 THER/PROPH/DIAG IV INF ADDON: CPT

## 2025-04-16 RX ORDER — DIPHENHYDRAMINE HYDROCHLORIDE 50 MG/ML
25 INJECTION, SOLUTION INTRAMUSCULAR; INTRAVENOUS ONCE
Status: COMPLETED | OUTPATIENT
Start: 2025-04-16 | End: 2025-04-16

## 2025-04-16 RX ORDER — HEPARIN SODIUM (PORCINE) LOCK FLUSH IV SOLN 100 UNIT/ML 100 UNIT/ML
500 SOLUTION INTRAVENOUS AS NEEDED
Status: DISCONTINUED | OUTPATIENT
Start: 2025-04-16 | End: 2025-04-16 | Stop reason: HOSPADM

## 2025-04-16 RX ORDER — SODIUM CHLORIDE 0.9 % (FLUSH) 0.9 %
10 SYRINGE (ML) INJECTION AS NEEDED
Status: DISCONTINUED | OUTPATIENT
Start: 2025-04-16 | End: 2025-04-16 | Stop reason: HOSPADM

## 2025-04-16 RX ORDER — SODIUM CHLORIDE 9 MG/ML
20 INJECTION, SOLUTION INTRAVENOUS ONCE
Status: COMPLETED | OUTPATIENT
Start: 2025-04-16 | End: 2025-04-16

## 2025-04-16 RX ORDER — MAGNESIUM SULFATE HEPTAHYDRATE 40 MG/ML
4 INJECTION, SOLUTION INTRAVENOUS ONCE
Status: COMPLETED | OUTPATIENT
Start: 2025-04-16 | End: 2025-04-16

## 2025-04-16 RX ORDER — FAMOTIDINE 10 MG/ML
20 INJECTION, SOLUTION INTRAVENOUS ONCE
Status: COMPLETED | OUTPATIENT
Start: 2025-04-16 | End: 2025-04-16

## 2025-04-16 RX ORDER — MONTELUKAST SODIUM 10 MG/1
10 TABLET ORAL ONCE
Status: COMPLETED | OUTPATIENT
Start: 2025-04-16 | End: 2025-04-16

## 2025-04-16 RX ORDER — SODIUM CHLORIDE 0.9 % (FLUSH) 0.9 %
10 SYRINGE (ML) INJECTION AS NEEDED
OUTPATIENT
Start: 2025-04-16

## 2025-04-16 RX ORDER — HEPARIN SODIUM (PORCINE) LOCK FLUSH IV SOLN 100 UNIT/ML 100 UNIT/ML
500 SOLUTION INTRAVENOUS AS NEEDED
OUTPATIENT
Start: 2025-04-16

## 2025-04-16 RX ADMIN — DEXAMETHASONE SODIUM PHOSPHATE 12 MG: 10 INJECTION, SOLUTION INTRAMUSCULAR; INTRAVENOUS at 11:04

## 2025-04-16 RX ADMIN — PACLITAXEL 140 MG: 6 INJECTION, SOLUTION INTRAVENOUS at 11:55

## 2025-04-16 RX ADMIN — MAGNESIUM SULFATE 4 G: 4 INJECTION INTRAVENOUS at 11:25

## 2025-04-16 RX ADMIN — MONTELUKAST 10 MG: 10 TABLET, FILM COATED ORAL at 11:03

## 2025-04-16 RX ADMIN — FAMOTIDINE 20 MG: 10 INJECTION INTRAVENOUS at 11:03

## 2025-04-16 RX ADMIN — HEPARIN 500 UNITS: 100 SYRINGE at 14:29

## 2025-04-16 RX ADMIN — DIPHENHYDRAMINE HYDROCHLORIDE 25 MG: 50 INJECTION, SOLUTION INTRAMUSCULAR; INTRAVENOUS at 11:04

## 2025-04-16 RX ADMIN — Medication 10 ML: at 14:29

## 2025-04-16 RX ADMIN — SODIUM CHLORIDE 20 ML/HR: 900 INJECTION, SOLUTION INTRAVENOUS at 11:02

## 2025-04-17 ENCOUNTER — TELEMEDICINE (OUTPATIENT)
Dept: PSYCHIATRY | Facility: HOSPITAL | Age: 45
End: 2025-04-17
Payer: COMMERCIAL

## 2025-04-17 ENCOUNTER — DOCUMENTATION (OUTPATIENT)
Dept: ONCOLOGY | Facility: CLINIC | Age: 45
End: 2025-04-17
Payer: COMMERCIAL

## 2025-04-17 DIAGNOSIS — F51.01 PRIMARY INSOMNIA: ICD-10-CM

## 2025-04-17 DIAGNOSIS — F43.23 ADJUSTMENT DISORDER WITH MIXED ANXIETY AND DEPRESSED MOOD: Primary | ICD-10-CM

## 2025-04-17 RX ORDER — QUETIAPINE FUMARATE 25 MG/1
25 TABLET, FILM COATED ORAL NIGHTLY
Qty: 30 TABLET | Refills: 2 | Status: SHIPPED | OUTPATIENT
Start: 2025-04-17

## 2025-04-17 NOTE — PROGRESS NOTES
Subjective     REASON FOR CONSULTATION:  breast cancer  Provide an opinion on any further workup or treatment                             REQUESTING PHYSICIAN:  Jsoe    RECORDS OBTAINED:  Records of the patients history including those obtained from the referring provider were reviewed and summarized in detail.    HISTORY OF PRESENT ILLNESS:  The patient is a 45 y.o. year old female who is here for an opinion about the above issue.    History of Present Illness   The patient return today for follow-up and to continue Taxol component of her adjuvant chemotherapy.  She has so far completed AC x 4 cycles and 5 weeks of Taxol.    The patient is tolerating Taxol well with no significant nausea vomiting diarrhea.  She has no significant peripheral neuropathy.  She is on antibiotics for a sinus infection with resolved fever but still congested.  She complains of more fatigued than usual.    ONCOLOGY HISTORY:  This is a 44-year-old woman referred from general surgery to discuss adjuvant treatment for recently surgically treated breast cancer.  The patient presented with a large palpable mass in the inner quadrant of the right breast.  The mass had been enlarging for couple years but the patient did not have insurance to get assessed.  The breast imaging is not available to me but she had a CT of the chest abdomen pelvis on 9/16/2024 showed a large mass in the right breast measuring up to 7.7 cm with abnormal left axillary lymphadenopathy.  There were tiny subpleural nodules in the lateral aspect of the left lower lobe likely granulomatous mildly conspicuous lymph node in the central mesentery 1.1 cm.  A PET scan was performed 9/30/2024 showing a hypermetabolic mass in the right breast with thickening throughout the right breast and pathologic hypermetabolic right axillary level 1 and 2 lymph nodes, no hypermetabolic internal mammary or supraclavicular lymph nodes.  The small pulmonary nodules were too small to  characterize and mesenteric lymph nodes without hypermetabolic activity.  A biopsy of the right breast mass showed invasive ductal adenocarcinoma.    She was taken to the operating room on 10/9/2024 and underwent a right modified radical mastectomy and axillary dissection, prophylactic left mastectomy.  Pathology from the right breast showed invasive ductal carcinoma [place] grade 3 (3+3+3) measuring 17 cm with extensive lymphovascular invasion and dermal lymphatic invasion.  The tumor extended to the dermis but did not ulcerate the skin.  Tumor extended to skeletal muscle and was present focally at an anterior margin, 0.05 mm of the posterior margin, 10 mm from the lateral margin, 20 mm from the medial margin, 28 mm from the superior margin and 40 mm from inferior margin.  There was ductal carcinoma in situ present within 2.5 mm of the posterior margin.  There were 13 lymph nodes in the axillary dissection 8 oncology plan/recommendations: of which had macrometastases, 1 micrometastases and 1 lymph node with isolated tumor cells.  The left breast had an intraductal papilloma otherwise negative.  The tumor was positive for estrogen receptor 99% of cells, progesterone receptor 50% of cells, HER2 0 and Ki-67 65%.    The patient has medical comorbidities of Crohn's disease.    Past Medical History:   Diagnosis Date    Anxiety     Breast cancer     Right    Crohn's disease     DVT (deep vein thrombosis) in pregnancy     PFO (patent foramen ovale)     Stroke     after the birth of her child at age 34    Tattoos         Past Surgical History:   Procedure Laterality Date     SECTION      COLON RESECTION      COLONOSCOPY      MASTECTOMY Bilateral 10/9/2024    Procedure: Modified radical mastectomy with axillary dissection of the right breast and simple mastectomy of the left breast;  Surgeon: Rebekah Garcia DO;  Location: [facility];  Service: General;  Laterality: Bilateral;    SKIN CANCER EXCISION       TEETH EXTRACTION N/A 1/28/2025    Procedure: TOOTH EXTRACTION #2,15,19,28,29;  Surgeon: Pramod Zambrano DMD;  Location: [facility] MAIN OR;  Service: Oral Surgery;  Laterality: N/A;    VENOUS ACCESS DEVICE (PORT) INSERTION N/A 12/10/2024    Procedure: INSERTION VENOUS ACCESS DEVICE;  Surgeon: Rebekah Garcia DO;  Location:  [facility] OR;  Service: General;  Laterality: N/A;        Current Outpatient Medications on File Prior to Visit   Medication Sig Dispense Refill    aspirin 81 MG EC tablet Take 1 tablet by mouth Daily.      dicyclomine (BENTYL) 10 MG capsule Take 1 capsule by mouth 3 (Three) Times a Day As Needed for Abdominal Cramping. 90 capsule 2    gabapentin (Neurontin) 300 MG capsule Take 2 capsules by mouth Every Night. 60 capsule 2    levoFLOXacin (LEVAQUIN) 500 MG tablet Take 1 tablet by mouth Daily for 7 days. 7 tablet 0    lidocaine-prilocaine (EMLA) 2.5-2.5 % cream Apply 1 Application topically to the appropriate area as directed As Needed for Mild Pain. Apply to port site 30 minutes before access 15 g 3    omeprazole (priLOSEC) 20 MG capsule Take 1 capsule by mouth As Needed.      ondansetron (ZOFRAN) 8 MG tablet Take 1 tablet by mouth 3 (Three) Times a Day As Needed for Nausea or Vomiting. 30 tablet 5    ondansetron (ZOFRAN) 8 MG tablet Take 1 tablet by mouth 3 (Three) Times a Day As Needed for Nausea or Vomiting. 30 tablet 5    potassium chloride (Klor-Con M20) 20 MEQ CR tablet Take 2 tablets by mouth 2 (Two) Times a Day. 40 tablet 1    prochlorperazine (COMPAZINE) 10 MG tablet Take 1 tablet by mouth Every 6 (Six) Hours As Needed for Nausea or Vomiting. 60 tablet 1    venlafaxine XR (Effexor XR) 37.5 MG 24 hr capsule Take 1 capsule by mouth Take As Directed. 1 capsule po q am for two weeks followed by increase to 2 capsules daily 60 capsule 2    QUEtiapine (SEROquel) 25 MG tablet Take 1 tablet by mouth Every Night. (Patient not taking: Reported on 4/23/2025) 30 tablet 2     Current  Facility-Administered Medications on File Prior to Visit   Medication Dose Route Frequency Provider Last Rate Last Admin    heparin injection 500 Units  500 Units Intravenous Nishant Desir MD   500 Units at 24 0916    heparin injection 500 Units  500 Units Intravenous PRNishant Padilla MD   500 Units at 25 1049    sodium chloride 0.9 % flush 10 mL  10 mL Intravenous Nishant Desir MD   10 mL at 24 0916    sodium chloride 0.9 % flush 10 mL  10 mL Intravenous PRN Nishant Witt MD   10 mL at 25 1048        ALLERGIES:  No Known Allergies     Social History     Socioeconomic History    Marital status: Single    Number of children: 1   Tobacco Use    Smoking status: Former     Current packs/day: 0.00     Average packs/day: 1 pack/day for 20.0 years (20.0 ttl pk-yrs)     Types: Cigarettes     Start date:      Quit date:      Years since quittin.3    Smokeless tobacco: Current    Tobacco comments:     Nicotine pouches   Vaping Use    Vaping status: Never Used   Substance and Sexual Activity    Alcohol use: Yes     Comment: OCC    Drug use: Never    Sexual activity: Defer        Family History   Problem Relation Age of Onset    Diabetes Mother     Heart disease Father     Diabetes Paternal Grandmother     Hypertension Other     Malig Hyperthermia Neg Hx         Review of Systems   Constitutional:  Positive for fatigue. Negative for fever.   HENT:  Positive for congestion.    Respiratory: Negative.     Cardiovascular: Negative.    Gastrointestinal:  Negative for diarrhea and nausea.   Genitourinary: Negative.    Musculoskeletal: Negative.    Skin:  Positive for rash. Negative for wound.   Neurological: Negative.    Hematological: Negative.    Psychiatric/Behavioral:  Negative for dysphoric mood.           Objective     Vitals:    25 1227   BP: 94/69   Pulse: 110   Resp: 16   Temp: 98.5 °F (36.9 °C)   TempSrc: Infrared   SpO2: 100%   Weight: 66.2 kg (146 lb)  "  Height: 165.1 cm (65\")   PainSc: 0-No pain           4/23/2025    12:28 PM   Current Status   ECOG score 0       Physical Exam    CONSTITUTIONAL: pleasant well-developed adult woman  HEENT: no icterus, no thrush, moist membranes   LYMPH: no cervical or supraclavicular lad  CV: RRR, S1S2, no murmur  RESP/chest: cta bilat, no wheezing, no rales, port present left chest wall  Breast: Deferred  GI: soft, nontender, no splenomegaly, +BS  MUSC: no edema, normal gait  NEURO: alert and oriented x3, normal strength  PSYCH: normal mood and affect  Skin: Rash induration    RECENT LABS:  Hematology WBC   Date Value Ref Range Status   04/23/2025 4.33 3.40 - 10.80 10*3/mm3 Final     RBC   Date Value Ref Range Status   04/23/2025 3.05 (L) 3.77 - 5.28 10*6/mm3 Final     Hemoglobin   Date Value Ref Range Status   04/23/2025 9.6 (L) 12.0 - 15.9 g/dL Final     Hematocrit   Date Value Ref Range Status   04/23/2025 28.4 (L) 34.0 - 46.6 % Final     Platelets   Date Value Ref Range Status   04/23/2025 203 140 - 450 10*3/mm3 Final        Lab Results   Component Value Date    GLUCOSE 137 (H) 04/16/2025    BUN 15 04/16/2025    CREATININE 0.62 04/16/2025     04/16/2025    K 3.3 (L) 04/16/2025     04/16/2025    CALCIUM 8.9 04/16/2025    PROTEINTOT 6.2 04/16/2025    ALBUMIN 3.4 (L) 04/16/2025    ALT 21 04/16/2025    AST 25 04/16/2025    ALKPHOS 111 04/16/2025    BILITOT 0.4 04/16/2025    GLOB 2.8 04/16/2025    AGRATIO 1.2 04/16/2025    BCR 24.2 04/16/2025    ANIONGAP 14.3 04/16/2025    EGFR 112.1 04/16/2025     PET scan 9/30/2024:  FINDINGS:     Head/neck: No suspicious uptake.     Chest: An 8 x 5.8 cm mass in the lower inner right breast with max SUV  of 18 (series 4/image 156). Mass comes in close proximity to the  sternum. Hypermetabolic parenchymal thickening throughout the right  breast with max SUV of 9 (series 4/image 140). Diffuse right breast skin  thickening.     Hypermetabolic right axillary level I and II lymph nodes. " Reference 1.2  cm level I lymph node with max SUV of 11.9 (series 4/image 103).  Additional reference subcentimeter level II lymph node with max SUV of  3.3 (series 4/image 93). Separate brown fat uptake in the bilateral  axilla and posterior neck base.     No enlarged or hypermetabolic internal mammary or supraclavicular lymph  nodes.     Few subcentimeter pulmonary nodules are too small for accurate PET  characterization without overt hypermetabolism and unchanged from recent  CT. Reference left lower lobe (series 4/image 167) and right upper lobe  (series 4/image 131).     Abdomen/pelvis: Mesenteric lymph nodes are mostly subcentimeter without  associated hypermetabolism.     Sigmoid colectomy. Tubular hypermetabolism throughout the otherwise  normal-appearing remaining colon may be medication related. Moderate  proximal colonic stool burden.     Bones: No focal osseous hypermetabolism with special attention to the  sternum.           IMPRESSION:  1. Hypermetabolic 8 cm right breast mass with hypermetabolic parenchymal  thickening throughout the right breast, pathologically proven invasive  ductal carcinoma. Mass comes in close proximity to the sternum. No focal  osseous hypermetabolism with special attention to the sternum.  2. Hypermetabolic right axillary level I and II lymph nodes suggesting  alana metastatic disease. No enlarged or hypermetabolic internal mammary  or supraclavicular lymph nodes. Separate brown fat uptake in the  bilateral axilla and posterior neck base.  3. Few subcentimeter pulmonary nodules are too small for accurate PET  characterization and will need follow-up via chest CT.  4. Mesenteric lymph nodes are mostly subcentimeter without associated  hypermetabolism.       Assessment & Plan   *pT3N2a M0 grade 3 invasive ductal adenocarcinoma right breast, ER 99% positive, ID 50% positive, HER2 0, Ki-67 65%, tumor present at anterior margin  presented with a large palpable mass in the inner  quadrant of the right breast; mass had been enlarging for couple years but the patient did not have insurance to get assessed  CT of the chest abdomen pelvis on 9/16/2024 showed a large mass in the right breast measuring up to 7.7 cm with abnormal left axillary lymphadenopathy.  There were tiny subpleural nodules in the lateral aspect of the left lower lobe likely granulomatous mildly conspicuous lymph node in the central mesentery 1.1 cm.    PET scan 9/30/2024 - hypermetabolic mass in the right breast with thickening throughout the right breast and pathologic hypermetabolic right axillary level 1 and 2 lymph nodes, no hypermetabolic internal mammary or supraclavicular lymph nodes.  The small pulmonary nodules were too small to characterize and mesenteric lymph nodes without hypermetabolic activity.    biopsy of the right breast mass showed invasive ductal adenocarcinoma.  operating room on 10/9/2024 and underwent a right modified radical mastectomy and axillary dissection, prophylactic left mastectomy.  Pathology from the right breast showed invasive ductal carcinoma [place] grade 3 (3+3+3) measuring 17 cm with extensive lymphovascular invasion and dermal lymphatic invasion.  The tumor extended to the dermis but did not ulcerate the skin.  Tumor extended to skeletal muscle and was present focally at an anterior margin, 0.05 mm of the posterior margin, 10 mm from the lateral margin, 20 mm from the medial margin, 28 mm from the superior margin and 40 mm from inferior margin.  There was ductal carcinoma in situ present within 2.5 mm of the posterior margin.  There were 13 lymph nodes in the axillary dissection 8 of which had macrometastases, 1 micrometastases and 1 lymph node with isolated tumor cells.  The left breast had an intraductal papilloma otherwise negative.  The tumor was positive for estrogen receptor 99% of cells, progesterone receptor 50% of cells, HER2 0 and Ki-67 65%.  Initiated adjuvant chemotherapy  with Adriamycin/Cytoxan 12/31/2024 (adjuvant chemotherapy delayed because of wound healing issues)   3/18/2025-initiated weekly Taxol    *Myelosuppression secondary to chemotherapy   Blood counts adequate to proceed with Taxol today      * nausea/diarrhea/cramping, chemo induced compounded by Crohn's  responding to Imodium/Zofran/Compazine    *Herpes zoster right thigh-resolved    *Sinus infection on antibiotics    *Hypokalemia on oral replacement    *[employer] 19 gene panel-VUS Bard 1    *medical comorbidities of Crohn's disease.    Oncology plan/recommendations:  Continue weekly Taxol with lab monitoring  Check BMP in magnesium today; continue oral potassium 20 mill equivalents twice daily  After chemotherapy she will need postmastectomy radiation given the size and alana involvement  She will need hormonal therapy with ovarian suppression/tamoxifen versus oophorectomy and AI therapy/ck4/6 inhibitor  Nonspecific lung nodules will need reassessment after completing chemotherapy radiographically  5.  MD visit plus lab and Taxol 3 weeks

## 2025-04-17 NOTE — PROGRESS NOTES
[facility]  Video visit conducted via "[employer]t Video Visit  You have chosen to receive care through a telehealth visit.  Do you consent to use a video/audio connection for your medical care today? YES  Telehealth provided in patient's home.  Location of Provider: [place], [place]     Subjective  Patient ID: Suzanne Lin is a 45 y.o. female is seen via telehealth in the [facility].    CC: Fatigue, tolerating treatment well    HPI/ Interval History:   Pt t is seen in follow up regarding supportive care surrounding continued treatment for breast cancer.    Fortunately, reports to be feeling good since transition to taxol. Describes improved appetite, gaining weight, tolerating treatment well. Denies significant experience of SE as compared to previous regimen, while acknowledging energy is lower than baseline, specifically appx three days after steroids wear off. In general, this is much improved as compared to previous treatment.  Is walking around more, appreciating strength and independence. Son is doing well, which remains top priority.     Sleep remains disrupted, further aggravated alongside steroids. Is not sleeping well on nights of chemo. Otherwise, appreciates benefit to gabapentin and benadryl, sometimes also using bentyl, which assists with sedation. Generally able to sleep well once able to fall to sleep. Is no longer taking olanzapine, as this was only recommended on days surrounding previous chemo regimen. Does agree this was very helpful for sleep when she took it. Otherwise, continues venlafaxine, eager to continue on low dosing as has greatly assisted with mood and anxiety sx.    Exam: As above    Recent Labs Reviewed:  Infusion on 04/16/2025   Component Date Value    Glucose 04/16/2025 137 (H)     BUN 04/16/2025 15     Creatinine 04/16/2025 0.62     Sodium 04/16/2025 139     Potassium 04/16/2025 3.3 (L)     Chloride 04/16/2025 103     CO2 04/16/2025 21.7 (L)      Calcium 04/16/2025 8.9     Total Protein 04/16/2025 6.2     Albumin 04/16/2025 3.4 (L)     ALT (SGPT) 04/16/2025 21     AST (SGOT) 04/16/2025 25     Alkaline Phosphatase 04/16/2025 111     Total Bilirubin 04/16/2025 0.4     Globulin 04/16/2025 2.8     A/G Ratio 04/16/2025 1.2     BUN/Creatinine Ratio 04/16/2025 24.2     Anion Gap 04/16/2025 14.3     eGFR 04/16/2025 112.1     WBC 04/16/2025 3.46     RBC 04/16/2025 3.70 (L)     Hemoglobin 04/16/2025 11.6 (L)     Hematocrit 04/16/2025 35.7     MCV 04/16/2025 96.5     MCH 04/16/2025 31.4     MCHC 04/16/2025 32.5     RDW 04/16/2025 20.6 (H)     RDW-SD 04/16/2025 72.0 (H)     MPV 04/16/2025 9.9     Platelets 04/16/2025 336     Neutrophil % 04/16/2025 71.1     Lymphocyte % 04/16/2025 13.3 (L)     Monocyte % 04/16/2025 4.0 (L)     Eosinophil % 04/16/2025 3.8     Basophil % 04/16/2025 1.4     Immature Grans % 04/16/2025 6.4 (H)     Neutrophils, Absolute 04/16/2025 2.46     Lymphocytes, Absolute 04/16/2025 0.46 (L)     Monocytes, Absolute 04/16/2025 0.14     Eosinophils, Absolute 04/16/2025 0.13     Basophils, Absolute 04/16/2025 0.05     Immature Grans, Absolute 04/16/2025 0.22 (H)     Magnesium 04/16/2025 1.2 (L)    Infusion on 04/09/2025   Component Date Value    Glucose 04/09/2025 104 (H)     BUN 04/09/2025 10     Creatinine 04/09/2025 0.56 (L)     Sodium 04/09/2025 139     Potassium 04/09/2025 3.1 (L)     Chloride 04/09/2025 106     CO2 04/09/2025 21.0 (L)     Calcium 04/09/2025 8.6     Total Protein 04/09/2025 5.8 (L)     Albumin 04/09/2025 3.2 (L)     ALT (SGPT) 04/09/2025 21     AST (SGOT) 04/09/2025 20     Alkaline Phosphatase 04/09/2025 109     Total Bilirubin 04/09/2025 0.3     Globulin 04/09/2025 2.6     A/G Ratio 04/09/2025 1.2     BUN/Creatinine Ratio 04/09/2025 17.9     Anion Gap 04/09/2025 12.0     eGFR 04/09/2025 114.9     WBC 04/09/2025 4.44     RBC 04/09/2025 3.50 (L)     Hemoglobin 04/09/2025 10.7 (L)     Hematocrit 04/09/2025 32.7 (L)     MCV 04/09/2025  93.4     MCH 04/09/2025 30.6     MCHC 04/09/2025 32.7     RDW 04/09/2025 20.7 (H)     RDW-SD 04/09/2025 68.8 (H)     MPV 04/09/2025 9.6     Platelets 04/09/2025 402     Neutrophil % 04/09/2025 67.7     Lymphocyte % 04/09/2025 19.1 (L)     Monocyte % 04/09/2025 6.5     Eosinophil % 04/09/2025 3.8     Basophil % 04/09/2025 1.1     Immature Grans % 04/09/2025 1.8 (H)     Neutrophils, Absolute 04/09/2025 3.00     Lymphocytes, Absolute 04/09/2025 0.85     Monocytes, Absolute 04/09/2025 0.29     Eosinophils, Absolute 04/09/2025 0.17     Basophils, Absolute 04/09/2025 0.05     Immature Grans, Absolute 04/09/2025 0.08 (H)     nRBC 04/09/2025 0.0    Infusion on 04/02/2025   Component Date Value    Glucose 04/02/2025 103 (H)     BUN 04/02/2025 11     Creatinine 04/02/2025 0.60     Sodium 04/02/2025 141     Potassium 04/02/2025 3.2 (L)     Chloride 04/02/2025 107     CO2 04/02/2025 22.5     Calcium 04/02/2025 8.6     Total Protein 04/02/2025 5.8 (L)     Albumin 04/02/2025 3.1 (L)     ALT (SGPT) 04/02/2025 21     AST (SGOT) 04/02/2025 22     Alkaline Phosphatase 04/02/2025 101     Total Bilirubin 04/02/2025 0.3     Globulin 04/02/2025 2.7     A/G Ratio 04/02/2025 1.1     BUN/Creatinine Ratio 04/02/2025 18.3     Anion Gap 04/02/2025 11.5     eGFR 04/02/2025 113.0     WBC 04/02/2025 2.98 (L)     RBC 04/02/2025 3.55 (L)     Hemoglobin 04/02/2025 10.8 (L)     Hematocrit 04/02/2025 32.6 (L)     MCV 04/02/2025 91.8     MCH 04/02/2025 30.4     MCHC 04/02/2025 33.1     RDW 04/02/2025 19.1 (H)     RDW-SD 04/02/2025 61.1 (H)     MPV 04/02/2025 10.3     Platelets 04/02/2025 242     Neutrophil % 04/02/2025 61.8     Lymphocyte % 04/02/2025 21.5     Monocyte % 04/02/2025 8.7     Eosinophil % 04/02/2025 2.3     Basophil % 04/02/2025 2.0 (H)     Immature Grans % 04/02/2025 3.7 (H)     Neutrophils, Absolute 04/02/2025 1.84     Lymphocytes, Absolute 04/02/2025 0.64 (L)     Monocytes, Absolute 04/02/2025 0.26     Eosinophils, Absolute 04/02/2025  0.07     Basophils, Absolute 04/02/2025 0.06     Immature Grans, Absolute 04/02/2025 0.11 (H)     nRBC 04/02/2025 0.0    Infusion on 03/26/2025   Component Date Value    Glucose 03/26/2025 97     BUN 03/26/2025 10     Creatinine 03/26/2025 0.56 (L)     Sodium 03/26/2025 140     Potassium 03/26/2025 4.0     Chloride 03/26/2025 107     CO2 03/26/2025 25.0     Calcium 03/26/2025 8.8     Total Protein 03/26/2025 6.0     Albumin 03/26/2025 3.2 (L)     ALT (SGPT) 03/26/2025 21     AST (SGOT) 03/26/2025 21     Alkaline Phosphatase 03/26/2025 102     Total Bilirubin 03/26/2025 0.3     Globulin 03/26/2025 2.8     A/G Ratio 03/26/2025 1.1     BUN/Creatinine Ratio 03/26/2025 17.9     Anion Gap 03/26/2025 8.0     eGFR 03/26/2025 114.9     WBC 03/26/2025 4.53     RBC 03/26/2025 3.76 (L)     Hemoglobin 03/26/2025 11.0 (L)     Hematocrit 03/26/2025 34.0     MCV 03/26/2025 90.4     MCH 03/26/2025 29.3     MCHC 03/26/2025 32.4     RDW 03/26/2025 17.9 (H)     RDW-SD 03/26/2025 57.8 (H)     MPV 03/26/2025 10.0     Platelets 03/26/2025 272     Neutrophil % 03/26/2025 75.4     Lymphocyte % 03/26/2025 15.2 (L)     Monocyte % 03/26/2025 6.4     Eosinophil % 03/26/2025 0.4     Basophil % 03/26/2025 1.5     Immature Grans % 03/26/2025 1.1 (H)     Neutrophils, Absolute 03/26/2025 3.41     Lymphocytes, Absolute 03/26/2025 0.69 (L)     Monocytes, Absolute 03/26/2025 0.29     Eosinophils, Absolute 03/26/2025 0.02     Basophils, Absolute 03/26/2025 0.07     Immature Grans, Absolute 03/26/2025 0.05     nRBC 03/26/2025 0.0    Infusion on 03/18/2025   Component Date Value    Glucose 03/18/2025 164 (H)     BUN 03/18/2025 12     Creatinine 03/18/2025 0.61     Sodium 03/18/2025 138     Potassium 03/18/2025 3.7     Chloride 03/18/2025 100     CO2 03/18/2025 22.3     Calcium 03/18/2025 9.6     Total Protein 03/18/2025 7.2     Albumin 03/18/2025 3.7     ALT (SGPT) 03/18/2025 12     AST (SGOT) 03/18/2025 17     Alkaline Phosphatase 03/18/2025 138 (H)      Total Bilirubin 03/18/2025 0.3     Globulin 03/18/2025 3.5     A/G Ratio 03/18/2025 1.1     BUN/Creatinine Ratio 03/18/2025 19.7     Anion Gap 03/18/2025 15.7 (H)     eGFR 03/18/2025 113.2     HCG, Urine QL 03/18/2025 Negative     WBC 03/18/2025 14.60 (H)     RBC 03/18/2025 4.57     Hemoglobin 03/18/2025 13.2     Hematocrit 03/18/2025 40.4     MCV 03/18/2025 88.4     MCH 03/18/2025 28.9     MCHC 03/18/2025 32.7     RDW 03/18/2025 17.7 (H)     RDW-SD 03/18/2025 56.5 (H)     MPV 03/18/2025 9.2     Platelets 03/18/2025 662 (H)     Neutrophil % 03/18/2025 90.4 (H)     Lymphocyte % 03/18/2025 4.4 (L)     Monocyte % 03/18/2025 3.2 (L)     Eosinophil % 03/18/2025 0.0 (L)     Basophil % 03/18/2025 0.3     Immature Grans % 03/18/2025 1.7 (H)     Neutrophils, Absolute 03/18/2025 13.21 (H)     Lymphocytes, Absolute 03/18/2025 0.64 (L)     Monocytes, Absolute 03/18/2025 0.46     Eosinophils, Absolute 03/18/2025 0.00     Basophils, Absolute 03/18/2025 0.04     Immature Grans, Absolute 03/18/2025 0.25 (H)     nRBC 03/18/2025 0.0     Magnesium 03/18/2025 1.8    Admission on 03/08/2025, Discharged on 03/10/2025   Component Date Value    Glucose 03/08/2025 110 (H)     BUN 03/08/2025 8     Creatinine 03/08/2025 0.57     Sodium 03/08/2025 136     Potassium 03/08/2025 2.0 (C)     Chloride 03/08/2025 93 (L)     CO2 03/08/2025 26.2     Calcium 03/08/2025 9.4     Total Protein 03/08/2025 7.2     Albumin 03/08/2025 3.4 (L)     ALT (SGPT) 03/08/2025 26     AST (SGOT) 03/08/2025 17     Alkaline Phosphatase 03/08/2025 205 (H)     Total Bilirubin 03/08/2025 1.3 (H)     Globulin 03/08/2025 3.8     A/G Ratio 03/08/2025 0.9     BUN/Creatinine Ratio 03/08/2025 14.0     Anion Gap 03/08/2025 16.8 (H)     eGFR 03/08/2025 115.1     Protime 03/08/2025 15.6 (H)     INR 03/08/2025 1.25 (H)     QT Interval 03/08/2025 413     QTC Interval 03/08/2025 476     Magnesium 03/08/2025 1.6     proBNP 03/08/2025 112.0     D-Dimer, Quantitative 03/08/2025 0.67 (H)      HS Troponin T 03/08/2025 12     WBC 03/08/2025 1.57 (C)     RBC 03/08/2025 3.12 (L)     Hemoglobin 03/08/2025 9.3 (L)     Hematocrit 03/08/2025 26.9 (L)     MCV 03/08/2025 86.2     MCH 03/08/2025 29.8     MCHC 03/08/2025 34.6     RDW 03/08/2025 15.9 (H)     RDW-SD 03/08/2025 49.1     MPV 03/08/2025 10.8     Platelets 03/08/2025 153     Neutrophil % 03/08/2025 66.7     Lymphocyte % 03/08/2025 21.9     Monocyte % 03/08/2025 11.5     Eosinophil % 03/08/2025 0.0 (L)     Basophil % 03/08/2025 0.0     Neutrophils Absolute 03/08/2025 1.05 (L)     Lymphocytes Absolute 03/08/2025 0.34 (L)     Monocytes Absolute 03/08/2025 0.18     Eosinophils Absolute 03/08/2025 0.00     Basophils Absolute 03/08/2025 0.00     Anisocytosis 03/08/2025 Slight/1+     Microcytes 03/08/2025 Slight/1+     Polychromasia 03/08/2025 Slight/1+     WBC Morphology 03/08/2025 Normal     Platelet Morphology 03/08/2025 Normal     HS Troponin T 03/08/2025 14 (H)     Troponin T Numeric Delta 03/08/2025 2     Glucose 03/08/2025 70     BUN 03/08/2025 7     Creatinine 03/08/2025 0.46 (L)     Sodium 03/08/2025 134 (L)     Potassium 03/08/2025 2.4 (C)     Chloride 03/08/2025 97 (L)     CO2 03/08/2025 24.4     Calcium 03/08/2025 8.2 (L)     BUN/Creatinine Ratio 03/08/2025 15.2     Anion Gap 03/08/2025 12.6     eGFR 03/08/2025 121.2     Glucose 03/08/2025 83     Potassium 03/08/2025 3.7     Glucose 03/09/2025 99     BUN 03/09/2025 4 (L)     Creatinine 03/09/2025 0.42 (L)     Sodium 03/09/2025 139     Potassium 03/09/2025 3.0 (L)     Chloride 03/09/2025 108 (H)     CO2 03/09/2025 21.8 (L)     Calcium 03/09/2025 7.8 (L)     Total Protein 03/09/2025 5.1 (L)     Albumin 03/09/2025 2.4 (L)     ALT (SGPT) 03/09/2025 14     AST (SGOT) 03/09/2025 11     Alkaline Phosphatase 03/09/2025 144 (H)     Total Bilirubin 03/09/2025 0.4     Globulin 03/09/2025 2.7     A/G Ratio 03/09/2025 0.9     BUN/Creatinine Ratio 03/09/2025 9.5     Anion Gap 03/09/2025 9.2     eGFR 03/09/2025  123.9     Magnesium 03/09/2025 1.6     Phosphorus 03/09/2025 3.3     WBC 03/09/2025 1.90 (C)     RBC 03/09/2025 2.33 (L)     Hemoglobin 03/09/2025 7.0 (L)     Hematocrit 03/09/2025 20.6 (C)     MCV 03/09/2025 88.4     MCH 03/09/2025 30.0     MCHC 03/09/2025 34.0     RDW 03/09/2025 16.6 (H)     RDW-SD 03/09/2025 52.1     MPV 03/09/2025 10.8     Platelets 03/09/2025 120 (L)     Neutrophil % 03/09/2025 53.6     Lymphocyte % 03/09/2025 21.1     Monocyte % 03/09/2025 17.4 (H)     Eosinophil % 03/09/2025 2.1     Basophil % 03/09/2025 1.6 (H)     Immature Grans % 03/09/2025 4.2 (H)     Neutrophils, Absolute 03/09/2025 1.02 (L)     Lymphocytes, Absolute 03/09/2025 0.40 (L)     Monocytes, Absolute 03/09/2025 0.33     Eosinophils, Absolute 03/09/2025 0.04     Basophils, Absolute 03/09/2025 0.03     Immature Grans, Absolute 03/09/2025 0.08 (H)     Glucose 03/09/2025 128     Glucose 03/09/2025 125     Glucose 03/09/2025 85     ABO Type 03/09/2025 A     RH type 03/09/2025 Positive     Antibody Screen 03/09/2025 Negative     T&S Expiration Date 03/09/2025 3/12/2025 11:59:59 PM     Product Code 03/10/2025 [id number]     Unit Number 03/10/2025 [id number]     UNIT  ABO 03/10/2025 A     UNIT  RH 03/10/2025 POS     Crossmatch Interpretation 03/10/2025 Compatible     Dispense Status 03/10/2025 PT     Blood Expiration Date 03/10/2025 [id number]     Blood Type Barcode 03/10/2025 6200     Product Code 03/10/2025 [id number]     Unit Number 03/10/2025 [id number]     UNIT  ABO 03/10/2025 A     UNIT  RH 03/10/2025 POS     Crossmatch Interpretation 03/10/2025 Compatible     Dispense Status 03/10/2025 PT     Blood Expiration Date 03/10/2025 [id number]     Blood Type Barcode 03/10/2025 6200     Potassium 03/09/2025 3.8     Glucose 03/09/2025 93     Glucose 03/09/2025 87     Glucose 03/10/2025 80     BUN 03/10/2025 2 (L)     Creatinine 03/10/2025 0.40 (L)     Sodium 03/10/2025 141     Potassium 03/10/2025 3.7     Chloride 03/10/2025 108  (H)     CO2 03/10/2025 25.9     Calcium 03/10/2025 8.4 (L)     Total Protein 03/10/2025 5.6 (L)     Albumin 03/10/2025 2.7 (L)     ALT (SGPT) 03/10/2025 12     AST (SGOT) 03/10/2025 9     Alkaline Phosphatase 03/10/2025 150 (H)     Total Bilirubin 03/10/2025 0.5     Globulin 03/10/2025 2.9     A/G Ratio 03/10/2025 0.9     BUN/Creatinine Ratio 03/10/2025 5.0 (L)     Anion Gap 03/10/2025 7.1     eGFR 03/10/2025 125.3     Magnesium 03/10/2025 1.5 (L)     Phosphorus 03/10/2025 2.6     WBC 03/10/2025 3.74     RBC 03/10/2025 3.67 (L)     Hemoglobin 03/10/2025 10.4 (L)     Hematocrit 03/10/2025 30.8 (L)     MCV 03/10/2025 83.9     MCH 03/10/2025 28.3     MCHC 03/10/2025 33.8     RDW 03/10/2025 17.1 (H)     RDW-SD 03/10/2025 51.1     MPV 03/10/2025 10.4     Platelets 03/10/2025 176     Neutrophil % 03/10/2025 61.0     Lymphocyte % 03/10/2025 15.8 (L)     Monocyte % 03/10/2025 14.7 (H)     Eosinophil % 03/10/2025 1.1     Basophil % 03/10/2025 1.3     Immature Grans % 03/10/2025 6.1 (H)     Neutrophils, Absolute 03/10/2025 2.28     Lymphocytes, Absolute 03/10/2025 0.59 (L)     Monocytes, Absolute 03/10/2025 0.55     Eosinophils, Absolute 03/10/2025 0.04     Basophils, Absolute 03/10/2025 0.05     Immature Grans, Absolute 03/10/2025 0.23 (H)     Glucose 03/09/2025 96    Infusion on 02/25/2025   Component Date Value    Glucose 02/25/2025 125 (H)     BUN 02/25/2025 11     Creatinine 02/25/2025 0.62     Sodium 02/25/2025 141     Potassium 02/25/2025 3.6     Chloride 02/25/2025 104     CO2 02/25/2025 23.5     Calcium 02/25/2025 9.0     Total Protein 02/25/2025 6.8     Albumin 02/25/2025 3.5     ALT (SGPT) 02/25/2025 20     AST (SGOT) 02/25/2025 31     Alkaline Phosphatase 02/25/2025 173 (H)     Total Bilirubin 02/25/2025 0.5     Globulin 02/25/2025 3.3     A/G Ratio 02/25/2025 1.1     BUN/Creatinine Ratio 02/25/2025 17.7     Anion Gap 02/25/2025 13.5     eGFR 02/25/2025 112.8     WBC 02/25/2025 9.37     RBC 02/25/2025 4.01      Hemoglobin 02/25/2025 12.0     Hematocrit 02/25/2025 36.3     MCV 02/25/2025 90.5     MCH 02/25/2025 29.9     MCHC 02/25/2025 33.1     RDW 02/25/2025 16.5 (H)     RDW-SD 02/25/2025 53.9     MPV 02/25/2025 9.4     Platelets 02/25/2025 541 (H)     Neutrophil % 02/25/2025 77.1 (H)     Lymphocyte % 02/25/2025 13.0 (L)     Monocyte % 02/25/2025 6.4     Eosinophil % 02/25/2025 0.9     Basophil % 02/25/2025 1.1     Immature Grans % 02/25/2025 1.5 (H)     Neutrophils, Absolute 02/25/2025 7.23 (H)     Lymphocytes, Absolute 02/25/2025 1.22     Monocytes, Absolute 02/25/2025 0.60     Eosinophils, Absolute 02/25/2025 0.08     Basophils, Absolute 02/25/2025 0.10     Immature Grans, Absolute 02/25/2025 0.14 (H)     nRBC 02/25/2025 0.0    Labs reviewed    Current Psychotropic Medications:  Gabapentin 600 mg q hs  Venlafaxine XR 37.5 mg daily    Plan of Care/ Medical Decision Making  Continue venlafaxine as written.  Reviewed ongoing sleep disruption, specifically steroid activation. Hx response to olanzapine surrounding treatment. Will prescribe alternate atypical, quetiapine, 25 mg q hs, to assist with sleep.   Continue gabapentin as written, although with general plans to reduce current polypharmacy.  Encouraged ongoing behavioral activation as able, supporting rest as needed.  FU scheduled in 4 weeks.    Diagnoses and all orders for this visit:    1. Adjustment disorder with mixed anxiety and depressed mood (Primary)    2. Primary insomnia    Other orders  -     QUEtiapine (SEROquel) 25 MG tablet; Take 1 tablet by mouth Every Night.  Dispense: 30 tablet; Refill: 2    I spent 35 minutes caring for Suzanne on this date of service. This time includes time spent by me in the following activities: preparing for the visit, reviewing tests, obtaining and/or reviewing a separately obtained history, performing a medically appropriate examination and/or evaluation, counseling and educating the patient/family/caregiver, ordering medications,  tests, or procedures, and documenting information in the medical record.

## 2025-04-18 ENCOUNTER — TELEPHONE (OUTPATIENT)
Dept: ONCOLOGY | Facility: CLINIC | Age: 45
End: 2025-04-18
Payer: COMMERCIAL

## 2025-04-18 RX ORDER — LEVOFLOXACIN 500 MG/1
500 TABLET, FILM COATED ORAL DAILY
Qty: 7 TABLET | Refills: 0 | Status: SHIPPED | OUTPATIENT
Start: 2025-04-18 | End: 2025-04-25

## 2025-04-18 NOTE — TELEPHONE ENCOUNTER
Provider: Eduar  Caller: patient  Relationship to Patient: self  Call Back Phone Number: [phone]  Reason for Call: patient says her fever has gotten to 102.5

## 2025-04-18 NOTE — TELEPHONE ENCOUNTER
Patient reports that yesterday, her fever got up to 102.3 and in the middle of the night it got up to 102.5. She denies cough, shortness of breath, nausea, vomiting or diarrhea. She thinks she may have a sinus infection because she's experiencing a lot of sinus pressure and has noticed blood in her tissue when blowing her nose.

## 2025-04-18 NOTE — PROGRESS NOTES
Patient called this evening with low-grade temperature-100.9 degrees-nasal discharge without sinus pain.  Recent CBC is without neutropenia documented April 16, 2025.  She is asked to treat this symptomatically with Tylenol, antihistamines and possibly decongestants as well as saline spray nasally and notify us if her temperature increases further and she has development of sinus pain.  She agrees to do so.  SOFIA

## 2025-04-18 NOTE — TELEPHONE ENCOUNTER
"Relayed Dr. Witt's message to the patient, \"Shouldn't be neutropenic now so levaquin 500 daily x 7 [days].\" Encouraged her to keep us updated on her progress.  "

## 2025-04-22 ENCOUNTER — PATIENT OUTREACH (OUTPATIENT)
Dept: OTHER | Facility: HOSPITAL | Age: 45
End: 2025-04-22
Payer: COMMERCIAL

## 2025-04-23 ENCOUNTER — APPOINTMENT (OUTPATIENT)
Dept: ONCOLOGY | Facility: HOSPITAL | Age: 45
End: 2025-04-23
Payer: COMMERCIAL

## 2025-04-23 ENCOUNTER — INFUSION (OUTPATIENT)
Dept: ONCOLOGY | Facility: HOSPITAL | Age: 45
End: 2025-04-23
Payer: COMMERCIAL

## 2025-04-23 ENCOUNTER — OFFICE VISIT (OUTPATIENT)
Dept: ONCOLOGY | Facility: CLINIC | Age: 45
End: 2025-04-23
Payer: COMMERCIAL

## 2025-04-23 VITALS
WEIGHT: 146 LBS | SYSTOLIC BLOOD PRESSURE: 94 MMHG | TEMPERATURE: 98.5 F | RESPIRATION RATE: 16 BRPM | OXYGEN SATURATION: 100 % | DIASTOLIC BLOOD PRESSURE: 69 MMHG | BODY MASS INDEX: 24.32 KG/M2 | HEIGHT: 65 IN | HEART RATE: 110 BPM

## 2025-04-23 VITALS — DIASTOLIC BLOOD PRESSURE: 64 MMHG | HEART RATE: 92 BPM | SYSTOLIC BLOOD PRESSURE: 105 MMHG

## 2025-04-23 DIAGNOSIS — C50.911 BREAST CANCER, STAGE 3, RIGHT: ICD-10-CM

## 2025-04-23 DIAGNOSIS — Z79.69 NEED FOR PROPHYLACTIC CHEMOTHERAPY: ICD-10-CM

## 2025-04-23 DIAGNOSIS — Z45.2 ENCOUNTER FOR CENTRAL LINE CARE: ICD-10-CM

## 2025-04-23 DIAGNOSIS — E83.42 HYPOMAGNESEMIA: Primary | ICD-10-CM

## 2025-04-23 DIAGNOSIS — E83.42 HYPOMAGNESEMIA: ICD-10-CM

## 2025-04-23 DIAGNOSIS — D50.9 IRON DEFICIENCY ANEMIA, UNSPECIFIED IRON DEFICIENCY ANEMIA TYPE: ICD-10-CM

## 2025-04-23 DIAGNOSIS — Z17.0 MALIGNANT NEOPLASM OF BREAST IN FEMALE, ESTROGEN RECEPTOR POSITIVE, UNSPECIFIED LATERALITY, UNSPECIFIED SITE OF BREAST: Primary | ICD-10-CM

## 2025-04-23 DIAGNOSIS — C50.919 MALIGNANT NEOPLASM OF BREAST IN FEMALE, ESTROGEN RECEPTOR POSITIVE, UNSPECIFIED LATERALITY, UNSPECIFIED SITE OF BREAST: Primary | ICD-10-CM

## 2025-04-23 LAB
ALBUMIN SERPL-MCNC: 3.1 G/DL (ref 3.5–5.2)
ALBUMIN/GLOB SERPL: 1.1 G/DL
ALP SERPL-CCNC: 111 U/L (ref 39–117)
ALT SERPL W P-5'-P-CCNC: 37 U/L (ref 1–33)
ANION GAP SERPL CALCULATED.3IONS-SCNC: 13.2 MMOL/L (ref 5–15)
AST SERPL-CCNC: 30 U/L (ref 1–32)
BASOPHILS # BLD AUTO: 0.04 10*3/MM3 (ref 0–0.2)
BASOPHILS NFR BLD AUTO: 0.9 % (ref 0–1.5)
BILIRUB SERPL-MCNC: 0.3 MG/DL (ref 0–1.2)
BUN SERPL-MCNC: 6 MG/DL (ref 6–20)
BUN/CREAT SERPL: 10.7 (ref 7–25)
CALCIUM SPEC-SCNC: 8.3 MG/DL (ref 8.6–10.5)
CHLORIDE SERPL-SCNC: 103 MMOL/L (ref 98–107)
CO2 SERPL-SCNC: 22.8 MMOL/L (ref 22–29)
CREAT SERPL-MCNC: 0.56 MG/DL (ref 0.57–1)
DEPRECATED RDW RBC AUTO: 67.6 FL (ref 37–54)
EGFRCR SERPLBLD CKD-EPI 2021: 114.9 ML/MIN/1.73
EOSINOPHIL # BLD AUTO: 0.08 10*3/MM3 (ref 0–0.4)
EOSINOPHIL NFR BLD AUTO: 1.8 % (ref 0.3–6.2)
ERYTHROCYTE [DISTWIDTH] IN BLOOD BY AUTOMATED COUNT: 19.8 % (ref 12.3–15.4)
GLOBULIN UR ELPH-MCNC: 2.8 GM/DL
GLUCOSE SERPL-MCNC: 129 MG/DL (ref 65–99)
HCT VFR BLD AUTO: 28.4 % (ref 34–46.6)
HGB BLD-MCNC: 9.6 G/DL (ref 12–15.9)
IMM GRANULOCYTES # BLD AUTO: 0.04 10*3/MM3 (ref 0–0.05)
IMM GRANULOCYTES NFR BLD AUTO: 0.9 % (ref 0–0.5)
LYMPHOCYTES # BLD AUTO: 1.22 10*3/MM3 (ref 0.7–3.1)
LYMPHOCYTES NFR BLD AUTO: 28.2 % (ref 19.6–45.3)
MAGNESIUM SERPL-MCNC: 1.3 MG/DL (ref 1.6–2.6)
MCH RBC QN AUTO: 31.5 PG (ref 26.6–33)
MCHC RBC AUTO-ENTMCNC: 33.8 G/DL (ref 31.5–35.7)
MCV RBC AUTO: 93.1 FL (ref 79–97)
MONOCYTES # BLD AUTO: 0.53 10*3/MM3 (ref 0.1–0.9)
MONOCYTES NFR BLD AUTO: 12.2 % (ref 5–12)
NEUTROPHILS NFR BLD AUTO: 2.42 10*3/MM3 (ref 1.7–7)
NEUTROPHILS NFR BLD AUTO: 56 % (ref 42.7–76)
NRBC BLD AUTO-RTO: 0 /100 WBC (ref 0–0.2)
PLATELET # BLD AUTO: 203 10*3/MM3 (ref 140–450)
PMV BLD AUTO: 11.3 FL (ref 6–12)
POTASSIUM SERPL-SCNC: 2.8 MMOL/L (ref 3.5–5.2)
PROT SERPL-MCNC: 5.9 G/DL (ref 6–8.5)
RBC # BLD AUTO: 3.05 10*6/MM3 (ref 3.77–5.28)
SODIUM SERPL-SCNC: 139 MMOL/L (ref 136–145)
WBC NRBC COR # BLD AUTO: 4.33 10*3/MM3 (ref 3.4–10.8)

## 2025-04-23 PROCEDURE — 25010000002 FAMOTIDINE 10 MG/ML SOLUTION: Performed by: INTERNAL MEDICINE

## 2025-04-23 PROCEDURE — 85025 COMPLETE CBC W/AUTO DIFF WBC: CPT | Performed by: INTERNAL MEDICINE

## 2025-04-23 PROCEDURE — 83735 ASSAY OF MAGNESIUM: CPT | Performed by: INTERNAL MEDICINE

## 2025-04-23 PROCEDURE — 25810000003 SODIUM CHLORIDE 0.9 % SOLUTION: Performed by: INTERNAL MEDICINE

## 2025-04-23 PROCEDURE — 25010000002 DEXAMETHASONE SODIUM PHOSPHATE 100 MG/10ML SOLUTION: Performed by: INTERNAL MEDICINE

## 2025-04-23 PROCEDURE — 25010000002 MAGNESIUM SULFATE PER 500 MG OF MAGNESIUM: Performed by: INTERNAL MEDICINE

## 2025-04-23 PROCEDURE — 96375 TX/PRO/DX INJ NEW DRUG ADDON: CPT

## 2025-04-23 PROCEDURE — 25810000003 SODIUM CHLORIDE 0.9 % SOLUTION 500 ML FLEX CONT: Performed by: INTERNAL MEDICINE

## 2025-04-23 PROCEDURE — 96367 TX/PROPH/DG ADDL SEQ IV INF: CPT

## 2025-04-23 PROCEDURE — 80053 COMPREHEN METABOLIC PANEL: CPT | Performed by: INTERNAL MEDICINE

## 2025-04-23 PROCEDURE — 96413 CHEMO IV INFUSION 1 HR: CPT

## 2025-04-23 PROCEDURE — 25010000002 DIPHENHYDRAMINE PER 50 MG: Performed by: INTERNAL MEDICINE

## 2025-04-23 PROCEDURE — 25010000002 POTASSIUM CHLORIDE PER 2 MEQ OF POTASSIUM: Performed by: INTERNAL MEDICINE

## 2025-04-23 PROCEDURE — 25010000002 PACLITAXEL PER 1 MG: Performed by: INTERNAL MEDICINE

## 2025-04-23 PROCEDURE — 25810000003 SODIUM CHLORIDE 0.9 % SOLUTION 250 ML FLEX CONT: Performed by: INTERNAL MEDICINE

## 2025-04-23 RX ORDER — SODIUM CHLORIDE 9 MG/ML
20 INJECTION, SOLUTION INTRAVENOUS ONCE
OUTPATIENT
Start: 2025-04-30

## 2025-04-23 RX ORDER — FAMOTIDINE 10 MG/ML
20 INJECTION, SOLUTION INTRAVENOUS AS NEEDED
OUTPATIENT
Start: 2025-05-07

## 2025-04-23 RX ORDER — HYDROCORTISONE SODIUM SUCCINATE 100 MG/2ML
100 INJECTION INTRAMUSCULAR; INTRAVENOUS AS NEEDED
Status: CANCELLED | OUTPATIENT
Start: 2025-04-23

## 2025-04-23 RX ORDER — HEPARIN SODIUM (PORCINE) LOCK FLUSH IV SOLN 100 UNIT/ML 100 UNIT/ML
500 SOLUTION INTRAVENOUS AS NEEDED
Status: DISCONTINUED | OUTPATIENT
Start: 2025-04-23 | End: 2025-04-23 | Stop reason: HOSPADM

## 2025-04-23 RX ORDER — SODIUM CHLORIDE 9 MG/ML
20 INJECTION, SOLUTION INTRAVENOUS ONCE
Status: COMPLETED | OUTPATIENT
Start: 2025-04-23 | End: 2025-04-23

## 2025-04-23 RX ORDER — MONTELUKAST SODIUM 10 MG/1
10 TABLET ORAL ONCE
Start: 2025-05-07 | End: 2025-05-07

## 2025-04-23 RX ORDER — MONTELUKAST SODIUM 10 MG/1
10 TABLET ORAL ONCE
Status: COMPLETED | OUTPATIENT
Start: 2025-04-23 | End: 2025-04-23

## 2025-04-23 RX ORDER — FAMOTIDINE 10 MG/ML
20 INJECTION, SOLUTION INTRAVENOUS ONCE
OUTPATIENT
Start: 2025-05-07

## 2025-04-23 RX ORDER — FAMOTIDINE 10 MG/ML
20 INJECTION, SOLUTION INTRAVENOUS AS NEEDED
OUTPATIENT
Start: 2025-04-30

## 2025-04-23 RX ORDER — HYDROCORTISONE SODIUM SUCCINATE 100 MG/2ML
100 INJECTION INTRAMUSCULAR; INTRAVENOUS AS NEEDED
OUTPATIENT
Start: 2025-04-30

## 2025-04-23 RX ORDER — FAMOTIDINE 10 MG/ML
20 INJECTION, SOLUTION INTRAVENOUS ONCE
OUTPATIENT
Start: 2025-04-30

## 2025-04-23 RX ORDER — FAMOTIDINE 10 MG/ML
20 INJECTION, SOLUTION INTRAVENOUS ONCE
Status: CANCELLED | OUTPATIENT
Start: 2025-04-23

## 2025-04-23 RX ORDER — SODIUM CHLORIDE 9 MG/ML
20 INJECTION, SOLUTION INTRAVENOUS ONCE
OUTPATIENT
Start: 2025-05-07

## 2025-04-23 RX ORDER — POTASSIUM CHLORIDE 1500 MG/1
40 TABLET, EXTENDED RELEASE ORAL 2 TIMES DAILY
Qty: 120 TABLET | Refills: 1 | Status: SHIPPED | OUTPATIENT
Start: 2025-04-23

## 2025-04-23 RX ORDER — SODIUM CHLORIDE 0.9 % (FLUSH) 0.9 %
10 SYRINGE (ML) INJECTION AS NEEDED
Status: DISCONTINUED | OUTPATIENT
Start: 2025-04-23 | End: 2025-04-23 | Stop reason: HOSPADM

## 2025-04-23 RX ORDER — FAMOTIDINE 10 MG/ML
20 INJECTION, SOLUTION INTRAVENOUS AS NEEDED
Status: CANCELLED | OUTPATIENT
Start: 2025-04-23

## 2025-04-23 RX ORDER — HEPARIN SODIUM (PORCINE) LOCK FLUSH IV SOLN 100 UNIT/ML 100 UNIT/ML
500 SOLUTION INTRAVENOUS AS NEEDED
Status: CANCELLED | OUTPATIENT
Start: 2025-04-24

## 2025-04-23 RX ORDER — PROCHLORPERAZINE MALEATE 10 MG
10 TABLET ORAL EVERY 6 HOURS PRN
Qty: 60 TABLET | Refills: 1 | Status: SHIPPED | OUTPATIENT
Start: 2025-04-23

## 2025-04-23 RX ORDER — SODIUM CHLORIDE 9 MG/ML
20 INJECTION, SOLUTION INTRAVENOUS ONCE
Status: CANCELLED | OUTPATIENT
Start: 2025-04-23

## 2025-04-23 RX ORDER — MONTELUKAST SODIUM 10 MG/1
10 TABLET ORAL ONCE
Status: CANCELLED
Start: 2025-04-23 | End: 2025-04-23

## 2025-04-23 RX ORDER — DIPHENHYDRAMINE HYDROCHLORIDE 50 MG/ML
50 INJECTION, SOLUTION INTRAMUSCULAR; INTRAVENOUS AS NEEDED
OUTPATIENT
Start: 2025-04-30

## 2025-04-23 RX ORDER — DIPHENHYDRAMINE HYDROCHLORIDE 50 MG/ML
50 INJECTION, SOLUTION INTRAMUSCULAR; INTRAVENOUS AS NEEDED
Status: CANCELLED | OUTPATIENT
Start: 2025-04-23

## 2025-04-23 RX ORDER — SODIUM CHLORIDE 0.9 % (FLUSH) 0.9 %
10 SYRINGE (ML) INJECTION AS NEEDED
Status: CANCELLED | OUTPATIENT
Start: 2025-04-24

## 2025-04-23 RX ORDER — MONTELUKAST SODIUM 10 MG/1
TABLET ORAL
Status: COMPLETED
Start: 2025-04-23 | End: 2025-04-23

## 2025-04-23 RX ORDER — MONTELUKAST SODIUM 10 MG/1
10 TABLET ORAL ONCE
Start: 2025-04-30 | End: 2025-04-30

## 2025-04-23 RX ORDER — HYDROCORTISONE SODIUM SUCCINATE 100 MG/2ML
100 INJECTION INTRAMUSCULAR; INTRAVENOUS AS NEEDED
OUTPATIENT
Start: 2025-05-07

## 2025-04-23 RX ORDER — DIPHENHYDRAMINE HYDROCHLORIDE 50 MG/ML
50 INJECTION, SOLUTION INTRAMUSCULAR; INTRAVENOUS AS NEEDED
OUTPATIENT
Start: 2025-05-07

## 2025-04-23 RX ORDER — FAMOTIDINE 10 MG/ML
20 INJECTION, SOLUTION INTRAVENOUS ONCE
Status: COMPLETED | OUTPATIENT
Start: 2025-04-23 | End: 2025-04-23

## 2025-04-23 RX ORDER — DIPHENHYDRAMINE HYDROCHLORIDE 50 MG/ML
25 INJECTION, SOLUTION INTRAMUSCULAR; INTRAVENOUS ONCE
Status: COMPLETED | OUTPATIENT
Start: 2025-04-23 | End: 2025-04-23

## 2025-04-23 RX ADMIN — MONTELUKAST 10 MG: 10 TABLET, FILM COATED ORAL at 13:52

## 2025-04-23 RX ADMIN — PACLITAXEL 140 MG: 6 INJECTION, SOLUTION INTRAVENOUS at 14:28

## 2025-04-23 RX ADMIN — DEXAMETHASONE SODIUM PHOSPHATE 12 MG: 10 INJECTION, SOLUTION INTRAMUSCULAR; INTRAVENOUS at 13:42

## 2025-04-23 RX ADMIN — SODIUM CHLORIDE: 9 INJECTION, SOLUTION INTRAVENOUS at 14:07

## 2025-04-23 RX ADMIN — FAMOTIDINE 20 MG: 10 INJECTION INTRAVENOUS at 13:41

## 2025-04-23 RX ADMIN — DIPHENHYDRAMINE HYDROCHLORIDE 25 MG: 50 INJECTION, SOLUTION INTRAMUSCULAR; INTRAVENOUS at 13:42

## 2025-04-23 RX ADMIN — SODIUM CHLORIDE 20 ML/HR: 900 INJECTION, SOLUTION INTRAVENOUS at 13:40

## 2025-04-24 ENCOUNTER — INFUSION (OUTPATIENT)
Dept: ONCOLOGY | Facility: HOSPITAL | Age: 45
End: 2025-04-24
Payer: COMMERCIAL

## 2025-04-24 VITALS
WEIGHT: 147 LBS | DIASTOLIC BLOOD PRESSURE: 63 MMHG | BODY MASS INDEX: 24.46 KG/M2 | HEART RATE: 79 BPM | SYSTOLIC BLOOD PRESSURE: 96 MMHG | OXYGEN SATURATION: 100 % | TEMPERATURE: 98 F

## 2025-04-24 DIAGNOSIS — E83.42 HYPOMAGNESEMIA: ICD-10-CM

## 2025-04-24 DIAGNOSIS — C50.911 BREAST CANCER, STAGE 3, RIGHT: Primary | ICD-10-CM

## 2025-04-24 DIAGNOSIS — Z79.69 NEED FOR PROPHYLACTIC CHEMOTHERAPY: ICD-10-CM

## 2025-04-24 DIAGNOSIS — Z45.2 ENCOUNTER FOR CENTRAL LINE CARE: ICD-10-CM

## 2025-04-24 PROCEDURE — 25010000002 HEPARIN LOCK FLUSH PER 10 UNITS: Performed by: INTERNAL MEDICINE

## 2025-04-24 PROCEDURE — 96366 THER/PROPH/DIAG IV INF ADDON: CPT

## 2025-04-24 PROCEDURE — 25810000003 SODIUM CHLORIDE 0.9 % SOLUTION 500 ML FLEX CONT: Performed by: INTERNAL MEDICINE

## 2025-04-24 PROCEDURE — 25010000002 POTASSIUM CHLORIDE PER 2 MEQ OF POTASSIUM: Performed by: INTERNAL MEDICINE

## 2025-04-24 PROCEDURE — 96365 THER/PROPH/DIAG IV INF INIT: CPT

## 2025-04-24 PROCEDURE — 25010000002 MAGNESIUM SULFATE PER 500 MG OF MAGNESIUM: Performed by: INTERNAL MEDICINE

## 2025-04-24 RX ORDER — SODIUM CHLORIDE 0.9 % (FLUSH) 0.9 %
10 SYRINGE (ML) INJECTION AS NEEDED
Status: DISCONTINUED | OUTPATIENT
Start: 2025-04-24 | End: 2025-04-24 | Stop reason: HOSPADM

## 2025-04-24 RX ORDER — HEPARIN SODIUM (PORCINE) LOCK FLUSH IV SOLN 100 UNIT/ML 100 UNIT/ML
500 SOLUTION INTRAVENOUS AS NEEDED
Status: DISCONTINUED | OUTPATIENT
Start: 2025-04-24 | End: 2025-04-24 | Stop reason: HOSPADM

## 2025-04-24 RX ORDER — HEPARIN SODIUM (PORCINE) LOCK FLUSH IV SOLN 100 UNIT/ML 100 UNIT/ML
500 SOLUTION INTRAVENOUS AS NEEDED
OUTPATIENT
Start: 2025-04-24

## 2025-04-24 RX ORDER — SODIUM CHLORIDE 0.9 % (FLUSH) 0.9 %
10 SYRINGE (ML) INJECTION AS NEEDED
OUTPATIENT
Start: 2025-04-24

## 2025-04-24 RX ADMIN — HEPARIN 500 UNITS: 100 SYRINGE at 10:06

## 2025-04-24 RX ADMIN — POTASSIUM CHLORIDE: 2 INJECTION, SOLUTION, CONCENTRATE INTRAVENOUS at 07:56

## 2025-04-24 RX ADMIN — Medication 10 ML: at 10:06

## 2025-04-27 RX ORDER — LEVOFLOXACIN 500 MG/1
500 TABLET, FILM COATED ORAL DAILY
Qty: 7 TABLET | Refills: 0 | Status: SHIPPED | OUTPATIENT
Start: 2025-04-27 | End: 2025-05-04

## 2025-04-27 NOTE — PROGRESS NOTES
On-call note: Patient called today reporting that she completed 7 days of Levaquin.  She is not experiencing any further fevers however she is still experiencing sinus discharge with bloody mucus.  Symptoms have improved somewhat on treatment but have not resolved.  Prescription sent today for another 7 days of Levaquin.  She was instructed to call within 2-3 more days if symptoms are persisting and we will consider need for referral to ENT.  Notation of drug interaction with Seroquel however patient reports that she has not started taking Seroquel yet.  She was instructed to hold the Seroquel until she completes her course of Levaquin.

## 2025-04-28 ENCOUNTER — HOSPITAL ENCOUNTER (EMERGENCY)
Facility: HOSPITAL | Age: 45
Discharge: HOME | End: 2025-04-28
Attending: EMERGENCY MEDICINE | Admitting: EMERGENCY MEDICINE
Payer: COMMERCIAL

## 2025-04-28 ENCOUNTER — OFFICE VISIT (OUTPATIENT)
Dept: SURGERY | Facility: CLINIC | Age: 45
End: 2025-04-28
Payer: COMMERCIAL

## 2025-04-28 VITALS
RESPIRATION RATE: 16 BRPM | OXYGEN SATURATION: 99 % | BODY MASS INDEX: 22.02 KG/M2 | TEMPERATURE: 97.3 F | SYSTOLIC BLOOD PRESSURE: 103 MMHG | DIASTOLIC BLOOD PRESSURE: 72 MMHG | WEIGHT: 137 LBS | HEIGHT: 66 IN | HEART RATE: 81 BPM

## 2025-04-28 VITALS
DIASTOLIC BLOOD PRESSURE: 52 MMHG | BODY MASS INDEX: 22.66 KG/M2 | SYSTOLIC BLOOD PRESSURE: 90 MMHG | TEMPERATURE: 97.6 F | WEIGHT: 136 LBS | HEART RATE: 140 BPM | HEIGHT: 65 IN

## 2025-04-28 DIAGNOSIS — R42 LIGHTHEADEDNESS: ICD-10-CM

## 2025-04-28 DIAGNOSIS — R53.1 GENERALIZED WEAKNESS: Primary | ICD-10-CM

## 2025-04-28 DIAGNOSIS — Z90.13 STATUS POST BILATERAL MASTECTOMY: Primary | ICD-10-CM

## 2025-04-28 DIAGNOSIS — Z85.9 HISTORY OF CANCER: ICD-10-CM

## 2025-04-28 LAB
ALBUMIN SERPL-MCNC: 3.2 G/DL (ref 3.5–5.2)
ALBUMIN/GLOB SERPL: 1.1 G/DL
ALP SERPL-CCNC: 131 U/L (ref 39–117)
ALT SERPL W P-5'-P-CCNC: 21 U/L (ref 1–33)
ANION GAP SERPL CALCULATED.3IONS-SCNC: 8 MMOL/L (ref 5–15)
AST SERPL-CCNC: 22 U/L (ref 1–32)
BACTERIA UR QL AUTO: NORMAL /HPF
BASOPHILS # BLD AUTO: 0.04 10*3/MM3 (ref 0–0.2)
BASOPHILS NFR BLD AUTO: 1 % (ref 0–1.5)
BILIRUB SERPL-MCNC: 0.4 MG/DL (ref 0–1.2)
BILIRUB UR QL STRIP: NEGATIVE
BUN SERPL-MCNC: 9 MG/DL (ref 6–20)
BUN/CREAT SERPL: 13.4 (ref 7–25)
CALCIUM SPEC-SCNC: 8.7 MG/DL (ref 8.6–10.5)
CHLORIDE SERPL-SCNC: 101 MMOL/L (ref 98–107)
CLARITY UR: CLEAR
CO2 SERPL-SCNC: 25 MMOL/L (ref 22–29)
COLOR UR: YELLOW
CREAT SERPL-MCNC: 0.67 MG/DL (ref 0.57–1)
DEPRECATED RDW RBC AUTO: 68.4 FL (ref 37–54)
EGFRCR SERPLBLD CKD-EPI 2021: 110 ML/MIN/1.73
EOSINOPHIL # BLD AUTO: 0.06 10*3/MM3 (ref 0–0.4)
EOSINOPHIL NFR BLD AUTO: 1.5 % (ref 0.3–6.2)
ERYTHROCYTE [DISTWIDTH] IN BLOOD BY AUTOMATED COUNT: 20.2 % (ref 12.3–15.4)
GLOBULIN UR ELPH-MCNC: 3 GM/DL
GLUCOSE SERPL-MCNC: 93 MG/DL (ref 65–99)
GLUCOSE UR STRIP-MCNC: NEGATIVE MG/DL
HCT VFR BLD AUTO: 32.1 % (ref 34–46.6)
HGB BLD-MCNC: 10.8 G/DL (ref 12–15.9)
HGB UR QL STRIP.AUTO: NEGATIVE
HYALINE CASTS UR QL AUTO: NORMAL /LPF
IMM GRANULOCYTES # BLD AUTO: 0.15 10*3/MM3 (ref 0–0.05)
IMM GRANULOCYTES NFR BLD AUTO: 3.7 % (ref 0–0.5)
KETONES UR QL STRIP: ABNORMAL
LEUKOCYTE ESTERASE UR QL STRIP.AUTO: ABNORMAL
LYMPHOCYTES # BLD AUTO: 0.81 10*3/MM3 (ref 0.7–3.1)
LYMPHOCYTES NFR BLD AUTO: 20 % (ref 19.6–45.3)
MAGNESIUM SERPL-MCNC: 2.1 MG/DL (ref 1.6–2.6)
MCH RBC QN AUTO: 31.7 PG (ref 26.6–33)
MCHC RBC AUTO-ENTMCNC: 33.6 G/DL (ref 31.5–35.7)
MCV RBC AUTO: 94.1 FL (ref 79–97)
MONOCYTES # BLD AUTO: 0.31 10*3/MM3 (ref 0.1–0.9)
MONOCYTES NFR BLD AUTO: 7.7 % (ref 5–12)
NEUTROPHILS NFR BLD AUTO: 2.67 10*3/MM3 (ref 1.7–7)
NEUTROPHILS NFR BLD AUTO: 66.1 % (ref 42.7–76)
NITRITE UR QL STRIP: NEGATIVE
NRBC BLD AUTO-RTO: 0.5 /100 WBC (ref 0–0.2)
PH UR STRIP.AUTO: >=9 [PH] (ref 5–8)
PLATELET # BLD AUTO: 419 10*3/MM3 (ref 140–450)
PMV BLD AUTO: 10.3 FL (ref 6–12)
POTASSIUM SERPL-SCNC: 4 MMOL/L (ref 3.5–5.2)
PROT SERPL-MCNC: 6.2 G/DL (ref 6–8.5)
PROT UR QL STRIP: NEGATIVE
RBC # BLD AUTO: 3.41 10*6/MM3 (ref 3.77–5.28)
RBC # UR STRIP: NORMAL /HPF
REF LAB TEST METHOD: NORMAL
SODIUM SERPL-SCNC: 134 MMOL/L (ref 136–145)
SP GR UR STRIP: 1.02 (ref 1–1.03)
SQUAMOUS #/AREA URNS HPF: NORMAL /HPF
UROBILINOGEN UR QL STRIP: ABNORMAL
WBC # UR STRIP: NORMAL /HPF
WBC NRBC COR # BLD AUTO: 4.04 10*3/MM3 (ref 3.4–10.8)

## 2025-04-28 PROCEDURE — 80053 COMPREHEN METABOLIC PANEL: CPT | Performed by: EMERGENCY MEDICINE

## 2025-04-28 PROCEDURE — 85025 COMPLETE CBC W/AUTO DIFF WBC: CPT | Performed by: EMERGENCY MEDICINE

## 2025-04-28 PROCEDURE — 99283 EMERGENCY DEPT VISIT LOW MDM: CPT

## 2025-04-28 PROCEDURE — 83735 ASSAY OF MAGNESIUM: CPT | Performed by: EMERGENCY MEDICINE

## 2025-04-28 PROCEDURE — 99213 OFFICE O/P EST LOW 20 MIN: CPT | Performed by: SURGERY

## 2025-04-28 PROCEDURE — 25810000003 LACTATED RINGERS SOLUTION: Performed by: EMERGENCY MEDICINE

## 2025-04-28 PROCEDURE — 81001 URINALYSIS AUTO W/SCOPE: CPT | Performed by: EMERGENCY MEDICINE

## 2025-04-28 RX ADMIN — SODIUM CHLORIDE, POTASSIUM CHLORIDE, SODIUM LACTATE AND CALCIUM CHLORIDE 1000 ML: 600; 310; 30; 20 INJECTION, SOLUTION INTRAVENOUS at 11:21

## 2025-04-28 NOTE — ED NOTES
Pt to ed from dr office via EMS    Pt currently on chemo. Pt c/o weakness and dizziness. Pt also reports congestion. Pt reports she is having problems getting around due to weakness.

## 2025-04-28 NOTE — DISCHARGE INSTRUCTIONS
Stay well-hydrated, eat regular meals, continue current medications, close outpatient specialty follow-up as discussed, ED return for worsening symptoms as needed.

## 2025-04-28 NOTE — PROGRESS NOTES
Suzanne Lin 45 y.o. female presents for 3 MO FU.  Pt states she does not feel well today.  Reports she is currently being treated for sinus inf. Pt states she feels weak and lethargic.  When she stands she feels like her legs and shoulders feel heavy.  Feels lightheaded and dizzy when she stands up. Pt think she prob needs to go to ER for magnesium and K+ and poss blood transfusion. HR rapid and pt states she can feel it.     History of Present Illness  The patient is a 45-year-old female here following up from a mastectomy for breast cancer.    She has been experiencing significant discomfort, including a sinus infection and gastrointestinal disturbances, which she attributes to her chemotherapy. Despite maintaining adequate hydration and potassium intake, her most recent chemotherapy session on Wednesday revealed decreased levels of both magnesium and potassium. Consequently, she received intravenous supplementation for these electrolytes on Thursday, which provided temporary relief. However, she subsequently experienced polyuria, with approximately 10 episodes the following day, leading her to suspect a potential link with the magnesium. Currently, she reports feeling weak and lethargic, describing a sensation of heaviness akin to carrying an additional 50 to 80 pounds during ambulation. These symptoms are typically accompanied by dizziness and lightheadedness, necessitating periods of rest. She also notes dryness at her incision site, which she believes is related to overall skin dryness.      Review of Systems        Past Medical History:   Diagnosis Date    Anxiety     Breast cancer     Right    Crohn's disease     DVT (deep vein thrombosis) in pregnancy     PFO (patent foramen ovale)     Stroke     after the birth of her child at age 34    Tattoos            Past Surgical History:   Procedure Laterality Date     SECTION      COLON RESECTION      COLONOSCOPY      MASTECTOMY Bilateral 10/9/2024     "Procedure: Modified radical mastectomy with axillary dissection of the right breast and simple mastectomy of the left breast;  Surgeon: Rebekah Garcia DO;  Location:  [facility] OR;  Service: General;  Laterality: Bilateral;    SKIN CANCER EXCISION      TEETH EXTRACTION N/A 1/28/2025    Procedure: TOOTH EXTRACTION #2,15,19,28,29;  Surgeon: Pramod Zambrano DMD;  Location: [facility] MAIN OR;  Service: Oral Surgery;  Laterality: N/A;    VENOUS ACCESS DEVICE (PORT) INSERTION N/A 12/10/2024    Procedure: INSERTION VENOUS ACCESS DEVICE;  Surgeon: Rebekah Garcia DO;  Location: [facility] OR;  Service: General;  Laterality: N/A;           Physical Exam  Constitutional:       Comments: Suzanne looks tired and weak and malnourished   Skin:     Comments: Her incisions look good   Neurological:      Mental Status: Mental status is at baseline.       Physical Exam        BP 90/52   Pulse (!) 140   Temp 97.6 °F (36.4 °C)   Ht 165.1 cm (65\")   Wt 61.7 kg (136 lb)   BMI 22.63 kg/m²         Diagnoses and all orders for this visit:    1. Status post bilateral mastectomy (Primary)      Assessment & Plan  1. Post-mastectomy follow-up.  Her heart rate is elevated, and her blood pressure is suboptimal. She reports feeling weak, lethargic, and experiencing dizziness and lightheadedness. She will be transported to the emergency room via ambulance for further evaluation and management.    Patient or patient representative verbalized consent for the use of Ambient Listening during the visit with  Rebekah Garica DO for chart documentation. 4/28/2025  09:10 EDT    "

## 2025-04-28 NOTE — LETTER
April 28, 2025     PHIL Gaines  [address] [place] [id number]    Patient: Suzanne Lin   YOB: 1980   Date of Visit: 4/28/2025     Dear PHIL Gaines:       Thank you for referring Suzanne Lin to me for evaluation. Below are the relevant portions of my assessment and plan of care.    If you have questions, please do not hesitate to call me. I look forward to following Suzanne along with you.         Sincerely,        Rebekah Garcia DO        CC: No Recipients    Rebekah Garcia DO  04/28/25 0910  Sign when Signing Visit  Suzanne Lin 45 y.o. female presents for 3 MO FU.  Pt states she does not feel well today.  Reports she is currently being treated for sinus inf. Pt states she feels weak and lethargic.  When she stands she feels like her legs and shoulders feel heavy.  Feels lightheaded and dizzy when she stands up. Pt think she prob needs to go to ER for magnesium and K+ and poss blood transfusion. HR rapid and pt states she can feel it.     History of Present Illness  The patient is a 45-year-old female here following up from a mastectomy for breast cancer.    She has been experiencing significant discomfort, including a sinus infection and gastrointestinal disturbances, which she attributes to her chemotherapy. Despite maintaining adequate hydration and potassium intake, her most recent chemotherapy session on Wednesday revealed decreased levels of both magnesium and potassium. Consequently, she received intravenous supplementation for these electrolytes on Thursday, which provided temporary relief. However, she subsequently experienced polyuria, with approximately 10 episodes the following day, leading her to suspect a potential link with the magnesium. Currently, she reports feeling weak and lethargic, describing a sensation of heaviness akin to carrying an additional 50 to 80 pounds during ambulation. These symptoms are typically accompanied by dizziness and  "lightheadedness, necessitating periods of rest. She also notes dryness at her incision site, which she believes is related to overall skin dryness.      Review of Systems        Past Medical History:   Diagnosis Date   • Anxiety    • Breast cancer     Right   • Crohn's disease    • DVT (deep vein thrombosis) in pregnancy    • PFO (patent foramen ovale)    • Stroke     after the birth of her child at age 34   • Tattoos            Past Surgical History:   Procedure Laterality Date   •  SECTION     • COLON RESECTION     • COLONOSCOPY     • MASTECTOMY Bilateral 10/9/2024    Procedure: Modified radical mastectomy with axillary dissection of the right breast and simple mastectomy of the left breast;  Surgeon: Rebekah Garcia DO;  Location: [facility] OR;  Service: General;  Laterality: Bilateral;   • SKIN CANCER EXCISION     • TEETH EXTRACTION N/A 2025    Procedure: TOOTH EXTRACTION #2,15,19,28,29;  Surgeon: Pramod Zambrano DMD;  Location: [facility] OR;  Service: Oral Surgery;  Laterality: N/A;   • VENOUS ACCESS DEVICE (PORT) INSERTION N/A 12/10/2024    Procedure: INSERTION VENOUS ACCESS DEVICE;  Surgeon: Rebekah Garcia DO;  Location: [facility] OR;  Service: General;  Laterality: N/A;           Physical Exam  Constitutional:       Comments: Suzanne looks tired and weak and malnourished   Skin:     Comments: Her incisions look good   Neurological:      Mental Status: Mental status is at baseline.       Physical Exam        BP 90/52   Pulse (!) 140   Temp 97.6 °F (36.4 °C)   Ht 165.1 cm (65\")   Wt 61.7 kg (136 lb)   BMI 22.63 kg/m²         Diagnoses and all orders for this visit:    1. Status post bilateral mastectomy (Primary)      Assessment & Plan  1. Post-mastectomy follow-up.  Her heart rate is elevated, and her blood pressure is suboptimal. She reports feeling weak, lethargic, and experiencing dizziness and lightheadedness. She will be transported to the emergency room via ambulance for " further evaluation and management.    Patient or patient representative verbalized consent for the use of Ambient Listening during the visit with  Rebekah Garcia DO for chart documentation. 4/28/2025  09:10 EDT

## 2025-04-28 NOTE — ED PROVIDER NOTES
EMERGENCY DEPARTMENT ENCOUNTER    Room Number:  36/36  PCP: Gia Rodriguez APRN  Historian: Patient      HPI:  Chief Complaint: Generalized weakness  A complete HPI/ROS/PMH/PSH/SH/FH are unobtainable due to: None    Context: Suzanne Lin is a 45 y.o. female who presents to the ED via EMS from doctor's office c/o acute increasing generalized weakness and lightheadedness for last several days.  Patient is on chemotherapy for breast cancer, states that she has recurrent issues with low magnesium and low potassium levels.  Does take supplemental potassium.  Has had some decreased p.o. intake recently.  May be some mildly increased loose stools, does have a history of underlying Crohn's disease.  Recently on a course of antibiotics for sinus congestion, currently on Levaquin.  No significant cough or shortness of breath.      MEDICAL RECORD REVIEW    External (non-ED) record review: General surgery office visit note reviewed from earlier today with Dr. Garcia, patient sent to the ER for further evaluation of the weakness, lightheadedness, had noted some slightly suboptimal blood pressure and elevated heart rates at that time.              PAST MEDICAL HISTORY  Active Ambulatory Problems     Diagnosis Date Noted    PFO (patent foramen ovale) 09/18/2024    Palpitations 09/28/2024    Breast cancer, stage 3, right 10/02/2024    Breast cancer in female 10/09/2024    Need for prophylactic chemotherapy 10/31/2024    Encounter for central line care 10/31/2024    LITA (iron deficiency anemia) 12/12/2024    Adverse effect of iron 12/12/2024    Crohn's disease without complication 12/17/2024    Encounter for screening colonoscopy 12/17/2024    Immunosuppressed due to chemotherapy 01/29/2025    Severe protein-calorie malnutrition 01/29/2025    Hypomagnesemia 04/16/2025     Resolved Ambulatory Problems     Diagnosis Date Noted    Neutropenic fever 01/22/2025    Hypokalemia 03/08/2025     Past Medical History:   Diagnosis  Date    Anxiety     Breast cancer     Crohn's disease     DVT (deep vein thrombosis) in pregnancy     Stroke     Tattoos          PAST SURGICAL HISTORY  Past Surgical History:   Procedure Laterality Date     SECTION      COLON RESECTION      COLONOSCOPY      MASTECTOMY Bilateral 10/9/2024    Procedure: Modified radical mastectomy with axillary dissection of the right breast and simple mastectomy of the left breast;  Surgeon: Rebekah Garcia DO;  Location: [facility] OR;  Service: General;  Laterality: Bilateral;    SKIN CANCER EXCISION      TEETH EXTRACTION N/A 2025    Procedure: TOOTH EXTRACTION #2,15,19,28,29;  Surgeon: Pramod Zambrano DMD;  Location: [facility] MAIN OR;  Service: Oral Surgery;  Laterality: N/A;    VENOUS ACCESS DEVICE (PORT) INSERTION N/A 12/10/2024    Procedure: INSERTION VENOUS ACCESS DEVICE;  Surgeon: Rebekah Garcia DO;  Location: [facility] OR;  Service: General;  Laterality: N/A;         FAMILY HISTORY  Family History   Problem Relation Age of Onset    Diabetes Mother     Heart disease Father     Diabetes Paternal Grandmother     Hypertension Other     Malig Hyperthermia Neg Hx          SOCIAL HISTORY  Social History     Socioeconomic History    Marital status: Single    Number of children: 1   Tobacco Use    Smoking status: Former     Current packs/day: 0.00     Average packs/day: 1 pack/day for 20.0 years (20.0 ttl pk-yrs)     Types: Cigarettes     Start date:      Quit date: 2019     Years since quittin.3    Smokeless tobacco: Current    Tobacco comments:     Nicotine pouches   Vaping Use    Vaping status: Never Used   Substance and Sexual Activity    Alcohol use: Yes     Comment: OCC    Drug use: Never    Sexual activity: Defer         ALLERGIES  Patient has no known allergies.        REVIEW OF SYSTEMS  Review of Systems     All systems reviewed and negative except for those discussed in HPI.       PHYSICAL EXAM    I have reviewed the triage vital signs and  nursing notes.    ED Triage Vitals   Temp Heart Rate Resp BP SpO2   04/28/25 1029 04/28/25 1027 04/28/25 1027 04/28/25 1027 04/28/25 1027   97.3 °F (36.3 °C) 115 18 135/90 100 %      Temp src Heart Rate Source Patient Position BP Location FiO2 (%)   04/28/25 1029 04/28/25 1027 -- -- --   Oral Monitor          Physical Exam  General: No acute distress, nontoxic  HEENT: Mucous membranes tacky, atraumatic, EOMI  Neck: Full ROM  Pulm: Symmetric chest rise, nonlabored, lungs CTAB  Cardiovascular: Borderline mild regular rhythm tachycardia, intact distal pulses  GI: Soft, nontender, nondistended, no rebound, no guarding, bowel sounds present  MSK: Full ROM, no deformity  Skin: Warm, dry  Neuro: Awake, alert, oriented x 4, GCS 15, moving all extremities, no focal deficits  Psych: Calm, cooperative        LAB RESULTS  Recent Results (from the past 24 hours)   Comprehensive Metabolic Panel    Collection Time: 04/28/25 11:12 AM    Specimen: Blood   Result Value Ref Range    Glucose 93 65 - 99 mg/dL    BUN 9 6 - 20 mg/dL    Creatinine 0.67 0.57 - 1.00 mg/dL    Sodium 134 (L) 136 - 145 mmol/L    Potassium 4.0 3.5 - 5.2 mmol/L    Chloride 101 98 - 107 mmol/L    CO2 25.0 22.0 - 29.0 mmol/L    Calcium 8.7 8.6 - 10.5 mg/dL    Total Protein 6.2 6.0 - 8.5 g/dL    Albumin 3.2 (L) 3.5 - 5.2 g/dL    ALT (SGPT) 21 1 - 33 U/L    AST (SGOT) 22 1 - 32 U/L    Alkaline Phosphatase 131 (H) 39 - 117 U/L    Total Bilirubin 0.4 0.0 - 1.2 mg/dL    Globulin 3.0 gm/dL    A/G Ratio 1.1 g/dL    BUN/Creatinine Ratio 13.4 7.0 - 25.0    Anion Gap 8.0 5.0 - 15.0 mmol/L    eGFR 110.0 >60.0 mL/min/1.73   Magnesium    Collection Time: 04/28/25 11:12 AM    Specimen: Blood   Result Value Ref Range    Magnesium 2.1 1.6 - 2.6 mg/dL   CBC Auto Differential    Collection Time: 04/28/25 11:12 AM    Specimen: Blood   Result Value Ref Range    WBC 4.04 3.40 - 10.80 10*3/mm3    RBC 3.41 (L) 3.77 - 5.28 10*6/mm3    Hemoglobin 10.8 (L) 12.0 - 15.9 g/dL    Hematocrit  32.1 (L) 34.0 - 46.6 %    MCV 94.1 79.0 - 97.0 fL    MCH 31.7 26.6 - 33.0 pg    MCHC 33.6 31.5 - 35.7 g/dL    RDW 20.2 (H) 12.3 - 15.4 %    RDW-SD 68.4 (H) 37.0 - 54.0 fl    MPV 10.3 6.0 - 12.0 fL    Platelets 419 140 - 450 10*3/mm3    Neutrophil % 66.1 42.7 - 76.0 %    Lymphocyte % 20.0 19.6 - 45.3 %    Monocyte % 7.7 5.0 - 12.0 %    Eosinophil % 1.5 0.3 - 6.2 %    Basophil % 1.0 0.0 - 1.5 %    Immature Grans % 3.7 (H) 0.0 - 0.5 %    Neutrophils, Absolute 2.67 1.70 - 7.00 10*3/mm3    Lymphocytes, Absolute 0.81 0.70 - 3.10 10*3/mm3    Monocytes, Absolute 0.31 0.10 - 0.90 10*3/mm3    Eosinophils, Absolute 0.06 0.00 - 0.40 10*3/mm3    Basophils, Absolute 0.04 0.00 - 0.20 10*3/mm3    Immature Grans, Absolute 0.15 (H) 0.00 - 0.05 10*3/mm3    nRBC 0.5 (H) 0.0 - 0.2 /100 WBC       Ordered the above labs and independently interpreted results. My findings will be discussed in the medical decision making section below        RADIOLOGY  No Radiology Exams Resulted Within Past 24 Hours    Ordered the above noted radiological studies.  Independently interpreted by me and my independent review of findings can be found in the ED Course.  See dictation for official radiology interpretation.      PROCEDURES    Procedures        MEDICATIONS GIVEN IN ER    Medications   lactated ringers bolus 1,000 mL (0 mL Intravenous Stopped 4/28/25 1305)         PROGRESS, DATA ANALYSIS, CONSULTS, AND MEDICAL DECISION MAKING    Please note that this section constitutes my independent interpretation of clinical data including lab results, radiology, EKG's.  This constitutes my independent professional opinion regarding differential diagnosis and management of this patient.  It may include any factors such as history from outside sources, review of external records, social determinants of health, management of medications, response to those treatments, and discussions with other providers.    Differential Diagnosis and Plan: Initial concern for  dehydration, renal failure, electrolyte imbalance, anemia, among others.  Plan for labs, supportive care, reevaluation with results.    Additional sources:  - Discussed/ obtained information from independent historians:       - (Social Determinants of Health): None     - Shared decision making:  Patient fully updated on and in agreement with the course and plan moving forward    ED Course as of 04/28/25 1330   Mon Apr 28, 2025   1126 WBC: 4.04 [DC]   1126 Hemoglobin(!): 10.8 [DC]   1126 Platelets: 419 [DC]   1153 Magnesium: 2.1 [DC]   1153 Glucose: 93 [DC]   1153 BUN: 9 [DC]   1153 Creatinine: 0.67 [DC]   1153 Sodium(!): 134 [DC]   1153 Potassium: 4.0 [DC]   1153 ALT (SGPT): 21 [DC]   1153 AST (SGOT): 22 [DC]   1153 Alkaline Phosphatase(!): 131 [DC]   1153 Total Bilirubin: 0.4 [DC]   1321 Patient ambulatory without complaint.  No emergent abnormalities today, has received IV fluids and she would prefer discharge at this time and feels comfortable doing so.  I do not see any reason for hospitalization at this time.  Close outpatient follow-up with her providers, ED return for worsening symptoms as needed. [DC]      ED Course User Index  [DC] Emanuel Arnold MD       Hospitalization Considered?:     Orders Placed During This Visit:  Orders Placed This Encounter   Procedures    Comprehensive Metabolic Panel    Urinalysis With Microscopic If Indicated (No Culture) - Urine, Clean Catch    Magnesium    CBC Auto Differential    Ambulate patient    CBC & Differential       Additional orders considered but not placed:      Independent interpretation of labs, radiology studies, and discussions with consultants: See ED Course        AS OF 13:30 EDT VITALS:    BP - 97/71  HR - 100  TEMP - 97.3 °F (36.3 °C) (Oral)  02 SATS - 95%          DIAGNOSIS  Final diagnoses:   Generalized weakness   Lightheadedness   History of cancer         DISPOSITION  ED Disposition       ED Disposition   Discharge    Condition   Stable    Comment    --                Please note that portions of this document were completed with a voice recognition program.    Note Disclaimer: At [facility], we believe that sharing information builds trust and better relationships. You are receiving this note because you recently visited [facility]. It is possible you will see health information before a provider has talked with you about it. This kind of information can be easy to misunderstand. To help you fully understand what it means for your health, we urge you to discuss this note with your provider.                       Emanuel Arnold MD  04/28/25 3146

## 2025-04-30 ENCOUNTER — APPOINTMENT (OUTPATIENT)
Dept: ONCOLOGY | Facility: HOSPITAL | Age: 45
End: 2025-04-30
Payer: COMMERCIAL

## 2025-04-30 ENCOUNTER — INFUSION (OUTPATIENT)
Dept: ONCOLOGY | Facility: HOSPITAL | Age: 45
End: 2025-04-30
Payer: COMMERCIAL

## 2025-04-30 ENCOUNTER — HOSPITAL ENCOUNTER (OUTPATIENT)
Dept: CARDIOLOGY | Facility: HOSPITAL | Age: 45
Discharge: HOME | End: 2025-04-30
Payer: COMMERCIAL

## 2025-04-30 ENCOUNTER — PATIENT ROUNDING (BHMG ONLY) (OUTPATIENT)
Dept: SURGERY | Facility: CLINIC | Age: 45
End: 2025-04-30
Payer: COMMERCIAL

## 2025-04-30 VITALS
DIASTOLIC BLOOD PRESSURE: 67 MMHG | OXYGEN SATURATION: 98 % | HEART RATE: 89 BPM | TEMPERATURE: 97.8 F | WEIGHT: 139 LBS | BODY MASS INDEX: 22.44 KG/M2 | SYSTOLIC BLOOD PRESSURE: 97 MMHG

## 2025-04-30 DIAGNOSIS — Z45.2 ENCOUNTER FOR CENTRAL LINE CARE: ICD-10-CM

## 2025-04-30 DIAGNOSIS — R00.2 HEART PALPITATIONS: ICD-10-CM

## 2025-04-30 DIAGNOSIS — Z79.899 HIGH RISK MEDICATION USE: ICD-10-CM

## 2025-04-30 DIAGNOSIS — E83.42 HYPOMAGNESEMIA: ICD-10-CM

## 2025-04-30 DIAGNOSIS — Z79.69 NEED FOR PROPHYLACTIC CHEMOTHERAPY: ICD-10-CM

## 2025-04-30 DIAGNOSIS — C50.911 BREAST CANCER, STAGE 3, RIGHT: Primary | ICD-10-CM

## 2025-04-30 LAB
ALBUMIN SERPL-MCNC: 3.2 G/DL (ref 3.5–5.2)
ALBUMIN/GLOB SERPL: 1.2 G/DL
ALP SERPL-CCNC: 112 U/L (ref 39–117)
ALT SERPL W P-5'-P-CCNC: 21 U/L (ref 1–33)
ANION GAP SERPL CALCULATED.3IONS-SCNC: 10.3 MMOL/L (ref 5–15)
AST SERPL-CCNC: 20 U/L (ref 1–32)
BASOPHILS # BLD AUTO: 0.05 10*3/MM3 (ref 0–0.2)
BASOPHILS NFR BLD AUTO: 0.9 % (ref 0–1.5)
BILIRUB SERPL-MCNC: 0.2 MG/DL (ref 0–1.2)
BUN SERPL-MCNC: 13 MG/DL (ref 6–20)
BUN/CREAT SERPL: 19.7 (ref 7–25)
CALCIUM SPEC-SCNC: 8.6 MG/DL (ref 8.6–10.5)
CHLORIDE SERPL-SCNC: 104 MMOL/L (ref 98–107)
CO2 SERPL-SCNC: 23.7 MMOL/L (ref 22–29)
CREAT SERPL-MCNC: 0.66 MG/DL (ref 0.57–1)
DEPRECATED RDW RBC AUTO: 72.6 FL (ref 37–54)
EGFRCR SERPLBLD CKD-EPI 2021: 110.4 ML/MIN/1.73
EOSINOPHIL # BLD AUTO: 0.14 10*3/MM3 (ref 0–0.4)
EOSINOPHIL NFR BLD AUTO: 2.6 % (ref 0.3–6.2)
ERYTHROCYTE [DISTWIDTH] IN BLOOD BY AUTOMATED COUNT: 20.4 % (ref 12.3–15.4)
GLOBULIN UR ELPH-MCNC: 2.7 GM/DL
GLUCOSE SERPL-MCNC: 84 MG/DL (ref 65–99)
HCT VFR BLD AUTO: 30.5 % (ref 34–46.6)
HGB BLD-MCNC: 10.5 G/DL (ref 12–15.9)
IMM GRANULOCYTES # BLD AUTO: 0.34 10*3/MM3 (ref 0–0.05)
IMM GRANULOCYTES NFR BLD AUTO: 6.2 % (ref 0–0.5)
LYMPHOCYTES # BLD AUTO: 1.27 10*3/MM3 (ref 0.7–3.1)
LYMPHOCYTES NFR BLD AUTO: 23.2 % (ref 19.6–45.3)
MAGNESIUM SERPL-MCNC: 2 MG/DL (ref 1.6–2.6)
MCH RBC QN AUTO: 33.8 PG (ref 26.6–33)
MCHC RBC AUTO-ENTMCNC: 34.4 G/DL (ref 31.5–35.7)
MCV RBC AUTO: 98.1 FL (ref 79–97)
MONOCYTES # BLD AUTO: 0.51 10*3/MM3 (ref 0.1–0.9)
MONOCYTES NFR BLD AUTO: 9.3 % (ref 5–12)
NEUTROPHILS NFR BLD AUTO: 3.16 10*3/MM3 (ref 1.7–7)
NEUTROPHILS NFR BLD AUTO: 57.8 % (ref 42.7–76)
PLATELET # BLD AUTO: 436 10*3/MM3 (ref 140–450)
PMV BLD AUTO: 9.6 FL (ref 6–12)
POTASSIUM SERPL-SCNC: 4 MMOL/L (ref 3.5–5.2)
PROT SERPL-MCNC: 5.9 G/DL (ref 6–8.5)
RBC # BLD AUTO: 3.11 10*6/MM3 (ref 3.77–5.28)
SODIUM SERPL-SCNC: 138 MMOL/L (ref 136–145)
WBC NRBC COR # BLD AUTO: 5.47 10*3/MM3 (ref 3.4–10.8)

## 2025-04-30 PROCEDURE — 80053 COMPREHEN METABOLIC PANEL: CPT | Performed by: INTERNAL MEDICINE

## 2025-04-30 PROCEDURE — 96375 TX/PRO/DX INJ NEW DRUG ADDON: CPT

## 2025-04-30 PROCEDURE — 25810000003 SODIUM CHLORIDE 0.9 % SOLUTION: Performed by: INTERNAL MEDICINE

## 2025-04-30 PROCEDURE — 96361 HYDRATE IV INFUSION ADD-ON: CPT

## 2025-04-30 PROCEDURE — 93225 XTRNL ECG REC<48 HRS REC: CPT

## 2025-04-30 PROCEDURE — 25010000002 HEPARIN LOCK FLUSH PER 10 UNITS: Performed by: INTERNAL MEDICINE

## 2025-04-30 PROCEDURE — 25010000002 DEXAMETHASONE SODIUM PHOSPHATE 100 MG/10ML SOLUTION: Performed by: INTERNAL MEDICINE

## 2025-04-30 PROCEDURE — 25010000002 DIPHENHYDRAMINE PER 50 MG: Performed by: INTERNAL MEDICINE

## 2025-04-30 PROCEDURE — 25010000002 PACLITAXEL PER 1 MG: Performed by: INTERNAL MEDICINE

## 2025-04-30 PROCEDURE — 25810000003 SODIUM CHLORIDE 0.9 % SOLUTION 250 ML FLEX CONT: Performed by: INTERNAL MEDICINE

## 2025-04-30 PROCEDURE — 83735 ASSAY OF MAGNESIUM: CPT | Performed by: INTERNAL MEDICINE

## 2025-04-30 PROCEDURE — 85025 COMPLETE CBC W/AUTO DIFF WBC: CPT | Performed by: INTERNAL MEDICINE

## 2025-04-30 PROCEDURE — 93005 ELECTROCARDIOGRAM TRACING: CPT | Performed by: INTERNAL MEDICINE

## 2025-04-30 PROCEDURE — 93226 XTRNL ECG REC<48 HR SCAN A/R: CPT

## 2025-04-30 PROCEDURE — 96413 CHEMO IV INFUSION 1 HR: CPT

## 2025-04-30 PROCEDURE — 25010000002 FAMOTIDINE 10 MG/ML SOLUTION: Performed by: INTERNAL MEDICINE

## 2025-04-30 RX ORDER — HEPARIN SODIUM (PORCINE) LOCK FLUSH IV SOLN 100 UNIT/ML 100 UNIT/ML
500 SOLUTION INTRAVENOUS AS NEEDED
Status: DISCONTINUED | OUTPATIENT
Start: 2025-04-30 | End: 2025-04-30 | Stop reason: HOSPADM

## 2025-04-30 RX ORDER — HEPARIN SODIUM (PORCINE) LOCK FLUSH IV SOLN 100 UNIT/ML 100 UNIT/ML
500 SOLUTION INTRAVENOUS AS NEEDED
OUTPATIENT
Start: 2025-04-30

## 2025-04-30 RX ORDER — MONTELUKAST SODIUM 10 MG/1
10 TABLET ORAL ONCE
Status: COMPLETED | OUTPATIENT
Start: 2025-04-30 | End: 2025-04-30

## 2025-04-30 RX ORDER — SODIUM CHLORIDE 9 MG/ML
1000 INJECTION, SOLUTION INTRAVENOUS CONTINUOUS
Status: ACTIVE | OUTPATIENT
Start: 2025-04-30 | End: 2025-04-30

## 2025-04-30 RX ORDER — SODIUM CHLORIDE 0.9 % (FLUSH) 0.9 %
10 SYRINGE (ML) INJECTION AS NEEDED
Status: DISCONTINUED | OUTPATIENT
Start: 2025-04-30 | End: 2025-04-30 | Stop reason: HOSPADM

## 2025-04-30 RX ORDER — SODIUM CHLORIDE 0.9 % (FLUSH) 0.9 %
10 SYRINGE (ML) INJECTION AS NEEDED
OUTPATIENT
Start: 2025-04-30

## 2025-04-30 RX ORDER — DIPHENHYDRAMINE HYDROCHLORIDE 50 MG/ML
25 INJECTION, SOLUTION INTRAMUSCULAR; INTRAVENOUS ONCE
Status: COMPLETED | OUTPATIENT
Start: 2025-04-30 | End: 2025-04-30

## 2025-04-30 RX ORDER — FAMOTIDINE 10 MG/ML
20 INJECTION, SOLUTION INTRAVENOUS ONCE
Status: COMPLETED | OUTPATIENT
Start: 2025-04-30 | End: 2025-04-30

## 2025-04-30 RX ADMIN — HEPARIN 500 UNITS: 100 SYRINGE at 12:09

## 2025-04-30 RX ADMIN — DIPHENHYDRAMINE HYDROCHLORIDE 25 MG: 50 INJECTION, SOLUTION INTRAMUSCULAR; INTRAVENOUS at 09:37

## 2025-04-30 RX ADMIN — DEXAMETHASONE SODIUM PHOSPHATE 12 MG: 10 INJECTION, SOLUTION INTRAMUSCULAR; INTRAVENOUS at 09:38

## 2025-04-30 RX ADMIN — PACLITAXEL 140 MG: 6 INJECTION, SOLUTION INTRAVENOUS at 10:29

## 2025-04-30 RX ADMIN — SODIUM CHLORIDE 1000 ML: 9 INJECTION, SOLUTION INTRAVENOUS at 09:27

## 2025-04-30 RX ADMIN — Medication 10 ML: at 12:09

## 2025-04-30 RX ADMIN — MONTELUKAST 10 MG: 10 TABLET, FILM COATED ORAL at 09:34

## 2025-04-30 RX ADMIN — FAMOTIDINE 20 MG: 10 INJECTION INTRAVENOUS at 09:34

## 2025-04-30 NOTE — NURSING NOTE
Pt reports shortness of breath, dizziness, palpitations when standing up, sporadically. Pt can feel it coming on and will lay down to regulate. Pt reports this has been happening for 3 weeks, but generally was when her magnesium or potassium was low. Dr. Witt notified. Okay to treat new order for 1 liter ns, EKG, echo and holter monitor.

## 2025-04-30 NOTE — PROGRESS NOTES
April 30, 2025    Hello, may I speak with Suzanne Lin?    My name is Thaddeus      [facility] am  with [facility]  [address] [place] [id number] [phone].    Before we get started may I verify your date of birth? 1980    I am calling to officially welcome you to our practice and ask about your recent visit. Is this a good time to talk? yes    Tell me about your visit with us. What things went well?  everything went well.        We're always looking for ways to make our patients' experiences even better. Do you have recommendations on ways we may improve?  no    Overall were you satisfied with your first visit to our practice? yes       I appreciate you taking the time to speak with me today. Is there anything else I can do for you? no      Thank you, and have a great day.

## 2025-05-01 ENCOUNTER — HOSPITAL ENCOUNTER (OUTPATIENT)
Dept: CARDIOLOGY | Facility: HOSPITAL | Age: 45
Discharge: HOME | End: 2025-05-01
Admitting: INTERNAL MEDICINE
Payer: COMMERCIAL

## 2025-05-01 VITALS — WEIGHT: 139 LBS | HEIGHT: 66 IN | BODY MASS INDEX: 22.34 KG/M2

## 2025-05-01 DIAGNOSIS — Z79.899 HIGH RISK MEDICATION USE: ICD-10-CM

## 2025-05-01 DIAGNOSIS — R00.2 HEART PALPITATIONS: ICD-10-CM

## 2025-05-01 LAB
AORTIC DIMENSIONLESS INDEX: 0.91 (DI)
AV MEAN PRESS GRAD SYS DOP V1V2: 2 MMHG
AV VMAX SYS DOP: 103 CM/SEC
BH CV ECHO LEFT VENTRICLE GLOBAL LONGITUDINAL STRAIN: -17.2 %
BH CV ECHO MEAS - AO MAX PG: 4.2 MMHG
BH CV ECHO MEAS - AO ROOT DIAM: 2.9 CM
BH CV ECHO MEAS - AO V2 VTI: 19.5 CM
BH CV ECHO MEAS - AVA(I,D): 2.05 CM2
BH CV ECHO MEAS - EDV(CUBED): 91.1 ML
BH CV ECHO MEAS - EDV(MOD-SP2): 95 ML
BH CV ECHO MEAS - EDV(MOD-SP4): 75 ML
BH CV ECHO MEAS - EF(MOD-SP2): 54.7 %
BH CV ECHO MEAS - EF(MOD-SP4): 53.3 %
BH CV ECHO MEAS - ESV(CUBED): 56 ML
BH CV ECHO MEAS - ESV(MOD-SP2): 43 ML
BH CV ECHO MEAS - ESV(MOD-SP4): 35 ML
BH CV ECHO MEAS - FS: 15 %
BH CV ECHO MEAS - IVS/LVPW: 1.17 CM
BH CV ECHO MEAS - IVSD: 0.7 CM
BH CV ECHO MEAS - LAT PEAK E' VEL: 13.8 CM/SEC
BH CV ECHO MEAS - LV DIASTOLIC VOL/BSA (35-75): 43.8 CM2
BH CV ECHO MEAS - LV MASS(C)D: 87.1 GRAMS
BH CV ECHO MEAS - LV MAX PG: 3.2 MMHG
BH CV ECHO MEAS - LV MEAN PG: 2 MMHG
BH CV ECHO MEAS - LV SYSTOLIC VOL/BSA (12-30): 20.4 CM2
BH CV ECHO MEAS - LV V1 MAX: 89.6 CM/SEC
BH CV ECHO MEAS - LV V1 VTI: 17.7 CM
BH CV ECHO MEAS - LVIDD: 4.5 CM
BH CV ECHO MEAS - LVIDS: 3.8 CM
BH CV ECHO MEAS - LVOT AREA: 2.26 CM2
BH CV ECHO MEAS - LVOT DIAM: 1.7 CM
BH CV ECHO MEAS - LVPWD: 0.6 CM
BH CV ECHO MEAS - MED PEAK E' VEL: 9.7 CM/SEC
BH CV ECHO MEAS - MV A DUR: 0.1 SEC
BH CV ECHO MEAS - MV A MAX VEL: 37.7 CM/SEC
BH CV ECHO MEAS - MV DEC SLOPE: 458 CM/SEC2
BH CV ECHO MEAS - MV DEC TIME: 0.17 SEC
BH CV ECHO MEAS - MV E MAX VEL: 52.9 CM/SEC
BH CV ECHO MEAS - MV E/A: 1.4
BH CV ECHO MEAS - MV MAX PG: 2.9 MMHG
BH CV ECHO MEAS - MV MEAN PG: 1.36 MMHG
BH CV ECHO MEAS - MV P1/2T: 56.5 MSEC
BH CV ECHO MEAS - MV V2 VTI: 16.4 CM
BH CV ECHO MEAS - MVA(P1/2T): 3.9 CM2
BH CV ECHO MEAS - MVA(VTI): 2.44 CM2
BH CV ECHO MEAS - RAP SYSTOLE: 3 MMHG
BH CV ECHO MEAS - SV(LVOT): 40 ML
BH CV ECHO MEAS - SV(MOD-SP2): 52 ML
BH CV ECHO MEAS - SV(MOD-SP4): 40 ML
BH CV ECHO MEAS - SVI(LVOT): 23.3 ML/M2
BH CV ECHO MEAS - SVI(MOD-SP2): 30.3 ML/M2
BH CV ECHO MEAS - SVI(MOD-SP4): 23.3 ML/M2
BH CV ECHO MEAS - TAPSE (>1.6): 1.52 CM
BH CV ECHO MEASUREMENTS AVERAGE E/E' RATIO: 4.5
BH CV XLRA - RV BASE: 2.8 CM
BH CV XLRA - TDI S': 11.2 CM/SEC
LEFT ATRIUM VOLUME INDEX: 12.2 ML/M2
LV EF BIPLANE MOD: 52.3 %
QT INTERVAL: 401 MS
QTC INTERVAL: 455 MS

## 2025-05-01 PROCEDURE — 25510000001 PERFLUTREN 6.52 MG/ML SUSPENSION 2 ML VIAL: Performed by: INTERNAL MEDICINE

## 2025-05-01 PROCEDURE — 93306 TTE W/DOPPLER COMPLETE: CPT

## 2025-05-01 PROCEDURE — 93356 MYOCRD STRAIN IMG SPCKL TRCK: CPT

## 2025-05-01 RX ADMIN — SODIUM CHLORIDE 3 ML: 9 INJECTION INTRAMUSCULAR; INTRAVENOUS; SUBCUTANEOUS at 13:42

## 2025-05-02 ENCOUNTER — PATIENT OUTREACH (OUTPATIENT)
Dept: OTHER | Facility: HOSPITAL | Age: 45
End: 2025-05-02
Payer: COMMERCIAL

## 2025-05-05 LAB
CV ZIO BASELINE AVG BPM: 86
CV ZIO BASELINE BPM HIGH: 167
CV ZIO BASELINE BPM LOW: 61

## 2025-05-07 ENCOUNTER — INFUSION (OUTPATIENT)
Dept: ONCOLOGY | Facility: HOSPITAL | Age: 45
End: 2025-05-07
Payer: COMMERCIAL

## 2025-05-07 VITALS
DIASTOLIC BLOOD PRESSURE: 63 MMHG | BODY MASS INDEX: 23.4 KG/M2 | TEMPERATURE: 98.2 F | WEIGHT: 145 LBS | SYSTOLIC BLOOD PRESSURE: 94 MMHG | RESPIRATION RATE: 20 BRPM | HEART RATE: 79 BPM | OXYGEN SATURATION: 97 %

## 2025-05-07 DIAGNOSIS — Z79.69 NEED FOR PROPHYLACTIC CHEMOTHERAPY: ICD-10-CM

## 2025-05-07 DIAGNOSIS — C50.911 BREAST CANCER, STAGE 3, RIGHT: ICD-10-CM

## 2025-05-07 DIAGNOSIS — E83.42 HYPOMAGNESEMIA: Primary | ICD-10-CM

## 2025-05-07 DIAGNOSIS — Z45.2 ENCOUNTER FOR CENTRAL LINE CARE: ICD-10-CM

## 2025-05-07 LAB
ALBUMIN SERPL-MCNC: 3 G/DL (ref 3.5–5.2)
ALBUMIN/GLOB SERPL: 1.4 G/DL
ALP SERPL-CCNC: 108 U/L (ref 39–117)
ALT SERPL W P-5'-P-CCNC: 17 U/L (ref 1–33)
ANION GAP SERPL CALCULATED.3IONS-SCNC: 10.2 MMOL/L (ref 5–15)
AST SERPL-CCNC: 19 U/L (ref 1–32)
BASOPHILS # BLD AUTO: 0.06 10*3/MM3 (ref 0–0.2)
BASOPHILS NFR BLD AUTO: 1.1 % (ref 0–1.5)
BILIRUB SERPL-MCNC: 0.2 MG/DL (ref 0–1.2)
BUN SERPL-MCNC: 11 MG/DL (ref 6–20)
BUN/CREAT SERPL: 18 (ref 7–25)
CALCIUM SPEC-SCNC: 8.5 MG/DL (ref 8.6–10.5)
CHLORIDE SERPL-SCNC: 108 MMOL/L (ref 98–107)
CO2 SERPL-SCNC: 22.8 MMOL/L (ref 22–29)
CREAT SERPL-MCNC: 0.61 MG/DL (ref 0.57–1)
DEPRECATED RDW RBC AUTO: 73.7 FL (ref 37–54)
EGFRCR SERPLBLD CKD-EPI 2021: 112.5 ML/MIN/1.73
EOSINOPHIL # BLD AUTO: 0.09 10*3/MM3 (ref 0–0.4)
EOSINOPHIL NFR BLD AUTO: 1.7 % (ref 0.3–6.2)
ERYTHROCYTE [DISTWIDTH] IN BLOOD BY AUTOMATED COUNT: 20.2 % (ref 12.3–15.4)
GLOBULIN UR ELPH-MCNC: 2.2 GM/DL
GLUCOSE SERPL-MCNC: 96 MG/DL (ref 65–99)
HCT VFR BLD AUTO: 30.2 % (ref 34–46.6)
HGB BLD-MCNC: 10 G/DL (ref 12–15.9)
IMM GRANULOCYTES # BLD AUTO: 0.08 10*3/MM3 (ref 0–0.05)
IMM GRANULOCYTES NFR BLD AUTO: 1.5 % (ref 0–0.5)
LYMPHOCYTES # BLD AUTO: 0.94 10*3/MM3 (ref 0.7–3.1)
LYMPHOCYTES NFR BLD AUTO: 17.5 % (ref 19.6–45.3)
MCH RBC QN AUTO: 33 PG (ref 26.6–33)
MCHC RBC AUTO-ENTMCNC: 33.1 G/DL (ref 31.5–35.7)
MCV RBC AUTO: 99.7 FL (ref 79–97)
MONOCYTES # BLD AUTO: 0.3 10*3/MM3 (ref 0.1–0.9)
MONOCYTES NFR BLD AUTO: 5.6 % (ref 5–12)
NEUTROPHILS NFR BLD AUTO: 3.91 10*3/MM3 (ref 1.7–7)
NEUTROPHILS NFR BLD AUTO: 72.6 % (ref 42.7–76)
NRBC BLD AUTO-RTO: 0 /100 WBC (ref 0–0.2)
PLATELET # BLD AUTO: 316 10*3/MM3 (ref 140–450)
PMV BLD AUTO: 9.5 FL (ref 6–12)
POTASSIUM SERPL-SCNC: 3.5 MMOL/L (ref 3.5–5.2)
PROT SERPL-MCNC: 5.2 G/DL (ref 6–8.5)
RBC # BLD AUTO: 3.03 10*6/MM3 (ref 3.77–5.28)
SODIUM SERPL-SCNC: 141 MMOL/L (ref 136–145)
WBC NRBC COR # BLD AUTO: 5.38 10*3/MM3 (ref 3.4–10.8)

## 2025-05-07 PROCEDURE — 85025 COMPLETE CBC W/AUTO DIFF WBC: CPT | Performed by: INTERNAL MEDICINE

## 2025-05-07 PROCEDURE — 25810000003 SODIUM CHLORIDE 0.9 % SOLUTION 250 ML FLEX CONT: Performed by: INTERNAL MEDICINE

## 2025-05-07 PROCEDURE — 25010000002 DEXAMETHASONE SODIUM PHOSPHATE 100 MG/10ML SOLUTION: Performed by: INTERNAL MEDICINE

## 2025-05-07 PROCEDURE — 25010000002 DIPHENHYDRAMINE PER 50 MG: Performed by: INTERNAL MEDICINE

## 2025-05-07 PROCEDURE — 96375 TX/PRO/DX INJ NEW DRUG ADDON: CPT

## 2025-05-07 PROCEDURE — 80053 COMPREHEN METABOLIC PANEL: CPT | Performed by: INTERNAL MEDICINE

## 2025-05-07 PROCEDURE — 25010000002 HEPARIN LOCK FLUSH PER 10 UNITS: Performed by: INTERNAL MEDICINE

## 2025-05-07 PROCEDURE — 25010000002 FAMOTIDINE 10 MG/ML SOLUTION: Performed by: INTERNAL MEDICINE

## 2025-05-07 PROCEDURE — 25010000002 PACLITAXEL PER 1 MG: Performed by: INTERNAL MEDICINE

## 2025-05-07 PROCEDURE — 96413 CHEMO IV INFUSION 1 HR: CPT

## 2025-05-07 RX ORDER — DIPHENHYDRAMINE HYDROCHLORIDE 50 MG/ML
25 INJECTION, SOLUTION INTRAMUSCULAR; INTRAVENOUS ONCE
Status: COMPLETED | OUTPATIENT
Start: 2025-05-07 | End: 2025-05-07

## 2025-05-07 RX ORDER — SODIUM CHLORIDE 0.9 % (FLUSH) 0.9 %
10 SYRINGE (ML) INJECTION AS NEEDED
Status: DISCONTINUED | OUTPATIENT
Start: 2025-05-07 | End: 2025-05-07 | Stop reason: HOSPADM

## 2025-05-07 RX ORDER — HEPARIN SODIUM (PORCINE) LOCK FLUSH IV SOLN 100 UNIT/ML 100 UNIT/ML
500 SOLUTION INTRAVENOUS AS NEEDED
OUTPATIENT
Start: 2025-05-07

## 2025-05-07 RX ORDER — MONTELUKAST SODIUM 10 MG/1
10 TABLET ORAL ONCE
Status: COMPLETED | OUTPATIENT
Start: 2025-05-07 | End: 2025-05-07

## 2025-05-07 RX ORDER — FAMOTIDINE 10 MG/ML
20 INJECTION, SOLUTION INTRAVENOUS ONCE
Status: COMPLETED | OUTPATIENT
Start: 2025-05-07 | End: 2025-05-07

## 2025-05-07 RX ORDER — SODIUM CHLORIDE 9 MG/ML
20 INJECTION, SOLUTION INTRAVENOUS ONCE
Status: DISCONTINUED | OUTPATIENT
Start: 2025-05-07 | End: 2025-05-07 | Stop reason: HOSPADM

## 2025-05-07 RX ORDER — HEPARIN SODIUM (PORCINE) LOCK FLUSH IV SOLN 100 UNIT/ML 100 UNIT/ML
500 SOLUTION INTRAVENOUS AS NEEDED
Status: DISCONTINUED | OUTPATIENT
Start: 2025-05-07 | End: 2025-05-07 | Stop reason: HOSPADM

## 2025-05-07 RX ORDER — SODIUM CHLORIDE 0.9 % (FLUSH) 0.9 %
10 SYRINGE (ML) INJECTION AS NEEDED
OUTPATIENT
Start: 2025-05-07

## 2025-05-07 RX ADMIN — HEPARIN 500 UNITS: 100 SYRINGE at 12:01

## 2025-05-07 RX ADMIN — FAMOTIDINE 20 MG: 10 INJECTION INTRAVENOUS at 10:19

## 2025-05-07 RX ADMIN — Medication 10 ML: at 12:01

## 2025-05-07 RX ADMIN — DIPHENHYDRAMINE HYDROCHLORIDE 25 MG: 50 INJECTION, SOLUTION INTRAMUSCULAR; INTRAVENOUS at 10:18

## 2025-05-07 RX ADMIN — MONTELUKAST 10 MG: 10 TABLET, FILM COATED ORAL at 10:17

## 2025-05-07 RX ADMIN — PACLITAXEL 140 MG: 6 INJECTION, SOLUTION INTRAVENOUS at 10:58

## 2025-05-07 RX ADMIN — DEXAMETHASONE SODIUM PHOSPHATE 12 MG: 10 INJECTION, SOLUTION INTRAMUSCULAR; INTRAVENOUS at 10:21

## 2025-05-07 NOTE — PROGRESS NOTES
Subjective     REASON FOR CONSULTATION:  breast cancer  Provide an opinion on any further workup or treatment                             REQUESTING PHYSICIAN:  Jose    RECORDS OBTAINED:  Records of the patients history including those obtained from the referring provider were reviewed and summarized in detail.    HISTORY OF PRESENT ILLNESS:  The patient is a 45 y.o. year old female who is here for an opinion about the above issue.    History of Present Illness   The patient return today for follow-up and to continue Taxol component of her adjuvant chemotherapy.  She has so far completed AC x 4 cycles and 8 weeks of Taxol.    The patient is tolerating Taxol well with no significant nausea vomiting diarrhea.  She has no significant peripheral neuropathy.  She continues to have sinus congestion and rhinorrhea with occasional blood-tinged mucus.  She is not having fever.    ONCOLOGY HISTORY:  This is a 44-year-old woman referred from general surgery to discuss adjuvant treatment for recently surgically treated breast cancer.  The patient presented with a large palpable mass in the inner quadrant of the right breast.  The mass had been enlarging for couple years but the patient did not have insurance to get assessed.  The breast imaging is not available to me but she had a CT of the chest abdomen pelvis on 9/16/2024 showed a large mass in the right breast measuring up to 7.7 cm with abnormal left axillary lymphadenopathy.  There were tiny subpleural nodules in the lateral aspect of the left lower lobe likely granulomatous mildly conspicuous lymph node in the central mesentery 1.1 cm.  A PET scan was performed 9/30/2024 showing a hypermetabolic mass in the right breast with thickening throughout the right breast and pathologic hypermetabolic right axillary level 1 and 2 lymph nodes, no hypermetabolic internal mammary or supraclavicular lymph nodes.  The small pulmonary nodules were too small to characterize and  mesenteric lymph nodes without hypermetabolic activity.  A biopsy of the right breast mass showed invasive ductal adenocarcinoma.    She was taken to the operating room on 10/9/2024 and underwent a right modified radical mastectomy and axillary dissection, prophylactic left mastectomy.  Pathology from the right breast showed invasive ductal carcinoma [place] grade 3 (3+3+3) measuring 17 cm with extensive lymphovascular invasion and dermal lymphatic invasion.  The tumor extended to the dermis but did not ulcerate the skin.  Tumor extended to skeletal muscle and was present focally at an anterior margin, 0.05 mm of the posterior margin, 10 mm from the lateral margin, 20 mm from the medial margin, 28 mm from the superior margin and 40 mm from inferior margin.  There was ductal carcinoma in situ present within 2.5 mm of the posterior margin.  There were 13 lymph nodes in the axillary dissection 8 oncology plan/recommendations: of which had macrometastases, 1 micrometastases and 1 lymph node with isolated tumor cells.  The left breast had an intraductal papilloma otherwise negative.  The tumor was positive for estrogen receptor 99% of cells, progesterone receptor 50% of cells, HER2 0 and Ki-67 65%.    The patient has medical comorbidities of Crohn's disease.    Past Medical History:   Diagnosis Date    Anxiety     Breast cancer     Right    Crohn's disease     DVT (deep vein thrombosis) in pregnancy     PFO (patent foramen ovale)     Stroke     after the birth of her child at age 34    Tattoos         Past Surgical History:   Procedure Laterality Date     SECTION      COLON RESECTION      COLONOSCOPY      MASTECTOMY Bilateral 10/9/2024    Procedure: Modified radical mastectomy with axillary dissection of the right breast and simple mastectomy of the left breast;  Surgeon: Rebekah Garcia DO;  Location: [facility];  Service: General;  Laterality: Bilateral;    SKIN CANCER EXCISION      TEETH EXTRACTION  N/A 1/28/2025    Procedure: TOOTH EXTRACTION #2,15,19,28,29;  Surgeon: Pramod Zambrano DMD;  Location: [facility] MAIN OR;  Service: Oral Surgery;  Laterality: N/A;    VENOUS ACCESS DEVICE (PORT) INSERTION N/A 12/10/2024    Procedure: INSERTION VENOUS ACCESS DEVICE;  Surgeon: Rebekah Garcia DO;  Location:  [facility] OR;  Service: General;  Laterality: N/A;        Current Outpatient Medications on File Prior to Visit   Medication Sig Dispense Refill    aspirin 81 MG EC tablet Take 1 tablet by mouth Daily.      dicyclomine (BENTYL) 10 MG capsule Take 1 capsule by mouth 3 (Three) Times a Day As Needed for Abdominal Cramping. 90 capsule 2    gabapentin (Neurontin) 300 MG capsule Take 2 capsules by mouth Every Night. 60 capsule 2    lidocaine-prilocaine (EMLA) 2.5-2.5 % cream Apply 1 Application topically to the appropriate area as directed As Needed for Mild Pain. Apply to port site 30 minutes before access 15 g 3    omeprazole (priLOSEC) 20 MG capsule Take 1 capsule by mouth As Needed.      ondansetron (ZOFRAN) 8 MG tablet Take 1 tablet by mouth 3 (Three) Times a Day As Needed for Nausea or Vomiting. 30 tablet 5    ondansetron (ZOFRAN) 8 MG tablet Take 1 tablet by mouth 3 (Three) Times a Day As Needed for Nausea or Vomiting. 30 tablet 5    potassium chloride (Klor-Con M20) 20 MEQ CR tablet Take 2 tablets by mouth 2 (Two) Times a Day. 120 tablet 1    prochlorperazine (COMPAZINE) 10 MG tablet Take 1 tablet by mouth Every 6 (Six) Hours As Needed for Nausea or Vomiting. 60 tablet 1    venlafaxine XR (Effexor XR) 37.5 MG 24 hr capsule Take 1 capsule by mouth Take As Directed. 1 capsule po q am for two weeks followed by increase to 2 capsules daily 60 capsule 2    QUEtiapine (SEROquel) 25 MG tablet Take 1 tablet by mouth Every Night. (Patient not taking: Reported on 4/23/2025) 30 tablet 2     Current Facility-Administered Medications on File Prior to Visit   Medication Dose Route Frequency Provider Last Rate Last Admin     "heparin injection 500 Units  500 Units Intravenous PRN Nishant Witt MD   500 Units at 24 0916    heparin injection 500 Units  500 Units Intravenous PRN Nishant Witt MD   500 Units at 25 1049    sodium chloride 0.9 % flush 10 mL  10 mL Intravenous PRN Nishant Witt MD   10 mL at 24 0916    sodium chloride 0.9 % flush 10 mL  10 mL Intravenous PRN Nishant Witt MD   10 mL at 25 1048        ALLERGIES:  No Known Allergies     Social History     Socioeconomic History    Marital status: Single    Number of children: 1   Tobacco Use    Smoking status: Former     Current packs/day: 0.00     Average packs/day: 1 pack/day for 20.0 years (20.0 ttl pk-yrs)     Types: Cigarettes     Start date:      Quit date:      Years since quittin.3    Smokeless tobacco: Current    Tobacco comments:     Nicotine pouches   Vaping Use    Vaping status: Never Used   Substance and Sexual Activity    Alcohol use: Yes     Comment: OCC    Drug use: Never    Sexual activity: Defer        Family History   Problem Relation Age of Onset    Diabetes Mother     Heart disease Father     Diabetes Paternal Grandmother     Hypertension Other     Malig Hyperthermia Neg Hx         Review of Systems   Constitutional:  Positive for fatigue. Negative for fever.   HENT:  Positive for congestion and sinus pressure.    Respiratory: Negative.     Cardiovascular: Negative.    Gastrointestinal:  Negative for diarrhea and nausea.   Genitourinary: Negative.    Musculoskeletal: Negative.    Skin:  Positive for rash. Negative for wound.   Neurological: Negative.    Hematological: Negative.    Psychiatric/Behavioral:  Negative for dysphoric mood.           Objective     Vitals:    25 0908   BP: 108/74   Pulse: 73   Resp: 16   Temp: 98.4 °F (36.9 °C)   TempSrc: Infrared   SpO2: 99%   Weight: 65.4 kg (144 lb 3.2 oz)   Height: 167.6 cm (65.98\")   PainSc: 0-No pain             2025     9:08 AM   Current Status "   ECOG score 0       Physical Exam    CONSTITUTIONAL: pleasant well-developed adult woman  HEENT: no icterus, no thrush, moist membranes   LYMPH: no cervical or supraclavicular lad  CV: RRR, S1S2, no murmur  RESP/chest: cta bilat, no wheezing, no rales, port present left chest wall  Breast: Deferred  GI: soft, nontender, no splenomegaly, +BS  MUSC: no edema, normal gait  NEURO: alert and oriented x3, normal strength  PSYCH: normal mood and affect  Skin: No rash or induration    RECENT LABS:  Hematology WBC   Date Value Ref Range Status   05/14/2025 3.78 3.40 - 10.80 10*3/mm3 Final     RBC   Date Value Ref Range Status   05/14/2025 3.00 (L) 3.77 - 5.28 10*6/mm3 Final     Hemoglobin   Date Value Ref Range Status   05/14/2025 10.0 (L) 12.0 - 15.9 g/dL Final     Hematocrit   Date Value Ref Range Status   05/14/2025 30.1 (L) 34.0 - 46.6 % Final     Platelets   Date Value Ref Range Status   05/14/2025 265 140 - 450 10*3/mm3 Final        Lab Results   Component Value Date    GLUCOSE 116 (H) 05/14/2025    BUN 11 05/14/2025    CREATININE 0.55 (L) 05/14/2025     05/14/2025    K 3.0 (L) 05/14/2025     05/14/2025    CALCIUM 8.2 (L) 05/14/2025    PROTEINTOT 5.6 (L) 05/14/2025    ALBUMIN 2.9 (L) 05/14/2025    ALT 15 05/14/2025    AST 14 05/14/2025    ALKPHOS 133 (H) 05/14/2025    BILITOT 0.3 05/14/2025    GLOB 2.7 05/14/2025    AGRATIO 1.1 05/14/2025    BCR 20.0 05/14/2025    ANIONGAP 12.1 05/14/2025    EGFR 115.4 05/14/2025     PET scan 9/30/2024:  FINDINGS:     Head/neck: No suspicious uptake.     Chest: An 8 x 5.8 cm mass in the lower inner right breast with max SUV  of 18 (series 4/image 156). Mass comes in close proximity to the  sternum. Hypermetabolic parenchymal thickening throughout the right  breast with max SUV of 9 (series 4/image 140). Diffuse right breast skin  thickening.     Hypermetabolic right axillary level I and II lymph nodes. Reference 1.2  cm level I lymph node with max SUV of 11.9 (series  4/image 103).  Additional reference subcentimeter level II lymph node with max SUV of  3.3 (series 4/image 93). Separate brown fat uptake in the bilateral  axilla and posterior neck base.     No enlarged or hypermetabolic internal mammary or supraclavicular lymph  nodes.     Few subcentimeter pulmonary nodules are too small for accurate PET  characterization without overt hypermetabolism and unchanged from recent  CT. Reference left lower lobe (series 4/image 167) and right upper lobe  (series 4/image 131).     Abdomen/pelvis: Mesenteric lymph nodes are mostly subcentimeter without  associated hypermetabolism.     Sigmoid colectomy. Tubular hypermetabolism throughout the otherwise  normal-appearing remaining colon may be medication related. Moderate  proximal colonic stool burden.     Bones: No focal osseous hypermetabolism with special attention to the  sternum.           IMPRESSION:  1. Hypermetabolic 8 cm right breast mass with hypermetabolic parenchymal  thickening throughout the right breast, pathologically proven invasive  ductal carcinoma. Mass comes in close proximity to the sternum. No focal  osseous hypermetabolism with special attention to the sternum.  2. Hypermetabolic right axillary level I and II lymph nodes suggesting  alana metastatic disease. No enlarged or hypermetabolic internal mammary  or supraclavicular lymph nodes. Separate brown fat uptake in the  bilateral axilla and posterior neck base.  3. Few subcentimeter pulmonary nodules are too small for accurate PET  characterization and will need follow-up via chest CT.  4. Mesenteric lymph nodes are mostly subcentimeter without associated  hypermetabolism.       Assessment & Plan   *pT3N2a M0 grade 3 invasive ductal adenocarcinoma right breast, ER 99% positive, DE 50% positive, HER2 0, Ki-67 65%, tumor present at anterior margin  presented with a large palpable mass in the inner quadrant of the right breast; mass had been enlarging for couple  years but the patient did not have insurance to get assessed  CT of the chest abdomen pelvis on 9/16/2024 showed a large mass in the right breast measuring up to 7.7 cm with abnormal left axillary lymphadenopathy.  There were tiny subpleural nodules in the lateral aspect of the left lower lobe likely granulomatous mildly conspicuous lymph node in the central mesentery 1.1 cm.    PET scan 9/30/2024 - hypermetabolic mass in the right breast with thickening throughout the right breast and pathologic hypermetabolic right axillary level 1 and 2 lymph nodes, no hypermetabolic internal mammary or supraclavicular lymph nodes.  The small pulmonary nodules were too small to characterize and mesenteric lymph nodes without hypermetabolic activity.    biopsy of the right breast mass showed invasive ductal adenocarcinoma.  operating room on 10/9/2024 and underwent a right modified radical mastectomy and axillary dissection, prophylactic left mastectomy.  Pathology from the right breast showed invasive ductal carcinoma [place] grade 3 (3+3+3) measuring 17 cm with extensive lymphovascular invasion and dermal lymphatic invasion.  The tumor extended to the dermis but did not ulcerate the skin.  Tumor extended to skeletal muscle and was present focally at an anterior margin, 0.05 mm of the posterior margin, 10 mm from the lateral margin, 20 mm from the medial margin, 28 mm from the superior margin and 40 mm from inferior margin.  There was ductal carcinoma in situ present within 2.5 mm of the posterior margin.  There were 13 lymph nodes in the axillary dissection 8 of which had macrometastases, 1 micrometastases and 1 lymph node with isolated tumor cells.  The left breast had an intraductal papilloma otherwise negative.  The tumor was positive for estrogen receptor 99% of cells, progesterone receptor 50% of cells, HER2 0 and Ki-67 65%.  Initiated adjuvant chemotherapy with Adriamycin/Cytoxan 12/31/2024 (adjuvant chemotherapy  delayed because of wound healing issues)   3/18/2025-initiated weekly Taxol    *Myelosuppression secondary to chemotherapy   Blood counts adequate to proceed with Taxol today      * nausea/diarrhea/cramping, chemo induced compounded by Crohn's  responding to Imodium/Zofran/Compazine      *Hypokalemia on oral replacement-3.0 today    *[employer] 19 gene panel-VUS Bard 1    *medical comorbidities of Crohn's disease.    Oncology plan/recommendations:  Continue weekly Taxol with lab monitoring  Check magnesium level-continue oral potassium 20 mill equivalents twice daily  After chemotherapy she will need postmastectomy radiation given the size and alana involvement  She will need hormonal therapy with ovarian suppression/tamoxifen versus oophorectomy and AI therapy/ck4/6 inhibitor  Nonspecific lung nodules will need reassessment after completing chemotherapy radiographically  5.  MD visit plus lab and Taxol 3 weeks

## 2025-05-14 ENCOUNTER — OFFICE VISIT (OUTPATIENT)
Dept: ONCOLOGY | Facility: CLINIC | Age: 45
End: 2025-05-14
Payer: COMMERCIAL

## 2025-05-14 ENCOUNTER — INFUSION (OUTPATIENT)
Dept: ONCOLOGY | Facility: HOSPITAL | Age: 45
End: 2025-05-14
Payer: COMMERCIAL

## 2025-05-14 VITALS
RESPIRATION RATE: 16 BRPM | BODY MASS INDEX: 23.18 KG/M2 | TEMPERATURE: 98.4 F | WEIGHT: 144.2 LBS | DIASTOLIC BLOOD PRESSURE: 74 MMHG | HEART RATE: 73 BPM | OXYGEN SATURATION: 99 % | SYSTOLIC BLOOD PRESSURE: 108 MMHG | HEIGHT: 66 IN

## 2025-05-14 VITALS — SYSTOLIC BLOOD PRESSURE: 100 MMHG | HEART RATE: 94 BPM | DIASTOLIC BLOOD PRESSURE: 68 MMHG

## 2025-05-14 DIAGNOSIS — E83.42 HYPOMAGNESEMIA: Primary | ICD-10-CM

## 2025-05-14 DIAGNOSIS — E83.42 HYPOMAGNESEMIA: ICD-10-CM

## 2025-05-14 DIAGNOSIS — Z79.69 NEED FOR PROPHYLACTIC CHEMOTHERAPY: ICD-10-CM

## 2025-05-14 DIAGNOSIS — Z45.2 ENCOUNTER FOR CENTRAL LINE CARE: ICD-10-CM

## 2025-05-14 DIAGNOSIS — C50.911 BREAST CANCER, STAGE 3, RIGHT: ICD-10-CM

## 2025-05-14 DIAGNOSIS — E87.6 HYPOKALEMIA: ICD-10-CM

## 2025-05-14 LAB
ALBUMIN SERPL-MCNC: 2.9 G/DL (ref 3.5–5.2)
ALBUMIN/GLOB SERPL: 1.1 G/DL
ALP SERPL-CCNC: 133 U/L (ref 39–117)
ALT SERPL W P-5'-P-CCNC: 15 U/L (ref 1–33)
ANION GAP SERPL CALCULATED.3IONS-SCNC: 12.1 MMOL/L (ref 5–15)
AST SERPL-CCNC: 14 U/L (ref 1–32)
BASOPHILS # BLD AUTO: 0.05 10*3/MM3 (ref 0–0.2)
BASOPHILS NFR BLD AUTO: 1.3 % (ref 0–1.5)
BILIRUB SERPL-MCNC: 0.3 MG/DL (ref 0–1.2)
BUN SERPL-MCNC: 11 MG/DL (ref 6–20)
BUN/CREAT SERPL: 20 (ref 7–25)
CALCIUM SPEC-SCNC: 8.2 MG/DL (ref 8.6–10.5)
CHLORIDE SERPL-SCNC: 105 MMOL/L (ref 98–107)
CO2 SERPL-SCNC: 21.9 MMOL/L (ref 22–29)
CREAT SERPL-MCNC: 0.55 MG/DL (ref 0.57–1)
DEPRECATED RDW RBC AUTO: 69.1 FL (ref 37–54)
EGFRCR SERPLBLD CKD-EPI 2021: 115.4 ML/MIN/1.73
EOSINOPHIL # BLD AUTO: 0.09 10*3/MM3 (ref 0–0.4)
EOSINOPHIL NFR BLD AUTO: 2.4 % (ref 0.3–6.2)
ERYTHROCYTE [DISTWIDTH] IN BLOOD BY AUTOMATED COUNT: 18.8 % (ref 12.3–15.4)
GLOBULIN UR ELPH-MCNC: 2.7 GM/DL
GLUCOSE SERPL-MCNC: 116 MG/DL (ref 65–99)
HCT VFR BLD AUTO: 30.1 % (ref 34–46.6)
HGB BLD-MCNC: 10 G/DL (ref 12–15.9)
IMM GRANULOCYTES # BLD AUTO: 0.06 10*3/MM3 (ref 0–0.05)
IMM GRANULOCYTES NFR BLD AUTO: 1.6 % (ref 0–0.5)
LYMPHOCYTES # BLD AUTO: 0.9 10*3/MM3 (ref 0.7–3.1)
LYMPHOCYTES NFR BLD AUTO: 23.8 % (ref 19.6–45.3)
MAGNESIUM SERPL-MCNC: 1.3 MG/DL (ref 1.6–2.6)
MCH RBC QN AUTO: 33.3 PG (ref 26.6–33)
MCHC RBC AUTO-ENTMCNC: 33.2 G/DL (ref 31.5–35.7)
MCV RBC AUTO: 100.3 FL (ref 79–97)
MONOCYTES # BLD AUTO: 0.26 10*3/MM3 (ref 0.1–0.9)
MONOCYTES NFR BLD AUTO: 6.9 % (ref 5–12)
NEUTROPHILS NFR BLD AUTO: 2.42 10*3/MM3 (ref 1.7–7)
NEUTROPHILS NFR BLD AUTO: 64 % (ref 42.7–76)
NRBC BLD AUTO-RTO: 0 /100 WBC (ref 0–0.2)
PLATELET # BLD AUTO: 265 10*3/MM3 (ref 140–450)
PMV BLD AUTO: 9.7 FL (ref 6–12)
POTASSIUM SERPL-SCNC: 3 MMOL/L (ref 3.5–5.2)
PROT SERPL-MCNC: 5.6 G/DL (ref 6–8.5)
RBC # BLD AUTO: 3 10*6/MM3 (ref 3.77–5.28)
SODIUM SERPL-SCNC: 139 MMOL/L (ref 136–145)
WBC NRBC COR # BLD AUTO: 3.78 10*3/MM3 (ref 3.4–10.8)

## 2025-05-14 PROCEDURE — 25810000003 SODIUM CHLORIDE 0.9 % SOLUTION 250 ML FLEX CONT: Performed by: INTERNAL MEDICINE

## 2025-05-14 PROCEDURE — 25010000002 FAMOTIDINE 10 MG/ML SOLUTION: Performed by: INTERNAL MEDICINE

## 2025-05-14 PROCEDURE — 25810000003 SODIUM CHLORIDE 0.9 % SOLUTION: Performed by: INTERNAL MEDICINE

## 2025-05-14 PROCEDURE — 25010000002 PACLITAXEL PER 1 MG: Performed by: INTERNAL MEDICINE

## 2025-05-14 PROCEDURE — 99214 OFFICE O/P EST MOD 30 MIN: CPT | Performed by: INTERNAL MEDICINE

## 2025-05-14 PROCEDURE — 25010000002 HEPARIN LOCK FLUSH PER 10 UNITS: Performed by: INTERNAL MEDICINE

## 2025-05-14 PROCEDURE — 25010000002 DEXAMETHASONE SODIUM PHOSPHATE 100 MG/10ML SOLUTION: Performed by: INTERNAL MEDICINE

## 2025-05-14 PROCEDURE — 25010000002 MAGNESIUM SULFATE IN D5W 1G/100ML (PREMIX) 1-5 GM/100ML-% SOLUTION: Performed by: INTERNAL MEDICINE

## 2025-05-14 PROCEDURE — 96368 THER/DIAG CONCURRENT INF: CPT

## 2025-05-14 PROCEDURE — 96375 TX/PRO/DX INJ NEW DRUG ADDON: CPT

## 2025-05-14 PROCEDURE — 83735 ASSAY OF MAGNESIUM: CPT | Performed by: INTERNAL MEDICINE

## 2025-05-14 PROCEDURE — 25010000002 DIPHENHYDRAMINE PER 50 MG: Performed by: INTERNAL MEDICINE

## 2025-05-14 PROCEDURE — 85025 COMPLETE CBC W/AUTO DIFF WBC: CPT | Performed by: INTERNAL MEDICINE

## 2025-05-14 PROCEDURE — 96413 CHEMO IV INFUSION 1 HR: CPT

## 2025-05-14 PROCEDURE — 80053 COMPREHEN METABOLIC PANEL: CPT | Performed by: INTERNAL MEDICINE

## 2025-05-14 RX ORDER — FAMOTIDINE 10 MG/ML
20 INJECTION, SOLUTION INTRAVENOUS AS NEEDED
OUTPATIENT
Start: 2025-05-21

## 2025-05-14 RX ORDER — SODIUM CHLORIDE 0.9 % (FLUSH) 0.9 %
10 SYRINGE (ML) INJECTION AS NEEDED
OUTPATIENT
Start: 2025-05-14

## 2025-05-14 RX ORDER — FAMOTIDINE 10 MG/ML
20 INJECTION, SOLUTION INTRAVENOUS ONCE
Status: CANCELLED | OUTPATIENT
Start: 2025-05-14

## 2025-05-14 RX ORDER — POTASSIUM CHLORIDE 750 MG/1
30 TABLET, EXTENDED RELEASE ORAL 2 TIMES DAILY
Qty: 180 TABLET | Refills: 4 | Status: SHIPPED | OUTPATIENT
Start: 2025-05-14

## 2025-05-14 RX ORDER — SODIUM CHLORIDE 9 MG/ML
20 INJECTION, SOLUTION INTRAVENOUS ONCE
OUTPATIENT
Start: 2025-05-21

## 2025-05-14 RX ORDER — MAGNESIUM SULFATE 1 G/100ML
3 INJECTION INTRAVENOUS ONCE
Status: COMPLETED | OUTPATIENT
Start: 2025-05-14 | End: 2025-05-14

## 2025-05-14 RX ORDER — HEPARIN SODIUM (PORCINE) LOCK FLUSH IV SOLN 100 UNIT/ML 100 UNIT/ML
500 SOLUTION INTRAVENOUS AS NEEDED
Status: DISCONTINUED | OUTPATIENT
Start: 2025-05-14 | End: 2025-05-14 | Stop reason: HOSPADM

## 2025-05-14 RX ORDER — SODIUM CHLORIDE 9 MG/ML
20 INJECTION, SOLUTION INTRAVENOUS ONCE
Status: COMPLETED | OUTPATIENT
Start: 2025-05-14 | End: 2025-05-14

## 2025-05-14 RX ORDER — DIPHENHYDRAMINE HYDROCHLORIDE 50 MG/ML
50 INJECTION, SOLUTION INTRAMUSCULAR; INTRAVENOUS AS NEEDED
Status: CANCELLED | OUTPATIENT
Start: 2025-05-14

## 2025-05-14 RX ORDER — FAMOTIDINE 10 MG/ML
20 INJECTION, SOLUTION INTRAVENOUS ONCE
OUTPATIENT
Start: 2025-05-21

## 2025-05-14 RX ORDER — DIPHENHYDRAMINE HYDROCHLORIDE 50 MG/ML
50 INJECTION, SOLUTION INTRAMUSCULAR; INTRAVENOUS AS NEEDED
OUTPATIENT
Start: 2025-05-28

## 2025-05-14 RX ORDER — MONTELUKAST SODIUM 10 MG/1
10 TABLET ORAL ONCE
Status: CANCELLED
Start: 2025-05-14 | End: 2025-05-14

## 2025-05-14 RX ORDER — HYDROCORTISONE SODIUM SUCCINATE 100 MG/2ML
100 INJECTION INTRAMUSCULAR; INTRAVENOUS AS NEEDED
Status: CANCELLED | OUTPATIENT
Start: 2025-05-14

## 2025-05-14 RX ORDER — MONTELUKAST SODIUM 10 MG/1
10 TABLET ORAL ONCE
Start: 2025-05-28 | End: 2025-05-28

## 2025-05-14 RX ORDER — SODIUM CHLORIDE 9 MG/ML
20 INJECTION, SOLUTION INTRAVENOUS ONCE
Status: CANCELLED | OUTPATIENT
Start: 2025-05-14

## 2025-05-14 RX ORDER — MONTELUKAST SODIUM 10 MG/1
10 TABLET ORAL ONCE
Status: COMPLETED | OUTPATIENT
Start: 2025-05-14 | End: 2025-05-14

## 2025-05-14 RX ORDER — HYDROCORTISONE SODIUM SUCCINATE 100 MG/2ML
100 INJECTION INTRAMUSCULAR; INTRAVENOUS AS NEEDED
OUTPATIENT
Start: 2025-05-28

## 2025-05-14 RX ORDER — FAMOTIDINE 10 MG/ML
20 INJECTION, SOLUTION INTRAVENOUS AS NEEDED
OUTPATIENT
Start: 2025-05-28

## 2025-05-14 RX ORDER — FAMOTIDINE 10 MG/ML
20 INJECTION, SOLUTION INTRAVENOUS ONCE
OUTPATIENT
Start: 2025-05-28

## 2025-05-14 RX ORDER — HEPARIN SODIUM (PORCINE) LOCK FLUSH IV SOLN 100 UNIT/ML 100 UNIT/ML
500 SOLUTION INTRAVENOUS AS NEEDED
OUTPATIENT
Start: 2025-05-14

## 2025-05-14 RX ORDER — FAMOTIDINE 10 MG/ML
20 INJECTION, SOLUTION INTRAVENOUS AS NEEDED
Status: CANCELLED | OUTPATIENT
Start: 2025-05-14

## 2025-05-14 RX ORDER — DIPHENHYDRAMINE HYDROCHLORIDE 50 MG/ML
50 INJECTION, SOLUTION INTRAMUSCULAR; INTRAVENOUS AS NEEDED
OUTPATIENT
Start: 2025-05-21

## 2025-05-14 RX ORDER — SODIUM CHLORIDE 9 MG/ML
20 INJECTION, SOLUTION INTRAVENOUS ONCE
OUTPATIENT
Start: 2025-05-28

## 2025-05-14 RX ORDER — DIPHENHYDRAMINE HYDROCHLORIDE 50 MG/ML
25 INJECTION, SOLUTION INTRAMUSCULAR; INTRAVENOUS ONCE
Status: COMPLETED | OUTPATIENT
Start: 2025-05-14 | End: 2025-05-14

## 2025-05-14 RX ORDER — MONTELUKAST SODIUM 10 MG/1
10 TABLET ORAL ONCE
Start: 2025-05-21 | End: 2025-05-21

## 2025-05-14 RX ORDER — HYDROCORTISONE SODIUM SUCCINATE 100 MG/2ML
100 INJECTION INTRAMUSCULAR; INTRAVENOUS AS NEEDED
OUTPATIENT
Start: 2025-05-21

## 2025-05-14 RX ORDER — SODIUM CHLORIDE 0.9 % (FLUSH) 0.9 %
10 SYRINGE (ML) INJECTION AS NEEDED
Status: DISCONTINUED | OUTPATIENT
Start: 2025-05-14 | End: 2025-05-14 | Stop reason: HOSPADM

## 2025-05-14 RX ORDER — FAMOTIDINE 10 MG/ML
20 INJECTION, SOLUTION INTRAVENOUS ONCE
Status: COMPLETED | OUTPATIENT
Start: 2025-05-14 | End: 2025-05-14

## 2025-05-14 RX ADMIN — DEXAMETHASONE SODIUM PHOSPHATE 12 MG: 10 INJECTION, SOLUTION INTRAMUSCULAR; INTRAVENOUS at 10:44

## 2025-05-14 RX ADMIN — Medication 10 ML: at 13:49

## 2025-05-14 RX ADMIN — PACLITAXEL 140 MG: 6 INJECTION, SOLUTION INTRAVENOUS at 11:40

## 2025-05-14 RX ADMIN — SODIUM CHLORIDE 20 ML/HR: 9 INJECTION, SOLUTION INTRAVENOUS at 10:43

## 2025-05-14 RX ADMIN — MAGNESIUM SULFATE HEPTAHYDRATE 3 G: 10 INJECTION, SOLUTION INTRAVENOUS at 11:06

## 2025-05-14 RX ADMIN — FAMOTIDINE 20 MG: 10 INJECTION INTRAVENOUS at 10:44

## 2025-05-14 RX ADMIN — HEPARIN 500 UNITS: 100 SYRINGE at 13:49

## 2025-05-14 RX ADMIN — DIPHENHYDRAMINE HYDROCHLORIDE 25 MG: 50 INJECTION, SOLUTION INTRAMUSCULAR; INTRAVENOUS at 10:44

## 2025-05-14 RX ADMIN — MONTELUKAST 10 MG: 10 TABLET, FILM COATED ORAL at 10:44

## 2025-05-15 ENCOUNTER — TELEMEDICINE (OUTPATIENT)
Dept: PSYCHIATRY | Facility: HOSPITAL | Age: 45
End: 2025-05-15
Payer: COMMERCIAL

## 2025-05-15 DIAGNOSIS — C50.911 BREAST CANCER, STAGE 3, RIGHT: ICD-10-CM

## 2025-05-15 DIAGNOSIS — F43.23 ADJUSTMENT DISORDER WITH MIXED ANXIETY AND DEPRESSED MOOD: Primary | ICD-10-CM

## 2025-05-15 DIAGNOSIS — F51.01 PRIMARY INSOMNIA: ICD-10-CM

## 2025-05-15 NOTE — Clinical Note
Me again :) sorry about that! Wanted to see if you would be comfortable filling out form or whatever so Suzanne could get a handicap pass? Sounds like stamina is really low, and she could use it. I told her I'd try to get the right people involved to have what she needed when she came in for treatment next week. Can you help? Thank you! Nilam

## 2025-05-15 NOTE — PROGRESS NOTES
Subjective     REASON FOR CONSULTATION:  breast cancer  Provide an opinion on any further workup or treatment                             REQUESTING PHYSICIAN:  Jose    RECORDS OBTAINED:  Records of the patients history including those obtained from the referring provider were reviewed and summarized in detail.    HISTORY OF PRESENT ILLNESS:  The patient is a 45 y.o. year old female who is here for an opinion about the above issue.    History of Present Illness   The patient return today for follow-up and to continue Taxol component of her adjuvant chemotherapy.  She has so far completed AC x 4 cycles and 8 weeks of Taxol.    The patient is tolerating Taxol well with no significant nausea vomiting diarrhea.  She has no significant peripheral neuropathy.  She continues to have sinus congestion and rhinorrhea with occasional blood-tinged mucus.  She is not having fever.    ONCOLOGY HISTORY:  This is a 44-year-old woman referred from general surgery to discuss adjuvant treatment for recently surgically treated breast cancer.  The patient presented with a large palpable mass in the inner quadrant of the right breast.  The mass had been enlarging for couple years but the patient did not have insurance to get assessed.  The breast imaging is not available to me but she had a CT of the chest abdomen pelvis on 9/16/2024 showed a large mass in the right breast measuring up to 7.7 cm with abnormal left axillary lymphadenopathy.  There were tiny subpleural nodules in the lateral aspect of the left lower lobe likely granulomatous mildly conspicuous lymph node in the central mesentery 1.1 cm.  A PET scan was performed 9/30/2024 showing a hypermetabolic mass in the right breast with thickening throughout the right breast and pathologic hypermetabolic right axillary level 1 and 2 lymph nodes, no hypermetabolic internal mammary or supraclavicular lymph nodes.  The small pulmonary nodules were too small to characterize and  mesenteric lymph nodes without hypermetabolic activity.  A biopsy of the right breast mass showed invasive ductal adenocarcinoma.    She was taken to the operating room on 10/9/2024 and underwent a right modified radical mastectomy and axillary dissection, prophylactic left mastectomy.  Pathology from the right breast showed invasive ductal carcinoma [place] grade 3 (3+3+3) measuring 17 cm with extensive lymphovascular invasion and dermal lymphatic invasion.  The tumor extended to the dermis but did not ulcerate the skin.  Tumor extended to skeletal muscle and was present focally at an anterior margin, 0.05 mm of the posterior margin, 10 mm from the lateral margin, 20 mm from the medial margin, 28 mm from the superior margin and 40 mm from inferior margin.  There was ductal carcinoma in situ present within 2.5 mm of the posterior margin.  There were 13 lymph nodes in the axillary dissection 8 oncology plan/recommendations: of which had macrometastases, 1 micrometastases and 1 lymph node with isolated tumor cells.  The left breast had an intraductal papilloma otherwise negative.  The tumor was positive for estrogen receptor 99% of cells, progesterone receptor 50% of cells, HER2 0 and Ki-67 65%.    The patient has medical comorbidities of Crohn's disease.    Past Medical History:   Diagnosis Date    Anxiety     Breast cancer     Right    Crohn's disease     DVT (deep vein thrombosis) in pregnancy     PFO (patent foramen ovale)     Stroke     after the birth of her child at age 34    Tattoos         Past Surgical History:   Procedure Laterality Date     SECTION      COLON RESECTION      COLONOSCOPY      MASTECTOMY Bilateral 10/9/2024    Procedure: Modified radical mastectomy with axillary dissection of the right breast and simple mastectomy of the left breast;  Surgeon: Rebekah Garcia DO;  Location: [facility];  Service: General;  Laterality: Bilateral;    SKIN CANCER EXCISION      TEETH EXTRACTION  N/A 1/28/2025    Procedure: TOOTH EXTRACTION #2,15,19,28,29;  Surgeon: Pramod Zambrano DMD;  Location: [facility] MAIN OR;  Service: Oral Surgery;  Laterality: N/A;    VENOUS ACCESS DEVICE (PORT) INSERTION N/A 12/10/2024    Procedure: INSERTION VENOUS ACCESS DEVICE;  Surgeon: Rebekah Garcia DO;  Location:  [facility] OR;  Service: General;  Laterality: N/A;        Current Outpatient Medications on File Prior to Visit   Medication Sig Dispense Refill    aspirin 81 MG EC tablet Take 1 tablet by mouth Daily.      dicyclomine (BENTYL) 10 MG capsule Take 1 capsule by mouth 3 (Three) Times a Day As Needed for Abdominal Cramping. 90 capsule 2    gabapentin (Neurontin) 300 MG capsule Take 2 capsules by mouth Every Night. 60 capsule 2    lidocaine-prilocaine (EMLA) 2.5-2.5 % cream Apply 1 Application topically to the appropriate area as directed As Needed for Mild Pain. Apply to port site 30 minutes before access 15 g 3    omeprazole (priLOSEC) 20 MG capsule Take 1 capsule by mouth As Needed.      ondansetron (ZOFRAN) 8 MG tablet Take 1 tablet by mouth 3 (Three) Times a Day As Needed for Nausea or Vomiting. 30 tablet 5    ondansetron (ZOFRAN) 8 MG tablet Take 1 tablet by mouth 3 (Three) Times a Day As Needed for Nausea or Vomiting. 30 tablet 5    potassium chloride (KLOR-CON M10) 10 MEQ CR tablet Take 3 tablets by mouth 2 (Two) Times a Day. 180 tablet 4    prochlorperazine (COMPAZINE) 10 MG tablet Take 1 tablet by mouth Every 6 (Six) Hours As Needed for Nausea or Vomiting. 60 tablet 1    QUEtiapine (SEROquel) 25 MG tablet Take 1 tablet by mouth Every Night. (Patient not taking: Reported on 4/23/2025) 30 tablet 2    venlafaxine XR (Effexor XR) 37.5 MG 24 hr capsule Take 1 capsule by mouth Take As Directed. 1 capsule po q am for two weeks followed by increase to 2 capsules daily 60 capsule 2     Current Facility-Administered Medications on File Prior to Visit   Medication Dose Route Frequency Provider Last Rate Last Admin     heparin injection 500 Units  500 Units Intravenous PRN Nishant Witt MD   500 Units at 24 0916    heparin injection 500 Units  500 Units Intravenous PRN Nishant Witt MD   500 Units at 25 1049    sodium chloride 0.9 % flush 10 mL  10 mL Intravenous PRN Nishant Witt MD   10 mL at 24 0916    sodium chloride 0.9 % flush 10 mL  10 mL Intravenous PRN Nishant Witt MD   10 mL at 25 1048    [DISCONTINUED] heparin injection 500 Units  500 Units Intravenous PRN Nishant Witt MD   500 Units at 25 1349    [DISCONTINUED] sodium chloride 0.9 % flush 10 mL  10 mL Intravenous PRN Nishant Witt MD   10 mL at 25 1349        ALLERGIES:  No Known Allergies     Social History     Socioeconomic History    Marital status: Single    Number of children: 1   Tobacco Use    Smoking status: Former     Current packs/day: 0.00     Average packs/day: 1 pack/day for 20.0 years (20.0 ttl pk-yrs)     Types: Cigarettes     Start date:      Quit date: 2019     Years since quittin.3    Smokeless tobacco: Current    Tobacco comments:     Nicotine pouches   Vaping Use    Vaping status: Never Used   Substance and Sexual Activity    Alcohol use: Yes     Comment: OCC    Drug use: Never    Sexual activity: Defer        Family History   Problem Relation Age of Onset    Diabetes Mother     Heart disease Father     Diabetes Paternal Grandmother     Hypertension Other     Malig Hyperthermia Neg Hx         Review of Systems   Constitutional:  Positive for fatigue. Negative for fever.   HENT:  Positive for congestion and sinus pressure.    Respiratory: Negative.     Cardiovascular: Negative.    Gastrointestinal:  Negative for diarrhea and nausea.   Genitourinary: Negative.    Musculoskeletal: Negative.    Skin:  Positive for rash. Negative for wound.   Neurological: Negative.    Hematological: Negative.    Psychiatric/Behavioral:  Negative for dysphoric mood.           Objective     There were  no vitals filed for this visit.            5/14/2025     9:08 AM   Current Status   ECOG score 0       Physical Exam    CONSTITUTIONAL: pleasant well-developed adult woman  HEENT: no icterus, no thrush, moist membranes   LYMPH: no cervical or supraclavicular lad  CV: RRR, S1S2, no murmur  RESP/chest: cta bilat, no wheezing, no rales, port present left chest wall  Breast: Deferred  GI: soft, nontender, no splenomegaly, +BS  MUSC: no edema, normal gait  NEURO: alert and oriented x3, normal strength  PSYCH: normal mood and affect  Skin: No rash or induration    RECENT LABS:  Hematology WBC   Date Value Ref Range Status   05/14/2025 3.78 3.40 - 10.80 10*3/mm3 Final     RBC   Date Value Ref Range Status   05/14/2025 3.00 (L) 3.77 - 5.28 10*6/mm3 Final     Hemoglobin   Date Value Ref Range Status   05/14/2025 10.0 (L) 12.0 - 15.9 g/dL Final     Hematocrit   Date Value Ref Range Status   05/14/2025 30.1 (L) 34.0 - 46.6 % Final     Platelets   Date Value Ref Range Status   05/14/2025 265 140 - 450 10*3/mm3 Final        Lab Results   Component Value Date    GLUCOSE 116 (H) 05/14/2025    BUN 11 05/14/2025    CREATININE 0.55 (L) 05/14/2025     05/14/2025    K 3.0 (L) 05/14/2025     05/14/2025    CALCIUM 8.2 (L) 05/14/2025    PROTEINTOT 5.6 (L) 05/14/2025    ALBUMIN 2.9 (L) 05/14/2025    ALT 15 05/14/2025    AST 14 05/14/2025    ALKPHOS 133 (H) 05/14/2025    BILITOT 0.3 05/14/2025    GLOB 2.7 05/14/2025    AGRATIO 1.1 05/14/2025    BCR 20.0 05/14/2025    ANIONGAP 12.1 05/14/2025    EGFR 115.4 05/14/2025     PET scan 9/30/2024:  FINDINGS:     Head/neck: No suspicious uptake.     Chest: An 8 x 5.8 cm mass in the lower inner right breast with max SUV  of 18 (series 4/image 156). Mass comes in close proximity to the  sternum. Hypermetabolic parenchymal thickening throughout the right  breast with max SUV of 9 (series 4/image 140). Diffuse right breast skin  thickening.     Hypermetabolic right axillary level I and II  lymph nodes. Reference 1.2  cm level I lymph node with max SUV of 11.9 (series 4/image 103).  Additional reference subcentimeter level II lymph node with max SUV of  3.3 (series 4/image 93). Separate brown fat uptake in the bilateral  axilla and posterior neck base.     No enlarged or hypermetabolic internal mammary or supraclavicular lymph  nodes.     Few subcentimeter pulmonary nodules are too small for accurate PET  characterization without overt hypermetabolism and unchanged from recent  CT. Reference left lower lobe (series 4/image 167) and right upper lobe  (series 4/image 131).     Abdomen/pelvis: Mesenteric lymph nodes are mostly subcentimeter without  associated hypermetabolism.     Sigmoid colectomy. Tubular hypermetabolism throughout the otherwise  normal-appearing remaining colon may be medication related. Moderate  proximal colonic stool burden.     Bones: No focal osseous hypermetabolism with special attention to the  sternum.           IMPRESSION:  1. Hypermetabolic 8 cm right breast mass with hypermetabolic parenchymal  thickening throughout the right breast, pathologically proven invasive  ductal carcinoma. Mass comes in close proximity to the sternum. No focal  osseous hypermetabolism with special attention to the sternum.  2. Hypermetabolic right axillary level I and II lymph nodes suggesting  alana metastatic disease. No enlarged or hypermetabolic internal mammary  or supraclavicular lymph nodes. Separate brown fat uptake in the  bilateral axilla and posterior neck base.  3. Few subcentimeter pulmonary nodules are too small for accurate PET  characterization and will need follow-up via chest CT.  4. Mesenteric lymph nodes are mostly subcentimeter without associated  hypermetabolism.       Assessment & Plan   *pT3N2a M0 grade 3 invasive ductal adenocarcinoma right breast, ER 99% positive, IA 50% positive, HER2 0, Ki-67 65%, tumor present at anterior margin  presented with a large palpable mass in  the inner quadrant of the right breast; mass had been enlarging for couple years but the patient did not have insurance to get assessed  CT of the chest abdomen pelvis on 9/16/2024 showed a large mass in the right breast measuring up to 7.7 cm with abnormal left axillary lymphadenopathy.  There were tiny subpleural nodules in the lateral aspect of the left lower lobe likely granulomatous mildly conspicuous lymph node in the central mesentery 1.1 cm.    PET scan 9/30/2024 - hypermetabolic mass in the right breast with thickening throughout the right breast and pathologic hypermetabolic right axillary level 1 and 2 lymph nodes, no hypermetabolic internal mammary or supraclavicular lymph nodes.  The small pulmonary nodules were too small to characterize and mesenteric lymph nodes without hypermetabolic activity.    biopsy of the right breast mass showed invasive ductal adenocarcinoma.  operating room on 10/9/2024 and underwent a right modified radical mastectomy and axillary dissection, prophylactic left mastectomy.  Pathology from the right breast showed invasive ductal carcinoma [place] grade 3 (3+3+3) measuring 17 cm with extensive lymphovascular invasion and dermal lymphatic invasion.  The tumor extended to the dermis but did not ulcerate the skin.  Tumor extended to skeletal muscle and was present focally at an anterior margin, 0.05 mm of the posterior margin, 10 mm from the lateral margin, 20 mm from the medial margin, 28 mm from the superior margin and 40 mm from inferior margin.  There was ductal carcinoma in situ present within 2.5 mm of the posterior margin.  There were 13 lymph nodes in the axillary dissection 8 of which had macrometastases, 1 micrometastases and 1 lymph node with isolated tumor cells.  The left breast had an intraductal papilloma otherwise negative.  The tumor was positive for estrogen receptor 99% of cells, progesterone receptor 50% of cells, HER2 0 and Ki-67 65%.  Initiated adjuvant  chemotherapy with Adriamycin/Cytoxan 12/31/2024 (adjuvant chemotherapy delayed because of wound healing issues)   3/18/2025-initiated weekly Taxol    *Myelosuppression secondary to chemotherapy   Blood counts adequate to proceed with Taxol today      * nausea/diarrhea/cramping, chemo induced compounded by Crohn's  responding to Imodium/Zofran/Compazine      *Hypokalemia on oral replacement-3.0 today    *[employer] 19 gene panel-VUS Bard 1    *medical comorbidities of Crohn's disease.    Oncology plan/recommendations:  Continue weekly Taxol with lab monitoring  Check magnesium level-continue oral potassium 20 mill equivalents twice daily  After chemotherapy she will need postmastectomy radiation given the size and alana involvement  She will need hormonal therapy with ovarian suppression/tamoxifen versus oophorectomy and AI therapy/ck4/6 inhibitor  Nonspecific lung nodules will need reassessment after completing chemotherapy radiographically  5.  MD visit plus lab and Taxol 3 weeks

## 2025-05-15 NOTE — PROGRESS NOTES
Behavioral Oncology Services  [facility]  You have chosen to receive care through a telehealth visit.  Do you consent to use a video/audio connection for your medical care today? YES  Telehealth provided in patient's home.  Location of Provider: [place], [place]     Subjective  Patient ID: Suzanne Lin is a 45 y.o. female is seen via telehealth in the Behavioral Oncology Clinic.    CC: Fatigue    HPI/ Interval History:   Pt is seen in follow up regarding insomnia associated with continued chemo. Continues to tolerate taxol well, appreciating chemo days due to higher energy with steroids. Otherwise reports significant reduction in stamina, intrusive fatigue. Has had echo  `11and EKG with review of electrolytes and other systems to assist with cause regarding this. Denies this being an acute change, although does feel this has become gradually more intrusive. Reports recent trip to [facility], becoming short of air with need to sit down to catch breath. Describes this as feeling like airway was closing. Denies associated anxiety, more often occurring with exertion.     Sleep remains acceptable. Reports ongoing use of gabapentin, occasional benadryl. Denies perception of adverse sx. Does not feel any sx of fatigue correlate with current medication strategy. Denies ever taking seroquel, and thus this is not contributing. Denies any nausea, vomitting, or other intrusive SE. Feels well supported in needs at this time.    Continues to push through treatment, eager to finish four more treatments of taxol, anticipating radiation following.    Son continues to do well. Reports ability to attend son's play and recent awards ceremony, noting challenges of this while grateful for ability to celebrate his successes.     Exam: As above    Recent Labs Reviewed:  Infusion on 05/14/2025   Component Date Value    Magnesium 05/14/2025 1.3 (L)    Infusion on 05/14/2025   Component Date Value    Glucose  05/14/2025 116 (H)     BUN 05/14/2025 11     Creatinine 05/14/2025 0.55 (L)     Sodium 05/14/2025 139     Potassium 05/14/2025 3.0 (L)     Chloride 05/14/2025 105     CO2 05/14/2025 21.9 (L)     Calcium 05/14/2025 8.2 (L)     Total Protein 05/14/2025 5.6 (L)     Albumin 05/14/2025 2.9 (L)     ALT (SGPT) 05/14/2025 15     AST (SGOT) 05/14/2025 14     Alkaline Phosphatase 05/14/2025 133 (H)     Total Bilirubin 05/14/2025 0.3     Globulin 05/14/2025 2.7     A/G Ratio 05/14/2025 1.1     BUN/Creatinine Ratio 05/14/2025 20.0     Anion Gap 05/14/2025 12.1     eGFR 05/14/2025 115.4     WBC 05/14/2025 3.78     RBC 05/14/2025 3.00 (L)     Hemoglobin 05/14/2025 10.0 (L)     Hematocrit 05/14/2025 30.1 (L)     MCV 05/14/2025 100.3 (H)     MCH 05/14/2025 33.3 (H)     MCHC 05/14/2025 33.2     RDW 05/14/2025 18.8 (H)     RDW-SD 05/14/2025 69.1 (H)     MPV 05/14/2025 9.7     Platelets 05/14/2025 265     Neutrophil % 05/14/2025 64.0     Lymphocyte % 05/14/2025 23.8     Monocyte % 05/14/2025 6.9     Eosinophil % 05/14/2025 2.4     Basophil % 05/14/2025 1.3     Immature Grans % 05/14/2025 1.6 (H)     Neutrophils, Absolute 05/14/2025 2.42     Lymphocytes, Absolute 05/14/2025 0.90     Monocytes, Absolute 05/14/2025 0.26     Eosinophils, Absolute 05/14/2025 0.09     Basophils, Absolute 05/14/2025 0.05     Immature Grans, Absolute 05/14/2025 0.06 (H)     nRBC 05/14/2025 0.0    Infusion on 05/07/2025   Component Date Value    Glucose 05/07/2025 96     BUN 05/07/2025 11     Creatinine 05/07/2025 0.61     Sodium 05/07/2025 141     Potassium 05/07/2025 3.5     Chloride 05/07/2025 108 (H)     CO2 05/07/2025 22.8     Calcium 05/07/2025 8.5 (L)     Total Protein 05/07/2025 5.2 (L)     Albumin 05/07/2025 3.0 (L)     ALT (SGPT) 05/07/2025 17     AST (SGOT) 05/07/2025 19     Alkaline Phosphatase 05/07/2025 108     Total Bilirubin 05/07/2025 0.2     Globulin 05/07/2025 2.2     A/G Ratio 05/07/2025 1.4     BUN/Creatinine Ratio 05/07/2025 18.0      Anion Gap 05/07/2025 10.2     eGFR 05/07/2025 112.5     WBC 05/07/2025 5.38     RBC 05/07/2025 3.03 (L)     Hemoglobin 05/07/2025 10.0 (L)     Hematocrit 05/07/2025 30.2 (L)     MCV 05/07/2025 99.7 (H)     MCH 05/07/2025 33.0     MCHC 05/07/2025 33.1     RDW 05/07/2025 20.2 (H)     RDW-SD 05/07/2025 73.7 (H)     MPV 05/07/2025 9.5     Platelets 05/07/2025 316     Neutrophil % 05/07/2025 72.6     Lymphocyte % 05/07/2025 17.5 (L)     Monocyte % 05/07/2025 5.6     Eosinophil % 05/07/2025 1.7     Basophil % 05/07/2025 1.1     Immature Grans % 05/07/2025 1.5 (H)     Neutrophils, Absolute 05/07/2025 3.91     Lymphocytes, Absolute 05/07/2025 0.94     Monocytes, Absolute 05/07/2025 0.30     Eosinophils, Absolute 05/07/2025 0.09     Basophils, Absolute 05/07/2025 0.06     Immature Grans, Absolute 05/07/2025 0.08 (H)     nRBC 05/07/2025 0.0    Hospital Outpatient Visit on 05/01/2025   Component Date Value    EF(MOD-bp) 05/01/2025 52.3     LV GLOBAL STRAIN  05/01/2025 -17.2     LVIDd 05/01/2025 4.5     LVIDs 05/01/2025 3.8     IVSd 05/01/2025 0.70     LVPWd 05/01/2025 0.60     FS 05/01/2025 15.0     IVS/LVPW 05/01/2025 1.17     ESV(cubed) 05/01/2025 56.0     LV Sys Vol (BSA correcte* 05/01/2025 20.4     EDV(cubed) 05/01/2025 91.1     LV Ochoa Vol (BSA correct* 05/01/2025 43.8     LV mass(C)d 05/01/2025 87.1     LVOT area 05/01/2025 2.26     LVOT diam 05/01/2025 1.70     EDV(MOD-sp2) 05/01/2025 95.0     EDV(MOD-sp4) 05/01/2025 75.0     ESV(MOD-sp2) 05/01/2025 43.0     ESV(MOD-sp4) 05/01/2025 35.0     SV(MOD-sp2) 05/01/2025 52.0     SV(MOD-sp4) 05/01/2025 40.0     SVi(MOD-SP2) 05/01/2025 30.3     SVi(MOD-SP4) 05/01/2025 23.3     SVi (LVOT) 05/01/2025 23.3     EF(MOD-sp2) 05/01/2025 54.7     EF(MOD-sp4) 05/01/2025 53.3     MV E max du 05/01/2025 52.9     MV A max du 05/01/2025 37.7     MV dec time 05/01/2025 0.17     MV E/A 05/01/2025 1.40     MV A dur 05/01/2025 0.10     LA ESV Index (BP) 05/01/2025 12.2     Med Peak E' Du  05/01/2025 9.7     Lat Peak E' Du 05/01/2025 13.8     Avg E/e' ratio 05/01/2025 4.50     SV(LVOT) 05/01/2025 40.0     RV Base 05/01/2025 2.8     TAPSE (>1.6) 05/01/2025 1.52     RV S' 05/01/2025 11.2     LV V1 max 05/01/2025 89.6     LV V1 max PG 05/01/2025 3.2     LV V1 mean PG 05/01/2025 2.00     LV V1 VTI 05/01/2025 17.7     Ao pk du 05/01/2025 103.0     Ao max PG 05/01/2025 4.2     Ao mean PG 05/01/2025 2.00     Ao V2 VTI 05/01/2025 19.5     DANIELLE(I,D) 05/01/2025 2.05     Dimensionless Index 05/01/2025 0.91     MV max PG 05/01/2025 2.9     MV mean PG 05/01/2025 1.36     MV V2 VTI 05/01/2025 16.4     MV P1/2t 05/01/2025 56.5     MVA(P1/2t) 05/01/2025 3.9     MVA(VTI) 05/01/2025 2.44     MV dec slope 05/01/2025 458.0     RAP systole 05/01/2025 3.0     Ao root diam 05/01/2025 2.9    Hospital Outpatient Visit on 04/30/2025   Component Date Value    QT Interval 04/30/2025 401     QTC Interval 04/30/2025 455    Hospital Outpatient Visit on 04/30/2025   Component Date Value    Heart rate (average) 04/30/2025 86     Heart rate minimum 04/30/2025 61     Heart rate maximum 04/30/2025 167    Infusion on 04/30/2025   Component Date Value    Glucose 04/30/2025 84     BUN 04/30/2025 13     Creatinine 04/30/2025 0.66     Sodium 04/30/2025 138     Potassium 04/30/2025 4.0     Chloride 04/30/2025 104     CO2 04/30/2025 23.7     Calcium 04/30/2025 8.6     Total Protein 04/30/2025 5.9 (L)     Albumin 04/30/2025 3.2 (L)     ALT (SGPT) 04/30/2025 21     AST (SGOT) 04/30/2025 20     Alkaline Phosphatase 04/30/2025 112     Total Bilirubin 04/30/2025 0.2     Globulin 04/30/2025 2.7     A/G Ratio 04/30/2025 1.2     BUN/Creatinine Ratio 04/30/2025 19.7     Anion Gap 04/30/2025 10.3     eGFR 04/30/2025 110.4     Magnesium 04/30/2025 2.0     WBC 04/30/2025 5.47     RBC 04/30/2025 3.11 (L)     Hemoglobin 04/30/2025 10.5 (L)     Hematocrit 04/30/2025 30.5 (L)     MCV 04/30/2025 98.1 (H)     MCH 04/30/2025 33.8 (H)     MCHC 04/30/2025 34.4      RDW 04/30/2025 20.4 (H)     RDW-SD 04/30/2025 72.6 (H)     MPV 04/30/2025 9.6     Platelets 04/30/2025 436     Neutrophil % 04/30/2025 57.8     Lymphocyte % 04/30/2025 23.2     Monocyte % 04/30/2025 9.3     Eosinophil % 04/30/2025 2.6     Basophil % 04/30/2025 0.9     Immature Grans % 04/30/2025 6.2 (H)     Neutrophils, Absolute 04/30/2025 3.16     Lymphocytes, Absolute 04/30/2025 1.27     Monocytes, Absolute 04/30/2025 0.51     Eosinophils, Absolute 04/30/2025 0.14     Basophils, Absolute 04/30/2025 0.05     Immature Grans, Absolute 04/30/2025 0.34 (H)    Admission on 04/28/2025, Discharged on 04/28/2025   Component Date Value    Glucose 04/28/2025 93     BUN 04/28/2025 9     Creatinine 04/28/2025 0.67     Sodium 04/28/2025 134 (L)     Potassium 04/28/2025 4.0     Chloride 04/28/2025 101     CO2 04/28/2025 25.0     Calcium 04/28/2025 8.7     Total Protein 04/28/2025 6.2     Albumin 04/28/2025 3.2 (L)     ALT (SGPT) 04/28/2025 21     AST (SGOT) 04/28/2025 22     Alkaline Phosphatase 04/28/2025 131 (H)     Total Bilirubin 04/28/2025 0.4     Globulin 04/28/2025 3.0     A/G Ratio 04/28/2025 1.1     BUN/Creatinine Ratio 04/28/2025 13.4     Anion Gap 04/28/2025 8.0     eGFR 04/28/2025 110.0     Color, UA 04/28/2025 Yellow     Appearance, UA 04/28/2025 Clear     pH, UA 04/28/2025 >=9.0 (H)     Specific Force, UA 04/28/2025 1.019     Glucose, UA 04/28/2025 Negative     Ketones, UA 04/28/2025 Trace (A)     Bilirubin, UA 04/28/2025 Negative     Blood, UA 04/28/2025 Negative     Protein, UA 04/28/2025 Negative     Leuk Esterase, UA 04/28/2025 Trace (A)     Nitrite, UA 04/28/2025 Negative     Urobilinogen, UA 04/28/2025 0.2 E.U./dL     Magnesium 04/28/2025 2.1     WBC 04/28/2025 4.04     RBC 04/28/2025 3.41 (L)     Hemoglobin 04/28/2025 10.8 (L)     Hematocrit 04/28/2025 32.1 (L)     MCV 04/28/2025 94.1     MCH 04/28/2025 31.7     MCHC 04/28/2025 33.6     RDW 04/28/2025 20.2 (H)     RDW-SD 04/28/2025 68.4 (H)     MPV  04/28/2025 10.3     Platelets 04/28/2025 419     Neutrophil % 04/28/2025 66.1     Lymphocyte % 04/28/2025 20.0     Monocyte % 04/28/2025 7.7     Eosinophil % 04/28/2025 1.5     Basophil % 04/28/2025 1.0     Immature Grans % 04/28/2025 3.7 (H)     Neutrophils, Absolute 04/28/2025 2.67     Lymphocytes, Absolute 04/28/2025 0.81     Monocytes, Absolute 04/28/2025 0.31     Eosinophils, Absolute 04/28/2025 0.06     Basophils, Absolute 04/28/2025 0.04     Immature Grans, Absolute 04/28/2025 0.15 (H)     nRBC 04/28/2025 0.5 (H)     RBC, UA 04/28/2025 0-2     WBC, UA 04/28/2025 0-2     Bacteria, UA 04/28/2025 None Seen     Squamous Epithelial Cell* 04/28/2025 0-2     Hyaline Casts, UA 04/28/2025 0-2     Methodology 04/28/2025 Automated Microscopy    Infusion on 04/23/2025   Component Date Value    Glucose 04/23/2025 129 (H)     BUN 04/23/2025 6     Creatinine 04/23/2025 0.56 (L)     Sodium 04/23/2025 139     Potassium 04/23/2025 2.8 (L)     Chloride 04/23/2025 103     CO2 04/23/2025 22.8     Calcium 04/23/2025 8.3 (L)     Total Protein 04/23/2025 5.9 (L)     Albumin 04/23/2025 3.1 (L)     ALT (SGPT) 04/23/2025 37 (H)     AST (SGOT) 04/23/2025 30     Alkaline Phosphatase 04/23/2025 111     Total Bilirubin 04/23/2025 0.3     Globulin 04/23/2025 2.8     A/G Ratio 04/23/2025 1.1     BUN/Creatinine Ratio 04/23/2025 10.7     Anion Gap 04/23/2025 13.2     eGFR 04/23/2025 114.9     WBC 04/23/2025 4.33     RBC 04/23/2025 3.05 (L)     Hemoglobin 04/23/2025 9.6 (L)     Hematocrit 04/23/2025 28.4 (L)     MCV 04/23/2025 93.1     MCH 04/23/2025 31.5     MCHC 04/23/2025 33.8     RDW 04/23/2025 19.8 (H)     RDW-SD 04/23/2025 67.6 (H)     MPV 04/23/2025 11.3     Platelets 04/23/2025 203     Neutrophil % 04/23/2025 56.0     Lymphocyte % 04/23/2025 28.2     Monocyte % 04/23/2025 12.2 (H)     Eosinophil % 04/23/2025 1.8     Basophil % 04/23/2025 0.9     Immature Grans % 04/23/2025 0.9 (H)     Neutrophils, Absolute 04/23/2025 2.42      Lymphocytes, Absolute 04/23/2025 1.22     Monocytes, Absolute 04/23/2025 0.53     Eosinophils, Absolute 04/23/2025 0.08     Basophils, Absolute 04/23/2025 0.04     Immature Grans, Absolute 04/23/2025 0.04     nRBC 04/23/2025 0.0     Magnesium 04/23/2025 1.3 (L)    Infusion on 04/16/2025   Component Date Value    Glucose 04/16/2025 137 (H)     BUN 04/16/2025 15     Creatinine 04/16/2025 0.62     Sodium 04/16/2025 139     Potassium 04/16/2025 3.3 (L)     Chloride 04/16/2025 103     CO2 04/16/2025 21.7 (L)     Calcium 04/16/2025 8.9     Total Protein 04/16/2025 6.2     Albumin 04/16/2025 3.4 (L)     ALT (SGPT) 04/16/2025 21     AST (SGOT) 04/16/2025 25     Alkaline Phosphatase 04/16/2025 111     Total Bilirubin 04/16/2025 0.4     Globulin 04/16/2025 2.8     A/G Ratio 04/16/2025 1.2     BUN/Creatinine Ratio 04/16/2025 24.2     Anion Gap 04/16/2025 14.3     eGFR 04/16/2025 112.1     WBC 04/16/2025 3.46     RBC 04/16/2025 3.70 (L)     Hemoglobin 04/16/2025 11.6 (L)     Hematocrit 04/16/2025 35.7     MCV 04/16/2025 96.5     MCH 04/16/2025 31.4     MCHC 04/16/2025 32.5     RDW 04/16/2025 20.6 (H)     RDW-SD 04/16/2025 72.0 (H)     MPV 04/16/2025 9.9     Platelets 04/16/2025 336     Neutrophil % 04/16/2025 71.1     Lymphocyte % 04/16/2025 13.3 (L)     Monocyte % 04/16/2025 4.0 (L)     Eosinophil % 04/16/2025 3.8     Basophil % 04/16/2025 1.4     Immature Grans % 04/16/2025 6.4 (H)     Neutrophils, Absolute 04/16/2025 2.46     Lymphocytes, Absolute 04/16/2025 0.46 (L)     Monocytes, Absolute 04/16/2025 0.14     Eosinophils, Absolute 04/16/2025 0.13     Basophils, Absolute 04/16/2025 0.05     Immature Grans, Absolute 04/16/2025 0.22 (H)     Magnesium 04/16/2025 1.2 (L)    There may be more visits with results that are not included.   Labs reviewed    Current Psychotropic Medications:  Venlafaxine XR 37.5 mg daily  Quetiapine - has not yet taken this  Gabapentin 600 mg q hs    Plan of Care/ Medical Decision Making  Continue  venlafaxine as written.  Support patient in holding Seroquel if sx well enough managed at this time.  Continue gabapentin as has assisted with sleep.  Communication initiated with med onc regarding recommendation for handicap pass; hopeful pt is able to get form at treatment next week.  FU scheduled in 4 weeks.    There are no diagnoses linked to this encounter.    I spent 31 minutes caring for Suzanne on this date of service. This time includes time spent by me in the following activities: preparing for the visit, reviewing tests, obtaining and/or reviewing a separately obtained history, performing a medically appropriate examination and/or evaluation, counseling and educating the patient/family/caregiver, referring and communicating with other health care professionals, and documenting information in the medical record.

## 2025-05-21 ENCOUNTER — INFUSION (OUTPATIENT)
Dept: ONCOLOGY | Facility: HOSPITAL | Age: 45
End: 2025-05-21
Payer: COMMERCIAL

## 2025-05-21 VITALS
TEMPERATURE: 97.7 F | HEART RATE: 108 BPM | DIASTOLIC BLOOD PRESSURE: 64 MMHG | WEIGHT: 142.8 LBS | OXYGEN SATURATION: 100 % | SYSTOLIC BLOOD PRESSURE: 89 MMHG | BODY MASS INDEX: 23.06 KG/M2

## 2025-05-21 DIAGNOSIS — E83.42 HYPOMAGNESEMIA: ICD-10-CM

## 2025-05-21 DIAGNOSIS — C50.911 BREAST CANCER, STAGE 3, RIGHT: ICD-10-CM

## 2025-05-21 DIAGNOSIS — Z45.2 ENCOUNTER FOR CENTRAL LINE CARE: Primary | ICD-10-CM

## 2025-05-21 DIAGNOSIS — Z79.69 NEED FOR PROPHYLACTIC CHEMOTHERAPY: ICD-10-CM

## 2025-05-21 LAB
ALBUMIN SERPL-MCNC: 3 G/DL (ref 3.5–5.2)
ALBUMIN/GLOB SERPL: 1 G/DL
ALP SERPL-CCNC: 128 U/L (ref 39–117)
ALT SERPL W P-5'-P-CCNC: 11 U/L (ref 1–33)
ANION GAP SERPL CALCULATED.3IONS-SCNC: 12.4 MMOL/L (ref 5–15)
AST SERPL-CCNC: 15 U/L (ref 1–32)
BASOPHILS # BLD AUTO: 0.06 10*3/MM3 (ref 0–0.2)
BASOPHILS NFR BLD AUTO: 1.2 % (ref 0–1.5)
BILIRUB SERPL-MCNC: 0.2 MG/DL (ref 0–1.2)
BUN SERPL-MCNC: 13 MG/DL (ref 6–20)
BUN/CREAT SERPL: 20.3 (ref 7–25)
CALCIUM SPEC-SCNC: 8.6 MG/DL (ref 8.6–10.5)
CHLORIDE SERPL-SCNC: 106 MMOL/L (ref 98–107)
CO2 SERPL-SCNC: 18.6 MMOL/L (ref 22–29)
CREAT SERPL-MCNC: 0.64 MG/DL (ref 0.57–1)
DEPRECATED RDW RBC AUTO: 67.5 FL (ref 37–54)
EGFRCR SERPLBLD CKD-EPI 2021: 111.2 ML/MIN/1.73
EOSINOPHIL # BLD AUTO: 0.07 10*3/MM3 (ref 0–0.4)
EOSINOPHIL NFR BLD AUTO: 1.4 % (ref 0.3–6.2)
ERYTHROCYTE [DISTWIDTH] IN BLOOD BY AUTOMATED COUNT: 18.1 % (ref 12.3–15.4)
GLOBULIN UR ELPH-MCNC: 2.9 GM/DL
GLUCOSE SERPL-MCNC: 131 MG/DL (ref 65–99)
HCT VFR BLD AUTO: 30.4 % (ref 34–46.6)
HGB BLD-MCNC: 10.1 G/DL (ref 12–15.9)
IMM GRANULOCYTES # BLD AUTO: 0.21 10*3/MM3 (ref 0–0.05)
IMM GRANULOCYTES NFR BLD AUTO: 4.2 % (ref 0–0.5)
LYMPHOCYTES # BLD AUTO: 0.84 10*3/MM3 (ref 0.7–3.1)
LYMPHOCYTES NFR BLD AUTO: 16.8 % (ref 19.6–45.3)
MAGNESIUM SERPL-MCNC: 1.4 MG/DL (ref 1.6–2.6)
MCH RBC QN AUTO: 34 PG (ref 26.6–33)
MCHC RBC AUTO-ENTMCNC: 33.2 G/DL (ref 31.5–35.7)
MCV RBC AUTO: 102.4 FL (ref 79–97)
MONOCYTES # BLD AUTO: 0.31 10*3/MM3 (ref 0.1–0.9)
MONOCYTES NFR BLD AUTO: 6.2 % (ref 5–12)
NEUTROPHILS NFR BLD AUTO: 3.51 10*3/MM3 (ref 1.7–7)
NEUTROPHILS NFR BLD AUTO: 70.2 % (ref 42.7–76)
NRBC BLD AUTO-RTO: 0.4 /100 WBC (ref 0–0.2)
PLATELET # BLD AUTO: 409 10*3/MM3 (ref 140–450)
PMV BLD AUTO: 9.4 FL (ref 6–12)
POTASSIUM SERPL-SCNC: 2.9 MMOL/L (ref 3.5–5.2)
PROT SERPL-MCNC: 5.9 G/DL (ref 6–8.5)
RBC # BLD AUTO: 2.97 10*6/MM3 (ref 3.77–5.28)
SODIUM SERPL-SCNC: 137 MMOL/L (ref 136–145)
WBC NRBC COR # BLD AUTO: 5 10*3/MM3 (ref 3.4–10.8)

## 2025-05-21 PROCEDURE — 25810000003 SODIUM CHLORIDE 0.9 % SOLUTION 250 ML FLEX CONT: Performed by: INTERNAL MEDICINE

## 2025-05-21 PROCEDURE — 25010000002 FAMOTIDINE 10 MG/ML SOLUTION: Performed by: INTERNAL MEDICINE

## 2025-05-21 PROCEDURE — 25010000002 MAGNESIUM SULFATE IN D5W 1G/100ML (PREMIX) 1-5 GM/100ML-% SOLUTION: Performed by: INTERNAL MEDICINE

## 2025-05-21 PROCEDURE — 25010000002 HEPARIN LOCK FLUSH PER 10 UNITS: Performed by: INTERNAL MEDICINE

## 2025-05-21 PROCEDURE — 25010000002 MAGNESIUM SULFATE 2 GM/50ML SOLUTION: Performed by: INTERNAL MEDICINE

## 2025-05-21 PROCEDURE — 96366 THER/PROPH/DIAG IV INF ADDON: CPT

## 2025-05-21 PROCEDURE — 96413 CHEMO IV INFUSION 1 HR: CPT

## 2025-05-21 PROCEDURE — 25010000002 DIPHENHYDRAMINE PER 50 MG: Performed by: INTERNAL MEDICINE

## 2025-05-21 PROCEDURE — 96375 TX/PRO/DX INJ NEW DRUG ADDON: CPT

## 2025-05-21 PROCEDURE — 25010000002 DEXAMETHASONE SODIUM PHOSPHATE 100 MG/10ML SOLUTION: Performed by: INTERNAL MEDICINE

## 2025-05-21 PROCEDURE — 85025 COMPLETE CBC W/AUTO DIFF WBC: CPT | Performed by: INTERNAL MEDICINE

## 2025-05-21 PROCEDURE — 80053 COMPREHEN METABOLIC PANEL: CPT | Performed by: INTERNAL MEDICINE

## 2025-05-21 PROCEDURE — 25810000003 SODIUM CHLORIDE 0.9 % SOLUTION: Performed by: INTERNAL MEDICINE

## 2025-05-21 PROCEDURE — 83735 ASSAY OF MAGNESIUM: CPT | Performed by: INTERNAL MEDICINE

## 2025-05-21 PROCEDURE — 25010000002 PACLITAXEL PER 1 MG: Performed by: INTERNAL MEDICINE

## 2025-05-21 PROCEDURE — 96367 TX/PROPH/DG ADDL SEQ IV INF: CPT

## 2025-05-21 RX ORDER — HEPARIN SODIUM (PORCINE) LOCK FLUSH IV SOLN 100 UNIT/ML 100 UNIT/ML
500 SOLUTION INTRAVENOUS AS NEEDED
Status: DISCONTINUED | OUTPATIENT
Start: 2025-05-21 | End: 2025-05-21 | Stop reason: HOSPADM

## 2025-05-21 RX ORDER — SODIUM CHLORIDE 0.9 % (FLUSH) 0.9 %
10 SYRINGE (ML) INJECTION AS NEEDED
OUTPATIENT
Start: 2025-05-21

## 2025-05-21 RX ORDER — MAGNESIUM SULFATE HEPTAHYDRATE 40 MG/ML
2 INJECTION, SOLUTION INTRAVENOUS ONCE
Status: COMPLETED | OUTPATIENT
Start: 2025-05-21 | End: 2025-05-21

## 2025-05-21 RX ORDER — HEPARIN SODIUM (PORCINE) LOCK FLUSH IV SOLN 100 UNIT/ML 100 UNIT/ML
500 SOLUTION INTRAVENOUS AS NEEDED
OUTPATIENT
Start: 2025-05-21

## 2025-05-21 RX ORDER — POTASSIUM CHLORIDE 1500 MG/1
60 TABLET, FILM COATED, EXTENDED RELEASE ORAL ONCE
Status: COMPLETED | OUTPATIENT
Start: 2025-05-21 | End: 2025-05-21

## 2025-05-21 RX ORDER — FAMOTIDINE 10 MG/ML
20 INJECTION, SOLUTION INTRAVENOUS ONCE
Status: COMPLETED | OUTPATIENT
Start: 2025-05-21 | End: 2025-05-21

## 2025-05-21 RX ORDER — MAGNESIUM SULFATE 1 G/100ML
1 INJECTION INTRAVENOUS ONCE
Status: COMPLETED | OUTPATIENT
Start: 2025-05-21 | End: 2025-05-21

## 2025-05-21 RX ORDER — MONTELUKAST SODIUM 10 MG/1
10 TABLET ORAL ONCE
Status: COMPLETED | OUTPATIENT
Start: 2025-05-21 | End: 2025-05-21

## 2025-05-21 RX ORDER — SODIUM CHLORIDE 9 MG/ML
20 INJECTION, SOLUTION INTRAVENOUS ONCE
Status: COMPLETED | OUTPATIENT
Start: 2025-05-21 | End: 2025-05-21

## 2025-05-21 RX ORDER — DIPHENHYDRAMINE HYDROCHLORIDE 50 MG/ML
25 INJECTION, SOLUTION INTRAMUSCULAR; INTRAVENOUS ONCE
Status: COMPLETED | OUTPATIENT
Start: 2025-05-21 | End: 2025-05-21

## 2025-05-21 RX ORDER — SODIUM CHLORIDE 0.9 % (FLUSH) 0.9 %
10 SYRINGE (ML) INJECTION AS NEEDED
Status: DISCONTINUED | OUTPATIENT
Start: 2025-05-21 | End: 2025-05-21 | Stop reason: HOSPADM

## 2025-05-21 RX ADMIN — DEXAMETHASONE SODIUM PHOSPHATE 12 MG: 10 INJECTION, SOLUTION INTRAMUSCULAR; INTRAVENOUS at 08:45

## 2025-05-21 RX ADMIN — MAGNESIUM SULFATE HEPTAHYDRATE 2 G: 40 INJECTION, SOLUTION INTRAVENOUS at 09:02

## 2025-05-21 RX ADMIN — SODIUM CHLORIDE 140 MG: 9 INJECTION, SOLUTION INTRAVENOUS at 10:05

## 2025-05-21 RX ADMIN — FAMOTIDINE 20 MG: 10 INJECTION INTRAVENOUS at 08:40

## 2025-05-21 RX ADMIN — Medication 10 ML: at 11:40

## 2025-05-21 RX ADMIN — DIPHENHYDRAMINE HYDROCHLORIDE 25 MG: 50 INJECTION, SOLUTION INTRAMUSCULAR; INTRAVENOUS at 08:43

## 2025-05-21 RX ADMIN — MONTELUKAST 10 MG: 10 TABLET, FILM COATED ORAL at 08:39

## 2025-05-21 RX ADMIN — HEPARIN 500 UNITS: 100 SYRINGE at 11:40

## 2025-05-21 RX ADMIN — SODIUM CHLORIDE 20 ML/HR: 9 INJECTION, SOLUTION INTRAVENOUS at 08:40

## 2025-05-21 RX ADMIN — MAGNESIUM SULFATE HEPTAHYDRATE 1 G: 10 INJECTION, SOLUTION INTRAVENOUS at 11:09

## 2025-05-21 RX ADMIN — POTASSIUM CHLORIDE 60 MEQ: 1500 TABLET, EXTENDED RELEASE ORAL at 09:24

## 2025-05-21 NOTE — NURSING NOTE
Labs reviewed with Dr. Witt.  Will replace Mag IV per protocol.  K 2.9 today. Prescription for oral K sent in 5/15 says 10meq 3 tabs twice a day (total of 60meq/day). Discussed with pt and she says she thought she was told to do 2 tabs twice a day, so she has only been taking 40meq daily.      Per Dr. Witt, give KCL 60meq po x 1 while she is here in the office and then pt to increase her home dose to 3 tabs twice daily.  Informed pt and she v/u. Per Dr. Witt, pt to take her home dose of 30meq this evening when she gets home  
unsure

## 2025-05-28 ENCOUNTER — INFUSION (OUTPATIENT)
Dept: ONCOLOGY | Facility: HOSPITAL | Age: 45
End: 2025-05-28
Payer: COMMERCIAL

## 2025-05-28 VITALS
DIASTOLIC BLOOD PRESSURE: 70 MMHG | OXYGEN SATURATION: 99 % | HEART RATE: 91 BPM | RESPIRATION RATE: 18 BRPM | WEIGHT: 145.2 LBS | SYSTOLIC BLOOD PRESSURE: 104 MMHG | BODY MASS INDEX: 23.45 KG/M2 | TEMPERATURE: 97.8 F

## 2025-05-28 DIAGNOSIS — Z79.69 NEED FOR PROPHYLACTIC CHEMOTHERAPY: ICD-10-CM

## 2025-05-28 DIAGNOSIS — Z45.2 ENCOUNTER FOR CENTRAL LINE CARE: ICD-10-CM

## 2025-05-28 DIAGNOSIS — E83.42 HYPOMAGNESEMIA: Primary | ICD-10-CM

## 2025-05-28 DIAGNOSIS — C50.911 BREAST CANCER, STAGE 3, RIGHT: ICD-10-CM

## 2025-05-28 LAB
ALBUMIN SERPL-MCNC: 2.9 G/DL (ref 3.5–5.2)
ALBUMIN/GLOB SERPL: 1 G/DL
ALP SERPL-CCNC: 128 U/L (ref 39–117)
ALT SERPL W P-5'-P-CCNC: 13 U/L (ref 1–33)
ANION GAP SERPL CALCULATED.3IONS-SCNC: 12 MMOL/L (ref 5–15)
AST SERPL-CCNC: 17 U/L (ref 1–32)
BASOPHILS # BLD AUTO: 0.04 10*3/MM3 (ref 0–0.2)
BASOPHILS NFR BLD AUTO: 0.7 % (ref 0–1.5)
BILIRUB SERPL-MCNC: <0.2 MG/DL (ref 0–1.2)
BUN SERPL-MCNC: 10.9 MG/DL (ref 6–20)
BUN/CREAT SERPL: 16.8 (ref 7–25)
CALCIUM SPEC-SCNC: 8.6 MG/DL (ref 8.6–10.5)
CHLORIDE SERPL-SCNC: 107 MMOL/L (ref 98–107)
CO2 SERPL-SCNC: 22 MMOL/L (ref 22–29)
CREAT SERPL-MCNC: 0.65 MG/DL (ref 0.57–1)
DEPRECATED RDW RBC AUTO: 68.5 FL (ref 37–54)
EGFRCR SERPLBLD CKD-EPI 2021: 110.8 ML/MIN/1.73
EOSINOPHIL # BLD AUTO: 0.05 10*3/MM3 (ref 0–0.4)
EOSINOPHIL NFR BLD AUTO: 0.9 % (ref 0.3–6.2)
ERYTHROCYTE [DISTWIDTH] IN BLOOD BY AUTOMATED COUNT: 17.8 % (ref 12.3–15.4)
GLOBULIN UR ELPH-MCNC: 2.8 GM/DL
GLUCOSE SERPL-MCNC: 108 MG/DL (ref 65–99)
HCT VFR BLD AUTO: 32 % (ref 34–46.6)
HGB BLD-MCNC: 10.3 G/DL (ref 12–15.9)
IMM GRANULOCYTES # BLD AUTO: 0.26 10*3/MM3 (ref 0–0.05)
IMM GRANULOCYTES NFR BLD AUTO: 4.7 % (ref 0–0.5)
LYMPHOCYTES # BLD AUTO: 0.97 10*3/MM3 (ref 0.7–3.1)
LYMPHOCYTES NFR BLD AUTO: 17.7 % (ref 19.6–45.3)
MAGNESIUM SERPL-MCNC: 1.3 MG/DL (ref 1.6–2.6)
MCH RBC QN AUTO: 33.8 PG (ref 26.6–33)
MCHC RBC AUTO-ENTMCNC: 32.2 G/DL (ref 31.5–35.7)
MCV RBC AUTO: 104.9 FL (ref 79–97)
MONOCYTES # BLD AUTO: 0.3 10*3/MM3 (ref 0.1–0.9)
MONOCYTES NFR BLD AUTO: 5.5 % (ref 5–12)
NEUTROPHILS NFR BLD AUTO: 3.86 10*3/MM3 (ref 1.7–7)
NEUTROPHILS NFR BLD AUTO: 70.5 % (ref 42.7–76)
PLATELET # BLD AUTO: 467 10*3/MM3 (ref 140–450)
PMV BLD AUTO: 9.2 FL (ref 6–12)
POTASSIUM SERPL-SCNC: 3.5 MMOL/L (ref 3.5–5.2)
PROT SERPL-MCNC: 5.7 G/DL (ref 6–8.5)
RBC # BLD AUTO: 3.05 10*6/MM3 (ref 3.77–5.28)
SODIUM SERPL-SCNC: 141 MMOL/L (ref 136–145)
WBC NRBC COR # BLD AUTO: 5.48 10*3/MM3 (ref 3.4–10.8)

## 2025-05-28 PROCEDURE — 25010000002 FAMOTIDINE 10 MG/ML SOLUTION: Performed by: INTERNAL MEDICINE

## 2025-05-28 PROCEDURE — 25010000002 MAGNESIUM SULFATE 2 GM/50ML SOLUTION: Performed by: NURSE PRACTITIONER

## 2025-05-28 PROCEDURE — 25810000003 SODIUM CHLORIDE 0.9 % SOLUTION: Performed by: INTERNAL MEDICINE

## 2025-05-28 PROCEDURE — 25810000003 SODIUM CHLORIDE 0.9 % SOLUTION 250 ML FLEX CONT: Performed by: INTERNAL MEDICINE

## 2025-05-28 PROCEDURE — 96366 THER/PROPH/DIAG IV INF ADDON: CPT

## 2025-05-28 PROCEDURE — 96367 TX/PROPH/DG ADDL SEQ IV INF: CPT

## 2025-05-28 PROCEDURE — 96413 CHEMO IV INFUSION 1 HR: CPT

## 2025-05-28 PROCEDURE — 96375 TX/PRO/DX INJ NEW DRUG ADDON: CPT

## 2025-05-28 PROCEDURE — 25010000002 DEXAMETHASONE SODIUM PHOSPHATE 100 MG/10ML SOLUTION: Performed by: INTERNAL MEDICINE

## 2025-05-28 PROCEDURE — 25010000002 PACLITAXEL PER 1 MG: Performed by: INTERNAL MEDICINE

## 2025-05-28 PROCEDURE — 83735 ASSAY OF MAGNESIUM: CPT | Performed by: INTERNAL MEDICINE

## 2025-05-28 PROCEDURE — 25010000002 HEPARIN LOCK FLUSH PER 10 UNITS: Performed by: INTERNAL MEDICINE

## 2025-05-28 PROCEDURE — 25010000002 DIPHENHYDRAMINE PER 50 MG: Performed by: INTERNAL MEDICINE

## 2025-05-28 PROCEDURE — 25010000002 MAGNESIUM SULFATE IN D5W 1G/100ML (PREMIX) 1-5 GM/100ML-% SOLUTION: Performed by: NURSE PRACTITIONER

## 2025-05-28 PROCEDURE — 80053 COMPREHEN METABOLIC PANEL: CPT | Performed by: INTERNAL MEDICINE

## 2025-05-28 PROCEDURE — 85025 COMPLETE CBC W/AUTO DIFF WBC: CPT | Performed by: INTERNAL MEDICINE

## 2025-05-28 RX ORDER — SODIUM CHLORIDE 0.9 % (FLUSH) 0.9 %
10 SYRINGE (ML) INJECTION AS NEEDED
Status: DISCONTINUED | OUTPATIENT
Start: 2025-05-28 | End: 2025-05-28 | Stop reason: HOSPADM

## 2025-05-28 RX ORDER — MONTELUKAST SODIUM 10 MG/1
10 TABLET ORAL ONCE
Status: COMPLETED | OUTPATIENT
Start: 2025-05-28 | End: 2025-05-28

## 2025-05-28 RX ORDER — SODIUM CHLORIDE 9 MG/ML
20 INJECTION, SOLUTION INTRAVENOUS ONCE
Status: COMPLETED | OUTPATIENT
Start: 2025-05-28 | End: 2025-05-28

## 2025-05-28 RX ORDER — MAGNESIUM SULFATE 1 G/100ML
1 INJECTION INTRAVENOUS ONCE
Status: COMPLETED | OUTPATIENT
Start: 2025-05-28 | End: 2025-05-28

## 2025-05-28 RX ORDER — HEPARIN SODIUM (PORCINE) LOCK FLUSH IV SOLN 100 UNIT/ML 100 UNIT/ML
500 SOLUTION INTRAVENOUS AS NEEDED
OUTPATIENT
Start: 2025-05-28

## 2025-05-28 RX ORDER — FAMOTIDINE 10 MG/ML
20 INJECTION, SOLUTION INTRAVENOUS ONCE
Status: COMPLETED | OUTPATIENT
Start: 2025-05-28 | End: 2025-05-28

## 2025-05-28 RX ORDER — SODIUM CHLORIDE 0.9 % (FLUSH) 0.9 %
10 SYRINGE (ML) INJECTION AS NEEDED
OUTPATIENT
Start: 2025-05-28

## 2025-05-28 RX ORDER — HEPARIN SODIUM (PORCINE) LOCK FLUSH IV SOLN 100 UNIT/ML 100 UNIT/ML
500 SOLUTION INTRAVENOUS AS NEEDED
Status: DISCONTINUED | OUTPATIENT
Start: 2025-05-28 | End: 2025-05-28 | Stop reason: HOSPADM

## 2025-05-28 RX ORDER — MAGNESIUM SULFATE HEPTAHYDRATE 40 MG/ML
2 INJECTION, SOLUTION INTRAVENOUS ONCE
Status: COMPLETED | OUTPATIENT
Start: 2025-05-28 | End: 2025-05-28

## 2025-05-28 RX ADMIN — MAGNESIUM SULFATE HEPTAHYDRATE 2 G: 40 INJECTION, SOLUTION INTRAVENOUS at 13:02

## 2025-05-28 RX ADMIN — SODIUM CHLORIDE 140 MG: 9 INJECTION, SOLUTION INTRAVENOUS at 14:03

## 2025-05-28 RX ADMIN — HEPARIN 500 UNITS: 100 SYRINGE at 15:48

## 2025-05-28 RX ADMIN — MONTELUKAST 10 MG: 10 TABLET, FILM COATED ORAL at 12:20

## 2025-05-28 RX ADMIN — FAMOTIDINE 20 MG: 10 INJECTION INTRAVENOUS at 12:20

## 2025-05-28 RX ADMIN — DIPHENHYDRAMINE HYDROCHLORIDE 25 MG: 50 INJECTION, SOLUTION INTRAMUSCULAR; INTRAVENOUS at 12:19

## 2025-05-28 RX ADMIN — SODIUM CHLORIDE 20 ML/HR: 9 INJECTION, SOLUTION INTRAVENOUS at 12:20

## 2025-05-28 RX ADMIN — Medication 10 ML: at 15:48

## 2025-05-28 RX ADMIN — DEXAMETHASONE SODIUM PHOSPHATE 12 MG: 10 INJECTION, SOLUTION INTRAMUSCULAR; INTRAVENOUS at 12:42

## 2025-05-28 RX ADMIN — MAGNESIUM SULFATE HEPTAHYDRATE 1 G: 10 INJECTION, SOLUTION INTRAVENOUS at 15:10

## 2025-05-28 NOTE — NURSING NOTE
Magnesium 1.3 replaced per protocol with 3 grams ivpb. Pt has rash to face upper body and bilateral upper extremities. Azalia HERNANDEZ assessed pt to do hydrocortisone and aquaphor cream. Patient received iv benadryl, pepcid, and dexamethasone here which is slightly improving rash. MARY Vieyra educated patient to notify us with any further issues.

## 2025-05-31 ENCOUNTER — PATIENT OUTREACH (OUTPATIENT)
Dept: RADIATION ONCOLOGY | Facility: HOSPITAL | Age: 45
End: 2025-05-31
Payer: COMMERCIAL

## 2025-06-03 ENCOUNTER — PRIOR AUTHORIZATION (OUTPATIENT)
Dept: ONCOLOGY | Facility: HOSPITAL | Age: 45
End: 2025-06-03
Payer: COMMERCIAL

## 2025-06-03 NOTE — TELEPHONE ENCOUNTER
Outcome  Approved today by [employer] 2017  PA Case: [id number], Status: Approved, Coverage Starts on: 6/3/2025 12:00:00 AM, Coverage Ends on: 6/3/2026 12:00:00 AM.  Effective Date: 6/3/2025  Authorization Expiration Date: 6/3/2026  Drug  [id number] 30MG capsules  ePA cloud logo  Form  Marisabel Klein Electronic PA Form (2017 [employer])

## 2025-06-04 ENCOUNTER — INFUSION (OUTPATIENT)
Dept: ONCOLOGY | Facility: HOSPITAL | Age: 45
End: 2025-06-04
Payer: COMMERCIAL

## 2025-06-04 ENCOUNTER — OFFICE VISIT (OUTPATIENT)
Dept: ONCOLOGY | Facility: CLINIC | Age: 45
End: 2025-06-04
Payer: COMMERCIAL

## 2025-06-04 VITALS — HEART RATE: 84 BPM | DIASTOLIC BLOOD PRESSURE: 65 MMHG | SYSTOLIC BLOOD PRESSURE: 96 MMHG

## 2025-06-04 VITALS
HEART RATE: 102 BPM | WEIGHT: 147.4 LBS | DIASTOLIC BLOOD PRESSURE: 72 MMHG | SYSTOLIC BLOOD PRESSURE: 85 MMHG | RESPIRATION RATE: 16 BRPM | OXYGEN SATURATION: 99 % | HEIGHT: 66 IN | TEMPERATURE: 97.2 F | BODY MASS INDEX: 23.69 KG/M2

## 2025-06-04 DIAGNOSIS — Z45.2 ENCOUNTER FOR CENTRAL LINE CARE: ICD-10-CM

## 2025-06-04 DIAGNOSIS — Z79.69 NEED FOR PROPHYLACTIC CHEMOTHERAPY: ICD-10-CM

## 2025-06-04 DIAGNOSIS — E83.42 HYPOMAGNESEMIA: ICD-10-CM

## 2025-06-04 DIAGNOSIS — C50.911 BREAST CANCER, STAGE 3, RIGHT: ICD-10-CM

## 2025-06-04 DIAGNOSIS — C50.911 BREAST CANCER, STAGE 3, RIGHT: Primary | ICD-10-CM

## 2025-06-04 DIAGNOSIS — E04.2 MULTIPLE THYROID NODULES: ICD-10-CM

## 2025-06-04 LAB
ALBUMIN SERPL-MCNC: 3.2 G/DL (ref 3.5–5.2)
ALBUMIN/GLOB SERPL: 1.2 G/DL
ALP SERPL-CCNC: 112 U/L (ref 39–117)
ALT SERPL W P-5'-P-CCNC: 15 U/L (ref 1–33)
ANION GAP SERPL CALCULATED.3IONS-SCNC: 11.3 MMOL/L (ref 5–15)
AST SERPL-CCNC: 18 U/L (ref 1–32)
BASOPHILS # BLD AUTO: 0.03 10*3/MM3 (ref 0–0.2)
BASOPHILS NFR BLD AUTO: 0.7 % (ref 0–1.5)
BILIRUB SERPL-MCNC: 0.2 MG/DL (ref 0–1.2)
BUN SERPL-MCNC: 11.2 MG/DL (ref 6–20)
BUN/CREAT SERPL: 19.6 (ref 7–25)
CALCIUM SPEC-SCNC: 8.7 MG/DL (ref 8.6–10.5)
CHLORIDE SERPL-SCNC: 108 MMOL/L (ref 98–107)
CO2 SERPL-SCNC: 23.7 MMOL/L (ref 22–29)
CREAT SERPL-MCNC: 0.57 MG/DL (ref 0.57–1)
DEPRECATED RDW RBC AUTO: 65.9 FL (ref 37–54)
EGFRCR SERPLBLD CKD-EPI 2021: 114.4 ML/MIN/1.73
EOSINOPHIL # BLD AUTO: 0.05 10*3/MM3 (ref 0–0.4)
EOSINOPHIL NFR BLD AUTO: 1.2 % (ref 0.3–6.2)
ERYTHROCYTE [DISTWIDTH] IN BLOOD BY AUTOMATED COUNT: 17.6 % (ref 12.3–15.4)
GLOBULIN UR ELPH-MCNC: 2.6 GM/DL
GLUCOSE SERPL-MCNC: 94 MG/DL (ref 65–99)
HCT VFR BLD AUTO: 31.9 % (ref 34–46.6)
HGB BLD-MCNC: 10.5 G/DL (ref 12–15.9)
IMM GRANULOCYTES # BLD AUTO: 0.09 10*3/MM3 (ref 0–0.05)
IMM GRANULOCYTES NFR BLD AUTO: 2.2 % (ref 0–0.5)
LYMPHOCYTES # BLD AUTO: 0.84 10*3/MM3 (ref 0.7–3.1)
LYMPHOCYTES NFR BLD AUTO: 20.3 % (ref 19.6–45.3)
MAGNESIUM SERPL-MCNC: 1.4 MG/DL (ref 1.6–2.6)
MCH RBC QN AUTO: 34 PG (ref 26.6–33)
MCHC RBC AUTO-ENTMCNC: 32.9 G/DL (ref 31.5–35.7)
MCV RBC AUTO: 103.2 FL (ref 79–97)
MONOCYTES # BLD AUTO: 0.25 10*3/MM3 (ref 0.1–0.9)
MONOCYTES NFR BLD AUTO: 6 % (ref 5–12)
NEUTROPHILS NFR BLD AUTO: 2.88 10*3/MM3 (ref 1.7–7)
NEUTROPHILS NFR BLD AUTO: 69.6 % (ref 42.7–76)
NRBC BLD AUTO-RTO: 0 /100 WBC (ref 0–0.2)
PLATELET # BLD AUTO: 343 10*3/MM3 (ref 140–450)
PMV BLD AUTO: 9.7 FL (ref 6–12)
POTASSIUM SERPL-SCNC: 3.7 MMOL/L (ref 3.5–5.2)
PROT SERPL-MCNC: 5.8 G/DL (ref 6–8.5)
RBC # BLD AUTO: 3.09 10*6/MM3 (ref 3.77–5.28)
SODIUM SERPL-SCNC: 143 MMOL/L (ref 136–145)
WBC NRBC COR # BLD AUTO: 4.14 10*3/MM3 (ref 3.4–10.8)

## 2025-06-04 PROCEDURE — 99214 OFFICE O/P EST MOD 30 MIN: CPT | Performed by: INTERNAL MEDICINE

## 2025-06-04 PROCEDURE — 25010000002 DIPHENHYDRAMINE PER 50 MG: Performed by: INTERNAL MEDICINE

## 2025-06-04 PROCEDURE — 25010000002 MAGNESIUM SULFATE 2 GM/50ML SOLUTION: Performed by: INTERNAL MEDICINE

## 2025-06-04 PROCEDURE — 96375 TX/PRO/DX INJ NEW DRUG ADDON: CPT

## 2025-06-04 PROCEDURE — 25010000002 DEXAMETHASONE SODIUM PHOSPHATE 100 MG/10ML SOLUTION: Performed by: INTERNAL MEDICINE

## 2025-06-04 PROCEDURE — 83735 ASSAY OF MAGNESIUM: CPT | Performed by: INTERNAL MEDICINE

## 2025-06-04 PROCEDURE — 85025 COMPLETE CBC W/AUTO DIFF WBC: CPT | Performed by: INTERNAL MEDICINE

## 2025-06-04 PROCEDURE — 25010000002 HEPARIN LOCK FLUSH PER 10 UNITS: Performed by: INTERNAL MEDICINE

## 2025-06-04 PROCEDURE — 25010000002 PACLITAXEL PER 1 MG: Performed by: INTERNAL MEDICINE

## 2025-06-04 PROCEDURE — 96413 CHEMO IV INFUSION 1 HR: CPT

## 2025-06-04 PROCEDURE — 96367 TX/PROPH/DG ADDL SEQ IV INF: CPT

## 2025-06-04 PROCEDURE — 25010000002 FAMOTIDINE 10 MG/ML SOLUTION: Performed by: INTERNAL MEDICINE

## 2025-06-04 PROCEDURE — 80053 COMPREHEN METABOLIC PANEL: CPT | Performed by: INTERNAL MEDICINE

## 2025-06-04 PROCEDURE — 25810000003 SODIUM CHLORIDE 0.9 % SOLUTION 250 ML FLEX CONT: Performed by: INTERNAL MEDICINE

## 2025-06-04 PROCEDURE — 25010000002 MAGNESIUM SULFATE IN D5W 1G/100ML (PREMIX) 1-5 GM/100ML-% SOLUTION: Performed by: INTERNAL MEDICINE

## 2025-06-04 PROCEDURE — 25810000003 SODIUM CHLORIDE 0.9 % SOLUTION: Performed by: INTERNAL MEDICINE

## 2025-06-04 RX ORDER — HEPARIN SODIUM (PORCINE) LOCK FLUSH IV SOLN 100 UNIT/ML 100 UNIT/ML
500 SOLUTION INTRAVENOUS AS NEEDED
Status: DISCONTINUED | OUTPATIENT
Start: 2025-06-04 | End: 2025-06-04 | Stop reason: HOSPADM

## 2025-06-04 RX ORDER — SODIUM CHLORIDE 0.9 % (FLUSH) 0.9 %
10 SYRINGE (ML) INJECTION AS NEEDED
OUTPATIENT
Start: 2025-06-04

## 2025-06-04 RX ORDER — SODIUM CHLORIDE 0.9 % (FLUSH) 0.9 %
10 SYRINGE (ML) INJECTION AS NEEDED
Status: DISCONTINUED | OUTPATIENT
Start: 2025-06-04 | End: 2025-06-04 | Stop reason: HOSPADM

## 2025-06-04 RX ORDER — HEPARIN SODIUM (PORCINE) LOCK FLUSH IV SOLN 100 UNIT/ML 100 UNIT/ML
500 SOLUTION INTRAVENOUS AS NEEDED
OUTPATIENT
Start: 2025-06-04

## 2025-06-04 RX ORDER — FAMOTIDINE 10 MG/ML
20 INJECTION, SOLUTION INTRAVENOUS AS NEEDED
Status: CANCELLED | OUTPATIENT
Start: 2025-06-04

## 2025-06-04 RX ORDER — SODIUM CHLORIDE 9 MG/ML
20 INJECTION, SOLUTION INTRAVENOUS ONCE
Status: COMPLETED | OUTPATIENT
Start: 2025-06-04 | End: 2025-06-04

## 2025-06-04 RX ORDER — HYDROCORTISONE SODIUM SUCCINATE 100 MG/2ML
100 INJECTION INTRAMUSCULAR; INTRAVENOUS AS NEEDED
Status: CANCELLED | OUTPATIENT
Start: 2025-06-04

## 2025-06-04 RX ORDER — MONTELUKAST SODIUM 10 MG/1
10 TABLET ORAL ONCE
Status: COMPLETED | OUTPATIENT
Start: 2025-06-04 | End: 2025-06-04

## 2025-06-04 RX ORDER — MAGNESIUM SULFATE HEPTAHYDRATE 40 MG/ML
2 INJECTION, SOLUTION INTRAVENOUS ONCE
Status: COMPLETED | OUTPATIENT
Start: 2025-06-04 | End: 2025-06-04

## 2025-06-04 RX ORDER — SODIUM CHLORIDE 9 MG/ML
20 INJECTION, SOLUTION INTRAVENOUS ONCE
Status: CANCELLED | OUTPATIENT
Start: 2025-06-04

## 2025-06-04 RX ORDER — MAGNESIUM SULFATE 1 G/100ML
1 INJECTION INTRAVENOUS ONCE
Status: COMPLETED | OUTPATIENT
Start: 2025-06-04 | End: 2025-06-04

## 2025-06-04 RX ORDER — MONTELUKAST SODIUM 10 MG/1
10 TABLET ORAL ONCE
Status: CANCELLED
Start: 2025-06-04 | End: 2025-06-04

## 2025-06-04 RX ORDER — FAMOTIDINE 10 MG/ML
20 INJECTION, SOLUTION INTRAVENOUS ONCE
Status: COMPLETED | OUTPATIENT
Start: 2025-06-04 | End: 2025-06-04

## 2025-06-04 RX ORDER — DIPHENHYDRAMINE HYDROCHLORIDE 50 MG/ML
50 INJECTION, SOLUTION INTRAMUSCULAR; INTRAVENOUS AS NEEDED
Status: CANCELLED | OUTPATIENT
Start: 2025-06-04

## 2025-06-04 RX ORDER — FAMOTIDINE 10 MG/ML
20 INJECTION, SOLUTION INTRAVENOUS ONCE
Status: CANCELLED | OUTPATIENT
Start: 2025-06-04

## 2025-06-04 RX ADMIN — DIPHENHYDRAMINE HYDROCHLORIDE 25 MG: 50 INJECTION, SOLUTION INTRAMUSCULAR; INTRAVENOUS at 10:57

## 2025-06-04 RX ADMIN — DEXAMETHASONE SODIUM PHOSPHATE 12 MG: 10 INJECTION, SOLUTION INTRAMUSCULAR; INTRAVENOUS at 10:41

## 2025-06-04 RX ADMIN — SODIUM CHLORIDE 20 ML/HR: 9 INJECTION, SOLUTION INTRAVENOUS at 10:38

## 2025-06-04 RX ADMIN — MAGNESIUM SULFATE HEPTAHYDRATE 1 G: 10 INJECTION, SOLUTION INTRAVENOUS at 11:12

## 2025-06-04 RX ADMIN — SODIUM CHLORIDE 140 MG: 9 INJECTION, SOLUTION INTRAVENOUS at 11:46

## 2025-06-04 RX ADMIN — FAMOTIDINE 20 MG: 10 INJECTION INTRAVENOUS at 10:39

## 2025-06-04 RX ADMIN — MONTELUKAST 10 MG: 10 TABLET, FILM COATED ORAL at 10:38

## 2025-06-04 RX ADMIN — Medication 10 ML: at 14:16

## 2025-06-04 RX ADMIN — MAGNESIUM SULFATE IN WATER FOR 2 G: 40 INJECTION INTRAVENOUS at 12:52

## 2025-06-04 RX ADMIN — HEPARIN 500 UNITS: 100 SYRINGE at 14:16

## 2025-06-04 NOTE — PROGRESS NOTES
Subjective     REASON FOR CONSULTATION:  breast cancer  Provide an opinion on any further workup or treatment                             REQUESTING PHYSICIAN:  Jose    RECORDS OBTAINED:  Records of the patients history including those obtained from the referring provider were reviewed and summarized in detail.    HISTORY OF PRESENT ILLNESS:  The patient is a 45 y.o. year old female who is here for an opinion about the above issue.    History of Present Illness   The patient return today for follow-up and to continue Taxol component of her adjuvant chemotherapy.  She has so far completed AC x 4 cycles and 11 weeks of Taxol.    The patient is tolerating Taxol well with no significant nausea vomiting diarrhea.  She has no significant peripheral neuropathy.  She complains of a skin rash bilateral forearms using topical hydrocortisone.    ONCOLOGY HISTORY:  This is a 44-year-old woman referred from general surgery to discuss adjuvant treatment for recently surgically treated breast cancer.  The patient presented with a large palpable mass in the inner quadrant of the right breast.  The mass had been enlarging for couple years but the patient did not have insurance to get assessed.  The breast imaging is not available to me but she had a CT of the chest abdomen pelvis on 9/16/2024 showed a large mass in the right breast measuring up to 7.7 cm with abnormal left axillary lymphadenopathy.  There were tiny subpleural nodules in the lateral aspect of the left lower lobe likely granulomatous mildly conspicuous lymph node in the central mesentery 1.1 cm.  A PET scan was performed 9/30/2024 showing a hypermetabolic mass in the right breast with thickening throughout the right breast and pathologic hypermetabolic right axillary level 1 and 2 lymph nodes, no hypermetabolic internal mammary or supraclavicular lymph nodes.  The small pulmonary nodules were too small to characterize and mesenteric lymph nodes without  hypermetabolic activity.  A biopsy of the right breast mass showed invasive ductal adenocarcinoma.    She was taken to the operating room on 10/9/2024 and underwent a right modified radical mastectomy and axillary dissection, prophylactic left mastectomy.  Pathology from the right breast showed invasive ductal carcinoma [place] grade 3 (3+3+3) measuring 17 cm with extensive lymphovascular invasion and dermal lymphatic invasion.  The tumor extended to the dermis but did not ulcerate the skin.  Tumor extended to skeletal muscle and was present focally at an anterior margin, 0.05 mm of the posterior margin, 10 mm from the lateral margin, 20 mm from the medial margin, 28 mm from the superior margin and 40 mm from inferior margin.  There was ductal carcinoma in situ present within 2.5 mm of the posterior margin.  There were 13 lymph nodes in the axillary dissection 8 oncology plan/recommendations: of which had macrometastases, 1 micrometastases and 1 lymph node with isolated tumor cells.  The left breast had an intraductal papilloma otherwise negative.  The tumor was positive for estrogen receptor 99% of cells, progesterone receptor 50% of cells, HER2 0 and Ki-67 65%.    The patient has medical comorbidities of Crohn's disease.    Past Medical History:   Diagnosis Date    Anxiety     Breast cancer     Right    Crohn's disease     DVT (deep vein thrombosis) in pregnancy     PFO (patent foramen ovale)     Stroke     after the birth of her child at age 34    Tattoos         Past Surgical History:   Procedure Laterality Date     SECTION      COLON RESECTION      COLONOSCOPY      MASTECTOMY Bilateral 10/9/2024    Procedure: Modified radical mastectomy with axillary dissection of the right breast and simple mastectomy of the left breast;  Surgeon: Rebekah Garcia DO;  Location: [facility];  Service: General;  Laterality: Bilateral;    SKIN CANCER EXCISION      TEETH EXTRACTION N/A 2025    Procedure:  TOOTH EXTRACTION #2,15,19,28,29;  Surgeon: Pramod Zambrano DMD;  Location: [facility] MAIN OR;  Service: Oral Surgery;  Laterality: N/A;    VENOUS ACCESS DEVICE (PORT) INSERTION N/A 12/10/2024    Procedure: INSERTION VENOUS ACCESS DEVICE;  Surgeon: Rebekah Garcia DO;  Location:  [facility] OR;  Service: General;  Laterality: N/A;        Current Outpatient Medications on File Prior to Visit   Medication Sig Dispense Refill    aspirin 81 MG EC tablet Take 1 tablet by mouth Daily.      dicyclomine (BENTYL) 10 MG capsule Take 1 capsule by mouth 3 (Three) Times a Day As Needed for Abdominal Cramping. 90 capsule 2    gabapentin (Neurontin) 300 MG capsule Take 2 capsules by mouth Every Night. 60 capsule 2    lidocaine-prilocaine (EMLA) 2.5-2.5 % cream Apply 1 Application topically to the appropriate area as directed As Needed for Mild Pain. Apply to port site 30 minutes before access 15 g 3    omeprazole (priLOSEC) 20 MG capsule Take 1 capsule by mouth As Needed.      ondansetron (ZOFRAN) 8 MG tablet Take 1 tablet by mouth 3 (Three) Times a Day As Needed for Nausea or Vomiting. 30 tablet 5    potassium chloride (KLOR-CON M10) 10 MEQ CR tablet Take 3 tablets by mouth 2 (Two) Times a Day. 180 tablet 4    venlafaxine XR (Effexor XR) 37.5 MG 24 hr capsule Take 1 capsule by mouth Take As Directed. 1 capsule po q am for two weeks followed by increase to 2 capsules daily 60 capsule 2    ondansetron (ZOFRAN) 8 MG tablet Take 1 tablet by mouth 3 (Three) Times a Day As Needed for Nausea or Vomiting. (Patient not taking: Reported on 6/4/2025) 30 tablet 5    prochlorperazine (COMPAZINE) 10 MG tablet Take 1 tablet by mouth Every 6 (Six) Hours As Needed for Nausea or Vomiting. (Patient not taking: Reported on 6/4/2025) 60 tablet 1    QUEtiapine (SEROquel) 25 MG tablet Take 1 tablet by mouth Every Night. (Patient not taking: Reported on 6/4/2025) 30 tablet 2     Current Facility-Administered Medications on File Prior to Visit   Medication  "Dose Route Frequency Provider Last Rate Last Admin    heparin injection 500 Units  500 Units Intravenous PRNishant Padilla MD   500 Units at 24 0916    heparin injection 500 Units  500 Units Intravenous PRN Nishant Witt MD   500 Units at 25 1049    sodium chloride 0.9 % flush 10 mL  10 mL Intravenous PRN Nishant Witt MD   10 mL at 24 0916    sodium chloride 0.9 % flush 10 mL  10 mL Intravenous PRN Nishant Witt MD   10 mL at 25 1048        ALLERGIES:  No Known Allergies     Social History     Socioeconomic History    Marital status: Single    Number of children: 1   Tobacco Use    Smoking status: Former     Current packs/day: 0.00     Average packs/day: 1 pack/day for 20.0 years (20.0 ttl pk-yrs)     Types: Cigarettes     Start date:      Quit date:      Years since quittin.4    Smokeless tobacco: Current    Tobacco comments:     Nicotine pouches   Vaping Use    Vaping status: Never Used   Substance and Sexual Activity    Alcohol use: Yes     Comment: OCC    Drug use: Never    Sexual activity: Defer        Family History   Problem Relation Age of Onset    Diabetes Mother     Heart disease Father     Diabetes Paternal Grandmother     Hypertension Other     Malig Hyperthermia Neg Hx         Review of Systems   Constitutional:  Positive for fatigue. Negative for fever.   HENT:  Positive for congestion and sinus pressure.    Respiratory: Negative.     Cardiovascular: Negative.    Gastrointestinal:  Negative for diarrhea and nausea.   Genitourinary: Negative.    Musculoskeletal: Negative.    Skin:  Positive for rash. Negative for wound.   Neurological: Negative.    Hematological: Negative.    Psychiatric/Behavioral:  Negative for dysphoric mood.    Unchanged 2025      Objective     Vitals:    25 0920   BP: (!) 85/72   Pulse: 102   Resp: 16   Temp: 97.2 °F (36.2 °C)   TempSrc: Infrared   SpO2: 99%   Weight: 66.9 kg (147 lb 6.4 oz)   Height: 167.6 cm (65.98\") "   PainSc: 0-No pain               6/4/2025     9:20 AM   Current Status   ECOG score 0       Physical Exam    CONSTITUTIONAL: pleasant well-developed adult woman  HEENT: no icterus, no thrush, moist membranes   LYMPH: no cervical or supraclavicular lad  CV: RRR, S1S2, no murmur  RESP/chest: cta bilat, no wheezing, no rales, port present left chest wall  Breast: Deferred  GI: soft, nontender, no splenomegaly, +BS  MUSC: no edema, normal gait  NEURO: alert and oriented x3, normal strength  PSYCH: normal mood and affect  Skin: Purpuric rash bilateral forearms slightly raised    RECENT LABS:  Hematology WBC   Date Value Ref Range Status   06/04/2025 4.14 3.40 - 10.80 10*3/mm3 Final     RBC   Date Value Ref Range Status   06/04/2025 3.09 (L) 3.77 - 5.28 10*6/mm3 Final     Hemoglobin   Date Value Ref Range Status   06/04/2025 10.5 (L) 12.0 - 15.9 g/dL Final     Hematocrit   Date Value Ref Range Status   06/04/2025 31.9 (L) 34.0 - 46.6 % Final     Platelets   Date Value Ref Range Status   06/04/2025 343 140 - 450 10*3/mm3 Final        Lab Results   Component Value Date    GLUCOSE 94 06/04/2025    BUN 11.2 06/04/2025    CREATININE 0.57 06/04/2025     06/04/2025    K 3.7 06/04/2025     (H) 06/04/2025    CALCIUM 8.7 06/04/2025    PROTEINTOT 5.8 (L) 06/04/2025    ALBUMIN 3.2 (L) 06/04/2025    ALT 15 06/04/2025    AST 18 06/04/2025    ALKPHOS 112 06/04/2025    BILITOT 0.2 06/04/2025    GLOB 2.6 06/04/2025    AGRATIO 1.2 06/04/2025    BCR 19.6 06/04/2025    ANIONGAP 11.3 06/04/2025    EGFR 114.4 06/04/2025     PET scan 9/30/2024:  FINDINGS:     Head/neck: No suspicious uptake.     Chest: An 8 x 5.8 cm mass in the lower inner right breast with max SUV  of 18 (series 4/image 156). Mass comes in close proximity to the  sternum. Hypermetabolic parenchymal thickening throughout the right  breast with max SUV of 9 (series 4/image 140). Diffuse right breast skin  thickening.     Hypermetabolic right axillary level I and II  lymph nodes. Reference 1.2  cm level I lymph node with max SUV of 11.9 (series 4/image 103).  Additional reference subcentimeter level II lymph node with max SUV of  3.3 (series 4/image 93). Separate brown fat uptake in the bilateral  axilla and posterior neck base.     No enlarged or hypermetabolic internal mammary or supraclavicular lymph  nodes.     Few subcentimeter pulmonary nodules are too small for accurate PET  characterization without overt hypermetabolism and unchanged from recent  CT. Reference left lower lobe (series 4/image 167) and right upper lobe  (series 4/image 131).     Abdomen/pelvis: Mesenteric lymph nodes are mostly subcentimeter without  associated hypermetabolism.     Sigmoid colectomy. Tubular hypermetabolism throughout the otherwise  normal-appearing remaining colon may be medication related. Moderate  proximal colonic stool burden.     Bones: No focal osseous hypermetabolism with special attention to the  sternum.           IMPRESSION:  1. Hypermetabolic 8 cm right breast mass with hypermetabolic parenchymal  thickening throughout the right breast, pathologically proven invasive  ductal carcinoma. Mass comes in close proximity to the sternum. No focal  osseous hypermetabolism with special attention to the sternum.  2. Hypermetabolic right axillary level I and II lymph nodes suggesting  alana metastatic disease. No enlarged or hypermetabolic internal mammary  or supraclavicular lymph nodes. Separate brown fat uptake in the  bilateral axilla and posterior neck base.  3. Few subcentimeter pulmonary nodules are too small for accurate PET  characterization and will need follow-up via chest CT.  4. Mesenteric lymph nodes are mostly subcentimeter without associated  hypermetabolism.       Assessment & Plan   *pT3N2a M0 grade 3 invasive ductal adenocarcinoma right breast, ER 99% positive, OH 50% positive, HER2 0, Ki-67 65%, tumor present at anterior margin  presented with a large palpable mass in  the inner quadrant of the right breast; mass had been enlarging for couple years but the patient did not have insurance to get assessed  CT of the chest abdomen pelvis on 9/16/2024 showed a large mass in the right breast measuring up to 7.7 cm with abnormal left axillary lymphadenopathy.  There were tiny subpleural nodules in the lateral aspect of the left lower lobe likely granulomatous mildly conspicuous lymph node in the central mesentery 1.1 cm.    PET scan 9/30/2024 - hypermetabolic mass in the right breast with thickening throughout the right breast and pathologic hypermetabolic right axillary level 1 and 2 lymph nodes, no hypermetabolic internal mammary or supraclavicular lymph nodes.  The small pulmonary nodules were too small to characterize and mesenteric lymph nodes without hypermetabolic activity.    biopsy of the right breast mass showed invasive ductal adenocarcinoma.  operating room on 10/9/2024 and underwent a right modified radical mastectomy and axillary dissection, prophylactic left mastectomy.  Pathology from the right breast showed invasive ductal carcinoma [place] grade 3 (3+3+3) measuring 17 cm with extensive lymphovascular invasion and dermal lymphatic invasion.  The tumor extended to the dermis but did not ulcerate the skin.  Tumor extended to skeletal muscle and was present focally at an anterior margin, 0.05 mm of the posterior margin, 10 mm from the lateral margin, 20 mm from the medial margin, 28 mm from the superior margin and 40 mm from inferior margin.  There was ductal carcinoma in situ present within 2.5 mm of the posterior margin.  There were 13 lymph nodes in the axillary dissection 8 of which had macrometastases, 1 micrometastases and 1 lymph node with isolated tumor cells.  The left breast had an intraductal papilloma otherwise negative.  The tumor was positive for estrogen receptor 99% of cells, progesterone receptor 50% of cells, HER2 0 and Ki-67 65%.  Initiated adjuvant  chemotherapy with Adriamycin/Cytoxan 12/31/2024 (adjuvant chemotherapy delayed because of wound healing issues)   3/18/2025-initiated weekly Taxol    *Lung nodules  Baseline CT chest abdomen pelvis 9/16/2024-tiny subpleural nodule in the left lower lobe likely granulomatous, mildly conspicuous lymph node in the central mesentery 1.1 [id number] PET negative uptake in pulmonary nodules or mesenteric lymph nodes  3/8/2025 CT angiogram chest few stable sub-6 mm pulmonary nodules    *Myelosuppression secondary to chemotherapy   Blood counts adequate to proceed with Taxol today    *Abnormal thyroid ultrasound-2 suspicious right thyroid nodules      * nausea/diarrhea/cramping, chemo induced compounded by Crohn's  responding to Imodium/Zofran/Compazine    *Hypokalemia on oral replacement-3.7 today; hypomagnesemia-1.4    *[employer] 19 gene panel-VUS Bard 1    *medical comorbidities of Crohn's disease.    Oncology plan/recommendations:  Patient to complete chemotherapy today with week 12 Taxol  IV magnesium today, continue oral potassium  Recheck blood pressure today before patient leaves after chemotherapy  1 week BMP potassium check  After chemotherapy she will need postmastectomy radiation given the size and alana involvement-referral made  FNA right thyroid nodules x 2  She will need hormonal therapy with ovarian suppression/tamoxifen versus oophorectomy and AI therapy/ck4/6 inhibitor

## 2025-06-06 ENCOUNTER — HOSPITAL ENCOUNTER (OUTPATIENT)
Dept: ULTRASOUND IMAGING | Facility: HOSPITAL | Age: 45
Discharge: HOME | End: 2025-06-06
Payer: COMMERCIAL

## 2025-06-06 DIAGNOSIS — E04.2 MULTIPLE THYROID NODULES: ICD-10-CM

## 2025-06-06 PROCEDURE — 25010000002 LIDOCAINE 1 % SOLUTION: Performed by: INTERNAL MEDICINE

## 2025-06-06 RX ORDER — SODIUM BICARBONATE 42 MG/ML
0.5 INJECTION, SOLUTION INTRAVENOUS ONCE
Status: COMPLETED | OUTPATIENT
Start: 2025-06-06 | End: 2025-06-06

## 2025-06-06 RX ORDER — LIDOCAINE HYDROCHLORIDE 10 MG/ML
5 INJECTION, SOLUTION INFILTRATION; PERINEURAL ONCE
Status: COMPLETED | OUTPATIENT
Start: 2025-06-06 | End: 2025-06-06

## 2025-06-06 RX ADMIN — SODIUM BICARBONATE 0.5 MEQ: 42 INJECTION, SOLUTION INTRAVENOUS at 10:29

## 2025-06-06 RX ADMIN — LIDOCAINE HYDROCHLORIDE 5 ML: 10 INJECTION, SOLUTION INFILTRATION; PERINEURAL at 10:30

## 2025-06-09 ENCOUNTER — TELEPHONE (OUTPATIENT)
Dept: ONCOLOGY | Facility: CLINIC | Age: 45
End: 2025-06-09
Payer: COMMERCIAL

## 2025-06-09 LAB
CYTO UR: NORMAL
DX PRELIMINARY: NORMAL
LAB AP CASE REPORT: NORMAL
LAB AP DIAGNOSIS COMMENT: NORMAL
LAB AP NON-GYN SPECIMEN ADEQUACY: NORMAL
PATH REPORT.FINAL DX SPEC: NORMAL
PATH REPORT.GROSS SPEC: NORMAL

## 2025-06-11 ENCOUNTER — INFUSION (OUTPATIENT)
Dept: ONCOLOGY | Facility: HOSPITAL | Age: 45
End: 2025-06-11
Payer: COMMERCIAL

## 2025-06-11 VITALS
WEIGHT: 146.8 LBS | HEART RATE: 89 BPM | OXYGEN SATURATION: 98 % | RESPIRATION RATE: 18 BRPM | BODY MASS INDEX: 23.71 KG/M2 | SYSTOLIC BLOOD PRESSURE: 92 MMHG | TEMPERATURE: 97.8 F | DIASTOLIC BLOOD PRESSURE: 62 MMHG

## 2025-06-11 DIAGNOSIS — Z45.2 ENCOUNTER FOR CENTRAL LINE CARE: ICD-10-CM

## 2025-06-11 DIAGNOSIS — E83.42 HYPOMAGNESEMIA: ICD-10-CM

## 2025-06-11 DIAGNOSIS — C50.911 BREAST CANCER, STAGE 3, RIGHT: ICD-10-CM

## 2025-06-11 DIAGNOSIS — D50.9 IRON DEFICIENCY ANEMIA, UNSPECIFIED IRON DEFICIENCY ANEMIA TYPE: Primary | ICD-10-CM

## 2025-06-11 DIAGNOSIS — E04.2 MULTIPLE THYROID NODULES: ICD-10-CM

## 2025-06-11 LAB
ANION GAP SERPL CALCULATED.3IONS-SCNC: 11.4 MMOL/L (ref 5–15)
BASOPHILS # BLD AUTO: 0.03 10*3/MM3 (ref 0–0.2)
BASOPHILS NFR BLD AUTO: 0.8 % (ref 0–1.5)
BUN SERPL-MCNC: 8.6 MG/DL (ref 6–20)
BUN/CREAT SERPL: 13.4 (ref 7–25)
CALCIUM SPEC-SCNC: 8.8 MG/DL (ref 8.6–10.5)
CHLORIDE SERPL-SCNC: 105 MMOL/L (ref 98–107)
CO2 SERPL-SCNC: 23.6 MMOL/L (ref 22–29)
CREAT SERPL-MCNC: 0.64 MG/DL (ref 0.57–1)
DEPRECATED RDW RBC AUTO: 65.9 FL (ref 37–54)
EGFRCR SERPLBLD CKD-EPI 2021: 111.2 ML/MIN/1.73
EOSINOPHIL # BLD AUTO: 0.06 10*3/MM3 (ref 0–0.4)
EOSINOPHIL NFR BLD AUTO: 1.6 % (ref 0.3–6.2)
ERYTHROCYTE [DISTWIDTH] IN BLOOD BY AUTOMATED COUNT: 16.7 % (ref 12.3–15.4)
FERRITIN SERPL-MCNC: 799 NG/ML (ref 13–150)
GLUCOSE SERPL-MCNC: 116 MG/DL (ref 65–99)
HCT VFR BLD AUTO: 31 % (ref 34–46.6)
HGB BLD-MCNC: 10.3 G/DL (ref 12–15.9)
IMM GRANULOCYTES # BLD AUTO: 0.09 10*3/MM3 (ref 0–0.05)
IMM GRANULOCYTES NFR BLD AUTO: 2.3 % (ref 0–0.5)
IRON 24H UR-MRATE: 41 MCG/DL (ref 37–145)
IRON SATN MFR SERPL: 16 % (ref 20–50)
LYMPHOCYTES # BLD AUTO: 0.72 10*3/MM3 (ref 0.7–3.1)
LYMPHOCYTES NFR BLD AUTO: 18.8 % (ref 19.6–45.3)
MAGNESIUM SERPL-MCNC: 1.4 MG/DL (ref 1.6–2.6)
MCH RBC QN AUTO: 35.2 PG (ref 26.6–33)
MCHC RBC AUTO-ENTMCNC: 33.2 G/DL (ref 31.5–35.7)
MCV RBC AUTO: 105.8 FL (ref 79–97)
MONOCYTES # BLD AUTO: 0.24 10*3/MM3 (ref 0.1–0.9)
MONOCYTES NFR BLD AUTO: 6.3 % (ref 5–12)
NEUTROPHILS NFR BLD AUTO: 2.69 10*3/MM3 (ref 1.7–7)
NEUTROPHILS NFR BLD AUTO: 70.2 % (ref 42.7–76)
PLATELET # BLD AUTO: 384 10*3/MM3 (ref 140–450)
PMV BLD AUTO: 9.5 FL (ref 6–12)
POTASSIUM SERPL-SCNC: 3.2 MMOL/L (ref 3.5–5.2)
RBC # BLD AUTO: 2.93 10*6/MM3 (ref 3.77–5.28)
SODIUM SERPL-SCNC: 140 MMOL/L (ref 136–145)
TIBC SERPL-MCNC: 256 MCG/DL (ref 298–536)
TRANSFERRIN SERPL-MCNC: 172 MG/DL (ref 200–360)
WBC NRBC COR # BLD AUTO: 3.83 10*3/MM3 (ref 3.4–10.8)

## 2025-06-11 PROCEDURE — 85025 COMPLETE CBC W/AUTO DIFF WBC: CPT | Performed by: NURSE PRACTITIONER

## 2025-06-11 PROCEDURE — 25010000002 HEPARIN LOCK FLUSH PER 10 UNITS: Performed by: INTERNAL MEDICINE

## 2025-06-11 PROCEDURE — 83540 ASSAY OF IRON: CPT | Performed by: NURSE PRACTITIONER

## 2025-06-11 PROCEDURE — 80048 BASIC METABOLIC PNL TOTAL CA: CPT | Performed by: INTERNAL MEDICINE

## 2025-06-11 PROCEDURE — 25010000002 MAGNESIUM SULFATE IN D5W 1G/100ML (PREMIX) 1-5 GM/100ML-% SOLUTION: Performed by: NURSE PRACTITIONER

## 2025-06-11 PROCEDURE — 96361 HYDRATE IV INFUSION ADD-ON: CPT

## 2025-06-11 PROCEDURE — 83735 ASSAY OF MAGNESIUM: CPT | Performed by: INTERNAL MEDICINE

## 2025-06-11 PROCEDURE — 96365 THER/PROPH/DIAG IV INF INIT: CPT

## 2025-06-11 PROCEDURE — 25010000002 MAGNESIUM SULFATE 2 GM/50ML SOLUTION: Performed by: NURSE PRACTITIONER

## 2025-06-11 PROCEDURE — 82728 ASSAY OF FERRITIN: CPT | Performed by: NURSE PRACTITIONER

## 2025-06-11 PROCEDURE — 96367 TX/PROPH/DG ADDL SEQ IV INF: CPT

## 2025-06-11 PROCEDURE — 25810000003 SODIUM CHLORIDE 0.9 % SOLUTION: Performed by: NURSE PRACTITIONER

## 2025-06-11 PROCEDURE — 84466 ASSAY OF TRANSFERRIN: CPT | Performed by: NURSE PRACTITIONER

## 2025-06-11 RX ORDER — SODIUM CHLORIDE 0.9 % (FLUSH) 0.9 %
10 SYRINGE (ML) INJECTION AS NEEDED
OUTPATIENT
Start: 2025-06-11

## 2025-06-11 RX ORDER — HEPARIN SODIUM (PORCINE) LOCK FLUSH IV SOLN 100 UNIT/ML 100 UNIT/ML
500 SOLUTION INTRAVENOUS AS NEEDED
OUTPATIENT
Start: 2025-06-11

## 2025-06-11 RX ORDER — MAGNESIUM SULFATE 1 G/100ML
1 INJECTION INTRAVENOUS ONCE
Status: COMPLETED | OUTPATIENT
Start: 2025-06-11 | End: 2025-06-11

## 2025-06-11 RX ORDER — HEPARIN SODIUM (PORCINE) LOCK FLUSH IV SOLN 100 UNIT/ML 100 UNIT/ML
500 SOLUTION INTRAVENOUS AS NEEDED
Status: DISCONTINUED | OUTPATIENT
Start: 2025-06-11 | End: 2025-06-11 | Stop reason: HOSPADM

## 2025-06-11 RX ORDER — MAGNESIUM SULFATE HEPTAHYDRATE 40 MG/ML
2 INJECTION, SOLUTION INTRAVENOUS ONCE
Status: COMPLETED | OUTPATIENT
Start: 2025-06-11 | End: 2025-06-11

## 2025-06-11 RX ORDER — SODIUM CHLORIDE 0.9 % (FLUSH) 0.9 %
10 SYRINGE (ML) INJECTION AS NEEDED
Status: DISCONTINUED | OUTPATIENT
Start: 2025-06-11 | End: 2025-06-11 | Stop reason: HOSPADM

## 2025-06-11 RX ADMIN — Medication 10 ML: at 10:59

## 2025-06-11 RX ADMIN — SODIUM CHLORIDE 500 ML: 9 INJECTION, SOLUTION INTRAVENOUS at 08:35

## 2025-06-11 RX ADMIN — MAGNESIUM SULFATE HEPTAHYDRATE 1 G: 10 INJECTION, SOLUTION INTRAVENOUS at 10:26

## 2025-06-11 RX ADMIN — HEPARIN 500 UNITS: 100 SYRINGE at 10:59

## 2025-06-11 RX ADMIN — MAGNESIUM SULFATE IN WATER FOR 2 G: 40 INJECTION INTRAVENOUS at 09:27

## 2025-06-11 NOTE — NURSING NOTE
Potassium 3.2 pt reports diarrhea increase potassium to 40 meq bid. Magnesium is 1.4 replaced magnesium per protocol 3 grams. 500cc of  Ivf administered and blood pressure improved. Appt scheduled for 6/25/25 to recheck labs pt called to verify.

## 2025-06-12 ENCOUNTER — TELEMEDICINE (OUTPATIENT)
Dept: PSYCHIATRY | Facility: HOSPITAL | Age: 45
End: 2025-06-12
Payer: COMMERCIAL

## 2025-06-12 DIAGNOSIS — R23.2 HOT FLASHES: ICD-10-CM

## 2025-06-12 DIAGNOSIS — R41.841 COGNITIVE COMMUNICATION DEFICIT: ICD-10-CM

## 2025-06-12 DIAGNOSIS — F43.23 ADJUSTMENT DISORDER WITH MIXED ANXIETY AND DEPRESSED MOOD: Primary | ICD-10-CM

## 2025-06-12 DIAGNOSIS — C50.911 BREAST CANCER, STAGE 3, RIGHT: ICD-10-CM

## 2025-06-12 RX ORDER — GABAPENTIN 300 MG/1
600 CAPSULE ORAL NIGHTLY
Qty: 60 CAPSULE | Refills: 2 | Status: SHIPPED | OUTPATIENT
Start: 2025-06-12 | End: 2026-06-12

## 2025-06-12 RX ORDER — VENLAFAXINE HYDROCHLORIDE 37.5 MG/1
37.5 CAPSULE, EXTENDED RELEASE ORAL DAILY
Qty: 30 CAPSULE | Refills: 0 | Status: SHIPPED | OUTPATIENT
Start: 2025-06-12 | End: 2026-06-12

## 2025-06-12 NOTE — Clinical Note
Lata Ramirez and Paula, Not sure your anticipated time to be able to approach referrals, but wanted to see if maybe you could offer me some assistance or perhaps consider some resources this patient might be able to tap into?  Lives in [place], about to initiate radiation. Budget is tight. Concerned for cost of daily drive. My note explains a little more, but ultimately she is a  and employer has continued to pay her but reducing over time. Does not have disability benefits... please share any ideas if you have them! She does report hesitancy to answer unknown numbers and requests we all LM for her to return call if needed. Thank you! Nilam

## 2025-06-12 NOTE — PROGRESS NOTES
"Behavioral Oncology Services  Video visit conducted via [employer] Video Visit  You have chosen to receive care through a telehealth visit.  Do you consent to use a video/audio connection for your medical care today? YES  Telehealth provided in patient's home.  Location of Provider: [place], [place]     Subjective  Patient ID: Suzanne Lin is a 45 y.o. female is seen via telehealth in the Behavioral Oncology Clinic.    CC: Fatigue, SOA    HPI/ Interval History:   Pt seen in follow-up regarding symptoms associated with treatment for breast cancer.    Reports gradual improvement in energy, no longer getting out of breath as easy, able to make biscuits and gravy today. Finished final course of taxol last week, anticipating radiation upcoming. Does mourn need to travel to [place] daily, eager to complete this, endorsing stress related to impending gas expenses. Does note employer continues to pay her, although paycheck is decreasing. Has been requested to look into disability, not having these specific benefits with current employer. Does not suspect she is eligible for this. Continues with natural resilience, while endorses stress associated with financial burden.    Medications reviewed; continues on gabapentin 600 mg nightly, reporting appropriate symptom management and effective sleep.  Continues on venlafaxine 37.5 milligrams daily.  Hot flashes are present, although not intrusive.  Does endorse some increase in anxiety as of recently, while finding this to be appropriate to situation.  Does endorse challenging cognitive side effects associated with treatment stating \"my brain is fried.\"  Confirms cognitive fog, issues with word finding, fatigue, and adjusted memory as compared to baseline.    Does have pruritic rash on left wrist, right arm, and forehead, reportedly related to recent chemo.     PHQ-9 remains subthreshold.    Exam: As above    Recent Labs Reviewed:  Infusion on 06/11/2025   Component Date Value "    Glucose 06/11/2025 116 (H)     BUN 06/11/2025 8.6     Creatinine 06/11/2025 0.64     Sodium 06/11/2025 140     Potassium 06/11/2025 3.2 (L)     Chloride 06/11/2025 105     CO2 06/11/2025 23.6     Calcium 06/11/2025 8.8     BUN/Creatinine Ratio 06/11/2025 13.4     Anion Gap 06/11/2025 11.4     eGFR 06/11/2025 111.2     Magnesium 06/11/2025 1.4 (L)     Ferritin 06/11/2025 799.00 (H)     Iron 06/11/2025 41     Iron Saturation (TSAT) 06/11/2025 16 (L)     Transferrin 06/11/2025 172 (L)     TIBC 06/11/2025 256 (L)     WBC 06/11/2025 3.83     RBC 06/11/2025 2.93 (L)     Hemoglobin 06/11/2025 10.3 (L)     Hematocrit 06/11/2025 31.0 (L)     MCV 06/11/2025 105.8 (H)     MCH 06/11/2025 35.2 (H)     MCHC 06/11/2025 33.2     RDW 06/11/2025 16.7 (H)     RDW-SD 06/11/2025 65.9 (H)     MPV 06/11/2025 9.5     Platelets 06/11/2025 384     Neutrophil % 06/11/2025 70.2     Lymphocyte % 06/11/2025 18.8 (L)     Monocyte % 06/11/2025 6.3     Eosinophil % 06/11/2025 1.6     Basophil % 06/11/2025 0.8     Immature Grans % 06/11/2025 2.3 (H)     Neutrophils, Absolute 06/11/2025 2.69     Lymphocytes, Absolute 06/11/2025 0.72     Monocytes, Absolute 06/11/2025 0.24     Eosinophils, Absolute 06/11/2025 0.06     Basophils, Absolute 06/11/2025 0.03     Immature Grans, Absolute 06/11/2025 0.09 (H)    Hospital Outpatient Visit on 06/06/2025   Component Date Value    Case Report 06/06/2025                      Value:BH LAG Fine Needle Aspirate                       Case: [id number]                                 Authorizing Provider:  Edward Dumont MD         Collected:           06/06/2025 10:54 AM          Ordering Location:     [facility]   Received:            06/06/2025 10:55 AM                                 ULTRASOUND                                                                   Pathologist:           Brittnee Vasquez MD                                                          Specimen:    Thyroid, Right, FNA                                                                         Final Diagnosis 06/06/2025                      Value:Thyroid, Right, Fine Needle Aspiration:  A. Montrose Category II:  Changes consistent with benign follicular nodule.        Preliminary Diagnosis 06/06/2025                      Value:Adequacy interpretation by Dr. Prabhakar. Results called to [facility].     10 smears and 1 cellblock.    (Service site: [facility]; CLIA #[id number])  (Final microscopic and diagnosis site: [facility])        Comment 06/06/2025                      Value:The Montrose System for Reporting Thyroid Cytopathology: Implied Risk of malignancy and recommended Clinical Management    Diagnostic Category                %Risk of Malignancy Usual Management*    I. Non-diagnostic                              5 - 10b                                 Repeat FNA, ultrasound guidance    II. Benign                                          0 - 3c                                  Clinical and sonographic follow-up    III. Atypia of Undetermined significance         or Follicular lesion of Undetermined         significance                               ~10 - 30d                            Repeat FNA, molecular testing,   or lobectomy    IV. Follicular Neoplasm or Suspicious         for a Follicular Neoplasm       25 - 40f                        Molecular testing, lobectomy    V. Suspicious for Malignancy          50 - 75                       Near-total thyroidectomy                                                                                                                                or lobectomyg,h    VI. Malignant                                97-99                        Near-total thyroidectomy         or lobectomyh       FNA = fine-needle aspiration.  a. Actual management may depend on other factors (eg. clinical, sonographic) besides the FNA  interpretation.  b. The risk of malignancy varies  "with the type / structure of the nodule, I.e. solid vs. complex vs. >50% cystic. Non-diagnostic aspirates from solid nodules are associated with a higher risk of malignancy as compared to those showing >50% cystic change and low-risk ultrasonographic features.  c. Estimate extrapolated from studies showing correlation between biopsied nodule and surgical pathology follow-up.  d. Estimates extrapolated from histopathologic data from large case cohorts (including repeat atypical FNAs) and meta-analysis of the post 2007 literature.  e. Includes cases of follicular neoplasm with oncocytic features (aka Hurthle cell neoplasm).  f. Estimates extrapolated from                           histopathologic data from large case cohorts and meta-analysis of the post 2007 literature.  g. Some studies have recommended molecular analysis to assess the type of surgical procedure (lobectomy vs. total thyroidectomy).  h. In the case of a \"Suspicious for metastatic tumor\" or a \"malignant\" interpretation indicating metastatic tumor rather than a primary thyroid malignancy, surgery, may not be indicated..6  PLEASE NOTE: The above should be interpreted along with clinical findings. 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                          Reference: The Powell System for Reporting Thyroid Cytopathology, Roro S., Jina, E et al.   Second Edition, 2018; p. 3.      Specimen Adequacy 06/06/2025 FNA immediate cytologic evaluation, satisfactory     Gross Description 06/06/2025                      Value:1. Thyroid.  Right Thyroid, FNA: 10 smears (5 spray fixed, 5 air dried) and 1 Thin Prep container submitted for cellblock- in formalin @ 1446 on 6/6/25.           Microscopic Description 06/06/2025                      Value:Cytologically benign follicular cells in macro follicles with colloid and hemosiderophages.    Screened by P. " Cinthia.      Infusion on 06/04/2025   Component Date Value    Glucose 06/04/2025 94     BUN 06/04/2025 11.2     Creatinine 06/04/2025 0.57     Sodium 06/04/2025 143     Potassium 06/04/2025 3.7     Chloride 06/04/2025 108 (H)     CO2 06/04/2025 23.7     Calcium 06/04/2025 8.7     Total Protein 06/04/2025 5.8 (L)     Albumin 06/04/2025 3.2 (L)     ALT (SGPT) 06/04/2025 15     AST (SGOT) 06/04/2025 18     Alkaline Phosphatase 06/04/2025 112     Total Bilirubin 06/04/2025 0.2     Globulin 06/04/2025 2.6     A/G Ratio 06/04/2025 1.2     BUN/Creatinine Ratio 06/04/2025 19.6     Anion Gap 06/04/2025 11.3     eGFR 06/04/2025 114.4     Magnesium 06/04/2025 1.4 (L)     WBC 06/04/2025 4.14     RBC 06/04/2025 3.09 (L)     Hemoglobin 06/04/2025 10.5 (L)     Hematocrit 06/04/2025 31.9 (L)     MCV 06/04/2025 103.2 (H)     MCH 06/04/2025 34.0 (H)     MCHC 06/04/2025 32.9     RDW 06/04/2025 17.6 (H)     RDW-SD 06/04/2025 65.9 (H)     MPV 06/04/2025 9.7     Platelets 06/04/2025 343     Neutrophil % 06/04/2025 69.6     Lymphocyte % 06/04/2025 20.3     Monocyte % 06/04/2025 6.0     Eosinophil % 06/04/2025 1.2     Basophil % 06/04/2025 0.7     Immature Grans % 06/04/2025 2.2 (H)     Neutrophils, Absolute 06/04/2025 2.88     Lymphocytes, Absolute 06/04/2025 0.84     Monocytes, Absolute 06/04/2025 0.25     Eosinophils, Absolute 06/04/2025 0.05     Basophils, Absolute 06/04/2025 0.03     Immature Grans, Absolute 06/04/2025 0.09 (H)     nRBC 06/04/2025 0.0    Infusion on 05/28/2025   Component Date Value    Glucose 05/28/2025 108 (H)     BUN 05/28/2025 10.9     Creatinine 05/28/2025 0.65     Sodium 05/28/2025 141     Potassium 05/28/2025 3.5     Chloride 05/28/2025 107     CO2 05/28/2025 22.0     Calcium 05/28/2025 8.6     Total Protein 05/28/2025 5.7 (L)     Albumin 05/28/2025 2.9 (L)     ALT (SGPT) 05/28/2025 13     AST (SGOT) 05/28/2025 17     Alkaline Phosphatase 05/28/2025 128 (H)     Total Bilirubin 05/28/2025 <0.2     Globulin  05/28/2025 2.8     A/G Ratio 05/28/2025 1.0     BUN/Creatinine Ratio 05/28/2025 16.8     Anion Gap 05/28/2025 12.0     eGFR 05/28/2025 110.8     Magnesium 05/28/2025 1.3 (L)     WBC 05/28/2025 5.48     RBC 05/28/2025 3.05 (L)     Hemoglobin 05/28/2025 10.3 (L)     Hematocrit 05/28/2025 32.0 (L)     MCV 05/28/2025 104.9 (H)     MCH 05/28/2025 33.8 (H)     MCHC 05/28/2025 32.2     RDW 05/28/2025 17.8 (H)     RDW-SD 05/28/2025 68.5 (H)     MPV 05/28/2025 9.2     Platelets 05/28/2025 467 (H)     Neutrophil % 05/28/2025 70.5     Lymphocyte % 05/28/2025 17.7 (L)     Monocyte % 05/28/2025 5.5     Eosinophil % 05/28/2025 0.9     Basophil % 05/28/2025 0.7     Immature Grans % 05/28/2025 4.7 (H)     Neutrophils, Absolute 05/28/2025 3.86     Lymphocytes, Absolute 05/28/2025 0.97     Monocytes, Absolute 05/28/2025 0.30     Eosinophils, Absolute 05/28/2025 0.05     Basophils, Absolute 05/28/2025 0.04     Immature Grans, Absolute 05/28/2025 0.26 (H)    Infusion on 05/21/2025   Component Date Value    Glucose 05/21/2025 131 (H)     BUN 05/21/2025 13     Creatinine 05/21/2025 0.64     Sodium 05/21/2025 137     Potassium 05/21/2025 2.9 (L)     Chloride 05/21/2025 106     CO2 05/21/2025 18.6 (L)     Calcium 05/21/2025 8.6     Total Protein 05/21/2025 5.9 (L)     Albumin 05/21/2025 3.0 (L)     ALT (SGPT) 05/21/2025 11     AST (SGOT) 05/21/2025 15     Alkaline Phosphatase 05/21/2025 128 (H)     Total Bilirubin 05/21/2025 0.2     Globulin 05/21/2025 2.9     A/G Ratio 05/21/2025 1.0     BUN/Creatinine Ratio 05/21/2025 20.3     Anion Gap 05/21/2025 12.4     eGFR 05/21/2025 111.2     WBC 05/21/2025 5.00     RBC 05/21/2025 2.97 (L)     Hemoglobin 05/21/2025 10.1 (L)     Hematocrit 05/21/2025 30.4 (L)     MCV 05/21/2025 102.4 (H)     MCH 05/21/2025 34.0 (H)     MCHC 05/21/2025 33.2     RDW 05/21/2025 18.1 (H)     RDW-SD 05/21/2025 67.5 (H)     MPV 05/21/2025 9.4     Platelets 05/21/2025 409     Neutrophil % 05/21/2025 70.2     Lymphocyte %  05/21/2025 16.8 (L)     Monocyte % 05/21/2025 6.2     Eosinophil % 05/21/2025 1.4     Basophil % 05/21/2025 1.2     Immature Grans % 05/21/2025 4.2 (H)     Neutrophils, Absolute 05/21/2025 3.51     Lymphocytes, Absolute 05/21/2025 0.84     Monocytes, Absolute 05/21/2025 0.31     Eosinophils, Absolute 05/21/2025 0.07     Basophils, Absolute 05/21/2025 0.06     Immature Grans, Absolute 05/21/2025 0.21 (H)     nRBC 05/21/2025 0.4 (H)     Magnesium 05/21/2025 1.4 (L)    Infusion on 05/14/2025   Component Date Value    Magnesium 05/14/2025 1.3 (L)    Infusion on 05/14/2025   Component Date Value    Glucose 05/14/2025 116 (H)     BUN 05/14/2025 11     Creatinine 05/14/2025 0.55 (L)     Sodium 05/14/2025 139     Potassium 05/14/2025 3.0 (L)     Chloride 05/14/2025 105     CO2 05/14/2025 21.9 (L)     Calcium 05/14/2025 8.2 (L)     Total Protein 05/14/2025 5.6 (L)     Albumin 05/14/2025 2.9 (L)     ALT (SGPT) 05/14/2025 15     AST (SGOT) 05/14/2025 14     Alkaline Phosphatase 05/14/2025 133 (H)     Total Bilirubin 05/14/2025 0.3     Globulin 05/14/2025 2.7     A/G Ratio 05/14/2025 1.1     BUN/Creatinine Ratio 05/14/2025 20.0     Anion Gap 05/14/2025 12.1     eGFR 05/14/2025 115.4     WBC 05/14/2025 3.78     RBC 05/14/2025 3.00 (L)     Hemoglobin 05/14/2025 10.0 (L)     Hematocrit 05/14/2025 30.1 (L)     MCV 05/14/2025 100.3 (H)     MCH 05/14/2025 33.3 (H)     MCHC 05/14/2025 33.2     RDW 05/14/2025 18.8 (H)     RDW-SD 05/14/2025 69.1 (H)     MPV 05/14/2025 9.7     Platelets 05/14/2025 265     Neutrophil % 05/14/2025 64.0     Lymphocyte % 05/14/2025 23.8     Monocyte % 05/14/2025 6.9     Eosinophil % 05/14/2025 2.4     Basophil % 05/14/2025 1.3     Immature Grans % 05/14/2025 1.6 (H)     Neutrophils, Absolute 05/14/2025 2.42     Lymphocytes, Absolute 05/14/2025 0.90     Monocytes, Absolute 05/14/2025 0.26     Eosinophils, Absolute 05/14/2025 0.09     Basophils, Absolute 05/14/2025 0.05     Immature Grans, Absolute 05/14/2025  0.06 (H)     nRBC 05/14/2025 0.0    Infusion on 05/07/2025   Component Date Value    Glucose 05/07/2025 96     BUN 05/07/2025 11     Creatinine 05/07/2025 0.61     Sodium 05/07/2025 141     Potassium 05/07/2025 3.5     Chloride 05/07/2025 108 (H)     CO2 05/07/2025 22.8     Calcium 05/07/2025 8.5 (L)     Total Protein 05/07/2025 5.2 (L)     Albumin 05/07/2025 3.0 (L)     ALT (SGPT) 05/07/2025 17     AST (SGOT) 05/07/2025 19     Alkaline Phosphatase 05/07/2025 108     Total Bilirubin 05/07/2025 0.2     Globulin 05/07/2025 2.2     A/G Ratio 05/07/2025 1.4     BUN/Creatinine Ratio 05/07/2025 18.0     Anion Gap 05/07/2025 10.2     eGFR 05/07/2025 112.5     WBC 05/07/2025 5.38     RBC 05/07/2025 3.03 (L)     Hemoglobin 05/07/2025 10.0 (L)     Hematocrit 05/07/2025 30.2 (L)     MCV 05/07/2025 99.7 (H)     MCH 05/07/2025 33.0     MCHC 05/07/2025 33.1     RDW 05/07/2025 20.2 (H)     RDW-SD 05/07/2025 73.7 (H)     MPV 05/07/2025 9.5     Platelets 05/07/2025 316     Neutrophil % 05/07/2025 72.6     Lymphocyte % 05/07/2025 17.5 (L)     Monocyte % 05/07/2025 5.6     Eosinophil % 05/07/2025 1.7     Basophil % 05/07/2025 1.1     Immature Grans % 05/07/2025 1.5 (H)     Neutrophils, Absolute 05/07/2025 3.91     Lymphocytes, Absolute 05/07/2025 0.94     Monocytes, Absolute 05/07/2025 0.30     Eosinophils, Absolute 05/07/2025 0.09     Basophils, Absolute 05/07/2025 0.06     Immature Grans, Absolute 05/07/2025 0.08 (H)     nRBC 05/07/2025 0.0    Hospital Outpatient Visit on 05/01/2025   Component Date Value    EF(MOD-bp) 05/01/2025 52.3     LV GLOBAL STRAIN  05/01/2025 -17.2     LVIDd 05/01/2025 4.5     LVIDs 05/01/2025 3.8     IVSd 05/01/2025 0.70     LVPWd 05/01/2025 0.60     FS 05/01/2025 15.0     IVS/LVPW 05/01/2025 1.17     ESV(cubed) 05/01/2025 56.0     LV Sys Vol (BSA correcte* 05/01/2025 20.4     EDV(cubed) 05/01/2025 91.1     LV Ochoa Vol (BSA correct* 05/01/2025 43.8     LV mass(C)d 05/01/2025 87.1     LVOT area 05/01/2025  2.26     LVOT diam 05/01/2025 1.70     EDV(MOD-sp2) 05/01/2025 95.0     EDV(MOD-sp4) 05/01/2025 75.0     ESV(MOD-sp2) 05/01/2025 43.0     ESV(MOD-sp4) 05/01/2025 35.0     SV(MOD-sp2) 05/01/2025 52.0     SV(MOD-sp4) 05/01/2025 40.0     SVi(MOD-SP2) 05/01/2025 30.3     SVi(MOD-SP4) 05/01/2025 23.3     SVi (LVOT) 05/01/2025 23.3     EF(MOD-sp2) 05/01/2025 54.7     EF(MOD-sp4) 05/01/2025 53.3     MV E max du 05/01/2025 52.9     MV A max du 05/01/2025 37.7     MV dec time 05/01/2025 0.17     MV E/A 05/01/2025 1.40     MV A dur 05/01/2025 0.10     LA ESV Index (BP) 05/01/2025 12.2     Med Peak E' Du 05/01/2025 9.7     Lat Peak E' Du 05/01/2025 13.8     Avg E/e' ratio 05/01/2025 4.50     SV(LVOT) 05/01/2025 40.0     RV Base 05/01/2025 2.8     TAPSE (>1.6) 05/01/2025 1.52     RV S' 05/01/2025 11.2     LV V1 max 05/01/2025 89.6     LV V1 max PG 05/01/2025 3.2     LV V1 mean PG 05/01/2025 2.00     LV V1 VTI 05/01/2025 17.7     Ao pk du 05/01/2025 103.0     Ao max PG 05/01/2025 4.2     Ao mean PG 05/01/2025 2.00     Ao V2 VTI 05/01/2025 19.5     DANIELLE(I,D) 05/01/2025 2.05     Dimensionless Index 05/01/2025 0.91     MV max PG 05/01/2025 2.9     MV mean PG 05/01/2025 1.36     MV V2 VTI 05/01/2025 16.4     MV P1/2t 05/01/2025 56.5     MVA(P1/2t) 05/01/2025 3.9     MVA(VTI) 05/01/2025 2.44     MV dec slope 05/01/2025 458.0     RAP systole 05/01/2025 3.0     Ao root diam 05/01/2025 2.9    Hospital Outpatient Visit on 04/30/2025   Component Date Value    QT Interval 04/30/2025 401     QTC Interval 04/30/2025 455    There may be more visits with results that are not included.   Labs reviewed    Current Psychotropic Medications:  Venlafaxine XR 37.5 mg daily  Gabapentin 600 milligrams nightly    Plan of Care/ Medical Decision Making  Continue medications as written; again explored subtherapeutic dosing of venlafaxine, considering increased to 75 mg daily for persistent and intrusive symptoms of anxiety.  Referral placed to [facility]  team, hopeful for assistance with financial impact of need to travel to [place] daily for upcoming radiation. Pt requests a message to be left so she can call them back, she does not regularly answer number she does not recognize.  Referral placed to speech therapy, noting persistent experience of brain fog, difficulty with word finding.  F U scheduled in 4 weeks.     Diagnoses and all orders for this visit:    1. Adjustment disorder with mixed anxiety and depressed mood (Primary)    2. Breast cancer, stage 3, right    3. Hot flashes  -     gabapentin (Neurontin) 300 MG capsule; Take 2 capsules by mouth Every Night.  Dispense: 60 capsule; Refill: 2    4. Cognitive communication deficit  -     Ambulatory Referral to Speech Therapy for Evaluation & Treatment    Other orders  -     venlafaxine XR (Effexor XR) 37.5 MG 24 hr capsule; Take 1 capsule by mouth Daily.  Dispense: 30 capsule; Refill: 0    I spent 36 minutes caring for Suzanne on this date of service. This time includes time spent by me in the following activities: preparing for the visit, reviewing tests, obtaining and/or reviewing a separately obtained history, performing a medically appropriate examination and/or evaluation, counseling and educating the patient/family/caregiver, ordering medications, tests, or procedures, referring and communicating with other health care professionals, and documenting information in the medical record.

## 2025-06-13 ENCOUNTER — DOCUMENTATION (OUTPATIENT)
Dept: OTHER | Facility: HOSPITAL | Age: 45
End: 2025-06-13
Payer: COMMERCIAL

## 2025-06-13 ENCOUNTER — TELEPHONE (OUTPATIENT)
Dept: OTHER | Facility: HOSPITAL | Age: 45
End: 2025-06-13
Payer: COMMERCIAL

## 2025-06-13 NOTE — PROGRESS NOTES
Oncology Social Work  Referral made to [employer] for a [employer] gift card for gas for the patient.  TIMO Torres

## 2025-06-13 NOTE — PROGRESS NOTES
Oncology Social Work  Referral made to [employer] for follow up on application previously submitted for their financial assistance program.  TIMO Torres

## 2025-06-13 NOTE — TELEPHONE ENCOUNTER
Oncology Social Work  Inbound call from the patient returning call to [facility] to address social work referral made for gas assistance.  [facility] discussed with the patient her income decreasing and needing assistance with gas as she will be driving from [place] to [place] for radiation.  The patient states that she is agreeable to a referral to [facility] for gas cards.  [facility] informed the patient that referrals were previously made to [employer] and [employer] and follow ups will be made to them regarding assistance. TIMO Torres

## 2025-06-17 ENCOUNTER — TELEPHONE (OUTPATIENT)
Dept: RADIATION ONCOLOGY | Facility: HOSPITAL | Age: 45
End: 2025-06-17
Payer: COMMERCIAL

## 2025-06-17 NOTE — PROGRESS NOTES
[facility] Radiation Oncology   Consult    Chief Complaint  Locally Advanced Breast Cancer of the Right Breast      Diagnosis: Locally Advanced Breast Cancer of the Right Breast    Overall Stage IIIA    pT3: 17cm primary  pN2a: 8 macrometastatic lymph nodes  cM0: per PET CT      Pacemaker: no  Prior History of Radiation: no  Contraindications to Radiation: no  Patient Requires Pregnancy Test: Yes      HPI:    Suzanne Lin is a 45 y.o. female who initially presented last year with a large palpable right breast mass.  She reported that it been enlarging for several years but she did not have insurance to have it assessed.  CT CAP 8/16/2024 demonstrated a 7.7 cm right breast mass with abnormal axillary lymph node avidity in right axillary level 1 and 2 no IMN or supraclavicular lymph nodes.  Biopsy of the right breast mass showed invasive ductal carcinoma.  The patient underwent right modified radical mastectomy and slightly dissection prophylactic left mastectomy 10/9/2024 pathology in the right breast demonstrated a 17 cm primary with extensive lymphovascular invasion and dermal lymphatic invasion.  The lesion extended into the dermis but did not ulcerate the skin.  The tumor extended into skeletal muscle.  There was an additional 12 cm of DCIS.  Of the 13 lymph nodes recovered in the axillary dissection 8 had macrometastases, 1 had micrometastases, and 1 had isolated tumor cells.  The left breast was benign.  There was focal positive anterior margin in the invasive component with less than 0.1 mm margin posteriorly.  All margins were negative of DCIS.  ER/TX positive, HER2 negative, Ki-67 65%.  The patient initiated adjuvant Adriamycin and Cytoxan with Dr. Witt on 12/31/2024.  Taxol was initiated on 3/18/2025.  She completed DD AC-T on 6/4/2025.  She has been referred for consideration of adjuvant radiation.        Imaging:      CT CAP 9/16/2024    IMPRESSION:  1.There is a large soft tissue mass involving the medial  right breast soft tissues measuring up to 7.7 cm.  2.There is evidence for associated lymphadenopathy in the (right) axillary region.  3.A tiny subpleural nodule is noted the lateral aspect of the left lower lobe likely related to a granuloma. No other suspicious pulmonary nodules are seen.  4.Mildly conspicuous lymph nodes are noted in the central mesentery within the abdomen and pelvis measuring up to 1.1 cm.  5.Recommend correlation with follow-up PET/CT imaging to evaluate for areas of abnormal malignant radiopharmaceutical accumulation.  6.A fold is noted at the gallbladder fundus. There may be a stone in the fold. There is evidence for mild gallbladder wall thickening/pericholecystic edema. The findings may indicate changes of cholecystitis. There is no biliary dilatation.      PET CT 9/30/2024    IMPRESSION:  1. Hypermetabolic 8 cm right breast mass with hypermetabolic parenchymal  thickening throughout the right breast, pathologically proven invasive  ductal carcinoma. Mass comes in close proximity to the sternum. No focal  osseous hypermetabolism with special attention to the sternum.  2. Hypermetabolic right axillary level I and II lymph nodes suggesting  alana metastatic disease. No enlarged or hypermetabolic internal mammary  or supraclavicular lymph nodes. Separate brown fat uptake in the  bilateral axilla and posterior neck base.  3. Few subcentimeter pulmonary nodules are too small for accurate PET  characterization and will need follow-up via chest CT.  4. Mesenteric lymph nodes are mostly subcentimeter without associated  hypermetabolism.      Pathology:      Bilateral Mastectomy and Right Axillary Dissection Pathology 10/9/2024    Final Diagnosis   1.  Breast, right, modified radical mastectomy: Invasive ductal adenocarcinoma and ductal carcinoma in situ               -A.  The invasive ductal adenocarcinoma                            I.  The tumor is Bruce grade III (tubular grade 3, nuclear  grade 3, mitotic rate 3).                            II.  The tumor measures up to 170 mm (17 consecutive 1 cm slices)                            III.  There is extensive lymphovascular invasion and dermal lymphatic invasion                            IV.  The tumor does extend into the dermis but does not ulcerate the skin and there is no Paget's disease                            V.  The tumor extends into skeletal muscle                            VI.  The tumor is focally at an anterior margin covering an area of 1 mm comes within 0.05 mm of the posterior margin, 10 mm of the lateral margin, 20 mm of the medial margin, 28 mm of the superior margin and 40 mm of the inferior margin.               -B.  The ductal carcinoma in situ                            I.  The ductal carcinoma in situ is of high nuclear grade with solid, cribriform and comedo architecture and expansive comedonecrosis                            II.  The ductal carcinoma in situ measures up to 120 mm (12 consecutive 1 cm slices)                            III.  Microcalcifications are associated with the ductal carcinoma in situ                            IV.  The ductal carcinoma in situ comes within 2.5 mm of the posterior margin, 5 mm of the anterior margin, 10 mm of the lateral margin, 28 mm of the superior margin, 40 mm of the inferior margin and 70 mm of the medial margin.     Comment: Hormone receptors were performed on the prior biopsy which is not available for review.     2.  Lymph nodes, axillary dissection: 13 lymph nodes are present               -A.  8 lymph nodes have macrometastasis ranging from 2.5 mm to 10 mm               -B.  1 lymph node has a micrometastasis measuring up to 1.8 mm               -C.  1 lymph node has isolated tumor cells (by criteria not counted in the count for positive lymph nodes in the synoptic).     3.  Breast, left, mastectomy: Benign breast tissue with intraductal papilloma that is intact measuring up  "to 1.5 mm     SPECIMEN   Procedure  Total mastectomy   Specimen Laterality  Right   TUMOR   Tumor Site  Upper outer quadrant     Lower outer quadrant     Upper inner quadrant     Lower inner quadrant   Histologic Type  Invasive carcinoma of no special type (ductal)   Histologic Grade (Bruce Histologic Score)     Glandular (Acinar) / Tubular Differentiation  Score 3   Nuclear Pleomorphism  Score 3   Mitotic Rate  Score 3   Overall Grade  Grade 3 (scores of 8 or 9)   Tumor Size  Greatest dimension of largest invasive focus (Millimeters): 170 mm   Tumor Focality  Single focus of invasive carcinoma   Ductal Carcinoma In Situ (DCIS)  Present     Negative for extensive intraductal component (EIC)   Size (Extent) of DCIS  Estimated size (extent) of DCIS is at least (Millimeters): 120 mm   Architectural Patterns  Comedo     Cribriform     Solid   Nuclear Grade  Grade III (high)   Necrosis  Present, central (expansive \"comedo\" necrosis)   Lobular Carcinoma In Situ (LCIS)  Not identified        Tumor Extent  Skin is present and involved   Skin Invasion  Carcinoma directly invades into the dermis or epidermis without skin ulceration (this does not change the T classification)   Skin Satellite Foci  Satellite foci not identified   Tumor Extent  Skeletal muscle is present and involved   Skeletal Muscle  Carcinoma invades skeletal muscle   Lymphatic and / or Vascular Invasion  Present     Extensive   Dermal Lymphatic and / or Vascular Invasion  Present   Microcalcifications  Present in DCIS   Treatment Effect in the Breast  No known presurgical therapy   MARGINS   Margin Status for Invasive Carcinoma  Invasive carcinoma present at margin   Margin(s) Involved by Invasive Carcinoma  Anterior: 1mm   Distance from Invasive Carcinoma to Anterior Margin  at   Distance from Invasive Carcinoma to Posterior Margin  0.05 mm   Distance from Invasive Carcinoma to Superior Margin  28 mm   Distance from Invasive Carcinoma to Inferior " Margin  40 mm   Distance from Invasive Carcinoma to Medial Margin  20 mm   Distance from Invasive Carcinoma to Lateral Margin  10 mm   Margin Status for DCIS  All margins negative for DCIS   Distance from DCIS to Closest Margin  2.5 mm   Closest Margin(s) to DCIS  Posterior   Distance from DCIS to Anterior Margin  5 mm   Distance from DCIS to Posterior Margin  2.5 mm   Distance from DCIS to Superior Margin  28. mm   Distance from DCIS to Inferior Margin  40 mm   Distance from DCIS to Medial Margin  70 mm   Distance from DCIS to Lateral Margin  10 mm   REGIONAL LYMPH NODES   Regional Lymph Node Status  Tumor present in regional lymph node(s)   Number of Lymph Nodes with Macrometastases  8   Number of Lymph Nodes with Micrometastases  1   Size of Largest Charlie Metastatic Deposit  10 mm   Extranodal Extension  Not identified   Total Number of Lymph Nodes Examined (sentinel and non-sentinel)  13   pTNM CLASSIFICATION (AJCC 8th Edition)   Reporting of pT, pN, and (when applicable) pM categories is based on information available to the pathologist at the time the report is issued. As per the AJCC (Chapter 1, 8th Ed.) it is the managing physician's responsibility to establish the final pathologic stage based upon all pertinent information, including but potentially not limited to this pathology report.   pT Category  pT3   pN Category  pN2a   SPECIAL STUDIES        Estrogen Receptor (ER) Status  Positive (greater than 10% of cells demonstrate nuclear positivity)   Percentage of Cells with Nuclear Positivity  99 %        Progesterone Receptor (PgR) Status  Positive   Percentage of Cells with Nuclear Positivity  50 %        HER2 (by immunohistochemistry)  Negative (Score 0)        Ki-67 Percentage of Positive Nuclei  65 %       Labs:    Lab Results   Component Value Date    CREATININE 0.64 06/11/2025       CMP          5/28/2025    11:25 6/4/2025    09:23 6/11/2025    08:05   CMP   Glucose 108  94  116    BUN 10.9  11.2  8.6     Creatinine 0.65  0.57  0.64    EGFR 110.8  114.4  111.2    Sodium 141  143  140    Potassium 3.5  3.7  3.2    Chloride 107  108  105    Calcium 8.6  8.7  8.8    Total Protein 5.7  5.8     Albumin 2.9  3.2     Globulin 2.8  2.6     Total Bilirubin <0.2  0.2     Alkaline Phosphatase 128  112     AST (SGOT) 17  18     ALT (SGPT) 13  15     Albumin/Globulin Ratio 1.0  1.2     BUN/Creatinine Ratio 16.8  19.6  13.4    Anion Gap 12.0  11.3  11.4      CBC          5/28/2025    11:25 6/4/2025    09:23 6/11/2025    08:30   CBC   WBC 5.48  4.14  3.83    RBC 3.05  3.09  2.93    Hemoglobin 10.3  10.5  10.3    Hematocrit 32.0  31.9  31.0    .9  103.2  105.8    MCH 33.8  34.0  35.2    MCHC 32.2  32.9  33.2    RDW 17.8  17.6  16.7    Platelets 467  343  384              Problem List:  Patient Active Problem List   Diagnosis    PFO (patent foramen ovale)    Palpitations    Breast cancer, stage 3, right    Breast cancer in female    Need for prophylactic chemotherapy    Encounter for central line care    LITA (iron deficiency anemia)    Adverse effect of iron    Crohn's disease without complication    Encounter for screening colonoscopy    Immunosuppressed due to chemotherapy    Severe protein-calorie malnutrition    Hypomagnesemia          Medications:  Current Outpatient Medications on File Prior to Visit   Medication Sig Dispense Refill    aspirin 81 MG EC tablet Take 1 tablet by mouth Daily.      dicyclomine (BENTYL) 10 MG capsule Take 1 capsule by mouth 3 (Three) Times a Day As Needed for Abdominal Cramping. 90 capsule 2    gabapentin (Neurontin) 300 MG capsule Take 2 capsules by mouth Every Night. 60 capsule 2    lidocaine-prilocaine (EMLA) 2.5-2.5 % cream Apply 1 Application topically to the appropriate area as directed As Needed for Mild Pain. Apply to port site 30 minutes before access 15 g 3    omeprazole (priLOSEC) 20 MG capsule Take 1 capsule by mouth As Needed.      ondansetron (ZOFRAN) 8 MG tablet Take 1 tablet  "by mouth 3 (Three) Times a Day As Needed for Nausea or Vomiting. 30 tablet 5    potassium chloride (KLOR-CON M10) 10 MEQ CR tablet Take 3 tablets by mouth 2 (Two) Times a Day. (Patient taking differently: Take 4 tablets by mouth 2 (Two) Times a Day.) 180 tablet 4    venlafaxine XR (Effexor XR) 37.5 MG 24 hr capsule Take 1 capsule by mouth Daily. 30 capsule 0     Current Facility-Administered Medications on File Prior to Visit   Medication Dose Route Frequency Provider Last Rate Last Admin    heparin injection 500 Units  500 Units Intravenous PRN Nishant Witt MD   500 Units at 12/11/24 0916    heparin injection 500 Units  500 Units Intravenous PRN Nishant Witt MD   500 Units at 01/07/25 1049    sodium chloride 0.9 % flush 10 mL  10 mL Intravenous PRN Nishant Witt MD   10 mL at 12/11/24 0916    sodium chloride 0.9 % flush 10 mL  10 mL Intravenous PRN Nishant Witt MD   10 mL at 01/07/25 1048          Allergies:  No Known Allergies      Family History:  The patient has no family history of conditions which would be contraindications to radiation therapy      Social History:  Quit 4-5 years ago    Distance From Clinic: 30 minutes - 1 hour    Patient has someone who can assist with transportation: yes      Review of Systems:    Review of Systems   Gastrointestinal:  Positive for diarrhea.   Skin:  Positive for rash.   Neurological:  Positive for dizziness and light-headedness.       Vital Signs:  /72   Pulse (!) 135   Resp 16   Wt 65.8 kg (145 lb)   SpO2 99%   BMI 23.41 kg/m²   Estimated body mass index is 23.41 kg/m² as calculated from the following:    Height as of 6/4/25: 167.6 cm (65.98\").    Weight as of this encounter: 65.8 kg (145 lb).  Pain Score    06/18/25 0936   PainSc: 0-No pain         ECOG: Restricted in physically strenuous activity but ambulatory and able to carry out work of a light or sedentary nature, e.g., light house work, office work = 1    Physical Exam  Vitals " reviewed.   Constitutional:       General: She is not in acute distress.     Appearance: Normal appearance.   HENT:      Head: Normocephalic and atraumatic.   Eyes:      Extraocular Movements: Extraocular movements intact.      Pupils: Pupils are equal, round, and reactive to light.   Pulmonary:      Effort: Pulmonary effort is normal.   Abdominal:      General: Abdomen is flat.      Palpations: Abdomen is soft.   Musculoskeletal:      Cervical back: Normal range of motion.   Skin:     General: Skin is warm and dry.      Comments: Well healed mastectomy scars bilaterally   Neurological:      General: No focal deficit present.      Mental Status: She is alert and oriented to person, place, and time.   Psychiatric:         Mood and Affect: Mood normal.         Behavior: Behavior normal.          Result Review :  The following data was reviewed by: Tyron Acosta MD on 06/18/2025:  Labs: Last Creatinine, CBC, CMP  Data reviewed : Radiologic studies CT CAP, PET CT            Diagnoses and all orders for this visit:    1. Breast cancer, stage 3, right (Primary)  -     POC Pregnancy, Urine    2. Pregnancy examination or test, negative result  -     POC Pregnancy, Urine        Assessment:    Suzanne Lin is a 45 y.o. female who initially presented last year with a large palpable right breast mass.  She reported that it been enlarging for several years but she did not have insurance to have it assessed.  CT CAP 8/16/2024 demonstrated a 7.7 cm right breast mass with abnormal axillary lymph node avidity in right axillary level 1 and 2 no IMN or supraclavicular lymph nodes.  Biopsy of the right breast mass showed invasive ductal carcinoma.  The patient underwent right modified radical mastectomy and slightly dissection prophylactic left mastectomy 10/9/2024 pathology in the right breast demonstrated a 17 cm primary with extensive lymphovascular invasion and dermal lymphatic invasion.  The lesion extended into the dermis but did  not ulcerate the skin.  The tumor extended into skeletal muscle.  There was an additional 12 cm of DCIS.  Of the 13 lymph nodes recovered in the axillary dissection 8 had macrometastases, 1 had micrometastases, and 1 had isolated tumor cells.  The left breast was benign.  There was focal positive anterior margin in the invasive component with less than 0.1 mm margin posteriorly.  All margins were negative of DCIS.  ER/MA positive, HER2 negative, Ki-67 65%.  The patient initiated adjuvant Adriamycin and Cytoxan with Dr. Witt on 12/31/2024.  Taxol was initiated on 3/18/2025.  She completed DD AC-T on 6/4/2025.  She has been referred for consideration of adjuvant radiation.    I met with the patient and discussed her workup and treatment to date in detail. I discussed the risks, benefits, side effects, and logistics of radiation treatment planning and delivery. I answered all of the patients questions to her satisfaction. I will plan on hypofractionated PMRT + RNI with a scar boost. CT simulation performed today as patient lives an hour away.       Plan:    -Plan for adjuvant radiation for locally advanced right breast cancer s/p mastecomy, ALND, and adjuvant ddACT. CT simulation today as patient lives an hour away.        I spent 60 minutes caring for Suzanne on this date of service. This time includes time spent by me in the following activities:preparing for the visit, reviewing tests, obtaining and/or reviewing a separately obtained history, documenting information in the medical record, independently interpreting results and communicating that information with the patient/family/caregiver, and care coordination  Follow Up   No follow-ups on file.  Patient was given instructions and counseling regarding her condition or for health maintenance advice. Please see specific information pulled into the AVS if appropriate.     Tyron Acosta MD

## 2025-06-18 ENCOUNTER — PATIENT OUTREACH (OUTPATIENT)
Dept: RADIATION ONCOLOGY | Facility: HOSPITAL | Age: 45
End: 2025-06-18
Payer: COMMERCIAL

## 2025-06-18 ENCOUNTER — CONSULT (OUTPATIENT)
Dept: RADIATION ONCOLOGY | Facility: HOSPITAL | Age: 45
End: 2025-06-18
Payer: COMMERCIAL

## 2025-06-18 ENCOUNTER — DOCUMENTATION (OUTPATIENT)
Dept: OTHER | Facility: HOSPITAL | Age: 45
End: 2025-06-18
Payer: COMMERCIAL

## 2025-06-18 ENCOUNTER — HOSPITAL ENCOUNTER (OUTPATIENT)
Dept: RADIATION ONCOLOGY | Facility: HOSPITAL | Age: 45
Setting detail: RADIATION/ONCOLOGY SERIES
End: 2025-06-18
Payer: COMMERCIAL

## 2025-06-18 ENCOUNTER — HOSPITAL ENCOUNTER (OUTPATIENT)
Dept: RADIATION ONCOLOGY | Facility: HOSPITAL | Age: 45
Discharge: HOME | End: 2025-06-18

## 2025-06-18 VITALS
HEART RATE: 135 BPM | RESPIRATION RATE: 16 BRPM | OXYGEN SATURATION: 99 % | BODY MASS INDEX: 23.41 KG/M2 | WEIGHT: 145 LBS | SYSTOLIC BLOOD PRESSURE: 103 MMHG | DIASTOLIC BLOOD PRESSURE: 72 MMHG

## 2025-06-18 DIAGNOSIS — C50.911 BREAST CANCER, STAGE 3, RIGHT: Primary | ICD-10-CM

## 2025-06-18 DIAGNOSIS — Z32.02 PREGNANCY EXAMINATION OR TEST, NEGATIVE RESULT: ICD-10-CM

## 2025-06-18 LAB
B-HCG UR QL: NEGATIVE
EXPIRATION DATE: NORMAL
INTERNAL NEGATIVE CONTROL: NORMAL
INTERNAL POSITIVE CONTROL: NORMAL
Lab: NORMAL

## 2025-06-18 PROCEDURE — 77334 RADIATION TREATMENT AID(S): CPT | Performed by: STUDENT IN AN ORGANIZED HEALTH CARE EDUCATION/TRAINING PROGRAM

## 2025-06-18 PROCEDURE — 77263 THER RADIOLOGY TX PLNG CPLX: CPT | Performed by: STUDENT IN AN ORGANIZED HEALTH CARE EDUCATION/TRAINING PROGRAM

## 2025-06-18 PROCEDURE — 77332 RADIATION TREATMENT AID(S): CPT | Performed by: STUDENT IN AN ORGANIZED HEALTH CARE EDUCATION/TRAINING PROGRAM

## 2025-06-18 PROCEDURE — 77290 THER RAD SIMULAJ FIELD CPLX: CPT | Performed by: STUDENT IN AN ORGANIZED HEALTH CARE EDUCATION/TRAINING PROGRAM

## 2025-06-18 PROCEDURE — G0463 HOSPITAL OUTPT CLINIC VISIT: HCPCS | Performed by: STUDENT IN AN ORGANIZED HEALTH CARE EDUCATION/TRAINING PROGRAM

## 2025-06-18 NOTE — PROGRESS NOTES
"Met with Suzanne at her radiation consult appt. With Dr. Acosta. She states she is feeling pretty good, has some fatigue. She will start radiation on 07/07 and her CT sim appt today. She has an appt scheduled with speech therapy for evaluation for cognitive issues since starting chemo. She c/o of brain fog and difficulty \"finding words' to say. We discussed [employer] program and she was given a folder with the [facility] pamplet, [employer] pamphlet, my contact card and a card for Heidi the massage therapist.She stated she spoke with  which is getting her a gas card and she has my contact information to reach out if she needs further assistance. Will continue to follow.  "

## 2025-06-18 NOTE — PROGRESS NOTES
Oncology Social Work  Per Zina with [employer] they attempted to contact the patient in Dec 2024 and had no response.  A request was made for another attempt to contact the patient for assistance and to leave her a detailed voicemail and she will return the call.  TIMO Torres

## 2025-06-18 NOTE — PROGRESS NOTES
Oncology Social Work  The patient was provided with the telephone number for Juna to get connected to a navigator to assist with initiating the process for applying for financial assistance once they are accepting referrals again.  TIMO Torres

## 2025-06-19 ENCOUNTER — DOCUMENTATION (OUTPATIENT)
Dept: OTHER | Facility: HOSPITAL | Age: 45
End: 2025-06-19
Payer: COMMERCIAL

## 2025-06-19 ENCOUNTER — TELEPHONE (OUTPATIENT)
Dept: OTHER | Facility: HOSPITAL | Age: 45
End: 2025-06-19
Payer: COMMERCIAL

## 2025-06-19 NOTE — PROGRESS NOTES
Oncology Social Work  Per [employer] they are sending the patient a $500 gift card. TIMO Torres

## 2025-06-23 ENCOUNTER — TELEPHONE (OUTPATIENT)
Dept: ONCOLOGY | Facility: CLINIC | Age: 45
End: 2025-06-23
Payer: COMMERCIAL

## 2025-06-23 NOTE — TELEPHONE ENCOUNTER
----- Message from Nishant Witt sent at 6/23/2025  7:03 AM EDT -----  Keep appt  ----- Message -----  From: Rose Lockwood RegSched Rep  Sent: 6/19/2025  11:33 AM EDT  To: Nishant Witt MD; Krystal Fernandez#    Dr Witt, starting radiation on 7/7. Does she need to see you on 7/9? Should this kiarra't be pushed out to after radiation is completed? Should she have periodic labs while undergoing radiation? Please advise, thank you, Roselia

## 2025-06-25 ENCOUNTER — INFUSION (OUTPATIENT)
Dept: ONCOLOGY | Facility: HOSPITAL | Age: 45
End: 2025-06-25
Payer: COMMERCIAL

## 2025-06-25 VITALS
WEIGHT: 148 LBS | DIASTOLIC BLOOD PRESSURE: 79 MMHG | SYSTOLIC BLOOD PRESSURE: 112 MMHG | HEART RATE: 105 BPM | BODY MASS INDEX: 23.9 KG/M2 | RESPIRATION RATE: 20 BRPM | OXYGEN SATURATION: 96 % | TEMPERATURE: 97.1 F

## 2025-06-25 DIAGNOSIS — Z45.2 ENCOUNTER FOR CENTRAL LINE CARE: Primary | ICD-10-CM

## 2025-06-25 DIAGNOSIS — C50.911 BREAST CANCER, STAGE 3, RIGHT: ICD-10-CM

## 2025-06-25 DIAGNOSIS — D50.9 IRON DEFICIENCY ANEMIA, UNSPECIFIED IRON DEFICIENCY ANEMIA TYPE: ICD-10-CM

## 2025-06-25 DIAGNOSIS — E83.42 HYPOMAGNESEMIA: ICD-10-CM

## 2025-06-25 LAB
ANION GAP SERPL CALCULATED.3IONS-SCNC: 12.9 MMOL/L (ref 5–15)
BASOPHILS # BLD AUTO: 0.06 10*3/MM3 (ref 0–0.2)
BASOPHILS NFR BLD AUTO: 0.7 % (ref 0–1.5)
BUN SERPL-MCNC: 15 MG/DL (ref 6–20)
BUN/CREAT SERPL: 24.2 (ref 7–25)
CALCIUM SPEC-SCNC: 9.1 MG/DL (ref 8.6–10.5)
CHLORIDE SERPL-SCNC: 104 MMOL/L (ref 98–107)
CO2 SERPL-SCNC: 23.1 MMOL/L (ref 22–29)
CREAT SERPL-MCNC: 0.62 MG/DL (ref 0.57–1)
DEPRECATED RDW RBC AUTO: 53.9 FL (ref 37–54)
EGFRCR SERPLBLD CKD-EPI 2021: 112.1 ML/MIN/1.73
EOSINOPHIL # BLD AUTO: 0.16 10*3/MM3 (ref 0–0.4)
EOSINOPHIL NFR BLD AUTO: 1.8 % (ref 0.3–6.2)
ERYTHROCYTE [DISTWIDTH] IN BLOOD BY AUTOMATED COUNT: 14.2 % (ref 12.3–15.4)
GLUCOSE SERPL-MCNC: 126 MG/DL (ref 65–99)
HCT VFR BLD AUTO: 38.6 % (ref 34–46.6)
HGB BLD-MCNC: 12.6 G/DL (ref 12–15.9)
IMM GRANULOCYTES # BLD AUTO: 0.09 10*3/MM3 (ref 0–0.05)
IMM GRANULOCYTES NFR BLD AUTO: 1 % (ref 0–0.5)
LYMPHOCYTES # BLD AUTO: 0.96 10*3/MM3 (ref 0.7–3.1)
LYMPHOCYTES NFR BLD AUTO: 10.7 % (ref 19.6–45.3)
MAGNESIUM SERPL-MCNC: 1.9 MG/DL (ref 1.6–2.6)
MCH RBC QN AUTO: 34 PG (ref 26.6–33)
MCHC RBC AUTO-ENTMCNC: 32.6 G/DL (ref 31.5–35.7)
MCV RBC AUTO: 104 FL (ref 79–97)
MONOCYTES # BLD AUTO: 0.49 10*3/MM3 (ref 0.1–0.9)
MONOCYTES NFR BLD AUTO: 5.5 % (ref 5–12)
NEUTROPHILS NFR BLD AUTO: 7.22 10*3/MM3 (ref 1.7–7)
NEUTROPHILS NFR BLD AUTO: 80.3 % (ref 42.7–76)
NRBC BLD AUTO-RTO: 0 /100 WBC (ref 0–0.2)
PLATELET # BLD AUTO: 361 10*3/MM3 (ref 140–450)
PMV BLD AUTO: 9.3 FL (ref 6–12)
POTASSIUM SERPL-SCNC: 3.6 MMOL/L (ref 3.5–5.2)
RBC # BLD AUTO: 3.71 10*6/MM3 (ref 3.77–5.28)
SODIUM SERPL-SCNC: 140 MMOL/L (ref 136–145)
WBC NRBC COR # BLD AUTO: 8.98 10*3/MM3 (ref 3.4–10.8)

## 2025-06-25 PROCEDURE — 25010000002 HEPARIN LOCK FLUSH PER 10 UNITS: Performed by: INTERNAL MEDICINE

## 2025-06-25 PROCEDURE — 36591 DRAW BLOOD OFF VENOUS DEVICE: CPT

## 2025-06-25 PROCEDURE — 85025 COMPLETE CBC W/AUTO DIFF WBC: CPT | Performed by: NURSE PRACTITIONER

## 2025-06-25 PROCEDURE — 83735 ASSAY OF MAGNESIUM: CPT | Performed by: NURSE PRACTITIONER

## 2025-06-25 PROCEDURE — 80048 BASIC METABOLIC PNL TOTAL CA: CPT | Performed by: NURSE PRACTITIONER

## 2025-06-25 RX ORDER — SODIUM CHLORIDE 0.9 % (FLUSH) 0.9 %
10 SYRINGE (ML) INJECTION AS NEEDED
OUTPATIENT
Start: 2025-06-25

## 2025-06-25 RX ORDER — HEPARIN SODIUM (PORCINE) LOCK FLUSH IV SOLN 100 UNIT/ML 100 UNIT/ML
500 SOLUTION INTRAVENOUS AS NEEDED
Status: DISCONTINUED | OUTPATIENT
Start: 2025-06-25 | End: 2025-06-25 | Stop reason: HOSPADM

## 2025-06-25 RX ORDER — HEPARIN SODIUM (PORCINE) LOCK FLUSH IV SOLN 100 UNIT/ML 100 UNIT/ML
500 SOLUTION INTRAVENOUS AS NEEDED
OUTPATIENT
Start: 2025-06-25

## 2025-06-25 RX ORDER — SODIUM CHLORIDE 0.9 % (FLUSH) 0.9 %
10 SYRINGE (ML) INJECTION AS NEEDED
Status: DISCONTINUED | OUTPATIENT
Start: 2025-06-25 | End: 2025-06-25 | Stop reason: HOSPADM

## 2025-06-25 RX ADMIN — Medication 10 ML: at 09:22

## 2025-06-25 RX ADMIN — HEPARIN 500 UNITS: 100 SYRINGE at 09:22

## 2025-07-01 ENCOUNTER — HOSPITAL ENCOUNTER (OUTPATIENT)
Dept: RADIATION ONCOLOGY | Facility: HOSPITAL | Age: 45
Setting detail: RADIATION/ONCOLOGY SERIES
End: 2025-07-01
Payer: COMMERCIAL

## 2025-07-03 ENCOUNTER — TELEPHONE (OUTPATIENT)
Dept: RADIATION ONCOLOGY | Facility: HOSPITAL | Age: 45
End: 2025-07-03
Payer: COMMERCIAL

## 2025-07-03 PROCEDURE — 77338 DESIGN MLC DEVICE FOR IMRT: CPT | Performed by: STUDENT IN AN ORGANIZED HEALTH CARE EDUCATION/TRAINING PROGRAM

## 2025-07-03 PROCEDURE — 77301 RADIOTHERAPY DOSE PLAN IMRT: CPT | Performed by: STUDENT IN AN ORGANIZED HEALTH CARE EDUCATION/TRAINING PROGRAM

## 2025-07-03 PROCEDURE — 77300 RADIATION THERAPY DOSE PLAN: CPT | Performed by: STUDENT IN AN ORGANIZED HEALTH CARE EDUCATION/TRAINING PROGRAM

## 2025-07-03 NOTE — TELEPHONE ENCOUNTER
Informed patient that we needed to rescan her on Monday 7/7/2025 which will move her radiation treatment start date from that day to 7/10/2025.

## 2025-07-07 ENCOUNTER — SPECIMEN COLLECTION (OUTPATIENT)
Dept: RADIATION ONCOLOGY | Facility: HOSPITAL | Age: 45
End: 2025-07-07
Payer: COMMERCIAL

## 2025-07-07 ENCOUNTER — HOSPITAL ENCOUNTER (OUTPATIENT)
Dept: RADIATION ONCOLOGY | Facility: HOSPITAL | Age: 45
Discharge: HOME | End: 2025-07-07
Payer: COMMERCIAL

## 2025-07-07 DIAGNOSIS — C50.911 BREAST CANCER, STAGE 3, RIGHT: Primary | ICD-10-CM

## 2025-07-07 DIAGNOSIS — Z32.02 PREGNANCY EXAMINATION OR TEST, NEGATIVE RESULT: ICD-10-CM

## 2025-07-07 LAB
B-HCG UR QL: NEGATIVE
EXPIRATION DATE: NORMAL
INTERNAL NEGATIVE CONTROL: NEGATIVE
INTERNAL POSITIVE CONTROL: POSITIVE
Lab: NORMAL

## 2025-07-08 NOTE — PROGRESS NOTES
Subjective     REASON FOR CONSULTATION:  breast cancer  Provide an opinion on any further workup or treatment                             REQUESTING PHYSICIAN:  Jose    RECORDS OBTAINED:  Records of the patients history including those obtained from the referring provider were reviewed and summarized in detail.    HISTORY OF PRESENT ILLNESS:  The patient is a 45 y.o. year old female who is here for an opinion about the above issue.    History of Present Illness   The patient return today for follow-up having now completed adjuvant chemotherapy.  She is slated to begin radiation tomorrow.  She is feeling well with improvement in fatigue no nausea vomiting peripheral neuropathy.  She has chronic diarrhea.    ONCOLOGY HISTORY:  This is a 44-year-old woman referred from general surgery to discuss adjuvant treatment for recently surgically treated breast cancer.  The patient presented with a large palpable mass in the inner quadrant of the right breast.  The mass had been enlarging for couple years but the patient did not have insurance to get assessed.  The breast imaging is not available to me but she had a CT of the chest abdomen pelvis on 9/16/2024 showed a large mass in the right breast measuring up to 7.7 cm with abnormal left axillary lymphadenopathy.  There were tiny subpleural nodules in the lateral aspect of the left lower lobe likely granulomatous mildly conspicuous lymph node in the central mesentery 1.1 cm.  A PET scan was performed 9/30/2024 showing a hypermetabolic mass in the right breast with thickening throughout the right breast and pathologic hypermetabolic right axillary level 1 and 2 lymph nodes, no hypermetabolic internal mammary or supraclavicular lymph nodes.  The small pulmonary nodules were too small to characterize and mesenteric lymph nodes without hypermetabolic activity.  A biopsy of the right breast mass showed invasive ductal adenocarcinoma.    She was taken to the operating room  on 10/9/2024 and underwent a right modified radical mastectomy and axillary dissection, prophylactic left mastectomy.  Pathology from the right breast showed invasive ductal carcinoma [place] grade 3 (3+3+3) measuring 17 cm with extensive lymphovascular invasion and dermal lymphatic invasion.  The tumor extended to the dermis but did not ulcerate the skin.  Tumor extended to skeletal muscle and was present focally at an anterior margin, 0.05 mm of the posterior margin, 10 mm from the lateral margin, 20 mm from the medial margin, 28 mm from the superior margin and 40 mm from inferior margin.  There was ductal carcinoma in situ present within 2.5 mm of the posterior margin.  There were 13 lymph nodes in the axillary dissection 8 oncology plan/recommendations: of which had macrometastases, 1 micrometastases and 1 lymph node with isolated tumor cells.  The left breast had an intraductal papilloma otherwise negative.  The tumor was positive for estrogen receptor 99% of cells, progesterone receptor 50% of cells, HER2 0 and Ki-67 65%.    The patient has medical comorbidities of Crohn's disease.    Past Medical History:   Diagnosis Date    Anxiety     Breast cancer     Right    Crohn's disease     DVT (deep vein thrombosis) in pregnancy     PFO (patent foramen ovale)     Stroke     after the birth of her child at age 34    Tattoos         Past Surgical History:   Procedure Laterality Date     SECTION      COLON RESECTION      COLONOSCOPY      MASTECTOMY Bilateral 10/9/2024    Procedure: Modified radical mastectomy with axillary dissection of the right breast and simple mastectomy of the left breast;  Surgeon: Rebekah Garcia DO;  Location: [facility] OR;  Service: General;  Laterality: Bilateral;    SKIN CANCER EXCISION      TEETH EXTRACTION N/A 2025    Procedure: TOOTH EXTRACTION #2,15,19,28,29;  Surgeon: Pramod Zambrano DMD;  Location: [facility] OR;  Service: Oral Surgery;  Laterality: N/A;     VENOUS ACCESS DEVICE (PORT) INSERTION N/A 12/10/2024    Procedure: INSERTION VENOUS ACCESS DEVICE;  Surgeon: Rebekah Garcia DO;  Location: [facility];  Service: General;  Laterality: N/A;        Current Outpatient Medications on File Prior to Visit   Medication Sig Dispense Refill    aspirin 81 MG EC tablet Take 1 tablet by mouth Daily.      dicyclomine (BENTYL) 10 MG capsule Take 1 capsule by mouth 3 (Three) Times a Day As Needed for Abdominal Cramping. 90 capsule 2    gabapentin (Neurontin) 300 MG capsule Take 2 capsules by mouth Every Night. 60 capsule 2    lidocaine-prilocaine (EMLA) 2.5-2.5 % cream Apply 1 Application topically to the appropriate area as directed As Needed for Mild Pain. Apply to port site 30 minutes before access 15 g 3    MAGNESIUM GLYCINATE PO Take 1 tablet by mouth Daily.      omeprazole (priLOSEC) 20 MG capsule Take 1 capsule by mouth As Needed.      ondansetron (ZOFRAN) 8 MG tablet Take 1 tablet by mouth 3 (Three) Times a Day As Needed for Nausea or Vomiting. 30 tablet 5    potassium chloride (KLOR-CON M10) 10 MEQ CR tablet Take 3 tablets by mouth 2 (Two) Times a Day. (Patient taking differently: Take 4 tablets by mouth 2 (Two) Times a Day.) 180 tablet 4    venlafaxine XR (Effexor XR) 37.5 MG 24 hr capsule Take 1 capsule by mouth Daily. 30 capsule 0     Current Facility-Administered Medications on File Prior to Visit   Medication Dose Route Frequency Provider Last Rate Last Admin    heparin injection 500 Units  500 Units Intravenous PRN Nishant Witt MD   500 Units at 12/11/24 0916    heparin injection 500 Units  500 Units Intravenous PRN Nishant Witt MD   500 Units at 01/07/25 1049    sodium chloride 0.9 % flush 10 mL  10 mL Intravenous PRN Nishant Witt MD   10 mL at 12/11/24 0916    sodium chloride 0.9 % flush 10 mL  10 mL Intravenous PRN Nishant Witt MD   10 mL at 01/07/25 1048        ALLERGIES:  No Known Allergies     Social History     Socioeconomic History     "Marital status: Single    Number of children: 1   Tobacco Use    Smoking status: Former     Current packs/day: 0.00     Average packs/day: 1 pack/day for 20.0 years (20.0 ttl pk-yrs)     Types: Cigarettes     Start date:      Quit date: 2019     Years since quittin.5    Smokeless tobacco: Current    Tobacco comments:     Nicotine pouches   Vaping Use    Vaping status: Never Used   Substance and Sexual Activity    Alcohol use: Yes     Comment: OCC    Drug use: Never    Sexual activity: Defer        Family History   Problem Relation Age of Onset    Diabetes Mother     Heart disease Father     Diabetes Paternal Grandmother     Hypertension Other     Malig Hyperthermia Neg Hx         Review of Systems   Constitutional:  Negative for fatigue and fever.   HENT:  Negative for congestion and sinus pressure.    Respiratory: Negative.     Cardiovascular: Negative.    Gastrointestinal:  Positive for diarrhea. Negative for nausea.   Genitourinary: Negative.    Musculoskeletal: Negative.    Skin:  Negative for rash and wound.   Neurological: Negative.    Hematological: Negative.    Psychiatric/Behavioral:  Negative for dysphoric mood.    Unchanged 2025      Objective     Vitals:    25 0750   BP: 101/70   Pulse: 95   Resp: 16   Temp: 98.3 °F (36.8 °C)   TempSrc: Infrared   SpO2: 99%   Weight: 67.7 kg (149 lb 3.2 oz)   Height: 165.1 cm (65\")   PainSc: 0-No pain                 2025     7:50 AM   Current Status   ECOG score 0       Physical Exam    CONSTITUTIONAL: pleasant well-developed adult woman  HEENT: no icterus, no thrush, moist membranes   LYMPH: no cervical or supraclavicular lad  CV: RRR, S1S2, no murmur  RESP/chest: cta bilat, no wheezing, no rales, port present left chest wall  Breast: Bilateral mastectomies  GI: soft, nontender, no splenomegaly, +BS  MUSC: no edema, normal gait  NEURO: alert and oriented x3, normal strength  PSYCH: normal mood and affect  Skin: No current rash    RECENT " LABS:  Hematology WBC   Date Value Ref Range Status   07/09/2025 5.55 3.40 - 10.80 10*3/mm3 Final     RBC   Date Value Ref Range Status   07/09/2025 3.83 3.77 - 5.28 10*6/mm3 Final     Hemoglobin   Date Value Ref Range Status   07/09/2025 12.8 12.0 - 15.9 g/dL Final     Hematocrit   Date Value Ref Range Status   07/09/2025 39.1 34.0 - 46.6 % Final     Platelets   Date Value Ref Range Status   07/09/2025 338 140 - 450 10*3/mm3 Final        Lab Results   Component Value Date    GLUCOSE 126 (H) 06/25/2025    BUN 15.0 06/25/2025    CREATININE 0.62 06/25/2025     06/25/2025    K 3.6 06/25/2025     06/25/2025    CALCIUM 9.1 06/25/2025    PROTEINTOT 5.8 (L) 06/04/2025    ALBUMIN 3.2 (L) 06/04/2025    ALT 15 06/04/2025    AST 18 06/04/2025    ALKPHOS 112 06/04/2025    BILITOT 0.2 06/04/2025    GLOB 2.6 06/04/2025    AGRATIO 1.2 06/04/2025    BCR 24.2 06/25/2025    ANIONGAP 12.9 06/25/2025    EGFR 112.1 06/25/2025     PET scan 9/30/2024:  FINDINGS:     Head/neck: No suspicious uptake.     Chest: An 8 x 5.8 cm mass in the lower inner right breast with max SUV  of 18 (series 4/image 156). Mass comes in close proximity to the  sternum. Hypermetabolic parenchymal thickening throughout the right  breast with max SUV of 9 (series 4/image 140). Diffuse right breast skin  thickening.     Hypermetabolic right axillary level I and II lymph nodes. Reference 1.2  cm level I lymph node with max SUV of 11.9 (series 4/image 103).  Additional reference subcentimeter level II lymph node with max SUV of  3.3 (series 4/image 93). Separate brown fat uptake in the bilateral  axilla and posterior neck base.     No enlarged or hypermetabolic internal mammary or supraclavicular lymph  nodes.     Few subcentimeter pulmonary nodules are too small for accurate PET  characterization without overt hypermetabolism and unchanged from recent  CT. Reference left lower lobe (series 4/image 167) and right upper lobe  (series 4/image 131).      Abdomen/pelvis: Mesenteric lymph nodes are mostly subcentimeter without  associated hypermetabolism.     Sigmoid colectomy. Tubular hypermetabolism throughout the otherwise  normal-appearing remaining colon may be medication related. Moderate  proximal colonic stool burden.     Bones: No focal osseous hypermetabolism with special attention to the  sternum.           IMPRESSION:  1. Hypermetabolic 8 cm right breast mass with hypermetabolic parenchymal  thickening throughout the right breast, pathologically proven invasive  ductal carcinoma. Mass comes in close proximity to the sternum. No focal  osseous hypermetabolism with special attention to the sternum.  2. Hypermetabolic right axillary level I and II lymph nodes suggesting  alana metastatic disease. No enlarged or hypermetabolic internal mammary  or supraclavicular lymph nodes. Separate brown fat uptake in the  bilateral axilla and posterior neck base.  3. Few subcentimeter pulmonary nodules are too small for accurate PET  characterization and will need follow-up via chest CT.  4. Mesenteric lymph nodes are mostly subcentimeter without associated  hypermetabolism.       Assessment & Plan   *pT3N2a M0 grade 3 invasive ductal adenocarcinoma right breast, ER 99% positive, MO 50% positive, HER2 0, Ki-67 65%, tumor present at anterior margin  presented with a large palpable mass in the inner quadrant of the right breast; mass had been enlarging for couple years but the patient did not have insurance to get assessed  CT of the chest abdomen pelvis on 9/16/2024 showed a large mass in the right breast measuring up to 7.7 cm with abnormal left axillary lymphadenopathy.  There were tiny subpleural nodules in the lateral aspect of the left lower lobe likely granulomatous mildly conspicuous lymph node in the central mesentery 1.1 cm.    PET scan 9/30/2024 - hypermetabolic mass in the right breast with thickening throughout the right breast and pathologic hypermetabolic  right axillary level 1 and 2 lymph nodes, no hypermetabolic internal mammary or supraclavicular lymph nodes.  The small pulmonary nodules were too small to characterize and mesenteric lymph nodes without hypermetabolic activity.    biopsy of the right breast mass showed invasive ductal adenocarcinoma.  operating room on 10/9/2024 and underwent a right modified radical mastectomy and axillary dissection, prophylactic left mastectomy.  Pathology from the right breast showed invasive ductal carcinoma [place] grade 3 (3+3+3) measuring 17 cm with extensive lymphovascular invasion and dermal lymphatic invasion.  The tumor extended to the dermis but did not ulcerate the skin.  Tumor extended to skeletal muscle and was present focally at an anterior margin, 0.05 mm of the posterior margin, 10 mm from the lateral margin, 20 mm from the medial margin, 28 mm from the superior margin and 40 mm from inferior margin.  There was ductal carcinoma in situ present within 2.5 mm of the posterior margin.  There were 13 lymph nodes in the axillary dissection 8 of which had macrometastases, 1 micrometastases and 1 lymph node with isolated tumor cells.  The left breast had an intraductal papilloma otherwise negative.  The tumor was positive for estrogen receptor 99% of cells, progesterone receptor 50% of cells, HER2 0 and Ki-67 65%.  Initiated adjuvant chemotherapy with Adriamycin/Cytoxan 12/31/2024 (adjuvant chemotherapy delayed because of wound healing issues)   3/18/2025-initiated weekly Taxol; 6/4/25 completed 12 weeks of adjuvant Taxol with no dose adjustments required    *Lung nodules  Baseline CT chest abdomen pelvis 9/16/2024-tiny subpleural nodule in the left lower lobe likely granulomatous, mildly conspicuous lymph node in the central mesentery 1.1 [id number] PET negative uptake in pulmonary nodules or mesenteric lymph nodes  3/8/2025 CT angiogram chest few stable sub-6 mm pulmonary nodules    *Myelosuppression secondary to  chemotherapy   Resolved     *Abnormal thyroid ultrasound-suspicious right thyroid nodule by ultrasound  Fine-needle aspiration thyroid nodule 6/6/2025-[place] category 2 consistent with benign follicular nodule    *Hypokalemia /hypomagnesemia on oral replacement for both    *[employer] 19 gene panel-VUS Bard 1    *medical comorbidities of Crohn's disease.    Oncology plan/recommendations:  Patient to proceed with postmastectomy radiation beginning tomorrow  She will need hormonal therapy with ovarian suppression/tamoxifen versus oophorectomy and AI therapy/ck4/6 inhibitor; I discussed with the patient options and she prefers to have surgical menopause with bilateral oophorectomies (also prefers hysterectomy at the same time)-referral made to gynecology to discuss with her further  6-week follow-up CBC CMP magnesium port flush

## 2025-07-09 ENCOUNTER — OFFICE VISIT (OUTPATIENT)
Dept: ONCOLOGY | Facility: CLINIC | Age: 45
End: 2025-07-09
Payer: COMMERCIAL

## 2025-07-09 ENCOUNTER — INFUSION (OUTPATIENT)
Dept: ONCOLOGY | Facility: HOSPITAL | Age: 45
End: 2025-07-09
Payer: COMMERCIAL

## 2025-07-09 VITALS
HEIGHT: 65 IN | OXYGEN SATURATION: 99 % | BODY MASS INDEX: 24.86 KG/M2 | TEMPERATURE: 98.3 F | WEIGHT: 149.2 LBS | HEART RATE: 95 BPM | RESPIRATION RATE: 16 BRPM | SYSTOLIC BLOOD PRESSURE: 101 MMHG | DIASTOLIC BLOOD PRESSURE: 70 MMHG

## 2025-07-09 DIAGNOSIS — Z45.2 ENCOUNTER FOR CENTRAL LINE CARE: ICD-10-CM

## 2025-07-09 DIAGNOSIS — T45.1X5A IMMUNOSUPPRESSED DUE TO CHEMOTHERAPY: ICD-10-CM

## 2025-07-09 DIAGNOSIS — D50.9 IRON DEFICIENCY ANEMIA, UNSPECIFIED IRON DEFICIENCY ANEMIA TYPE: ICD-10-CM

## 2025-07-09 DIAGNOSIS — D84.821 IMMUNOSUPPRESSED DUE TO CHEMOTHERAPY: ICD-10-CM

## 2025-07-09 DIAGNOSIS — Z79.69 IMMUNOSUPPRESSED DUE TO CHEMOTHERAPY: ICD-10-CM

## 2025-07-09 DIAGNOSIS — E83.42 HYPOMAGNESEMIA: Primary | ICD-10-CM

## 2025-07-09 DIAGNOSIS — C50.911 BREAST CANCER, STAGE 3, RIGHT: ICD-10-CM

## 2025-07-09 DIAGNOSIS — C50.911 BREAST CANCER, STAGE 3, RIGHT: Primary | ICD-10-CM

## 2025-07-09 LAB
ALBUMIN SERPL-MCNC: 3.7 G/DL (ref 3.5–5.2)
ALBUMIN/GLOB SERPL: 1.3 G/DL
ALP SERPL-CCNC: 96 U/L (ref 39–117)
ALT SERPL W P-5'-P-CCNC: 18 U/L (ref 1–33)
ANION GAP SERPL CALCULATED.3IONS-SCNC: 12.3 MMOL/L (ref 5–15)
AST SERPL-CCNC: 21 U/L (ref 1–32)
BASOPHILS # BLD AUTO: 0.06 10*3/MM3 (ref 0–0.2)
BASOPHILS NFR BLD AUTO: 1.1 % (ref 0–1.5)
BILIRUB SERPL-MCNC: 0.2 MG/DL (ref 0–1.2)
BUN SERPL-MCNC: 8.8 MG/DL (ref 6–20)
BUN/CREAT SERPL: 13.5 (ref 7–25)
CALCIUM SPEC-SCNC: 9.2 MG/DL (ref 8.6–10.5)
CHLORIDE SERPL-SCNC: 105 MMOL/L (ref 98–107)
CO2 SERPL-SCNC: 22.7 MMOL/L (ref 22–29)
CREAT SERPL-MCNC: 0.65 MG/DL (ref 0.57–1)
DEPRECATED RDW RBC AUTO: 47.2 FL (ref 37–54)
EGFRCR SERPLBLD CKD-EPI 2021: 110.8 ML/MIN/1.73
EOSINOPHIL # BLD AUTO: 0.44 10*3/MM3 (ref 0–0.4)
EOSINOPHIL NFR BLD AUTO: 7.9 % (ref 0.3–6.2)
ERYTHROCYTE [DISTWIDTH] IN BLOOD BY AUTOMATED COUNT: 12.7 % (ref 12.3–15.4)
GLOBULIN UR ELPH-MCNC: 2.8 GM/DL
GLUCOSE SERPL-MCNC: 118 MG/DL (ref 65–99)
HCT VFR BLD AUTO: 39.1 % (ref 34–46.6)
HGB BLD-MCNC: 12.8 G/DL (ref 12–15.9)
IMM GRANULOCYTES # BLD AUTO: 0.04 10*3/MM3 (ref 0–0.05)
IMM GRANULOCYTES NFR BLD AUTO: 0.7 % (ref 0–0.5)
LYMPHOCYTES # BLD AUTO: 0.87 10*3/MM3 (ref 0.7–3.1)
LYMPHOCYTES NFR BLD AUTO: 15.7 % (ref 19.6–45.3)
MAGNESIUM SERPL-MCNC: 1.9 MG/DL (ref 1.6–2.6)
MCH RBC QN AUTO: 33.4 PG (ref 26.6–33)
MCHC RBC AUTO-ENTMCNC: 32.7 G/DL (ref 31.5–35.7)
MCV RBC AUTO: 102.1 FL (ref 79–97)
MONOCYTES # BLD AUTO: 0.44 10*3/MM3 (ref 0.1–0.9)
MONOCYTES NFR BLD AUTO: 7.9 % (ref 5–12)
NEUTROPHILS NFR BLD AUTO: 3.7 10*3/MM3 (ref 1.7–7)
NEUTROPHILS NFR BLD AUTO: 66.7 % (ref 42.7–76)
NRBC BLD AUTO-RTO: 0 /100 WBC (ref 0–0.2)
PLATELET # BLD AUTO: 338 10*3/MM3 (ref 140–450)
PMV BLD AUTO: 9.3 FL (ref 6–12)
POTASSIUM SERPL-SCNC: 3.5 MMOL/L (ref 3.5–5.2)
PROT SERPL-MCNC: 6.5 G/DL (ref 6–8.5)
RBC # BLD AUTO: 3.83 10*6/MM3 (ref 3.77–5.28)
SODIUM SERPL-SCNC: 140 MMOL/L (ref 136–145)
WBC NRBC COR # BLD AUTO: 5.55 10*3/MM3 (ref 3.4–10.8)

## 2025-07-09 PROCEDURE — 36591 DRAW BLOOD OFF VENOUS DEVICE: CPT

## 2025-07-09 PROCEDURE — 25010000002 HEPARIN LOCK FLUSH PER 10 UNITS: Performed by: INTERNAL MEDICINE

## 2025-07-09 PROCEDURE — 83735 ASSAY OF MAGNESIUM: CPT | Performed by: INTERNAL MEDICINE

## 2025-07-09 PROCEDURE — 80053 COMPREHEN METABOLIC PANEL: CPT | Performed by: INTERNAL MEDICINE

## 2025-07-09 PROCEDURE — 85025 COMPLETE CBC W/AUTO DIFF WBC: CPT | Performed by: INTERNAL MEDICINE

## 2025-07-09 RX ORDER — SODIUM CHLORIDE 0.9 % (FLUSH) 0.9 %
10 SYRINGE (ML) INJECTION AS NEEDED
OUTPATIENT
Start: 2025-07-09

## 2025-07-09 RX ORDER — HEPARIN SODIUM (PORCINE) LOCK FLUSH IV SOLN 100 UNIT/ML 100 UNIT/ML
500 SOLUTION INTRAVENOUS AS NEEDED
Status: DISCONTINUED | OUTPATIENT
Start: 2025-07-09 | End: 2025-07-09 | Stop reason: HOSPADM

## 2025-07-09 RX ORDER — SODIUM CHLORIDE 0.9 % (FLUSH) 0.9 %
10 SYRINGE (ML) INJECTION AS NEEDED
Status: DISCONTINUED | OUTPATIENT
Start: 2025-07-09 | End: 2025-07-09 | Stop reason: HOSPADM

## 2025-07-09 RX ORDER — HEPARIN SODIUM (PORCINE) LOCK FLUSH IV SOLN 100 UNIT/ML 100 UNIT/ML
500 SOLUTION INTRAVENOUS AS NEEDED
OUTPATIENT
Start: 2025-07-09

## 2025-07-09 RX ADMIN — Medication 10 ML: at 08:24

## 2025-07-09 RX ADMIN — Medication 500 UNITS: at 08:24

## 2025-07-10 ENCOUNTER — HOSPITAL ENCOUNTER (OUTPATIENT)
Dept: RADIATION ONCOLOGY | Facility: HOSPITAL | Age: 45
Discharge: HOME | End: 2025-07-10

## 2025-07-10 LAB
RAD ONC ARIA COURSE ID: NORMAL
RAD ONC ARIA COURSE INTENT: NORMAL
RAD ONC ARIA COURSE LAST TREATMENT DATE: NORMAL
RAD ONC ARIA COURSE START DATE: NORMAL
RAD ONC ARIA COURSE TREATMENT ELAPSED DAYS: 0
RAD ONC ARIA FIRST TREATMENT DATE: NORMAL
RAD ONC ARIA PLAN FRACTIONS TREATED TO DATE: 1
RAD ONC ARIA PLAN ID: NORMAL
RAD ONC ARIA PLAN PRESCRIBED DOSE PER FRACTION: 2.66 GY
RAD ONC ARIA PLAN PRIMARY REFERENCE POINT: NORMAL
RAD ONC ARIA PLAN TOTAL FRACTIONS PRESCRIBED: 16
RAD ONC ARIA PLAN TOTAL PRESCRIBED DOSE: 4256 CGY
RAD ONC ARIA REFERENCE POINT DOSAGE GIVEN TO DATE: 2.66 GY
RAD ONC ARIA REFERENCE POINT ID: NORMAL
RAD ONC ARIA REFERENCE POINT SESSION DOSAGE GIVEN: 2.66 GY

## 2025-07-10 PROCEDURE — 77427 RADIATION TX MANAGEMENT X5: CPT | Performed by: STUDENT IN AN ORGANIZED HEALTH CARE EDUCATION/TRAINING PROGRAM

## 2025-07-10 PROCEDURE — 77385: CPT | Performed by: STUDENT IN AN ORGANIZED HEALTH CARE EDUCATION/TRAINING PROGRAM

## 2025-07-10 PROCEDURE — 77338 DESIGN MLC DEVICE FOR IMRT: CPT | Performed by: STUDENT IN AN ORGANIZED HEALTH CARE EDUCATION/TRAINING PROGRAM

## 2025-07-10 PROCEDURE — 77301 RADIOTHERAPY DOSE PLAN IMRT: CPT | Performed by: STUDENT IN AN ORGANIZED HEALTH CARE EDUCATION/TRAINING PROGRAM

## 2025-07-10 PROCEDURE — 77300 RADIATION THERAPY DOSE PLAN: CPT | Performed by: STUDENT IN AN ORGANIZED HEALTH CARE EDUCATION/TRAINING PROGRAM

## 2025-07-11 ENCOUNTER — PATIENT OUTREACH (OUTPATIENT)
Dept: OTHER | Facility: HOSPITAL | Age: 45
End: 2025-07-11
Payer: COMMERCIAL

## 2025-07-11 ENCOUNTER — RADIATION ONCOLOGY WEEKLY ASSESSMENT (OUTPATIENT)
Dept: RADIATION ONCOLOGY | Facility: HOSPITAL | Age: 45
End: 2025-07-11
Payer: COMMERCIAL

## 2025-07-11 ENCOUNTER — HOSPITAL ENCOUNTER (OUTPATIENT)
Dept: RADIATION ONCOLOGY | Facility: HOSPITAL | Age: 45
Discharge: HOME | End: 2025-07-11

## 2025-07-11 VITALS
WEIGHT: 150.4 LBS | RESPIRATION RATE: 16 BRPM | HEART RATE: 84 BPM | SYSTOLIC BLOOD PRESSURE: 104 MMHG | BODY MASS INDEX: 25.03 KG/M2 | DIASTOLIC BLOOD PRESSURE: 71 MMHG | OXYGEN SATURATION: 98 %

## 2025-07-11 DIAGNOSIS — C50.911 BREAST CANCER, STAGE 3, RIGHT: Primary | ICD-10-CM

## 2025-07-11 LAB
RAD ONC ARIA COURSE ID: NORMAL
RAD ONC ARIA COURSE INTENT: NORMAL
RAD ONC ARIA COURSE LAST TREATMENT DATE: NORMAL
RAD ONC ARIA COURSE START DATE: NORMAL
RAD ONC ARIA COURSE TREATMENT ELAPSED DAYS: 1
RAD ONC ARIA FIRST TREATMENT DATE: NORMAL
RAD ONC ARIA PLAN FRACTIONS TREATED TO DATE: 2
RAD ONC ARIA PLAN ID: NORMAL
RAD ONC ARIA PLAN PRESCRIBED DOSE PER FRACTION: 2.66 GY
RAD ONC ARIA PLAN PRIMARY REFERENCE POINT: NORMAL
RAD ONC ARIA PLAN TOTAL FRACTIONS PRESCRIBED: 16
RAD ONC ARIA PLAN TOTAL PRESCRIBED DOSE: 4256 CGY
RAD ONC ARIA REFERENCE POINT DOSAGE GIVEN TO DATE: 5.32 GY
RAD ONC ARIA REFERENCE POINT ID: NORMAL
RAD ONC ARIA REFERENCE POINT SESSION DOSAGE GIVEN: 2.66 GY

## 2025-07-11 PROCEDURE — 77014 CHG CT GUIDANCE RADIATION THERAPY FLDS PLACEMENT: CPT | Performed by: STUDENT IN AN ORGANIZED HEALTH CARE EDUCATION/TRAINING PROGRAM

## 2025-07-11 PROCEDURE — 77385: CPT | Performed by: STUDENT IN AN ORGANIZED HEALTH CARE EDUCATION/TRAINING PROGRAM

## 2025-07-11 NOTE — PROGRESS NOTES
Radiation Oncology  On-Treatment Note      Patient: Suzanne Lin    MRN: [id number]    Attending Physician: Tyron Acosta MD     Diagnosis:     ICD-10-CM ICD-9-CM   1. Breast cancer, stage 3, right  C50.911 174.9       Radiation Therapy Visit:  Continue radiation therapy, Dosimetry plan remains acceptable, Films reviewed and remains acceptable, Pain assessed, Pain management planned, Radiation dose schedule reviewed and remains acceptable, Radiation technique remains acceptable, and Symptoms within expected range    Radiation Treatments       Active   Plans   RtCW+N   Most recent treatment: Dose planned: 266 cGy (fraction 2 on 7/11/2025)   Total: Dose planned: 4,256 cGy (16 fractions)   Elapsed Days: 1      Reference Points   [id number]   Most recent treatment: Dose given: 266 cGy (on 7/11/2025)   Total: Dose given: 532 cGy   Elapsed Days: 1                      Physical Examination:  Vitals: Blood pressure 104/71, pulse 84, resp. rate 16, weight 68.2 kg (150 lb 6.4 oz), SpO2 98%.  Pain Score    07/11/25 0932   PainSc: 0-No pain       Restricted in physically strenuous activity but ambulatory and able to carry out work of a light or sedentary nature, e.g., light house work, office work = 1    We examined the relevant areas: yes  Findings are within the expected range for this stage of treatment: yes  -------------------------------------------------------------------------------------------------------------------    ACTION ITEMS:  Patient tolerating treatment well and as expected for this stage in their treatment and Continue radiation therapy as planned    Estimated Completion Date: 8/7/2025      Tyron Acosta MD  Radiation Oncology

## 2025-07-14 ENCOUNTER — HOSPITAL ENCOUNTER (OUTPATIENT)
Dept: RADIATION ONCOLOGY | Facility: HOSPITAL | Age: 45
Discharge: HOME | End: 2025-07-14
Payer: COMMERCIAL

## 2025-07-14 LAB
RAD ONC ARIA COURSE ID: NORMAL
RAD ONC ARIA COURSE INTENT: NORMAL
RAD ONC ARIA COURSE LAST TREATMENT DATE: NORMAL
RAD ONC ARIA COURSE START DATE: NORMAL
RAD ONC ARIA COURSE TREATMENT ELAPSED DAYS: 4
RAD ONC ARIA FIRST TREATMENT DATE: NORMAL
RAD ONC ARIA PLAN FRACTIONS TREATED TO DATE: 3
RAD ONC ARIA PLAN ID: NORMAL
RAD ONC ARIA PLAN PRESCRIBED DOSE PER FRACTION: 2.66 GY
RAD ONC ARIA PLAN PRIMARY REFERENCE POINT: NORMAL
RAD ONC ARIA PLAN TOTAL FRACTIONS PRESCRIBED: 16
RAD ONC ARIA PLAN TOTAL PRESCRIBED DOSE: 4256 CGY
RAD ONC ARIA REFERENCE POINT DOSAGE GIVEN TO DATE: 7.98 GY
RAD ONC ARIA REFERENCE POINT ID: NORMAL
RAD ONC ARIA REFERENCE POINT SESSION DOSAGE GIVEN: 2.66 GY

## 2025-07-14 PROCEDURE — 77385: CPT | Performed by: STUDENT IN AN ORGANIZED HEALTH CARE EDUCATION/TRAINING PROGRAM

## 2025-07-14 PROCEDURE — 77300 RADIATION THERAPY DOSE PLAN: CPT | Performed by: STUDENT IN AN ORGANIZED HEALTH CARE EDUCATION/TRAINING PROGRAM

## 2025-07-14 PROCEDURE — 77336 RADIATION PHYSICS CONSULT: CPT | Performed by: STUDENT IN AN ORGANIZED HEALTH CARE EDUCATION/TRAINING PROGRAM

## 2025-07-14 PROCEDURE — 77014 CHG CT GUIDANCE RADIATION THERAPY FLDS PLACEMENT: CPT | Performed by: STUDENT IN AN ORGANIZED HEALTH CARE EDUCATION/TRAINING PROGRAM

## 2025-07-15 ENCOUNTER — HOSPITAL ENCOUNTER (OUTPATIENT)
Dept: RADIATION ONCOLOGY | Facility: HOSPITAL | Age: 45
Discharge: HOME | End: 2025-07-15

## 2025-07-15 LAB
RAD ONC ARIA COURSE ID: NORMAL
RAD ONC ARIA COURSE INTENT: NORMAL
RAD ONC ARIA COURSE LAST TREATMENT DATE: NORMAL
RAD ONC ARIA COURSE START DATE: NORMAL
RAD ONC ARIA COURSE TREATMENT ELAPSED DAYS: 5
RAD ONC ARIA FIRST TREATMENT DATE: NORMAL
RAD ONC ARIA PLAN FRACTIONS TREATED TO DATE: 4
RAD ONC ARIA PLAN ID: NORMAL
RAD ONC ARIA PLAN PRESCRIBED DOSE PER FRACTION: 2.66 GY
RAD ONC ARIA PLAN PRIMARY REFERENCE POINT: NORMAL
RAD ONC ARIA PLAN TOTAL FRACTIONS PRESCRIBED: 16
RAD ONC ARIA PLAN TOTAL PRESCRIBED DOSE: 4256 CGY
RAD ONC ARIA REFERENCE POINT DOSAGE GIVEN TO DATE: 10.64 GY
RAD ONC ARIA REFERENCE POINT ID: NORMAL
RAD ONC ARIA REFERENCE POINT SESSION DOSAGE GIVEN: 2.66 GY

## 2025-07-15 PROCEDURE — 77334 RADIATION TREATMENT AID(S): CPT | Performed by: STUDENT IN AN ORGANIZED HEALTH CARE EDUCATION/TRAINING PROGRAM

## 2025-07-15 PROCEDURE — 77385: CPT | Performed by: STUDENT IN AN ORGANIZED HEALTH CARE EDUCATION/TRAINING PROGRAM

## 2025-07-15 PROCEDURE — 77321 SPECIAL TELETX PORT PLAN: CPT | Performed by: STUDENT IN AN ORGANIZED HEALTH CARE EDUCATION/TRAINING PROGRAM

## 2025-07-15 PROCEDURE — 77300 RADIATION THERAPY DOSE PLAN: CPT | Performed by: STUDENT IN AN ORGANIZED HEALTH CARE EDUCATION/TRAINING PROGRAM

## 2025-07-15 PROCEDURE — 77014 CHG CT GUIDANCE RADIATION THERAPY FLDS PLACEMENT: CPT | Performed by: STUDENT IN AN ORGANIZED HEALTH CARE EDUCATION/TRAINING PROGRAM

## 2025-07-16 ENCOUNTER — HOSPITAL ENCOUNTER (OUTPATIENT)
Dept: RADIATION ONCOLOGY | Facility: HOSPITAL | Age: 45
Discharge: HOME | End: 2025-07-16

## 2025-07-16 LAB
RAD ONC ARIA COURSE ID: NORMAL
RAD ONC ARIA COURSE INTENT: NORMAL
RAD ONC ARIA COURSE LAST TREATMENT DATE: NORMAL
RAD ONC ARIA COURSE START DATE: NORMAL
RAD ONC ARIA COURSE TREATMENT ELAPSED DAYS: 6
RAD ONC ARIA FIRST TREATMENT DATE: NORMAL
RAD ONC ARIA PLAN FRACTIONS TREATED TO DATE: 5
RAD ONC ARIA PLAN ID: NORMAL
RAD ONC ARIA PLAN PRESCRIBED DOSE PER FRACTION: 2.66 GY
RAD ONC ARIA PLAN PRIMARY REFERENCE POINT: NORMAL
RAD ONC ARIA PLAN TOTAL FRACTIONS PRESCRIBED: 16
RAD ONC ARIA PLAN TOTAL PRESCRIBED DOSE: 4256 CGY
RAD ONC ARIA REFERENCE POINT DOSAGE GIVEN TO DATE: 13.3 GY
RAD ONC ARIA REFERENCE POINT ID: NORMAL
RAD ONC ARIA REFERENCE POINT SESSION DOSAGE GIVEN: 2.66 GY

## 2025-07-16 PROCEDURE — 77014 CHG CT GUIDANCE RADIATION THERAPY FLDS PLACEMENT: CPT | Performed by: STUDENT IN AN ORGANIZED HEALTH CARE EDUCATION/TRAINING PROGRAM

## 2025-07-16 PROCEDURE — 77385: CPT | Performed by: STUDENT IN AN ORGANIZED HEALTH CARE EDUCATION/TRAINING PROGRAM

## 2025-07-17 ENCOUNTER — TELEMEDICINE (OUTPATIENT)
Dept: PSYCHIATRY | Facility: HOSPITAL | Age: 45
End: 2025-07-17
Payer: COMMERCIAL

## 2025-07-17 ENCOUNTER — RADIATION ONCOLOGY WEEKLY ASSESSMENT (OUTPATIENT)
Dept: RADIATION ONCOLOGY | Facility: HOSPITAL | Age: 45
End: 2025-07-17
Payer: COMMERCIAL

## 2025-07-17 ENCOUNTER — HOSPITAL ENCOUNTER (OUTPATIENT)
Dept: RADIATION ONCOLOGY | Facility: HOSPITAL | Age: 45
Discharge: HOME | End: 2025-07-17

## 2025-07-17 VITALS
SYSTOLIC BLOOD PRESSURE: 115 MMHG | WEIGHT: 151.6 LBS | RESPIRATION RATE: 18 BRPM | DIASTOLIC BLOOD PRESSURE: 77 MMHG | OXYGEN SATURATION: 99 % | HEART RATE: 94 BPM | BODY MASS INDEX: 25.23 KG/M2

## 2025-07-17 DIAGNOSIS — R23.2 HOT FLASHES: ICD-10-CM

## 2025-07-17 DIAGNOSIS — C50.911 BREAST CANCER, STAGE 3, RIGHT: ICD-10-CM

## 2025-07-17 DIAGNOSIS — F43.23 ADJUSTMENT DISORDER WITH MIXED ANXIETY AND DEPRESSED MOOD: Primary | ICD-10-CM

## 2025-07-17 DIAGNOSIS — C50.911 BREAST CANCER, STAGE 3, RIGHT: Primary | ICD-10-CM

## 2025-07-17 LAB
RAD ONC ARIA COURSE ID: NORMAL
RAD ONC ARIA COURSE INTENT: NORMAL
RAD ONC ARIA COURSE LAST TREATMENT DATE: NORMAL
RAD ONC ARIA COURSE START DATE: NORMAL
RAD ONC ARIA COURSE TREATMENT ELAPSED DAYS: 7
RAD ONC ARIA FIRST TREATMENT DATE: NORMAL
RAD ONC ARIA PLAN FRACTIONS TREATED TO DATE: 6
RAD ONC ARIA PLAN ID: NORMAL
RAD ONC ARIA PLAN PRESCRIBED DOSE PER FRACTION: 2.66 GY
RAD ONC ARIA PLAN PRIMARY REFERENCE POINT: NORMAL
RAD ONC ARIA PLAN TOTAL FRACTIONS PRESCRIBED: 16
RAD ONC ARIA PLAN TOTAL PRESCRIBED DOSE: 4256 CGY
RAD ONC ARIA REFERENCE POINT DOSAGE GIVEN TO DATE: 15.96 GY
RAD ONC ARIA REFERENCE POINT ID: NORMAL
RAD ONC ARIA REFERENCE POINT SESSION DOSAGE GIVEN: 2.66 GY

## 2025-07-17 PROCEDURE — 77385: CPT | Performed by: STUDENT IN AN ORGANIZED HEALTH CARE EDUCATION/TRAINING PROGRAM

## 2025-07-17 PROCEDURE — 77427 RADIATION TX MANAGEMENT X5: CPT | Performed by: STUDENT IN AN ORGANIZED HEALTH CARE EDUCATION/TRAINING PROGRAM

## 2025-07-17 PROCEDURE — 77014 CHG CT GUIDANCE RADIATION THERAPY FLDS PLACEMENT: CPT | Performed by: STUDENT IN AN ORGANIZED HEALTH CARE EDUCATION/TRAINING PROGRAM

## 2025-07-17 RX ORDER — VENLAFAXINE HYDROCHLORIDE 37.5 MG/1
37.5 CAPSULE, EXTENDED RELEASE ORAL DAILY
Qty: 90 CAPSULE | Refills: 0 | Status: SHIPPED | OUTPATIENT
Start: 2025-07-17 | End: 2026-07-17

## 2025-07-17 NOTE — PROGRESS NOTES
Behavioral Oncology Services  Video visit conducted via "[employer]t Video Visit  You have chosen to receive care through a telehealth visit.  Do you consent to use a video/audio connection for your medical care today? YES  Telehealth provided in patient's home.  Location of Provider: [place], [place]     Subjective  Patient ID: Suzanne Lin is a 45 y.o. female is seen via telehealth in the Behavioral Oncology Clinic.    CC: Anxiety    HPI/ Interval History:   Pt is seen in follow up regarding ongoing burden of treatment for breast cancer. Reports to be doing well. Initiated radiation last Thursday, driving to [place] daily, and currently tolerating well. Anticipates 21 total treatments, acknowledging symptom burden may increase over time. Did appreciate connection to [facility] team, appreciating gift cards which will be incredibly helpful for gas to radiation and ongoing financial support in time off work. Continues to contemplate challenging decisions, recent filing of bankruptcy, and appreciation of this as necessary choice. Does endorse anxiety surrounding this. Remains hopeful to return to work following radiation, while recognizing next steps of hysterectomy vs AI plus lupron, with appointments upcoming to discuss. Is future oriented, considering importance of ongoing self care, reduction of toxins in environment. Denies unmanaged sx of anxiety or affective disturbance at this time.    Exam: As above    Recent Labs Reviewed:  Hospital Outpatient Visit on 07/17/2025   Component Date Value    Course ID 07/17/2025 [email or web address]     Course Intent 07/17/2025 Curative     Course Start Date 07/17/2025 6/26/2025  9:13 AM     Course First Treatment D* 07/17/2025 7/10/2025 12:46 PM     Course Last Treatment Da* 07/17/2025 7/17/2025  9:10 AM     Course Elapsed Days 07/17/2025 7     Reference Point ID 07/17/2025 [email or web address]     Reference Point Dosage G* 07/17/[id number]     Reference Point Session * 07/17/2025 2.66     Plan ID  07/17/2025 [email or web address]     Plan Fractions Treated t* 07/17/2025 6     Plan Total Fractions Pre* 07/17/2025 16     Plan Prescribed Dose Per* 07/17/2025 2.66     Plan Total Prescribed Do* 07/17/2025 4,256     Plan Primary Reference P* 07/17/2025 [email or web address]    Hospital Outpatient Visit on 07/16/2025   Component Date Value    Course ID 07/16/2025 [email or web address]     Course Intent 07/16/2025 Curative     Course Start Date 07/16/2025 6/26/2025  9:13 AM     Course First Treatment D* 07/16/2025 7/10/2025 12:46 PM     Course Last Treatment Da* 07/16/2025 7/16/2025  9:17 AM     Course Elapsed Days 07/16/2025 6     Reference Point ID 07/16/2025 [email or web address]     Reference Point Dosage G* 07/16/2025 [id number]     Reference Point Session * 07/16/2025 2.66     Plan ID 07/16/2025 [email or web address]     Plan Fractions Treated t* 07/16/2025 5     Plan Total Fractions Pre* 07/16/2025 16     Plan Prescribed Dose Per* 07/16/2025 2.66     Plan Total Prescribed Do* 07/16/2025 4,256     Plan Primary Reference P* 07/16/2025 [email or web address]    Hospital Outpatient Visit on 07/15/2025   Component Date Value    Course ID 07/15/2025 [email or web address]     Course Intent 07/15/2025 Curative     Course Start Date 07/15/2025 6/26/2025  9:13 AM     Course First Treatment D* 07/15/2025 7/10/2025 12:46 PM     Course Last Treatment Da* 07/15/2025 7/15/2025  8:55 AM     Course Elapsed Days 07/15/2025 5     Reference Point ID 07/15/2025 [email or web address]     Reference Point Dosage G* 07/15/[phone]     Reference Point Session * 07/15/2025 2.66     Plan ID 07/15/2025 [email or web address]     Plan Fractions Treated t* 07/15/2025 4     Plan Total Fractions Pre* 07/15/2025 16     Plan Prescribed Dose Per* 07/15/2025 2.66     Plan Total Prescribed Do* 07/15/2025 4,256     Plan Primary Reference P* 07/15/2025 [email or web address]    Hospital Outpatient Visit on 07/14/2025   Component Date Value    Course ID 07/14/2025 [email or web address]     Course Intent 07/14/2025 Curative     Course Start Date 07/14/2025 6/26/2025  9:13 AM      Course First Treatment D* 07/14/2025 7/10/2025 12:46 PM     Course Last Treatment Da* 07/14/2025 7/14/2025  9:12 AM     Course Elapsed Days 07/14/2025 4     Reference Point ID 07/14/2025 [email or web address]     Reference Point Dosage G* 07/14/2025 [phone]     Reference Point Session * 07/14/2025 2.66     Plan ID 07/14/2025 [email or web address]+N     Plan Fractions Treated t* 07/14/2025 3     Plan Total Fractions Pre* 07/14/2025 16     Plan Prescribed Dose Per* 07/14/2025 2.66     Plan Total Prescribed Do* 07/14/2025 4,256     Plan Primary Reference P* 07/14/2025 [email or web address]    Hospital Outpatient Visit on 07/11/2025   Component Date Value    Course ID 07/11/2025 [email or web address]     Course Intent 07/11/2025 Curative     Course Start Date 07/11/2025 6/26/2025  9:13 AM     Course First Treatment D* 07/11/2025 7/10/2025 12:46 PM     Course Last Treatment Da* 07/11/2025 7/11/2025  9:20 AM     Course Elapsed Days 07/11/2025 1     Reference Point ID 07/11/2025 [email or web address]     Reference Point Dosage G* 07/11/[id number]     Reference Point Session * 07/11/2025 2.66     Plan ID 07/11/2025 [email or web address]+N     Plan Fractions Treated t* 07/11/2025 2     Plan Total Fractions Pre* 07/11/2025 16     Plan Prescribed Dose Per* 07/11/2025 2.66     Plan Total Prescribed Do* 07/11/2025 4,256     Plan Primary Reference P* 07/11/2025 [email or web address]    Orders Only on 07/10/2025   Component Date Value    Course ID 07/10/2025 [email or web address]     Course Intent 07/10/2025 Curative     Course Start Date 07/10/2025 6/26/2025  9:13 AM     Course First Treatment D* 07/10/2025 7/10/2025 12:46 PM     Course Last Treatment Da* 07/10/2025 7/10/2025  1:18 PM     Course Elapsed Days 07/10/2025 0     Reference Point ID 07/10/2025 [email or web address]     Reference Point Dosage G* 07/10/2025 [id number]     Reference Point Session * 07/10/2025 2.11589035     Plan ID 07/10/2025 [email or web address]+N     Plan Fractions Treated t* 07/10/2025 1     Plan Total Fractions Pre* 07/10/2025 16     Plan Prescribed Dose Per* 07/10/2025 2.66      Plan Total Prescribed Do* 07/10/2025 4,256     Plan Primary Reference P* 07/10/2025 [email or web address]+N    Infusion on 07/09/2025   Component Date Value    Glucose 07/09/2025 118 (H)     BUN 07/09/2025 8.8     Creatinine 07/09/2025 0.65     Sodium 07/09/2025 140     Potassium 07/09/2025 3.5     Chloride 07/09/2025 105     CO2 07/09/2025 22.7     Calcium 07/09/2025 9.2     Total Protein 07/09/2025 6.5     Albumin 07/09/2025 3.7     ALT (SGPT) 07/09/2025 18     AST (SGOT) 07/09/2025 21     Alkaline Phosphatase 07/09/2025 96     Total Bilirubin 07/09/2025 0.2     Globulin 07/09/2025 2.8     A/G Ratio 07/09/2025 1.3     BUN/Creatinine Ratio 07/09/2025 13.5     Anion Gap 07/09/2025 12.3     eGFR 07/09/2025 110.8     Magnesium 07/09/2025 1.9     WBC 07/09/2025 5.55     RBC 07/09/2025 3.83     Hemoglobin 07/09/2025 12.8     Hematocrit 07/09/2025 39.1     MCV 07/09/2025 102.1 (H)     MCH 07/09/2025 33.4 (H)     MCHC 07/09/2025 32.7     RDW 07/09/2025 12.7     RDW-SD 07/09/2025 47.2     MPV 07/09/2025 9.3     Platelets 07/09/2025 338     Neutrophil % 07/09/2025 66.7     Lymphocyte % 07/09/2025 15.7 (L)     Monocyte % 07/09/2025 7.9     Eosinophil % 07/09/2025 7.9 (H)     Basophil % 07/09/2025 1.1     Immature Grans % 07/09/2025 0.7 (H)     Neutrophils, Absolute 07/09/2025 3.70     Lymphocytes, Absolute 07/09/2025 0.87     Monocytes, Absolute 07/09/2025 0.44     Eosinophils, Absolute 07/09/2025 0.44 (H)     Basophils, Absolute 07/09/2025 0.06     Immature Grans, Absolute 07/09/2025 0.04     nRBC 07/09/2025 0.0    Orders Only on 07/07/2025   Component Date Value    HCG, Urine, QL 07/07/2025 Negative     Lot Number 07/07/2025 [id number]     Internal Positive Control 07/07/2025 Positive     Internal Negative Control 07/07/2025 Negative     Expiration Date 07/07/2025 8,055,026    Infusion on 06/25/2025   Component Date Value    Glucose 06/25/2025 126 (H)     BUN 06/25/2025 15.0     Creatinine 06/25/2025 0.62     Sodium 06/25/2025 140      Potassium 06/25/2025 3.6     Chloride 06/25/2025 104     CO2 06/25/2025 23.1     Calcium 06/25/2025 9.1     BUN/Creatinine Ratio 06/25/2025 24.2     Anion Gap 06/25/2025 12.9     eGFR 06/25/2025 112.1     Magnesium 06/25/2025 1.9     WBC 06/25/2025 8.98     RBC 06/25/2025 3.71 (L)     Hemoglobin 06/25/2025 12.6     Hematocrit 06/25/2025 38.6     MCV 06/25/2025 104.0 (H)     MCH 06/25/2025 34.0 (H)     MCHC 06/25/2025 32.6     RDW 06/25/2025 14.2     RDW-SD 06/25/2025 53.9     MPV 06/25/2025 9.3     Platelets 06/25/2025 361     Neutrophil % 06/25/2025 80.3 (H)     Lymphocyte % 06/25/2025 10.7 (L)     Monocyte % 06/25/2025 5.5     Eosinophil % 06/25/2025 1.8     Basophil % 06/25/2025 0.7     Immature Grans % 06/25/2025 1.0 (H)     Neutrophils, Absolute 06/25/2025 7.22 (H)     Lymphocytes, Absolute 06/25/2025 0.96     Monocytes, Absolute 06/25/2025 0.49     Eosinophils, Absolute 06/25/2025 0.16     Basophils, Absolute 06/25/2025 0.06     Immature Grans, Absolute 06/25/2025 0.09 (H)     nRBC 06/25/2025 0.0    Consult on 06/18/2025   Component Date Value    HCG, Urine, QL 06/18/2025 Negative     Lot Number 06/18/2025 [id number]     Internal Positive Control 06/18/2025 Passed     Internal Negative Control 06/18/2025 Passed     Expiration Date 06/18/2025 08-    There may be more visits with results that are not included.   Labs reviewed    Current Psychotropic Medications:  Venlafaxine XR 37.5 mg daily  Gabapentin 600 mg bid    Plan of Care/ Medical Decision Making  Continue venlafaxine and gabapentin as written.  Questions answered regarding expectations of menopausal type symptoms with aromatase inhibitor, hysterectomy.  Explored symptom management, ongoing connection to behavioral health care and medical teams, and ability to address the symptoms as they arise.  Follow-up scheduled in 4 weeks.    Diagnoses and all orders for this visit:    1. Adjustment disorder with mixed anxiety and depressed mood (Primary)    2.  Breast cancer, stage 3, right    3. Hot flashes    Other orders  -     venlafaxine XR (Effexor XR) 37.5 MG 24 hr capsule; Take 1 capsule by mouth Daily.  Dispense: 90 capsule; Refill: 0    I spent 35 minutes caring for Suzanne on this date of service. This time includes time spent by me in the following activities: preparing for the visit, reviewing tests, obtaining and/or reviewing a separately obtained history, performing a medically appropriate examination and/or evaluation, counseling and educating the patient/family/caregiver, ordering medications, tests, or procedures, and documenting information in the medical record.

## 2025-07-17 NOTE — PROGRESS NOTES
Radiation Oncology  On-Treatment Note      Patient: Suzanne Lin    MRN: [id number]    Attending Physician: Tyron Acosta MD     Diagnosis:     ICD-10-CM ICD-9-CM   1. Breast cancer, stage 3, right  C50.911 174.9       Radiation Therapy Visit:  Continue radiation therapy, Dosimetry plan remains acceptable, Films reviewed and remains acceptable, Pain assessed, Pain management planned, Radiation dose schedule reviewed and remains acceptable, Radiation technique remains acceptable, and Symptoms within expected range    Radiation Treatments       Active   Plans   RtCW+N   Most recent treatment: Dose planned: 266 cGy (fraction 6 on 7/17/2025)   Total: Dose planned: 4,256 cGy (16 fractions)   Elapsed Days: 7      Reference Points   Rx_RtCW+N   Most recent treatment: Dose given: 266 cGy (on 7/17/2025)   Total: Dose given: 1,596 cGy   Elapsed Days: 7                      Physical Examination:  Vitals: Blood pressure 115/77, pulse 94, resp. rate 18, weight 68.8 kg (151 lb 9.6 oz), SpO2 99%.  Pain Score    07/17/25 0919   PainSc: 0-No pain       Restricted in physically strenuous activity but ambulatory and able to carry out work of a light or sedentary nature, e.g., light house work, office work = 1    We examined the relevant areas: yes  Findings are within the expected range for this stage of treatment: yes  -------------------------------------------------------------------------------------------------------------------    ACTION ITEMS:  Patient tolerating treatment well and as expected for this stage in their treatment and Continue radiation therapy as planned    Estimated Completion Date: 8/7/2025      Tyron Acosta MD  Radiation Oncology

## 2025-07-18 ENCOUNTER — HOSPITAL ENCOUNTER (OUTPATIENT)
Dept: RADIATION ONCOLOGY | Facility: HOSPITAL | Age: 45
Discharge: HOME | End: 2025-07-18

## 2025-07-18 LAB
RAD ONC ARIA COURSE ID: NORMAL
RAD ONC ARIA COURSE INTENT: NORMAL
RAD ONC ARIA COURSE LAST TREATMENT DATE: NORMAL
RAD ONC ARIA COURSE START DATE: NORMAL
RAD ONC ARIA COURSE TREATMENT ELAPSED DAYS: 8
RAD ONC ARIA FIRST TREATMENT DATE: NORMAL
RAD ONC ARIA PLAN FRACTIONS TREATED TO DATE: 7
RAD ONC ARIA PLAN ID: NORMAL
RAD ONC ARIA PLAN PRESCRIBED DOSE PER FRACTION: 2.66 GY
RAD ONC ARIA PLAN PRIMARY REFERENCE POINT: NORMAL
RAD ONC ARIA PLAN TOTAL FRACTIONS PRESCRIBED: 16
RAD ONC ARIA PLAN TOTAL PRESCRIBED DOSE: 4256 CGY
RAD ONC ARIA REFERENCE POINT DOSAGE GIVEN TO DATE: 18.62 GY
RAD ONC ARIA REFERENCE POINT ID: NORMAL
RAD ONC ARIA REFERENCE POINT SESSION DOSAGE GIVEN: 2.66 GY

## 2025-07-18 PROCEDURE — 77385: CPT | Performed by: STUDENT IN AN ORGANIZED HEALTH CARE EDUCATION/TRAINING PROGRAM

## 2025-07-18 PROCEDURE — 77014 CHG CT GUIDANCE RADIATION THERAPY FLDS PLACEMENT: CPT | Performed by: STUDENT IN AN ORGANIZED HEALTH CARE EDUCATION/TRAINING PROGRAM

## 2025-07-21 ENCOUNTER — HOSPITAL ENCOUNTER (OUTPATIENT)
Dept: RADIATION ONCOLOGY | Facility: HOSPITAL | Age: 45
Discharge: HOME | End: 2025-07-21
Payer: COMMERCIAL

## 2025-07-21 LAB
RAD ONC ARIA COURSE ID: NORMAL
RAD ONC ARIA COURSE INTENT: NORMAL
RAD ONC ARIA COURSE LAST TREATMENT DATE: NORMAL
RAD ONC ARIA COURSE START DATE: NORMAL
RAD ONC ARIA COURSE TREATMENT ELAPSED DAYS: 11
RAD ONC ARIA FIRST TREATMENT DATE: NORMAL
RAD ONC ARIA PLAN FRACTIONS TREATED TO DATE: 8
RAD ONC ARIA PLAN ID: NORMAL
RAD ONC ARIA PLAN PRESCRIBED DOSE PER FRACTION: 2.66 GY
RAD ONC ARIA PLAN PRIMARY REFERENCE POINT: NORMAL
RAD ONC ARIA PLAN TOTAL FRACTIONS PRESCRIBED: 16
RAD ONC ARIA PLAN TOTAL PRESCRIBED DOSE: 4256 CGY
RAD ONC ARIA REFERENCE POINT DOSAGE GIVEN TO DATE: 21.28 GY
RAD ONC ARIA REFERENCE POINT ID: NORMAL
RAD ONC ARIA REFERENCE POINT SESSION DOSAGE GIVEN: 2.66 GY

## 2025-07-21 PROCEDURE — 77014 CHG CT GUIDANCE RADIATION THERAPY FLDS PLACEMENT: CPT | Performed by: STUDENT IN AN ORGANIZED HEALTH CARE EDUCATION/TRAINING PROGRAM

## 2025-07-21 PROCEDURE — 77336 RADIATION PHYSICS CONSULT: CPT | Performed by: STUDENT IN AN ORGANIZED HEALTH CARE EDUCATION/TRAINING PROGRAM

## 2025-07-21 PROCEDURE — 77385: CPT | Performed by: STUDENT IN AN ORGANIZED HEALTH CARE EDUCATION/TRAINING PROGRAM

## 2025-07-22 ENCOUNTER — HOSPITAL ENCOUNTER (OUTPATIENT)
Dept: RADIATION ONCOLOGY | Facility: HOSPITAL | Age: 45
Discharge: HOME | End: 2025-07-22

## 2025-07-22 LAB
RAD ONC ARIA COURSE ID: NORMAL
RAD ONC ARIA COURSE INTENT: NORMAL
RAD ONC ARIA COURSE LAST TREATMENT DATE: NORMAL
RAD ONC ARIA COURSE START DATE: NORMAL
RAD ONC ARIA COURSE TREATMENT ELAPSED DAYS: 12
RAD ONC ARIA FIRST TREATMENT DATE: NORMAL
RAD ONC ARIA PLAN FRACTIONS TREATED TO DATE: 9
RAD ONC ARIA PLAN ID: NORMAL
RAD ONC ARIA PLAN PRESCRIBED DOSE PER FRACTION: 2.66 GY
RAD ONC ARIA PLAN PRIMARY REFERENCE POINT: NORMAL
RAD ONC ARIA PLAN TOTAL FRACTIONS PRESCRIBED: 16
RAD ONC ARIA PLAN TOTAL PRESCRIBED DOSE: 4256 CGY
RAD ONC ARIA REFERENCE POINT DOSAGE GIVEN TO DATE: 23.94 GY
RAD ONC ARIA REFERENCE POINT ID: NORMAL
RAD ONC ARIA REFERENCE POINT SESSION DOSAGE GIVEN: 2.66 GY

## 2025-07-22 PROCEDURE — 77385: CPT | Performed by: STUDENT IN AN ORGANIZED HEALTH CARE EDUCATION/TRAINING PROGRAM

## 2025-07-22 PROCEDURE — 77014 CHG CT GUIDANCE RADIATION THERAPY FLDS PLACEMENT: CPT | Performed by: STUDENT IN AN ORGANIZED HEALTH CARE EDUCATION/TRAINING PROGRAM

## 2025-07-23 ENCOUNTER — HOSPITAL ENCOUNTER (OUTPATIENT)
Dept: RADIATION ONCOLOGY | Facility: HOSPITAL | Age: 45
Discharge: HOME | End: 2025-07-23

## 2025-07-23 LAB
RAD ONC ARIA COURSE ID: NORMAL
RAD ONC ARIA COURSE INTENT: NORMAL
RAD ONC ARIA COURSE LAST TREATMENT DATE: NORMAL
RAD ONC ARIA COURSE START DATE: NORMAL
RAD ONC ARIA COURSE TREATMENT ELAPSED DAYS: 13
RAD ONC ARIA FIRST TREATMENT DATE: NORMAL
RAD ONC ARIA PLAN FRACTIONS TREATED TO DATE: 10
RAD ONC ARIA PLAN ID: NORMAL
RAD ONC ARIA PLAN PRESCRIBED DOSE PER FRACTION: 2.66 GY
RAD ONC ARIA PLAN PRIMARY REFERENCE POINT: NORMAL
RAD ONC ARIA PLAN TOTAL FRACTIONS PRESCRIBED: 16
RAD ONC ARIA PLAN TOTAL PRESCRIBED DOSE: 4256 CGY
RAD ONC ARIA REFERENCE POINT DOSAGE GIVEN TO DATE: 26.6 GY
RAD ONC ARIA REFERENCE POINT ID: NORMAL
RAD ONC ARIA REFERENCE POINT SESSION DOSAGE GIVEN: 2.66 GY

## 2025-07-23 PROCEDURE — 77014 CHG CT GUIDANCE RADIATION THERAPY FLDS PLACEMENT: CPT | Performed by: STUDENT IN AN ORGANIZED HEALTH CARE EDUCATION/TRAINING PROGRAM

## 2025-07-23 PROCEDURE — 77385: CPT | Performed by: STUDENT IN AN ORGANIZED HEALTH CARE EDUCATION/TRAINING PROGRAM

## 2025-07-24 ENCOUNTER — HOSPITAL ENCOUNTER (OUTPATIENT)
Dept: RADIATION ONCOLOGY | Facility: HOSPITAL | Age: 45
Discharge: HOME | End: 2025-07-24

## 2025-07-24 ENCOUNTER — RADIATION ONCOLOGY WEEKLY ASSESSMENT (OUTPATIENT)
Dept: RADIATION ONCOLOGY | Facility: HOSPITAL | Age: 45
End: 2025-07-24
Payer: COMMERCIAL

## 2025-07-24 VITALS
SYSTOLIC BLOOD PRESSURE: 100 MMHG | OXYGEN SATURATION: 100 % | BODY MASS INDEX: 25.79 KG/M2 | HEART RATE: 82 BPM | RESPIRATION RATE: 16 BRPM | DIASTOLIC BLOOD PRESSURE: 69 MMHG | WEIGHT: 155 LBS

## 2025-07-24 DIAGNOSIS — C50.911 BREAST CANCER, STAGE 3, RIGHT: Primary | ICD-10-CM

## 2025-07-24 LAB
RAD ONC ARIA COURSE ID: NORMAL
RAD ONC ARIA COURSE INTENT: NORMAL
RAD ONC ARIA COURSE LAST TREATMENT DATE: NORMAL
RAD ONC ARIA COURSE START DATE: NORMAL
RAD ONC ARIA COURSE TREATMENT ELAPSED DAYS: 14
RAD ONC ARIA FIRST TREATMENT DATE: NORMAL
RAD ONC ARIA PLAN FRACTIONS TREATED TO DATE: 11
RAD ONC ARIA PLAN ID: NORMAL
RAD ONC ARIA PLAN PRESCRIBED DOSE PER FRACTION: 2.66 GY
RAD ONC ARIA PLAN PRIMARY REFERENCE POINT: NORMAL
RAD ONC ARIA PLAN TOTAL FRACTIONS PRESCRIBED: 16
RAD ONC ARIA PLAN TOTAL PRESCRIBED DOSE: 4256 CGY
RAD ONC ARIA REFERENCE POINT DOSAGE GIVEN TO DATE: 29.26 GY
RAD ONC ARIA REFERENCE POINT ID: NORMAL
RAD ONC ARIA REFERENCE POINT SESSION DOSAGE GIVEN: 2.66 GY

## 2025-07-24 PROCEDURE — 77385: CPT | Performed by: STUDENT IN AN ORGANIZED HEALTH CARE EDUCATION/TRAINING PROGRAM

## 2025-07-24 PROCEDURE — 77427 RADIATION TX MANAGEMENT X5: CPT | Performed by: STUDENT IN AN ORGANIZED HEALTH CARE EDUCATION/TRAINING PROGRAM

## 2025-07-24 PROCEDURE — 77014 CHG CT GUIDANCE RADIATION THERAPY FLDS PLACEMENT: CPT | Performed by: STUDENT IN AN ORGANIZED HEALTH CARE EDUCATION/TRAINING PROGRAM

## 2025-07-24 NOTE — PROGRESS NOTES
Radiation Oncology  On-Treatment Note      Patient: Suzanne Lin    MRN: [id number]    Attending Physician: Tyron Acosta MD     Diagnosis:     ICD-10-CM ICD-9-CM   1. Breast cancer, stage 3, right  C50.911 174.9       Radiation Therapy Visit:  Continue radiation therapy, Dosimetry plan remains acceptable, Films reviewed and remains acceptable, Pain assessed, Pain management planned, Radiation dose schedule reviewed and remains acceptable, Radiation technique remains acceptable, and Symptoms within expected range    Radiation Treatments       Active   Plans   RtCW+N   Most recent treatment: Dose planned: 266 cGy (fraction 11 on 7/24/2025)   Total: Dose planned: 4,256 cGy (16 fractions)   Elapsed Days: 14      Reference Points   Rx_RtCW+N   Most recent treatment: Dose given: 266 cGy (on 7/24/2025)   Total: Dose given: 2,926 cGy   Elapsed Days: 14                      Physical Examination:  Vitals: Blood pressure 100/69, pulse 82, resp. rate 16, weight 70.3 kg (155 lb), SpO2 100%.  Pain Score    07/24/25 0924   PainSc: 0-No pain       Restricted in physically strenuous activity but ambulatory and able to carry out work of a light or sedentary nature, e.g., light house work, office work = 1    We examined the relevant areas: yes  Findings are within the expected range for this stage of treatment: yes  -------------------------------------------------------------------------------------------------------------------    ACTION ITEMS:  Patient tolerating treatment well and as expected for this stage in their treatment and Continue radiation therapy as planned    Estimated Completion Date: 8/7/2025      Tyron Acosta MD  Radiation Oncology

## 2025-07-25 ENCOUNTER — HOSPITAL ENCOUNTER (OUTPATIENT)
Dept: RADIATION ONCOLOGY | Facility: HOSPITAL | Age: 45
Discharge: HOME | End: 2025-07-25

## 2025-07-25 LAB
RAD ONC ARIA COURSE ID: NORMAL
RAD ONC ARIA COURSE INTENT: NORMAL
RAD ONC ARIA COURSE LAST TREATMENT DATE: NORMAL
RAD ONC ARIA COURSE START DATE: NORMAL
RAD ONC ARIA COURSE TREATMENT ELAPSED DAYS: 15
RAD ONC ARIA FIRST TREATMENT DATE: NORMAL
RAD ONC ARIA PLAN FRACTIONS TREATED TO DATE: 12
RAD ONC ARIA PLAN ID: NORMAL
RAD ONC ARIA PLAN PRESCRIBED DOSE PER FRACTION: 2.66 GY
RAD ONC ARIA PLAN PRIMARY REFERENCE POINT: NORMAL
RAD ONC ARIA PLAN TOTAL FRACTIONS PRESCRIBED: 16
RAD ONC ARIA PLAN TOTAL PRESCRIBED DOSE: 4256 CGY
RAD ONC ARIA REFERENCE POINT DOSAGE GIVEN TO DATE: 31.92 GY
RAD ONC ARIA REFERENCE POINT ID: NORMAL
RAD ONC ARIA REFERENCE POINT SESSION DOSAGE GIVEN: 2.66 GY

## 2025-07-25 PROCEDURE — 77385: CPT | Performed by: STUDENT IN AN ORGANIZED HEALTH CARE EDUCATION/TRAINING PROGRAM

## 2025-07-25 PROCEDURE — 77014 CHG CT GUIDANCE RADIATION THERAPY FLDS PLACEMENT: CPT | Performed by: STUDENT IN AN ORGANIZED HEALTH CARE EDUCATION/TRAINING PROGRAM

## 2025-07-28 ENCOUNTER — HOSPITAL ENCOUNTER (OUTPATIENT)
Dept: RADIATION ONCOLOGY | Facility: HOSPITAL | Age: 45
Discharge: HOME | End: 2025-07-28
Payer: COMMERCIAL

## 2025-07-28 ENCOUNTER — TELEPHONE (OUTPATIENT)
Dept: ONCOLOGY | Facility: CLINIC | Age: 45
End: 2025-07-28
Payer: COMMERCIAL

## 2025-07-28 LAB
RAD ONC ARIA COURSE ID: NORMAL
RAD ONC ARIA COURSE INTENT: NORMAL
RAD ONC ARIA COURSE LAST TREATMENT DATE: NORMAL
RAD ONC ARIA COURSE START DATE: NORMAL
RAD ONC ARIA COURSE TREATMENT ELAPSED DAYS: 18
RAD ONC ARIA FIRST TREATMENT DATE: NORMAL
RAD ONC ARIA PLAN FRACTIONS TREATED TO DATE: 13
RAD ONC ARIA PLAN ID: NORMAL
RAD ONC ARIA PLAN PRESCRIBED DOSE PER FRACTION: 2.66 GY
RAD ONC ARIA PLAN PRIMARY REFERENCE POINT: NORMAL
RAD ONC ARIA PLAN TOTAL FRACTIONS PRESCRIBED: 16
RAD ONC ARIA PLAN TOTAL PRESCRIBED DOSE: 4256 CGY
RAD ONC ARIA REFERENCE POINT DOSAGE GIVEN TO DATE: 34.58 GY
RAD ONC ARIA REFERENCE POINT ID: NORMAL
RAD ONC ARIA REFERENCE POINT SESSION DOSAGE GIVEN: 2.66 GY

## 2025-07-28 PROCEDURE — 77385: CPT | Performed by: STUDENT IN AN ORGANIZED HEALTH CARE EDUCATION/TRAINING PROGRAM

## 2025-07-28 PROCEDURE — 77336 RADIATION PHYSICS CONSULT: CPT | Performed by: STUDENT IN AN ORGANIZED HEALTH CARE EDUCATION/TRAINING PROGRAM

## 2025-07-28 PROCEDURE — 77014 CHG CT GUIDANCE RADIATION THERAPY FLDS PLACEMENT: CPT | Performed by: STUDENT IN AN ORGANIZED HEALTH CARE EDUCATION/TRAINING PROGRAM

## 2025-07-28 NOTE — TELEPHONE ENCOUNTER
Call back to Ms. Lin and let her know that if she goes to [facility] or [facility], they will fax a request for orders needed, or we can order, and she can  a hard copy of the order to take with her, when she is in the building for radiation.  Patient states she will just go to one of the [facility], and let them send request for what is needed.

## 2025-07-28 NOTE — TELEPHONE ENCOUNTER
Caller: Suzanne Lin    Relationship: Self    Best call back number: [phone]    What is the best time to reach you: ANYTIME    Who are you requesting to speak with (clinical staff, provider,  specific staff member): DR ARCHER / CLINICAL    What was the call regarding: PT REQUESTING A PRESCRIPTION FOR BREAST PROTHESIS

## 2025-07-29 ENCOUNTER — HOSPITAL ENCOUNTER (OUTPATIENT)
Dept: RADIATION ONCOLOGY | Facility: HOSPITAL | Age: 45
Discharge: HOME | End: 2025-07-29

## 2025-07-29 LAB
RAD ONC ARIA COURSE ID: NORMAL
RAD ONC ARIA COURSE INTENT: NORMAL
RAD ONC ARIA COURSE LAST TREATMENT DATE: NORMAL
RAD ONC ARIA COURSE START DATE: NORMAL
RAD ONC ARIA COURSE TREATMENT ELAPSED DAYS: 19
RAD ONC ARIA FIRST TREATMENT DATE: NORMAL
RAD ONC ARIA PLAN FRACTIONS TREATED TO DATE: 14
RAD ONC ARIA PLAN ID: NORMAL
RAD ONC ARIA PLAN PRESCRIBED DOSE PER FRACTION: 2.66 GY
RAD ONC ARIA PLAN PRIMARY REFERENCE POINT: NORMAL
RAD ONC ARIA PLAN TOTAL FRACTIONS PRESCRIBED: 16
RAD ONC ARIA PLAN TOTAL PRESCRIBED DOSE: 4256 CGY
RAD ONC ARIA REFERENCE POINT DOSAGE GIVEN TO DATE: 37.24 GY
RAD ONC ARIA REFERENCE POINT ID: NORMAL
RAD ONC ARIA REFERENCE POINT SESSION DOSAGE GIVEN: 2.66 GY

## 2025-07-29 PROCEDURE — 77385: CPT | Performed by: STUDENT IN AN ORGANIZED HEALTH CARE EDUCATION/TRAINING PROGRAM

## 2025-07-29 PROCEDURE — 77014 CHG CT GUIDANCE RADIATION THERAPY FLDS PLACEMENT: CPT | Performed by: STUDENT IN AN ORGANIZED HEALTH CARE EDUCATION/TRAINING PROGRAM

## 2025-07-30 ENCOUNTER — HOSPITAL ENCOUNTER (OUTPATIENT)
Dept: RADIATION ONCOLOGY | Facility: HOSPITAL | Age: 45
Discharge: HOME | End: 2025-07-30

## 2025-07-30 LAB
RAD ONC ARIA COURSE ID: NORMAL
RAD ONC ARIA COURSE INTENT: NORMAL
RAD ONC ARIA COURSE LAST TREATMENT DATE: NORMAL
RAD ONC ARIA COURSE START DATE: NORMAL
RAD ONC ARIA COURSE TREATMENT ELAPSED DAYS: 20
RAD ONC ARIA FIRST TREATMENT DATE: NORMAL
RAD ONC ARIA PLAN FRACTIONS TREATED TO DATE: 15
RAD ONC ARIA PLAN ID: NORMAL
RAD ONC ARIA PLAN PRESCRIBED DOSE PER FRACTION: 2.66 GY
RAD ONC ARIA PLAN PRIMARY REFERENCE POINT: NORMAL
RAD ONC ARIA PLAN TOTAL FRACTIONS PRESCRIBED: 16
RAD ONC ARIA PLAN TOTAL PRESCRIBED DOSE: 4256 CGY
RAD ONC ARIA REFERENCE POINT DOSAGE GIVEN TO DATE: 39.9 GY
RAD ONC ARIA REFERENCE POINT ID: NORMAL
RAD ONC ARIA REFERENCE POINT SESSION DOSAGE GIVEN: 2.66 GY

## 2025-07-30 PROCEDURE — 77385: CPT | Performed by: STUDENT IN AN ORGANIZED HEALTH CARE EDUCATION/TRAINING PROGRAM

## 2025-07-30 PROCEDURE — 77014 CHG CT GUIDANCE RADIATION THERAPY FLDS PLACEMENT: CPT | Performed by: STUDENT IN AN ORGANIZED HEALTH CARE EDUCATION/TRAINING PROGRAM

## 2025-07-31 ENCOUNTER — RADIATION ONCOLOGY WEEKLY ASSESSMENT (OUTPATIENT)
Dept: RADIATION ONCOLOGY | Facility: HOSPITAL | Age: 45
End: 2025-07-31
Payer: COMMERCIAL

## 2025-07-31 ENCOUNTER — HOSPITAL ENCOUNTER (OUTPATIENT)
Dept: RADIATION ONCOLOGY | Facility: HOSPITAL | Age: 45
Discharge: HOME | End: 2025-07-31

## 2025-07-31 VITALS
BODY MASS INDEX: 25.79 KG/M2 | HEART RATE: 91 BPM | OXYGEN SATURATION: 99 % | DIASTOLIC BLOOD PRESSURE: 67 MMHG | RESPIRATION RATE: 14 BRPM | WEIGHT: 155 LBS | SYSTOLIC BLOOD PRESSURE: 93 MMHG

## 2025-07-31 DIAGNOSIS — C50.911 BREAST CANCER, STAGE 3, RIGHT: Primary | ICD-10-CM

## 2025-07-31 LAB
RAD ONC ARIA COURSE ID: NORMAL
RAD ONC ARIA COURSE INTENT: NORMAL
RAD ONC ARIA COURSE LAST TREATMENT DATE: NORMAL
RAD ONC ARIA COURSE START DATE: NORMAL
RAD ONC ARIA COURSE TREATMENT ELAPSED DAYS: 21
RAD ONC ARIA FIRST TREATMENT DATE: NORMAL
RAD ONC ARIA PLAN FRACTIONS TREATED TO DATE: 16
RAD ONC ARIA PLAN ID: NORMAL
RAD ONC ARIA PLAN PRESCRIBED DOSE PER FRACTION: 2.66 GY
RAD ONC ARIA PLAN PRIMARY REFERENCE POINT: NORMAL
RAD ONC ARIA PLAN TOTAL FRACTIONS PRESCRIBED: 16
RAD ONC ARIA PLAN TOTAL PRESCRIBED DOSE: 4256 CGY
RAD ONC ARIA REFERENCE POINT DOSAGE GIVEN TO DATE: 42.56 GY
RAD ONC ARIA REFERENCE POINT ID: NORMAL
RAD ONC ARIA REFERENCE POINT SESSION DOSAGE GIVEN: 2.66 GY

## 2025-07-31 PROCEDURE — 77014 CHG CT GUIDANCE RADIATION THERAPY FLDS PLACEMENT: CPT | Performed by: STUDENT IN AN ORGANIZED HEALTH CARE EDUCATION/TRAINING PROGRAM

## 2025-07-31 PROCEDURE — 77427 RADIATION TX MANAGEMENT X5: CPT | Performed by: STUDENT IN AN ORGANIZED HEALTH CARE EDUCATION/TRAINING PROGRAM

## 2025-07-31 PROCEDURE — 77385: CPT | Performed by: STUDENT IN AN ORGANIZED HEALTH CARE EDUCATION/TRAINING PROGRAM

## 2025-07-31 NOTE — PROGRESS NOTES
Radiation Oncology  On-Treatment Note      Patient: Suzanne Lin    MRN: [id number]    Attending Physician: Tyron Acosta MD     Diagnosis:     ICD-10-CM ICD-9-CM   1. Breast cancer, stage 3, right  C50.911 174.9       Radiation Therapy Visit:  Continue radiation therapy, Dosimetry plan remains acceptable, Films reviewed and remains acceptable, Pain assessed, Pain management planned, Radiation dose schedule reviewed and remains acceptable, Radiation technique remains acceptable, and Symptoms within expected range    Radiation Treatments       Active   Reference Points   Rx_RtCW+N   Most recent treatment: Dose given: 266 cGy (on 7/31/2025)   Total: Dose given: 4,256 cGy   Elapsed Days: 21                      Physical Examination:  Vitals: Blood pressure 93/67, pulse 91, resp. rate 14, weight 70.3 kg (155 lb), SpO2 99%.  Pain Score    07/31/25 0934   PainSc: 0-No pain       Restricted in physically strenuous activity but ambulatory and able to carry out work of a light or sedentary nature, e.g., light house work, office work = 1    We examined the relevant areas: yes  Findings are within the expected range for this stage of treatment: yes  -------------------------------------------------------------------------------------------------------------------    ACTION ITEMS:  Patient tolerating treatment well and as expected for this stage in their treatment and Continue radiation therapy as planned    Estimated Completion Date: 8/7/2025      Tyron Acosta MD  Radiation Oncology

## 2025-08-01 ENCOUNTER — HOSPITAL ENCOUNTER (OUTPATIENT)
Dept: RADIATION ONCOLOGY | Facility: HOSPITAL | Age: 45
Setting detail: RADIATION/ONCOLOGY SERIES
End: 2025-08-01
Payer: COMMERCIAL

## 2025-08-01 LAB
RAD ONC ARIA COURSE ID: NORMAL
RAD ONC ARIA COURSE INTENT: NORMAL
RAD ONC ARIA COURSE LAST TREATMENT DATE: NORMAL
RAD ONC ARIA COURSE START DATE: NORMAL
RAD ONC ARIA COURSE TREATMENT ELAPSED DAYS: 22
RAD ONC ARIA FIRST TREATMENT DATE: NORMAL
RAD ONC ARIA PLAN FRACTIONS TREATED TO DATE: 1
RAD ONC ARIA PLAN ID: NORMAL
RAD ONC ARIA PLAN PRESCRIBED DOSE PER FRACTION: 2 GY
RAD ONC ARIA PLAN PRIMARY REFERENCE POINT: NORMAL
RAD ONC ARIA PLAN TOTAL FRACTIONS PRESCRIBED: 5
RAD ONC ARIA PLAN TOTAL PRESCRIBED DOSE: 1000 CGY
RAD ONC ARIA REFERENCE POINT DOSAGE GIVEN TO DATE: 2 GY
RAD ONC ARIA REFERENCE POINT ID: NORMAL
RAD ONC ARIA REFERENCE POINT SESSION DOSAGE GIVEN: 2 GY

## 2025-08-01 PROCEDURE — 77280 THER RAD SIMULAJ FIELD SMPL: CPT | Performed by: STUDENT IN AN ORGANIZED HEALTH CARE EDUCATION/TRAINING PROGRAM

## 2025-08-01 PROCEDURE — 77412 RADIATION TX DELIVERY LVL 3: CPT | Performed by: STUDENT IN AN ORGANIZED HEALTH CARE EDUCATION/TRAINING PROGRAM

## 2025-08-04 ENCOUNTER — HOSPITAL ENCOUNTER (OUTPATIENT)
Dept: RADIATION ONCOLOGY | Facility: HOSPITAL | Age: 45
Discharge: HOME | End: 2025-08-04
Payer: COMMERCIAL

## 2025-08-04 LAB
RAD ONC ARIA COURSE ID: NORMAL
RAD ONC ARIA COURSE INTENT: NORMAL
RAD ONC ARIA COURSE LAST TREATMENT DATE: NORMAL
RAD ONC ARIA COURSE START DATE: NORMAL
RAD ONC ARIA COURSE TREATMENT ELAPSED DAYS: 25
RAD ONC ARIA FIRST TREATMENT DATE: NORMAL
RAD ONC ARIA PLAN FRACTIONS TREATED TO DATE: 2
RAD ONC ARIA PLAN ID: NORMAL
RAD ONC ARIA PLAN PRESCRIBED DOSE PER FRACTION: 2 GY
RAD ONC ARIA PLAN PRIMARY REFERENCE POINT: NORMAL
RAD ONC ARIA PLAN TOTAL FRACTIONS PRESCRIBED: 5
RAD ONC ARIA PLAN TOTAL PRESCRIBED DOSE: 1000 CGY
RAD ONC ARIA REFERENCE POINT DOSAGE GIVEN TO DATE: 4 GY
RAD ONC ARIA REFERENCE POINT ID: NORMAL
RAD ONC ARIA REFERENCE POINT SESSION DOSAGE GIVEN: 2 GY

## 2025-08-04 PROCEDURE — 77336 RADIATION PHYSICS CONSULT: CPT | Performed by: STUDENT IN AN ORGANIZED HEALTH CARE EDUCATION/TRAINING PROGRAM

## 2025-08-04 PROCEDURE — 77412 RADIATION TX DELIVERY LVL 3: CPT | Performed by: STUDENT IN AN ORGANIZED HEALTH CARE EDUCATION/TRAINING PROGRAM

## 2025-08-05 ENCOUNTER — HOSPITAL ENCOUNTER (OUTPATIENT)
Dept: RADIATION ONCOLOGY | Facility: HOSPITAL | Age: 45
Discharge: HOME | End: 2025-08-05

## 2025-08-05 LAB
RAD ONC ARIA COURSE ID: NORMAL
RAD ONC ARIA COURSE INTENT: NORMAL
RAD ONC ARIA COURSE LAST TREATMENT DATE: NORMAL
RAD ONC ARIA COURSE START DATE: NORMAL
RAD ONC ARIA COURSE TREATMENT ELAPSED DAYS: 26
RAD ONC ARIA FIRST TREATMENT DATE: NORMAL
RAD ONC ARIA PLAN FRACTIONS TREATED TO DATE: 3
RAD ONC ARIA PLAN ID: NORMAL
RAD ONC ARIA PLAN PRESCRIBED DOSE PER FRACTION: 2 GY
RAD ONC ARIA PLAN PRIMARY REFERENCE POINT: NORMAL
RAD ONC ARIA PLAN TOTAL FRACTIONS PRESCRIBED: 5
RAD ONC ARIA PLAN TOTAL PRESCRIBED DOSE: 1000 CGY
RAD ONC ARIA REFERENCE POINT DOSAGE GIVEN TO DATE: 6 GY
RAD ONC ARIA REFERENCE POINT ID: NORMAL
RAD ONC ARIA REFERENCE POINT SESSION DOSAGE GIVEN: 2 GY

## 2025-08-05 PROCEDURE — 77412 RADIATION TX DELIVERY LVL 3: CPT | Performed by: STUDENT IN AN ORGANIZED HEALTH CARE EDUCATION/TRAINING PROGRAM

## 2025-08-06 ENCOUNTER — HOSPITAL ENCOUNTER (OUTPATIENT)
Dept: RADIATION ONCOLOGY | Facility: HOSPITAL | Age: 45
Discharge: HOME | End: 2025-08-06

## 2025-08-06 DIAGNOSIS — C50.911 BREAST CANCER, STAGE 3, RIGHT: Primary | ICD-10-CM

## 2025-08-06 LAB
RAD ONC ARIA COURSE ID: NORMAL
RAD ONC ARIA COURSE INTENT: NORMAL
RAD ONC ARIA COURSE LAST TREATMENT DATE: NORMAL
RAD ONC ARIA COURSE START DATE: NORMAL
RAD ONC ARIA COURSE TREATMENT ELAPSED DAYS: 27
RAD ONC ARIA FIRST TREATMENT DATE: NORMAL
RAD ONC ARIA PLAN FRACTIONS TREATED TO DATE: 4
RAD ONC ARIA PLAN ID: NORMAL
RAD ONC ARIA PLAN PRESCRIBED DOSE PER FRACTION: 2 GY
RAD ONC ARIA PLAN PRIMARY REFERENCE POINT: NORMAL
RAD ONC ARIA PLAN TOTAL FRACTIONS PRESCRIBED: 5
RAD ONC ARIA PLAN TOTAL PRESCRIBED DOSE: 1000 CGY
RAD ONC ARIA REFERENCE POINT DOSAGE GIVEN TO DATE: 8 GY
RAD ONC ARIA REFERENCE POINT ID: NORMAL
RAD ONC ARIA REFERENCE POINT SESSION DOSAGE GIVEN: 2 GY

## 2025-08-06 PROCEDURE — 77412 RADIATION TX DELIVERY LVL 3: CPT | Performed by: STUDENT IN AN ORGANIZED HEALTH CARE EDUCATION/TRAINING PROGRAM

## 2025-08-06 RX ORDER — SILVER SULFADIAZINE 10 MG/G
1 CREAM TOPICAL 2 TIMES DAILY
Qty: 50 G | Refills: 0 | Status: SHIPPED | OUTPATIENT
Start: 2025-08-06

## 2025-08-07 ENCOUNTER — RADIATION ONCOLOGY WEEKLY ASSESSMENT (OUTPATIENT)
Dept: RADIATION ONCOLOGY | Facility: HOSPITAL | Age: 45
End: 2025-08-07
Payer: COMMERCIAL

## 2025-08-07 ENCOUNTER — HOSPITAL ENCOUNTER (OUTPATIENT)
Dept: RADIATION ONCOLOGY | Facility: HOSPITAL | Age: 45
Discharge: HOME | End: 2025-08-07

## 2025-08-07 ENCOUNTER — TREATMENT (OUTPATIENT)
Dept: RADIATION ONCOLOGY | Facility: HOSPITAL | Age: 45
End: 2025-08-07

## 2025-08-07 VITALS
BODY MASS INDEX: 25.79 KG/M2 | DIASTOLIC BLOOD PRESSURE: 71 MMHG | RESPIRATION RATE: 16 BRPM | WEIGHT: 155 LBS | OXYGEN SATURATION: 99 % | SYSTOLIC BLOOD PRESSURE: 103 MMHG | HEART RATE: 60 BPM

## 2025-08-07 DIAGNOSIS — C50.911 BREAST CANCER, STAGE 3, RIGHT: Primary | ICD-10-CM

## 2025-08-07 LAB
RAD ONC ARIA COURSE END DATE: NORMAL
RAD ONC ARIA COURSE ID: NORMAL
RAD ONC ARIA COURSE ID: NORMAL
RAD ONC ARIA COURSE INTENT: NORMAL
RAD ONC ARIA COURSE INTENT: NORMAL
RAD ONC ARIA COURSE LAST TREATMENT DATE: NORMAL
RAD ONC ARIA COURSE LAST TREATMENT DATE: NORMAL
RAD ONC ARIA COURSE START DATE: NORMAL
RAD ONC ARIA COURSE START DATE: NORMAL
RAD ONC ARIA COURSE TREATMENT ELAPSED DAYS: 28
RAD ONC ARIA COURSE TREATMENT ELAPSED DAYS: 28
RAD ONC ARIA FIRST TREATMENT DATE: NORMAL
RAD ONC ARIA FIRST TREATMENT DATE: NORMAL
RAD ONC ARIA PLAN FRACTIONS TREATED TO DATE: 16
RAD ONC ARIA PLAN FRACTIONS TREATED TO DATE: 5
RAD ONC ARIA PLAN FRACTIONS TREATED TO DATE: 5
RAD ONC ARIA PLAN ID: NORMAL
RAD ONC ARIA PLAN NAME: NORMAL
RAD ONC ARIA PLAN NAME: NORMAL
RAD ONC ARIA PLAN PRESCRIBED DOSE PER FRACTION: 2 GY
RAD ONC ARIA PLAN PRESCRIBED DOSE PER FRACTION: 2 GY
RAD ONC ARIA PLAN PRESCRIBED DOSE PER FRACTION: 2.66 GY
RAD ONC ARIA PLAN PRIMARY REFERENCE POINT: NORMAL
RAD ONC ARIA PLAN TOTAL FRACTIONS PRESCRIBED: 16
RAD ONC ARIA PLAN TOTAL FRACTIONS PRESCRIBED: 5
RAD ONC ARIA PLAN TOTAL FRACTIONS PRESCRIBED: 5
RAD ONC ARIA PLAN TOTAL PRESCRIBED DOSE: 1000 CGY
RAD ONC ARIA PLAN TOTAL PRESCRIBED DOSE: 1000 CGY
RAD ONC ARIA PLAN TOTAL PRESCRIBED DOSE: 4256 CGY
RAD ONC ARIA REFERENCE POINT DOSAGE GIVEN TO DATE: 10 GY
RAD ONC ARIA REFERENCE POINT DOSAGE GIVEN TO DATE: 10 GY
RAD ONC ARIA REFERENCE POINT DOSAGE GIVEN TO DATE: 42.56 GY
RAD ONC ARIA REFERENCE POINT ID: NORMAL
RAD ONC ARIA REFERENCE POINT SESSION DOSAGE GIVEN: 2 GY

## 2025-08-07 PROCEDURE — 77412 RADIATION TX DELIVERY LVL 3: CPT | Performed by: STUDENT IN AN ORGANIZED HEALTH CARE EDUCATION/TRAINING PROGRAM

## 2025-08-10 DIAGNOSIS — Z79.69 NEED FOR PROPHYLACTIC CHEMOTHERAPY: ICD-10-CM

## 2025-08-10 DIAGNOSIS — C50.911 BREAST CANCER, STAGE 3, RIGHT: ICD-10-CM

## 2025-08-11 DIAGNOSIS — Z17.0 MALIGNANT NEOPLASM OF BREAST IN FEMALE, ESTROGEN RECEPTOR POSITIVE, UNSPECIFIED LATERALITY, UNSPECIFIED SITE OF BREAST: ICD-10-CM

## 2025-08-11 DIAGNOSIS — C50.919 MALIGNANT NEOPLASM OF BREAST IN FEMALE, ESTROGEN RECEPTOR POSITIVE, UNSPECIFIED LATERALITY, UNSPECIFIED SITE OF BREAST: ICD-10-CM

## 2025-08-11 DIAGNOSIS — C50.911 BREAST CANCER, STAGE 3, RIGHT: Primary | ICD-10-CM

## 2025-08-11 RX ORDER — DEXAMETHASONE 4 MG/1
TABLET ORAL
Qty: 4 TABLET | Refills: 0 | OUTPATIENT
Start: 2025-08-11

## 2025-08-11 RX ORDER — AMOXICILLIN AND CLAVULANATE POTASSIUM 500; 125 MG/1; MG/1
1 TABLET, FILM COATED ORAL 2 TIMES DAILY
Qty: 10 TABLET | Refills: 0 | OUTPATIENT
Start: 2025-08-11 | End: 2025-08-16

## 2025-08-13 ENCOUNTER — OFFICE VISIT (OUTPATIENT)
Dept: SURGERY | Facility: CLINIC | Age: 45
End: 2025-08-13
Payer: COMMERCIAL

## 2025-08-13 VITALS
HEART RATE: 72 BPM | WEIGHT: 155 LBS | HEIGHT: 65 IN | RESPIRATION RATE: 16 BRPM | BODY MASS INDEX: 25.83 KG/M2 | SYSTOLIC BLOOD PRESSURE: 98 MMHG | DIASTOLIC BLOOD PRESSURE: 64 MMHG

## 2025-08-13 DIAGNOSIS — C50.911 BREAST CANCER, STAGE 3, RIGHT: Primary | ICD-10-CM

## 2025-08-13 PROCEDURE — 99213 OFFICE O/P EST LOW 20 MIN: CPT | Performed by: SURGERY

## 2025-08-14 ENCOUNTER — OFFICE VISIT (OUTPATIENT)
Dept: OBSTETRICS AND GYNECOLOGY | Facility: CLINIC | Age: 45
End: 2025-08-14
Payer: COMMERCIAL

## 2025-08-14 VITALS
HEIGHT: 65 IN | DIASTOLIC BLOOD PRESSURE: 66 MMHG | BODY MASS INDEX: 26.37 KG/M2 | WEIGHT: 158.25 LBS | SYSTOLIC BLOOD PRESSURE: 108 MMHG

## 2025-08-14 DIAGNOSIS — Z01.419 ROUTINE GYNECOLOGICAL EXAMINATION: ICD-10-CM

## 2025-08-14 DIAGNOSIS — Z01.419 CERVICAL SMEAR, AS PART OF ROUTINE GYNECOLOGICAL EXAMINATION: Primary | ICD-10-CM

## 2025-08-14 DIAGNOSIS — Z11.3 SCREEN FOR STD (SEXUALLY TRANSMITTED DISEASE): ICD-10-CM

## 2025-08-14 DIAGNOSIS — C50.911 BREAST CANCER, STAGE 3, RIGHT: ICD-10-CM

## 2025-08-14 DIAGNOSIS — N91.2 AMENORRHEA: ICD-10-CM

## 2025-08-14 DIAGNOSIS — Z11.51 SPECIAL SCREENING EXAMINATION FOR HUMAN PAPILLOMAVIRUS (HPV): ICD-10-CM

## 2025-08-14 DIAGNOSIS — Z13.89 SCREENING FOR GENITOURINARY CONDITION: ICD-10-CM

## 2025-08-14 DIAGNOSIS — Z71.85 HPV VACCINE COUNSELING: ICD-10-CM

## 2025-08-14 LAB
BILIRUB BLD-MCNC: NEGATIVE MG/DL
CLARITY, POC: CLEAR
COLOR UR: ABNORMAL
GLUCOSE UR STRIP-MCNC: NEGATIVE MG/DL
KETONES UR QL: ABNORMAL
LEUKOCYTE EST, POC: NEGATIVE
NITRITE UR-MCNC: NEGATIVE MG/ML
PH UR: 5 [PH] (ref 5–8)
PROT UR STRIP-MCNC: ABNORMAL MG/DL
RBC # UR STRIP: NEGATIVE /UL
SP GR UR: 1.01 (ref 1–1.03)
UROBILINOGEN UR QL: ABNORMAL

## 2025-08-15 ENCOUNTER — PATIENT ROUNDING (BHMG ONLY) (OUTPATIENT)
Dept: OBSTETRICS AND GYNECOLOGY | Facility: CLINIC | Age: 45
End: 2025-08-15
Payer: COMMERCIAL

## 2025-08-15 LAB
ESTRADIOL SERPL-MCNC: <5 PG/ML
FSH SERPL-ACNC: 103 MIU/ML
HBV SURFACE AG SERPL QL IA: NEGATIVE
HCV IGG SERPL QL IA: NON REACTIVE
HIV 1+2 AB+HIV1 P24 AG SERPL QL IA: NON REACTIVE
HSV1 IGG SERPL QL IA: REACTIVE
HSV2 IGG SERPL QL IA: REACTIVE
RPR SER QL: NON REACTIVE
TSH SERPL DL<=0.005 MIU/L-ACNC: 1.21 UIU/ML (ref 0.45–4.5)

## 2025-08-16 LAB
C TRACH RRNA SPEC QL NAA+PROBE: NEGATIVE
N GONORRHOEA RRNA SPEC QL NAA+PROBE: NEGATIVE
T VAGINALIS RRNA SPEC QL NAA+PROBE: NEGATIVE

## 2025-08-18 ENCOUNTER — HOSPITAL ENCOUNTER (OUTPATIENT)
Dept: SPEECH THERAPY | Facility: HOSPITAL | Age: 45
Setting detail: THERAPIES SERIES
Discharge: HOME | End: 2025-08-18
Payer: COMMERCIAL

## 2025-08-18 DIAGNOSIS — C50.911 BREAST CANCER, STAGE 3, RIGHT: Primary | ICD-10-CM

## 2025-08-18 DIAGNOSIS — R41.841 COGNITIVE COMMUNICATION DEFICIT: ICD-10-CM

## 2025-08-18 LAB
CYTOLOGIST CVX/VAG CYTO: NORMAL
CYTOLOGY CVX/VAG DOC CYTO: NORMAL
CYTOLOGY CVX/VAG DOC THIN PREP: NORMAL
DX ICD CODE: NORMAL
HPV I/H RISK 4 DNA CVX QL PROBE+SIG AMP: NEGATIVE
OTHER STN SPEC: NORMAL
SERVICE CMNT-IMP: NORMAL
STAT OF ADQ CVX/VAG CYTO-IMP: NORMAL

## 2025-08-18 PROCEDURE — 96125 COGNITIVE TEST BY HC PRO: CPT

## 2025-08-20 ENCOUNTER — LAB (OUTPATIENT)
Dept: ONCOLOGY | Facility: HOSPITAL | Age: 45
End: 2025-08-20
Payer: COMMERCIAL

## 2025-08-20 ENCOUNTER — OFFICE VISIT (OUTPATIENT)
Dept: ONCOLOGY | Facility: CLINIC | Age: 45
End: 2025-08-20
Payer: COMMERCIAL

## 2025-08-20 VITALS
WEIGHT: 159 LBS | DIASTOLIC BLOOD PRESSURE: 67 MMHG | HEIGHT: 65 IN | OXYGEN SATURATION: 98 % | BODY MASS INDEX: 26.49 KG/M2 | SYSTOLIC BLOOD PRESSURE: 90 MMHG | TEMPERATURE: 98.2 F | HEART RATE: 90 BPM | RESPIRATION RATE: 16 BRPM

## 2025-08-20 DIAGNOSIS — C50.919 MALIGNANT NEOPLASM OF BREAST IN FEMALE, ESTROGEN RECEPTOR POSITIVE, UNSPECIFIED LATERALITY, UNSPECIFIED SITE OF BREAST: ICD-10-CM

## 2025-08-20 DIAGNOSIS — Z79.69 IMMUNOSUPPRESSED DUE TO CHEMOTHERAPY: ICD-10-CM

## 2025-08-20 DIAGNOSIS — T45.1X5A IMMUNOSUPPRESSED DUE TO CHEMOTHERAPY: ICD-10-CM

## 2025-08-20 DIAGNOSIS — Z17.0 MALIGNANT NEOPLASM OF BREAST IN FEMALE, ESTROGEN RECEPTOR POSITIVE, UNSPECIFIED LATERALITY, UNSPECIFIED SITE OF BREAST: ICD-10-CM

## 2025-08-20 DIAGNOSIS — Z45.2 ENCOUNTER FOR CENTRAL LINE CARE: Primary | ICD-10-CM

## 2025-08-20 DIAGNOSIS — D84.821 IMMUNOSUPPRESSED DUE TO CHEMOTHERAPY: ICD-10-CM

## 2025-08-20 DIAGNOSIS — D50.9 IRON DEFICIENCY ANEMIA, UNSPECIFIED IRON DEFICIENCY ANEMIA TYPE: ICD-10-CM

## 2025-08-20 DIAGNOSIS — C50.911 BREAST CANCER, STAGE 3, RIGHT: Primary | ICD-10-CM

## 2025-08-20 DIAGNOSIS — C50.911 BREAST CANCER, STAGE 3, RIGHT: ICD-10-CM

## 2025-08-20 LAB
ALBUMIN SERPL-MCNC: 3.6 G/DL (ref 3.5–5.2)
ALBUMIN/GLOB SERPL: 1.2 G/DL
ALP SERPL-CCNC: 125 U/L (ref 39–117)
ALT SERPL W P-5'-P-CCNC: 33 U/L (ref 1–33)
ANION GAP SERPL CALCULATED.3IONS-SCNC: 12 MMOL/L (ref 5–15)
AST SERPL-CCNC: 30 U/L (ref 1–32)
BASOPHILS # BLD AUTO: 0.05 10*3/MM3 (ref 0–0.2)
BASOPHILS NFR BLD AUTO: 1 % (ref 0–1.5)
BILIRUB SERPL-MCNC: 0.2 MG/DL (ref 0–1.2)
BUN SERPL-MCNC: 9.6 MG/DL (ref 6–20)
BUN/CREAT SERPL: 14.8 (ref 7–25)
CALCIUM SPEC-SCNC: 9.4 MG/DL (ref 8.6–10.5)
CHLORIDE SERPL-SCNC: 103 MMOL/L (ref 98–107)
CO2 SERPL-SCNC: 22 MMOL/L (ref 22–29)
CREAT SERPL-MCNC: 0.65 MG/DL (ref 0.57–1)
DEPRECATED RDW RBC AUTO: 45.2 FL (ref 37–54)
EGFRCR SERPLBLD CKD-EPI 2021: 110.8 ML/MIN/1.73
EOSINOPHIL # BLD AUTO: 0.13 10*3/MM3 (ref 0–0.4)
EOSINOPHIL NFR BLD AUTO: 2.6 % (ref 0.3–6.2)
ERYTHROCYTE [DISTWIDTH] IN BLOOD BY AUTOMATED COUNT: 12.8 % (ref 12.3–15.4)
GLOBULIN UR ELPH-MCNC: 3 GM/DL
GLUCOSE SERPL-MCNC: 102 MG/DL (ref 65–99)
HCT VFR BLD AUTO: 41.2 % (ref 34–46.6)
HGB BLD-MCNC: 13.6 G/DL (ref 12–15.9)
IMM GRANULOCYTES # BLD AUTO: 0.03 10*3/MM3 (ref 0–0.05)
IMM GRANULOCYTES NFR BLD AUTO: 0.6 % (ref 0–0.5)
LYMPHOCYTES # BLD AUTO: 0.6 10*3/MM3 (ref 0.7–3.1)
LYMPHOCYTES NFR BLD AUTO: 12.2 % (ref 19.6–45.3)
MAGNESIUM SERPL-MCNC: 1.8 MG/DL (ref 1.6–2.6)
MCH RBC QN AUTO: 31.6 PG (ref 26.6–33)
MCHC RBC AUTO-ENTMCNC: 33 G/DL (ref 31.5–35.7)
MCV RBC AUTO: 95.6 FL (ref 79–97)
MONOCYTES # BLD AUTO: 0.5 10*3/MM3 (ref 0.1–0.9)
MONOCYTES NFR BLD AUTO: 10.2 % (ref 5–12)
NEUTROPHILS NFR BLD AUTO: 3.61 10*3/MM3 (ref 1.7–7)
NEUTROPHILS NFR BLD AUTO: 73.4 % (ref 42.7–76)
NRBC BLD AUTO-RTO: 0 /100 WBC (ref 0–0.2)
PLATELET # BLD AUTO: 229 10*3/MM3 (ref 140–450)
PMV BLD AUTO: 9.4 FL (ref 6–12)
POTASSIUM SERPL-SCNC: 3.9 MMOL/L (ref 3.5–5.2)
PROT SERPL-MCNC: 6.6 G/DL (ref 6–8.5)
RBC # BLD AUTO: 4.31 10*6/MM3 (ref 3.77–5.28)
SODIUM SERPL-SCNC: 137 MMOL/L (ref 136–145)
WBC NRBC COR # BLD AUTO: 4.92 10*3/MM3 (ref 3.4–10.8)

## 2025-08-20 PROCEDURE — 80053 COMPREHEN METABOLIC PANEL: CPT | Performed by: INTERNAL MEDICINE

## 2025-08-20 PROCEDURE — 25010000002 HEPARIN LOCK FLUSH PER 10 UNITS: Performed by: INTERNAL MEDICINE

## 2025-08-20 PROCEDURE — 85025 COMPLETE CBC W/AUTO DIFF WBC: CPT | Performed by: INTERNAL MEDICINE

## 2025-08-20 PROCEDURE — 83735 ASSAY OF MAGNESIUM: CPT | Performed by: INTERNAL MEDICINE

## 2025-08-20 PROCEDURE — 36591 DRAW BLOOD OFF VENOUS DEVICE: CPT

## 2025-08-20 RX ORDER — HEPARIN SODIUM (PORCINE) LOCK FLUSH IV SOLN 100 UNIT/ML 100 UNIT/ML
500 SOLUTION INTRAVENOUS AS NEEDED
OUTPATIENT
Start: 2025-08-20

## 2025-08-20 RX ORDER — SODIUM CHLORIDE 0.9 % (FLUSH) 0.9 %
10 SYRINGE (ML) INJECTION AS NEEDED
OUTPATIENT
Start: 2025-08-20

## 2025-08-20 RX ORDER — HEPARIN SODIUM (PORCINE) LOCK FLUSH IV SOLN 100 UNIT/ML 100 UNIT/ML
500 SOLUTION INTRAVENOUS AS NEEDED
Status: DISCONTINUED | OUTPATIENT
Start: 2025-08-20 | End: 2025-08-20 | Stop reason: HOSPADM

## 2025-08-20 RX ORDER — SODIUM CHLORIDE 0.9 % (FLUSH) 0.9 %
10 SYRINGE (ML) INJECTION AS NEEDED
Status: DISCONTINUED | OUTPATIENT
Start: 2025-08-20 | End: 2025-08-20 | Stop reason: HOSPADM

## 2025-08-20 RX ORDER — TAMOXIFEN CITRATE 20 MG/1
20 TABLET ORAL DAILY
Qty: 30 TABLET | Refills: 1 | Status: SHIPPED | OUTPATIENT
Start: 2025-08-20

## 2025-08-20 RX ADMIN — Medication 500 UNITS: at 09:25

## 2025-08-20 RX ADMIN — Medication 10 ML: at 09:25

## 2025-08-25 ENCOUNTER — HOSPITAL ENCOUNTER (OUTPATIENT)
Dept: SPEECH THERAPY | Facility: HOSPITAL | Age: 45
Setting detail: THERAPIES SERIES
Discharge: HOME | End: 2025-08-25
Payer: COMMERCIAL

## 2025-08-25 DIAGNOSIS — C50.911 BREAST CANCER, STAGE 3, RIGHT: ICD-10-CM

## 2025-08-25 DIAGNOSIS — R41.841 COGNITIVE COMMUNICATION DEFICIT: Primary | ICD-10-CM

## 2025-08-25 PROCEDURE — 92507 TX SP LANG VOICE COMM INDIV: CPT

## (undated) DEVICE — SUT SILK 2/0 SUTUPAK TIES 24IN SA75H

## (undated) DEVICE — DRP C/ARM 41X74IN

## (undated) DEVICE — UNDYED MONOFILAMENT (POLYDIOXANONE), ABSORBABLE SYNTHETIC SURGICAL SUTURE: Brand: PDS

## (undated) DEVICE — IRRIGATOR BULB ASEPTO 60CC STRL

## (undated) DEVICE — TOWEL,OR,DSP,ST,BLUE,STD,4/PK,20PK/CS: Brand: MEDLINE

## (undated) DEVICE — NDL HYPO PRECISIONGLIDE REG 25G 1 1/2

## (undated) DEVICE — DRN WND HUBLSS FLUT FULL PERF SIL10MM

## (undated) DEVICE — SUT SILK 2/0 SH 30IN K833H

## (undated) DEVICE — DRAPE,REIN 53X77,STERILE: Brand: MEDLINE

## (undated) DEVICE — TRANSFER SET 3": Brand: MEDLINE INDUSTRIES, INC.

## (undated) DEVICE — SYR LL TP 10ML STRL

## (undated) DEVICE — DRSNG PAD ABD 8X10IN STRL

## (undated) DEVICE — PK PROC MAJ 90

## (undated) DEVICE — STANDARD HYPODERMIC NEEDLE,POLYPROPYLENE HUB: Brand: MONOJECT

## (undated) DEVICE — ANTIBACTERIAL UNDYED BRAIDED (POLYGLACTIN 910), SYNTHETIC ABSORBABLE SUTURE: Brand: COATED VICRYL

## (undated) DEVICE — PAD,NON-ADHERENT,3X8,STERILE,LF,1/PK: Brand: MEDLINE

## (undated) DEVICE — GAUZE,SPONGE,FLUFF,6"X6.75",STRL,5/TRAY: Brand: MEDLINE

## (undated) DEVICE — GAUZE,SPONGE,4"X4",16PLY,XRAY,STRL,LF: Brand: MEDLINE

## (undated) DEVICE — BLD TONG INDIV/WRP A/ 6IN STRL

## (undated) DEVICE — SUT VIC 3/0 CTI 36IN J944H

## (undated) DEVICE — PROXIMATE RH ROTATING HEAD SKIN STAPLERS (35 WIDE) CONTAINS 35 STAINLESS STEEL STAPLES: Brand: PROXIMATE

## (undated) DEVICE — LAG MINOR PROCEDURE: Brand: MEDLINE INDUSTRIES, INC.

## (undated) DEVICE — SUT MNCRYL 2/0 SH 27IN Y417H

## (undated) DEVICE — CVR PROB INTRAOP L TP 12X244CM

## (undated) DEVICE — STOCKINETTE,IMPERVIOUS,12X48,STERILE: Brand: MEDLINE

## (undated) DEVICE — SUT PROLN 2/0 CT2 30IN 8411H

## (undated) DEVICE — SYRINGE, LUER LOCK, 5ML: Brand: MEDLINE

## (undated) DEVICE — APPL CHLORAPREP HI/LITE 26ML ORNG

## (undated) DEVICE — SUT SILK 2/0 SH CR5 18IN C0125

## (undated) DEVICE — SUT GUT CHRM 2/0 LIGAPAK 54IN L113G

## (undated) DEVICE — SPONGE,LAP,18"X18",DLX,XR,ST,5/PK,40/PK: Brand: MEDLINE

## (undated) DEVICE — CONTAINER,SPECIMEN,OR STERILE,4OZ: Brand: MEDLINE

## (undated) DEVICE — PICO 14 10CM X 30CM US CTN1: Brand: PICO 14

## (undated) DEVICE — SUT SILK 2/0 TIES 18IN A185H

## (undated) DEVICE — HARMONIC FOCUS SHEARS 9CM LENGTH + ADAPTIVE TISSUE TECHNOLOGY FOR USE WITH BLUE HAND PIECE ONLY: Brand: HARMONIC FOCUS

## (undated) DEVICE — GLV SURG PREMIERPRO ORTHO LTX PF SZ8 BRN

## (undated) DEVICE — SOL IRR H2O BO 1000ML STRL

## (undated) DEVICE — 3M™ STERI-STRIP™ ANTIMICROBIAL SKIN CLOSURES 1/2 INCH X 4 INCHES 50/CARTON 4 CARTONS/CASE A1847: Brand: 3M™ STERI-STRIP™

## (undated) DEVICE — STRIP,CLOSURE,WOUND,MEDI-STRIP,1/2X4: Brand: MEDLINE

## (undated) DEVICE — SUT ETHLN 3/0 PS2 18 IN 1669H

## (undated) DEVICE — SUT SILK 3/0 TIES 18IN A184H

## (undated) DEVICE — KT DRN EVAC WND PVC 15F3/16IN 400CC

## (undated) DEVICE — SPONGE,LAP,4"X18",XR,ST,5/PK,40PK/CS: Brand: MEDLINE INDUSTRIES, INC.

## (undated) DEVICE — INTENDED FOR TISSUE SEPARATION, AND OTHER PROCEDURES THAT REQUIRE A SHARP SURGICAL BLADE TO PUNCTURE OR CUT.: Brand: BARD-PARKER ® STAINLESS STEEL BLADES

## (undated) DEVICE — ADHS SKIN PREMIERPRO EXOFIN TOPICAL HI/VISC .5ML

## (undated) DEVICE — HYPODERMIC SAFETY NEEDLE: Brand: MAGELLAN

## (undated) DEVICE — RESERVOIR,SUCTION,100CC,SILICONE: Brand: MEDLINE

## (undated) DEVICE — PK ENT MINOR 40

## (undated) DEVICE — DRSNG SURESITE WNDW 4X4.5

## (undated) DEVICE — PENCL SMOKE/EVAC MEGADYNE TELESCP 10FT

## (undated) DEVICE — TOTAL TRAY, DB, 100% SILI FOLEY, 16FR 10: Brand: MEDLINE

## (undated) DEVICE — GAUZE,SPONGE,4"X4",12PLY,STERILE,LF,2'S: Brand: MEDLINE

## (undated) DEVICE — GLV SURG SENSICARE W/ALOE PF LF 6.5 STRL

## (undated) DEVICE — SOL IRR H2O BTL 1000ML STRL

## (undated) DEVICE — Device